# Patient Record
Sex: FEMALE | Race: WHITE | Employment: OTHER | URBAN - METROPOLITAN AREA
[De-identification: names, ages, dates, MRNs, and addresses within clinical notes are randomized per-mention and may not be internally consistent; named-entity substitution may affect disease eponyms.]

---

## 2018-02-13 ENCOUNTER — HOSPITAL ENCOUNTER (OUTPATIENT)
Age: 58
Setting detail: OBSERVATION
Discharge: PSYCHIATRIC OR SUBSTANCE USE FACILITY | End: 2018-02-16
Attending: EMERGENCY MEDICINE | Admitting: INTERNAL MEDICINE
Payer: MEDICARE

## 2018-02-13 ENCOUNTER — APPOINTMENT (OUTPATIENT)
Dept: GENERAL RADIOLOGY | Age: 58
End: 2018-02-13
Attending: INTERNAL MEDICINE
Payer: MEDICARE

## 2018-02-13 ENCOUNTER — APPOINTMENT (OUTPATIENT)
Dept: CT IMAGING | Age: 58
End: 2018-02-13
Attending: EMERGENCY MEDICINE
Payer: MEDICARE

## 2018-02-13 DIAGNOSIS — R41.82 ALTERED MENTAL STATUS, UNSPECIFIED ALTERED MENTAL STATUS TYPE: ICD-10-CM

## 2018-02-13 DIAGNOSIS — E87.6 HYPOKALEMIA: Primary | ICD-10-CM

## 2018-02-13 PROBLEM — E83.39 HYPOPHOSPHATEMIA: Status: ACTIVE | Noted: 2018-02-13
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
## 2018-02-13 PROBLEM — F10.10 ALCOHOL ABUSE: Status: ACTIVE | Noted: 2018-02-13

## 2018-02-13 PROBLEM — F32.A DEPRESSION: Status: ACTIVE | Noted: 2018-02-13

## 2018-02-13 PROBLEM — G93.41 ACUTE METABOLIC ENCEPHALOPATHY: Status: ACTIVE | Noted: 2018-02-13

## 2018-02-13 PROBLEM — F01.50 VASCULAR DEMENTIA (HCC): Status: ACTIVE | Noted: 2018-02-13

## 2018-02-13 PROBLEM — N17.9 AKI (ACUTE KIDNEY INJURY) (HCC): Status: ACTIVE | Noted: 2018-02-13

## 2018-02-13 PROBLEM — E83.42 HYPOMAGNESEMIA: Status: ACTIVE | Noted: 2018-02-13

## 2018-02-13 LAB
ALBUMIN SERPL-MCNC: 2.7 G/DL (ref 3.5–5)
ALBUMIN/GLOB SERPL: 0.8 {RATIO} (ref 1.2–3.5)
ALP SERPL-CCNC: 207 U/L (ref 50–136)
ALT SERPL-CCNC: 36 U/L (ref 12–65)
AMMONIA PLAS-SCNC: <10 UMOL/L (ref 11–32)
AMPHET UR QL SCN: NEGATIVE
ANION GAP SERPL CALC-SCNC: 9 MMOL/L (ref 7–16)
APPEARANCE UR: ABNORMAL
AST SERPL-CCNC: 34 U/L (ref 15–37)
BACTERIA URNS QL MICRO: ABNORMAL /HPF
BARBITURATES UR QL SCN: NEGATIVE
BASOPHILS # BLD: 0.1 K/UL (ref 0–0.2)
BASOPHILS NFR BLD: 1 % (ref 0–2)
BENZODIAZ UR QL: NEGATIVE
BILIRUB SERPL-MCNC: 1.5 MG/DL (ref 0.2–1.1)
BILIRUB UR QL: NEGATIVE
BUN SERPL-MCNC: 11 MG/DL (ref 6–23)
CALCIUM SERPL-MCNC: 9.2 MG/DL (ref 8.3–10.4)
CANNABINOIDS UR QL SCN: NEGATIVE
CASTS URNS QL MICRO: ABNORMAL /LPF
CHLORIDE SERPL-SCNC: 99 MMOL/L (ref 98–107)
CO2 SERPL-SCNC: 33 MMOL/L (ref 21–32)
COCAINE UR QL SCN: NEGATIVE
COLOR UR: YELLOW
CREAT SERPL-MCNC: 1.94 MG/DL (ref 0.6–1)
DIFFERENTIAL METHOD BLD: ABNORMAL
EOSINOPHIL # BLD: 0 K/UL (ref 0–0.8)
EOSINOPHIL NFR BLD: 1 % (ref 0.5–7.8)
EPI CELLS #/AREA URNS HPF: ABNORMAL /HPF
ERYTHROCYTE [DISTWIDTH] IN BLOOD BY AUTOMATED COUNT: 15.6 % (ref 11.9–14.6)
GLOBULIN SER CALC-MCNC: 3.4 G/DL (ref 2.3–3.5)
GLUCOSE SERPL-MCNC: 85 MG/DL (ref 65–100)
GLUCOSE UR STRIP.AUTO-MCNC: NEGATIVE MG/DL
HCT VFR BLD AUTO: 39.3 % (ref 35.8–46.3)
HGB BLD-MCNC: 12.9 G/DL (ref 11.7–15.4)
HGB UR QL STRIP: NEGATIVE
IMM GRANULOCYTES # BLD: 0 K/UL (ref 0–0.5)
IMM GRANULOCYTES NFR BLD AUTO: 0 % (ref 0–5)
KETONES UR QL STRIP.AUTO: NEGATIVE MG/DL
LACTATE BLD-SCNC: 1.4 MMOL/L (ref 0.5–1.9)
LEUKOCYTE ESTERASE UR QL STRIP.AUTO: NEGATIVE
LYMPHOCYTES # BLD: 1.6 K/UL (ref 0.5–4.6)
LYMPHOCYTES NFR BLD: 20 % (ref 13–44)
MAGNESIUM SERPL-MCNC: 1.4 MG/DL (ref 1.8–2.4)
MCH RBC QN AUTO: 29.8 PG (ref 26.1–32.9)
MCHC RBC AUTO-ENTMCNC: 32.8 G/DL (ref 31.4–35)
MCV RBC AUTO: 90.8 FL (ref 79.6–97.8)
METHADONE UR QL: NEGATIVE
MONOCYTES # BLD: 1.1 K/UL (ref 0.1–1.3)
MONOCYTES NFR BLD: 13 % (ref 4–12)
NEUTS SEG # BLD: 5.2 K/UL (ref 1.7–8.2)
NEUTS SEG NFR BLD: 65 % (ref 43–78)
NITRITE UR QL STRIP.AUTO: NEGATIVE
OPIATES UR QL: NEGATIVE
PCP UR QL: NEGATIVE
PH UR STRIP: 7 [PH] (ref 5–9)
PHOSPHATE SERPL-MCNC: 2 MG/DL (ref 2.5–4.5)
PLATELET # BLD AUTO: 163 K/UL (ref 150–450)
PMV BLD AUTO: 9.9 FL (ref 10.8–14.1)
POTASSIUM SERPL-SCNC: 2.5 MMOL/L (ref 3.5–5.1)
PROT SERPL-MCNC: 6.1 G/DL (ref 6.3–8.2)
PROT UR STRIP-MCNC: 100 MG/DL
RBC # BLD AUTO: 4.33 M/UL (ref 4.05–5.25)
RBC #/AREA URNS HPF: ABNORMAL /HPF
SODIUM SERPL-SCNC: 141 MMOL/L (ref 136–145)
SP GR UR REFRACTOMETRY: 1 (ref 1–1.02)
UROBILINOGEN UR QL STRIP.AUTO: 0.2 EU/DL (ref 0.2–1)
WBC # BLD AUTO: 7.9 K/UL (ref 4.3–11.1)
WBC URNS QL MICRO: ABNORMAL /HPF

## 2018-02-13 PROCEDURE — 81003 URINALYSIS AUTO W/O SCOPE: CPT | Performed by: INTERNAL MEDICINE

## 2018-02-13 PROCEDURE — 85025 COMPLETE CBC W/AUTO DIFF WBC: CPT | Performed by: EMERGENCY MEDICINE

## 2018-02-13 PROCEDURE — 74011250637 HC RX REV CODE- 250/637: Performed by: EMERGENCY MEDICINE

## 2018-02-13 PROCEDURE — 65390000012 HC CONDITION CODE 44 OBSERVATION

## 2018-02-13 PROCEDURE — 84100 ASSAY OF PHOSPHORUS: CPT | Performed by: INTERNAL MEDICINE

## 2018-02-13 PROCEDURE — 99218 HC RM OBSERVATION: CPT

## 2018-02-13 PROCEDURE — 99285 EMERGENCY DEPT VISIT HI MDM: CPT | Performed by: EMERGENCY MEDICINE

## 2018-02-13 PROCEDURE — 96372 THER/PROPH/DIAG INJ SC/IM: CPT

## 2018-02-13 PROCEDURE — 83735 ASSAY OF MAGNESIUM: CPT | Performed by: INTERNAL MEDICINE

## 2018-02-13 PROCEDURE — 74011250637 HC RX REV CODE- 250/637: Performed by: INTERNAL MEDICINE

## 2018-02-13 PROCEDURE — 81003 URINALYSIS AUTO W/O SCOPE: CPT | Performed by: EMERGENCY MEDICINE

## 2018-02-13 PROCEDURE — 65270000029 HC RM PRIVATE

## 2018-02-13 PROCEDURE — 71045 X-RAY EXAM CHEST 1 VIEW: CPT

## 2018-02-13 PROCEDURE — 83605 ASSAY OF LACTIC ACID: CPT

## 2018-02-13 PROCEDURE — 82140 ASSAY OF AMMONIA: CPT | Performed by: EMERGENCY MEDICINE

## 2018-02-13 PROCEDURE — 74011250636 HC RX REV CODE- 250/636: Performed by: INTERNAL MEDICINE

## 2018-02-13 PROCEDURE — 70450 CT HEAD/BRAIN W/O DYE: CPT

## 2018-02-13 PROCEDURE — 80053 COMPREHEN METABOLIC PANEL: CPT | Performed by: EMERGENCY MEDICINE

## 2018-02-13 PROCEDURE — 80307 DRUG TEST PRSMV CHEM ANLYZR: CPT | Performed by: EMERGENCY MEDICINE

## 2018-02-13 RX ORDER — ATORVASTATIN CALCIUM 40 MG/1
40 TABLET, FILM COATED ORAL DAILY
Status: DISCONTINUED | OUTPATIENT
Start: 2018-02-14 | End: 2018-02-17 | Stop reason: HOSPADM

## 2018-02-13 RX ORDER — SODIUM CHLORIDE 9 MG/ML
75 INJECTION, SOLUTION INTRAVENOUS CONTINUOUS
Status: DISCONTINUED | OUTPATIENT
Start: 2018-02-13 | End: 2018-02-14

## 2018-02-13 RX ORDER — MAGNESIUM SULFATE HEPTAHYDRATE 40 MG/ML
2 INJECTION, SOLUTION INTRAVENOUS ONCE
Status: DISCONTINUED | OUTPATIENT
Start: 2018-02-13 | End: 2018-02-13

## 2018-02-13 RX ORDER — FOLIC ACID 1 MG/1
1 TABLET ORAL DAILY
Status: DISCONTINUED | OUTPATIENT
Start: 2018-02-14 | End: 2018-02-17 | Stop reason: HOSPADM

## 2018-02-13 RX ORDER — AMLODIPINE BESYLATE 5 MG/1
5 TABLET ORAL DAILY
Status: DISCONTINUED | OUTPATIENT
Start: 2018-02-14 | End: 2018-02-17 | Stop reason: HOSPADM

## 2018-02-13 RX ORDER — ONDANSETRON 8 MG/1
8 TABLET, ORALLY DISINTEGRATING ORAL
Status: COMPLETED | OUTPATIENT
Start: 2018-02-13 | End: 2018-02-13

## 2018-02-13 RX ORDER — LORAZEPAM 2 MG/ML
2 INJECTION INTRAMUSCULAR
Status: DISCONTINUED | OUTPATIENT
Start: 2018-02-13 | End: 2018-02-13

## 2018-02-13 RX ORDER — SODIUM,POTASSIUM PHOSPHATES 280-250MG
1 POWDER IN PACKET (EA) ORAL 4 TIMES DAILY
Status: DISCONTINUED | OUTPATIENT
Start: 2018-02-13 | End: 2018-02-17 | Stop reason: HOSPADM

## 2018-02-13 RX ORDER — LORAZEPAM 2 MG/ML
2 INJECTION INTRAMUSCULAR
Status: DISCONTINUED | OUTPATIENT
Start: 2018-02-13 | End: 2018-02-17 | Stop reason: HOSPADM

## 2018-02-13 RX ORDER — CLOPIDOGREL BISULFATE 75 MG/1
75 TABLET ORAL DAILY
Status: DISCONTINUED | OUTPATIENT
Start: 2018-02-14 | End: 2018-02-14

## 2018-02-13 RX ORDER — LANOLIN ALCOHOL/MO/W.PET/CERES
800 CREAM (GRAM) TOPICAL ONCE
Status: COMPLETED | OUTPATIENT
Start: 2018-02-13 | End: 2018-02-13

## 2018-02-13 RX ORDER — ACETAMINOPHEN 325 MG/1
650 TABLET ORAL
Status: DISCONTINUED | OUTPATIENT
Start: 2018-02-13 | End: 2018-02-17 | Stop reason: HOSPADM

## 2018-02-13 RX ORDER — POTASSIUM CHLORIDE 20 MEQ/1
40 TABLET, EXTENDED RELEASE ORAL
Status: COMPLETED | OUTPATIENT
Start: 2018-02-13 | End: 2018-02-13

## 2018-02-13 RX ORDER — HEPARIN SODIUM 5000 [USP'U]/ML
5000 INJECTION, SOLUTION INTRAVENOUS; SUBCUTANEOUS EVERY 8 HOURS
Status: DISCONTINUED | OUTPATIENT
Start: 2018-02-13 | End: 2018-02-17 | Stop reason: HOSPADM

## 2018-02-13 RX ORDER — POTASSIUM CHLORIDE 20 MEQ/1
20 TABLET, EXTENDED RELEASE ORAL
Status: COMPLETED | OUTPATIENT
Start: 2018-02-13 | End: 2018-02-13

## 2018-02-13 RX ORDER — PANTOPRAZOLE SODIUM 40 MG/1
40 TABLET, DELAYED RELEASE ORAL
Status: DISCONTINUED | OUTPATIENT
Start: 2018-02-14 | End: 2018-02-17 | Stop reason: HOSPADM

## 2018-02-13 RX ORDER — LANOLIN ALCOHOL/MO/W.PET/CERES
400 CREAM (GRAM) TOPICAL 2 TIMES DAILY
Status: DISCONTINUED | OUTPATIENT
Start: 2018-02-14 | End: 2018-02-17 | Stop reason: HOSPADM

## 2018-02-13 RX ORDER — LANOLIN ALCOHOL/MO/W.PET/CERES
100 CREAM (GRAM) TOPICAL DAILY
Status: DISCONTINUED | OUTPATIENT
Start: 2018-02-14 | End: 2018-02-17 | Stop reason: HOSPADM

## 2018-02-13 RX ORDER — POTASSIUM CHLORIDE 14.9 MG/ML
20 INJECTION INTRAVENOUS ONCE
Status: DISCONTINUED | OUTPATIENT
Start: 2018-02-13 | End: 2018-02-13

## 2018-02-13 RX ADMIN — POTASSIUM CHLORIDE 40 MEQ: 20 TABLET, EXTENDED RELEASE ORAL at 14:24

## 2018-02-13 RX ADMIN — POTASSIUM CHLORIDE 20 MEQ: 20 TABLET, EXTENDED RELEASE ORAL at 19:13

## 2018-02-13 RX ADMIN — LORAZEPAM 2 MG: 2 INJECTION INTRAMUSCULAR; INTRAVENOUS at 22:58

## 2018-02-13 RX ADMIN — POTASSIUM & SODIUM PHOSPHATES POWDER PACK 280-160-250 MG 1 PACKET: 280-160-250 PACK at 22:58

## 2018-02-13 RX ADMIN — Medication 800 MG: at 19:14

## 2018-02-13 RX ADMIN — HEPARIN SODIUM 5000 UNITS: 5000 INJECTION, SOLUTION INTRAVENOUS; SUBCUTANEOUS at 19:14

## 2018-02-13 RX ADMIN — ONDANSETRON 8 MG: 8 TABLET, ORALLY DISINTEGRATING ORAL at 19:14

## 2018-02-13 NOTE — ED PROVIDER NOTES
HPI:  62 F, brought in by family member with concern that she has not eaten for over 3 weeks.  stated she was diagnosed with a stroke in February 2017 went to rehabilitation for 1 month in May 2017 is discharged home. Since then she has not been eating and drinking. Has been refusing to take care of herself. Stated she is able to get up and go urinate. + drinking fluids. No fever. Patient denies any headache, neck stiffness, abdominal pain nausea vomiting or diarrhea. No rash. Stated she does not have an appetite. ROS  Constitutional: No fever, no chills  Skin: no rash  Eye: No vision changes  ENMT: No sore throat, no congestion  Respiratory: No shortness of breath, no cough  Cardiovascular: No chest pain  Gastrointestinal: No vomiting, no nausea, no diarrhea, no abdominal pain  : No dysuria  MSK: No back pain, no muscle pain, no joint pain  Neuro: No headache, no change in mental status, no numbness, no tingling, no weakness    All other review of systems positive per history of present illness and the above otherwise negative or noncontributory.     Visit Vitals    BP (!) 154/92    Pulse (!) 16    Temp 99.1 °F (37.3 °C)    Resp 16    Ht 5' 6\" (1.676 m)    Wt 86.2 kg (190 lb)    SpO2 98%    BMI 30.67 kg/m2     Past Medical History:   Diagnosis Date    Aneurysm of common carotid artery ([facility] [address] 2004    left side s/p coil     CKD (chronic kidney disease) 9/18/2014    Dementia     FSGS (focal segmental glomerulosclerosis)     in remission at present    Gout     Hypertension     managed with medication     Osteoarthritis 9/18/2014    Stroke ([facility] [address] 2004, 2013    slight weakness on right side, slight effect to speech     Past Surgical History:   Procedure Laterality Date    HX ANKLE FRACTURE TX Left 2013    has hardware in   [address] [facility]  2004    left carotid     HX ORTHOPAEDIC Right 10/2014    hip replacement    HX REFRACTIVE SURGERY      bilateral - Lasik Prior to Admission Medications   Prescriptions Last Dose Informant Patient Reported? Taking? Cetirizine (ZYRTEC) 10 mg cap   Yes No   Sig: Take 10 mg by mouth daily. POTASSIUM GLUCONATE PO   Yes No   Sig: Take 1 capsule by mouth as needed. Takes one capsule as needed   aspirin (ASPIRIN) 325 mg tablet   Yes No   Sig: Take 325 mg by mouth daily. furosemide (LASIX) 20 mg tablet   No No   Sig: Take two or three tablets every day   ibuprofen (MOTRIN IB) 200 mg tablet   Yes No   Sig: Take 200 mg by mouth daily. multivitamin (ONE A DAY) tablet   Yes No   Sig: Take 1 tablet by mouth daily. olmesartan (BENICAR) 40 mg tablet   Yes No   Sig: Take 20 mg by mouth daily. oxyCODONE IR (ROXICODONE) 5 mg immediate release tablet   No No   Sig: Take 1-2 tablets by mouth every four (4) hours as needed. pravastatin (PRAVACHOL) 20 mg tablet   No No   Sig: Take 1 tablet by mouth nightly. promethazine (PHENERGAN) 25 mg tablet   No No   Sig: Take 1 tablet by mouth every six (6) hours as needed. traZODone (DESYREL) 100 mg tablet   No No   Sig: Take 1 Tab by mouth nightly. valerian root 500 mg cap   Yes No   Sig: Take 500 mg by mouth daily. vitamin E (AQUA GEMS) 400 unit capsule   Yes No   Sig: Take 400 Units by mouth as needed. ziprasidone hcl (GEODON) 60 mg capsule   Yes No   Sig: Take 60 mg by mouth daily.       Facility-Administered Medications: None         Adult Exam   General: alert, no acute distress  Head: normocephalic, atraumatic  ENT: moist mucous membranes  Neck: supple, non-tender; full range of motion  Cardiovascular: regular rate and rhythm, normal peripheral perfusion, no edema  Respiratory: lungs are clear to auscultation; normal respirations; no wheezing, rales or rhonchi  Gastrointestinal: soft, non-tender; no rebound or guarding, no peritoneal signs, no distension  Back: non-tender, full range of motion  Musculoskeletal: normal range of motion, normal strength, no gross deformities  Neurological: alert and oriented x 4, no gross focal deficits; normal speech  Psychiatric: cooperative; appropriate mood and affect    MDM:neurologically intact nontoxic well-appearing. There is no complaint at this time. I did affect that she does not have an appetite. She denies any difficulty swallowing. Denies any abdominal pain nausea vomiting when eating. Just stated she doesn't feel like eating. We'll obtain lab work, CT scan of the head for assessment. Recent Results (from the past 12 hour(s))   CBC WITH AUTOMATED DIFF    Collection Time: 02/13/18  1:01 PM   Result Value Ref Range    WBC 7.9 4.3 - 11.1 K/uL    RBC 4.33 4.05 - 5.25 M/uL    HGB 12.9 11.7 - 15.4 g/dL    HCT 39.3 35.8 - 46.3 %    MCV 90.8 79.6 - 97.8 FL    MCH 29.8 26.1 - 32.9 PG    MCHC 32.8 31.4 - 35.0 g/dL    RDW 15.6 (H) 11.9 - 14.6 %    PLATELET 797 872 - 507 K/uL    MPV 9.9 (L) 10.8 - 14.1 FL    DF AUTOMATED      NEUTROPHILS 65 43 - 78 %    LYMPHOCYTES 20 13 - 44 %    MONOCYTES 13 (H) 4.0 - 12.0 %    EOSINOPHILS 1 0.5 - 7.8 %    BASOPHILS 1 0.0 - 2.0 %    IMMATURE GRANULOCYTES 0 0.0 - 5.0 %    ABS. NEUTROPHILS 5.2 1.7 - 8.2 K/UL    ABS. LYMPHOCYTES 1.6 0.5 - 4.6 K/UL    ABS. MONOCYTES 1.1 0.1 - 1.3 K/UL    ABS. EOSINOPHILS 0.0 0.0 - 0.8 K/UL    ABS. BASOPHILS 0.1 0.0 - 0.2 K/UL    ABS. IMM. GRANS. 0.0 0.0 - 0.5 K/UL   METABOLIC PANEL, COMPREHENSIVE    Collection Time: 02/13/18  1:01 PM   Result Value Ref Range    Sodium 141 136 - 145 mmol/L    Potassium 2.5 (LL) 3.5 - 5.1 mmol/L    Chloride 99 98 - 107 mmol/L    CO2 33 (H) 21 - 32 mmol/L    Anion gap 9 7 - 16 mmol/L    Glucose 85 65 - 100 mg/dL    BUN 11 6 - 23 MG/DL    Creatinine 1.94 (H) 0.6 - 1.0 MG/DL    GFR est AA 34 (L) >60 ml/min/1.73m2    GFR est non-AA 28 (L) >60 ml/min/1.73m2    Calcium 9.2 8.3 - 10.4 MG/DL    Bilirubin, total 1.5 (H) 0.2 - 1.1 MG/DL    ALT (SGPT) 36 12 - 65 U/L    AST (SGOT) 34 15 - 37 U/L    Alk.  phosphatase 207 (H) 50 - 136 U/L Protein, total 6.1 (L) 6.3 - 8.2 g/dL    Albumin 2.7 (L) 3.5 - 5.0 g/dL    Globulin 3.4 2.3 - 3.5 g/dL    A-G Ratio 0.8 (L) 1.2 - 3.5     AMMONIA    Collection Time: 02/13/18  1:01 PM   Result Value Ref Range    Ammonia <10 (L) 11 - 32 UMOL/L   MAGNESIUM    Collection Time: 02/13/18  1:01 PM   Result Value Ref Range    Magnesium 1.4 (LL) 1.8 - 2.4 mg/dL   PHOSPHORUS    Collection Time: 02/13/18  1:01 PM   Result Value Ref Range    Phosphorus 2.0 (L) 2.5 - 4.5 MG/DL   POC LACTIC ACID    Collection Time: 02/13/18  1:13 PM   Result Value Ref Range    Lactic Acid (POC) 1.4 0.5 - 1.9 mmol/L   DRUG SCREEN, URINE    Collection Time: 02/13/18  3:16 PM   Result Value Ref Range    PCP(PHENCYCLIDINE) NEGATIVE       BENZODIAZEPINES NEGATIVE       COCAINE NEGATIVE       AMPHETAMINES NEGATIVE       METHADONE NEGATIVE       THC (TH-CANNABINOL) NEGATIVE       OPIATES NEGATIVE       BARBITURATES NEGATIVE      URINALYSIS W/ RFLX MICROSCOPIC    Collection Time: 02/13/18  3:16 PM   Result Value Ref Range    Color YELLOW      Appearance CLOUDY      Specific gravity 1.003 1.001 - 1.023      pH (UA) 7.0 5.0 - 9.0      Protein 100 (A) NEG mg/dL    Glucose NEGATIVE  NEG mg/dL    Ketone NEGATIVE  NEG mg/dL    Bilirubin NEGATIVE  NEG      Blood NEGATIVE  NEG      Urobilinogen 0.2 0.2 - 1.0 EU/dL    Nitrites NEGATIVE  NEG      Leukocyte Esterase NEGATIVE  NEG      WBC 0-3 0 /hpf    RBC 0-3 0 /hpf    Epithelial cells 3-5 0 /hpf    Bacteria TRACE 0 /hpf    Casts 0-3 0 /lpf       Xr Chest Sngl V    Result Date: 2/13/2018  Portable chest x-ray INDICATION: Altered mental status COMPARISON: 09/18/2014 FINDINGS: Lungs are clear and normally expanded. Normal cardiomediastinal silhouette. No pleural effusion or pneumothorax. Surrounding bones are intact. IMPRESSION: No acute abnormality. Ct Head Wo Cont    Result Date: 2/13/2018  CT BRAIN WITHOUT CONTRAST 2/13/2018 HISTORY:  History of stroke.  No other progressive confusion and deteriorating mental status COMPARISON:  CT brain 10/2/2014 TECHNIQUE:  Axial scans without contrast.  Radiation dose reduction techniques were used for this study: All CT scans performed at this facility use one or all of the following: Automated exposure control, adjustment of the mA and/or kVp according to patient's size, iterative reconstruction. FINDINGS:  Stable, chronic left posterior frontal and adjacent anterior temporal lobe infarct. Diffuse advanced cortical volume loss again noted. Small chronic left posterior watershed infarct. Embolization coil within the left parasellar ICA. Exam negative for acute hemorrhage, mass, or mass effect. No definite CT evidence of acute major vascular territory infarct. IMPRESSION:  No acute CT findings the brain with stable appearing chronic changes as above. CT neg. CXR neg  Hypokalemia. She has AMS per family. This may have psychiatric component but I feel patient need to have further evaluation including possible MRI head for AMS. Spoke with hospitalist. Patient will be admitted for further evaluation   Dragon voice recognition software was used to create this note. Although the note has been reviewed and corrected where necessary, additional errors may have been overlooked and remain in the text.

## 2018-02-13 NOTE — ED TRIAGE NOTES
Patient with  stating that for past 2 months patient has refused to take her medications and decreased appetite. Patient has been refusing personal care stating has only taken one bath since discharge from [facility] in May 2017. Patient denies any complaints. Patient history of CVA and has been having increased confusion over past month. Patient only answers question in relation to family member at this time. Patient with bowel movement on herself yesterday.

## 2018-02-13 NOTE — H&P
History and Physical    Patient: Christophe Pressley MRN: [id number]  SSN: [id number]    YOB: 1960  Age: 62 y.o. Sex: female      Subjective:   Cc: \" I have decreased appetite\"    Christophe Pressley is a 62 y.o. female who has a PMH of vascular dementia, HTN, dyslipidemia, chronic alcohol intake, remote history of bipolar disorder in no treatment, CKD stage III, multiple mechanical falls who was brought in by her  since he noted she had been refusing to eat and drink water for around 3-4 weeks. He said that she drinks 1 pint of vodka daily for several years and her habit has produced falls with fractures, last 1 month ago creating a right humeral fracture. Her last drink was 2 days ago. Patient denies cough, fever, upper or lower gi bleeding episodes, abdominal pain, diarrhea. She has no suicidal nor homicidal ideations, but stated she does feel depressed sometimes. Her  stated since May/17, when she was released from rehab, he finds very hard to convince her to take a bath. Upon arrival to ER VS: /90  HR 80  T 97F  RR 16  O2: 96% room air. Labs: potassium of 2. 5, cr 1.9 ( baseline 1.5 ), brain ct was negative for acute findings. cxr unremarkable. She received KCL 40 po and 20 meq iv x 1. Hospitalist was contacted for admission due to hypokalemia and bryson. ROS: all pertinent findings described as above    PMH: as above  Social hx: as above.  Non smoker  Family hx: her father had cancer     Past Medical History:   Diagnosis Date    Aneurysm of common carotid artery ([facility] [address] 2004    left side s/p coil     CKD (chronic kidney disease) 9/18/2014    Dementia     FSGS (focal segmental glomerulosclerosis)     in remission at present    Gout     Hypertension     managed with medication     Osteoarthritis 9/18/2014    Stroke ([address] 2004, 2013    slight weakness on right side, slight effect to speech     Past Surgical History:   Procedure Laterality Date    HX ANKLE FRACTURE TX Left 2013    [facility]    [facility]     HX ORTHOPAEDIC Right 10/2014    hip replacement    HX REFRACTIVE SURGERY      bilateral - Lasik      Family History   Problem Relation Age of Onset    Cancer Father 80     unknown    Stroke Sister 48     Social History   Substance Use Topics    Smoking status: Former Smoker     Packs/day: 0.25     Quit date: 1/1/1979    Smokeless tobacco: Never Used    Alcohol use 1.5 oz/week     3 Shots of liquor per week      Prior to Admission medications    Medication Sig Start Date End Date Taking? Authorizing Provider   aspirin (ASPIRIN) 325 mg tablet Take 325 mg by mouth daily. Historical Provider   olmesartan (BENICAR) 40 mg tablet Take 20 mg by mouth daily. Anneliese Worrell MD   vitamin E (AQUA GEMS) 400 unit capsule Take 400 Units by mouth as needed. Historical Provider   POTASSIUM GLUCONATE PO Take 1 capsule by mouth as needed. Takes one capsule as needed    Historical Provider   ibuprofen (MOTRIN IB) 200 mg tablet Take 200 mg by mouth daily. Historical Provider   ziprasidone hcl (GEODON) 60 mg capsule Take 60 mg by mouth daily. Aretha Coughlin MD   valerian root 500 mg cap Take 500 mg by mouth daily. Historical Provider   Cetirizine (ZYRTEC) 10 mg cap Take 10 mg by mouth daily. Historical Provider   multivitamin (ONE A DAY) tablet Take 1 tablet by mouth daily. Historical Provider   pravastatin (PRAVACHOL) 20 mg tablet Take 1 tablet by mouth nightly. 10/22/14   Nory Harmon DO   promethazine (PHENERGAN) 25 mg tablet Take 1 tablet by mouth every six (6) hours as needed. 9/21/14   Aretha Coughlin MD   oxyCODONE IR (ROXICODONE) 5 mg immediate release tablet Take 1-2 tablets by mouth every four (4) hours as needed.  9/19/14   Aretha Coughlin MD   furosemide (LASIX) 20 mg tablet Take two or three tablets every day 6/30/14   Anneliese Worrell MD   traZODone (DESYREL) 100 mg tablet Take 1 Tab by mouth nightly. 10/31/13   Radha Stanley MD        Allergies   Allergen Reactions    Codeine Nausea and Vomiting    Lortab [Hydrocodone-Acetaminophen] Nausea and Vomiting       Review of Systems:  A comprehensive review of systems was negative except for that written in the History of Present Illness. Objective:     Vitals:    02/13/18 0842 02/13/18 0849 02/13/18 1109   BP: 136/90 (!) 154/92    Pulse: (!) 16     Resp: 16     Temp:   99.1 °F (37.3 °C)   SpO2: 98%     Weight: 86.2 kg (190 lb)     Height: 5' 6\" (1.676 m)          Physical Exam:  GENERAL: alert, cooperative, no distress, appears stated age  EYE: negative  LYMPHATIC: Cervical, supraclavicular, and axillary nodes normal.   THROAT & NECK: normal and no erythema or exudates noted. LUNG: clear to auscultation bilaterally  HEART: regular rate and rhythm, S1, S2 normal, no murmur, click, rub or gallop  ABDOMEN: soft, non-tender. Bowel sounds normal. No masses,  no organomegaly  EXTREMITIES:  extremities normal, atraumatic, no cyanosis or edema  SKIN: Normal.  NEUROLOGIC: negative, no tongue fasciculations, no tremors, alert, oriented in person and place, not in time   PSYCHIATRIC: flat affect     Assessment:     Hospital Problems  Date Reviewed: 7/29/2014          Codes Class Noted POA    Hypokalemia ICD-10-CM: E87.6  ICD-9-CM: 276.8  2/13/2018 Yes        OSMIN (acute [facility]) [facility] ICD-10-CM: N17.9  ICD-9-CM: 584.9  2/13/2018 Yes        Acute metabolic encephalopathy YFZ-87-GE: G93.41  ICD-9-CM: 348.31  2/13/2018 Yes        Alcohol abuse ICD-10-CM: F10.10  ICD-9-CM: 305.00  2/13/2018 Yes        Falls ICD-10-CM: P72. Jack Schwalbe  ICD-9-CM: E888.9  2/13/2018 Yes        Vascular dementia ICD-10-CM: F01.50  ICD-9-CM: 290.40  2/13/2018 Yes        Gait instability ICD-10-CM: R26.81  ICD-9-CM: 781.2  10/22/2014 Yes        Osteoarthritis ICD-10-CM: M19.90  ICD-9-CM: 715.90  9/18/2014 Yes        Essential hypertension ICD-10-CM: I10  ICD-9-CM: 401.9  9/11/2012 Yes              Plan: 1. Hypokalemia, possible due to chronic alcohol intake / poor dietary intake  2. OSMIN on ckd: possible due to dehydration  3. Acute metabolic encephalopathy, possible secondary to poor oral intake / history of depression/ vascular dementia / hypokalemia  4. Chronic alcohol intake: currently not on withdrawal  5. Multiple history of falls and fractures: recent 1 month ago with right humeral fx  6. Hypomagenesemia, hypophosphatemia   7. History of bipolar/ depression: currently not suicidal nor homicidal     Plan:    -Admit under inpatient  -Renal diet  -Continue IVF hydration  -Avoid nephrotoxic meds  -Stat iv thiamine/folate, then oral tablet tomorrow  -Mag 2 gr iv  -Neutra phos 1 tab q 6hrs   -Ativan 2mg ivp q 4hrs if agitation/withdrawal  -Resume lipitor, norvasc  -Hold plavix   -Psych eval tomorrow  -EKG, UA   -re-check chem, mag, phosp in am  -Check vit b 12 levels, tsh, rpr   -PT/OT for falls  -DVT ppx: heparin sq/ GCS    Full code    Estimated LOS > 2MN   Risk: high  Estimated DC planning: home PT/ STR  Goals of care discussed with patient and her       Signed By: Concha Martinez MD     February 13, 2018

## 2018-02-13 NOTE — PROGRESS NOTES
Visited with patient,  Marissa Robb at bedside.  states patient was in [facility] for rehab after inpatient visit for 2 months (till May of 2017). States she since 'won't do anything for me'. ... Levorn Stover 'won't get out'. ...'hasn't bathed since May'. States she uses a walker for ambulation but sometimes will just 'walk down the walls'. States he has tried to [employer] for her but they mostly eat out because she doesn't like his food. They also get Meals on Wheels. Patient tells  and I to stop talking because he's 'talking shit'. Addressed patient directly who admits that she does not leave the house because 'I just don't want to'. Also admits that she does not bathe 'because it hurts too much'. Patient then put her hands in the air and states 'go away, go away - stop asking me all these questions'. Patient appears agitated. Will need to address dispo later.

## 2018-02-14 LAB
ANION GAP SERPL CALC-SCNC: 12 MMOL/L (ref 7–16)
BUN SERPL-MCNC: 16 MG/DL (ref 6–23)
CALCIUM SERPL-MCNC: 8.4 MG/DL (ref 8.3–10.4)
CHLORIDE SERPL-SCNC: 102 MMOL/L (ref 98–107)
CO2 SERPL-SCNC: 25 MMOL/L (ref 21–32)
CREAT SERPL-MCNC: 2.36 MG/DL (ref 0.6–1)
GLUCOSE SERPL-MCNC: 110 MG/DL (ref 65–100)
MAGNESIUM SERPL-MCNC: 1.8 MG/DL (ref 1.8–2.4)
POTASSIUM SERPL-SCNC: 4.1 MMOL/L (ref 3.5–5.1)
SODIUM SERPL-SCNC: 139 MMOL/L (ref 136–145)

## 2018-02-14 PROCEDURE — 74011250637 HC RX REV CODE- 250/637: Performed by: INTERNAL MEDICINE

## 2018-02-14 PROCEDURE — 65390000012 HC CONDITION CODE 44 OBSERVATION

## 2018-02-14 PROCEDURE — G8980 MOBILITY D/C STATUS: HCPCS

## 2018-02-14 PROCEDURE — 97162 PT EVAL MOD COMPLEX 30 MIN: CPT

## 2018-02-14 PROCEDURE — 83735 ASSAY OF MAGNESIUM: CPT | Performed by: INTERNAL MEDICINE

## 2018-02-14 PROCEDURE — 80048 BASIC METABOLIC PNL TOTAL CA: CPT | Performed by: INTERNAL MEDICINE

## 2018-02-14 PROCEDURE — 90471 IMMUNIZATION ADMIN: CPT

## 2018-02-14 PROCEDURE — 96361 HYDRATE IV INFUSION ADD-ON: CPT

## 2018-02-14 PROCEDURE — G8978 MOBILITY CURRENT STATUS: HCPCS

## 2018-02-14 PROCEDURE — 90686 IIV4 VACC NO PRSV 0.5 ML IM: CPT | Performed by: INTERNAL MEDICINE

## 2018-02-14 PROCEDURE — 99218 HC RM OBSERVATION: CPT

## 2018-02-14 PROCEDURE — 96372 THER/PROPH/DIAG INJ SC/IM: CPT

## 2018-02-14 PROCEDURE — G8979 MOBILITY GOAL STATUS: HCPCS

## 2018-02-14 PROCEDURE — 36416 COLLJ CAPILLARY BLOOD SPEC: CPT | Performed by: INTERNAL MEDICINE

## 2018-02-14 PROCEDURE — 74011250636 HC RX REV CODE- 250/636: Performed by: INTERNAL MEDICINE

## 2018-02-14 RX ORDER — ONDANSETRON 2 MG/ML
4 INJECTION INTRAMUSCULAR; INTRAVENOUS
Status: DISCONTINUED | OUTPATIENT
Start: 2018-02-14 | End: 2018-02-17 | Stop reason: HOSPADM

## 2018-02-14 RX ORDER — MIRTAZAPINE 15 MG/1
7.5 TABLET, FILM COATED ORAL
Status: DISCONTINUED | OUTPATIENT
Start: 2018-02-14 | End: 2018-02-17 | Stop reason: HOSPADM

## 2018-02-14 RX ADMIN — ATORVASTATIN CALCIUM 40 MG: 40 TABLET, FILM COATED ORAL at 09:40

## 2018-02-14 RX ADMIN — AMLODIPINE BESYLATE 5 MG: 5 TABLET ORAL at 09:40

## 2018-02-14 RX ADMIN — INFLUENZA VIRUS VACCINE 0.5 ML: 15; 15; 15; 15 SUSPENSION INTRAMUSCULAR at 00:43

## 2018-02-14 RX ADMIN — POTASSIUM & SODIUM PHOSPHATES POWDER PACK 280-160-250 MG 1 PACKET: 280-160-250 PACK at 09:39

## 2018-02-14 RX ADMIN — Medication 1 AMPULE: at 03:43

## 2018-02-14 RX ADMIN — MIRTAZAPINE 7.5 MG: 15 TABLET, FILM COATED ORAL at 22:23

## 2018-02-14 RX ADMIN — Medication 1 AMPULE: at 21:09

## 2018-02-14 RX ADMIN — POTASSIUM & SODIUM PHOSPHATES POWDER PACK 280-160-250 MG 1 PACKET: 280-160-250 PACK at 22:22

## 2018-02-14 RX ADMIN — Medication 400 MG: at 18:18

## 2018-02-14 RX ADMIN — FOLIC ACID 1 MG: 1 TABLET ORAL at 09:39

## 2018-02-14 RX ADMIN — Medication 400 MG: at 09:39

## 2018-02-14 RX ADMIN — HEPARIN SODIUM 5000 UNITS: 5000 INJECTION, SOLUTION INTRAVENOUS; SUBCUTANEOUS at 13:32

## 2018-02-14 RX ADMIN — Medication 1 AMPULE: at 09:46

## 2018-02-14 RX ADMIN — Medication 100 MG: at 09:40

## 2018-02-14 RX ADMIN — LORAZEPAM 2 MG: 2 INJECTION INTRAMUSCULAR; INTRAVENOUS at 22:18

## 2018-02-14 RX ADMIN — PANTOPRAZOLE SODIUM 40 MG: 40 TABLET, DELAYED RELEASE ORAL at 09:40

## 2018-02-14 RX ADMIN — HEPARIN SODIUM 5000 UNITS: 5000 INJECTION, SOLUTION INTRAVENOUS; SUBCUTANEOUS at 03:43

## 2018-02-14 RX ADMIN — LORAZEPAM 2 MG: 2 INJECTION INTRAMUSCULAR; INTRAVENOUS at 13:32

## 2018-02-14 RX ADMIN — SODIUM CHLORIDE 75 ML/HR: 900 INJECTION, SOLUTION INTRAVENOUS at 13:32

## 2018-02-14 RX ADMIN — POTASSIUM & SODIUM PHOSPHATES POWDER PACK 280-160-250 MG 1 PACKET: 280-160-250 PACK at 13:00

## 2018-02-14 RX ADMIN — POTASSIUM & SODIUM PHOSPHATES POWDER PACK 280-160-250 MG 1 PACKET: 280-160-250 PACK at 18:18

## 2018-02-14 RX ADMIN — HEPARIN SODIUM 5000 UNITS: 5000 INJECTION, SOLUTION INTRAVENOUS; SUBCUTANEOUS at 18:18

## 2018-02-14 NOTE — PROGRESS NOTES
Problem: Nutrition Deficit  Goal: *Optimize nutritional status  Nutrition  Reason for assessment: Referral received from nursing admission Malnutrition Screening Tool for recently lost unknown amount of weight without trying and eating poorly due to decreased appetite. Assessment:   Diet order(s): Renal  Food/Nutrition Patient History:  Pt presented by  r/t patient refusing to eat or drink past 3-4 weeks; drinks 1 pint vodka daily past several years. Past medical history notable for vascular dementia, HTN, dyslipidemia, chronic alcohol intake, , stroke and multiple falls at home. Pt lives with spouse; states she is sedentary at home; has no appetite.  states he brings take out home most days from [facility] [employer] and [employer]; states his wife is a picky eater so he doesn't cook much at home. Pt eats a biscuit at breakfast and burger, Baxano filet during the days when she eats. Spouse states she has eaten more the past day than she had for the last 3 weeks. Pt unable to tell me if she lost weight recently or what is her usual body weight; doesn't know if her clothes are feeling loose on her. Pertinent Rx:  Folvite, Thiamine, Remeron   Anthropometrics:Height: 5' 6\" (167.6 cm),  Weight: 86.2 kg (190 lb), Weight Source: Estimated, Body mass index is 30.67 kg/(m^2). BMI class of Obesity Class 1.   *Please obtain an actual weight to assess reported weight loss  Macronutrient needs:  EER:  9100-7934 kcal /day (20-25 kcal/kg BW)  EPR:  35-47 grams protein/day (0.6-0.8- grams/kg IBW) GFR 23  Intake/Comparative Standards: No recorded intake; pt/spouse state patient has eaten well past 3 meals; ~95%. Pt potentially meets % estimated kcal needs and 100% estimated protein needs. Nutrition Diagnosis: No nutrition diagnosis at this time. Intervention:  Meals and snacks: Continue current diet.  Briefly reviewed dietary guidelines for a Renal diet; pt states she doesn't use salt at home; noted high fat, high sodium intake from take outs. Encouraged continued po intake past discharge. Ordered weight  Discharge nutrition plan: Too soon to determine.     Nidhi Hernandez, [address] [place] [address], [facility] [address], MPH  [phone]

## 2018-02-14 NOTE — PROGRESS NOTES
Admission assessment completed via doc flow sheet. Patient is alert and oriented x 2. Respirations even and unlabored on room air. Lung sounds CTA bilaterally. Heart sounds S1, S2 auscultated and regular. Abdomen soft and non tender. Bowel sounds active to all 4 quadrants. Skin is noted with bruising on the left armpit and left hip. Patient ambulates to bathroom as needed with assistance. Patient denies pain and other needs at this time. Bed is locked and in low position. Bed rails x 3. Patient is encouraged to call for assistance. Spouse at bedside. Call light within reach.

## 2018-02-14 NOTE — PROGRESS NOTES
Primary Nurse Chris Broderick and Reyes Pederson RN performed a dual skin assessment on this patient No impairment noted  Luis score is 20.

## 2018-02-14 NOTE — PROGRESS NOTES
Problem: Mobility Impaired (Adult and Pediatric)  Goal: *Acute Goals and Plan of Care (Insert Text)  1 WEEK GOALS :  (1.)Ms. Ed Flores will move from supine to sit and sit to supine  with STAND BY ASSIST within 7 treatment day(s). (2.)Ms. Ed Flores will transfer from bed to chair and chair to bed with CONTACT GUARD ASSIST using the least restrictive device within 7 treatment day(s). (3.)Ms. Ed Flores will ambulate with CONTACT GUARD ASSIST for  feet with the least restrictive device within 7 treatment day(s). 4) pt performing HEP 4 X a day, AROM UE & LE with written guidelines. 5) Pt out of bed or in chair / chair mode 6 hrs a day.    ________________________________________________________________________________________________    PHYSICAL THERAPY: Initial Assessment, Discharge, Treatment Day: Day of Assessment, AM 2/14/2018  INPATIENT: Hospital Day: 2  Payor: Griffith Councilman / Plan: [address] / Product Type: Managed Care Medicare /      NAME/AGE/GENDER: Adrienne Torres is a 62 y.o. female   PRIMARY DIAGNOSIS: Hypokalemia  OSMIN [facility]acute [facility]) ([facility]  Acute metabolic encephalopathy <principal problem not specified> <principal problem not specified>        ICD-10: Treatment Diagnosis:   · Generalized Muscle Weakness (M62.81)  · Other lack of cordination (R27.8)   Precaution/Allergies:  Codeine and Lortab [hydrocodone-acetaminophen]      ASSESSMENT:     Ms. Ed Flores presents as agitated & very debilitated along with not being very cooperative. Pt is a high fall risk & questionable potential due to poor motivation. PT will put pt on a trial basis for 1 week to see if further potential can be obtained. ADDENDUM : Dr Angela Greene cleared pt to use Right UE as tolerated with no restrictions @ 9:15 am 11/14/18. This section established at most recent assessment   PROBLEM LIST (Impairments causing functional limitations):  1. Decreased Strength  2.  Decreased ADL/Functional Activities  3. Decreased Transfer Abilities  4. Decreased Ambulation Ability/Technique  5. Decreased Balance  6. Decreased Activity Tolerance  7. Decreased Flexibility/Joint Mobility  8. Decreased [place] with Home Exercise Program   INTERVENTIONS PLANNED: (Benefits and precautions of physical therapy have been discussed with the patient.)  1. Balance Exercise  2. Bed Mobility  3. Family Education  4. Gait Training  5. Therapeutic Activites  6. Therapeutic Exercise/Strengthening  7. Transfer Training     TREATMENT PLAN: Frequency/Duration: daily for duration of hospital stay  Rehabilitation Potential For Stated Goals: Guarded     RECOMMENDED REHABILITATION/EQUIPMENT: (at time of discharge pending progress): Due to the probability of continued deficits (see above) this patient will likely need continued skilled physical therapy after discharge. Equipment:    to be determined              HISTORY:   History of Present Injury/Illness (Reason for Referral):  Anup Mejía is a 62 y.o. female who has a PMH of vascular dementia, HTN, dyslipidemia, chronic alcohol intake, remote history of bipolar disorder in no treatment, CKD stage III, multiple mechanical falls who was brought in by her  since he noted she had been refusing to eat and drink water for around 3-4 weeks. He said that she drinks 1 pint of vodka daily for several years and her habit has produced falls with fractures, last 1 month ago creating a right humeral fracture. Her last drink was 2 days ago. Patient denies cough, fever, upper or lower gi bleeding episodes, abdominal pain, diarrhea. She has no suicidal nor homicidal ideations, but stated she does feel depressed sometimes. Her  stated since May/17, when she was released from rehab, he finds very hard to convince her to take a bath. Upon arrival to ER VS: /90  HR 80  T 97F  RR 16  O2: 96% room air.   Labs: potassium of 2. 5, cr 1.9 ( baseline 1.5 ), brain ct was negative for acute findings. cxr unremarkable. She received KCL 40 po and 20 meq iv x 1. Past Medical History/Comorbidities:   Ms. Kyle Aquino  has a past medical history of Aneurysm of common carotid artery ([address] (2004); CKD (chronic kidney disease) (9/18/2014); Dementia; FSGS (focal segmental glomerulosclerosis); Gout; Hypertension; Osteoarthritis (9/18/2014); and Stroke [facility]) (2004, 2013). Ms. Kyle Aquino  has a past surgical history that includes hx intracranial aneurysm repair (2004); hx refractive surgery; hx orthopaedic (Right, 10/2014); and hx ankle fracture tx (Left, 2013). Social History/Living Environment:   Home Environment: Private residence  # Steps to Enter:  (pt not able to say)  One/Two Story Residence: One story  # of Interior Steps: 15  Living Alone: No  Support Systems: Spouse/Significant Other/Partner  Patient Expects to be Discharged to[de-identified] Private residence  Current DME Used/Available at Home:  (pt unable to say)  Prior Level of Function/Work/Activity:  Pt holding furniture in the home to ambulate but frequent falls reported  Personal Factors: Other factors that influence how disability is experienced by the patient:  Current & PMH   Number of Personal Factors/Comorbidities that affect the Plan of Care: 3+: HIGH COMPLEXITY   EXAMINATION:   Most Recent Physical Functioning:   Gross Assessment:  AROM: Generally decreased, functional (left UE & both LE's)  Strength: Generally decreased, functional (left UE & both LE's)  Coordination: Generally decreased, functional (left UE & both LE's)                    Balance:  Sitting: Intact; Without support  Standing: Impaired; With support (manual) Bed Mobility:  Supine to Sit: Minimum assistance  Sit to Supine: Contact guard assistance  Scooting: Contact guard assistance       Transfers:  Sit to Stand: Moderate assistance  Stand to Sit: Moderate assistance  Bed to Chair: Moderate assistance  Gait:     Speed/Radha: Shuffled; Slow  Step Length: Left shortened;Right shortened  Gait Abnormalities: Decreased step clearance;Shuffling gait;Trunk sway increased  Distance (ft): 15 Feet (ft) (x 2)  Assistive Device:  (none, HHA)  Ambulation - Level of Assistance: Moderate assistance   Functional Mobility:         Gait/Ambulation:  mod        Transfers:  mod        Bed Mobility:  min   Body Structures Involved:  1. Metabolic  2. Muscles Body Functions Affected:  1. Movement Related  2. Metobolic/Endocrine Activities and Participation Affected:  1. General Tasks and Demands  2. Mobility   Number of elements that affect the Plan of Care: 4+: HIGH COMPLEXITY   CLINICAL PRESENTATION:   Presentation: Evolving clinical presentation with changing clinical characteristics: MODERATE COMPLEXITY   CLINICAL DECISION MAKIN [facility] Mobility Inpatient Short Form  How much difficulty does the patient currently have. .. Unable A Lot A Little None   1. Turning over in bed (including adjusting bedclothes, sheets and blankets)? [] 1   [] 2   [x] 3   [] 4   2. Sitting down on and standing up from a chair with arms ( e.g., wheelchair, bedside commode, etc.)   [] 1   [x] 2   [] 3   [] 4   3. Moving from lying on back to sitting on the side of the bed? [] 1   [] 2   [x] 3   [] 4   How much help from another person does the patient currently need. .. Total A Lot A Little None   4. Moving to and from a bed to a chair (including a wheelchair)? [] 1   [x] 2   [] 3   [] 4   5. Need to walk in hospital room? [] 1   [x] 2   [] 3   [] 4   6. Climbing 3-5 steps with a railing? [x] 1   [] 2   [] 3   [] 4   © , Trustees of [address] [place] [id number], under license to [employer]. All rights reserved      Score:  Initial: 13 Most Recent: X (Date: -- )    Interpretation of Tool:  Represents activities that are increasingly more difficult (i.e. Bed mobility, Transfers, Gait). Score 24 23 22-20 19-15 14-10 9-7 6     Modifier CH CI CJ CK CL CM CN      ?  Mobility - Walking and Moving Around:     - CURRENT STATUS: CL - 60%-79% impaired, limited or restricted    - GOAL STATUS: CK - 40%-59% impaired, limited or restricted    - D/C STATUS:  CL - 60%-79% impaired, limited or restricted  Payor: [address] [place] [address] / Plan: [address] / Product Type: Managed Care Medicare /      Medical Necessity:     · Patient is expected to demonstrate progress in strength, range of motion, balance, coordination and functional technique to decrease assistance required with bed mobility, transfers & gait. Reason for Services/Other Comments:  · Patient continues to require skilled intervention due to pt not safe with functional mobility. Use of outcome tool(s) and clinical judgement create a POC that gives a: Questionable prediction of patient's progress: MODERATE COMPLEXITY            TREATMENT:   (In addition to Assessment/Re-Assessment sessions the following treatments were rendered)   Pre-treatment Symptoms/Complaints:  fatigue  Pain: Initial: visual scale  Pain Intensity 1: 0  Post Session:  0/10     Assessment/Reassessment only, no treatment provided today    Braces/Orthotics/Lines/Etc:   · IV  Treatment/Session Assessment:    · Response to Treatment:  tolerated poorly  · Interdisciplinary Collaboration:   o Registered Nurse  o Rehabilitation Attendant  · After treatment position/precautions:   o Supine in bed  o Bed alarm/tab alert on  o Bed/Chair-wheels locked  o Call light within reach  o RN notified   · Compliance with Program/Exercises: Will assess as treatment progresses. · Recommendations/Intent for next treatment session: \"Next visit will focus on reduction in assistance provided\".   Total Treatment Duration:  PT Patient Time In/Time Out  Time In: 0830  Time Out: [address] PT

## 2018-02-14 NOTE — PROGRESS NOTES
EFFIE Santana Care Management Signed  Progress Notes Date of Service: 02/15/18 1687         []Hide copied text  []Rolandover for attribution information  SW has spoken with spouse the past 2 days about psych placement. Spouse last updated this am.   Referrals were made to [facility] and [facility] on 2/14. [facility] denied because they state they are a crisis hospital and pt was not a harm to herself or others. [facility] had no beds and didn't feel pt was medically stable due to labs. Pt had updated lab work today which was [facility]. [facility] reviewed and may take but did want an EKG- MD will order. [facility] Viera will want pt on Involuntary Papers which will be completed when [facility] officially accepts her. SW spoke with Pallavi Wright several times and she is aware EKG ordered but results will not be ready until late and will be faxed in the AM.  Pallavi Wright [phone]  [facility]                      Per MD and  tele psych , they feel pt needs inpt treatment for depression. Pt is agreeable to go ,if not she would be made an involuntary commitment. SW also spoke with spouse who states he has been to [facility] before. Spouse originally was wanting a referral to [facility] for physical rehab ( where pt has been before). SW and MD explained that pt's depression needed to be addressed first since she is not eating ,bathing or ambulating much. Referral faxed to [facility] and they Mily Garcia report pt does not meet inpt criteria since she is not a threat to herself or others. Maeve Cummings states they cannot make pt's eat. Maeve Cummings states [facility] is a [facility] thererfore pt has to be a harm to herself or others , not just depression. BIJAN [place]ed [place] [id number]. Deshawn Dotson from [place] [address] called and states pt is not medially stable (\"horrible labs\") and they currently do not have an available bed.   BIJAN asked MD if pt was medically stable- she was rechecking labs but first pt refused, then  they were unable to get blood. MD reports she will try to draw blood herself if none has been able to be drawn. SW following for pt to be medically stable and will fax to additional facilities.     Lynette Motley

## 2018-02-14 NOTE — PROGRESS NOTES
Progress Note    Patient: Eran Dia MRN: [id number]  SSN: [id number]    YOB: 1960  Age: 62 y.o. Sex: female      Admit Date: 2/13/2018    LOS: 1 day     Subjective:   Patient examined at bedside. She had no complains. Psychiatry consult made, patient is a candidate for inpatient psych admission once medically stable.  was present in the room. Care manager involved. She has been eating. She was encouraged to allow blood work done. ROS: all pertinent findings described in my note. Objective:     Vitals:    02/13/18 1905 02/13/18 2058 02/13/18 2320 02/14/18 0340   BP: 148/71 (!) 143/95 133/80 116/78   Pulse: 89 92 81 (!) 106   Resp: 16 16 18 16   Temp:  98.3 °F (36.8 °C) 98.5 °F (36.9 °C) 98 °F (36.7 °C)   SpO2: 98% 100% 100% 96%   Weight:       Height:            Intake and Output:  Current Shift:    Last three shifts: 02/12 1901 - 02/14 0700  In: -   Out: 50 [Urine:50]    Physical Exam:   GENERAL: alert, cooperative, no distress, appears stated age  EYE: negative  LYMPHATIC: Cervical, supraclavicular, and axillary nodes normal.   THROAT & NECK: normal and no erythema or exudates noted. LUNG: clear to auscultation bilaterally  HEART: regular rate and rhythm, S1, S2 normal, no murmur, click, rub or gallop  ABDOMEN: soft, non-tender. Bowel sounds normal. No masses,  no organomegaly  EXTREMITIES:  extremities normal, atraumatic, no cyanosis or edema  SKIN: Normal.  NEUROLOGIC: negative, no signs of tremors   PSYCHIATRIC: flat affect     Lab/Data Review: All lab results for the last 24 hours reviewed.        Assessment:     Active Problems:    Essential hypertension (9/11/2012)      Osteoarthritis (9/18/2014)      Gait instability (10/22/2014)      Hypokalemia (2/13/2018)      OSMIN (acute kidney injury) ([facility] (2/13/2018)      Acute metabolic encephalopathy (0/51/4711)      Alcohol abuse (2/13/2018)      Falls (2/13/2018)      Vascular dementia (2/13/2018) Hypomagnesemia (2/13/2018)      Hypophosphatemia (2/13/2018)      Depression (2/13/2018)        Plan: 1. Hypokalemia, possible due to chronic alcohol intake / poor dietary intake  2. OSMIN on ckd: possible due to dehydration  3. Acute metabolic encephalopathy, possible secondary to poor oral intake / history of depression/ vascular dementia / hypokalemia: resolved   4. Chronic alcohol intake: currently not on withdrawal  5. Multiple history of falls and fractures: recent 1 month ago with right humeral fx  6. Hypomagenesemia, hypophosphatemia   7. History of bipolar/ depression: currently not suicidal nor homicidal        -Continue diet  -Stop IVF   -Avoid nephrotoxic meds  -PO thiamine/folate  -Neutra phos 1 tab q 6hrs   -Ativan 2mg ivp q 4hrs if agitation/withdrawal   lipitor, norvasc  -Holding plavix for now   -start mirtazapine 7.5 mg po pm  -re-check chem, mag, phosp today and replace prn   -PT/OT for falls  -DVT ppx: heparin sq/ GCS     Full code    Psych eval done today suggest inpatient psychiatry transfer once medically ready. *Patient status changed to observation. PMH, ROS, Social hx and family hx unchanged from HPI done on 2/13/18.      Signed By: Acosta Calderon MD     February 14, 2018

## 2018-02-14 NOTE — PROGRESS NOTES
Per MD and  tele psych , they feel pt needs inpt treatment for depression. Pt is agreeable to go ,if not she would be made an involuntary commitment. BIJAN also spoke with spouse who states he has been to [facility] before. Spouse originally was wanting a referral to [facility] for physical rehab ( where pt has been before). SW and MD explained that pt's depression needed to be addressed first since she is not eating ,bathing or ambulating much. Referral faxed to [facility] and they Alberteen Corporal) report pt does not meet inpt criteria since she is not a threat to herself or others. [facility] states they cannot make pt's eat. [facility] states [facility] is a [facility] thererfore pt has to be a harm to herself or others , not just depression. BIJAN faxed referral to [facility] [address] [facility]    Tabatha Doherty

## 2018-02-14 NOTE — PROGRESS NOTES
TRANSFER - IN REPORT:    Verbal report received from Michael Jalloh (name) on Karole Cure  being received from ED (unit) for routine progression of care      Report consisted of patients Situation, Background, Assessment and   Recommendations(SBAR). Information from the following report(s) SBAR, Kardex, ED Summary, Procedure Summary, Intake/Output, MAR, Accordion and Med Rec Status was reviewed with the receiving nurse. Opportunity for questions and clarification was provided. Assessment completed upon patients arrival to unit and care assumed.

## 2018-02-14 NOTE — PHYSICIAN ADVISORY
Letter of Determination:  Outpatient status receiving Observation Services    This patient was originally hospitalized as Inpatient Status on 2/13/2018 for acute hypokalemia. At this time this patient does not appear to meet the medical necessity requirements in CMS regulation Section 43 .3 to support an inpatient level of care. It is our recommendation that this patient's hospitalization status should be changed from INPATIENT to OUTPATIENT receiving OBSERVATION services via Condition Code 44.      This may change due to the medical condition of the patient and new clinical evidence as the patients care progreses. The final decision regarding the patient's hospitalization status depends on the attending physician's judgement.     Amber Bowles MD, QASIM,   Physician [place].

## 2018-02-14 NOTE — ED NOTES
TRANSFER - OUT REPORT:    Verbal report given to [address] [place] [address], RN(name) on Richmond Parada  being transferred to [facility](unit) for routine progression of care       Report consisted of patients Situation, Background, Assessment and   Recommendations(SBAR). Information from the following report(s) SBAR, ED Summary, Procedure Summary and MAR was reviewed with the receiving nurse. Lines:   Quad Lumen 09/18/14 Right Subclavian (Active)        Opportunity for questions and clarification was provided.       Patient transported with:   [employer]

## 2018-02-15 LAB
ANION GAP SERPL CALC-SCNC: 10 MMOL/L (ref 7–16)
BUN SERPL-MCNC: 15 MG/DL (ref 6–23)
CALCIUM SERPL-MCNC: 8.7 MG/DL (ref 8.3–10.4)
CHLORIDE SERPL-SCNC: 105 MMOL/L (ref 98–107)
CO2 SERPL-SCNC: 29 MMOL/L (ref 21–32)
CREAT SERPL-MCNC: 2.14 MG/DL (ref 0.6–1)
GLUCOSE SERPL-MCNC: 89 MG/DL (ref 65–100)
MAGNESIUM SERPL-MCNC: 1.9 MG/DL (ref 1.8–2.4)
POTASSIUM SERPL-SCNC: 3.6 MMOL/L (ref 3.5–5.1)
SODIUM SERPL-SCNC: 144 MMOL/L (ref 136–145)

## 2018-02-15 PROCEDURE — 96361 HYDRATE IV INFUSION ADD-ON: CPT

## 2018-02-15 PROCEDURE — 96372 THER/PROPH/DIAG INJ SC/IM: CPT

## 2018-02-15 PROCEDURE — 74011250637 HC RX REV CODE- 250/637: Performed by: INTERNAL MEDICINE

## 2018-02-15 PROCEDURE — 93005 ELECTROCARDIOGRAM TRACING: CPT | Performed by: INTERNAL MEDICINE

## 2018-02-15 PROCEDURE — 36415 COLL VENOUS BLD VENIPUNCTURE: CPT | Performed by: INTERNAL MEDICINE

## 2018-02-15 PROCEDURE — 99218 HC RM OBSERVATION: CPT

## 2018-02-15 PROCEDURE — 80048 BASIC METABOLIC PNL TOTAL CA: CPT | Performed by: INTERNAL MEDICINE

## 2018-02-15 PROCEDURE — 83735 ASSAY OF MAGNESIUM: CPT | Performed by: INTERNAL MEDICINE

## 2018-02-15 PROCEDURE — 74011250636 HC RX REV CODE- 250/636: Performed by: INTERNAL MEDICINE

## 2018-02-15 RX ORDER — SODIUM CHLORIDE 9 MG/ML
75 INJECTION, SOLUTION INTRAVENOUS CONTINUOUS
Status: DISCONTINUED | OUTPATIENT
Start: 2018-02-15 | End: 2018-02-17 | Stop reason: HOSPADM

## 2018-02-15 RX ADMIN — HEPARIN SODIUM 5000 UNITS: 5000 INJECTION, SOLUTION INTRAVENOUS; SUBCUTANEOUS at 17:36

## 2018-02-15 RX ADMIN — POTASSIUM & SODIUM PHOSPHATES POWDER PACK 280-160-250 MG 1 PACKET: 280-160-250 PACK at 22:48

## 2018-02-15 RX ADMIN — POTASSIUM & SODIUM PHOSPHATES POWDER PACK 280-160-250 MG 1 PACKET: 280-160-250 PACK at 08:59

## 2018-02-15 RX ADMIN — SODIUM CHLORIDE 75 ML/HR: 900 INJECTION, SOLUTION INTRAVENOUS at 08:59

## 2018-02-15 RX ADMIN — AMLODIPINE BESYLATE 5 MG: 5 TABLET ORAL at 08:58

## 2018-02-15 RX ADMIN — ACETAMINOPHEN 650 MG: 325 TABLET ORAL at 23:35

## 2018-02-15 RX ADMIN — Medication 400 MG: at 08:58

## 2018-02-15 RX ADMIN — FOLIC ACID 1 MG: 1 TABLET ORAL at 08:59

## 2018-02-15 RX ADMIN — Medication 1 AMPULE: at 20:31

## 2018-02-15 RX ADMIN — HEPARIN SODIUM 5000 UNITS: 5000 INJECTION, SOLUTION INTRAVENOUS; SUBCUTANEOUS at 03:25

## 2018-02-15 RX ADMIN — HEPARIN SODIUM 5000 UNITS: 5000 INJECTION, SOLUTION INTRAVENOUS; SUBCUTANEOUS at 10:41

## 2018-02-15 RX ADMIN — ATORVASTATIN CALCIUM 40 MG: 40 TABLET, FILM COATED ORAL at 08:58

## 2018-02-15 RX ADMIN — Medication 100 MG: at 08:58

## 2018-02-15 RX ADMIN — MIRTAZAPINE 7.5 MG: 15 TABLET, FILM COATED ORAL at 22:48

## 2018-02-15 RX ADMIN — Medication 1 AMPULE: at 08:58

## 2018-02-15 RX ADMIN — Medication 400 MG: at 17:36

## 2018-02-15 RX ADMIN — POTASSIUM & SODIUM PHOSPHATES POWDER PACK 280-160-250 MG 1 PACKET: 280-160-250 PACK at 17:35

## 2018-02-15 RX ADMIN — LORAZEPAM 2 MG: 2 INJECTION INTRAMUSCULAR; INTRAVENOUS at 20:22

## 2018-02-15 RX ADMIN — PANTOPRAZOLE SODIUM 40 MG: 40 TABLET, DELAYED RELEASE ORAL at 08:58

## 2018-02-15 NOTE — PROGRESS NOTES
Assessment done via doc flow sheet. Pt resting in bed, sleeping, aroused repeatedly with verbal and tactile stimuli, resp easy & regular, lungs CTA bilaterally, cooperative and follows command. Bed low & locked, side rails x3 up with call light within reach, instructed to call for assistance as needed.  at bedside.

## 2018-02-15 NOTE — PROGRESS NOTES
Progress Note    Patient: Joey Peters MRN: [id number]  SSN: [id number]    YOB: 1960  Age: 62 y.o. Sex: female      Admit Date: 2/13/2018    LOS: 1 day     Subjective:   Patient examined at bedside. She had no complains.  at bedside. She had no complains. Awaiting bed availability to transfer her to psych facility. Electrolytes replaced. ROS: all pertinent findings described in my note. Objective:     Vitals:    02/14/18 2249 02/15/18 0321 02/15/18 0717 02/15/18 1036   BP: 114/77 122/77 110/71 108/72   Pulse: 82 88 87 84   Resp: 16 16 16 18   Temp: 98.6 °F (37 °C) 98.7 °F (37.1 °C) 98.2 °F (36.8 °C) 98.6 °F (37 °C)   SpO2: 100% 97% 98% 98%   Weight:       Height:            Intake and Output:  Current Shift:    Last three shifts: 02/13 1901 - 02/15 0700  In: 391 [I.V.:391]  Out: 1300 [Urine:1300]    Physical Exam:   GENERAL: alert, cooperative, no distress, appears stated age  EYE: negative  LYMPHATIC: Cervical, supraclavicular, and axillary nodes normal.   THROAT & NECK: normal and no erythema or exudates noted. LUNG: clear to auscultation bilaterally  HEART: regular rate and rhythm, S1, S2 normal, no murmur, click, rub or gallop  ABDOMEN: soft, non-tender. Bowel sounds normal. No masses,  no organomegaly  EXTREMITIES:  extremities normal, atraumatic, no cyanosis or edema  SKIN: Normal.  NEUROLOGIC: negative, no signs of tremors   PSYCHIATRIC: flat affect     Lab/Data Review: All lab results for the last 24 hours reviewed.        Assessment:     Active Problems:    Essential hypertension (9/11/2012)      Osteoarthritis (9/18/2014)      Gait instability (10/22/2014)      Hypokalemia (2/13/2018)      OSMIN (acute kidney injury) ([facility] (2/13/2018)      Acute metabolic encephalopathy (9/08/8982)      Alcohol abuse (2/13/2018)      Falls (2/13/2018)      Vascular dementia (2/13/2018)      Hypomagnesemia (2/13/2018)      Hypophosphatemia (2/13/2018)      Depression (2/13/2018)        Plan: 1. Hypokalemia, possible due to chronic alcohol intake / poor dietary intake: resolved   2. OSMIN on ckd: possible due to dehydration: stable   3. Acute metabolic encephalopathy, possible secondary to poor oral intake / history of depression/ vascular dementia / hypokalemia: resolved   4. Chronic alcohol intake: not on withdrawal  5. Multiple history of falls and fractures: recent 1 month ago with right humeral fx  6. Hypomagenesemia, hypophosphatemia: resolved   7. History of bipolar/ depression: currently not suicidal nor homicidal      -Continue diet  -Start ivf at 75 ml/hr   -Avoid nephrotoxic meds  -PO thiamine/folate  -Neutra phos 1 tab q 6hrs   -Ativan 2mg ivp q 4hrs if agitation/withdrawal   lipitor, norvasc  -Holding plavix for now   -On mirtazapine 7.5 mg po pm  -PT/OT for falls  -DVT ppx: heparin sq/ GCS     Full code    Disposition: inpatient psychiatry transfer once bed availability- she was not accepted to [facility]     Signed By: Mahad Ovalles MD     February 15, 2018

## 2018-02-15 NOTE — PHYSICIAN ADVISORY
Letter of Determination:  Outpatient states receiving Observation Services    This case was reviewed, and I concur with Outpatient status receiving Observation services. This determination may change depending on further medical information, condition changes of the patient, or treatment requirements.       Thong Head MD, QASIM,   Physician [facility].

## 2018-02-15 NOTE — PROGRESS NOTES
Shift assessment completed via doc flow sheet. Patient is alert and oriented x 2. Respirations even and unlabored on room air. Lung sounds CTA bilaterally. Heart sounds S1, S2 auscultated and regular. Abdomen soft and non tender. Bowel sounds active to all 4 quadrants. IV flushed without difficulty. Patient ambulates to bathroom as needed with assistance. Patient denies pain and other needs at this time. Bed is locked and in low position. Bed rails x 2. Patient is encouraged to call for assistance. Spouse at bedside. Call light within reach.

## 2018-02-16 VITALS
OXYGEN SATURATION: 99 % | TEMPERATURE: 98.3 F | RESPIRATION RATE: 18 BRPM | DIASTOLIC BLOOD PRESSURE: 81 MMHG | WEIGHT: 190 LBS | SYSTOLIC BLOOD PRESSURE: 132 MMHG | BODY MASS INDEX: 30.53 KG/M2 | HEIGHT: 66 IN | HEART RATE: 91 BPM

## 2018-02-16 LAB
ANION GAP SERPL CALC-SCNC: 11 MMOL/L (ref 7–16)
ATRIAL RATE: 87 BPM
BUN SERPL-MCNC: 15 MG/DL (ref 6–23)
CALCIUM SERPL-MCNC: 8.1 MG/DL (ref 8.3–10.4)
CALCULATED P AXIS, ECG09: 34 DEGREES
CALCULATED R AXIS, ECG10: 54 DEGREES
CALCULATED T AXIS, ECG11: 25 DEGREES
CHLORIDE SERPL-SCNC: 106 MMOL/L (ref 98–107)
CO2 SERPL-SCNC: 26 MMOL/L (ref 21–32)
CREAT SERPL-MCNC: 1.94 MG/DL (ref 0.6–1)
DIAGNOSIS, 93000: NORMAL
GLUCOSE SERPL-MCNC: 97 MG/DL (ref 65–100)
MAGNESIUM SERPL-MCNC: 1.8 MG/DL (ref 1.8–2.4)
P-R INTERVAL, ECG05: 136 MS
POTASSIUM SERPL-SCNC: 3.4 MMOL/L (ref 3.5–5.1)
Q-T INTERVAL, ECG07: 380 MS
QRS DURATION, ECG06: 82 MS
QTC CALCULATION (BEZET), ECG08: 457 MS
SODIUM SERPL-SCNC: 143 MMOL/L (ref 136–145)
VENTRICULAR RATE, ECG03: 87 BPM

## 2018-02-16 PROCEDURE — 74011250637 HC RX REV CODE- 250/637: Performed by: INTERNAL MEDICINE

## 2018-02-16 PROCEDURE — 99218 HC RM OBSERVATION: CPT

## 2018-02-16 PROCEDURE — 96365 THER/PROPH/DIAG IV INF INIT: CPT

## 2018-02-16 PROCEDURE — 83735 ASSAY OF MAGNESIUM: CPT | Performed by: INTERNAL MEDICINE

## 2018-02-16 PROCEDURE — 96366 THER/PROPH/DIAG IV INF ADDON: CPT

## 2018-02-16 PROCEDURE — 74011250636 HC RX REV CODE- 250/636: Performed by: INTERNAL MEDICINE

## 2018-02-16 PROCEDURE — 96372 THER/PROPH/DIAG INJ SC/IM: CPT

## 2018-02-16 PROCEDURE — 80048 BASIC METABOLIC PNL TOTAL CA: CPT | Performed by: INTERNAL MEDICINE

## 2018-02-16 PROCEDURE — 36415 COLL VENOUS BLD VENIPUNCTURE: CPT | Performed by: INTERNAL MEDICINE

## 2018-02-16 PROCEDURE — 96361 HYDRATE IV INFUSION ADD-ON: CPT

## 2018-02-16 RX ORDER — ATORVASTATIN CALCIUM 40 MG/1
40 TABLET, FILM COATED ORAL DAILY
Qty: 30 TAB | Refills: 0 | Status: SHIPPED | OUTPATIENT
Start: 2018-02-17 | End: 2018-05-25

## 2018-02-16 RX ORDER — POTASSIUM CHLORIDE 14.9 MG/ML
20 INJECTION INTRAVENOUS ONCE
Status: COMPLETED | OUTPATIENT
Start: 2018-02-16 | End: 2018-02-16

## 2018-02-16 RX ORDER — MIRTAZAPINE 7.5 MG/1
7.5 TABLET, FILM COATED ORAL
Qty: 30 TAB | Refills: 0 | Status: SHIPPED | OUTPATIENT
Start: 2018-02-16 | End: 2018-03-03

## 2018-02-16 RX ORDER — ACETAMINOPHEN 325 MG/1
650 TABLET ORAL
Qty: 30 TAB | Refills: 0 | Status: SHIPPED | OUTPATIENT
Start: 2018-02-16

## 2018-02-16 RX ORDER — PANTOPRAZOLE SODIUM 40 MG/1
40 TABLET, DELAYED RELEASE ORAL
Qty: 30 TAB | Refills: 0 | Status: SHIPPED | OUTPATIENT
Start: 2018-02-17

## 2018-02-16 RX ORDER — FOLIC ACID 1 MG/1
1 TABLET ORAL DAILY
Qty: 30 TAB | Refills: 0 | Status: SHIPPED | OUTPATIENT
Start: 2018-02-17

## 2018-02-16 RX ORDER — AMLODIPINE BESYLATE 5 MG/1
5 TABLET ORAL DAILY
Qty: 30 TAB | Refills: 0 | Status: SHIPPED | OUTPATIENT
Start: 2018-02-17 | End: 2018-05-25

## 2018-02-16 RX ORDER — LANOLIN ALCOHOL/MO/W.PET/CERES
100 CREAM (GRAM) TOPICAL DAILY
Qty: 30 TAB | Refills: 0 | Status: SHIPPED | OUTPATIENT
Start: 2018-02-17

## 2018-02-16 RX ADMIN — POTASSIUM CHLORIDE 20 MEQ: 200 INJECTION, SOLUTION INTRAVENOUS at 10:28

## 2018-02-16 RX ADMIN — AMLODIPINE BESYLATE 5 MG: 5 TABLET ORAL at 09:23

## 2018-02-16 RX ADMIN — HEPARIN SODIUM 5000 UNITS: 5000 INJECTION, SOLUTION INTRAVENOUS; SUBCUTANEOUS at 02:44

## 2018-02-16 RX ADMIN — LORAZEPAM 2 MG: 2 INJECTION INTRAMUSCULAR; INTRAVENOUS at 15:38

## 2018-02-16 RX ADMIN — HEPARIN SODIUM 5000 UNITS: 5000 INJECTION, SOLUTION INTRAVENOUS; SUBCUTANEOUS at 10:28

## 2018-02-16 RX ADMIN — Medication 400 MG: at 09:22

## 2018-02-16 RX ADMIN — Medication 100 MG: at 09:22

## 2018-02-16 RX ADMIN — POTASSIUM & SODIUM PHOSPHATES POWDER PACK 280-160-250 MG 1 PACKET: 280-160-250 PACK at 13:54

## 2018-02-16 RX ADMIN — Medication 1 AMPULE: at 09:23

## 2018-02-16 RX ADMIN — ATORVASTATIN CALCIUM 40 MG: 40 TABLET, FILM COATED ORAL at 09:22

## 2018-02-16 RX ADMIN — SODIUM CHLORIDE 75 ML/HR: 900 INJECTION, SOLUTION INTRAVENOUS at 01:03

## 2018-02-16 RX ADMIN — HEPARIN SODIUM 5000 UNITS: 5000 INJECTION, SOLUTION INTRAVENOUS; SUBCUTANEOUS at 17:30

## 2018-02-16 RX ADMIN — PANTOPRAZOLE SODIUM 40 MG: 40 TABLET, DELAYED RELEASE ORAL at 09:23

## 2018-02-16 RX ADMIN — POTASSIUM & SODIUM PHOSPHATES POWDER PACK 280-160-250 MG 1 PACKET: 280-160-250 PACK at 09:23

## 2018-02-16 RX ADMIN — Medication 400 MG: at 17:31

## 2018-02-16 RX ADMIN — FOLIC ACID 1 MG: 1 TABLET ORAL at 09:22

## 2018-02-16 RX ADMIN — POTASSIUM & SODIUM PHOSPHATES POWDER PACK 280-160-250 MG 1 PACKET: 280-160-250 PACK at 17:31

## 2018-02-16 NOTE — PROGRESS NOTES
AM Assessment. Pt. A/O X4. Flat affect noted.  very demanding. Respirations even and unlabored. Lungs clear. S1 & S2 auscultated. Abd. Soft and non tender. Generalized weakness BLE noted. Pt. Sitting up eating breakfast. No complaints at this time. Call light within reach.  at bedside will monitor hourly.

## 2018-02-16 NOTE — PROGRESS NOTES
Called report to [address], MARTIN at [facility]. All questions answered. Call back number provided for any further questions.

## 2018-02-16 NOTE — PROGRESS NOTES
Physical Therapy assessment: checked on pt for PT, spouse at bedside. Pt is curled up with covers over her head. Attempted to gently wake pt and when I pulled covers back she pulled them back over her head. Tried again and explained we were there to help her keep her strength up, she again pulled her covers back up and did not acknowledge PT presence with any verbalizations. Will try and check back on later as schedule/time allows. Thanks.  Spencer Rendon, PT

## 2018-02-16 NOTE — PROGRESS NOTES
Shift assessment completed via doc flow sheet. Patient is alert and oriented x 2. Respirations even and unlabored on room air. Lung sounds CTA bilaterally. Heart sounds S1, S2 auscultated and regular. Abdomen soft and non tender. Bowel sounds active to all 4 quadrants. IV fluids infusing without difficulty. Patient ambulates to bathroom as needed with assistance. Patient reported chest pain to 3/10 at this time. Bed is locked and in low position. Bed rails x 2. Bed alarm on. Patient is encouraged to call for assistance. Spouse at bedside. Call light within reach.

## 2018-02-16 NOTE — DISCHARGE SUMMARY
Discharge Summary     Patient: Navarro Sher MRN: [id number]  SSN: [id number]    YOB: 1960  Age: 62 y.o. Sex: female       Admit Date: 2/13/2018    Discharge Date: 2/16/2018      Admission Diagnoses: Hypokalemia  OSMIN (acute kidney injury) ([facility]  Acute metabolic encephalopathy  Hypokalemia    Discharge Diagnoses:   Problem List as of 2/16/2018  Date Reviewed: 2/13/2018          Codes Class Noted - Resolved    Hypokalemia ICD-10-CM: E87.6  ICD-9-CM: 276.8  2/13/2018 - Present        OSMIN (acute kidney injury) ([facility] ICD-10-CM: N17.9  ICD-9-CM: 584.9  2/13/2018 - Present        Acute metabolic encephalopathy GVI-84-YO: G93.41  ICD-9-CM: 348.31  2/13/2018 - Present        Alcohol abuse ICD-10-CM: F10.10  ICD-9-CM: 305.00  2/13/2018 - Present        Falls ICD-10-CM: W19. Cele Heal  ICD-9-CM: E888.9  2/13/2018 - Present        Vascular dementia ICD-10-CM: F01.50  ICD-9-CM: 290.40  2/13/2018 - Present        Hypomagnesemia ICD-10-CM: E83.42  ICD-9-CM: 275.2  2/13/2018 - Present        Hypophosphatemia ICD-10-CM: E83.39  ICD-9-CM: 275.3  2/13/2018 - Present        Depression ICD-10-CM: F32.9  ICD-9-CM: 242  2/13/2018 - Present        Bipolar disorder ([facility] ICD-10-CM: F31.9  ICD-9-CM: 296.80  10/22/2014 - Present        Gait instability ICD-10-CM: R26.81  ICD-9-CM: 781.2  10/22/2014 - Present        S/P total hip arthroplasty ICD-10-CM: B77.591  ICD-9-CM: V43.64  9/19/2014 - Present        CKD (chronic kidney disease) ICD-10-CM: N18.9  ICD-9-CM: 585.9  9/18/2014 - Present        Osteoarthritis ICD-10-CM: M19.90  ICD-9-CM: 715.90  9/18/2014 - Present        Weakness of right leg ICD-10-CM: R29.898  ICD-9-CM: 729.89  12/2/2013 - Present        Essential hypertension ICD-10-CM: I10  ICD-9-CM: 401.9  9/11/2012 - Present        CVA (cerebral infarction) ICD-10-CM: I63.9  ICD-9-CM: 434.91  9/11/2012 - Present        FSGS (focal segmental glomerulosclerosis) ICD-10-CM: N05.1  ICD-9-CM: 582.1  9/11/2012 - Present Gout ICD-10-CM: M10.9  ICD-9-CM: 274.9  9/11/2012 - Present        RESOLVED: Hip fracture requiring operative repair [facility]) ICD-10-CM: S72.009A  ICD-9-CM: 820.8  9/19/2014 - 10/22/2014        RESOLVED: Femoral neck fracture ([address] ICD-10-CM: S72.009A  ICD-9-CM: 820.8  9/18/2014 - 10/22/2014        RESOLVED: HTN (hypertension), malignant ICD-10-CM: I10  ICD-9-CM: 401.0  9/18/2014 - 10/22/2014        RESOLVED: Edema of both legs ICD-10-CM: R60.0  ICD-9-CM: 782.3  9/11/2012 - 10/22/2014        RESOLVED: Anxiety state, unspecified ICD-10-CM: F41.1  ICD-9-CM: 300.00  9/11/2012 - 10/22/2014        RESOLVED: Allergic rhinitis, cause unspecified ICD-10-CM: J30.9  ICD-9-CM: 477.9  9/11/2012 - 10/22/2014        RESOLVED: Cerebral arteritis ICD-10-CM: I67.7  ICD-9-CM: 437.4  9/11/2012 - 10/22/2014               Discharge Condition: [facility] Course:   As note on HPI note: \" Oswaldo Garduno is a 62 y.o. female who has a PMH of vascular dementia, HTN, dyslipidemia, chronic alcohol intake, remote history of bipolar disorder in no treatment, CKD stage III, multiple mechanical falls who was brought in by her  since he noted she had been refusing to eat and drink water for around 3-4 weeks. He said that she drinks 1 pint of vodka daily for several years and her habit has produced falls with fractures, last 1 month ago creating a right humeral fracture. Her last drink was 2 days ago. Patient denies cough, fever, upper or lower gi bleeding episodes, abdominal pain, diarrhea. She has no suicidal nor homicidal ideations, but stated she does feel depressed sometimes. Her  stated since May/17, when she was released from rehab, he finds very hard to convince her to take a bath. Upon arrival to ER VS: /90  HR 80  T 97F  RR 16  O2: 96% room air. Labs: potassium of 2. 5, cr 1.9 ( baseline 1.5 ), brain ct was negative for acute findings. cxr unremarkable. She received KCL 40 po and 20 meq iv x 1\".     Hospital course: she was admitted with diagnosis of: Hypokalemia, possible due to chronic alcohol intake / poor dietary intake,  Chronic alcohol intake: currently not on withdrawal, Multiple history of falls and fractures: recent 1 month ago with right humeral fx, Hypomagenesemia, hypophosphatemia, History of bipolar/ depression: currently not suicidal nor homicidal.    Her clinical status improved, her electrolyte imbalances were corrected. She experienced no signs of alcohol withdrawal during her admission. EKG showed NSR. Patient had tele-psych evaluation, which recommended admission into a psych facility due to depression. She was started on mirtazapine. Patient has been eating. Spouse at bedside providing support. She is medically stable to be referred to psych unit. Physical Exam:  GENERAL: alert, cooperative, no distress, appears stated age  EYE: negative  LYMPHATIC: Cervical, supraclavicular, and axillary nodes normal.   THROAT & NECK: normal and no erythema or exudates noted. LUNG: clear to auscultation bilaterally  HEART: regular rate and rhythm, S1, S2 normal, no murmur, click, rub or gallop  ABDOMEN: soft, non-tender. Bowel sounds normal. No masses,  no organomegaly  EXTREMITIES:  extremities normal, atraumatic, no cyanosis or edema  SKIN: Normal.  NEUROLOGIC: negative, no tongue fasciculations, no tremors, alert, oriented in person and place, not in time   PSYCHIATRIC: flat affect     Consults: Psychiatry    Significant Diagnostic Studies: SEE DC SUMMARY NOTE     Disposition: [address]     Discharge Medications:   Current Discharge Medication List      START taking these medications    Details   acetaminophen (TYLENOL) 325 mg tablet Take 2 Tabs by mouth every six (6) hours as needed. Qty: 30 Tab, Refills: 0      amLODIPine (NORVASC) 5 mg tablet Take 1 Tab by mouth daily. Qty: 30 Tab, Refills: 0      atorvastatin (LIPITOR) 40 mg tablet Take 1 Tab by mouth daily.   Qty: 30 Tab, Refills: 0      folic acid (FOLVITE) 1 mg tablet Take 1 Tab by mouth daily. Qty: 30 Tab, Refills: 0      mirtazapine (REMERON) 7.5 mg tablet Take 1 Tab by mouth nightly. Qty: 30 Tab, Refills: 0      pantoprazole (PROTONIX) 40 mg tablet Take 1 Tab by mouth Daily (before breakfast). Qty: 30 Tab, Refills: 0      thiamine (B-1) 100 mg tablet Take 1 Tab by mouth daily. Qty: 30 Tab, Refills: 0         STOP taking these medications       aspirin (ASPIRIN) 325 mg tablet Comments:   Reason for Stopping:         olmesartan (BENICAR) 40 mg tablet Comments:   Reason for Stopping:         vitamin E (AQUA GEMS) 400 unit capsule Comments:   Reason for Stopping:         POTASSIUM GLUCONATE PO Comments:   Reason for Stopping:         ibuprofen (MOTRIN IB) 200 mg tablet Comments:   Reason for Stopping:         ziprasidone hcl (GEODON) 60 mg capsule Comments:   Reason for Stopping:         valerian root 500 mg cap Comments:   Reason for Stopping:         Cetirizine (ZYRTEC) 10 mg cap Comments:   Reason for Stopping:         multivitamin (ONE A DAY) tablet Comments:   Reason for Stopping:         pravastatin (PRAVACHOL) 20 mg tablet Comments:   Reason for Stopping:         promethazine (PHENERGAN) 25 mg tablet Comments:   Reason for Stopping:         oxyCODONE IR (ROXICODONE) 5 mg immediate release tablet Comments:   Reason for Stopping:         furosemide (LASIX) 20 mg tablet Comments:   Reason for Stopping:         traZODone (DESYREL) 100 mg tablet Comments:   Reason for Stopping:               Activity: Activity as tolerated  Diet: Cardiac Diet  Wound Care: None needed    No orders of the defined types were placed in this encounter.       Signed By: Eliza Gonzalez MD     February 16, 2018

## 2018-02-17 NOTE — PROGRESS NOTES
Shift assessment done. Pt alert, oriented to self. HR regular. RR even and unlabored. Abd soft tender. ASctive bowels sounds X4. No IV site. Bed LL. Bed alarm on, Family at bedside. All needs met at this time.

## 2018-02-25 ENCOUNTER — APPOINTMENT (OUTPATIENT)
Dept: ULTRASOUND IMAGING | Age: 58
DRG: 854 | End: 2018-02-25
Attending: EMERGENCY MEDICINE
Payer: MEDICARE

## 2018-02-25 ENCOUNTER — APPOINTMENT (OUTPATIENT)
Dept: GENERAL RADIOLOGY | Age: 58
DRG: 854 | End: 2018-02-25
Attending: EMERGENCY MEDICINE
Payer: MEDICARE

## 2018-02-25 ENCOUNTER — HOSPITAL ENCOUNTER (INPATIENT)
Age: 58
LOS: 6 days | Discharge: HOME HEALTH | DRG: 854 | End: 2018-03-03
Attending: EMERGENCY MEDICINE | Admitting: SURGERY
Payer: MEDICARE

## 2018-02-25 DIAGNOSIS — R11.2 NAUSEA AND VOMITING, INTRACTABILITY OF VOMITING NOT SPECIFIED, UNSPECIFIED VOMITING TYPE: ICD-10-CM

## 2018-02-25 DIAGNOSIS — M19.91 PRIMARY OSTEOARTHRITIS, UNSPECIFIED SITE: ICD-10-CM

## 2018-02-25 DIAGNOSIS — R10.13 ACUTE EPIGASTRIC PAIN: ICD-10-CM

## 2018-02-25 DIAGNOSIS — K81.0 ACUTE CHOLECYSTITIS: ICD-10-CM

## 2018-02-25 DIAGNOSIS — N17.9 ACUTE KIDNEY INJURY (HCC): ICD-10-CM

## 2018-02-25 DIAGNOSIS — K80.63 CALCULUS OF GALLBLADDER AND BILE DUCT WITH ACUTE CHOLECYSTITIS, WITH OBSTRUCTION: Primary | ICD-10-CM

## 2018-02-25 PROBLEM — K81.9 CHOLECYSTITIS: Status: ACTIVE | Noted: 2018-02-25

## 2018-02-25 PROBLEM — K80.20 CHOLELITHIASIS: Status: ACTIVE | Noted: 2018-02-25

## 2018-02-25 LAB
ALBUMIN SERPL-MCNC: 2.9 G/DL (ref 3.5–5)
ALBUMIN/GLOB SERPL: 0.7 {RATIO} (ref 1.2–3.5)
ALP SERPL-CCNC: 563 U/L (ref 50–136)
ALT SERPL-CCNC: 292 U/L (ref 12–65)
ANION GAP SERPL CALC-SCNC: 16 MMOL/L (ref 7–16)
APPEARANCE UR: CLEAR
AST SERPL-CCNC: 660 U/L (ref 15–37)
BACTERIA URNS QL MICRO: 0 /HPF
BASOPHILS # BLD: 0 K/UL (ref 0–0.2)
BASOPHILS NFR BLD: 0 % (ref 0–2)
BILIRUB SERPL-MCNC: 4.1 MG/DL (ref 0.2–1.1)
BILIRUB UR QL: ABNORMAL
BUN SERPL-MCNC: 26 MG/DL (ref 6–23)
CALCIUM SERPL-MCNC: 9.3 MG/DL (ref 8.3–10.4)
CHLORIDE SERPL-SCNC: 102 MMOL/L (ref 98–107)
CO2 SERPL-SCNC: 22 MMOL/L (ref 21–32)
COLOR UR: ABNORMAL
CREAT SERPL-MCNC: 2.49 MG/DL (ref 0.6–1)
DIFFERENTIAL METHOD BLD: ABNORMAL
EOSINOPHIL # BLD: 0 K/UL (ref 0–0.8)
EOSINOPHIL NFR BLD: 0 % (ref 0.5–7.8)
EPI CELLS #/AREA URNS HPF: ABNORMAL /HPF
ERYTHROCYTE [DISTWIDTH] IN BLOOD BY AUTOMATED COUNT: 15.2 % (ref 11.9–14.6)
FLUAV AG NPH QL IA: NEGATIVE
FLUBV AG NPH QL IA: NEGATIVE
GLOBULIN SER CALC-MCNC: 4.3 G/DL (ref 2.3–3.5)
GLUCOSE SERPL-MCNC: 229 MG/DL (ref 65–100)
GLUCOSE UR STRIP.AUTO-MCNC: 100 MG/DL
HCT VFR BLD AUTO: 39.7 % (ref 35.8–46.3)
HGB BLD-MCNC: 13.2 G/DL (ref 11.7–15.4)
HGB UR QL STRIP: ABNORMAL
IMM GRANULOCYTES # BLD: 0 K/UL (ref 0–0.5)
IMM GRANULOCYTES NFR BLD AUTO: 0 % (ref 0–5)
KETONES UR QL STRIP.AUTO: NEGATIVE MG/DL
LACTATE BLD-SCNC: 2.7 MMOL/L (ref 0.5–1.9)
LACTATE BLD-SCNC: 3.9 MMOL/L (ref 0.5–1.9)
LEUKOCYTE ESTERASE UR QL STRIP.AUTO: ABNORMAL
LIPASE SERPL-CCNC: 391 U/L (ref 73–393)
LYMPHOCYTES # BLD: 0.2 K/UL (ref 0.5–4.6)
LYMPHOCYTES NFR BLD: 5 % (ref 13–44)
MCH RBC QN AUTO: 30.9 PG (ref 26.1–32.9)
MCHC RBC AUTO-ENTMCNC: 33.2 G/DL (ref 31.4–35)
MCV RBC AUTO: 93 FL (ref 79.6–97.8)
MONOCYTES # BLD: 0 K/UL (ref 0.1–1.3)
MONOCYTES NFR BLD: 1 % (ref 4–12)
NEUTS SEG # BLD: 3.8 K/UL (ref 1.7–8.2)
NEUTS SEG NFR BLD: 94 % (ref 43–78)
NITRITE UR QL STRIP.AUTO: NEGATIVE
PH UR STRIP: 7.5 [PH] (ref 5–9)
PLATELET # BLD AUTO: 190 K/UL (ref 150–450)
PMV BLD AUTO: 10.8 FL (ref 10.8–14.1)
POTASSIUM SERPL-SCNC: 5 MMOL/L (ref 3.5–5.1)
PROCALCITONIN SERPL-MCNC: 4.6 NG/ML
PROT SERPL-MCNC: 7.2 G/DL (ref 6.3–8.2)
PROT UR STRIP-MCNC: ABNORMAL MG/DL
RBC # BLD AUTO: 4.27 M/UL (ref 4.05–5.25)
RBC #/AREA URNS HPF: ABNORMAL /HPF
SODIUM SERPL-SCNC: 140 MMOL/L (ref 136–145)
SP GR UR REFRACTOMETRY: 1.01 (ref 1–1.02)
UROBILINOGEN UR QL STRIP.AUTO: 1 EU/DL (ref 0.2–1)
WBC # BLD AUTO: 4 K/UL (ref 4.3–11.1)
WBC URNS QL MICRO: ABNORMAL /HPF

## 2018-02-25 PROCEDURE — 87186 SC STD MICRODIL/AGAR DIL: CPT | Performed by: EMERGENCY MEDICINE

## 2018-02-25 PROCEDURE — 96365 THER/PROPH/DIAG IV INF INIT: CPT | Performed by: EMERGENCY MEDICINE

## 2018-02-25 PROCEDURE — 87040 BLOOD CULTURE FOR BACTERIA: CPT | Performed by: EMERGENCY MEDICINE

## 2018-02-25 PROCEDURE — 76705 ECHO EXAM OF ABDOMEN: CPT

## 2018-02-25 PROCEDURE — 80053 COMPREHEN METABOLIC PANEL: CPT | Performed by: EMERGENCY MEDICINE

## 2018-02-25 PROCEDURE — 96361 HYDRATE IV INFUSION ADD-ON: CPT | Performed by: EMERGENCY MEDICINE

## 2018-02-25 PROCEDURE — 87205 SMEAR GRAM STAIN: CPT | Performed by: EMERGENCY MEDICINE

## 2018-02-25 PROCEDURE — 71045 X-RAY EXAM CHEST 1 VIEW: CPT

## 2018-02-25 PROCEDURE — 74011000258 HC RX REV CODE- 258: Performed by: EMERGENCY MEDICINE

## 2018-02-25 PROCEDURE — 83605 ASSAY OF LACTIC ACID: CPT

## 2018-02-25 PROCEDURE — 87077 CULTURE AEROBIC IDENTIFY: CPT | Performed by: EMERGENCY MEDICINE

## 2018-02-25 PROCEDURE — 87153 DNA/RNA SEQUENCING: CPT

## 2018-02-25 PROCEDURE — 87076 CULTURE ANAEROBE IDENT EACH: CPT

## 2018-02-25 PROCEDURE — 87804 INFLUENZA ASSAY W/OPTIC: CPT | Performed by: EMERGENCY MEDICINE

## 2018-02-25 PROCEDURE — 74011250637 HC RX REV CODE- 250/637: Performed by: EMERGENCY MEDICINE

## 2018-02-25 PROCEDURE — 74011250636 HC RX REV CODE- 250/636: Performed by: FAMILY MEDICINE

## 2018-02-25 PROCEDURE — 84145 PROCALCITONIN (PCT): CPT | Performed by: EMERGENCY MEDICINE

## 2018-02-25 PROCEDURE — 87086 URINE CULTURE/COLONY COUNT: CPT | Performed by: EMERGENCY MEDICINE

## 2018-02-25 PROCEDURE — 96375 TX/PRO/DX INJ NEW DRUG ADDON: CPT | Performed by: EMERGENCY MEDICINE

## 2018-02-25 PROCEDURE — 77030032490 HC SLV COMPR SCD KNE COVD -B

## 2018-02-25 PROCEDURE — 85025 COMPLETE CBC W/AUTO DIFF WBC: CPT | Performed by: EMERGENCY MEDICINE

## 2018-02-25 PROCEDURE — 99285 EMERGENCY DEPT VISIT HI MDM: CPT | Performed by: EMERGENCY MEDICINE

## 2018-02-25 PROCEDURE — 74011250636 HC RX REV CODE- 250/636: Performed by: EMERGENCY MEDICINE

## 2018-02-25 PROCEDURE — 74011000258 HC RX REV CODE- 258: Performed by: FAMILY MEDICINE

## 2018-02-25 PROCEDURE — 74011000250 HC RX REV CODE- 250: Performed by: EMERGENCY MEDICINE

## 2018-02-25 PROCEDURE — 81003 URINALYSIS AUTO W/O SCOPE: CPT | Performed by: EMERGENCY MEDICINE

## 2018-02-25 PROCEDURE — 83690 ASSAY OF LIPASE: CPT | Performed by: EMERGENCY MEDICINE

## 2018-02-25 PROCEDURE — 74011250637 HC RX REV CODE- 250/637: Performed by: FAMILY MEDICINE

## 2018-02-25 PROCEDURE — 65270000029 HC RM PRIVATE

## 2018-02-25 RX ORDER — MIRTAZAPINE 15 MG/1
30 TABLET, FILM COATED ORAL
Status: DISCONTINUED | OUTPATIENT
Start: 2018-02-25 | End: 2018-02-27 | Stop reason: HOSPADM

## 2018-02-25 RX ORDER — SODIUM CHLORIDE 0.9 % (FLUSH) 0.9 %
5-10 SYRINGE (ML) INJECTION AS NEEDED
Status: DISCONTINUED | OUTPATIENT
Start: 2018-02-25 | End: 2018-02-27 | Stop reason: HOSPADM

## 2018-02-25 RX ORDER — ONDANSETRON 4 MG/1
4 TABLET, ORALLY DISINTEGRATING ORAL
Status: COMPLETED | OUTPATIENT
Start: 2018-02-25 | End: 2018-02-25

## 2018-02-25 RX ORDER — ACETAMINOPHEN 325 MG/1
650 TABLET ORAL
Status: DISCONTINUED | OUTPATIENT
Start: 2018-02-25 | End: 2018-02-26

## 2018-02-25 RX ORDER — ESCITALOPRAM OXALATE 10 MG/1
10 TABLET ORAL DAILY
COMMUNITY

## 2018-02-25 RX ORDER — PANTOPRAZOLE SODIUM 40 MG/1
40 TABLET, DELAYED RELEASE ORAL
Status: DISCONTINUED | OUTPATIENT
Start: 2018-02-26 | End: 2018-02-27 | Stop reason: HOSPADM

## 2018-02-25 RX ORDER — SODIUM CHLORIDE, SODIUM LACTATE, POTASSIUM CHLORIDE, CALCIUM CHLORIDE 600; 310; 30; 20 MG/100ML; MG/100ML; MG/100ML; MG/100ML
500 INJECTION, SOLUTION INTRAVENOUS ONCE
Status: COMPLETED | OUTPATIENT
Start: 2018-02-25 | End: 2018-02-25

## 2018-02-25 RX ORDER — HYDROCODONE BITARTRATE AND ACETAMINOPHEN 7.5; 325 MG/1; MG/1
1 TABLET ORAL
Status: DISCONTINUED | OUTPATIENT
Start: 2018-02-25 | End: 2018-02-25

## 2018-02-25 RX ORDER — MORPHINE SULFATE 4 MG/ML
4 INJECTION, SOLUTION INTRAMUSCULAR; INTRAVENOUS
Status: DISCONTINUED | OUTPATIENT
Start: 2018-02-25 | End: 2018-02-27

## 2018-02-25 RX ORDER — SODIUM CHLORIDE 0.9 % (FLUSH) 0.9 %
5-10 SYRINGE (ML) INJECTION EVERY 8 HOURS
Status: DISCONTINUED | OUTPATIENT
Start: 2018-02-25 | End: 2018-02-27 | Stop reason: HOSPADM

## 2018-02-25 RX ORDER — LORAZEPAM 2 MG/ML
0.5 INJECTION INTRAMUSCULAR
Status: COMPLETED | OUTPATIENT
Start: 2018-02-25 | End: 2018-02-25

## 2018-02-25 RX ORDER — ONDANSETRON 2 MG/ML
4 INJECTION INTRAMUSCULAR; INTRAVENOUS
Status: COMPLETED | OUTPATIENT
Start: 2018-02-25 | End: 2018-02-25

## 2018-02-25 RX ORDER — SODIUM CHLORIDE, SODIUM LACTATE, POTASSIUM CHLORIDE, CALCIUM CHLORIDE 600; 310; 30; 20 MG/100ML; MG/100ML; MG/100ML; MG/100ML
1000 INJECTION, SOLUTION INTRAVENOUS ONCE
Status: COMPLETED | OUTPATIENT
Start: 2018-02-25 | End: 2018-02-25

## 2018-02-25 RX ORDER — ACETAMINOPHEN 650 MG/1
650 SUPPOSITORY RECTAL
Status: COMPLETED | OUTPATIENT
Start: 2018-02-25 | End: 2018-02-25

## 2018-02-25 RX ORDER — ESCITALOPRAM OXALATE 10 MG/1
10 TABLET ORAL DAILY
Status: DISCONTINUED | OUTPATIENT
Start: 2018-02-26 | End: 2018-02-27 | Stop reason: HOSPADM

## 2018-02-25 RX ORDER — SODIUM CHLORIDE, SODIUM LACTATE, POTASSIUM CHLORIDE, CALCIUM CHLORIDE 600; 310; 30; 20 MG/100ML; MG/100ML; MG/100ML; MG/100ML
125 INJECTION, SOLUTION INTRAVENOUS CONTINUOUS
Status: DISCONTINUED | OUTPATIENT
Start: 2018-02-25 | End: 2018-02-27 | Stop reason: HOSPADM

## 2018-02-25 RX ORDER — QUETIAPINE FUMARATE 50 MG/1
50 TABLET, FILM COATED ORAL
COMMUNITY

## 2018-02-25 RX ORDER — QUETIAPINE FUMARATE 25 MG/1
50 TABLET, FILM COATED ORAL
Status: DISCONTINUED | OUTPATIENT
Start: 2018-02-25 | End: 2018-02-27 | Stop reason: HOSPADM

## 2018-02-25 RX ORDER — ONDANSETRON 2 MG/ML
4 INJECTION INTRAMUSCULAR; INTRAVENOUS
Status: DISCONTINUED | OUTPATIENT
Start: 2018-02-25 | End: 2018-02-27 | Stop reason: HOSPADM

## 2018-02-25 RX ADMIN — PIPERACILLIN SODIUM,TAZOBACTAM SODIUM 3.38 G: 3; .375 INJECTION, POWDER, FOR SOLUTION INTRAVENOUS at 15:27

## 2018-02-25 RX ADMIN — ACETAMINOPHEN 650 MG: 650 SUPPOSITORY RECTAL at 06:07

## 2018-02-25 RX ADMIN — PIPERACILLIN SODIUM AND TAZOBACTAM SODIUM 4.5 G: 4; .5 INJECTION, POWDER, LYOPHILIZED, FOR SOLUTION INTRAVENOUS at 07:53

## 2018-02-25 RX ADMIN — SODIUM CHLORIDE 1000 ML: 900 INJECTION, SOLUTION INTRAVENOUS at 05:04

## 2018-02-25 RX ADMIN — Medication 5 ML: at 21:11

## 2018-02-25 RX ADMIN — PROMETHAZINE HYDROCHLORIDE 12.5 MG: 25 INJECTION INTRAMUSCULAR; INTRAVENOUS at 06:07

## 2018-02-25 RX ADMIN — MIRTAZAPINE 30 MG: 15 TABLET, FILM COATED ORAL at 21:10

## 2018-02-25 RX ADMIN — SODIUM CHLORIDE, SODIUM LACTATE, POTASSIUM CHLORIDE, AND CALCIUM CHLORIDE 125 ML/HR: 600; 310; 30; 20 INJECTION, SOLUTION INTRAVENOUS at 12:00

## 2018-02-25 RX ADMIN — Medication 10 ML: at 15:31

## 2018-02-25 RX ADMIN — QUETIAPINE FUMARATE 50 MG: 25 TABLET ORAL at 21:10

## 2018-02-25 RX ADMIN — SODIUM CHLORIDE, SODIUM LACTATE, POTASSIUM CHLORIDE, AND CALCIUM CHLORIDE 500 ML: 600; 310; 30; 20 INJECTION, SOLUTION INTRAVENOUS at 07:55

## 2018-02-25 RX ADMIN — LORAZEPAM 0.5 MG: 2 INJECTION INTRAMUSCULAR; INTRAVENOUS at 07:56

## 2018-02-25 RX ADMIN — SODIUM CHLORIDE, SODIUM LACTATE, POTASSIUM CHLORIDE, AND CALCIUM CHLORIDE 1000 ML: 600; 310; 30; 20 INJECTION, SOLUTION INTRAVENOUS at 06:18

## 2018-02-25 RX ADMIN — ONDANSETRON 4 MG: 2 INJECTION INTRAMUSCULAR; INTRAVENOUS at 05:04

## 2018-02-25 RX ADMIN — ONDANSETRON 4 MG: 4 TABLET, ORALLY DISINTEGRATING ORAL at 07:50

## 2018-02-25 RX ADMIN — PIPERACILLIN SODIUM,TAZOBACTAM SODIUM 3.38 G: 3; .375 INJECTION, POWDER, FOR SOLUTION INTRAVENOUS at 23:43

## 2018-02-25 NOTE — CONSULTS
[address]  [address], [facility] [place]        History and Physical/Surgical Consult   Angelique Lane date: 2018    MRN: [id number]     : 1960     Age: 62 y.o.          2018 11:42 AM    Subjective/HPI:   This patient is a 62 y.o. seen and evaluated at the request of Hospitalist for Cholelithiasis. This is a 70-year-old female with history of vascular dementia chronic renal disease and alcohol abuse recently admitted to the hospital on  for malnutrition and dehydration. She was discharged on . Her previous admit the patient admits to not eating or drinking while at home. She was discharged home after rehydration with no other issues. Today the patient presents to the emergency department complaining of abdominal pain described as diffuse in location. Her pain was rated 10 out of 10. The onset was after eating dinner last night. She states she has never had this abdominal pain before. Nothing seemed to make the pain better until she presented to the emergency department and pain medication was administered. She denies any associated fevers nausea or vomiting. She denies drinking any alcohol since she was discharged from the hospital 1 week ago. The patient does admit to drinking an excessive amount of alcohol. She states that she has been drinking 1 pint of vodka every day for the last 10 years. Prior to that she states that she drank 6-12 Hdez lights every day for approximately 1 year. She states that she did not began drinking alcohol until approximately 11 years ago and did not drink prior to that. Today she appears in no acute distress. She does not have any family support in the room at this time. She states that her  went home. Gallbladder ultrasound revealed gallstones with gallbladder wall thickening. She has no previous history of liver disease or cirrhosis.   Her liver enzymes on today's evaluation are elevated and her total bilirubin is 4. Limited abdominal ultrasound     INDICATION: Abdominal pain, fever, jaundice     FINDINGS: Absent sonographic Alberts's sign. Gallstones. Gallbladder wall  thickening measuring 9 mm. No pericholecystic fluid. The extrahepatic bile duct  is normal in caliber measuring 4 mm. Liver measures 17.6 cm. Heterogeneous liver  echogenicity most compatible with steatosis. The visualized portion of the  pancreas is normal. Right kidney measures 8.2 cm. It is echodense in relation to  the liver. This can be seen with chronic medical renal disease. The imaged  portion of the abdominal aorta is normal in caliber. The IVC is patent. Portal  vein is patent with normal flow direction.     IMPRESSION  IMPRESSION: Gallstones with gallbladder wall thickening. Absent sonographic  Alberts's sign. No biliary ductal dilatation. 2. Likely liver steatosis. 3. Echogenic right renal cortex. Review of Systems  A comprehensive review of systems was negative except for that written in the HPI.   Past Medical History:   Diagnosis Date    Aneurysm of common carotid artery ([address] 2004    left side s/p coil     CKD (chronic kidney disease) 9/18/2014    Dementia     FSGS (focal segmental glomerulosclerosis)     in remission at present    Gout     Hypertension     managed with medication     Osteoarthritis 9/18/2014    Stroke ([address] 2004, 2013    slight weakness on right side, slight effect to speech      Past Surgical History:   Procedure Laterality Date    HX ANKLE FRACTURE TX Left 2013    has hardware in   [place]  2004    left carotid     HX ORTHOPAEDIC Right 10/2014    hip replacement    HX REFRACTIVE SURGERY      bilateral - Lasik      Allergies   Allergen Reactions    Codeine Nausea and Vomiting    Lortab [Hydrocodone-Acetaminophen] Nausea and Vomiting      Social History   Substance Use Topics    Smoking status: Former Smoker Packs/day: 0.25     Quit date: 1/1/1979    Smokeless tobacco: Never Used    Alcohol use 1.5 oz/week     3 Shots of liquor per week      Social History     Social History Narrative     Family History   Problem Relation Age of Onset    Cancer Father 80     unknown    Stroke Sister 48      Prior to Admission Medications   Prescriptions Last Dose Informant Patient Reported? Taking?   acetaminophen (TYLENOL) 325 mg tablet   No No   Sig: Take 2 Tabs by mouth every six (6) hours as needed. amLODIPine (NORVASC) 5 mg tablet   No No   Sig: Take 1 Tab by mouth daily. atorvastatin (LIPITOR) 40 mg tablet   No No   Sig: Take 1 Tab by mouth daily. folic acid (FOLVITE) 1 mg tablet   No No   Sig: Take 1 Tab by mouth daily. mirtazapine (REMERON) 7.5 mg tablet   No No   Sig: Take 1 Tab by mouth nightly. pantoprazole (PROTONIX) 40 mg tablet   No No   Sig: Take 1 Tab by mouth Daily (before breakfast). thiamine (B-1) 100 mg tablet   No No   Sig: Take 1 Tab by mouth daily. Facility-Administered Medications: None     No current facility-administered medications for this encounter. Current Outpatient Prescriptions   Medication Sig    acetaminophen (TYLENOL) 325 mg tablet Take 2 Tabs by mouth every six (6) hours as needed.  amLODIPine (NORVASC) 5 mg tablet Take 1 Tab by mouth daily.  atorvastatin (LIPITOR) 40 mg tablet Take 1 Tab by mouth daily.  folic acid (FOLVITE) 1 mg tablet Take 1 Tab by mouth daily.  mirtazapine (REMERON) 7.5 mg tablet Take 1 Tab by mouth nightly.  pantoprazole (PROTONIX) 40 mg tablet Take 1 Tab by mouth Daily (before breakfast).  thiamine (B-1) 100 mg tablet Take 1 Tab by mouth daily.      Objective:     Vitals:    02/25/18 0616 02/25/18 0630 02/25/18 0909 02/25/18 0911   BP:  141/73  119/75   Pulse:       Resp:    18   Temp: (!) 101.2 °F (38.4 °C)   (!) 100.7 °F (38.2 °C)   SpO2: 94% 94% 93% 97%   Weight:       Height:               Physical Exam:   Gen- the patient is well developed and in no acute distress  HEENT- PERRL, EOMI, no scleral icterus       nose without alar flaring or epistaxis                  oral mus[place]a moist without cyanosis  Neck- no JVD or retractions  Lungs- resp even/unlab   Heart- RRR   Abd- Soft normal bowel sounds in all 4 quadrants. There is minimal guarding. There is no Alberts sign. Ext- warm without cyanosis. There is no lower leg edema. Skin- no jaundice or rashes  Neuro- alert and oriented x 3. No gross sensorimotor deficits are present. Data Review   Recent Labs      02/25/18   0517   WBC  4.0*   HGB  13.2   HCT  39.7   PLT  190     Recent Labs      02/25/18   0517   NA  140   K  5.0   CL  102   CO2  22   GLU  229*   BUN  26*   CREA  2.49*       Assessment:     Hospital Problems  Date Reviewed: 2/13/2018    None        Plan:   Patient has cholelithiasis without ductal dilatation. Liver enzymes and total bilirubin are elevated. Although this could be related to choledocholithiasis, the patient's alcohol abuse history has to be considered and that this could be early onset cirrhosis. I will order an INR and some serial liver enzymes and follow along with the patient to assess her risk for surgery. If her liver enzymes trend to normal and her INR is acceptable I will recommend a laparoscopic cholecystectomy prior to discharge. I will consult gastroenterology for possible ERCP if we think this could be due to choledocholithiasis.   --    [place]ley [place] Kristina, DO

## 2018-02-25 NOTE — IP AVS SNAPSHOT
Celeste Sanford 
 
 
 [address] [place] [address] 
[phone] Patient: Anastacio Carnes MRN: [id number] :1960 About your hospitalization You were admitted on:  2018 You last received care in the:  [facility]  [place] You were discharged on:  March 3, 2018 Why you were hospitalized Your primary diagnosis was:  Cholelithiasis Your diagnoses also included:  Cholecystitis, Essential Hypertension, Bipolar Disorder (Hcc), Rickey (Acute Kidney Injury) (Hcc), Depression, Cerebral Infarction (Hcc), Sepsis (Hcc), Bacteremia Due To Gram-Negative Bacteria Follow-up Information Follow up With Details Comments Contact Info Shabbir Paniagua [address] [facility] [address] 
[phone] Kerwin Acevedo DO Schedule an appointment as soon as possible for a visit in 2 weeks for follow up  [address] [facility] [address] [facility] [address] [phone] Rashaad Chinchilla DO Schedule an appointment as soon as possible for a visit in 1 week for follow up  [facility] [address] 
[phone] Chirag Meza MD Schedule an appointment as soon as possible for a visit in 3 weeks For follow up  [address] [facility] [address] [facility] [address] 
[phone] Shayan Albarado MD Schedule an appointment as soon as possible for a visit in 3 weeks For follow up  [address] [facility] [facility] [address] 
[phone] Discharge Orders None A check kristen indicates which time of day the medication should be taken. My Medications START taking these medications Instructions Each Dose to Equal  
 Morning Noon Evening Bedtime  
 ciprofloxacin HCl 500 mg tablet Commonly known as:  CIPRO Your last dose was: Your next dose is: Take 1 Tab by mouth two (2) times a day for 7 days. 500 mg  
    
   
   
   
  
 oxyCODONE-acetaminophen 5-325 mg per tablet Commonly known as:  PERCOCET Your last dose was: Your next dose is: Take 1 Tab by mouth every six (6) hours as needed. Max Daily Amount: 4 Tabs. 1 Tab Saccharomyces boulardii 250 mg capsule Commonly known as:  Jalen Pang Your last dose was: Your next dose is: Take 2 Caps by mouth two (2) times a day for 7 days. 500 mg CHANGE how you take these medications Instructions Each Dose to Equal  
 Morning Noon Evening Bedtime  
 mirtazapine 30 mg tablet Commonly known as:  Mónica Buzz What changed:   
- medication strength 
- how much to take Your last dose was: Your next dose is: Take 1 Tab by mouth nightly for 30 days. 30 mg CONTINUE taking these medications Instructions Each Dose to Equal  
 Morning Noon Evening Bedtime  
 acetaminophen 325 mg tablet Commonly known as:  TYLENOL Your last dose was: Your next dose is: Take 2 Tabs by mouth every six (6) hours as needed. 650 mg  
    
   
   
   
  
 amLODIPine 5 mg tablet Commonly known as:  Mahnaz Strange Your last dose was: Your next dose is: Take 1 Tab by mouth daily. 5 mg  
    
   
   
   
  
 atorvastatin 40 mg tablet Commonly known as:  LIPITOR Your last dose was: Your next dose is: Take 1 Tab by mouth daily. 40 mg  
    
   
   
   
  
 escitalopram oxalate 10 mg tablet Commonly known as:  Brandy Helms Your last dose was: Your next dose is: Take 10 mg by mouth daily. 10 mg  
    
   
   
   
  
 folic acid 1 mg tablet Commonly known as:  Cheyanne Your last dose was: Your next dose is: Take 1 Tab by mouth daily. 1 mg pantoprazole 40 mg tablet Commonly known as:  PROTONIX Your last dose was: Your next dose is: Take 1 Tab by mouth Daily (before breakfast). 40 mg  
    
   
   
   
  
 QUEtiapine 50 mg tablet Commonly known as:  SEROquel Your last dose was: Your next dose is: Take 50 mg by mouth nightly. 50 mg  
    
   
   
   
  
 thiamine 100 mg tablet Commonly known as:  B-1 Your last dose was: Your next dose is: Take 1 Tab by mouth daily. 100 mg Where to Get Your Medications Information on where to get these meds will be given to you by the nurse or doctor. ! Ask your nurse or doctor about these medications  
  ciprofloxacin HCl 500 mg tablet  
 mirtazapine 30 mg tablet  
 oxyCODONE-acetaminophen 5-325 mg per tablet Saccharomyces boulardii 250 mg capsule Discharge Instructions DISCHARGE SUMMARY from Nurse PATIENT INSTRUCTIONS: 
 
 
F-face looks uneven A-arms unable to move or move unevenly S-speech slurred or non-existent T-time-call 911 as soon as signs and symptoms begin-DO NOT go Back to bed or wait to see if you get better-TIME IS BRAIN. Warning Signs of HEART ATTACK Call 911 if you have these symptoms: 
? Chest discomfort. Most heart attacks involve discomfort in the center of the chest that lasts more than a few minutes, or that goes away and comes back. It can feel like uncomfortable pressure, squeezing, fullness, or pain. ? Discomfort in other areas of the upper body. Symptoms can include pain or discomfort in one or both arms, the back, neck, jaw, or stomach. ? Shortness of breath with or without chest discomfort. ? Other signs may include breaking out in a cold sweat, nausea, or lightheadedness. Don't wait more than five minutes to call [address]! Fast action can save your life. Calling 911 is almost always the fastest way to get lifesaving treatment. Emergency Medical Services staff can begin treatment when they arrive  up to an hour sooner than if someone gets to the hospital by car. The discharge information has been reviewed with the {PATIENT PARENT GUARDIAN:33901}. The {PATIENT PARENT GUARDIAN:41877} verbalized understanding. Discharge medications reviewed with the {Dishcarge meds reviewed CDDI:53533} and appropriate educational materials and side effects teaching were provided. __________________________________________________________________________________________________________________________________ Cholecystectomy: What to Expect at [facility] Your Recovery After your surgery, it is normal to feel weak and tired for several days after you return home. Your belly may be swollen. If you had laparoscopic surgery, you may also have pain in your shoulder for about 24 hours. You may have gas or need to burp a lot at first, and a few people get diarrhea. The diarrhea usually goes away in 2 to 4 weeks, but it may last longer. How quickly you recover depends on whether you had a laparoscopic or open surgery. · For a laparoscopic surgery, most people can go back to work or their normal routine in 1 to 2 weeks, but it may take longer, depending on the type of work you do. · For an open surgery, it will probably take 4 to 6 weeks before you get back to your normal routine. This care sheet gives you a general idea about how long it will take for you to recover. However, each person recovers at a different pace. Follow the steps below to get better as quickly as possible. How can you care for yourself at home? Activity ? · Rest when you feel tired. Getting enough sleep will help you recover. ? · Try to walk each day.  Start out by walking a little more than you did the day before. Gradually increase the amount you walk. Walking boosts blood flow and helps prevent pneumonia and constipation. ? · For about 2 to 4 weeks, avoid lifting anything that would make you strain. This may include a child, heavy grocery bags and milk containers, a heavy briefcase or backpack, cat litter or dog food bags, or a vacuum . ? · Avoid strenuous activities, such as biking, jogging, weightlifting, and aerobic exercise, until your doctor says it is okay. ? · You may shower 24 to 48 hours after surgery, if your doctor okays it. Pat the cut (incision) dry. Do not take a bath for the first 2 weeks, or until your doctor tells you it is okay. ? · You may drive when you are no longer taking pain medicine and can quickly move your foot from the gas pedal to the brake. You must also be able to sit comfortably for a long period of time, even if you do not plan to go far. You might get caught in traffic. ? · For a laparoscopic surgery, most people can go back to work or their normal routine in 1 to 2 weeks, but it may take longer. For an open surgery, it will probably take 4 to 6 weeks before you get back to your normal routine. ? · Your doctor will tell you when you can have sex again. ? Diet ? · Eat smaller meals more often instead of fewer larger meals. You can eat a normal diet, but avoid eating fatty foods for about 1 month. Fatty foods include hamburger, whole milk, cheese, and many snack foods. If your stomach is upset, try bland, low-fat foods like plain rice, broiled chicken, toast, and yogurt. ? · Drink plenty of fluids (unless your doctor tells you not to). ? · If you have diarrhea, try avoiding spicy foods, dairy products, fatty foods, and alcohol. You can also watch to see if specific foods cause it, and stop eating them. If the diarrhea continues for more than 2 weeks, talk to your doctor.   
? · You may notice that your bowel movements are not regular right after your surgery. This is common. Try to avoid constipation and straining with bowel movements. You may want to take a fiber supplement every day. If you have not had a bowel movement after a couple of days, ask your doctor about taking a mild laxative. Medicines ? · Your doctor will tell you if and when you can restart your medicines. He or she will also give you instructions about taking any new medicines. ? · If you take blood thinners, such as warfarin (Coumadin), clopidogrel (Plavix), or aspirin, be sure to talk to your doctor. He or she will tell you if and when to start taking those medicines again. Make sure that you understand exactly what your doctor wants you to do. ? · Take pain medicines exactly as directed. ¨ If the doctor gave you a prescription medicine for pain, take it as prescribed. ¨ If you are not taking a prescription pain medicine, take an over-the-counter medicine such as acetaminophen (Tylenol), ibuprofen (Advil, Motrin), or naproxen (Aleve). Read and follow all instructions on the label. ¨ Do not take two or more pain medicines at the same time unless the doctor told you to. Many pain medicines contain acetaminophen, which is Tylenol. Too much Tylenol can be harmful. ? · If you think your pain medicine is making you sick to your stomach: 
¨ Take your medicine after meals (unless your doctor tells you not to). ¨ Ask your doctor for a different pain medicine. ? · If your doctor prescribed antibiotics, take them as directed. Do not stop taking them just because you feel better. You need to take the full course of antibiotics. Incision care ? · If you have strips of tape on the incision, or cut, leave the tape on for a week or until it falls off.  
? · After 24 to 48 hours, wash the area daily with warm, soapy water, and pat it dry. ? · You may have staples to hold the cut together. Keep them dry until your doctor takes them out. This is usually in 7 to 10 days. ? · Keep the area clean and dry. You may cover it with a gauze bandage if it weeps or rubs against clothing. Change the bandage every day. ?Ice ? · To reduce swelling and pain, put ice or a cold pack on your belly for 10 to 20 minutes at a time. Do this every 1 to 2 hours. Put a thin cloth between the ice and your skin. Follow-up care is a key part of your treatment and safety. Be sure to make and go to all appointments, and call your doctor if you are having problems. It's also a good idea to know your test results and keep a list of the medicines you take. When should you call for help? Call 911 anytime you think you may need emergency care. For example, call if: 
? · You passed out (lost consciousness). ? · You are short of breath. Frutoso Golder ? Call your doctor now or seek immediate medical care if: 
? · You are sick to your stomach and cannot drink fluids. ? · You have pain that does not get better when you take your pain medicine. ? · You cannot pass stools or gas. ? · You have signs of infection, such as: 
¨ Increased pain, swelling, warmth, or redness. ¨ Red streaks leading from the incision. ¨ Pus draining from the incision. ¨ A fever. ? · Bright red blood has soaked through the bandage over your incision. ? · You have loose stitches, or your incision comes open. ? · You have signs of a blood clot in your leg (called a deep vein thrombosis), such as: 
¨ Pain in your calf, back of knee, thigh, or groin. ¨ Redness and swelling in your leg or groin. ? Watch closely for any changes in your health, and be sure to contact your doctor if you have any problems. Where can you learn more? Go to http://carrington-venancio.info/. Enter [phone] [id number] in the search box to learn more about \"Cholecystectomy: What to Expect at Home. \" Current as of: May 12, 2017 Content Version: 11.4 © 1037-1542 [employer]wise, [employer].  Care instructions adapted under license by Yolande Chilel (which disclaims liability or warranty for this information). If you have questions about a medical condition or this instruction, always ask your healthcare professional. [address] any warranty or liability for your use of this information. [employer] Announcement We are excited to announce that we are making your provider's discharge notes available to you in [employer]. You will see these notes when they are completed and signed by the physician that discharged you from your recent hospital stay. If you have any questions or concerns about any information you see in [employer], please call the Health Information Department where you were seen or reach out to your Primary Care Provider for more information about your plan of care. Introducing [facility]! Judith Roblero introduces [employer] patient portal. Now you can access parts of your medical record, email your doctor's office, and request medication refills online. 1. In your internet browser, go to [email or web address] 2. Click on the First Time User? Click Here link in the Sign In box. You will see the New Member Sign Up page. 3. Enter your [employer] Access Code exactly as it appears below. You will not need to use this code after youve completed the sign-up process. If you do not sign up before the expiration date, you must request a new code. · [employer] Access Code: [email or web address] Expires: 5/14/2018  3:49 PM 
 
4. Enter the last four digits of your Social Security Number (xxxx) and Date of Birth (mm/dd/yyyy) as indicated and click Submit. You will be taken to the next sign-up page. 5. Create a [employer] ID. This will be your [employer] login ID and cannot be changed, so think of one that is secure and easy to remember. 6. Create a [employer] password. You can change your password at any time. 7. Enter your Password Reset Question and Answer. This can be used at a later time if you forget your password. 8. Enter your e-mail address. You will receive e-mail notification when new information is available in [address] 9. Click Sign Up. You can now view and download portions of your medical record. 10. Click the Download Summary menu link to download a portable copy of your medical information. If you have questions, please visit the Frequently Asked Questions section of the [employer] website. Remember, [employer] is NOT to be used for urgent needs. For medical emergencies, dial 911. Now available from your iPhone and Android! Unresulted Labs-Please follow up with your PCP about these lab tests Order Current Status CULTURE, BLOOD Preliminary result CULTURE, BLOOD Preliminary result Providers Seen During Your Hospitalization Provider Specialty Primary office phone Anneliese Naidu MD Emergency Medicine [phone] Quentin Billy MD Emergency Medicine [phone] Ayad Springer MD Family Practice [phone] Your Primary Care Physician (PCP) Primary Care Physician Office Phone Office Fax Radha Falcon [phone] You are allergic to the following Allergen Reactions Codeine Nausea and Vomiting Lortab (Hydrocodone-Acetaminophen) Nausea and Vomiting Recent Documentation Height Weight Breastfeeding? BMI OB Status Smoking Status 1.676 m 81.6 kg No 29.05 kg/m2 Postmenopausal Former Smoker Emergency Contacts Name Discharge Info Relation Home Work Mobile Prashant Barrientos  Spouse [3] [phone] Patient Belongings The following personal items are in your possession at time of discharge: 
  Dental Appliances: None         Home Medications: Sent home   Jewelry: None  Clothing: At bedside    Other Valuables: None Please provide this summary of care documentation to your next provider. Signatures-by signing, you are acknowledging that this After Visit Summary has been reviewed with you and you have received a copy. Patient Signature:  ____________________________________________________________ Date:  ____________________________________________________________  
  
Viola Oka Provider Signature:  ____________________________________________________________ Date:  ____________________________________________________________

## 2018-02-25 NOTE — CONSULTS
[facility] Consult Note    Luis A Sanon,  1960       Primary GI Physician:     Referring Physician:  Dr Lluvia Rojas Date:  2018    Admit Date:  2018    Chief Complaint:  Elevated liver chemistry, cholelithiasis    Subjective:     History of Present Illness:  Patient is a 62 y.o. female seen in consultation at the request of Dr. Azalia Bosch for evaluation of elevated liver chemistry and cholelithiasis. She was admitted this morning after presenting to the ER after a fall which occurred this morning after a period of nausea, vomiting and abdominal pain which began late yesterday. Ultrasound was obtained and revealed gallstones with CBD 4mm, as well as a heterogenous liver consistent with steatosis. Labs were obtained with WBC 4.0, hgb 13.2, platelet 622. Liver panel revealed t bili 4.1, , , and alk phos 563. BUN was 26 with creatinine 2.49. INR is 1.0. She was seen by surgery with evaluation for liver disease recommended prior to proceeding with cholecystectomy. Patient was recently admitted  to  by the hospitalist service with dehydration and malnutrition. Daily intake of etoh was reported at that time in large amount, currently with 1 pint of vodka daily. During that admission, alk phos was mildly elevated at 203 with low albumin. She was admitted for psychiatric evaluation in [place] upon discharge with hx of bipolar disease noted. She is seen today with her  present and reports no etoh intake since that admission . She notes she was discharged from [facility] on Thursday and had steak that evening, which seemed to give her some gi discomfort. Yesterday, she had [place] for lunch and noted onset of epigastric pain about an hour later with some nausea and vomiting, and later some light-headness with a fall. She had one loose stool this morning but none further. She has had no bleeding and no melena.      On current admission, patient has been febrile with tmax 101.7, and some tachycardia noted. Limited abdominal ultrasound 2/25/18   INDICATION: Abdominal pain, fever, jaundice   FINDINGS: Absent sonographic Alberts's sign. Gallstones. Gallbladder wall  thickening measuring 9 mm. No pericholecystic fluid. The extrahepatic bile duct  is normal in caliber measuring 4 mm. Liver measures 17.6 cm. Heterogeneous liver  echogenicity most compatible with steatosis. The visualized portion of the  pancreas is normal. Right kidney measures 8.2 cm. It is echodense in relation to  the liver. This can be seen with chronic medical renal disease. The imaged  portion of the abdominal aorta is normal in caliber. The IVC is patent. Portal  vein is patent with normal flow direction.   IMPRESSION  IMPRESSION: Gallstones with gallbladder wall thickening. Absent sonographic  Alberts's sign. No biliary ductal dilatation. 2. Likely liver steatosis. 3. Echogenic right renal cortex. PMH:  Past Medical History:   Diagnosis Date    Aneurysm of common carotid artery ([address] 2004    left side s/p coil     CKD (chronic kidney disease) 9/18/2014    Dementia     FSGS (focal segmental glomerulosclerosis)     in remission at present    Gout     Hypertension     managed with medication     Osteoarthritis 9/18/2014    Stroke ([address] 2004, 2013    slight weakness on right side, slight effect to speech       PSH:  Past Surgical History:   Procedure Laterality Date    HX ANKLE FRACTURE TX Left 2013    has hardware in   [place]  2004    left carotid     HX ORTHOPAEDIC Right 10/2014    hip replacement    HX REFRACTIVE SURGERY      bilateral - Lasik       Allergies: Allergies   Allergen Reactions    Codeine Nausea and Vomiting    Lortab [Hydrocodone-Acetaminophen] Nausea and Vomiting       Home Medications:  Prior to Admission medications    Medication Sig Start Date End Date Taking?  Authorizing Provider   acetaminophen (TYLENOL) 325 mg tablet Take 2 Tabs by mouth every six (6) hours as needed. 2/16/18   Jsoesito Ruiz MD   amLODIPine (NORVASC) 5 mg tablet Take 1 Tab by mouth daily. 2/17/18   Josesito Ruiz MD   atorvastatin (LIPITOR) 40 mg tablet Take 1 Tab by mouth daily. 2/17/18   Josesito Ruiz MD   folic acid (FOLVITE) 1 mg tablet Take 1 Tab by mouth daily. 2/17/18   Josesito Ruiz MD   mirtazapine (REMERON) 7.5 mg tablet Take 1 Tab by mouth nightly. 2/16/18   Josesito Ruiz MD   pantoprazole (PROTONIX) 40 mg tablet Take 1 Tab by mouth Daily (before breakfast). 2/17/18   Josesito Ruiz MD   thiamine (B-1) 100 mg tablet Take 1 Tab by mouth daily. 2/17/18   Josesito Ruiz MD       Hospital Medications:  Current Facility-Administered Medications   Medication Dose Route Frequency    sodium chloride (NS) flush 5-10 mL  5-10 mL IntraVENous Q8H    sodium chloride (NS) flush 5-10 mL  5-10 mL IntraVENous PRN    acetaminophen (TYLENOL) tablet 650 mg  650 mg Oral Q4H PRN    morphine injection 4 mg  4 mg IntraVENous Q4H PRN    lactated Ringers infusion  125 mL/hr IntraVENous CONTINUOUS    ondansetron (ZOFRAN) injection 4 mg  4 mg IntraVENous Q4H PRN    [START ON 2/26/2018] pantoprazole (PROTONIX) tablet 40 mg  40 mg Oral ACB    piperacillin-tazobactam (ZOSYN) 3.375 g in 0.9% sodium chloride (MBP/ADV) 100 mL  3.375 g IntraVENous Q8H       Social History:  Social History   Substance Use Topics    Smoking status: Former Smoker     Packs/day: 0.25     Quit date: 1/1/1979    Smokeless tobacco: Never Used    Alcohol use 1.5 oz/week     3 Shots of liquor per week       Pt denies any history of drug use, blood transfusions, or tattoos. Family History:  Family History   Problem Relation Age of Onset    Cancer Father 80     unknown    Stroke Sister 48       Review of Systems:  A detailed 10 system ROS is obtained, with pertinent positives as listed above. All others are negative.     Diet:  npo    Objective:     Physical Exam:  Vitals:  Visit Vitals    /74    Pulse 97    Temp 98.4 °F (36.9 °C)    Resp 18    Ht 5' 6\" (1.676 m)    Wt 81.6 kg (180 lb)    SpO2 97%    BMI 29.05 kg/m2     Gen:  Pt is alert, cooperative, no acute distress  Skin:  Extremities and face reveal no rashes. Pale, no jaundice. HEENT: Sclerae anicteric. Extra-occular muscles are intact. No oral ulcers. No abnormal pigmentation of the lips. The neck is supple. Cardiovascular: Regular rate and rhythm. No murmurs, gallops, or rubs. Respiratory:  Comfortable breathing with no accessory muscle use. Clear breath sounds anteriorly with no wheezes, rales, or rhonchi. GI:  Abdomen nondistended, soft, with mild epigastric tenderness. Normal active bowel sounds. No enlargement of the liver or spleen. No masses palpable. Rectal:  Deferred  Musculoskeletal:  No pitting edema of the lower legs. Neurological:  Gross memory appears intact. Patient is alert and oriented. Psychiatric:  Mood appears appropriate with judgement intact. Lymphatic:  No cervical or supraclavicular adenopathy. Laboratory:    Recent Labs      02/25/18   0517   WBC  4.0*   HGB  13.2   HCT  39.7   PLT  190   MCV  93.0   NA  140   K  5.0   CL  102   CO2  22   BUN  26*   CREA  2.49*   CA  9.3   GLU  229*   AP  563*   SGOT  660*   ALT  292*   TBILI  4.1*   ALB  2.9*   TP  7.2   LPSE  391          Assessment:     Principal Problem:    Cholelithiasis (2/25/2018)    Active Problems:    Essential hypertension (9/11/2012)      Cerebral infarction ([address] (9/11/2012)      Bipolar disorder ([facility] (10/22/2014)      OSMIN (acute kidney injury) ([facility] (2/13/2018)      Depression (2/13/2018)      Cholecystitis (2/25/2018)        Plan:     61 yo female with hx of CVA, CKD, etoh abuse and bipolar disease is seen today for evaluation of elevated liver chemistry. She presented last evening with onset of n/v and epigastric pain which began following a meal at the [address]   She has noted in the last month that fattier and fried foods seem to cause gi discomfort. On arrival to the ER, she was noted to have elevations in liver chemistry, consistent with reported hx of heavy etoh intake (abstinent x 2 weeks). Ultarsound revealed thickened gallbladder wall with CBD 4mm; hepatic fatty infiltration was also noted. WBC ct is normal with normal platelet count and INR 1.0. Recent admission this month for malnutrition and electrolyte imbalance is reviewed, with discharge to psychiatric facility where patient was admitted for approximately a week. 1.  Elevated liver chemistry likely secondary acute etoh hepatitis given AST:ALT ratio; patient reports abstinence of etoh x 2 weeks but liver chemistry on recent admission was normal excepting mild elevation of alk phos and low albumin  2. Etiology of abdominal pain and n/v is more likely related to cholelithiasis   3. Will further evaluation biliary tract with MRCP  4. Further recommendations to be based on above. Patient is seen and examined in collaboration with Danii Galarza. Assessment and plan as per Dr. Martir Powell.   Olivia Rasmussen NP

## 2018-02-25 NOTE — ED PROVIDER NOTES
HPI Comments: 68-year-old female with history of CVA, hypertension, recent admission for alcohol use and delirium presents with fall about a non-hour episode of vomiting associated with epigastric pain. She's had no fever or diarrhea. Ate [place] food prior to the onset of this. hospitalized here about a week ago. Admitted to psychiatric Hospital after that. Patient is a 62 y.o. female presenting with abdominal pain. The history is provided by the patient. Abdominal Pain    This is a new problem. The current episode started 6 to 12 hours ago. The problem occurs constantly. The problem has not changed since onset. The pain is associated with vomiting. The pain is located in the epigastric region. The quality of the pain is dull and cramping. The pain is moderate. Associated symptoms include nausea and vomiting. Pertinent negatives include no fever, no diarrhea, no hematochezia, no melena, no constipation, no dysuria, no frequency, no chest pain and no back pain. Nothing worsens the pain. The pain is relieved by nothing. The patient's surgical history non-contributory.        Past Medical History:   Diagnosis Date    Aneurysm of common carotid artery ([address] 2004    left side s/p coil     CKD (chronic kidney disease) 9/18/2014    Dementia     FSGS (focal segmental glomerulosclerosis)     in remission at present    Gout     Hypertension     managed with medication     Osteoarthritis 9/18/2014    Stroke ([address] 2004, 2013    slight weakness on right side, slight effect to speech       Past Surgical History:   Procedure Laterality Date    HX ANKLE FRA[facility] [place] Left 2013    [facility]    [facility]     HX ORTHOPAEDIC Right 10/2014    hip replacement    HX REFRACTIVE SURGERY      bilateral - Lasik         Family History:   Problem Relation Age of Onset    Cancer Father 80     unknown    Stroke Sister 48       Social History     Social History    Marital status:      Spouse name: N/A    Number of children: N/A    Years of education: N/A     Occupational History    Not on file. Social History Main Topics    Smoking status: Former Smoker     Packs/day: 0.25     Quit date: 1/1/1979    Smokeless tobacco: Never Used    Alcohol use 1.5 oz/week     3 Shots of liquor per week    Drug use: No    Sexual activity: Yes     Partners: Male     Birth control/ protection: None     Other Topics Concern    Not on file     Social History Narrative         ALLERGIES: Codeine and Lortab [hydrocodone-acetaminophen]    Review of Systems   Constitutional: Negative for chills and fever. Cardiovascular: Negative for chest pain. Gastrointestinal: Positive for abdominal pain, nausea and vomiting. Negative for constipation, diarrhea, hematochezia and melena. Genitourinary: Negative for dysuria and frequency. Musculoskeletal: Negative for back pain and neck pain. Vitals:    02/25/18 0344   BP: (!) 157/92   Pulse: (!) 133   Resp: 18   Temp: 98.9 °F (37.2 °C)   SpO2: 98%   Weight: 81.6 kg (180 lb)   Height: 5' 6\" (1.676 m)            Physical Exam   Constitutional: She is oriented to person, place, and time. She appears well-developed and well-nourished. No distress. HENT:   Head: Normocephalic and atraumatic. Mouth/Throat: Oropharynx is clear and moist. No oropharyngeal exudate. Eyes: Conjunctivae and EOM are normal. Pupils are equal, round, and reactive to light. Neck: Normal range of motion. Neck supple. Cardiovascular: Normal rate, regular rhythm and intact distal pulses. No murmur heard. Pulmonary/Chest: Breath sounds normal. No respiratory distress. Abdominal: Soft. Bowel sounds are normal. She exhibits no mass. There is tenderness in the epigastric area. There is no rebound, no guarding, no CVA tenderness, no tenderness at McBurney's point and negative Alberts's sign. No hernia.    Actively vomiting   Neurological: She is alert and oriented to person, place, and time. Gait normal.   Nl speech   Skin: Skin is warm and dry. Psychiatric: She has a normal mood and affect. Her speech is normal.   Nursing note and vitals reviewed. MDM  Number of Diagnoses or Management Options  Acute cholecystitis:   Acute epigastric pain:   Acute kidney injury [facility]):   Nausea and vomiting, intractability of vomiting not specified, unspecified vomiting type:   Diagnosis management comments: Patient feels very warm, checked rectal temp. IV fluids and hydration. Check screening lab work. Possible imaging. 9:37 AM  Patient's ultrasound shows a thickened gallbladder wall but no active stones and nothing in the common bile duct. I discussed the case with on-call for general surgery and they said they would follow along with a delayed procedure is the Likely preferred course. I will discuss the case with the hospitalist for admission. I spoke with the hospitalist who kindly agreed to admit the patient.        Amount and/or Complexity of Data Reviewed  Clinical lab tests: ordered and reviewed  Independent visualization of images, tracings, or specimens: yes    Risk of Complications, Morbidity, and/or Mortality  Presenting problems: moderate  Diagnostic procedures: minimal  Management options: low    Patient Progress  Patient progress: stable        ED Course       Procedures      Results Include:    Recent Results (from the past 24 hour(s))   CBC WITH AUTOMATED DIFF    Collection Time: 02/25/18  5:17 AM   Result Value Ref Range    WBC 4.0 (L) 4.3 - 11.1 K/uL    RBC 4.27 4.05 - 5.25 M/uL    HGB 13.2 11.7 - 15.4 g/dL    HCT 39.7 35.8 - 46.3 %    MCV 93.0 79.6 - 97.8 FL    MCH 30.9 26.1 - 32.9 PG    MCHC 33.2 31.4 - 35.0 g/dL    RDW 15.2 (H) 11.9 - 14.6 %    PLATELET 399 047 - 574 K/uL    MPV 10.8 10.8 - 14.1 FL    DF AUTOMATED      NEUTROPHILS 94 (H) 43 - 78 %    LYMPHOCYTES 5 (L) 13 - 44 %    MONOCYTES 1 (L) 4.0 - 12.0 %    EOSINOPHILS 0 (L) 0.5 - 7.8 %    BASOPHILS 0 0.0 - 2.0 %    IMMATURE GRANULOCYTES 0 0.0 - 5.0 %    ABS. NEUTROPHILS 3.8 1.7 - 8.2 K/UL    ABS. LYMPHOCYTES 0.2 (L) 0.5 - 4.6 K/UL    ABS. MONOCYTES 0.0 (L) 0.1 - 1.3 K/UL    ABS. EOSINOPHILS 0.0 0.0 - 0.8 K/UL    ABS. BASOPHILS 0.0 0.0 - 0.2 K/UL    ABS. IMM. GRANS. 0.0 0.0 - 0.5 K/UL   METABOLIC PANEL, COMPREHENSIVE    Collection Time: 02/25/18  5:17 AM   Result Value Ref Range    Sodium 140 136 - 145 mmol/L    Potassium 5.0 3.5 - 5.1 mmol/L    Chloride 102 98 - 107 mmol/L    CO2 22 21 - 32 mmol/L    Anion gap 16 7 - 16 mmol/L    Glucose 229 (H) 65 - 100 mg/dL    BUN 26 (H) 6 - 23 MG/DL    Creatinine 2.49 (H) 0.6 - 1.0 MG/DL    GFR est AA 26 (L) >60 ml/min/1.73m2    GFR est non-AA 21 (L) >60 ml/min/1.73m2    Calcium 9.3 8.3 - 10.4 MG/DL    Bilirubin, total 4.1 (H) 0.2 - 1.1 MG/DL    ALT (SGPT) 292 (H) 12 - 65 U/L    AST (SGOT) 660 (H) 15 - 37 U/L    Alk.  phosphatase 563 (H) 50 - 136 U/L    Protein, total 7.2 6.3 - 8.2 g/dL    Albumin 2.9 (L) 3.5 - 5.0 g/dL    Globulin 4.3 (H) 2.3 - 3.5 g/dL    A-G Ratio 0.7 (L) 1.2 - 3.5     INFLUENZA A & B AG (RAPID TEST)    Collection Time: 02/25/18  5:17 AM   Result Value Ref Range    Influenza A Ag NEGATIVE  NEG      Influenza B Ag NEGATIVE  NEG     LIPASE    Collection Time: 02/25/18  5:17 AM   Result Value Ref Range    Lipase 391 73 - 393 U/L   URINALYSIS W/ RFLX MICROSCOPIC    Collection Time: 02/25/18  6:00 AM   Result Value Ref Range    Color ELISA      Appearance CLEAR      Specific gravity 1.015 1.001 - 1.023      pH (UA) 7.5 5.0 - 9.0      Protein GREATER THAN/EQUAL  (A) NEG mg/dL    Glucose 100 (A) NEG mg/dL    Ketone NEGATIVE  NEG mg/dL    Bilirubin MODERATE (A) NEG      Blood TRACE (A) NEG      Urobilinogen 1.0 0.2 - 1.0 EU/dL    Nitrites NEGATIVE  NEG      Leukocyte Esterase TRACE (A) NEG      WBC 5-10 0 /hpf    RBC 3-5 0 /hpf    Epithelial cells 3-5 0 /hpf    Bacteria 0 0 /hpf   POC LACTIC ACID    Collection Time: 02/25/18  6:49 AM   Result Value Ref Range Lactic Acid (POC) 3.9 (H) 0.5 - 1.9 mmol/L            7:17 AM   epigastrium. Patient febrile, elevated lactate. All we'll cover for sepsis at this point. Due to elevated bilirubin, will obtain ultrasound.

## 2018-02-25 NOTE — ROUTINE PROCESS
TRANSFER - OUT REPORT:    Verbal report given to Lawson Goldsmith on Alli Pelaez  being transferred to  for routine progression of care       Report consisted of patients Situation, Background, Assessment and   Recommendations(SBAR). Information from the following report(s) ED Summary was reviewed with the receiving nurse. Lines:   Peripheral IV 02/25/18 Right External jugular (Active)   Site Assessment Clean, dry, & intact 2/25/2018  6:34 AM   Phlebitis Assessment 0 2/25/2018  6:34 AM   Infiltration Assessment 0 2/25/2018  6:34 AM   Dressing Status Clean, dry, & intact 2/25/2018  6:34 AM   Dressing Type Transparent 2/25/2018  6:34 AM   Hub Color/Line Status Pink 2/25/2018  6:34 AM        Opportunity for questions and clarification was provided.       Patient transported with:

## 2018-02-25 NOTE — PROGRESS NOTES
Assessment complete via flow sheet. Pt A&Ox3. Pt is jaundiced. Respirations even and unlabored, CTA. S1 S2 auscultated. Pt on room air. Pt reports pain level of 0 out of 10. Bowel sounds active, abdomen soft. Pt has small amount of loose stool. Assisted pt up to [facility], pt fairly tolerated. Denies other needs. Bed in lowest position, side rails up 3, call bell in reach. Instructed to call for assistance. Pt verbalized understanding. Plan of care reviewed with patient. Bed alarm on and functioning.

## 2018-02-25 NOTE — PROGRESS NOTES
TRANSFER - IN REPORT:    Verbal report received from Anahi(name) on Ti Strickland  being received from ER(unit) for routine progression of care      Report consisted of patients Situation, Background, Assessment and   Recommendations(SBAR). Information from the following report(s) ED Summary and Recent Results was reviewed with the receiving nurse. Opportunity for questions and clarification was provided.

## 2018-02-25 NOTE — PROGRESS NOTES
Notified nurse to complete screening form and keep patient NPO after midnight for MRI abd to be done tomorrow

## 2018-02-25 NOTE — H&P
HOSPITALIST H&P/CONSULT  NAME:  Rigoberto Fay   Age:  62 y.o.  :   1960   MRN:   [id number]  PCP: Rita Chauhan DO  Consulting MD:  Treatment Team: Attending Provider: Tremayne Lockhart MD  HPI:   62 y. o. female who has a PMH of vascular dementia, HTN, dyslipidemia, chronic alcohol intake, remote history of bipolar disorder in no treatment, CKD stage III, multiple mechanical falls that presents from home with complaints of severe abdominal pain x 2 days. Pain is mid abdomen/epigastic and sharp in nature. She denies nausea, emesis or diarrhea. She was recently hospitalized earlier this month for alcohol intoxication and delirium. She denies any recent alcohol use. In the ER, she is tachycardic, febrile and normotensive. Labs are significant for: WBC 4, lactic acid of 3.9, creatinine of 2.4, bilirubin 4.1 and transaminitis. She had abdominal ultrasound that showed gallstones with gallbladder wall thickening but no biliary ductal dilatation. General surg consulted and already evaluated. GI to be consulted also. Needs MRCP prior to cholecystectomy    Complete ROS done and is as stated in HPI or otherwise negative  Past Medical History:   Diagnosis Date    Aneurysm of common carotid artery ([facility] [address]     left side s/p coil     CKD (chronic kidney disease) 2014    Dementia     FSGS (focal segmental glomerulosclerosis)     in remission at present    Gout     Hypertension     managed with medication     Osteoarthritis 2014    Stroke ([facility] [address] ,     slight weakness on right side, slight effect to speech      Past Surgical History:   Procedure Laterality Date    HX ANKLE FRACTURE TX Left 2013    has hardware in   [place]      left carotid     HX ORTHOPAEDIC Right 10/2014    hip replacement    HX REFRACTIVE SURGERY      bilateral - Lasik      Prior to Admission Medications   Prescriptions Last Dose Informant Patient Reported?  Taking?   acetaminophen (TYLENOL) 325 mg tablet   No No   Sig: Take 2 Tabs by mouth every six (6) hours as needed. amLODIPine (NORVASC) 5 mg tablet   No No   Sig: Take 1 Tab by mouth daily. atorvastatin (LIPITOR) 40 mg tablet   No No   Sig: Take 1 Tab by mouth daily. folic acid (FOLVITE) 1 mg tablet   No No   Sig: Take 1 Tab by mouth daily. mirtazapine (REMERON) 7.5 mg tablet   No No   Sig: Take 1 Tab by mouth nightly. pantoprazole (PROTONIX) 40 mg tablet   No No   Sig: Take 1 Tab by mouth Daily (before breakfast). thiamine (B-1) 100 mg tablet   No No   Sig: Take 1 Tab by mouth daily. Facility-Administered Medications: None     Allergies   Allergen Reactions    Codeine Nausea and Vomiting    Lortab [Hydrocodone-Acetaminophen] Nausea and Vomiting      Social History   Substance Use Topics    Smoking status: Former Smoker     Packs/day: 0.25     Quit date: 1979    Smokeless tobacco: Never Used    Alcohol use 1.5 oz/week     3 Shots of liquor per week      Family History   Problem Relation Age of Onset    Cancer Father 80     unknown    Stroke Sister 50      Objective:     Visit Vitals    /75    Pulse (!) 138    Temp (!) 100.7 °F (38.2 °C)    Resp 18    Ht 5' 6\" (1.676 m)    Wt 81.6 kg (180 lb)    SpO2 97%    BMI 29.05 kg/m2      Temp (24hrs), Av.3 °F (37.9 °C), Min:98.9 °F (37.2 °C), Max:101.2 °F (38.4 °C)    Oxygen Therapy  O2 Sat (%): 97 % (18 0911)  Pulse via Oximetry: 115 beats per minute (18 0911)  O2 Device: Room air (18 8896)     Physical Exam:  General:    Alert, cooperative, no distress, appears stated age. Lungs:   Clear to auscultation bilaterally. No Wheezing or Rhonchi. No rales. Heart:   Regular rate and rhythm,  no murmur, rub or gallop. Abdomen:   Soft, Not distended. Bowel sounds active. RUQ TTP  Extremities: No cyanosis. No edema. No clubbing  Skin:     Texture, turgor normal. No rashes or lesions.   Jaundiced  Neurologic: Alert and oriented x 3, no focal deficits   Data Review:   Recent Results (from the past 24 hour(s))   CBC WITH AUTOMATED DIFF    Collection Time: 02/25/18  5:17 AM   Result Value Ref Range    WBC 4.0 (L) 4.3 - 11.1 K/uL    RBC 4.27 4.05 - 5.25 M/uL    HGB 13.2 11.7 - 15.4 g/dL    HCT 39.7 35.8 - 46.3 %    MCV 93.0 79.6 - 97.8 FL    MCH 30.9 26.1 - 32.9 PG    MCHC 33.2 31.4 - 35.0 g/dL    RDW 15.2 (H) 11.9 - 14.6 %    PLATELET 958 800 - 811 K/uL    MPV 10.8 10.8 - 14.1 FL    DF AUTOMATED      NEUTROPHILS 94 (H) 43 - 78 %    LYMPHOCYTES 5 (L) 13 - 44 %    MONOCYTES 1 (L) 4.0 - 12.0 %    EOSINOPHILS 0 (L) 0.5 - 7.8 %    BASOPHILS 0 0.0 - 2.0 %    IMMATURE GRANULOCYTES 0 0.0 - 5.0 %    ABS. NEUTROPHILS 3.8 1.7 - 8.2 K/UL    ABS. LYMPHOCYTES 0.2 (L) 0.5 - 4.6 K/UL    ABS. MONOCYTES 0.0 (L) 0.1 - 1.3 K/UL    ABS. EOSINOPHILS 0.0 0.0 - 0.8 K/UL    ABS. BASOPHILS 0.0 0.0 - 0.2 K/UL    ABS. IMM. GRANS. 0.0 0.0 - 0.5 K/UL   METABOLIC PANEL, COMPREHENSIVE    Collection Time: 02/25/18  5:17 AM   Result Value Ref Range    Sodium 140 136 - 145 mmol/L    Potassium 5.0 3.5 - 5.1 mmol/L    Chloride 102 98 - 107 mmol/L    CO2 22 21 - 32 mmol/L    Anion gap 16 7 - 16 mmol/L    Glucose 229 (H) 65 - 100 mg/dL    BUN 26 (H) 6 - 23 MG/DL    Creatinine 2.49 (H) 0.6 - 1.0 MG/DL    GFR est AA 26 (L) >60 ml/min/1.73m2    GFR est non-AA 21 (L) >60 ml/min/1.73m2    Calcium 9.3 8.3 - 10.4 MG/DL    Bilirubin, total 4.1 (H) 0.2 - 1.1 MG/DL    ALT (SGPT) 292 (H) 12 - 65 U/L    AST (SGOT) 660 (H) 15 - 37 U/L    Alk.  phosphatase 563 (H) 50 - 136 U/L    Protein, total 7.2 6.3 - 8.2 g/dL    Albumin 2.9 (L) 3.5 - 5.0 g/dL    Globulin 4.3 (H) 2.3 - 3.5 g/dL    A-G Ratio 0.7 (L) 1.2 - 3.5     INFLUENZA A & B AG (RAPID TEST)    Collection Time: 02/25/18  5:17 AM   Result Value Ref Range    Influenza A Ag NEGATIVE  NEG      Influenza B Ag NEGATIVE  NEG     LIPASE    Collection Time: 02/25/18  5:17 AM   Result Value Ref Range    Lipase 391 73 - 393 U/L   PROCALCITONIN    Collection Time: 02/25/18  5:17 AM   Result Value Ref Range    Procalcitonin 4.6 ng/mL   URINALYSIS W/ RFLX MICROSCOPIC    Collection Time: 02/25/18  6:00 AM   Result Value Ref Range    Color ELISA      Appearance CLEAR      Specific gravity 1.015 1.001 - 1.023      pH (UA) 7.5 5.0 - 9.0      Protein GREATER THAN/EQUAL  (A) NEG mg/dL    Glucose 100 (A) NEG mg/dL    Ketone NEGATIVE  NEG mg/dL    Bilirubin MODERATE (A) NEG      Blood TRACE (A) NEG      Urobilinogen 1.0 0.2 - 1.0 EU/dL    Nitrites NEGATIVE  NEG      Leukocyte Esterase TRACE (A) NEG      WBC 5-10 0 /hpf    RBC 3-5 0 /hpf    Epithelial cells 3-5 0 /hpf    Bacteria 0 0 /hpf   POC LACTIC ACID    Collection Time: 02/25/18  6:49 AM   Result Value Ref Range    Lactic Acid (POC) 3.9 (H) 0.5 - 1.9 mmol/L   POC LACTIC ACID    Collection Time: 02/25/18  7:43 AM   Result Value Ref Range    Lactic Acid (POC) 2.7 (H) 0.5 - 1.9 mmol/L     Imaging /Procedures /Studies   IMPRESSION: Gallstones with gallbladder wall thickening. Absent sonographic  Alberts's sign. No biliary ductal dilatation. 2. Likely liver steatosis. 3. Echogenic right renal cortex    Assessment and Plan: Active Hospital Problems    Diagnosis Date Noted    Cholecystitis 02/25/2018    Cholelithiasis 02/25/2018    Essential hypertension 09/11/2012       PLAN  ·  Admit to med-surg unit  · S/p LR bolus in ER. Will continue maintenance LR and make NPO except meds/sips of clears  · General surg and GI consulted. Input is appreciated  · Meets sepsis criteria for cholecystitis.  Continue Zosyn  · Repeat Lactic acid is pending  · Liver profile, PT/INR in am  ·     DVT ppx: SCDs   Code Status: Full  Anticipated discharge: 3-4 Midnights    Signed By: Ian John MD     February 25, 2018

## 2018-02-26 ENCOUNTER — APPOINTMENT (OUTPATIENT)
Dept: MRI IMAGING | Age: 58
DRG: 854 | End: 2018-02-26
Attending: NURSE PRACTITIONER
Payer: MEDICARE

## 2018-02-26 LAB
BACTERIA SPEC CULT: NEGATIVE
BACTERIA SPEC CULT: NORMAL
SERVICE CMNT-IMP: NORMAL

## 2018-02-26 PROCEDURE — 74011000258 HC RX REV CODE- 258: Performed by: FAMILY MEDICINE

## 2018-02-26 PROCEDURE — 74011250636 HC RX REV CODE- 250/636: Performed by: FAMILY MEDICINE

## 2018-02-26 PROCEDURE — 97165 OT EVAL LOW COMPLEX 30 MIN: CPT

## 2018-02-26 PROCEDURE — 74181 MRI ABDOMEN W/O CONTRAST: CPT

## 2018-02-26 PROCEDURE — 87493 C DIFF AMPLIFIED PROBE: CPT | Performed by: FAMILY MEDICINE

## 2018-02-26 PROCEDURE — 97162 PT EVAL MOD COMPLEX 30 MIN: CPT

## 2018-02-26 PROCEDURE — 74011250637 HC RX REV CODE- 250/637: Performed by: FAMILY MEDICINE

## 2018-02-26 PROCEDURE — 65270000029 HC RM PRIVATE

## 2018-02-26 RX ADMIN — ESCITALOPRAM OXALATE 10 MG: 10 TABLET ORAL at 08:59

## 2018-02-26 RX ADMIN — Medication 5 ML: at 05:54

## 2018-02-26 RX ADMIN — SODIUM CHLORIDE, SODIUM LACTATE, POTASSIUM CHLORIDE, AND CALCIUM CHLORIDE 125 ML/HR: 600; 310; 30; 20 INJECTION, SOLUTION INTRAVENOUS at 13:41

## 2018-02-26 RX ADMIN — PIPERACILLIN SODIUM,TAZOBACTAM SODIUM 3.38 G: 3; .375 INJECTION, POWDER, FOR SOLUTION INTRAVENOUS at 07:37

## 2018-02-26 RX ADMIN — Medication 5 ML: at 13:43

## 2018-02-26 RX ADMIN — QUETIAPINE FUMARATE 50 MG: 25 TABLET ORAL at 21:23

## 2018-02-26 RX ADMIN — PANTOPRAZOLE SODIUM 40 MG: 40 TABLET, DELAYED RELEASE ORAL at 07:37

## 2018-02-26 RX ADMIN — PIPERACILLIN SODIUM,TAZOBACTAM SODIUM 3.38 G: 3; .375 INJECTION, POWDER, FOR SOLUTION INTRAVENOUS at 16:00

## 2018-02-26 RX ADMIN — Medication 5 ML: at 21:23

## 2018-02-26 RX ADMIN — MIRTAZAPINE 30 MG: 15 TABLET, FILM COATED ORAL at 21:23

## 2018-02-26 NOTE — PROGRESS NOTES
Spoke with Tadeo SALAZAR about order for PICC line. Patient has positive blood cultures and history of CKD. Would need nephrology approval for PICC or Midline placement. Tadeo SALAZAR informed me that the IV in the EJ is working, but lab is unable to draw labs. Informed her that we had outpatients scheduled downtown this morning and it would be after lunch before we could get there. Asked her to call ICU and house supervisor to see if they could get labs and potentially another IV access.

## 2018-02-26 NOTE — PROGRESS NOTES
Dr. Jt Moser made aware of Blood culture with gram negative rods. No new orders recieved as patient is already on Zosyn.

## 2018-02-26 NOTE — PROGRESS NOTES
Spiritual Care visit. Initial visit. Patient felt she was fine, didn't need anything from chaplains.  invited to call if needed.     Visit by Clyde Murray M.Ed., .B. ,Staff

## 2018-02-26 NOTE — CONSULTS
[facility]        Subjective: A on CKD (FSGS)   Renal consult dictated # [id number]    Review of Systems -   General ROS: negative for - fever, chills  Respiratory ROS: no SOB, cough, ROBERTS  Cardiovascular ROS: no CP, palpitations  Gastrointestinal ROS: no N&V, abdominal pain, diarrhea  Genito-Urinary ROS: no difficulty voiding, dysuria  Neurological ROS: no seizures, focal weekness        Objective:    Vitals:    02/26/18 0341 02/26/18 0737 02/26/18 1042 02/26/18 1448   BP: 97/64 97/68 101/68 102/71   Pulse: 97 95 93 86   Resp: 18 18 18 18   Temp: 98.7 °F (37.1 °C) 98.5 °F (36.9 °C) 98.6 °F (37 °C) 98.2 °F (36.8 °C)   SpO2: 97% 95% 93% 97%   Weight:       Height:           PE  Gen: in no acute distress  Oriented to person and place, not time  CV:reg rate  Chest:clear  Abd: soft  Ext/Access: no edema       . LAB  Recent Labs      02/25/18   0517   WBC  4.0*   HGB  13.2   HCT  39.7   PLT  190     Recent Labs      02/25/18   0517   NA  140   K  5.0   CL  102   CO2  22   GLU  229*   BUN  26*   CREA  2.49*   CA  9.3   ALB  2.9*   TBILI  4.1*   ALT  292*   SGOT  660*           Radiology    A/P:   Patient Active Problem List   Diagnosis Code    Essential hypertension I10    Cerebral infarction ([facility] I63.9    FSGS (focal segmental glomerulosclerosis) N05.1    Gout M10.9    Weakness of right leg R29.898    CKD (chronic kidney disease) N18.9    Osteoarthritis M19.90    S/P total hip arthroplasty Z96.649    Bipolar disorder ([facility] [address] [facility] F31.9    Gait instability R26.81    Hypokalemia E87.6    OSMIN (acute kidney injury) ([facility] [address] [facility] N17.9    Acute metabolic encephalopathy E74.20    Alcohol abuse F10.10    Falls W19. Silvana Hilt Vascular dementia F01.50    Hypomagnesemia E83.42    Hypophosphatemia E83.39    Depression F32.9    Cholecystitis K81.9    Cholelithiasis K80.20       The acute component is probably due to a pre- renal component.   She has hx of FSGS baseline creat on 1.4 ot 2. )  Will check labs in am. Rupesh Man MD

## 2018-02-26 NOTE — PROGRESS NOTES
Problem: Interdisciplinary Rounds  Goal: Interdisciplinary Rounds  Interdisciplinary team rounds were held 2/26/2018 with the following team members:Care Management, Nursing, Pharmacy and Physician and the patient and spouse. Plan of care discussed. See clinical pathway and/or care plan for interventions and desired outcomes.

## 2018-02-26 NOTE — PROGRESS NOTES
Problem: Falls - Risk of  Goal: *Absence of Falls  Document Damien Fall Risk and appropriate interventions in the flowsheet.    Outcome: Progressing Towards Goal  Fall Risk Interventions:  Mobility Interventions: Assess mobility with egress test, Bed/chair exit alarm, Communicate number of staff needed for ambulation/transfer, Mechanical lift, OT consult for ADLs, Patient to call before getting OOB, PT Consult for mobility concerns, PT Consult for assist device competence, Strengthening exercises (ROM-active/passive), Utilize walker, cane, or other assitive device, Utilize gait belt for transfers/ambulation    Mentation Interventions: Adequate sleep, hydration, pain control, Bed/chair exit alarm, Door open when patient unattended, Evaluate medications/consider consulting pharmacy, Familiar objects from home, Eyeglasses and hearing aids, Family/sitter at bedside, Gait belt with transfers/ambulation, HELP ([address]) if available, Reorient patient, Self-releasing belt, Toileting rounds, Update white board, Room close to nurse's station, More frequent rounding, Increase mobility    Medication Interventions: Assess postural VS orthostatic hypotension, Bed/chair exit alarm, Patient to call before getting OOB, Teach patient to arise slowly, Utilize gait belt for transfers/ambulation, Evaluate medications/consider consulting pharmacy         History of Falls Interventions: Bed/chair exit alarm, Consult care management for discharge planning, Door open when patient unattended, Evaluate medications/consider consulting pharmacy, Investigate reason for fall, Room close to nurse's station, Utilize gait belt for transfer/ambulation

## 2018-02-26 NOTE — PROGRESS NOTES
GI DAILY PROGRESS NOTE    Admit Date:  2/25/2018  Today's Date:  2/26/2018    CC:  Elevated LFTs    Subjective:     Patient feeling \"foggy. \" Abdominal pain resolved. \"Just gassy. \"  Reports minimal diarrhea - just 2 small loose BMs since admission. No rectal bleeding. Unable to draw blood this morning - awaiting PICC placement. MRCP pending - radiology checking if head coil is made of titanium. Medications:   Current Facility-Administered Medications   Medication Dose Route Frequency    sodium chloride (NS) flush 5-10 mL  5-10 mL IntraVENous Q8H    sodium chloride (NS) flush 5-10 mL  5-10 mL IntraVENous PRN    acetaminophen (TYLENOL) tablet 650 mg  650 mg Oral Q4H PRN    morphine injection 4 mg  4 mg IntraVENous Q4H PRN    lactated Ringers infusion  125 mL/hr IntraVENous CONTINUOUS    ondansetron (ZOFRAN) injection 4 mg  4 mg IntraVENous Q4H PRN    pantoprazole (PROTONIX) tablet 40 mg  40 mg Oral ACB    piperacillin-tazobactam (ZOSYN) 3.375 g in 0.9% sodium chloride (MBP/ADV) 100 mL  3.375 g IntraVENous Q8H    escitalopram oxalate (LEXAPRO) tablet 10 mg  10 mg Oral DAILY    mirtazapine (REMERON) tablet 30 mg  30 mg Oral QHS    QUEtiapine (SEROquel) tablet 50 mg  50 mg Oral QHS       Review of Systems:  ROS was obtained, with pertinent positives as listed above. No chest pain or SOB. Diet:  NPO    Objective:   Vitals:  Visit Vitals    /68 (BP 1 Location: Right arm, BP Patient Position: At rest;Head of bed elevated (Comment degrees))    Pulse 93    Temp 98.6 °F (37 °C)    Resp 18    Ht 5' 6\" (1.676 m)    Wt 81.6 kg (180 lb)    SpO2 93%    Breastfeeding No    BMI 29.05 kg/m2     Intake/Output:        Exam:  General appearance: alert, cooperative, no distress  Lungs: CTA ant  Heart: RRR  Abdomen: Soft, NONTENDER, NABS.    Extremities: extremities normal, atraumatic, no cyanosis or edema  Neuro:  alert and oriented    Data Review (Labs):    Recent Labs      02/25/18   0517   WBC  4.0* HGB  13.2   HCT  39.7   PLT  190   MCV  93.0   NA  140   K  5.0   CL  102   CO2  22   BUN  26*   CREA  2.49*   CA  9.3   GLU  229*   AP  563*   SGOT  660*   ALT  292*   TBILI  4.1*   ALB  2.9*   TP  7.2   LPSE  391       Assessment:     Principal Problem:  Cholelithiasis (2/25/2018)    Active Problems:  Essential hypertension (9/11/2012)  Cerebral infarction ([address] (9/11/2012)  Bipolar disorder ([address] (10/22/2014)  OSMIN (acute kidney injury) ([address] (2/13/2018)  Depression (2/13/2018)  Cholecystitis (2/25/2018)    Abdominal US 2/25  IMPRESSION: Gallstones with gallbladder wall thickening. Absent sonographic  Alberts's sign. No biliary ductal dilatation. 2. Likely liver steatosis. 3. Echogenic right renal cortex. 61 yo female with hx of CVA, CKD, EtOH abuse and bipolar disease is seen today for evaluation of elevated liver chemistry. She presented last evening with onset of n/v and epigastric pain which began following a meal at the [address] [employer]. She has noted in the last month that fattier and fried foods seem to cause gi discomfort. On arrival to the ER, she was noted to have elevations in liver chemistry, consistent with reported hx of heavy etoh intake (abstinent x 2 weeks). Ultarsound revealed thickened gallbladder wall with CBD 4mm; hepatic fatty infiltration was also noted. WBC ct is normal with normal platelet count and INR 1.0. Recent admission this month for malnutrition and electrolyte imbalance is reviewed, with discharge to psychiatric facility where patient was admitted for approximately a week. Elevated liver chemistry likely secondary acute etoh hepatitis given AST:ALT ratio; but also could represent early biliary obstruction. Abdominal pain resolved. Possibly passed a stone. Plan:     MRCP and labs pending. Further recs pending MRCP results and if radiology able to proceed (patient with head coil - ? Titanium).      Otis Swain PA-C  [facility]    I have seen and examined the patient, and I have directed and agreed to the plan as described. She has no abdominal pain at this point. Repeat blood testing is not available due to lack of access, and a PICC is planned. Her MRCP is complete but has not been read. I think I see a filling defect in the distal common bile duct consistent with a stone. I do suspect alcoholic hepatitis, but she may have biliary obstruction due to a CBD stone as well. Will allow a diet at her request but keep her NPO in the AM for possible ERCP tomorrow, pending official read on MRCP. Francy Jj MD    Addendum:  With her history of heavy alcohol use and elevated transaminases, Tylenol is contraindicated.

## 2018-02-26 NOTE — PROGRESS NOTES
Assessment complete via flow sheet. Pt A&Ox3. Pt sitting up in bed ordering breakfast.  Respirations even and unlabored, CTA. S1 S2 auscultated. Pt on room air. Pt reports pain level of 0 out of 10. Bowel sounds active, abdomen tender. Denies other needs. Bed in lowest position, side rails up 3, call bell in reach. Instructed to call for assistance. Pt verbalized understanding. Plan of care reviewed with patient. No

## 2018-02-26 NOTE — PROGRESS NOTES
[address]  [address] [facility], [address] [place] [address], [address]      PLAN:MRCP shows choledocholithiasis. GI following. She will likely need ERCP. Lap kerri on Wed      ASSESSMENT:  Admit Date: 2/25/2018   * No surgery found *  * No surgery found *    Principal Problem:    Cholelithiasis (2/25/2018)    Active Problems:    Essential hypertension (9/11/2012)      Cerebral infarction ([facility] [address] (9/11/2012)      Bipolar disorder ([facility] [address] (10/22/2014)      OSMIN (acute kidney injury) ([facility] [address] (2/13/2018)      Depression (2/13/2018)      Cholecystitis (2/25/2018)         SUBJECTIVE:Denies pain. Lab work pending due to difficult stick. MRCP shows choledocholithiasis. She denies pain, N/V/F.      OBJECTIVE:  Constitutional: Alert oriented cooperative patient in no acute distress; appears stated age   Visit Vitals    /71 (BP 1 Location: Right arm, BP Patient Position: At rest;Head of bed elevated (Comment degrees))    Pulse 86    Temp 98.2 °F (36.8 °C)    Resp 18    Ht 5' 6\" (1.676 m)    Wt 180 lb (81.6 kg)    SpO2 97%    Breastfeeding No    BMI 29.05 kg/m2     Eyes:Sclera are clear. ENMT: no external lesions gross hearing normal; no obvious neck masses, no ear or lip lesions  CV: RRR. Normal perfusion  Resp: No JVD. Breathing is  non-labored; no audible wheezing. GI: soft no TTP    Musculoskeletal: unremarkable with normal function. No embolic signs or cyanosis.    Neuro:  Oriented; moves all 4; no focal deficits  Psychiatric: normal affect and mood, no memory impairment      Patient Vitals for the past 24 hrs:   BP Temp Pulse Resp SpO2   02/26/18 1448 102/71 98.2 °F (36.8 °C) 86 18 97 %   02/26/18 1042 101/68 98.6 °F (37 °C) 93 18 93 %   02/26/18 0737 97/68 98.5 °F (36.9 °C) 95 18 95 %   02/26/18 0341 97/64 98.7 °F (37.1 °C) 97 18 97 %   02/25/18 2245 92/62 98.9 °F (37.2 °C) 97 16 95 %   02/25/18 1837 92/62 - - - -   02/25/18 1815 (!) 89/61 99.3 °F (37.4 °C) 94 16 96 % Labs:  Recent Labs      02/25/18   0517   WBC  4.0*   HGB  13.2   PLT  190   NA  140   K  5.0   CL  102   CO2  22   BUN  26*   CREA  2.49*   GLU  229*   TBILI  4.1*   SGOT  660*   ALT  292*   AP  563*   LPSE  391         Shikha Bettencourt, DO

## 2018-02-26 NOTE — PROGRESS NOTES
Problem: Mobility Impaired (Adult and Pediatric)  Goal: *Acute Goals and Plan of Care (Insert Text)  STG:  (1.)Ms. Josesito Wilhelm will move from supine to sit and sit to supine  with INDEPENDENT within three treatment day(s). (2.)Ms. Josesito Wilhelm will transfer from bed to chair and chair to bed with INDEPENDENT using the least restrictive device within three treatment day(s). (3.)Ms. Josesito Wilhelm will ambulate with INDEPENDENT for 25 feet with the least restrictive device within three treatment day(s). LTG:  (1.)Ms. Josesito Wilhelm will ambulate with INDEPENDENT for  feet with the least restrictive device within 5-7 treatment day(s). ________________________________________________________________________________________________    Outcome: Progressing Towards Goal    PHYSICAL THERAPY: Initial Assessment 2/26/2018  INPATIENT: Hospital Day: 2  Payor: [address] [place] [address] / Plan: [address] / Product Type: [employer] Medicare /      NAME/AGE/GENDER: Ronnie Gan is a 62 y.o. female   PRIMARY DIAGNOSIS: Cholecystitis Cholelithiasis Cholelithiasis        ICD-10: Treatment Diagnosis:   · Difficulty in walking, Not elsewhere classified (R26.2)  · Other abnormalities of gait and mobility (R26.89)   Precaution/Allergies:  Codeine and Lortab [hydrocodone-acetaminophen]      ASSESSMENT:     Ms. Josesito Wilhelm presents with decreased independence with bed mobility, transfers, gait, and therapeutic exercise. Therapy will maximize independence with functional mobility. Pt progressing with ambulation. This section established at most recent assessment   PROBLEM LIST (Impairments causing functional limitations):  1. Decreased Strength  2. Decreased Transfer Abilities  3. Decreased Ambulation Ability/Technique  4. Decreased Balance  5. Increased Pain  6. Decreased Activity Tolerance  7.  Decreased Flexibility/Joint Mobility   INTERVENTIONS PLANNED: (Benefits and precautions of physical therapy have been discussed with the patient.)  1. Bed Mobility  2. Gait Training  3. Therapeutic Activites  4. Therapeutic Exercise/Strengthening  5. Transfer Training  6. Group Therapy     TREATMENT PLAN: Frequency/Duration: daily for duration of hospital stay  Rehabilitation Potential For Stated Goals: Good     RECOMMENDED REHABILITATION/EQUIPMENT: (at time of discharge pending progress): Due to the probability of continued deficits (see above) this patient will likely need continued skilled physical therapy after discharge. Equipment:    None at this time              HISTORY:   History of Present Injury/Illness (Reason for Referral):  62 y. o. female who has a PMH of vascular dementia, HTN, dyslipidemia, chronic alcohol intake, remote history of bipolar disorder in no treatment, CKD stage III, multiple mechanical falls that presents from home with complaints of severe abdominal pain x 2 days. Pain is mid abdomen/epigastic and sharp in nature. She denies nausea, emesis or diarrhea. She was recently hospitalized earlier this month for alcohol intoxication and delirium. She denies any recent alcohol use. In the ER, she is tachycardic, febrile and normotensive. Labs are significant for: WBC 4, lactic acid of 3.9, creatinine of 2.4, bilirubin 4.1 and transaminitis. She had abdominal ultrasound that showed gallstones with gallbladder wall thickening but no biliary ductal dilatation. General surg consulted and already evaluated. GI to be consulted also. Needs MRCP prior to cholecystectomy  Past Medical History/Comorbidities:   Ms. Amy Hair  has a past medical history of Aneurysm of common carotid artery ([facility] [address] (2004); CKD (chronic kidney disease) (9/18/2014); Dementia; FSGS (focal segmental glomerulosclerosis); Gout; Hypertension; Osteoarthritis (9/18/2014); and Stroke [facility]) (2004, 2013).   Ms. Amy Hair  has a past surgical history that includes hx intracranial aneurysm repair (2004); hx refractive surgery; hx orthopaedic (Right, 10/2014); and hx ankle fracture tx (Left, 2013). Social History/Living Environment:   Home Environment: Private residence  One/Two Story Residence: Two story, live on 1st floor  Living Alone: No  Support Systems: Family member(s)  Patient Expects to be Discharged to[de-identified] Private residence  Current DME Used/Available at Home: None  Prior Level of Function/Work/Activity:  Pt requires assistance from  for ambulation and ADLs. Number of Personal Factors/Comorbidities that affect the Plan of Care: 1-2: MODERATE COMPLEXITY   EXAMINATION:   Most Recent Physical Functioning:   Gross Assessment:  AROM: Generally decreased, functional  Strength: Generally decreased, functional  Coordination: Generally decreased, functional               Posture:  Posture (WDL): Within defined limits  Balance:  Sitting: Intact  Standing: Pull to stand; With support Bed Mobility:  Supine to Sit: Minimum assistance  Sit to Supine: Minimum assistance  Wheelchair Mobility:     Transfers:  Sit to Stand: Moderate assistance;Assist x2  Stand to Sit: Moderate assistance;Assist x2  Gait:     Gait Abnormalities: Other (unsteady gait)  Distance (ft): 18 Feet (ft) (to door and back to bed)  Assistive Device: Other (comment) (handheld assistance)  Ambulation - Level of Assistance: Moderate assistance;Assist x2      Body Structures Involved:  1. Bones  2. Joints  3. Muscles  4. Ligaments Body Functions Affected:  1. Neuromusculoskeletal  2. Movement Related Activities and Participation Affected:  1. Mobility   Number of elements that affect the Plan of Care: 4+: HIGH COMPLEXITY   CLINICAL PRESENTATION:   Presentation: Evolving clinical presentation with changing clinical characteristics: MODERATE COMPLEXITY   CLINICAL DECISION MAKIN [facility] Mobility Inpatient Short Form  How much difficulty does the patient currently have. .. Unable A Lot A Little None   1.   Turning over in bed (including adjusting bedclothes, sheets and blankets)? [] 1   [] 2   [x] 3   [] 4   2. Sitting down on and standing up from a chair with arms ( e.g., wheelchair, bedside commode, etc.)   [] 1   [] 2   [x] 3   [] 4   3. Moving from lying on back to sitting on the side of the bed? [] 1   [] 2   [x] 3   [] 4   How much help from another person does the patient currently need. .. Total A Lot A Little None   4. Moving to and from a bed to a chair (including a wheelchair)? [] 1   [] 2   [x] 3   [] 4   5. Need to walk in hospital room? [] 1   [] 2   [x] 3   [] 4   6. Climbing 3-5 steps with a railing? [] 1   [x] 2   [] 3   [] 4   © 2007, Trustees of [address] [place] [address], under license to [employer]. All rights reserved      Score:  Initial: 17 Most Recent: X (Date: -- )    Interpretation of Tool:  Represents activities that are increasingly more difficult (i.e. Bed mobility, Transfers, Gait). Score 24 23 22-20 19-15 14-10 9-7 6     Modifier CH CI CJ CK CL CM CN      ? Mobility - Walking and Moving Around:     - CURRENT STATUS: CK - 40%-59% impaired, limited or restricted    - GOAL STATUS: CK - 40%-59% impaired, limited or restricted    - D/C STATUS:  CK - 40%-59% impaired, limited or restricted  Payor: [address] [place] [address] / Plan: [employer] / Product Type: Managed Care Medicare /      Medical Necessity:     · Skilled intervention continues to be required due to decreased mobility ability. Reason for Services/Other Comments:  · Patient continues to require skilled intervention due to medical complications and patient unable to attend/participate in therapy as expected.    Use of outcome tool(s) and clinical judgement create a POC that gives a: Questionable prediction of patient's progress: MODERATE COMPLEXITY            TREATMENT:   (In addition to Assessment/Re-Assessment sessions the following treatments were rendered)   Pre-treatment Symptoms/Complaints:  No complaints  Pain: Initial:   Pain Intensity 1: 0  Post Session:  No compliants     Assessment/Reassessment only, no treatment provided today    Braces/Orthotics/Lines/Etc:   · O2 Device: Room air  Treatment/Session Assessment:    · Response to Treatment:  Pt agreeable to therapy. · Interdisciplinary Collaboration:   o Physical Therapist  o Occupational Therapist  o Registered Nurse  · After treatment position/precautions:   o Bed/Chair-wheels locked  o Call light within reach  o RN notified   · Compliance with Program/Exercises: compliant most of the time. · Recommendations/Intent for next treatment session: \"Next visit will focus on advancements to more challenging activities and reduction in assistance provided\".   Total Treatment Duration:  PT Patient Time In/Time Out  Time In: 1030  Time Out: [address], PT

## 2018-02-26 NOTE — PROGRESS NOTES
Message left at [facility], [phone]. Dr. Ramos Like, according to , placed brain coils.

## 2018-02-26 NOTE — PROGRESS NOTES
Shift assessment complete. HR regular. RR even and unlabored. Able to make needs know. Abd soft active bowel sounds. All needs met at this time. Will monitor with hourly rounds.

## 2018-02-26 NOTE — PROGRESS NOTES
Patient has coil in brain, have informed RN that we will need implant information - Please call MRI once we get op notes for coil  Thanks

## 2018-02-26 NOTE — PROGRESS NOTES
Problem: Self Care Deficits Care Plan (Adult)  Goal: *Acute Goals and Plan of Care (Insert Text)  1. Patient will perform grooming with supervision. 2. Patient will perform upper body dressing with supervision. 3. Patient will perform lower body dressing with minimal assist.  4. Patient will perform bathing with minimal assist.  5. Patient will perform toileting and toilet transfer with supervision. 6. Patient will perform ADL functional mobility and tranfers in room with supervision. 7. Patient/family to demonstrate knowledge of home safety and DME recommendations. Goals to be achieved in 7 days. OCCUPATIONAL THERAPY: Initial Assessment and AM 2/26/2018  INPATIENT: Hospital Day: 2  Payor: [address] [place] [address] / Plan: [address] / Product Type: [employer] Medicare /      NAME/AGE/GENDER: Hawa Wright is a 62 y.o. female   PRIMARY DIAGNOSIS:  Cholecystitis Cholelithiasis Cholelithiasis        ICD-10: Treatment Diagnosis:    · Generalized Muscle Weakness (M62.81)  · Other lack of cordination (R27.8)   Precautions/Allergies:     Codeine and Lortab [hydrocodone-acetaminophen]      ASSESSMENT:     Ms. Neema Woodard presents with above diagnosis. Although pt receives assist for her  for self care, she appears to be somewhat weaker than baseline. Pt will benefit from skilled OT services to address self care and functional mobility deficits. This section established at most recent assessment   PROBLEM LIST (Impairments causing functional limitations):  1. Decreased Strength  2. Decreased ADL/Functional Activities  3. Decreased Transfer Abilities  4. Decreased Ambulation Ability/Technique  5. Decreased Balance   INTERVENTIONS PLANNED: (Benefits and precautions of occupational therapy have been discussed with the patient.)  1. Activities of daily living training  2. Adaptive equipment training  3. Balance training  4. Clothing management  5. Cognitive training  6.  Hygiene training  7. Therapeutic activity  8. Therapeutic exercise       TREATMENT PLAN: Frequency/Duration: Follow patient 4x/wk to address above goals. Rehabilitation Potential For Stated Goals: Excellent     RECOMMENDED REHABILITATION/EQUIPMENT: (at time of discharge pending progress): Due to the probability of continued deficits (see above) this patient will not likely need continued skilled occupational therapy after discharge. Equipment:    TBD              OCCUPATIONAL PROFILE AND HISTORY:   History of Present Injury/Illness (Reason for Referral):  Pt admitted with epidastric pain. Past Medical History/Comorbidities:   Ms. Suzie Orta  has a past medical history of Aneurysm of common carotid artery ([facility] [address] (2004); CKD (chronic kidney disease) (9/18/2014); Dementia; FSGS (focal segmental glomerulosclerosis); Gout; Hypertension; Osteoarthritis (9/18/2014); and Stroke [facility]) (2004, 2013). Ms. Suzie Orta  has a past surgical history that includes hx intracranial aneurysm repair (2004); hx refractive surgery; hx orthopaedic (Right, 10/2014); and hx ankle fracture tx (Left, 2013). Social History/Living Environment:   Home Environment: Private residence  One/Two Story Residence: Two story, live on 1st floor  Living Alone: No  Support Systems: Family member(s)  Patient Expects to be Discharged to[de-identified] Private residence  Current DME Used/Available at Home: None  Tub or Shower Type: Shower  Prior Level of Function/Work/Activity:  Supervision to minimal assist for self care and funtional mobility     Number of Personal Factors/Comorbidities that affect the Plan of Care: Brief history (0):  LOW COMPLEXITY   ASSESSMENT OF OCCUPATIONAL PERFORMANCE[de-identified]   Activities of Daily Living:         Currently requiring moderate assist  Basic ADLs (From Assessment) Complex ADLs (From Assessment)   Basic ADL  Feeding: Supervision  Oral Facial Hygiene/Grooming: Supervision  Bathing:  Moderate assistance  Upper Body Dressing: Minimum assistance  Lower Body Dressing: Moderate assistance  Toileting: Moderate assistance     Grooming/Bathing/Dressing Activities of Daily Living     Cognitive Retraining  Safety/Judgement: Awareness of environment; Fall prevention                 Functional Transfers  Toilet Transfer : Moderate assistance;Assist x2  Tub Transfer: Moderate assistance;Assist x2     Bed/Mat Mobility  Supine to Sit: Minimum assistance  Sit to Supine: Minimum assistance  Sit to Stand: Moderate assistance;Assist x2       Most Recent Physical Functioning:   Gross Assessment:                  Posture:  Posture (WDL): Within defined limits  Balance:  Sitting: Intact  Standing: Pull to stand; With support Bed Mobility:  Supine to Sit: Minimum assistance  Sit to Supine: Minimum assistance  Wheelchair Mobility:     Transfers:  Sit to Stand: Moderate assistance;Assist x2  Stand to Sit: Moderate assistance;Assist x2     ROM:          BUE ROM WFL           Patient Vitals for the past 6 hrs:   BP BP Patient Position SpO2 Pulse   18 0737 97/68 At rest;Head of bed elevated (Comment degrees) 95 % 95   18 1042 101/68 At rest;Head of bed elevated (Comment degrees) 93 % 93       Mental Status  Neurologic State: Alert  Orientation Level: Oriented X4  Cognition: Appropriate decision making, Appropriate for age attention/concentration, Appropriate safety awareness  Perception: Appears intact  Perseveration: No perseveration noted  Safety/Judgement: Awareness of environment, Fall prevention                          Physical Skills Involved:  1. Range of Motion  2. Balance  3. Strength  4. Activity Tolerance Cognitive Skills Affected (resulting in the inability to perform in a timely and safe manner):  1. None Psychosocial Skills Affected:  1.  None   Number of elements that affect the Plan of Care: 1-3:  LOW COMPLEXITY   CLINICAL DECISION MAKIN [facility] [address] [id number] AM-PAC 6 Clicks   Daily Activity Inpatient Short Form  How much help from another person does the patient currently need. .. Total A Lot A Little None   1. Putting on and taking off regular lower body clothing? [] 1   [x] 2   [] 3   [] 4   2. Bathing (including washing, rinsing, drying)? [] 1   [x] 2   [] 3   [] 4   3. Toileting, which includes using toilet, bedpan or urinal?   [] 1   [x] 2   [] 3   [] 4   4. Putting on and taking off regular upper body clothing? [] 1   [] 2   [x] 3   [] 4   5. Taking care of personal grooming such as brushing teeth? [] 1   [] 2   [x] 3   [] 4   6. Eating meals? [] 1   [] 2   [x] 3   [] 4   © 2007, Trustees of [address] [place], [place] [id number], under license to [employer]. All rights reserved      Score:  Initial: 15 Most Recent: X (Date: -- )    Interpretation of Tool:  Represents activities that are increasingly more difficult (i.e. Bed mobility, Transfers, Gait). Score 24 23 22-20 19-15 14-10 9-7 6     Modifier CH CI CJ CK CL CM CN      ? Self Care:     - CURRENT STATUS: CK - 40%-59% impaired, limited or restricted    - GOAL STATUS: CJ - 20%-39% impaired, limited or restricted    - D/C STATUS:  ---------------To be determined---------------  Payor: Pita Emery / Plan: [employer] MEDICARE COMPLETE / Product Type: Managed Care Medicare /      Medical Necessity:     · Patient is expected to demonstrate progress in balance, coordination and functional technique to decrease assistance required with self care and functional mobility. Reason for Services/Other Comments:  · Patient continues to require skilled intervention due to decreased self care and functional mobility.    Use of outcome tool(s) and clinical judgement create a POC that gives a: LOW COMPLEXITY         TREATMENT:   (In addition to Assessment/Re-Assessment sessions the following treatments were rendered)     Pre-treatment Symptoms/Complaints:  none  Pain: Initial:   Pain Intensity 1: 0  Post Session:  0     Assessment/Reassessment only, no treatment provided today    Braces/Orthotics/Lines/Etc:   · IV  · O2 Device: Room air  Treatment/Session Assessment:    · Response to Treatment:  Tolerated well  · Interdisciplinary Collaboration:   o Physical Therapist  o Occupational Therapist  o Registered Nurse  · After treatment position/precautions:   o Supine in bed  o Bed alarm/tab alert on  o Bed/Chair-wheels locked  o Bed in low position  o Caregiver at bedside  o Call light within reach  o RN notified  o Side rails x 3   · Compliance with Program/Exercises: compliant most of the time. · Recommendations/Intent for next treatment session: \"Next visit will focus on advancements to more challenging activities\".   Total Treatment Duration:  OT Patient Time In/Time Out  Time In: 0950  Time Out: [place], OT

## 2018-02-27 ENCOUNTER — ANESTHESIA EVENT (OUTPATIENT)
Dept: ENDOSCOPY | Age: 58
DRG: 854 | End: 2018-02-27
Payer: MEDICARE

## 2018-02-27 ENCOUNTER — HOSPITAL ENCOUNTER (OUTPATIENT)
Age: 58
Setting detail: OUTPATIENT SURGERY
Discharge: STILL ADMITTED OR ADMINISTRATIVE | DRG: 854 | End: 2018-02-27
Attending: INTERNAL MEDICINE | Admitting: INTERNAL MEDICINE
Payer: MEDICARE

## 2018-02-27 ENCOUNTER — ANESTHESIA (OUTPATIENT)
Dept: ENDOSCOPY | Age: 58
DRG: 854 | End: 2018-02-27
Payer: MEDICARE

## 2018-02-27 ENCOUNTER — APPOINTMENT (OUTPATIENT)
Dept: GENERAL RADIOLOGY | Age: 58
DRG: 854 | End: 2018-02-27
Attending: INTERNAL MEDICINE
Payer: MEDICARE

## 2018-02-27 VITALS
OXYGEN SATURATION: 100 % | TEMPERATURE: 98.1 F | RESPIRATION RATE: 18 BRPM | DIASTOLIC BLOOD PRESSURE: 86 MMHG | SYSTOLIC BLOOD PRESSURE: 135 MMHG | HEART RATE: 65 BPM

## 2018-02-27 PROBLEM — R78.81 BACTEREMIA DUE TO GRAM-NEGATIVE BACTERIA: Status: ACTIVE | Noted: 2018-02-27

## 2018-02-27 PROBLEM — A41.9 SEPSIS (HCC): Status: ACTIVE | Noted: 2018-02-27

## 2018-02-27 LAB
ALBUMIN SERPL-MCNC: 1.8 G/DL (ref 3.5–5)
ALBUMIN/GLOB SERPL: 0.5 {RATIO} (ref 1.2–3.5)
ALP SERPL-CCNC: 190 U/L (ref 50–136)
ALT SERPL-CCNC: 105 U/L (ref 12–65)
ANION GAP SERPL CALC-SCNC: 11 MMOL/L (ref 7–16)
AST SERPL-CCNC: 64 U/L (ref 15–37)
BACTERIA SPEC CULT: ABNORMAL
BACTERIA SPEC CULT: ABNORMAL
BACTERIA SPEC CULT: NORMAL
BASOPHILS # BLD: 0 K/UL (ref 0–0.2)
BASOPHILS NFR BLD: 0 % (ref 0–2)
BILIRUB SERPL-MCNC: 1.1 MG/DL (ref 0.2–1.1)
BUN SERPL-MCNC: 33 MG/DL (ref 6–23)
CALCIUM SERPL-MCNC: 8.5 MG/DL (ref 8.3–10.4)
CHLORIDE SERPL-SCNC: 109 MMOL/L (ref 98–107)
CO2 SERPL-SCNC: 27 MMOL/L (ref 21–32)
CREAT SERPL-MCNC: 3.13 MG/DL (ref 0.6–1)
DIFFERENTIAL METHOD BLD: ABNORMAL
EOSINOPHIL # BLD: 0 K/UL (ref 0–0.8)
EOSINOPHIL NFR BLD: 0 % (ref 0.5–7.8)
ERYTHROCYTE [DISTWIDTH] IN BLOOD BY AUTOMATED COUNT: 16 % (ref 11.9–14.6)
GLOBULIN SER CALC-MCNC: 3.6 G/DL (ref 2.3–3.5)
GLUCOSE SERPL-MCNC: 83 MG/DL (ref 65–100)
GRAM STN SPEC: ABNORMAL
HCT VFR BLD AUTO: 31.1 % (ref 35.8–46.3)
HGB BLD-MCNC: 9.7 G/DL (ref 11.7–15.4)
IMM GRANULOCYTES # BLD: 0.1 K/UL (ref 0–0.5)
IMM GRANULOCYTES NFR BLD AUTO: 0 % (ref 0–5)
INR PPP: 0.9
LACTATE SERPL-SCNC: 0.8 MMOL/L (ref 0.4–2)
LYMPHOCYTES # BLD: 1.2 K/UL (ref 0.5–4.6)
LYMPHOCYTES NFR BLD: 7 % (ref 13–44)
MCH RBC QN AUTO: 29.2 PG (ref 26.1–32.9)
MCHC RBC AUTO-ENTMCNC: 31.2 G/DL (ref 31.4–35)
MCV RBC AUTO: 93.7 FL (ref 79.6–97.8)
MONOCYTES # BLD: 0.5 K/UL (ref 0.1–1.3)
MONOCYTES NFR BLD: 3 % (ref 4–12)
NEUTS SEG # BLD: 14.9 K/UL (ref 1.7–8.2)
NEUTS SEG NFR BLD: 90 % (ref 43–78)
PLATELET # BLD AUTO: 151 K/UL (ref 150–450)
PMV BLD AUTO: 12.3 FL (ref 10.8–14.1)
POTASSIUM SERPL-SCNC: 4.1 MMOL/L (ref 3.5–5.1)
PROT SERPL-MCNC: 5.4 G/DL (ref 6.3–8.2)
PROTHROMBIN TIME: 12.8 SEC (ref 11.5–14.5)
RBC # BLD AUTO: 3.32 M/UL (ref 4.05–5.25)
SERVICE CMNT-IMP: ABNORMAL
SERVICE CMNT-IMP: NORMAL
SODIUM SERPL-SCNC: 147 MMOL/L (ref 136–145)
WBC # BLD AUTO: 16.7 K/UL (ref 4.3–11.1)

## 2018-02-27 PROCEDURE — 74011636320 HC RX REV CODE- 636/320: Performed by: INTERNAL MEDICINE

## 2018-02-27 PROCEDURE — 77030011640 HC PAD GRND REM COVD -A: Performed by: INTERNAL MEDICINE

## 2018-02-27 PROCEDURE — 80053 COMPREHEN METABOLIC PANEL: CPT | Performed by: INTERNAL MEDICINE

## 2018-02-27 PROCEDURE — 74011250636 HC RX REV CODE- 250/636: Performed by: INTERNAL MEDICINE

## 2018-02-27 PROCEDURE — 74011250637 HC RX REV CODE- 250/637: Performed by: FAMILY MEDICINE

## 2018-02-27 PROCEDURE — 74011250636 HC RX REV CODE- 250/636: Performed by: FAMILY MEDICINE

## 2018-02-27 PROCEDURE — 74330 X-RAY BILE/PANC ENDOSCOPY: CPT

## 2018-02-27 PROCEDURE — 85025 COMPLETE CBC W/AUTO DIFF WBC: CPT | Performed by: INTERNAL MEDICINE

## 2018-02-27 PROCEDURE — 76060000032 HC ANESTHESIA 0.5 TO 1 HR: Performed by: INTERNAL MEDICINE

## 2018-02-27 PROCEDURE — 74011000250 HC RX REV CODE- 250

## 2018-02-27 PROCEDURE — 77030032490 HC SLV COMPR SCD KNE COVD -B: Performed by: INTERNAL MEDICINE

## 2018-02-27 PROCEDURE — 77030009038 HC CATH BILI STN RTVR BSC -C: Performed by: INTERNAL MEDICINE

## 2018-02-27 PROCEDURE — 74011000258 HC RX REV CODE- 258: Performed by: FAMILY MEDICINE

## 2018-02-27 PROCEDURE — 85610 PROTHROMBIN TIME: CPT | Performed by: INTERNAL MEDICINE

## 2018-02-27 PROCEDURE — 77030007288 HC DEV LOK BILI BSC -A: Performed by: INTERNAL MEDICINE

## 2018-02-27 PROCEDURE — 83605 ASSAY OF LACTIC ACID: CPT | Performed by: FAMILY MEDICINE

## 2018-02-27 PROCEDURE — 77030012595 HC SPHNTOM BILI BSC -D: Performed by: INTERNAL MEDICINE

## 2018-02-27 PROCEDURE — 74011250636 HC RX REV CODE- 250/636

## 2018-02-27 PROCEDURE — 0FC98ZZ EXTIRPATION OF MATTER FROM COMMON BILE DUCT, VIA NATURAL OR ARTIFICIAL OPENING ENDOSCOPIC: ICD-10-PCS | Performed by: INTERNAL MEDICINE

## 2018-02-27 PROCEDURE — 76040000026: Performed by: INTERNAL MEDICINE

## 2018-02-27 PROCEDURE — 65270000029 HC RM PRIVATE

## 2018-02-27 RX ORDER — OXYCODONE AND ACETAMINOPHEN 5; 325 MG/1; MG/1
1 TABLET ORAL
Status: DISCONTINUED | OUTPATIENT
Start: 2018-02-27 | End: 2018-02-27 | Stop reason: HOSPADM

## 2018-02-27 RX ORDER — MORPHINE SULFATE 4 MG/ML
2 INJECTION, SOLUTION INTRAMUSCULAR; INTRAVENOUS
Status: DISCONTINUED | OUTPATIENT
Start: 2018-02-27 | End: 2018-02-27 | Stop reason: HOSPADM

## 2018-02-27 RX ORDER — PANTOPRAZOLE SODIUM 40 MG/1
40 TABLET, DELAYED RELEASE ORAL
Status: DISCONTINUED | OUTPATIENT
Start: 2018-02-28 | End: 2018-02-27 | Stop reason: HOSPADM

## 2018-02-27 RX ORDER — LORAZEPAM 1 MG/1
1 TABLET ORAL
Status: DISCONTINUED | OUTPATIENT
Start: 2018-02-27 | End: 2018-02-27 | Stop reason: HOSPADM

## 2018-02-27 RX ORDER — ROCURONIUM BROMIDE 10 MG/ML
INJECTION, SOLUTION INTRAVENOUS AS NEEDED
Status: DISCONTINUED | OUTPATIENT
Start: 2018-02-27 | End: 2018-02-27 | Stop reason: HOSPADM

## 2018-02-27 RX ORDER — GLYCOPYRROLATE 0.2 MG/ML
INJECTION INTRAMUSCULAR; INTRAVENOUS AS NEEDED
Status: DISCONTINUED | OUTPATIENT
Start: 2018-02-27 | End: 2018-02-27 | Stop reason: HOSPADM

## 2018-02-27 RX ORDER — SODIUM CHLORIDE, SODIUM LACTATE, POTASSIUM CHLORIDE, CALCIUM CHLORIDE 600; 310; 30; 20 MG/100ML; MG/100ML; MG/100ML; MG/100ML
1000 INJECTION, SOLUTION INTRAVENOUS CONTINUOUS
Status: DISCONTINUED | OUTPATIENT
Start: 2018-02-27 | End: 2018-02-27 | Stop reason: HOSPADM

## 2018-02-27 RX ORDER — QUETIAPINE FUMARATE 25 MG/1
50 TABLET, FILM COATED ORAL
Status: CANCELLED | OUTPATIENT
Start: 2018-02-27

## 2018-02-27 RX ORDER — NALOXONE HYDROCHLORIDE 0.4 MG/ML
0.4 INJECTION, SOLUTION INTRAMUSCULAR; INTRAVENOUS; SUBCUTANEOUS AS NEEDED
Status: DISCONTINUED | OUTPATIENT
Start: 2018-02-27 | End: 2018-02-27 | Stop reason: HOSPADM

## 2018-02-27 RX ORDER — PROPOFOL 10 MG/ML
INJECTION, EMULSION INTRAVENOUS AS NEEDED
Status: DISCONTINUED | OUTPATIENT
Start: 2018-02-27 | End: 2018-02-27 | Stop reason: HOSPADM

## 2018-02-27 RX ORDER — NALOXONE HYDROCHLORIDE 0.4 MG/ML
0.4 INJECTION, SOLUTION INTRAMUSCULAR; INTRAVENOUS; SUBCUTANEOUS AS NEEDED
Status: CANCELLED | OUTPATIENT
Start: 2018-02-27

## 2018-02-27 RX ORDER — QUETIAPINE FUMARATE 25 MG/1
50 TABLET, FILM COATED ORAL
Status: DISCONTINUED | OUTPATIENT
Start: 2018-02-27 | End: 2018-02-27 | Stop reason: HOSPADM

## 2018-02-27 RX ORDER — ONDANSETRON 2 MG/ML
4 INJECTION INTRAMUSCULAR; INTRAVENOUS
Status: CANCELLED | OUTPATIENT
Start: 2018-02-27

## 2018-02-27 RX ORDER — LIDOCAINE HYDROCHLORIDE 20 MG/ML
INJECTION, SOLUTION EPIDURAL; INFILTRATION; INTRACAUDAL; PERINEURAL AS NEEDED
Status: DISCONTINUED | OUTPATIENT
Start: 2018-02-27 | End: 2018-02-27 | Stop reason: HOSPADM

## 2018-02-27 RX ORDER — LORAZEPAM 1 MG/1
1 TABLET ORAL
Status: CANCELLED | OUTPATIENT
Start: 2018-02-27

## 2018-02-27 RX ORDER — SODIUM CHLORIDE, SODIUM LACTATE, POTASSIUM CHLORIDE, CALCIUM CHLORIDE 600; 310; 30; 20 MG/100ML; MG/100ML; MG/100ML; MG/100ML
125 INJECTION, SOLUTION INTRAVENOUS CONTINUOUS
Status: DISCONTINUED | OUTPATIENT
Start: 2018-02-27 | End: 2018-02-27 | Stop reason: HOSPADM

## 2018-02-27 RX ORDER — OXYCODONE AND ACETAMINOPHEN 5; 325 MG/1; MG/1
1 TABLET ORAL
Status: CANCELLED | OUTPATIENT
Start: 2018-02-27

## 2018-02-27 RX ORDER — NEOSTIGMINE METHYLSULFATE 1 MG/ML
INJECTION INTRAVENOUS AS NEEDED
Status: DISCONTINUED | OUTPATIENT
Start: 2018-02-27 | End: 2018-02-27 | Stop reason: HOSPADM

## 2018-02-27 RX ORDER — PANTOPRAZOLE SODIUM 40 MG/1
40 TABLET, DELAYED RELEASE ORAL
Status: CANCELLED | OUTPATIENT
Start: 2018-02-28

## 2018-02-27 RX ORDER — ESCITALOPRAM OXALATE 10 MG/1
10 TABLET ORAL DAILY
Status: CANCELLED | OUTPATIENT
Start: 2018-02-28

## 2018-02-27 RX ORDER — MIRTAZAPINE 30 MG/1
30 TABLET, FILM COATED ORAL
Status: DISCONTINUED | OUTPATIENT
Start: 2018-02-27 | End: 2018-02-27 | Stop reason: HOSPADM

## 2018-02-27 RX ORDER — MIRTAZAPINE 15 MG/1
30 TABLET, FILM COATED ORAL
Status: CANCELLED | OUTPATIENT
Start: 2018-02-27

## 2018-02-27 RX ORDER — SODIUM CHLORIDE, SODIUM LACTATE, POTASSIUM CHLORIDE, CALCIUM CHLORIDE 600; 310; 30; 20 MG/100ML; MG/100ML; MG/100ML; MG/100ML
125 INJECTION, SOLUTION INTRAVENOUS CONTINUOUS
Status: CANCELLED | OUTPATIENT
Start: 2018-02-27

## 2018-02-27 RX ORDER — ONDANSETRON 2 MG/ML
4 INJECTION INTRAMUSCULAR; INTRAVENOUS
Status: DISCONTINUED | OUTPATIENT
Start: 2018-02-27 | End: 2018-02-27 | Stop reason: HOSPADM

## 2018-02-27 RX ORDER — ESCITALOPRAM OXALATE 10 MG/1
10 TABLET ORAL DAILY
Status: DISCONTINUED | OUTPATIENT
Start: 2018-02-28 | End: 2018-02-27 | Stop reason: HOSPADM

## 2018-02-27 RX ADMIN — Medication 5 ML: at 05:14

## 2018-02-27 RX ADMIN — IOPAMIDOL 125 ML: 755 INJECTION, SOLUTION INTRAVENOUS at 14:02

## 2018-02-27 RX ADMIN — ROCURONIUM BROMIDE 25 MG: 10 INJECTION, SOLUTION INTRAVENOUS at 13:34

## 2018-02-27 RX ADMIN — NEOSTIGMINE METHYLSULFATE 3 MG: 1 INJECTION INTRAVENOUS at 14:03

## 2018-02-27 RX ADMIN — GLYCOPYRROLATE 0.4 MG: 0.2 INJECTION INTRAMUSCULAR; INTRAVENOUS at 14:03

## 2018-02-27 RX ADMIN — PROPOFOL 110 MG: 10 INJECTION, EMULSION INTRAVENOUS at 13:33

## 2018-02-27 RX ADMIN — PANTOPRAZOLE SODIUM 40 MG: 40 TABLET, DELAYED RELEASE ORAL at 08:30

## 2018-02-27 RX ADMIN — SODIUM CHLORIDE, SODIUM LACTATE, POTASSIUM CHLORIDE, AND CALCIUM CHLORIDE 1000 ML: 600; 310; 30; 20 INJECTION, SOLUTION INTRAVENOUS at 11:44

## 2018-02-27 RX ADMIN — ESCITALOPRAM OXALATE 10 MG: 10 TABLET ORAL at 08:30

## 2018-02-27 RX ADMIN — LIDOCAINE HYDROCHLORIDE 100 MG: 20 INJECTION, SOLUTION EPIDURAL; INFILTRATION; INTRACAUDAL; PERINEURAL at 13:33

## 2018-02-27 RX ADMIN — PIPERACILLIN SODIUM,TAZOBACTAM SODIUM 3.38 G: 3; .375 INJECTION, POWDER, FOR SOLUTION INTRAVENOUS at 00:40

## 2018-02-27 RX ADMIN — PIPERACILLIN SODIUM,TAZOBACTAM SODIUM 3.38 G: 3; .375 INJECTION, POWDER, FOR SOLUTION INTRAVENOUS at 08:30

## 2018-02-27 NOTE — PROGRESS NOTES
Hospitalist Progress Note      Admit Date: 2018  3:49 AM   NAME: Fifi Gonzales   :  1960   MRN:  [id number]   Attending: Zahira Macario MD  PCP:  Madan Escobedo DO    SUBJECTIVE:   62 y. o. female who has a PMH of vascular dementia, HTN, dyslipidemia, chronic alcohol intake, remote history of bipolar disorder in no treatment, CKD stage III, multiple mechanical falls admitted on  for acute cholecystitis with associated transminitis. Abdominal USG showed gallstones with gallbladder wall thickening and liver steatosis. GI and surgery consulted. MRCP showed persistent cholelithiasis with trace pericholecystic free fluid and obstructing choledocholithiasis. Plan is for ERCP and elective cholecystectomy. PICC line placement declined by PICC team due to presence of CKD. Nephrology consulted for eval.      : patient resting well. She denies any significant pain. Review of Systems negative with exception of pertinent positives noted above  PHYSICAL EXAM     Visit Vitals    /81 (BP 1 Location: Right arm, BP Patient Position: At rest;Head of bed elevated (Comment degrees))    Pulse 84    Temp 99.4 °F (37.4 °C)    Resp 16    Ht 5' 6\" (1.676 m)    Wt 81.6 kg (180 lb)    SpO2 97%    Breastfeeding No    BMI 29.05 kg/m2      Temp (24hrs), Av.8 °F (37.1 °C), Min:98.2 °F (36.8 °C), Max:99.4 °F (37.4 °C)    Oxygen Therapy  O2 Sat (%): 97 % (18 0349)  Pulse via Oximetry: 97 beats per minute (18 1414)  O2 Device: Room air (18 0719)  No intake or output data in the 24 hours ending 18 0726     General: No acute distress    Lungs:  CTA Bilaterally.    Heart:  Regular rate and rhythm,  No murmur, rub, or gallop  Abdomen: Soft, Non distended, Non tender, Positive bowel sounds  Extremities: No cyanosis, clubbing or edema  Neurologic:  No focal deficits      / MRCP  1.  Persistent cholelithiasis with trace pericholecystic free fluid, and  findings consistent with obstructing choledocholithiasis. Elective GI  consultation is recommended. ASSESSMENT      Active Hospital Problems    Diagnosis Date Noted    Cholecystitis 02/25/2018    Cholelithiasis 02/25/2018    OSMIN (acute kidney injury) ([facility] 02/13/2018    Depression 02/13/2018    Bipolar disorder ([facility] 10/22/2014    Essential hypertension 09/11/2012    Cerebral infarction ([facility] 09/11/2012     Plan:  · Continue maintenance LR and make NPO except meds/sips of clears  · General surg and GI on board, will f/u with recommendations. · MRCP confirms obstructive choledocholithiasis. Scheduled for ERCP today, lap kerri tentatively scheduled for 2/28 as per gen surgery. · Patient has poor vasculature and nursing unable to obtain blood work. She has IJ for medication. PICC line requested. Due to h/o CKD; PICC refused to place PICC unless Nephrology consents (in case of future HD needs). Nephrology consulted  · Met sepsis criteria due to cholecystitis. Continue Zosyn. Blood cultures positive for GNR. · Liver profile, PT/INR daily  ·  Continue home meds.  PO Ativan prn anxiety     DVT ppx: SCDs   Code Status: Full    High risk with opioids on discharge    Signed By: Chris Fuller MD     February 26, 2018

## 2018-02-27 NOTE — CONSULTS
[facility]    Braulio Menon  MR#: [id number]  : 1960  ACCOUNT #: [de-identified]   DATE OF SERVICE: 2018    NEPHROLOGY CONSULTATION     We are seeing the patient at the request of Dr. Maxine Hartmann with regards to chronic kidney disease. HISTORY OF PRESENT ILLNESS:  The patient is a 54-year-old  female who was admitted to [address] [place] [address] in 2018 with a 2-day history of severe abdominal pain. The pain was midepigastric and sharp in nature. Workup shows abnormal liver function tests with a total bilirubin of 4.1, ALT of 660 and AST of 563. She also has been noted to have worsening renal function with a sodium of 140, potassium 5.0, chloride 102, CO2 is 22, blood sugar 229, BUN is 26, creatinine 2.49. The patient has a history of stage III chronic kidney disease with biopsy-proven focal segmental glomerulosclerosis. She has been followed by Dr. Michelle Moore in our office and has been very intermittent and irregular in her office visits, often going several years between visits. She has been followed in our office since at least , if no longer. Her creatinine has been hovering anywhere from 1.1 to 2.20 over the past 13 or 14 years. She was recently hospitalized here with dehydration, and about a week or so ago her BUN was 16, creatinine was 2.36. By the time she was discharged on the , her BUN was 15, creatinine was back down to 1.94 before popping back up to 2.49 yesterday. Other medical problems include vascular dementia secondary to multiple strokes, chronic hypertension, bipolar disorder, cholecystitis, osteoarthritis, and alcohol abuse. There are also some records that indicate that she may have had diabetes in the past.      ALLERGIES:  SHE HAS HISTORY OF DRUG INTOLERANCE TO ACE INHIBITORS, WHICH CAUSED A COUGH.     CURRENT MEDICATIONS:  Include Lexapro 10 mg p.o. daily, Remeron 30 mg p.o. at bedtime, Protonix 40 mg p.o. q.a.m., Zosyn 3.375 grams IV q.8h, Seroquel 50 mg p.o. at bedtime, and lactated Ringer's at 125 mL per hour. SOCIAL HISTORY:  She is . She does not smoke. She states that she stopped drinking alcohol 3 or 4 weeks ago. FAMILY HISTORY:  Noncontributory. REVIEW OF SYSTEMS:  She denies any fevers, chills. No headaches, dizzy spells, fainting spells. No shortness of breath, cough, wheezing or chest pain. She does have the abdominal pain as stated above. No dysuria or polyuria. No arthralgias or myalgias. PHYSICAL EXAMINATION  GENERAL:  Reveals a pleasant but mildly confused white female in no acute distress. She is oriented to person and place, but not to time. VITAL SIGNS:  Her temperature is 98.2, blood pressure is 102/71, pulse is 86, respirations are 18 and not labored. HEAD:  Normocephalic. EYES:  Pupils equal, react to light and accommodation. Extraocular muscles are intact. Fundi were not visualized. LUNGS:  Clear. No rales or wheezes heard. Breath sounds equal bilaterally. HEART:  Regular rate and rhythm. ABDOMEN:  Soft. Bowel sounds are present. GENITORECTAL:  Exam is deferred. EXTREMITIES:  She has no peripheral edema. IMPRESSION   1. Acute on chronic kidney disease. Her CKD is secondary to FSGS. Her acute component is probably a prerenal component due to poor oral intake with the abdominal pain as well as possibly effects of her liver disease. 2.  Acute alcoholic hepatitis. 3.  Cholecystitis. 4.  Questionable diabetes. 5.  History of multiple cerebral infarcts and multi-infarct dementia. 6.  History of medical noncompliance. 7.  Remote history of alcohol abuse. PLANS AND RECOMMENDATIONS:  For now, hopefully her renal function is returning to baseline with careful hydration. Will check labs in the morning and will follow with you. Thank you very much for allowing us to see her and helping in her care.       NEDRA ROLLINS Jaja Goff / GRACIE  D: 02/26/2018 16:17     T: 02/26/2018 17:25  JOB #: [id number]  CC: Alec Alaniz, [address] [place] [address]

## 2018-02-27 NOTE — PROGRESS NOTES
Problem: Falls - Risk of  Goal: *Absence of Falls  Document Damien Fall Risk and appropriate interventions in the flowsheet.    Outcome: Progressing Towards Goal  Fall Risk Interventions:  Mobility Interventions: Assess mobility with egress test    Mentation Interventions: Adequate sleep, hydration, pain control    Medication Interventions: Assess postural VS orthostatic hypotension         History of Falls Interventions: Bed/chair exit alarm

## 2018-02-27 NOTE — PROGRESS NOTES
TRANSFER - OUT REPORT:    Verbal report given to Helena(name) on Shalom Portillo  being transferred to [facility](unit) for routine progression of care       Report consisted of patients Situation, Background, Assessment and   Recommendations(SBAR). Information from the following report(s) SBAR, Kardex, [facility] and Recent Results was reviewed with the receiving nurse. Lines:   Peripheral IV 02/25/18 Right External jugular (Active)   Site Assessment Clean, dry, & intact 2/27/2018  7:19 AM   Phlebitis Assessment 0 2/27/2018  7:19 AM   Infiltration Assessment 0 2/27/2018  7:19 AM   Dressing Status Clean, dry, & intact 2/27/2018  7:19 AM   Dressing Type Tape;Transparent 2/27/2018  7:19 AM   Hub Color/Line Status Infusing 2/27/2018  7:19 AM        Opportunity for questions and clarification was provided.

## 2018-02-27 NOTE — PROGRESS NOTES
Assessment complete via flow sheet. Pt A&Ox3, pt has flat affect. Respirations even and unlabored. S1 S2 auscultated. Pt on room air. Pt reports pain level of 0 out of 10. Bowel sounds active, abdomen soft. Denies other needs. Bed in lowest position, side rails up 3, call bell in reach. Instructed to call for assistance. Pt verbalized understanding. Plan of care reviewed with patient.

## 2018-02-27 NOTE — PROCEDURES
ENDOSCOPIC  RETROGRADE CHOLANGIOPANCREATOGRAPHY    DATE of PROCEDURE: 2/27/2018    PT NAME: Jennifer Cote     [id number]    MEDICATION: general;     INSTRUMENT:  [id number] VF    SPECIAL PROCEDURE:papillotomy w/ balloon sweep  BLOOD LOSS- 0 to min. SPEC- no  IMPLANT- none    PROCEDURE: standard procedure w/o complications    ASSESSMENT:  1. 15 mm CBD stone extracted- able to pass 18 mm balloon- intrahepatics wnl  2.  Pancreas not evaluated    3. GB not seen    PLAN:  1. inpt f/u    Nohemy Davidson MD

## 2018-02-27 NOTE — PROGRESS NOTES
PT Note: S: Pt states she is not well--had a terrible night.  present, states pt is worried about \"surgery. \"                 O: Pt supine. A: Pt appears comfortable with no needs identified. P: Continue efforts as pt in better spirits.

## 2018-02-27 NOTE — PROGRESS NOTES
TRANSFER - IN REPORT:    Verbal report received from Paz(name) on Leron Merlin  being received from PACU(unit) for routine progression of care      Report consisted of patients Situation, Background, Assessment and   Recommendations(SBAR). Information from the following report(s) SBAR, Procedure Summary, Intake/Output and Recent Results was reviewed with the receiving nurse. Opportunity for questions and clarification was provided. Assessment completed upon patients arrival to unit and care assumed.

## 2018-02-27 NOTE — PERIOP NOTES
TRANSFER - OUT REPORT:    Verbal report given to MARTIN SANCHEZ on Fifi Gonzales  being transferred to 18  for routine post - op       Report consisted of patients Situation, Background, Assessment and   Recommendations(SBAR). Information from the following report(s) SBAR and OR Summary was reviewed with the receiving nurse. Lines:   Peripheral IV 02/25/18 Right External jugular (Active)   Site Assessment Clean, dry, & intact 2/27/2018 11:35 AM   Phlebitis Assessment 0 2/27/2018 11:35 AM   Infiltration Assessment 0 2/27/2018 11:35 AM   Dressing Status Clean, dry, & intact 2/27/2018 11:35 AM   Dressing Type Transparent;Tape 2/27/2018 11:35 AM   Hub Color/Line Status Pink; Infusing 2/27/2018 11:35 AM        Opportunity for questions and clarification was provided.       Patient transported with:   O2 @ 3 liters

## 2018-02-27 NOTE — PROGRESS NOTES
TRANSFER - IN REPORT:    Verbal report received from [address] [facility] [address] [facility] [address], RN (name) on Philis Route  being received from [place]- room 344 (unit) for ordered procedure. Report consisted of patients Situation, Background, Assessment and   Recommendations(SBAR). Information from the following report(s) SBAR was reviewed with the receiving nurse. Opportunity for questions and clarification was provided. [facility] [place] at this time.

## 2018-02-27 NOTE — PROGRESS NOTES
Hospitalist Progress Note      Admit Date: 2018  3:49 AM   NAME: Kirk Garcia   :  1960   MRN:  [id number]   Attending: Claudell Polo, MD  PCP:  Benita Koch DO    SUBJECTIVE:   62 y. o. female who has a PMH of vascular dementia, HTN, dyslipidemia, chronic alcohol intake, remote history of bipolar disorder in no treatment, CKD stage III, multiple mechanical falls that presents from home with complaints of severe abdominal pain x 2 days. Pain is mid abdomen/epigastic and sharp in nature. She denies nausea, emesis or diarrhea. She was recently hospitalized earlier this month for alcohol intoxication and delirium. She denies any recent alcohol use. In the ER, she is tachycardic, febrile and normotensive. Labs are significant for: WBC 4, lactic acid of 3.9, creatinine of 2.4, bilirubin 4.1 and transaminitis. She had abdominal ultrasound that showed gallstones with gallbladder wall thickening but no biliary ductal dilatation. General surg consulted and already evaluated. GI consulted for MRCP prior to cholecystectomy      Today: patient resting well. She denies any significant pain. Unable to obtain labs due to poor vasculature. PICC pending    Review of Systems negative with exception of pertinent positives noted above  PHYSICAL EXAM     Visit Vitals    /81 (BP 1 Location: Right arm, BP Patient Position: At rest;Head of bed elevated (Comment degrees))    Pulse 84    Temp 99.4 °F (37.4 °C)    Resp 16    Ht 5' 6\" (1.676 m)    Wt 81.6 kg (180 lb)    SpO2 97%    Breastfeeding No    BMI 29.05 kg/m2      Temp (24hrs), Av.8 °F (37.1 °C), Min:98.2 °F (36.8 °C), Max:99.4 °F (37.4 °C)    Oxygen Therapy  O2 Sat (%): 97 % (18 3029)  Pulse via Oximetry: 97 beats per minute (18 1414)  O2 Device: Room air (18 9234)  No intake or output data in the 24 hours ending 18 0563     General: No acute distress    Lungs:  CTA Bilaterally.    Heart:  Regular rate and rhythm,  No murmur, rub, or gallop  Abdomen: Soft, Non distended, Non tender, Positive bowel sounds  Extremities: No cyanosis, clubbing or edema  Neurologic:  No focal deficits      2/26/ MRCP  1.  Persistent cholelithiasis with trace pericholecystic free fluid, and  findings consistent with obstructing choledocholithiasis. Elective GI  consultation is recommended. ASSESSMENT      Active Hospital Problems    Diagnosis Date Noted    Cholecystitis 02/25/2018    Cholelithiasis 02/25/2018    OSMIN (acute kidney injury) ([facility] 02/13/2018    Depression 02/13/2018    Bipolar disorder ([facility] 10/22/2014    Essential hypertension 09/11/2012    Cerebral infarction ([facility] 09/11/2012     Plan:  · Continue maintenance LR and make NPO except meds/sips of clears  · General surg and GI consulted. Input is appreciated  · MRCP confirms choledocholithiasis. Will need ERCP  · Patient has poor vasculature and nursing unable to obtain blood work. She has IJ for medication. PICC line requested. Due to h/o CKD; PICC refused to place PICC unless Nephrology consents (in case of future HD needs). Nephrology consulted  · Met sepsis criteria for cholecystitis. Continue Zosyn  · Liver profile, PT/INR daily  ·  Continue home meds.  PO Ativan prn anxiety     DVT ppx: SCDs   Code Status: Full  Anticipated discharge: 3-4 Midnights    Signed By: Aydin Calloway MD     February 26, 2018

## 2018-02-27 NOTE — ANESTHESIA POSTPROCEDURE EVALUATION
Post-Anesthesia Evaluation and Assessment    Patient: Richmond Parada MRN: [id number]  SSN: [id number]    YOB: 1960  Age: 62 y.o. Sex: female       Cardiovascular Function/Vital Signs  Visit Vitals    /74    Pulse 95    Temp 36.7 °C (98.1 °F)    Resp 16    SpO2 99%       Patient is status post general anesthesia for Procedure(s):  ENDOSCOPIC RETROGRADE CHOLANGIOPANCREATOGRAPHY (ERCP)/ [facility]- ROOM 344  ENDOSCOPIC SPHINCTEROTOMY  ENDOSCOPIC STONE EXTRACTION/BALLOON SWEEP. Nausea/Vomiting: None    Postoperative hydration reviewed and adequate. Pain:  Pain Scale 1: Numeric (0 - 10) (02/27/18 1418)  Pain Intensity 1: 0 (02/27/18 1418)   Managed    Neurological Status:   Neuro (WDL): Within Defined Limits (02/27/18 1418)   At baseline    Mental Status and Level of Consciousness: Arousable    Pulmonary Status:   O2 Device: Nasal cannula (02/27/18 1418)   Adequate oxygenation and airway patent    Complications related to anesthesia: None    Post-anesthesia assessment completed.  No concerns    Signed By: Milton Tan MD     February 27, 2018

## 2018-02-27 NOTE — INTERVAL H&P NOTE
H&P Update:  German Dumont was seen and examined. History and physical has been reviewed. The patient has been examined.  There have been no significant clinical changes since the completion of the originally dated History and Physical.    Signed By: Edgar Israel MD     February 27, 2018 12:56 PM

## 2018-02-27 NOTE — PROGRESS NOTES
MRCP shows probable small stone in distal CBD. She is afebrile with stable vital signs and significant improvement in liver tests, but she has significant leukocytosis with worsening of renal function. IV access is currently limited to an external jugular IV with a PICC planned (?). I am concerned for possible cholangitis, although the improvement in liver tests implies that she no longer has high grade biliary obstruction. In my opinion, urgent ERCP is indicated. I discussed this possibility with the patient yesterday. This is being tentatively scheduled for late this afternoon. Ideally, this patient needs central venous access of some type ASAP due to need for IV antibiotics and potential for decompensation.

## 2018-02-27 NOTE — PROGRESS NOTES
Problem: Falls - Risk of  Goal: *Absence of Falls  Document Damien Fall Risk and appropriate interventions in the flowsheet.    Fall Risk Interventions:  Mobility Interventions: Assess mobility with egress test, Bed/chair exit alarm, Communicate number of staff needed for ambulation/transfer, Mechanical lift, OT consult for ADLs, PT Consult for mobility concerns, PT Consult for assist device competence, Utilize walker, cane, or other assitive device, Utilize gait belt for transfers/ambulation, Strengthening exercises (ROM-active/passive), Patient to call before getting OOB    Mentation Interventions: Adequate sleep, hydration, pain control, Bed/chair exit alarm, Door open when patient unattended, Eyeglasses and hearing aids, Familiar objects from home, Gait belt with transfers/ambulation, More frequent rounding, Reorient patient, Room close to nurse's station, Self-releasing belt, Toileting rounds, Update white board, Increase mobility, HELP ([address]) if available, Family/sitter at bedside, Evaluate medications/consider consulting pharmacy    Medication Interventions: Assess postural VS orthostatic hypotension, Bed/chair exit alarm, Evaluate medications/consider consulting pharmacy, Patient to call before getting OOB, Utilize gait belt for transfers/ambulation, Teach patient to arise slowly         History of Falls Interventions: Bed/chair exit alarm, Door open when patient unattended, Consult care management for discharge planning, Evaluate medications/consider consulting pharmacy, Investigate reason for fall, Utilize gait belt for transfer/ambulation, Room close to nurse's station

## 2018-02-27 NOTE — ANESTHESIA PREPROCEDURE EVALUATION
Anesthetic History   No history of anesthetic complications            Review of Systems / Medical History  Patient summary reviewed and pertinent labs reviewed    Pulmonary  Within defined limits                 Neuro/Psych       CVA (weakness in R side.)      Comments: Vascular dementia Cardiovascular    Hypertension          PAD    Exercise tolerance: >4 METS     GI/Hepatic/Renal         Renal disease (FSGS (remission)): CRI and ARF       Endo/Other        Arthritis and anemia     Other Findings              Physical Exam    Airway  Mallampati: II  TM Distance: 4 - 6 cm  Neck ROM: normal range of motion   Mouth opening: Normal     Cardiovascular    Rhythm: regular  Rate: normal         Dental  No notable dental hx       Pulmonary  Breath sounds clear to auscultation               Abdominal         Other Findings            Anesthetic Plan    ASA: 3  Anesthesia type: spinal          Induction: Intravenous  Anesthetic plan and risks discussed with: Patient

## 2018-02-27 NOTE — PROGRESS NOTES
[facility] progress note    Follow-Up on: 2/27/2018     [place] and [place] planned ERCP today. She denies any nausea or abdominal pain at present. ROS:  Gen - no fever, no chills, appetite npo  CV - no chest pain, no orthopnea  Lung - no shortness of breath, no cough  Abd - no tenderness, no nausea, no vomiting  Ext - noted edema    Exam:  Vitals:    02/27/18 1135   BP: 133/78   Pulse: 81   Resp: 16   SpO2: 93%       No intake or output data in the 24 hours ending 02/27/18 1200    GEN - in no distress  CV - S1, S2, no rub  Lung - clear bilaterally  Abd - soft, nontender  Ext - trace edema    Recent Labs      02/27/18   0708  02/25/18   0517   WBC  16.7*  4.0*   HGB  9.7*  13.2   HCT  31.1*  39.7   PLT  151  190        Recent Labs      02/27/18   0708  02/25/18   0517   NA  147*  140   K  4.1  5.0   CL  109*  102   CO2  27  22   BUN  33*  26*   CREA  3.13*  2.49*   CA  8.5  9.3   GLU  83  229*       Assessment / Plan:    1. OSMIN - suspect pre-renal.  Continue IVF. She has underlying Stage IV CKD and noted small right kidney on ultrasound consistent with advanced CKD. 2.  HTN - stable    3. Cholangitis     4. Bx-proven FSGS    Continue IVF. Hopefully, she can get through this with her limited renal reserve.

## 2018-02-27 NOTE — PROGRESS NOTES
Informed by  unable to get AM labs due to pt being a hard stick. Lab draw attempted by 3 lab techs, unable to find vein.

## 2018-02-27 NOTE — PROGRESS NOTES
Shift assessment complete. HR regular. RR even and unlabored. Abd soft active bowel sounds. Incontinent briefs worn. Thick cream applied to buttocks as preventative. IVF without complications. All needs met at this time. Will monitor with hourly rounds.

## 2018-02-28 ENCOUNTER — APPOINTMENT (OUTPATIENT)
Dept: GENERAL RADIOLOGY | Age: 58
DRG: 854 | End: 2018-02-28
Attending: SURGERY
Payer: MEDICARE

## 2018-02-28 ENCOUNTER — ANESTHESIA (OUTPATIENT)
Dept: SURGERY | Age: 58
DRG: 854 | End: 2018-02-28
Payer: MEDICARE

## 2018-02-28 ENCOUNTER — ANESTHESIA EVENT (OUTPATIENT)
Dept: SURGERY | Age: 58
DRG: 854 | End: 2018-02-28
Payer: MEDICARE

## 2018-02-28 LAB
ALBUMIN SERPL-MCNC: 1.8 G/DL (ref 3.5–5)
ALBUMIN/GLOB SERPL: 0.5 {RATIO} (ref 1.2–3.5)
ALP SERPL-CCNC: 289 U/L (ref 50–136)
ALT SERPL-CCNC: 102 U/L (ref 12–65)
ANION GAP SERPL CALC-SCNC: 11 MMOL/L (ref 7–16)
AST SERPL-CCNC: 109 U/L (ref 15–37)
BILIRUB SERPL-MCNC: 1.2 MG/DL (ref 0.2–1.1)
BUN SERPL-MCNC: 24 MG/DL (ref 6–23)
CALCIUM SERPL-MCNC: 8.6 MG/DL (ref 8.3–10.4)
CHLORIDE SERPL-SCNC: 111 MMOL/L (ref 98–107)
CO2 SERPL-SCNC: 25 MMOL/L (ref 21–32)
CREAT SERPL-MCNC: 2.74 MG/DL (ref 0.6–1)
ERYTHROCYTE [DISTWIDTH] IN BLOOD BY AUTOMATED COUNT: 15.6 % (ref 11.9–14.6)
GLOBULIN SER CALC-MCNC: 3.6 G/DL (ref 2.3–3.5)
GLUCOSE SERPL-MCNC: 88 MG/DL (ref 65–100)
HCT VFR BLD AUTO: 31.2 % (ref 35.8–46.3)
HGB BLD-MCNC: 9.9 G/DL (ref 11.7–15.4)
LACTATE SERPL-SCNC: 0.9 MMOL/L (ref 0.4–2)
MCH RBC QN AUTO: 29.6 PG (ref 26.1–32.9)
MCHC RBC AUTO-ENTMCNC: 31.7 G/DL (ref 31.4–35)
MCV RBC AUTO: 93.4 FL (ref 79.6–97.8)
PLATELET # BLD AUTO: 136 K/UL (ref 150–450)
PMV BLD AUTO: 11.6 FL (ref 10.8–14.1)
POTASSIUM SERPL-SCNC: 3.6 MMOL/L (ref 3.5–5.1)
PROT SERPL-MCNC: 5.4 G/DL (ref 6.3–8.2)
RBC # BLD AUTO: 3.34 M/UL (ref 4.05–5.25)
SODIUM SERPL-SCNC: 147 MMOL/L (ref 136–145)
WBC # BLD AUTO: 9.5 K/UL (ref 4.3–11.1)

## 2018-02-28 PROCEDURE — 74011250636 HC RX REV CODE- 250/636

## 2018-02-28 PROCEDURE — 77030008522 HC TBNG INSUF LAPRO STRY -B: Performed by: SURGERY

## 2018-02-28 PROCEDURE — 77030031139 HC SUT VCRL2 J&J -A: Performed by: SURGERY

## 2018-02-28 PROCEDURE — 94760 N-INVAS EAR/PLS OXIMETRY 1: CPT

## 2018-02-28 PROCEDURE — 97116 GAIT TRAINING THERAPY: CPT

## 2018-02-28 PROCEDURE — 77030035051: Performed by: SURGERY

## 2018-02-28 PROCEDURE — 77030011640 HC PAD GRND REM COVD -A: Performed by: SURGERY

## 2018-02-28 PROCEDURE — 88304 TISSUE EXAM BY PATHOLOGIST: CPT | Performed by: SURGERY

## 2018-02-28 PROCEDURE — 77030032490 HC SLV COMPR SCD KNE COVD -B: Performed by: SURGERY

## 2018-02-28 PROCEDURE — C1894 INTRO/SHEATH, NON-LASER: HCPCS | Performed by: SURGERY

## 2018-02-28 PROCEDURE — 74011000250 HC RX REV CODE- 250

## 2018-02-28 PROCEDURE — 74011250636 HC RX REV CODE- 250/636: Performed by: INTERNAL MEDICINE

## 2018-02-28 PROCEDURE — 76210000006 HC OR PH I REC 0.5 TO 1 HR: Performed by: SURGERY

## 2018-02-28 PROCEDURE — 74011636320 HC RX REV CODE- 636/320: Performed by: SURGERY

## 2018-02-28 PROCEDURE — 74011000258 HC RX REV CODE- 258: Performed by: HOSPITALIST

## 2018-02-28 PROCEDURE — 74011250636 HC RX REV CODE- 250/636: Performed by: HOSPITALIST

## 2018-02-28 PROCEDURE — 87040 BLOOD CULTURE FOR BACTERIA: CPT | Performed by: HOSPITALIST

## 2018-02-28 PROCEDURE — 77030018836 HC SOL IRR NACL ICUM -A: Performed by: SURGERY

## 2018-02-28 PROCEDURE — 77030004818 HC CATH CHOLGM TELE -B: Performed by: SURGERY

## 2018-02-28 PROCEDURE — 76060000034 HC ANESTHESIA 1.5 TO 2 HR: Performed by: SURGERY

## 2018-02-28 PROCEDURE — 77030035048 HC TRCR ENDOSC OPTCL COVD -B: Performed by: SURGERY

## 2018-02-28 PROCEDURE — 74300 X-RAY BILE DUCTS/PANCREAS: CPT

## 2018-02-28 PROCEDURE — 77030008477 HC STYL SATN SLP COVD -A: Performed by: NURSE ANESTHETIST, CERTIFIED REGISTERED

## 2018-02-28 PROCEDURE — 77030008756 HC TU IRR SUC STRY -B: Performed by: SURGERY

## 2018-02-28 PROCEDURE — 80053 COMPREHEN METABOLIC PANEL: CPT | Performed by: SURGERY

## 2018-02-28 PROCEDURE — 77030000038 HC TIP SCIS LAPSCP SURI -B: Performed by: SURGERY

## 2018-02-28 PROCEDURE — 74011000258 HC RX REV CODE- 258: Performed by: INTERNAL MEDICINE

## 2018-02-28 PROCEDURE — 97535 SELF CARE MNGMENT TRAINING: CPT

## 2018-02-28 PROCEDURE — 85027 COMPLETE CBC AUTOMATED: CPT | Performed by: SURGERY

## 2018-02-28 PROCEDURE — 65270000029 HC RM PRIVATE

## 2018-02-28 PROCEDURE — 77030002967 HC SUT PDS J&J -B: Performed by: SURGERY

## 2018-02-28 PROCEDURE — 74011000250 HC RX REV CODE- 250: Performed by: SURGERY

## 2018-02-28 PROCEDURE — 0FT44ZZ RESECTION OF GALLBLADDER, PERCUTANEOUS ENDOSCOPIC APPROACH: ICD-10-PCS | Performed by: SURGERY

## 2018-02-28 PROCEDURE — 77030012022 HC APPL CLP ENDOSC COVD -C: Performed by: SURGERY

## 2018-02-28 PROCEDURE — BF100ZZ FLUOROSCOPY OF BILE DUCTS USING HIGH OSMOLAR CONTRAST: ICD-10-PCS | Performed by: SURGERY

## 2018-02-28 PROCEDURE — 74011250637 HC RX REV CODE- 250/637: Performed by: HOSPITALIST

## 2018-02-28 PROCEDURE — 77030008703 HC TU ET UNCUF COVD -A: Performed by: NURSE ANESTHETIST, CERTIFIED REGISTERED

## 2018-02-28 PROCEDURE — 83605 ASSAY OF LACTIC ACID: CPT | Performed by: HOSPITALIST

## 2018-02-28 PROCEDURE — 74011250636 HC RX REV CODE- 250/636: Performed by: SURGERY

## 2018-02-28 PROCEDURE — 77010033678 HC OXYGEN DAILY

## 2018-02-28 PROCEDURE — 77030016151 HC PROTCTR LNS DFOG COVD -B: Performed by: SURGERY

## 2018-02-28 PROCEDURE — 36415 COLL VENOUS BLD VENIPUNCTURE: CPT | Performed by: SURGERY

## 2018-02-28 PROCEDURE — 76010000161 HC OR TIME 1 TO 1.5 HR INTENSV-TIER 1: Performed by: SURGERY

## 2018-02-28 PROCEDURE — 77030035220 HC TRCR ENDOSC BLNTPRT ANCHR COVD -B: Performed by: SURGERY

## 2018-02-28 RX ORDER — SODIUM CHLORIDE, SODIUM LACTATE, POTASSIUM CHLORIDE, CALCIUM CHLORIDE 600; 310; 30; 20 MG/100ML; MG/100ML; MG/100ML; MG/100ML
100 INJECTION, SOLUTION INTRAVENOUS CONTINUOUS
Status: DISCONTINUED | OUTPATIENT
Start: 2018-02-28 | End: 2018-03-01

## 2018-02-28 RX ORDER — NALOXONE HYDROCHLORIDE 0.4 MG/ML
0.2 INJECTION, SOLUTION INTRAMUSCULAR; INTRAVENOUS; SUBCUTANEOUS AS NEEDED
Status: DISCONTINUED | OUTPATIENT
Start: 2018-02-28 | End: 2018-02-28 | Stop reason: HOSPADM

## 2018-02-28 RX ORDER — ROCURONIUM BROMIDE 10 MG/ML
INJECTION, SOLUTION INTRAVENOUS AS NEEDED
Status: DISCONTINUED | OUTPATIENT
Start: 2018-02-28 | End: 2018-02-28 | Stop reason: HOSPADM

## 2018-02-28 RX ORDER — LORAZEPAM 1 MG/1
1 TABLET ORAL
Status: DISCONTINUED | OUTPATIENT
Start: 2018-02-28 | End: 2018-03-03 | Stop reason: HOSPADM

## 2018-02-28 RX ORDER — FENTANYL CITRATE 50 UG/ML
INJECTION, SOLUTION INTRAMUSCULAR; INTRAVENOUS AS NEEDED
Status: DISCONTINUED | OUTPATIENT
Start: 2018-02-28 | End: 2018-02-28 | Stop reason: HOSPADM

## 2018-02-28 RX ORDER — MIRTAZAPINE 15 MG/1
30 TABLET, FILM COATED ORAL
Status: DISCONTINUED | OUTPATIENT
Start: 2018-02-28 | End: 2018-03-03 | Stop reason: HOSPADM

## 2018-02-28 RX ORDER — SAME BUTANEDISULFONATE/BETAINE 400-600 MG
500 POWDER IN PACKET (EA) ORAL 2 TIMES DAILY
Status: DISCONTINUED | OUTPATIENT
Start: 2018-02-28 | End: 2018-03-03 | Stop reason: HOSPADM

## 2018-02-28 RX ORDER — GLYCOPYRROLATE 0.2 MG/ML
INJECTION INTRAMUSCULAR; INTRAVENOUS AS NEEDED
Status: DISCONTINUED | OUTPATIENT
Start: 2018-02-28 | End: 2018-02-28 | Stop reason: HOSPADM

## 2018-02-28 RX ORDER — HYDROMORPHONE HYDROCHLORIDE 2 MG/ML
0.5 INJECTION, SOLUTION INTRAMUSCULAR; INTRAVENOUS; SUBCUTANEOUS
Status: DISCONTINUED | OUTPATIENT
Start: 2018-02-28 | End: 2018-02-28 | Stop reason: HOSPADM

## 2018-02-28 RX ORDER — SODIUM CHLORIDE 0.9 % (FLUSH) 0.9 %
5-10 SYRINGE (ML) INJECTION AS NEEDED
Status: DISCONTINUED | OUTPATIENT
Start: 2018-02-28 | End: 2018-03-03 | Stop reason: HOSPADM

## 2018-02-28 RX ORDER — SODIUM CHLORIDE, SODIUM LACTATE, POTASSIUM CHLORIDE, CALCIUM CHLORIDE 600; 310; 30; 20 MG/100ML; MG/100ML; MG/100ML; MG/100ML
75 INJECTION, SOLUTION INTRAVENOUS CONTINUOUS
Status: DISCONTINUED | OUTPATIENT
Start: 2018-02-28 | End: 2018-02-28 | Stop reason: HOSPADM

## 2018-02-28 RX ORDER — BUPIVACAINE HYDROCHLORIDE 2.5 MG/ML
INJECTION, SOLUTION EPIDURAL; INFILTRATION; INTRACAUDAL AS NEEDED
Status: DISCONTINUED | OUTPATIENT
Start: 2018-02-28 | End: 2018-02-28 | Stop reason: HOSPADM

## 2018-02-28 RX ORDER — ONDANSETRON 2 MG/ML
4 INJECTION INTRAMUSCULAR; INTRAVENOUS
Status: DISCONTINUED | OUTPATIENT
Start: 2018-02-28 | End: 2018-03-03 | Stop reason: HOSPADM

## 2018-02-28 RX ORDER — QUETIAPINE FUMARATE 25 MG/1
50 TABLET, FILM COATED ORAL
Status: DISCONTINUED | OUTPATIENT
Start: 2018-02-28 | End: 2018-03-03 | Stop reason: HOSPADM

## 2018-02-28 RX ORDER — OXYCODONE AND ACETAMINOPHEN 5; 325 MG/1; MG/1
1 TABLET ORAL
Status: DISCONTINUED | OUTPATIENT
Start: 2018-02-28 | End: 2018-03-03 | Stop reason: HOSPADM

## 2018-02-28 RX ORDER — LIDOCAINE HYDROCHLORIDE 20 MG/ML
INJECTION, SOLUTION EPIDURAL; INFILTRATION; INTRACAUDAL; PERINEURAL AS NEEDED
Status: DISCONTINUED | OUTPATIENT
Start: 2018-02-28 | End: 2018-02-28 | Stop reason: HOSPADM

## 2018-02-28 RX ORDER — ESCITALOPRAM OXALATE 10 MG/1
10 TABLET ORAL DAILY
Status: DISCONTINUED | OUTPATIENT
Start: 2018-02-28 | End: 2018-03-03 | Stop reason: HOSPADM

## 2018-02-28 RX ORDER — MORPHINE SULFATE 2 MG/ML
2 INJECTION, SOLUTION INTRAMUSCULAR; INTRAVENOUS
Status: DISCONTINUED | OUTPATIENT
Start: 2018-02-28 | End: 2018-03-03 | Stop reason: HOSPADM

## 2018-02-28 RX ORDER — DEXAMETHASONE SODIUM PHOSPHATE 4 MG/ML
INJECTION, SOLUTION INTRA-ARTICULAR; INTRALESIONAL; INTRAMUSCULAR; INTRAVENOUS; SOFT TISSUE AS NEEDED
Status: DISCONTINUED | OUTPATIENT
Start: 2018-02-28 | End: 2018-02-28 | Stop reason: HOSPADM

## 2018-02-28 RX ORDER — SODIUM CHLORIDE 0.9 % (FLUSH) 0.9 %
5-10 SYRINGE (ML) INJECTION EVERY 8 HOURS
Status: DISCONTINUED | OUTPATIENT
Start: 2018-02-28 | End: 2018-03-03 | Stop reason: HOSPADM

## 2018-02-28 RX ORDER — NEOSTIGMINE METHYLSULFATE 1 MG/ML
INJECTION INTRAVENOUS AS NEEDED
Status: DISCONTINUED | OUTPATIENT
Start: 2018-02-28 | End: 2018-02-28 | Stop reason: HOSPADM

## 2018-02-28 RX ORDER — NALOXONE HYDROCHLORIDE 0.4 MG/ML
0.4 INJECTION, SOLUTION INTRAMUSCULAR; INTRAVENOUS; SUBCUTANEOUS AS NEEDED
Status: DISCONTINUED | OUTPATIENT
Start: 2018-02-28 | End: 2018-03-03 | Stop reason: HOSPADM

## 2018-02-28 RX ORDER — ONDANSETRON 2 MG/ML
INJECTION INTRAMUSCULAR; INTRAVENOUS AS NEEDED
Status: DISCONTINUED | OUTPATIENT
Start: 2018-02-28 | End: 2018-02-28 | Stop reason: HOSPADM

## 2018-02-28 RX ORDER — PROPOFOL 10 MG/ML
INJECTION, EMULSION INTRAVENOUS AS NEEDED
Status: DISCONTINUED | OUTPATIENT
Start: 2018-02-28 | End: 2018-02-28 | Stop reason: HOSPADM

## 2018-02-28 RX ORDER — EPINEPHRINE 1 MG/ML
INJECTION, SOLUTION, CONCENTRATE INTRAVENOUS AS NEEDED
Status: DISCONTINUED | OUTPATIENT
Start: 2018-02-28 | End: 2018-02-28 | Stop reason: HOSPADM

## 2018-02-28 RX ORDER — PANTOPRAZOLE SODIUM 40 MG/1
40 TABLET, DELAYED RELEASE ORAL
Status: DISCONTINUED | OUTPATIENT
Start: 2018-02-28 | End: 2018-03-03 | Stop reason: HOSPADM

## 2018-02-28 RX ORDER — OXYCODONE HYDROCHLORIDE 5 MG/1
5 TABLET ORAL
Status: DISCONTINUED | OUTPATIENT
Start: 2018-02-28 | End: 2018-02-28 | Stop reason: HOSPADM

## 2018-02-28 RX ADMIN — LORAZEPAM 1 MG: 1 TABLET ORAL at 19:32

## 2018-02-28 RX ADMIN — SODIUM CHLORIDE, SODIUM LACTATE, POTASSIUM CHLORIDE, AND CALCIUM CHLORIDE: 600; 310; 30; 20 INJECTION, SOLUTION INTRAVENOUS at 15:59

## 2018-02-28 RX ADMIN — FENTANYL CITRATE 100 MCG: 50 INJECTION, SOLUTION INTRAMUSCULAR; INTRAVENOUS at 16:09

## 2018-02-28 RX ADMIN — LIDOCAINE HYDROCHLORIDE 60 MG: 20 INJECTION, SOLUTION EPIDURAL; INFILTRATION; INTRACAUDAL; PERINEURAL at 16:09

## 2018-02-28 RX ADMIN — DEXAMETHASONE SODIUM PHOSPHATE 4 MG: 4 INJECTION, SOLUTION INTRA-ARTICULAR; INTRALESIONAL; INTRAMUSCULAR; INTRAVENOUS; SOFT TISSUE at 16:41

## 2018-02-28 RX ADMIN — ONDANSETRON 4 MG: 2 INJECTION INTRAMUSCULAR; INTRAVENOUS at 16:41

## 2018-02-28 RX ADMIN — LORAZEPAM 1 MG: 1 TABLET ORAL at 10:25

## 2018-02-28 RX ADMIN — GLYCOPYRROLATE 0.8 MG: 0.2 INJECTION INTRAMUSCULAR; INTRAVENOUS at 17:13

## 2018-02-28 RX ADMIN — ESCITALOPRAM OXALATE 10 MG: 10 TABLET ORAL at 09:01

## 2018-02-28 RX ADMIN — PANTOPRAZOLE SODIUM 40 MG: 40 TABLET, DELAYED RELEASE ORAL at 09:01

## 2018-02-28 RX ADMIN — PIPERACILLIN SODIUM,TAZOBACTAM SODIUM 3.38 G: 3; .375 INJECTION, POWDER, FOR SOLUTION INTRAVENOUS at 09:00

## 2018-02-28 RX ADMIN — SODIUM CHLORIDE, SODIUM LACTATE, POTASSIUM CHLORIDE, AND CALCIUM CHLORIDE: 600; 310; 30; 20 INJECTION, SOLUTION INTRAVENOUS at 16:58

## 2018-02-28 RX ADMIN — PIPERACILLIN SODIUM,TAZOBACTAM SODIUM 3.38 G: 3; .375 INJECTION, POWDER, FOR SOLUTION INTRAVENOUS at 17:13

## 2018-02-28 RX ADMIN — ROCURONIUM BROMIDE 10 MG: 10 INJECTION, SOLUTION INTRAVENOUS at 16:42

## 2018-02-28 RX ADMIN — NEOSTIGMINE METHYLSULFATE 5 MG: 1 INJECTION INTRAVENOUS at 17:13

## 2018-02-28 RX ADMIN — ROCURONIUM BROMIDE 40 MG: 10 INJECTION, SOLUTION INTRAVENOUS at 16:09

## 2018-02-28 RX ADMIN — PROPOFOL 130 MG: 10 INJECTION, EMULSION INTRAVENOUS at 16:09

## 2018-02-28 RX ADMIN — QUETIAPINE FUMARATE 50 MG: 25 TABLET ORAL at 22:49

## 2018-02-28 RX ADMIN — MIRTAZAPINE 30 MG: 15 TABLET, FILM COATED ORAL at 22:49

## 2018-02-28 NOTE — ANESTHESIA PREPROCEDURE EVALUATION
Anesthetic History   No history of anesthetic complications            Review of Systems / Medical History  Patient summary reviewed and pertinent labs reviewed    Pulmonary          Smoker (Quit 1979)         Neuro/Psych       CVA (weakness in R side.)  Dementia    Comments: Vascular dementia Cardiovascular    Hypertension: well controlled          PAD    Exercise tolerance: >4 METS  Comments: ECHO 2017: 55%  Per  has no CP     GI/Hepatic/Renal         Renal disease (FSGS (remission)): CRI and ARF       Endo/Other        Arthritis and anemia     Other Findings              Physical Exam    Airway  Mallampati: II  TM Distance: 4 - 6 cm  Neck ROM: normal range of motion   Mouth opening: Normal     Cardiovascular    Rhythm: regular  Rate: normal         Dental    Dentition: Caps/crowns     Pulmonary  Breath sounds clear to auscultation               Abdominal  GI exam deferred       Other Findings            Anesthetic Plan    ASA: 3  Anesthesia type: spinal and general          Induction: Intravenous  Anesthetic plan and risks discussed with: Patient and Spouse      I spoke with the patient's  on the phone and explained risks.

## 2018-02-28 NOTE — PROGRESS NOTES
Shift assessment completed. Pt is alert and oriented x 3, lungs diminished but clear. Verbalizes needs well. Skin warm and dry. Up to bedside commode with assist. Possibility patient may go to surgery today. Continue to monitor.

## 2018-02-28 NOTE — PROGRESS NOTES
GI DAILY PROGRESS NOTE    Admit Date:  2/25/2018  Today's Date:  2/28/2018    CC:  Elevated LFTs    Subjective:     S/p ERCP yesterday 2/27 - see below  Patient feels well. Denies any abdominal pain or N/V  Reports loose stools this morning. No bleeding. Medications:   Current Facility-Administered Medications   Medication Dose Route Frequency    sodium chloride (NS) flush 5-10 mL  5-10 mL IntraVENous Q8H    morphine injection 2 mg  2 mg IntraVENous Q4H PRN    sodium chloride (NS) flush 5-10 mL  5-10 mL IntraVENous PRN    escitalopram oxalate (LEXAPRO) tablet 10 mg  10 mg Oral DAILY    mirtazapine (REMERON) tablet 30 mg  30 mg Oral QHS    pantoprazole (PROTONIX) tablet 40 mg  40 mg Oral ACB    piperacillin-tazobactam (ZOSYN) 3.375 g in 0.9% sodium chloride (MBP/ADV) 100 mL  3.375 g IntraVENous Q8H    QUEtiapine (SEROquel) tablet 50 mg  50 mg Oral QHS    lactated Ringers infusion  100 mL/hr IntraVENous CONTINUOUS    LORazepam (ATIVAN) tablet 1 mg  1 mg Oral TID PRN    naloxone (NARCAN) injection 0.4 mg  0.4 mg IntraVENous PRN    ondansetron (ZOFRAN) injection 4 mg  4 mg IntraVENous Q4H PRN    oxyCODONE-acetaminophen (PERCOCET) 5-325 mg per tablet 1 Tab  1 Tab Oral Q6H PRN    Saccharomyces boulardii (FLORASTOR) capsule 500 mg  500 mg Oral BID       Review of Systems:  ROS was obtained, with pertinent positives as listed above. No chest pain or SOB. Diet:  NPO    Objective:   Vitals:  Visit Vitals    /84 (BP 1 Location: Right arm, BP Patient Position: At rest)    Pulse 76    Temp 99 °F (37.2 °C)    Resp 16    Ht 5' 6\" (1.676 m)    Wt 81.6 kg (180 lb)    SpO2 96%    Breastfeeding No    BMI 29.05 kg/m2     Intake/Output:  02/28 0701 - 02/28 1900  In: -   Out: 300 [Urine:300]  02/26 1901 - 02/28 0700  In: 150 [I.V.:150]  Out: 250 [Urine:250]  Exam:  General appearance: alert, cooperative, no distress  Lungs: CTA ant  Heart: RRR  Abdomen: Soft, NONTENDER, NABS.    Extremities: extremities normal, atraumatic, no cyanosis or edema  Neuro:  alert and oriented    Data Review (Labs):    Recent Labs      02/28/18   0817  02/27/18   0708   WBC  9.5  16.7*   HGB  9.9*  9.7*   HCT  31.2*  31.1*   PLT  136*  151   MCV  93.4  93.7   NA  147*  147*   K  3.6  4.1   CL  111*  109*   CO2  25  27   BUN  24*  33*   CREA  2.74*  3.13*   CA  8.6  8.5   GLU  88  83   AP  289*  190*   SGOT  109*  64*   ALT  102*  105*   TBILI  1.2*  1.1   ALB  1.8*  1.8*   TP  5.4*  5.4*   PTP   --   12.8   INR   --   0.9       Assessment:     Principal Problem:  Cholelithiasis (2/25/2018)    Active Problems:  Essential hypertension (9/11/2012)  Cerebral infarction ([facility] (9/11/2012)  Bipolar disorder ([facility] (10/22/2014)  OSMIN (acute kidney injury) ([facility] (2/13/2018)  Depression (2/13/2018)  Cholecystitis (2/25/2018)    Abdominal US 2/25  IMPRESSION: Gallstones with gallbladder wall thickening. Absent sonographic  Alberts's sign. No biliary ductal dilatation. 2. Likely liver steatosis. 3. Echogenic right renal cortex. MRCP 2/26  1. Persistent cholelithiasis with trace pericholecystic free fluid, and  findings consistent with obstructing choledocholithiasis. Elective GI  consultation is recommended. 2.  Other chronic findings as above. ERCP 2/27  SPECIAL PROCEDURE:papillotomy w/ balloon sweep  BLOOD LOSS- 0 to min. SPEC- no  IMPLANT- none     PROCEDURE: standard procedure w/o complications     ASSESSMENT:  1. 15 mm CBD stone extracted- able to pass 18 mm balloon- intrahepatics wnl  2. Pancreas not evaluated    3. GB not seen      63 yo female with hx of CVA, CKD, EtOH abuse and bipolar disease is seen today for evaluation of elevated liver chemistry. She presented 2/25 with onset of n/v and epigastric pain which began following a meal at the [address] She has noted in the last month that fattier and fried foods seem to cause GI discomfort.   On arrival to the ER, she was noted to have elevations in liver chemistry, consistent with reported hx of heavy etoh intake (abstinent x 2 weeks). Ultarsound revealed thickened gallbladder wall with CBD 4mm; hepatic fatty infiltration was also noted. MRCP with CBD stone and ERCP with stone extraction. LFTs have improved and abdominal pain resolved. Blood culture positive for Klebsiella Pneumoniae, pansensitive, patient on Zosyn. Plan:      Abstinence of EtOH advised. Patient for lap kerri today. Will sign-off. Pls call if we can be of any further assistance. FU with GI as needed. Ira Palacios PA-C  [facility]    Discussed with AMINAH and agree with above. Doing well post ERCP. No further GI intervention necessary. No need for outpatient f/u    D.  Opal Lou MD

## 2018-02-28 NOTE — PROGRESS NOTES
Hospitalist Progress Note      Admit Date: 2018  3:49 AM   NAME: Claire Aguero   :  1960   MRN:  [id number]   Attending: Ian John MD  PCP:  Concepción Das, DO    SUBJECTIVE:   62 y. o. female who has a PMH of vascular dementia, HTN, dyslipidemia, chronic alcohol intake, remote history of bipolar disorder in no treatment, CKD stage III, multiple mechanical falls admitted on  for acute cholecystitis with associated transminitis. Abdominal USG showed gallstones with gallbladder wall thickening and liver steatosis. GI and surgery consulted. MRCP showed persistent cholelithiasis with trace pericholecystic free fluid and obstructing choledocholithiasis. Plan is for ERCP and elective cholecystectomy. PICC line placement declined by PICC team due to presence of CKD. Nephrology consulted for eval.      :   Pt is resting comfortably in bed. She denies abdominal pain, nausea or vomiting. She reports having loose stools, multiple BM's since yesterday. Discussed results of ERCP    Review of Systems negative with exception of pertinent positives noted above  PHYSICAL EXAM     Visit Vitals    BP (!) 135/92 (BP 1 Location: Right arm, BP Patient Position: At rest;Head of bed elevated (Comment degrees))  Comment: rn notified    Pulse 81    Temp 99.4 °F (37.4 °C)    Resp 16    Ht 5' 6\" (1.676 m)    Wt 81.6 kg (180 lb)    SpO2 97%    Breastfeeding No    BMI 29.05 kg/m2      Temp (24hrs), Av.9 °F (37.2 °C), Min:98.1 °F (36.7 °C), Max:100.1 °F (37.8 °C)    Oxygen Therapy  O2 Sat (%): 97 % (18 0724)  Pulse via Oximetry: 97 beats per minute (18 1414)  O2 Device: Room air (18)    Intake/Output Summary (Last 24 hours) at 18 0819  Last data filed at 18 1400   Gross per 24 hour   Intake              150 ml   Output              250 ml   Net             -100 ml        General: No acute distress    Lungs:  CTA Bilaterally.    Heart:  Regular rate and rhythm,  No murmur, rub, or gallop  Abdomen: Soft, Non distended, Non tender, Positive bowel sounds  Extremities: No cyanosis, clubbing or edema  Neurologic:  No focal deficits      2/26/ MRCP  1.  Persistent cholelithiasis with trace pericholecystic free fluid, and  findings consistent with obstructing choledocholithiasis. Elective GI  consultation is recommended. ASSESSMENT      Active Hospital Problems    Diagnosis Date Noted    Sepsis ([facility] 02/27/2018     Priority: 1 - One    Bacteremia due to Gram-negative bacteria 02/27/2018     Priority: 2 - Two    Cholecystitis 02/25/2018     Priority: 3 - Three    Cholelithiasis 02/25/2018     Priority: 4 - Four    OSMIN (acute kidney injury) ([facility] 02/13/2018     Priority: 5 - Five    Depression 02/13/2018    Bipolar disorder ([facility] 10/22/2014    Essential hypertension 09/11/2012    Cerebral infarction ([facility] 09/11/2012     Plan:  · Plan for laparoscopic cholecystectomy today. Pt is NPO. · MRCP confirms obstructive choledocholithiasis. S/P ERCP on 2/27, removal of 15 mm stone from GBD. · Blood culture positive for Klebsiella Pneumoniae, pansensitive, will continue zosyn for now. D3  · Repeat blood cultures drawn this AM  · Prn narcotics for pain control  · Liver profile, PT/INR daily  ·  Continue home meds.  PO Ativan prn anxiety     DVT ppx: SCDs   Code Status: Full    High risk with opioids on board    Signed By: Graciela Choudhary MD     February 26, 2018

## 2018-02-28 NOTE — ANESTHESIA POSTPROCEDURE EVALUATION
Post-Anesthesia Evaluation and Assessment    Patient: Sri Slade MRN: [id number]  SSN: [id number]    YOB: 1960  Age: 62 y.o. Sex: female       Cardiovascular Function/Vital Signs  Visit Vitals    /89 (BP 1 Location: Left arm, BP Patient Position: At rest)    Pulse 86    Temp 36.8 °C (98.2 °F)    Resp 16    Ht 5' 6\" (1.676 m)    Wt 81.6 kg (180 lb)    SpO2 97%    Breastfeeding No    BMI 29.05 kg/m2       Patient is status post spinal, general anesthesia for Procedure(s):  CHOLECYSTECTOMY LAPAROSCOPIC WITH INTRAOP CHOLANGIOGRAM.    Nausea/Vomiting: None    Postoperative hydration reviewed and adequate. Pain:  Pain Scale 1: Visual (02/28/18 1731)  Pain Intensity 1: 0 (02/28/18 1731)   Managed    Neurological Status:   Neuro (WDL): Exceptions to WDL (02/28/18 1731)  Neuro  Neurologic State: Drowsy; Eyes open to voice (02/28/18 1731)  Orientation Level: Oriented to situation;Oriented to person (02/28/18 1046)  Cognition: Decreased attention/concentration (02/28/18 1731)   At baseline    Mental Status and Level of Consciousness: Arousable    Pulmonary Status:   O2 Device: Nasal cannula (02/28/18 1801)   Adequate oxygenation and airway patent    Complications related to anesthesia: None    Post-anesthesia assessment completed.  No concerns    Signed By: Onur Almanzar MD     February 28, 2018

## 2018-02-28 NOTE — PERIOP NOTES
TRANSFER - IN REPORT:    Verbal report received from Everton Gonzales RN on Jennifer Cote  being received from  for routine progression of care      Report consisted of patients Situation, Background, Assessment and   Recommendations(SBAR). Information from the following report(s) SBAR, Kardex, ED Summary, OR Summary, Procedure Summary, Intake/Output, MAR, Accordion, Recent Results and Med Rec Status was reviewed with the receiving nurse. Opportunity for questions and clarification was provided. Assessment completed upon patients arrival to unit and care assumed.

## 2018-02-28 NOTE — PROGRESS NOTES
Shift assessment complete. Pt resting in bed quietly. Respirations even and unlabored. No s/sx of distress noted. No needs voiced at this time. All safety measures in place.

## 2018-02-28 NOTE — PROGRESS NOTES
Problem: Interdisciplinary Rounds  Goal: Interdisciplinary Rounds  Interdisciplinary team rounds were held 2/28/2018 with the following team members:Care Management, Nursing, Patient Relations, Pharmacy, Physical Therapy and Physician and the patient and spouse. Plan of care discussed. See clinical pathway and/or care plan for interventions and desired outcomes.

## 2018-02-28 NOTE — PROGRESS NOTES
Spoke with horace Treviño. Patient has CKD and need to be cleared by Nephrology before picc placement.

## 2018-02-28 NOTE — PERIOP NOTES
TRANSFER - OUT REPORT:    Verbal report given to Tigist Fontenot RN (name) on Ra Knott  being transferred to room 344 (unit) for routine progression of care       Report consisted of patients Situation, Background, Assessment and   Recommendations(SBAR). Information from the following report(s) SBAR, Kardex, Procedure Summary and Intake/Output was reviewed with the receiving nurse. Lines:   Peripheral IV 02/25/18 Right External jugular (Active)   Site Assessment Clean, dry, & intact 2/28/2018  5:31 PM   Phlebitis Assessment 0 2/28/2018  5:31 PM   Infiltration Assessment 0 2/28/2018  5:31 PM   Dressing Status Clean, dry, & intact 2/28/2018  5:31 PM   Dressing Type Tape;Transparent 2/28/2018  5:31 PM   Hub Color/Line Status Infusing;Patent 2/28/2018  5:31 PM        Opportunity for questions and clarification was provided.       Patient transported with:   O2 @ 2 liters  Registered Nurse

## 2018-02-28 NOTE — BRIEF OP NOTE
BRIEF OPERATIVE NOTE    Date of Procedure: 2/28/2018   Preoperative Diagnosis: chloecystitis with cholelithiasis  Postoperative Diagnosis: chloecystitis with cholelithiasis    Procedure(s):  CHOLECYSTECTOMY LAPAROSCOPIC WITH INTRAOP CHOLANGIOGRAM  Surgeon(s) and Role:     * Karely Knott DO - Primary         Assistant Staff: None      Surgical Staff:  Circ-1: Michael Zendejas RN  Scrub Tech-1: Andres Valdez  Scrub Tech-2: Valerie Lopez  Event Time In   Incision Start 1627   Incision Close 1721     Anesthesia: General   Estimated Blood Loss: Minimal  Specimens:   ID Type Source Tests Collected by Time Destination   1 : Gallbladder Preservative Gallbladder  Karely Knott DO 2/28/2018 1656 Pathology      Findings: Micronodular liver consistent with alcoholic liver disease, GB with thickened wall, 4mm cystic duct, normal IOC, PDS endoloop closure of cystic duct.    Complications: none  Implants: * No implants in log [address] [facility] [address]

## 2018-02-28 NOTE — PROGRESS NOTES
Problem: Mobility Impaired (Adult and Pediatric)  Goal: *Acute Goals and Plan of Care (Insert Text)  Goals revised 2/28  STG:  (1.)Ms. Debra Mckay will move from supine to sit and sit to supine  with INDEPENDENT within three treatment day(s). (2.)Ms. Debra Mckay will transfer from bed to chair and chair to bed with SUPERVISION using the least restrictive device within three treatment day(s). (3.)Ms. Debra Mckay will ambulate with CGA for 25 feet with the least restrictive device within three treatment day(s). LTG:  (1.)Ms. Debra Mckay will ambulate with SUPERVISION for 100 feet with the least restrictive device within 5-7 treatment day(s). 2. Pt. Will increase B LE strength 1/2 grade within 7days  3. Pt. Will perform sit to stand and SPT independently within 7 days  4. Pt. Will climb up/down 4 steps with rails and CGA within 7 days  ________________________________________________________________________________________________    Outcome: Progressing Towards Goal    PHYSICAL THERAPY: Re-evaluation, AM 2/28/2018  INPATIENT: Hospital Day: 4  Payor: [employer] MEDICARE COMPLETE / Plan: [address] / Product Type: Managed Care Medicare /      NAME/AGE/GENDER: Nestor Moore is a 62 y.o. female   PRIMARY DIAGNOSIS: Cholecystitis  chloecystitis with cholelithiasis Cholelithiasis Cholelithiasis  Procedure(s) (LRB):  CHOLECYSTECTOMY LAPAROSCOPIC WITH INTRAOP CHOLANGIOGRAM/ patient in room 344 (N/A)     ICD-10: Treatment Diagnosis:   · Generalized Muscle Weakness (M62.81)  · Other abnormalities of gait and mobility (R26.89)   Precaution/Allergies:  Codeine and Lortab [hydrocodone-acetaminophen]      ASSESSMENT:     Ms. Debra Mckay presents with decreased independence with bed mobility, transfers, gait, and decreased standing balance B LE strength. Sh requires encouragement to participate. She states that she uses a RW at home.  Nursing thinks she has some dementia, but the cognitive issues may be from the prior aneurysm/CVA. Her spouse is present in the room. She would benefit from further PT while here to address her deficits. As we work with her, we will determine if she needs HH PT at OR. This section established at most recent assessment   PROBLEM LIST (Impairments causing functional limitations):  1. Decreased Strength  2. Decreased Transfer Abilities  3. Decreased Ambulation Ability/Technique  4. Decreased Balance  5. Decreased Activity Tolerance  6. Decreased Flexibility/Joint Mobility  7. decreased endurance   INTERVENTIONS PLANNED: (Benefits and precautions of physical therapy have been discussed with the patient.)  1. Balance Exercise  2. Bed Mobility  3. Family Education  4. Gait Training  5. Home Exercise Program (HEP)  6. Therapeutic Activites  7. Therapeutic Exercise/Strengthening  8. Transfer Training  9. endurance activities     TREATMENT PLAN: Frequency/Duration: daily for duration of hospital stay  Rehabilitation Potential For Stated Goals: Good     RECOMMENDED REHABILITATION/EQUIPMENT: (at time of discharge pending progress): Due to the probability of continued deficits (see above) this patient will likely need continued skilled physical therapy after discharge. Equipment:    None at this time              HISTORY:   History of Present Injury/Illness (Reason for Referral):  62 y. o. female who has a PMH of vascular dementia, HTN, dyslipidemia, chronic alcohol intake, remote history of bipolar disorder in no treatment, CKD stage III, multiple mechanical falls that presents from home with complaints of severe abdominal pain x 2 days. Pain is mid abdomen/epigastic and sharp in nature. She denies nausea, emesis or diarrhea. She was recently hospitalized earlier this month for alcohol intoxication and delirium. She denies any recent alcohol use. In the ER, she is tachycardic, febrile and normotensive.  Labs are significant for: WBC 4, lactic acid of 3.9, creatinine of 2.4, bilirubin 4.1 and transaminitis. She had abdominal ultrasound that showed gallstones with gallbladder wall thickening but no biliary ductal dilatation. General surg consulted and already evaluated. GI to be consulted also. Needs MRCP prior to cholecystectomy  Past Medical History/Comorbidities:   Ms. Darlene Osorio  has a past medical history of Aneurysm of common carotid artery ([facility] (2004); CKD (chronic kidney disease) (9/18/2014); Dementia; FSGS (focal segmental glomerulosclerosis); Gout; Hypertension; Osteoarthritis (9/18/2014); and Stroke [facility]) (2004, 2013). Ms. Darlene Osorio  has a past surgical history that includes hx intracranial aneurysm repair (2004); hx refractive surgery; hx orthopaedic (Right, 10/2014); hx ankle fracture tx (Left, 2013); and hx ercp (02/27/2018). Social History/Living Environment:   Home Environment: Private residence  One/Two Story Residence: Two story, live on 1st floor  # of Interior Steps: 15  Interior Rails: Left  Living Alone: No  Support Systems: Spouse/Significant Other/Partner  Patient Expects to be Discharged to[de-identified] Private residence  Current DME Used/Available at Home: Walker, rolling  Tub or Shower Type: Shower  Prior Level of Function/Work/Activity:  Pt requires assistance from  for  ADLs and uses a RW for amb. Number of Personal Factors/Comorbidities that affect the Plan of Care: 1-2: MODERATE COMPLEXITY   EXAMINATION:   Most Recent Physical Functioning:   Gross Assessment:  AROM: Within functional limits (B LEs)  Strength: Generally decreased, functional (B LES 3+/5)  Sensation: Intact (B LEs)               Posture:  Posture Assessment:  Forward head, Rounded shoulders  Balance:  Sitting: Intact  Standing: With support;Pull to stand Bed Mobility:  Supine to Sit: Contact guard assistance  Sit to Supine: Contact guard assistance  Scooting: Contact guard assistance  Wheelchair Mobility:     Transfers:  Sit to Stand: Contact guard assistance  Stand to Sit: Contact guard assistance  Gait: Speed/Radha: Pace decreased (<100 feet/min)  Gait Abnormalities: Decreased step clearance; Path deviations  Distance (ft): 5 Feet (ft) (x2)  Assistive Device: Walker, rolling  Ambulation - Level of Assistance: Minimal assistance  Interventions: Safety awareness training;Verbal cues; Visual/Demos; Tactile cues  Duration: 10 Minutes      Body Structures Involved:  1. Joints  2. Muscles Body Functions Affected:  1. Neuromusculoskeletal  2. Movement Related Activities and Participation Affected:  1. Mobility  2. Self Care   Number of elements that affect the Plan of Care: 4+: HIGH COMPLEXITY   CLINICAL PRESENTATION:   Presentation: Evolving clinical presentation with changing clinical characteristics: MODERATE COMPLEXITY   CLINICAL DECISION MAKIN [facility] Inpatient Short Form  How much difficulty does the patient currently have. .. Unable A Lot A Little None   1. Turning over in bed (including adjusting bedclothes, sheets and blankets)? [] 1   [] 2   [] 3   [x] 4   2. Sitting down on and standing up from a chair with arms ( e.g., wheelchair, bedside commode, etc.)   [] 1   [] 2   [x] 3   [] 4   3. Moving from lying on back to sitting on the side of the bed? [] 1   [] 2   [x] 3   [] 4   How much help from another person does the patient currently need. .. Total A Lot A Little None   4. Moving to and from a bed to a chair (including a wheelchair)? [] 1   [] 2   [x] 3   [] 4   5. Need to walk in hospital room? [] 1   [] 2   [x] 3   [] 4   6. Climbing 3-5 steps with a railing? [] 1   [x] 2   [] 3   [] 4   © 2007, Trustees of [address] [place] [address] [address] [id number], under license to [employer]. All rights reserved      Score:  Initial: 17 Most Recent: 18 (Date: 18 )    Interpretation of Tool:  Represents activities that are increasingly more difficult (i.e. Bed mobility, Transfers, Gait). Score 24 23 22-20 19-15 14-10 9-7 6     Modifier CH CI CJ CK CL CM CN      ?  Mobility - Walking and Moving Around:     - CURRENT STATUS: CK - 40%-59% impaired, limited or restricted    - GOAL STATUS: CJ - 20%-39% impaired, limited or restricted    - D/C STATUS:  ---------------To be determined---------------  Payor: [employer] MEDICARE COMPLETE / Plan: [address] / Product Type: Managed Care Medicare /      Medical Necessity:     · Skilled intervention continues to be required due to decreased mobility ability. Reason for Services/Other Comments:  · Patient continues to require skilled intervention due to medical complications and patient unable to attend/participate in therapy as expected. Use of outcome tool(s) and clinical judgement create a POC that gives a: Questionable prediction of patient's progress: MODERATE COMPLEXITY            TREATMENT:   (In addition to Assessment/Re-Assessment sessions the following treatments were rendered)   Pre-treatment Symptoms/Complaints:  Needs encouragement to get up. Needs to have a BM  Pain: Initial:   Pain Intensity 1: 0  Post Session:  No compliants     Gait Training (10 Minutes):  Gait training to improve and/or restore physical functioning as related to mobility, strength, balance and endurance. Ambulated 5 Feet (ft) (x2) with Minimal assistance using a Walker, rolling and moderate Safety awareness training;Verbal cues; Visual/Demos; Tactile cues related to their posture and safe use of RW to promote proper body posture and safety. Braces/Orthotics/Lines/Etc:   · IV  · O2 Device: Room air  Treatment/Session Assessment:    · Response to Treatment: Needs encouragement to get out bed.  Anxious  · Interdisciplinary Collaboration:   o Physical Therapist  o Occupational Therapist  o Registered Nurse  · After treatment position/precautions:   o Supine in bed  o Bed/Chair-wheels locked  o Bed in low position  o Call light within reach  o Family at bedside  o Nurse at bedside  o Side rails x 3   · Compliance with Program/Exercises: compliant most of the time. · Recommendations/Intent for next treatment session: \"Next visit will focus on advancements to more challenging activities and reduction in assistance provided\" Will focus on bed mobility, transfers, gait, strengthening and endurance activities.   Total Treatment Duration:  PT Patient Time In/Time Out  Time In: 1015  Time Out: [address], [place]

## 2018-02-28 NOTE — PROGRESS NOTES
Problem: Self Care Deficits Care Plan (Adult)  Goal: *Acute Goals and Plan of Care (Insert Text)  1. Patient will perform grooming with supervision. 2. Patient will perform upper body dressing with supervision. 3. Patient will perform lower body dressing with minimal assist.  4. Patient will perform bathing with minimal assist.  5. Patient will perform toileting and toilet transfer with supervision. 6. Patient will perform ADL functional mobility and tranfers in room with supervision. 7. Patient/family to demonstrate knowledge of home safety and DME recommendations. Goals to be achieved in 7 days. OCCUPATIONAL THERAPY: Daily Note, Treatment Day: 1st and AM 2/28/2018  INPATIENT: Hospital Day: 4  Payor: Veena Quezada / Plan: [address] / Product Type: [employer] Care Medicare /      NAME/AGE/GENDER: Karen Munoz is a 62 y.o. female   PRIMARY DIAGNOSIS:  Cholecystitis  chloecystitis with cholelithiasis Cholelithiasis Cholelithiasis  Procedure(s) (LRB):  CHOLECYSTECTOMY LAPAROSCOPIC WITH INTRAOP CHOLANGIOGRAM/ patient in room 344 (N/A)     ICD-10: Treatment Diagnosis:    · Generalized Muscle Weakness (M62.81)  · Other lack of cordination (R27.8)   Precautions/Allergies:     Codeine and Lortab [hydrocodone-acetaminophen]      ASSESSMENT:     Ms. Manolo Acevedo presents with above diagnosis. Pt completed toileting and functional transfers today. See chart below for assist. Encouraged pt to complete perineal hygiene herself. Initially pt refused then assisted. Pt declined getting in chair or ambulating in hallways. Continue OT. This section established at most recent assessment   PROBLEM LIST (Impairments causing functional limitations):  1. Decreased Strength  2. Decreased ADL/Functional Activities  3. Decreased Transfer Abilities  4. Decreased Ambulation Ability/Technique  5.  Decreased Balance   INTERVENTIONS PLANNED: (Benefits and precautions of occupational therapy have been discussed with the patient.)  1. Activities of daily living training  2. Adaptive equipment training  3. Balance training  4. Clothing management  5. Cognitive training  6. Hygiene training  7. Therapeutic activity  8. Therapeutic exercise       TREATMENT PLAN: Frequency/Duration: Follow patient 4x/wk to address above goals. Rehabilitation Potential For Stated Goals: Excellent     RECOMMENDED REHABILITATION/EQUIPMENT: (at time of discharge pending progress): Due to the probability of continued deficits (see above) this patient will not likely need continued skilled occupational therapy after discharge. Equipment:    TBD              OCCUPATIONAL PROFILE AND HISTORY:   History of Present Injury/Illness (Reason for Referral):  Pt admitted with epidastric pain. Past Medical History/Comorbidities:   Ms. Suzie Orta  has a past medical history of Aneurysm of common carotid artery ([facility] [address] (2004); CKD (chronic kidney disease) (9/18/2014); Dementia; FSGS (focal segmental glomerulosclerosis); Gout; Hypertension; Osteoarthritis (9/18/2014); and Stroke [facility]) (2004, 2013). Ms. Szuie Orta  has a past surgical history that includes hx intracranial aneurysm repair (2004); hx refractive surgery; hx orthopaedic (Right, 10/2014); hx ankle fracture tx (Left, 2013); and hx ercp (02/27/2018).   Social History/Living Environment:   Home Environment: Private residence  One/Two Story Residence: Two story, live on 1st floor  # of Interior Steps: 15  Interior Rails: Left  Living Alone: No  Support Systems: Spouse/Significant Other/Partner  Patient Expects to be Discharged to[de-identified] Private residence  Current DME Used/Available at Home: Walker, rolling  Tub or Shower Type: Shower  Prior Level of Function/Work/Activity:  Supervision to minimal assist for self care and funtional mobility     Number of Personal Factors/Comorbidities that affect the Plan of Care: Brief history (0):  LOW COMPLEXITY   ASSESSMENT OF OCCUPATIONAL PERFORMANCE[de-identified]   Activities of Daily Living:         Currently requiring moderate assist  Basic ADLs (From Assessment) Complex ADLs (From Assessment)   Basic ADL  Feeding: Supervision  Oral Facial Hygiene/Grooming: Supervision  Bathing: Moderate assistance  Upper Body Dressing: Minimum assistance  Lower Body Dressing: Moderate assistance  Toileting: Moderate assistance     Grooming/Bathing/Dressing Activities of Daily Living     Cognitive Retraining  Safety/Judgement: Fall prevention           Toileting  Toileting Assistance: Supervision/set up  Bowel Hygiene: Maximum assistance; Total assistance (dependent)     Functional Transfers  Toilet Transfer : Contact guard assistance (BSC)     Bed/Mat Mobility  Supine to Sit: Contact guard assistance  Sit to Supine: Contact guard assistance  Sit to Stand: Contact guard assistance  Scooting: Contact guard assistance       Most Recent Physical Functioning:   Gross Assessment:                  Posture:  Posture (WDL): Within defined limits  Posture Assessment: Forward head, Rounded shoulders  Balance:  Sitting: Intact  Standing: Pull to stand; With support Bed Mobility:  Supine to Sit: Contact guard assistance  Sit to Supine: Contact guard assistance  Scooting: Contact guard assistance  Wheelchair Mobility:     Transfers:  Sit to Stand: Contact guard assistance  Stand to Sit: Contact guard assistance     ROM:          BUE ROM WFL           Patient Vitals for the past 6 hrs:   BP BP Patient Position SpO2 Pulse   02/28/18 0724 (!) 135/92 At rest;Head of bed elevated (Comment degrees) 97 % 81       Mental Status  Neurologic State: Alert  Orientation Level: Oriented to situation, Oriented to person  Cognition: Decreased attention/concentration, Impulsive  Perception: Appears intact  Perseveration: No perseveration noted  Safety/Judgement: Fall prevention                          Physical Skills Involved:  1. Range of Motion  2. Balance  3. Strength  4.  Activity Tolerance Cognitive Skills Affected (resulting in the inability to perform in a timely and safe manner):  1. None Psychosocial Skills Affected:  1. None   Number of elements that affect the Plan of Care: 1-3:  LOW COMPLEXITY   CLINICAL DECISION MAKIN[address] [place] [address] AM-PAC 6 Clicks   Daily Activity Inpatient Short Form  How much help from another person does the patient currently need. .. Total A Lot A Little None   1. Putting on and taking off regular lower body clothing? [] 1   [x] 2   [] 3   [] 4   2. Bathing (including washing, rinsing, drying)? [] 1   [x] 2   [] 3   [] 4   3. Toileting, which includes using toilet, bedpan or urinal?   [] 1   [x] 2   [] 3   [] 4   4. Putting on and taking off regular upper body clothing? [] 1   [] 2   [x] 3   [] 4   5. Taking care of personal grooming such as brushing teeth? [] 1   [] 2   [x] 3   [] 4   6. Eating meals? [] 1   [] 2   [x] 3   [] 4   © , Trustees of [address] [place] [id number], under license to [employer]. All rights reserved      Score:  Initial: 15 Most Recent: X (Date: -- )    Interpretation of Tool:  Represents activities that are increasingly more difficult (i.e. Bed mobility, Transfers, Gait). Score 24 23 22-20 19-15 14-10 9-7 6     Modifier CH CI CJ CK CL CM CN      ? Self Care:     - CURRENT STATUS: CK - 40%-59% impaired, limited or restricted    - GOAL STATUS: CJ - 20%-39% impaired, limited or restricted    - D/C STATUS:  ---------------To be determined---------------  Payor: [address] [place] [address] / Plan: [facility] MEDICARE COMPLETE / Product Type: Managed Care Medicare /      Medical Necessity:     · Patient is expected to demonstrate progress in balance, coordination and functional technique to decrease assistance required with self care and functional mobility. Reason for Services/Other Comments:  · Patient continues to require skilled intervention due to decreased self care and functional mobility.    Use of outcome tool(s) and clinical judgement create a POC that gives a: LOW COMPLEXITY         TREATMENT:   (In addition to Assessment/Re-Assessment sessions the following treatments were rendered)     Pre-treatment Symptoms/Complaints:  none  Pain: Initial:   Pain Intensity 1: 0  Post Session:  0     Self Care: (10): Procedure(s) (per grid) utilized to improve and/or restore self-care/home management as related to toileting and grooming. Required minimal verbal and   cueing to facilitate activities of daily living skills and compensatory activities. Braces/Orthotics/Lines/Etc:   · IV  · O2 Device: Room air  Treatment/Session Assessment:    · Response to Treatment:  Tolerated fairly  · Interdisciplinary Collaboration:   o Physical Therapist  o Occupational Therapist  o Registered Nurse  · After treatment position/precautions:   o Supine in bed  o Bed/Chair-wheels locked  o Bed in low position  o Caregiver at bedside  o Call light within reach  o RN notified  o Side rails x 3   · Compliance with Program/Exercises: compliant most of the time. · Recommendations/Intent for next treatment session: \"Next visit will focus on advancements to more challenging activities\".   Total Treatment Duration:  OT Patient Time In/Time Out  Time In: 1030  Time Out: 999 [facility]

## 2018-02-28 NOTE — PROGRESS NOTES
Pt in bed resting, no noted distress. Assessment complete. Pt denies pain. IV infusing without difficulties. SCDs on. RR even, unlabored, no noted distress, lungs clear. Bed alarm turned on for safety. Bed L/L, call bell is within reach and side rails are up x 2.

## 2018-02-28 NOTE — PROGRESS NOTES
TRANSFER - IN REPORT:    Verbal report received from [facility], Rn, (name) on Shalom Portillo  being received from PACU (unit) for routine progression of care      Report consisted of patients Situation, Background, Assessment and   Recommendations(SBAR). Information from the following report(s) SBAR and Kardex was reviewed with the receiving nurse. Opportunity for questions and clarification was provided. Assessment completed upon patients arrival to unit and care assumed.

## 2018-02-28 NOTE — H&P (VIEW-ONLY)
[address]  [address], [address] [place], [place] [address]n [facility]       History and Physical/Surgical Consult   Manolo Buchanan date: 2018    MRN: [id number]     : 1960     Age: 62 y.o.          2018 11:42 AM    Subjective/HPI:   This patient is a 62 y.o. seen and evaluated at the request of Hospitalist for Cholelithiasis. This is a 78-year-old female with history of vascular dementia chronic renal disease and alcohol abuse recently admitted to the hospital on  for malnutrition and dehydration. She was discharged on . Her previous admit the patient admits to not eating or drinking while at home. She was discharged home after rehydration with no other issues. Today the patient presents to the emergency department complaining of abdominal pain described as diffuse in location. Her pain was rated 10 out of 10. The onset was after eating dinner last night. She states she has never had this abdominal pain before. Nothing seemed to make the pain better until she presented to the emergency department and pain medication was administered. She denies any associated fevers nausea or vomiting. She denies drinking any alcohol since she was discharged from the hospital 1 week ago. The patient does admit to drinking an excessive amount of alcohol. She states that she has been drinking 1 pint of vodka every day for the last 10 years. Prior to that she states that she drank 6-12 Hdez lights every day for approximately 1 year. She states that she did not began drinking alcohol until approximately 11 years ago and did not drink prior to that. Today she appears in no acute distress. She does not have any family support in the room at this time. She states that her  went home. Gallbladder ultrasound revealed gallstones with gallbladder wall thickening. She has no previous history of liver disease or cirrhosis.   Her liver enzymes on today's evaluation are elevated and her total bilirubin is 4. Limited abdominal ultrasound     INDICATION: Abdominal pain, fever, jaundice     FINDINGS: Absent sonographic Alberts's sign. Gallstones. Gallbladder wall  thickening measuring 9 mm. No pericholecystic fluid. The extrahepatic bile duct  is normal in caliber measuring 4 mm. Liver measures 17.6 cm. Heterogeneous liver  echogenicity most compatible with steatosis. The visualized portion of the  pancreas is normal. Right kidney measures 8.2 cm. It is echodense in relation to  the liver. This can be seen with chronic medical renal disease. The imaged  portion of the abdominal aorta is normal in caliber. The IVC is patent. Portal  vein is patent with normal flow direction.     IMPRESSION  IMPRESSION: Gallstones with gallbladder wall thickening. Absent sonographic  Alberts's sign. No biliary ductal dilatation. 2. Likely liver steatosis. 3. Echogenic right renal cortex. Review of Systems  A comprehensive review of systems was negative except for that written in the HPI.   Past Medical History:   Diagnosis Date    Aneurysm of common carotid artery ([address] 2004    left side s/p coil     CKD (chronic kidney disease) 9/18/2014    Dementia     FSGS (focal segmental glomerulosclerosis)     in remission at present    Gout     Hypertension     managed with medication     Osteoarthritis 9/18/2014    Stroke ([address] 2004, 2013    slight weakness on right side, slight effect to speech      Past Surgical History:   Procedure Laterality Date    HX ANKLE FRACTURE [place] Left 2013    has hardware in   [place]  2004    left carotid     HX ORTHOPAEDIC Right 10/2014    hip replacement    HX REFRACTIVE SURGERY      bilateral - Lasik      Allergies   Allergen Reactions    Codeine Nausea and Vomiting    Lortab [Hydrocodone-Acetaminophen] Nausea and Vomiting      Social History   Substance Use Topics    Smoking status: Former Smoker Packs/day: 0.25     Quit date: 1/1/1979    Smokeless tobacco: Never Used    Alcohol use 1.5 oz/week     3 Shots of liquor per week      Social History     Social History Narrative     Family History   Problem Relation Age of Onset    Cancer Father 80     unknown    Stroke Sister 48      Prior to Admission Medications   Prescriptions Last Dose Informant Patient Reported? Taking?   acetaminophen (TYLENOL) 325 mg tablet   No No   Sig: Take 2 Tabs by mouth every six (6) hours as needed. amLODIPine (NORVASC) 5 mg tablet   No No   Sig: Take 1 Tab by mouth daily. atorvastatin (LIPITOR) 40 mg tablet   No No   Sig: Take 1 Tab by mouth daily. folic acid (FOLVITE) 1 mg tablet   No No   Sig: Take 1 Tab by mouth daily. mirtazapine (REMERON) 7.5 mg tablet   No No   Sig: Take 1 Tab by mouth nightly. pantoprazole (PROTONIX) 40 mg tablet   No No   Sig: Take 1 Tab by mouth Daily (before breakfast). thiamine (B-1) 100 mg tablet   No No   Sig: Take 1 Tab by mouth daily. Facility-Administered Medications: None     No current facility-administered medications for this encounter. Current Outpatient Prescriptions   Medication Sig    acetaminophen (TYLENOL) 325 mg tablet Take 2 Tabs by mouth every six (6) hours as needed.  amLODIPine (NORVASC) 5 mg tablet Take 1 Tab by mouth daily.  atorvastatin (LIPITOR) 40 mg tablet Take 1 Tab by mouth daily.  folic acid (FOLVITE) 1 mg tablet Take 1 Tab by mouth daily.  mirtazapine (REMERON) 7.5 mg tablet Take 1 Tab by mouth nightly.  pantoprazole (PROTONIX) 40 mg tablet Take 1 Tab by mouth Daily (before breakfast).  thiamine (B-1) 100 mg tablet Take 1 Tab by mouth daily.      Objective:     Vitals:    02/25/18 0616 02/25/18 0630 02/25/18 0909 02/25/18 0911   BP:  141/73  119/75   Pulse:       Resp:    18   Temp: (!) 101.2 °F (38.4 °C)   (!) 100.7 °F (38.2 °C)   SpO2: 94% 94% 93% 97%   Weight:       Height:               Physical Exam:   Gen- the patient is well developed and in no acute distress  HEENT- PERRL, EOMI, no scleral icterus       nose without alar flaring or epistaxis                  oral muscosa moist without cyanosis  Neck- no JVD or retractions  Lungs- resp even/unlab   Heart- RRR   Abd- Soft normal bowel sounds in all 4 quadrants. There is minimal guarding. There is no Alberts sign. Ext- warm without cyanosis. There is no lower leg edema. Skin- no jaundice or rashes  Neuro- alert and oriented x 3. No gross sensorimotor deficits are present. Data Review   Recent Labs      02/25/18   0517   WBC  4.0*   HGB  13.2   HCT  39.7   PLT  190     Recent Labs      02/25/18   0517   NA  140   K  5.0   CL  102   CO2  22   GLU  229*   BUN  26*   CREA  2.49*       Assessment:     Hospital Problems  Date Reviewed: 2/13/2018    None        Plan:   Patient has cholelithiasis without ductal dilatation. Liver enzymes and total bilirubin are elevated. Although this could be related to choledocholithiasis, the patient's alcohol abuse history has to be considered and that this could be early onset cirrhosis. I will order an INR and some serial liver enzymes and follow along with the patient to assess her risk for surgery. If her liver enzymes trend to normal and her INR is acceptable I will recommend a laparoscopic cholecystectomy prior to discharge. I will consult gastroenterology for possible ERCP if we think this could be due to choledocholithiasis.   --    Pj Acevedo, DO

## 2018-02-28 NOTE — PROGRESS NOTES
TRANSFER - OUT REPORT:    Verbal report given to [facility], Rn, (name) on Josselyn Homes  being transferred to Preop(unit) for ordered procedure       Report consisted of patients Situation, Background, Assessment and   Recommendations(SBAR). Information from the following report(s) SBAR and Kardex was reviewed with the receiving nurse. Lines:   Peripheral IV 02/25/18 Right External jugular (Active)   Site Assessment Clean, dry, & intact 2/28/2018  7:36 AM   Phlebitis Assessment 0 2/28/2018  7:36 AM   Infiltration Assessment 0 2/28/2018  7:36 AM   Dressing Status Clean, dry, & intact 2/28/2018  7:36 AM   Dressing Type Transparent 2/28/2018  7:36 AM   Hub Color/Line Status Infusing 2/28/2018  7:36 AM        Opportunity for questions and clarification was provided.       Patient transported with:

## 2018-02-28 NOTE — INTERVAL H&P NOTE
H&P Update:  Esteban Banerjee was seen and examined. History and physical has been reviewed. Significant clinical changes have occurred as noted:  S/p ERCP. Lap kerri planned.     Signed By: Sabrina Acevedo DO     February 28, 2018 3:21 PM

## 2018-03-01 LAB
ALBUMIN SERPL-MCNC: 1.8 G/DL (ref 3.5–5)
ANION GAP SERPL CALC-SCNC: 9 MMOL/L (ref 7–16)
BASOPHILS # BLD: 0 K/UL (ref 0–0.2)
BASOPHILS NFR BLD: 0 % (ref 0–2)
BUN SERPL-MCNC: 22 MG/DL (ref 6–23)
CALCIUM SERPL-MCNC: 8.5 MG/DL (ref 8.3–10.4)
CHLORIDE SERPL-SCNC: 109 MMOL/L (ref 98–107)
CO2 SERPL-SCNC: 25 MMOL/L (ref 21–32)
CREAT SERPL-MCNC: 2.33 MG/DL (ref 0.6–1)
DIFFERENTIAL METHOD BLD: ABNORMAL
EOSINOPHIL # BLD: 0 K/UL (ref 0–0.8)
EOSINOPHIL NFR BLD: 0 % (ref 0.5–7.8)
ERYTHROCYTE [DISTWIDTH] IN BLOOD BY AUTOMATED COUNT: 15.3 % (ref 11.9–14.6)
GLUCOSE SERPL-MCNC: 131 MG/DL (ref 65–100)
HCT VFR BLD AUTO: 32.1 % (ref 35.8–46.3)
HGB BLD-MCNC: 10.2 G/DL (ref 11.7–15.4)
IMM GRANULOCYTES # BLD: 0.1 K/UL (ref 0–0.5)
IMM GRANULOCYTES NFR BLD AUTO: 1 % (ref 0–5)
LYMPHOCYTES # BLD: 1.1 K/UL (ref 0.5–4.6)
LYMPHOCYTES NFR BLD: 13 % (ref 13–44)
MCH RBC QN AUTO: 29.7 PG (ref 26.1–32.9)
MCHC RBC AUTO-ENTMCNC: 31.8 G/DL (ref 31.4–35)
MCV RBC AUTO: 93.6 FL (ref 79.6–97.8)
MONOCYTES # BLD: 0.6 K/UL (ref 0.1–1.3)
MONOCYTES NFR BLD: 7 % (ref 4–12)
NEUTS SEG # BLD: 6.9 K/UL (ref 1.7–8.2)
NEUTS SEG NFR BLD: 79 % (ref 43–78)
PHOSPHATE SERPL-MCNC: 4.1 MG/DL (ref 2.5–4.5)
PLATELET # BLD AUTO: 129 K/UL (ref 150–450)
PMV BLD AUTO: 12 FL (ref 10.8–14.1)
POTASSIUM SERPL-SCNC: 4.4 MMOL/L (ref 3.5–5.1)
RBC # BLD AUTO: 3.43 M/UL (ref 4.05–5.25)
SODIUM SERPL-SCNC: 143 MMOL/L (ref 136–145)
WBC # BLD AUTO: 8.7 K/UL (ref 4.3–11.1)

## 2018-03-01 PROCEDURE — 85025 COMPLETE CBC W/AUTO DIFF WBC: CPT | Performed by: INTERNAL MEDICINE

## 2018-03-01 PROCEDURE — 74011250636 HC RX REV CODE- 250/636: Performed by: INTERNAL MEDICINE

## 2018-03-01 PROCEDURE — 94760 N-INVAS EAR/PLS OXIMETRY 1: CPT

## 2018-03-01 PROCEDURE — 74011000258 HC RX REV CODE- 258: Performed by: HOSPITALIST

## 2018-03-01 PROCEDURE — 97530 THERAPEUTIC ACTIVITIES: CPT

## 2018-03-01 PROCEDURE — 74011250637 HC RX REV CODE- 250/637: Performed by: HOSPITALIST

## 2018-03-01 PROCEDURE — 74011000258 HC RX REV CODE- 258: Performed by: INTERNAL MEDICINE

## 2018-03-01 PROCEDURE — 65270000029 HC RM PRIVATE

## 2018-03-01 PROCEDURE — 97116 GAIT TRAINING THERAPY: CPT

## 2018-03-01 PROCEDURE — 74011250636 HC RX REV CODE- 250/636: Performed by: HOSPITALIST

## 2018-03-01 PROCEDURE — 80069 RENAL FUNCTION PANEL: CPT | Performed by: INTERNAL MEDICINE

## 2018-03-01 RX ORDER — SODIUM CHLORIDE 9 MG/ML
75 INJECTION, SOLUTION INTRAVENOUS CONTINUOUS
Status: DISCONTINUED | OUTPATIENT
Start: 2018-03-01 | End: 2018-03-03 | Stop reason: HOSPADM

## 2018-03-01 RX ADMIN — PIPERACILLIN SODIUM,TAZOBACTAM SODIUM 3.38 G: 3; .375 INJECTION, POWDER, FOR SOLUTION INTRAVENOUS at 00:23

## 2018-03-01 RX ADMIN — SODIUM CHLORIDE, SODIUM LACTATE, POTASSIUM CHLORIDE, AND CALCIUM CHLORIDE 100 ML/HR: 600; 310; 30; 20 INJECTION, SOLUTION INTRAVENOUS at 00:33

## 2018-03-01 RX ADMIN — CEFTRIAXONE SODIUM 2 G: 2 INJECTION, POWDER, FOR SOLUTION INTRAMUSCULAR; INTRAVENOUS at 12:55

## 2018-03-01 RX ADMIN — OXYCODONE HYDROCHLORIDE AND ACETAMINOPHEN 1 TABLET: 5; 325 TABLET ORAL at 17:59

## 2018-03-01 RX ADMIN — PANTOPRAZOLE SODIUM 40 MG: 40 TABLET, DELAYED RELEASE ORAL at 09:14

## 2018-03-01 RX ADMIN — QUETIAPINE FUMARATE 50 MG: 25 TABLET ORAL at 22:54

## 2018-03-01 RX ADMIN — SODIUM CHLORIDE 75 ML/HR: 900 INJECTION, SOLUTION INTRAVENOUS at 12:54

## 2018-03-01 RX ADMIN — LORAZEPAM 1 MG: 1 TABLET ORAL at 12:57

## 2018-03-01 RX ADMIN — LORAZEPAM 1 MG: 1 TABLET ORAL at 20:09

## 2018-03-01 RX ADMIN — MIRTAZAPINE 30 MG: 15 TABLET, FILM COATED ORAL at 22:54

## 2018-03-01 RX ADMIN — RDII 250 MG CAPSULE 500 MG: at 17:59

## 2018-03-01 RX ADMIN — PIPERACILLIN SODIUM,TAZOBACTAM SODIUM 3.38 G: 3; .375 INJECTION, POWDER, FOR SOLUTION INTRAVENOUS at 09:14

## 2018-03-01 RX ADMIN — ESCITALOPRAM OXALATE 10 MG: 10 TABLET ORAL at 09:14

## 2018-03-01 RX ADMIN — RDII 250 MG CAPSULE 500 MG: at 09:14

## 2018-03-01 RX ADMIN — OXYCODONE HYDROCHLORIDE AND ACETAMINOPHEN 1 TABLET: 5; 325 TABLET ORAL at 09:14

## 2018-03-01 NOTE — PROGRESS NOTES
02/28/18 2000   Oxygen Therapy   O2 Sat (%) 99 %   Pulse via Oximetry 88 beats per minute   O2 Device Nasal cannula   O2 Flow Rate (L/min) 2 l/min  (decreased to 1. ap added.  no sob noted)

## 2018-03-01 NOTE — PROGRESS NOTES
Problem: Self Care Deficits Care Plan (Adult)  Goal: *Acute Goals and Plan of Care (Insert Text)  1. Patient will perform grooming with supervision. 2. Patient will perform upper body dressing with supervision. 3. Patient will perform lower body dressing with minimal assist.  4. Patient will perform bathing with minimal assist.  5. Patient will perform toileting and toilet transfer with supervision. 6. Patient will perform ADL functional mobility and tranfers in room with supervision. 7. Patient/family to demonstrate knowledge of home safety and DME recommendations. Goals to be achieved in 7 days. OCCUPATIONAL THERAPY: Daily Note, Treatment Day: 2nd and AM 3/1/2018  INPATIENT: Hospital Day: 5  Payor: [employer] / Plan: [employer]ICH. [address] / Product Type: [employer] Medicare /      NAME/AGE/GENDER: Malinda Patino is a 62 y.o. female   PRIMARY DIAGNOSIS:  Cholecystitis  chloecystitis with cholelithiasis Cholelithiasis Cholelithiasis  Procedure(s) (LRB):  CHOLECYSTECTOMY LAPAROSCOPIC WITH INTRAOP CHOLANGIOGRAM (N/A)  1 Day Post-Op  ICD-10: Treatment Diagnosis:    · Generalized Muscle Weakness (M62.81)  · Other lack of cordination (R27.8)   Precautions/Allergies:     Codeine and Lortab [hydrocodone-acetaminophen]      ASSESSMENT:     Ms. Angelique Wong presents with above diagnosis. Pt completed bed mobility this am. See chart below for assist.   Pt needs encouragement to participate. Continue OT. This section established at most recent assessment   PROBLEM LIST (Impairments causing functional limitations):  1. Decreased Strength  2. Decreased ADL/Functional Activities  3. Decreased Transfer Abilities  4. Decreased Ambulation Ability/Technique  5. Decreased Balance   INTERVENTIONS PLANNED: (Benefits and precautions of occupational therapy have been discussed with the patient.)  1. Activities of daily living training  2. Adaptive equipment training  3. Balance training  4.  Clothing management  5. Cognitive training  6. Hygiene training  7. Therapeutic activity  8. Therapeutic exercise       TREATMENT PLAN: Frequency/Duration: Follow patient 4x/wk to address above goals. Rehabilitation Potential For Stated Goals: Excellent     RECOMMENDED REHABILITATION/EQUIPMENT: (at time of discharge pending progress): Due to the probability of continued deficits (see above) this patient will not likely need continued skilled occupational therapy after discharge. Equipment:    TBD              OCCUPATIONAL PROFILE AND HISTORY:   History of Present Injury/Illness (Reason for Referral):  Pt admitted with epidastric pain. Past Medical History/Comorbidities:   Ms. Walt Brannon  has a past medical history of Aneurysm of common carotid artery ([facility] [address] (2004); CKD (chronic kidney disease) (9/18/2014); Dementia; FSGS (focal segmental glomerulosclerosis); Gout; Hypertension; Osteoarthritis (9/18/2014); and Stroke [facility]) (2004, 2013). Ms. Walt Brannon  has a past surgical history that includes hx intracranial aneurysm repair (2004); hx refractive surgery; hx orthopaedic (Right, 10/2014); hx ankle fracture tx (Left, 2013); and hx ercp (02/27/2018).   Social History/Living Environment:   Home Environment: Private residence  One/Two Story Residence: Two story, live on 1st floor  # of Interior Steps: 15  Interior Rails: Left  Living Alone: No  Support Systems: Spouse/Significant Other/Partner  Patient Expects to be Discharged to[de-identified] Private residence  Current DME Used/Available at Home: Walker, rolling  Tub or Shower Type: Shower  Prior Level of Function/Work/Activity:  Supervision to minimal assist for self care and funtional mobility     Number of Personal Factors/Comorbidities that affect the Plan of Care: Brief history (0):  LOW COMPLEXITY   ASSESSMENT OF OCCUPATIONAL PERFORMANCE[de-identified]   Activities of Daily Living:         Currently requiring moderate assist  Basic ADLs (From Assessment) Complex ADLs (From Assessment)   Basic ADL  Feeding: Supervision  Oral Facial Hygiene/Grooming: Supervision  Bathing: Moderate assistance  Upper Body Dressing: Minimum assistance  Lower Body Dressing: Moderate assistance  Toileting: Moderate assistance     Grooming/Bathing/Dressing Activities of Daily Living     Cognitive Retraining  Safety/Judgement: Fall prevention                       Bed/Mat Mobility  Supine to Sit: Contact guard assistance; Additional time  Sit to Supine: Contact guard assistance; Additional time       Most Recent Physical Functioning:   Gross Assessment:                  Posture:  Posture (WDL): Within defined limits  Posture Assessment: Forward head, Rounded shoulders  Balance:  Sitting: Intact Bed Mobility:  Supine to Sit: Contact guard assistance; Additional time  Sit to Supine: Contact guard assistance; Additional time  Wheelchair Mobility:     Transfers:        ROM:          BUE ROM WFL           Patient Vitals for the past 6 hrs:   BP BP Patient Position SpO2 O2 Flow Rate (L/min) Pulse   18 0714 142/87 At rest 98 % - 68   18 0916 - - 98 % 0 l/min -       Mental Status  Neurologic State: Alert  Orientation Level: Oriented to person  Cognition: Follows commands  Perception: Appears intact  Perseveration: No perseveration noted  Safety/Judgement: Fall prevention                          Physical Skills Involved:  1. Range of Motion  2. Balance  3. Strength  4. Activity Tolerance Cognitive Skills Affected (resulting in the inability to perform in a timely and safe manner):  1. None Psychosocial Skills Affected:  1. None   Number of elements that affect the Plan of Care: 1-3:  LOW COMPLEXITY   CLINICAL DECISION MAKIN [facility] [address] [address] AM-PAC 6 Clicks   Daily Activity Inpatient Short Form  How much help from another person does the patient currently need. .. Total A Lot A Little None   1. Putting on and taking off regular lower body clothing? [] 1   [x] 2   [] 3   [] 4   2.   Bathing (including washing, rinsing, drying)? [] 1   [x] 2   [] 3   [] 4   3. Toileting, which includes using toilet, bedpan or urinal?   [] 1   [x] 2   [] 3   [] 4   4. Putting on and taking off regular upper body clothing? [] 1   [] 2   [x] 3   [] 4   5. Taking care of personal grooming such as brushing teeth? [] 1   [] 2   [x] 3   [] 4   6. Eating meals? [] 1   [] 2   [x] 3   [] 4   © 2007, Trustees of [address] [place] [address] [address] [address], under license to [employer]. All rights reserved      Score:  Initial: 15 Most Recent: X (Date: -- )    Interpretation of Tool:  Represents activities that are increasingly more difficult (i.e. Bed mobility, Transfers, Gait). Score 24 23 22-20 19-15 14-10 9-7 6     Modifier CH CI CJ CK CL CM CN      ? Self Care:     - CURRENT STATUS: CK - 40%-59% impaired, limited or restricted    - GOAL STATUS: CJ - 20%-39% impaired, limited or restricted    - D/C STATUS:  ---------------To be determined---------------  Payor: Sonia Turciso / Plan: [facility] MEDICARE COMPLETE / Product Type: Managed Care Medicare /      Medical Necessity:     · Patient is expected to demonstrate progress in balance, coordination and functional technique to decrease assistance required with self care and functional mobility. Reason for Services/Other Comments:  · Patient continues to require skilled intervention due to decreased self care and functional mobility. Use of outcome tool(s) and clinical judgement create a POC that gives a: LOW COMPLEXITY         TREATMENT:   (In addition to Assessment/Re-Assessment sessions the following treatments were rendered)     Pre-treatment Symptoms/Complaints:  none  Pain: Initial:   Pain Intensity 1: 0  Post Session:  0     Therapeutic Activity: (    10): Therapeutic activities including bed mobility to improve mobility and strength. Required minimal   to strength and positioning.      Braces/Orthotics/Lines/Etc:   · IV  · O2 Device: Room air  Treatment/Session Assessment: · Response to Treatment:  Tolerated fairly  · Interdisciplinary Collaboration:   o Physical Therapist  o Occupational Therapist  o Registered Nurse  · After treatment position/precautions:   o Supine in bed  o Bed/Chair-wheels locked  o Bed in low position  o Caregiver at bedside  o Call light within reach  o RN notified  o Side rails x 3   · Compliance with Program/Exercises: compliant most of the time. · Recommendations/Intent for next treatment session: \"Next visit will focus on advancements to more challenging activities\".   Total Treatment Duration:  OT Patient Time In/Time Out  Time In: 1050  Time Out: 1236 [facility]

## 2018-03-01 NOTE — OP NOTES
[facility] REPORT    Braulio Menon  MR#: [id number]  : 1960  ACCOUNT #: [de-identified]   DATE OF SERVICE: 2018    PREOPERATIVE DIAGNOSIS:  Acute cholecystitis with choledocholithiasis. POSTOPERATIVE DIAGNOSIS:  Acute cholecystitis with choledocholithiasis. PROCEDURE:  Laparoscopic cholecystectomy with intraoperative cholangiogram.    ANESTHESIA:  General endotracheal.    SURGEON:  Berlin Morse DO.    ASSISTANT:  None. COMPLICATIONS:  None. DISPOSITION:  Stable. COUNTS:  Sponge count, needle count, and instrument count all correct x3. SPECIMEN:  Gallbladder. IMPLANT:  None. ESTIMATED BLOOD LOSS:  Minimal.    DESCRIPTION OF PROCEDURE:  This is a 40-year-old female with liver disease, alcoholic liver disease, with acute cholecystitis, choledocholithiasis, status post ERCP with a sphincterotomy. She was prepared for laparoscopic cholecystectomy. Consent was obtained by describing the procedure to the patient including potential complications to include infection, bleeding, cholangiogram, possible bile leak, bile duct injury, and possible cholangiogram.  Consent was obtained and placed on the chart. She was administered Ancef 2 grams IV preoperatively, taken to the operating suite, placed in the supine position. General anesthesia was initiated without complication. She was then prepped and draped in sterile fashion. Timeout was taken to confirm the patient and the proper procedure. Following this, an infraumbilical incision was planned, 0.25% Marcaine with epinephrine was used to anesthetize the skin and subcutaneous tissue. A #11 scalpel blade was used to make a skin incision. Bovie cauterization was used to dissect down to the rectus fascia. The fascia was then incised and 0 Vicryl sutures were placed as anchor stitches. The peritoneum was elevated between tonsil Schnidts, entered with the Metzenbaum scissors. Colleen trocar was placed in the peritoneum and secured in place. CO2 gas was used to create a pneumoperitoneum of 15  mmHg. The laparoscope was passed into the abdomen and brief survey revealed a micronodular liver consistent with alcoholic liver disease and edematous thickened gallbladder with pericholecystic fluid. The remaining 5 mm trocars were placed in the usual fashion. No evidence of bleeding. The gallbladder was grasped, elevated over the dome of the liver exposing the triangle of Calot. The King pouch was able to be identified and grasped, elevated anteriorly and laterally exposing the triangle. The anterior peritoneum was taken down medially and laterally exposing a 5 mm cystic duct. The duct was skeletonized and one clip was placed adjacent to the gallbladder and then a choledochotomy was performed with the EndoShears. A cholangiocatheter was then inserted into the cystic duct and clipped into place and then using C-arm fluoroscopy, Conray dye was injected into the cystic duct under C-arm fluoroscopy. The cystic duct was readily visualized and the common bile duct, hepatic duct, right and left hepatic radicles were visualized to be dilated with free spillage into the duodenum. There was no evidence of stone or stricture. At this point, we concluded the cholangiogram.  The cholangiocatheter was removed and then 2 clips were placed proximally on the cystic duct, one distally and the duct was divided. The cystic duct was then reinforced using a PDS Endoloop. The cystic artery was then identified within the triangle. Two clips were placed proximally, one distally and the artery divided. The gallbladder was dissected off the liver bed using spot cauterization, was retrieved, and sent to pathology for analysis. We readmitted the scope, copiously irrigated with saline until clear, ensured hemostasis using spot cauterization.   Once hemostasis was confirmed, we removed all trocars in the usual fashion. No evidence of bleeding. The periumbilical port site was closed with 0 Vicryl in a figure-of-eight fashion, irrigated with saline until clear. We approximated the skin edges with 3-0 Vicryl in simple running fashion. Mastisol and Steri-Strips were placed on the incision. Sterile dressing applied. The patient will be extubated and transferred to recovery, stable. FINDINGS:  A 59-year-old female with acute cholecystitis and choledocholithiasis. Intraoperative findings revealed a micronodular liver consistent with alcoholic liver disease. A gallbladder with a thickened wall and pericholecystic fluid. A 4 mm cystic duct, cholangiogram was normal.  She tolerated the procedure well. DO DARSHANA VILLALOBOS / MARK  D: 02/28/2018 17:24     T: 02/28/2018 22:55  JOB #: [id number]

## 2018-03-01 NOTE — PROGRESS NOTES
Problem: Interdisciplinary Rounds  Goal: Interdisciplinary Rounds  Interdisciplinary team rounds were held 3/1/2018 with the following team members:Care Management, Nursing, Pharmacy, Physical Therapy and Physician and the patient. Plan of care discussed. See clinical pathway and/or care plan for interventions and desired outcomes.

## 2018-03-01 NOTE — PROGRESS NOTES
Hospitalist Progress Note      Admit Date: 2018  3:49 AM   NAME: Hawa Wright   :  1960   MRN:  [id number]   Attending: Eric Adams MD  PCP:  Lilian Ochoa DO    SUBJECTIVE:   62 y. o. female who has a PMH of vascular dementia, HTN, dyslipidemia, chronic alcohol intake, remote history of bipolar disorder in no treatment, CKD stage III, multiple mechanical falls admitted on  for acute cholecystitis with associated transminitis. Abdominal USG showed gallstones with gallbladder wall thickening and liver steatosis. GI and surgery consulted. MRCP showed persistent cholelithiasis with trace pericholecystic free fluid and obstructing choledocholithiasis. Plan is for ERCP and elective cholecystectomy. PICC line placement declined by PICC team due to presence of CKD. Nephrology consulted for eval.      3/1:   Pt seen and examined  \" I am doing okay. \"  Pt reports loose stools, multiple episodes. No abdominal pain, nausea or vomiting. Review of Systems negative with exception of pertinent positives noted above  PHYSICAL EXAM     Visit Vitals    /87 (BP 1 Location: Left arm, BP Patient Position: At rest)    Pulse 68    Temp 98.6 °F (37 °C)    Resp 14    Ht 5' 6\" (1.676 m)    Wt 81.6 kg (180 lb)    SpO2 98%    Breastfeeding No    BMI 29.05 kg/m2      Temp (24hrs), Av.5 °F (36.9 °C), Min:97.6 °F (36.4 °C), Max:99 °F (37.2 °C)    Oxygen Therapy  O2 Sat (%): 98 % (18 0714)  Pulse via Oximetry: 90 beats per minute (18)  O2 Device: Nasal cannula (18)  O2 Flow Rate (L/min): 1 l/min (18)    Intake/Output Summary (Last 24 hours) at 18 0818  Last data filed at 18 0151   Gross per 24 hour   Intake             1100 ml   Output              950 ml   Net              150 ml        General: No acute distress    Lungs:  CTA Bilaterally.    Heart:  Regular rate and rhythm,  No murmur, rub, or gallop  Abdomen: Soft, Non distended, Non tender, Positive bowel sounds  Extremities: No cyanosis, clubbing or edema  Neurologic:  No focal deficits      2/26/ MRCP  1.  Persistent cholelithiasis with trace pericholecystic free fluid, and  findings consistent with obstructing choledocholithiasis. Elective GI  consultation is recommended. ASSESSMENT      Active Hospital Problems    Diagnosis Date Noted    Sepsis ([address] 02/27/2018     Priority: 1 - One    Bacteremia due to Gram-negative bacteria 02/27/2018     Priority: 2 - Two    Cholecystitis 02/25/2018     Priority: 3 - Three    Cholelithiasis 02/25/2018     Priority: 4 - Four    OSMIN (acute kidney injury) ([facility] [address] 02/13/2018     Priority: 5 - Five    Depression 02/13/2018    Bipolar disorder ([address] 10/22/2014    Essential hypertension 09/11/2012    Cerebral infarction ([facility] 09/11/2012     Plan:  · S/p laparoscopic cholecystectomy, POD #1, still NPO. Will wait for surgical team evaluation  · MRCP confirms obstructive choledocholithiasis. S/P ERCP on 2/27, removal of 15 mm stone from GBD. · Blood culture positive for Klebsiella Pneumoniae, pansensitive, switched zosyn to rocephin on 3/1. · Repeat blood cultures drawn this AM  · Prn narcotics for pain control  · Liver profile, PT/INR daily  ·  Continue home meds.  PO Ativan prn anxiety     DVT ppx: SCDs   Code Status: Full    High risk with opioids on board    Signed By: Joseline Nunez MD     February 26, 2018

## 2018-03-01 NOTE — PROGRESS NOTES
Shift assessment complete. Pt alert and oriented.  at bedside. Respirations even and unlabored. Pt denies pain. Abdomen soft, tender with hypoactive bowel sounds. 4ps to abdomen c/d/i. Pt requested ativan for anxiety, see MAR for administration. No other needs voiced at this time. All safety measures in place.

## 2018-03-01 NOTE — PROGRESS NOTES
Problem: Mobility Impaired (Adult and Pediatric)  Goal: *Acute Goals and Plan of Care (Insert Text)  Goals revised 2/28  STG:  (1.)Ms. Jay Aldana will move from supine to sit and sit to supine  with INDEPENDENT within three treatment day(s). (2.)Ms. Jay Aldana will transfer from bed to chair and chair to bed with SUPERVISION using the least restrictive device within three treatment day(s). (3.)Ms. Jay Aldana will ambulate with CGA for 25 feet with the least restrictive device within three treatment day(s). LTG:  (1.)Ms. Jay Aldana will ambulate with SUPERVISION for 100 feet with the least restrictive device within 5-7 treatment day(s). 2. Pt. Will increase B LE strength 1/2 grade within 7days  3. Pt. Will perform sit to stand and SPT independently within 7 days  4. Pt. Will climb up/down 4 steps with rails and CGA within 7 days  ________________________________________________________________________________________________    Outcome: Progressing Towards Goal    PHYSICAL THERAPY: Daily Note, Treatment Day: 1st, AM 3/1/2018  INPATIENT: Hospital Day: 5  Payor: [employer] MEDICARE COMPLETE / Plan: [address] / Product Type: Managed Care Medicare /      NAME/AGE/GENDER: Claire Aguero is a 62 y.o. female   PRIMARY DIAGNOSIS: Cholecystitis  chloecystitis with cholelithiasis Cholelithiasis Cholelithiasis  Procedure(s) (LRB):  CHOLECYSTECTOMY LAPAROSCOPIC WITH INTRAOP CHOLANGIOGRAM (N/A)  1 Day Post-Op  ICD-10: Treatment Diagnosis:   · Generalized Muscle Weakness (M62.81)  · Other abnormalities of gait and mobility (R26.89)   Precaution/Allergies:  Codeine and Lortab [hydrocodone-acetaminophen]      ASSESSMENT:     Ms. Jay Aldana was supine on contact with  at bedside. She required a lot of encouragement to participate with therapy but was able to ambulate to and from the door with CGA and a rolling walker. She requires CGA x 1 for bed mobility.     This section established at most recent assessment PROBLEM LIST (Impairments causing functional limitations):  1. Decreased Strength  2. Decreased Transfer Abilities  3. Decreased Ambulation Ability/Technique  4. Decreased Balance  5. Decreased Activity Tolerance  6. Decreased Flexibility/Joint Mobility  7. decreased endurance   INTERVENTIONS PLANNED: (Benefits and precautions of physical therapy have been discussed with the patient.)  1. Balance Exercise  2. Bed Mobility  3. Family Education  4. Gait Training  5. Home Exercise Program (HEP)  6. Therapeutic Activites  7. Therapeutic Exercise/Strengthening  8. Transfer Training  9. endurance activities     TREATMENT PLAN: Frequency/Duration: daily for duration of hospital stay  Rehabilitation Potential For Stated Goals: Good     RECOMMENDED REHABILITATION/EQUIPMENT: (at time of discharge pending progress): Due to the probability of continued deficits (see above) this patient will likely need continued skilled physical therapy after discharge. Equipment:    None at this time              HISTORY:   History of Present Injury/Illness (Reason for Referral):  62 y. o. female who has a PMH of vascular dementia, HTN, dyslipidemia, chronic alcohol intake, remote history of bipolar disorder in no treatment, CKD stage III, multiple mechanical falls that presents from home with complaints of severe abdominal pain x 2 days. Pain is mid abdomen/epigastic and sharp in nature. She denies nausea, emesis or diarrhea. She was recently hospitalized earlier this month for alcohol intoxication and delirium. She denies any recent alcohol use. In the ER, she is tachycardic, febrile and normotensive. Labs are significant for: WBC 4, lactic acid of 3.9, creatinine of 2.4, bilirubin 4.1 and transaminitis. She had abdominal ultrasound that showed gallstones with gallbladder wall thickening but no biliary ductal dilatation. General surg consulted and already evaluated. GI to be consulted also.  Needs MRCP prior to cholecystectomy  Past Medical History/Comorbidities:   Ms. Neema Woodard  has a past medical history of Aneurysm of common carotid artery ([facility] [address] (2004); CKD (chronic kidney disease) (9/18/2014); Dementia; FSGS (focal segmental glomerulosclerosis); Gout; Hypertension; Osteoarthritis (9/18/2014); and Stroke [facility]) (2004, 2013). Ms. Neema Woodard  has a past surgical history that includes hx intracranial aneurysm repair (2004); hx refractive surgery; hx orthopaedic (Right, 10/2014); hx ankle fracture tx (Left, 2013); and hx ercp (02/27/2018). Social History/Living Environment:   Home Environment: Private residence  One/Two Story Residence: Two story, live on 1st floor  # of Interior Steps: 15  Interior Rails: Left  Living Alone: No  Support Systems: Spouse/Significant Other/Partner  Patient Expects to be Discharged to[de-identified] Private residence  Current DME Used/Available at Home: Walker, rolling  Tub or Shower Type: Shower  Prior Level of Function/Work/Activity:  Pt requires assistance from  for  ADLs and uses a RW for amb. Number of Personal Factors/Comorbidities that affect the Plan of Care: 1-2: MODERATE COMPLEXITY   EXAMINATION:   Most Recent Physical Functioning:   Gross Assessment:                  Posture:     Balance:  Sitting: Intact  Standing: Pull to stand; With support Bed Mobility:  Supine to Sit: Contact guard assistance  Sit to Supine: Contact guard assistance  Scooting: Contact guard assistance  Wheelchair Mobility:     Transfers:  Sit to Stand: Contact guard assistance  Stand to Sit: Contact guard assistance  Gait:     Speed/Radha: Slow  Step Length: Right shortened;Left shortened  Gait Abnormalities: Decreased step clearance;Shuffling gait  Distance (ft): 10 Feet (ft)  Assistive Device: Walker, rolling  Ambulation - Level of Assistance: Contact guard assistance  Interventions: Safety awareness training;Verbal cues  Duration: 10 Minutes      Body Structures Involved:  1. Joints  2.  Muscles Body Functions Affected:  1. Neuromusculoskeletal  2. Movement Related Activities and Participation Affected:  1. Mobility  2. Self Care   Number of elements that affect the Plan of Care: 4+: HIGH COMPLEXITY   CLINICAL PRESENTATION:   Presentation: Evolving clinical presentation with changing clinical characteristics: MODERATE COMPLEXITY   CLINICAL DECISION MAKIN [facility] Mobility Inpatient Short Form  How much difficulty does the patient currently have. .. Unable A Lot A Little None   1. Turning over in bed (including adjusting bedclothes, sheets and blankets)? [] 1   [] 2   [] 3   [x] 4   2. Sitting down on and standing up from a chair with arms ( e.g., wheelchair, bedside commode, etc.)   [] 1   [] 2   [x] 3   [] 4   3. Moving from lying on back to sitting on the side of the bed? [] 1   [] 2   [x] 3   [] 4   How much help from another person does the patient currently need. .. Total A Lot A Little None   4. Moving to and from a bed to a chair (including a wheelchair)? [] 1   [] 2   [x] 3   [] 4   5. Need to walk in hospital room? [] 1   [] 2   [x] 3   [] 4   6. Climbing 3-5 steps with a railing? [] 1   [x] 2   [] 3   [] 4   © , Trustees of [address] [place] [address], under license to [employer]. All rights reserved      Score:  Initial: 17 Most Recent: 18 (Date: 18 )    Interpretation of Tool:  Represents activities that are increasingly more difficult (i.e. Bed mobility, Transfers, Gait). Score 24 23 22-20 19-15 14-10 9-7 6     Modifier CH CI CJ CK CL CM CN      ?  Mobility - Walking and Moving Around:     - CURRENT STATUS: CK - 40%-59% impaired, limited or restricted    - GOAL STATUS: CJ - 20%-39% impaired, limited or restricted    - D/C STATUS:  ---------------To be determined---------------  Payor: [facility] MEDICARE COMPLETE / Plan: [address] / Product Type: Managed Care Medicare /      Medical Necessity:     · Skilled intervention continues to be required due to decreased mobility ability. Reason for Services/Other Comments:  · Patient continues to require skilled intervention due to medical complications and patient unable to attend/participate in therapy as expected. Use of outcome tool(s) and clinical judgement create a POC that gives a: Questionable prediction of patient's progress: MODERATE COMPLEXITY            TREATMENT:   (In addition to Assessment/Re-Assessment sessions the following treatments were rendered)   Pre-treatment Symptoms/Complaints:  Needs encouragement to get up. Needs to have a BM  Pain: Initial:      Post Session:  No compliants     Gait Training (10 Minutes):  Gait training to improve and/or restore physical functioning as related to mobility, strength, balance and endurance. Ambulated 10 Feet (ft) with Contact guard assistance using a Walker, rolling and moderate Safety awareness training;Verbal cues related to their posture and safe use of RW to promote proper body posture and safety. Braces/Orthotics/Lines/Etc:   · IV  · O2 Device: Room air  Treatment/Session Assessment:    · Response to Treatment: Needs encouragement to get out bed. Anxious  · Interdisciplinary Collaboration:   o Physical Therapist  o Occupational Therapist  o Registered Nurse  · After treatment position/precautions:   o Supine in bed  o Bed/Chair-wheels locked  o Bed in low position  o Call light within reach  o Family at bedside  o Nurse at bedside  o Side rails x 3   · Compliance with Program/Exercises: compliant most of the time. · Recommendations/Intent for next treatment session: \"Next visit will focus on advancements to more challenging activities and reduction in assistance provided\" Will focus on bed mobility, transfers, gait, strengthening and endurance activities.   Total Treatment Duration:  PT Patient Time In/Time Out  Time In: 1100  Time Out: K[address] 30, PT

## 2018-03-01 NOTE — PROGRESS NOTES
Am assessment completed. Pt is alert and oriented x 3, lungs diminished. Verbalizes needs well. Takes meds whole with thin liquids. Up with assist to bsc. Aixa Flaming blood for labs and sent to lab. Puncture sites c/d/i. Continue to monitor.

## 2018-03-01 NOTE — PROGRESS NOTES
[address]  [address] [facility], [address] [place] [address], [address] [facility] [address]      PLAN:Regular diet. Ambulate TID  Will sign off. Patient will follow up in 2 weeks in my office. ASSESSMENT:  Admit Date: 2/25/2018   1 Day Post-Op  Procedure(s):  CHOLECYSTECTOMY LAPAROSCOPIC WITH INTRAOP CHOLANGIOGRAM    Principal Problem:    Cholelithiasis (2/25/2018)    Active Problems:    Essential hypertension (9/11/2012)      Cerebral infarction ([address] (9/11/2012)      Bipolar disorder ([address] (10/22/2014)      OSMIN (acute kidney injury) ([address] (2/13/2018)      Depression (2/13/2018)      Cholecystitis (2/25/2018)      Sepsis ([address] (2/27/2018)      Bacteremia due to Gram-negative bacteria (2/27/2018)         SUBJECTIVE:Denies pain. No fever, nausea or vomiting. Incision clean dry and intact. OBJECTIVE:  Constitutional: Alert oriented cooperative patient in no acute distress; appears stated age   Visit Vitals    /87 (BP 1 Location: Left arm, BP Patient Position: At rest)    Pulse 68    Temp 98.6 °F (37 °C)    Resp 14    Ht 5' 6\" (1.676 m)    Wt 180 lb (81.6 kg)    SpO2 98%    Breastfeeding No    BMI 29.05 kg/m2     Eyes:Sclera are clear. ENMT: no external lesions gross hearing normal; no obvious neck masses, no ear or lip lesions  CV: RRR. Normal perfusion  Resp: No JVD. Breathing is  non-labored; no audible wheezing. GI: soft C/D/I. Musculoskeletal: unremarkable with normal function. No embolic signs or cyanosis.    Neuro:  Oriented; moves all 4; no focal deficits  Psychiatric: normal affect and mood, no memory impairment      Patient Vitals for the past 24 hrs:   BP Temp Pulse Resp SpO2   03/01/18 0916 - - - - 98 %   03/01/18 0714 142/87 98.6 °F (37 °C) 68 14 98 %   03/01/18 0307 131/82 99 °F (37.2 °C) 62 18 97 %   02/28/18 2218 136/86 98.8 °F (37.1 °C) 72 18 98 %   02/28/18 2015 - - - - 97 %   02/28/18 2000 - - - - 99 %   02/28/18 1829 (!) 165/97 97.6 °F (36.4 °C) 75 16 99 % 02/28/18 1816 163/87 - 83 14 98 %   02/28/18 1801 163/89 - 86 16 97 %   02/28/18 1746 161/83 - 92 16 97 %   02/28/18 1741 162/84 - 95 14 98 %   02/28/18 1736 162/86 - 100 16 98 %   02/28/18 1731 (!) 164/102 98.2 °F (36.8 °C) (!) 105 16 95 %     Labs:  Recent Labs      03/01/18   1005  02/28/18   0817  02/27/18   0708   WBC  8.7  9.5  16.7*   HGB  10.2*  9.9*  9.7*   PLT  129*  136*  151   NA  143  147*  147*   K  4.4  3.6  4.1   CL  109*  111*  109*   CO2  25  25  27   BUN  22  24*  33*   CREA  2.33*  2.74*  3.13*   GLU  131*  88  83   PTP   --    --   12.8   INR   --    --   0.9   TBILI   --   1.2*  1.1   SGOT   --   109*  64*   ALT   --   102*  105*   AP   --   289*  190*         Kerwin Snow Kristina, DO

## 2018-03-01 NOTE — PROGRESS NOTES
Called and spoke with RN about picc line order that is waiting for renal approval.  Informed her that as soon as renal gives the ok we will come place line.  Pt currently has cvc in neck

## 2018-03-02 PROCEDURE — 97530 THERAPEUTIC ACTIVITIES: CPT

## 2018-03-02 PROCEDURE — 74011000258 HC RX REV CODE- 258: Performed by: HOSPITALIST

## 2018-03-02 PROCEDURE — 74011250637 HC RX REV CODE- 250/637: Performed by: HOSPITALIST

## 2018-03-02 PROCEDURE — 74011250636 HC RX REV CODE- 250/636: Performed by: HOSPITALIST

## 2018-03-02 PROCEDURE — 65270000029 HC RM PRIVATE

## 2018-03-02 RX ADMIN — LORAZEPAM 1 MG: 1 TABLET ORAL at 09:28

## 2018-03-02 RX ADMIN — Medication 5 ML: at 05:22

## 2018-03-02 RX ADMIN — SODIUM CHLORIDE 75 ML/HR: 900 INJECTION, SOLUTION INTRAVENOUS at 13:05

## 2018-03-02 RX ADMIN — PANTOPRAZOLE SODIUM 40 MG: 40 TABLET, DELAYED RELEASE ORAL at 09:28

## 2018-03-02 RX ADMIN — CEFTRIAXONE SODIUM 2 G: 2 INJECTION, POWDER, FOR SOLUTION INTRAMUSCULAR; INTRAVENOUS at 13:04

## 2018-03-02 RX ADMIN — QUETIAPINE FUMARATE 50 MG: 25 TABLET ORAL at 20:35

## 2018-03-02 RX ADMIN — LORAZEPAM 1 MG: 1 TABLET ORAL at 20:35

## 2018-03-02 RX ADMIN — RDII 250 MG CAPSULE 500 MG: at 17:27

## 2018-03-02 RX ADMIN — ESCITALOPRAM OXALATE 10 MG: 10 TABLET ORAL at 09:29

## 2018-03-02 RX ADMIN — MIRTAZAPINE 30 MG: 15 TABLET, FILM COATED ORAL at 20:35

## 2018-03-02 RX ADMIN — RDII 250 MG CAPSULE 500 MG: at 09:00

## 2018-03-02 NOTE — PROGRESS NOTES
[facility] progress note    Follow-Up on: 3/2/2018     Patient seen and examined on floor.  at bedside. Making plenty of urine and denies complaints. ROS:  Gen - no fever, no chills, appetite npo  CV - no chest pain, no orthopnea  Lung - no shortness of breath, no cough  Abd - no tenderness, no nausea, no vomiting  Ext - noted edema    Exam:  Vitals:    03/01/18 2254 03/02/18 0240 03/02/18 0745 03/02/18 1144   BP: 112/75 113/76 142/86 129/78   Pulse: 78 75 89 89   Resp: 16 16 16 16   Temp: 98.3 °F (36.8 °C) 98.1 °F (36.7 °C) 97.9 °F (36.6 °C) 98.3 °F (36.8 °C)   SpO2: 97% 98% 97% 97%   Weight:       Height:             Intake/Output Summary (Last 24 hours) at 03/02/18 1525  Last data filed at 03/02/18 1420   Gross per 24 hour   Intake             1421 ml   Output              400 ml   Net             1021 ml       GEN - in no distress  CV - S1, S2, no rub  Lung - clear bilaterally  Abd - soft, nontender  Ext - trace edema    Recent Labs      03/01/18   1005  02/28/18   0817   WBC  8.7  9.5   HGB  10.2*  9.9*   HCT  32.1*  31.2*   PLT  129*  136*        Recent Labs      03/01/18   1005  02/28/18   0817   NA  143  147*   K  4.4  3.6   CL  109*  111*   CO2  25  25   BUN  22  24*   CREA  2.33*  2.74*   CA  8.5  8.6   GLU  131*  88   PHOS  4.1   --        Assessment / Plan:    1. OSMIN on CKD4: Cr improving and likely close to baseline    2. HTN - stable    3. Cholangitis s/p kerri and ERCP with stone extraction    4.   Bx-proven FSGS

## 2018-03-02 NOTE — PROGRESS NOTES
Shift assessment complete. Pt alert and oriented. Respirations even and unlabored. Lung sounds clear. Abdomen soft with active bowel sounds. 4ps c/d/i. Pt requested ativan see MAR for administration. No other needs voiced at this time. All safety measures in place. Pt instructed to call for any assistance.

## 2018-03-02 NOTE — PROGRESS NOTES
Problem: Mobility Impaired (Adult and Pediatric)  Goal: *Acute Goals and Plan of Care (Insert Text)  Goals revised 2/28  STG:  (1.)Ms. Pernell Wright will move from supine to sit and sit to supine  with INDEPENDENT within three treatment day(s). (2.)Ms. Pernell Wright will transfer from bed to chair and chair to bed with SUPERVISION using the least restrictive device within three treatment day(s). (3.)Ms. Pernell Wright will ambulate with CGA for 25 feet with the least restrictive device within three treatment day(s). LTG:  (1.)Ms. Pernell Wright will ambulate with SUPERVISION for 100 feet with the least restrictive device within 5-7 treatment day(s). 2. Pt. Will increase B LE strength 1/2 grade within 7days  3. Pt. Will perform sit to stand and SPT independently within 7 days  4. Pt. Will climb up/down 4 steps with rails and CGA within 7 days  ________________________________________________________________________________________________    Outcome: Progressing Towards Goal    PHYSICAL THERAPY: Daily Note, Treatment Day: 2nd, PM 3/2/2018  INPATIENT: Hospital Day: 6  Payor: [employer] MEDICARE COMPLETE / Plan: [address] / Product Type: Managed Care Medicare /      NAME/AGE/GENDER: German Dumont is a 62 y.o. female   PRIMARY DIAGNOSIS: Cholecystitis  chloecystitis with cholelithiasis Cholelithiasis Cholelithiasis  Procedure(s) (LRB):  CHOLECYSTECTOMY LAPAROSCOPIC WITH INTRAOP CHOLANGIOGRAM (N/A)  2 Days Post-Op  ICD-10: Treatment Diagnosis:   · Generalized Muscle Weakness (M62.81)  · Other abnormalities of gait and mobility (R26.89)   Precaution/Allergies:  Codeine and Lortab [hydrocodone-acetaminophen]      ASSESSMENT:     Ms. Pernell Wright was asleep on contact with  asleep on cot. She required encouragement to participate with therapy. Ambulated to [facility] with HHA  X 2; declined use of walker. Sat up on [facility] and performed LE exercises. CNA present for hygiene and bathing. Slow progress. Needs encouragement. This section established at most recent assessment   PROBLEM LIST (Impairments causing functional limitations):  1. Decreased Strength  2. Decreased Transfer Abilities  3. Decreased Ambulation Ability/Technique  4. Decreased Balance  5. Decreased Activity Tolerance  6. Decreased Flexibility/Joint Mobility  7. decreased endurance   INTERVENTIONS PLANNED: (Benefits and precautions of physical therapy have been discussed with the patient.)  1. Balance Exercise  2. Bed Mobility  3. Family Education  4. Gait Training  5. Home Exercise Program (HEP)  6. Therapeutic Activites  7. Therapeutic Exercise/Strengthening  8. Transfer Training  9. endurance activities     TREATMENT PLAN: Frequency/Duration: daily for duration of hospital stay  Rehabilitation Potential For Stated Goals: Good     RECOMMENDED REHABILITATION/EQUIPMENT: (at time of discharge pending progress): Due to the probability of continued deficits (see above) this patient will likely need continued skilled physical therapy after discharge. Equipment:    None at this time              HISTORY:   History of Present Injury/Illness (Reason for Referral):  62 y. o. female who has a PMH of vascular dementia, HTN, dyslipidemia, chronic alcohol intake, remote history of bipolar disorder in no treatment, CKD stage III, multiple mechanical falls that presents from home with complaints of severe abdominal pain x 2 days. Pain is mid abdomen/epigastic and sharp in nature. She denies nausea, emesis or diarrhea. She was recently hospitalized earlier this month for alcohol intoxication and delirium. She denies any recent alcohol use. In the ER, she is tachycardic, febrile and normotensive. Labs are significant for: WBC 4, lactic acid of 3.9, creatinine of 2.4, bilirubin 4.1 and transaminitis. She had abdominal ultrasound that showed gallstones with gallbladder wall thickening but no biliary ductal dilatation. General surg consulted and already evaluated.  GI to be consulted also. Needs MRCP prior to cholecystectomy  Past Medical History/Comorbidities:   Ms. Wagner Rob  has a past medical history of Aneurysm of common carotid artery ([facility] [address] (2004); CKD (chronic kidney disease) (9/18/2014); Dementia; FSGS (focal segmental glomerulosclerosis); Gout; Hypertension; Osteoarthritis (9/18/2014); and Stroke [facility]) (2004, 2013). Ms. Wagner Rob  has a past surgical history that includes hx intracranial aneurysm repair (2004); hx refractive surgery; hx orthopaedic (Right, 10/2014); hx ankle fracture tx (Left, 2013); and hx ercp (02/27/2018). Social History/Living Environment:   Home Environment: Private residence  One/Two Story Residence: Two story, live on 1st floor  # of Interior Steps: 15  Interior Rails: Left  Living Alone: No  Support Systems: Spouse/Significant Other/Partner  Patient Expects to be Discharged to[de-identified] Private residence  Current DME Used/Available at Home: Walker, rolling  Tub or Shower Type: Shower  Prior Level of Function/Work/Activity:  Pt requires assistance from  for  ADLs and uses a RW for amb. Number of Personal Factors/Comorbidities that affect the Plan of Care: 1-2: MODERATE COMPLEXITY   EXAMINATION:   Most Recent Physical Functioning:   Gross Assessment:                  Posture:     Balance:  Sitting: Intact  Standing: Pull to stand; With support Bed Mobility:  Supine to Sit: Contact guard assistance  Sit to Supine: Contact guard assistance  Wheelchair Mobility:     Transfers:  Sit to Stand: Minimum assistance; Additional time;Assist x1;Assist x2  Stand to Sit: Minimum assistance; Additional time;Assist x1;Assist x2  Bed to Chair: Minimum assistance; Additional time;Assist x1;Assist x2  Duration: 20 Minutes  Gait:     Speed/Radha: Slow  Step Length: Left shortened;Right shortened  Gait Abnormalities: Decreased step clearance;Shuffling gait  Distance (ft): 5 Feet (ft)  Assistive Device: Other (comment) (refused to use walker; requested HHA)  Ambulation - Level of Assistance: Minimal assistance;Assist x1;Assist x2  Interventions: Manual cues; Safety awareness training;Verbal cues      Body Structures Involved:  1. Joints  2. Muscles Body Functions Affected:  1. Neuromusculoskeletal  2. Movement Related Activities and Participation Affected:  1. Mobility  2. Self Care   Number of elements that affect the Plan of Care: 4+: HIGH COMPLEXITY   CLINICAL PRESENTATION:   Presentation: Evolving clinical presentation with changing clinical characteristics: MODERATE COMPLEXITY   CLINICAL DECISION MAKIN [facility] Mobility Inpatient Short Form  How much difficulty does the patient currently have. .. Unable A Lot A Little None   1. Turning over in bed (including adjusting bedclothes, sheets and blankets)? [] 1   [] 2   [] 3   [x] 4   2. Sitting down on and standing up from a chair with arms ( e.g., wheelchair, bedside commode, etc.)   [] 1   [] 2   [x] 3   [] 4   3. Moving from lying on back to sitting on the side of the bed? [] 1   [] 2   [x] 3   [] 4   How much help from another person does the patient currently need. .. Total A Lot A Little None   4. Moving to and from a bed to a chair (including a wheelchair)? [] 1   [] 2   [x] 3   [] 4   5. Need to walk in hospital room? [] 1   [] 2   [x] 3   [] 4   6. Climbing 3-5 steps with a railing? [] 1   [x] 2   [] 3   [] 4   © , Trustees of [address] [place] [address], under license to [employer]. All rights reserved      Score:  Initial: 17 Most Recent: 18 (Date: 18 )    Interpretation of Tool:  Represents activities that are increasingly more difficult (i.e. Bed mobility, Transfers, Gait). Score 24 23 22-20 19-15 14-10 9-7 6     Modifier CH CI CJ CK CL CM CN      ?  Mobility - Walking and Moving Around:     - CURRENT STATUS: CK - 40%-59% impaired, limited or restricted    - GOAL STATUS: CJ - 20%-39% impaired, limited or restricted    - D/C STATUS:  ---------------To be determined---------------  Payor: [address] [place] [address] / Plan: [facility] MEDICARE COMPLETE / Product Type: Managed Care Medicare /      Medical Necessity:     · Skilled intervention continues to be required due to decreased mobility ability. Reason for Services/Other Comments:  · Patient continues to require skilled intervention due to medical complications and patient unable to attend/participate in therapy as expected. Use of outcome tool(s) and clinical judgement create a POC that gives a: Questionable prediction of patient's progress: MODERATE COMPLEXITY            TREATMENT:   (In addition to Assessment/Re-Assessment sessions the following treatments were rendered)   Pre-treatment Symptoms/Complaints:  \" I can't get up. You have to help me. \"  Pain: Initial:   Pain Intensity 1: 0  Post Session:  No compliants       Therapeutic Activity: (  20 Minutes ):  Therapeutic activities including Bed transfers, Toilet transfers, Ambulation on level ground and  B LE exercises as below to improve mobility, strength, balance and coordination. Required minimal Manual cues; Safety awareness training;Verbal cues     Date:  3/2/18 Date:   Date:     Activity/Exercise Parameters Parameters Parameters   Seated marching 10     Ankle pumps 10     Long arc quads 10     Hip abduction/adduction 10                                 Braces/Orthotics/Lines/Etc:   · IV  · O2 Device: Room air  Treatment/Session Assessment:    · Response to Treatment: Needs encouragement to get out bed. Anxious  · Interdisciplinary Collaboration:   o Physical Therapist  o Registered Nurse  o Rehabilitation Attendant  o Certified Nursing Assistant/Patient Care Technician  · After treatment position/precautions:   o sitting on bedside commode; CNA present   · Compliance with Program/Exercises: needs encouragement. · Recommendations/Intent for next treatment session:   \"Next visit will focus on advancements to more challenging activities and reduction in assistance provided\" Will focus on bed mobility, transfers, gait, strengthening and endurance activities.   Total Treatment Duration:  PT Patient Time In/Time Out  Time In: 1400  Time Out: [address], PT

## 2018-03-02 NOTE — PROGRESS NOTES
Patient eating breakfast.  Refused PT at this time. \" I'm not doing it now. I'm eating. \" Will try back later if time allows.

## 2018-03-02 NOTE — PROGRESS NOTES
Problem: Interdisciplinary Rounds  Goal: Interdisciplinary Rounds  Interdisciplinary team rounds were held 3/2/2018 with the following team members:Care Management, Nursing, Patient Relations, Pharmacy, Physical Therapy and Physician and the patient. Plan of care discussed. See clinical pathway and/or care plan for interventions and desired outcomes.

## 2018-03-02 NOTE — PROGRESS NOTES
Am assessment completed. Pt is alert and oriented x 3, lungs clear, breathing non-labored. Bowel sounds + q 4. Denies constipation at this time. Pt wears brief refuses to take shower or go to bathroom but will use the bsc. Has not had stool since yesterday. C-diff cancelled due to prior one being negative. Multiple puncture sites c/d/i. Safety measures in place. Continue to monitor.

## 2018-03-02 NOTE — PROGRESS NOTES
Hospitalist Progress Note      Admit Date: 2018  3:49 AM   NAME: Kirk Garcia   :  1960   MRN:  [id number]   Attending: Claudell Polo, MD  PCP:  Benita Koch DO    SUBJECTIVE:   62 y. o. female who has a PMH of vascular dementia, HTN, dyslipidemia, chronic alcohol intake, remote history of bipolar disorder in no treatment, CKD stage III, multiple mechanical falls admitted on  for acute cholecystitis with associated transminitis. Abdominal USG showed gallstones with gallbladder wall thickening and liver steatosis. GI and surgery consulted. MRCP showed persistent cholelithiasis with trace pericholecystic free fluid and obstructing choledocholithiasis. Plan is for ERCP and elective cholecystectomy. PICC line placement declined by PICC team due to presence of CKD. Nephrology consulted for eval.      3/2:   Pt seen and examined  \" I am doing well. \"  Denies abdominal pain, nausea, vomiting, fever, chills. Review of Systems negative with exception of pertinent positives noted above  PHYSICAL EXAM     Visit Vitals    /76 (BP 1 Location: Right arm, BP Patient Position: At rest)    Pulse 75    Temp 98.1 °F (36.7 °C)    Resp 16    Ht 5' 6\" (1.676 m)    Wt 81.6 kg (180 lb)    SpO2 98%    Breastfeeding No    BMI 29.05 kg/m2      Temp (24hrs), Av.8 °F (36.6 °C), Min:96.7 °F (35.9 °C), Max:98.3 °F (36.8 °C)    Oxygen Therapy  O2 Sat (%): 98 % (18 0240)  Pulse via Oximetry: 81 beats per minute (18 0916)  O2 Device: Room air (18 1603)  O2 Flow Rate (L/min): 0 l/min (18 0916)    Intake/Output Summary (Last 24 hours) at 18 0754  Last data filed at 18 1802   Gross per 24 hour   Intake             1421 ml   Output                0 ml   Net             1421 ml        General: No acute distress    Lungs:  CTA Bilaterally.    Heart:  Regular rate and rhythm,  No murmur, rub, or gallop  Abdomen: Soft, Non distended, Non tender, Positive bowel sounds  Extremities: No cyanosis, clubbing or edema  Neurologic:  No focal deficits      2/26/ MRCP  1.  Persistent cholelithiasis with trace pericholecystic free fluid, and  findings consistent with obstructing choledocholithiasis. Elective GI  consultation is recommended. ASSESSMENT      Active Hospital Problems    Diagnosis Date Noted    Sepsis [facility] 02/27/2018     Priority: 1 - One    Bacteremia due to Gram-negative bacteria 02/27/2018     Priority: 2 - Two    Cholecystitis 02/25/2018     Priority: 3 - Three    Cholelithiasis 02/25/2018     Priority: 4 - Four    OSMIN (acute kidney injury) ([facility] [address] 02/13/2018     Priority: 5 - Five    Depression 02/13/2018    Bipolar disorder ([facility] 10/22/2014    Essential hypertension 09/11/2012    Cerebral infarction ([facility] 09/11/2012     Plan:  · S/p laparoscopic cholecystectomy, POD #2, tolerating diet well. · MRCP confirms obstructive choledocholithiasis. S/P ERCP on 2/27, removal of 15 mm stone from GBD. · Blood culture positive for Klebsiella Pneumoniae, pansensitive, switched zosyn to rocephin on 3/1 (D2)  · Repeat blood cultures drawn this AM  · Prn narcotics for pain control  · Liver profile, PT/INR daily  ·  Continue home meds. PO Ativan prn anxiety     DVT ppx: SCDs   Code Status: Full    High risk with opioids on board  Disposition: will be discharged to home tomorrow on oral antibiotics.     Signed By: Melinda Gonzalez MD     February 26, 2018

## 2018-03-03 VITALS
RESPIRATION RATE: 18 BRPM | OXYGEN SATURATION: 95 % | HEART RATE: 91 BPM | WEIGHT: 180 LBS | TEMPERATURE: 98.3 F | HEIGHT: 66 IN | SYSTOLIC BLOOD PRESSURE: 161 MMHG | BODY MASS INDEX: 28.93 KG/M2 | DIASTOLIC BLOOD PRESSURE: 90 MMHG

## 2018-03-03 PROCEDURE — 74011250637 HC RX REV CODE- 250/637: Performed by: HOSPITALIST

## 2018-03-03 RX ORDER — CIPROFLOXACIN 500 MG/1
500 TABLET ORAL 2 TIMES DAILY
Qty: 14 TAB | Refills: 0 | Status: SHIPPED | OUTPATIENT
Start: 2018-03-03 | End: 2018-03-10

## 2018-03-03 RX ORDER — SAME BUTANEDISULFONATE/BETAINE 400-600 MG
500 POWDER IN PACKET (EA) ORAL 2 TIMES DAILY
Qty: 28 CAP | Refills: 0 | Status: SHIPPED | OUTPATIENT
Start: 2018-03-03 | End: 2018-03-10

## 2018-03-03 RX ORDER — OXYCODONE AND ACETAMINOPHEN 5; 325 MG/1; MG/1
1 TABLET ORAL
Qty: 20 TAB | Refills: 0 | Status: ON HOLD | OUTPATIENT
Start: 2018-03-03 | End: 2018-05-25

## 2018-03-03 RX ORDER — MIRTAZAPINE 30 MG/1
30 TABLET, FILM COATED ORAL
Qty: 30 TAB | Refills: 2 | Status: SHIPPED | OUTPATIENT
Start: 2018-03-03 | End: 2018-04-02

## 2018-03-03 RX ADMIN — ESCITALOPRAM OXALATE 10 MG: 10 TABLET ORAL at 08:37

## 2018-03-03 RX ADMIN — PANTOPRAZOLE SODIUM 40 MG: 40 TABLET, DELAYED RELEASE ORAL at 06:08

## 2018-03-03 RX ADMIN — RDII 250 MG CAPSULE 500 MG: at 08:37

## 2018-03-03 NOTE — PROGRESS NOTES
Nutrition: Pt screened for LOS Day 5. No needs identified. Assessment notes to follow.     Norma Downing MS, [address], [address], LD  W: [phone]  C: [phone]

## 2018-03-03 NOTE — PROGRESS NOTES
Discharge instructions given to pt. And spouse. Verbalized understanding. All questions answered. Will call when ready to leave.

## 2018-03-03 NOTE — DISCHARGE SUMMARY
Hospitalist Discharge Summary     Patient ID:  Leron Merlin  [id number]  62 y.o.  1960  Admit date: 2/25/2018  3:49 AM  Discharge date and time: 3/3/2018  Attending: Navarro Gomez MD  PCP:  Reshma Reddy DO  Treatment Team: Attending Provider: Navarro Gomez MD; Utilization Review: Maribel Jain RN; Consulting Provider: Gregg Baum MD    Principal Diagnosis:    Sepsis due to klebsiella bacteremia secondary to Acute cholecystitis. Acute cholecystitis  Cholelithiasis with choledocholithiasis  OSMIN on CKD-3  HTN      Principal Problem:    Cholelithiasis (2/25/2018)    Active Problems:    Sepsis ([address] (2/27/2018)      Bacteremia due to Gram-negative bacteria (2/27/2018)      Cholecystitis (2/25/2018)      OSMIN (acute kidney injury) ([address] (2/13/2018)      Essential hypertension (9/11/2012)      Cerebral infarction ([address] (9/11/2012)      Bipolar disorder ([address] (10/22/2014)      Depression (2/13/2018)             Hospital Course:  Please refer to the admission H&P for details of presentation. In summary, the patient is 62years old female with pmhx of HTN, CKD-3, bipolar d/o, depression  Admitted on 2/26 with nausea, vomiting, abdominal discomfort which was secondary to acute cholecystitis due to cholelithiasis and choledocholithiasis. Pt had MRCP as recommended by GI, which confirmed obstructive choledocholithiasis. ERCP was done the next day, which removed 15 mm stone with balloon approach. Surgery was consulted, recommended for laparoscopic cholecystectomy. Pt was empirically started on Zosyn in view of possible sepsis, which was confirmed with positive blood culture with Klebsiella pneumoniae. Repeat blood culture on 2/28 was negative. Pt was transitioned from zosyn to rocephin for three days and discharged on oral cipro for 7 more days. Pt was able to tolerate diet without difficulty. She will be discharged on 3/3/18 with home health aide and PT/OT.     Significant Diagnostic Studies: All imaging personally reviewed by me. Labs: Results:       Chemistry Recent Labs      03/01/18   1005  02/28/18   0817   GLU  131*  88   NA  143  147*   K  4.4  3.6   CL  109*  111*   CO2  25  25   BUN  22  24*   CREA  2.33*  2.74*   CA  8.5  8.6   AGAP  9  11   AP   --   289*   TP   --   5.4*   ALB  1.8*  1.8*   GLOB   --   3.6*   AGRAT   --   0.5*      CBC w/Diff Recent Labs      03/01/18   1005  02/28/18   0817   WBC  8.7  9.5   RBC  3.43*  3.34*   HGB  10.2*  9.9*   HCT  32.1*  31.2*   PLT  129*  136*   GRANS  79*   --    LYMPH  13   --    EOS  0*   --       Cardiac Enzymes No results for input(s): CPK, CKND1, RADHA in the last 72 hours. No lab exists for component: CKRMB, TROIP   Coagulation No results for input(s): PTP, INR, APTT in the last 72 hours. No lab exists for component: INREXT    Lipid Panel Lab Results   Component Value Date/Time    Cholesterol, total 245 (H) 08/28/2014 08:40 AM    HDL Cholesterol 84 (H) 08/28/2014 08:40 AM    LDL, calculated 144.2 (H) 08/28/2014 08:40 AM    VLDL, calculated 16.8 08/28/2014 08:40 AM    Triglyceride 84 08/28/2014 08:40 AM    CHOL/HDL Ratio 2.9 08/28/2014 08:40 AM      BNP No results for input(s): BNPP in the last 72 hours. Liver Enzymes Recent Labs      03/01/18   1005  02/28/18   0817   TP   --   5.4*   ALB  1.8*  1.8*   AP   --   289*   SGOT   --   109*      Thyroid Studies Lab Results   Component Value Date/Time    TSH 0.904 12/02/2013 11:16 AM            Discharge Exam:  Visit Vitals    /90 (BP 1 Location: Left arm, BP Patient Position: At rest)    Pulse 91    Temp 98.3 °F (36.8 °C)    Resp 18    Ht 5' 6\" (1.676 m)    Wt 81.6 kg (180 lb)    SpO2 95%    Breastfeeding No    BMI 29.05 kg/m2     General appearance: alert, cooperative, no distress, appears stated age  Lungs: clear to auscultation bilaterally  Heart: regular rate and rhythm, S1, S2 normal, no murmur, click, rub or gallop  Abdomen: soft, non-tender.  Bowel sounds normal. No masses,  no organomegaly  Extremities: no cyanosis or edema  Neurologic: Grossly normal    Disposition: home  Discharge Condition: stable  Patient Instructions:   Current Discharge Medication List      START taking these medications    Details   Saccharomyces boulardii (FLORASTOR) 250 mg capsule Take 2 Caps by mouth two (2) times a day for 7 days. Qty: 28 Cap, Refills: 0      oxyCODONE-acetaminophen (PERCOCET) 5-325 mg per tablet Take 1 Tab by mouth every six (6) hours as needed. Max Daily Amount: 4 Tabs. Qty: 20 Tab, Refills: 0    Associated Diagnoses: Primary osteoarthritis, unspecified site      ciprofloxacin HCl (CIPRO) 500 mg tablet Take 1 Tab by mouth two (2) times a day for 7 days. Qty: 14 Tab, Refills: 0         CONTINUE these medications which have CHANGED    Details   mirtazapine (REMERON) 30 mg tablet Take 1 Tab by mouth nightly for 30 days. Qty: 30 Tab, Refills: 2         CONTINUE these medications which have NOT CHANGED    Details   QUEtiapine (SEROQUEL) 50 mg tablet Take 50 mg by mouth nightly. escitalopram oxalate (LEXAPRO) 10 mg tablet Take 10 mg by mouth daily. acetaminophen (TYLENOL) 325 mg tablet Take 2 Tabs by mouth every six (6) hours as needed. Qty: 30 Tab, Refills: 0      amLODIPine (NORVASC) 5 mg tablet Take 1 Tab by mouth daily. Qty: 30 Tab, Refills: 0      atorvastatin (LIPITOR) 40 mg tablet Take 1 Tab by mouth daily. Qty: 30 Tab, Refills: 0      folic acid (FOLVITE) 1 mg tablet Take 1 Tab by mouth daily. Qty: 30 Tab, Refills: 0      pantoprazole (PROTONIX) 40 mg tablet Take 1 Tab by mouth Daily (before breakfast). Qty: 30 Tab, Refills: 0      thiamine (B-1) 100 mg tablet Take 1 Tab by mouth daily. Qty: 30 Tab, Refills: 0             Activity: PT/OT per Home Health  Diet: GI soft diet, cardia  Wound Care: As directed    Follow-up  ·   Follow up with Surgery in 2 weeks as scheduled. · Follow up with PCP in 1 week. · Follow up with GI and Nephrology in 2-3 weeks.   Time spent to discharge patient 35 minutes  Signed:  Syeda Busch MD  3/3/2018  7:46 AM

## 2018-03-03 NOTE — PROGRESS NOTES
AM Assessment. Pt. A/O X4. Respirations even and unlabored. Lungs clear. Abd. soft and non tender. 4 PS with steristrips, c/d/i. Generalized weakness noted. Pt. Voiding in brief at night and asking to go to the [facility] during the day. No complaints at this time.  at bedside. Call light within reach. Will monitor hourly.

## 2018-03-03 NOTE — PROGRESS NOTES
Resting comfortably,multiple abdominal dsngs D/I. NV status WDL. Denies needs at present. Incontinent and wears briefs. Call light within reach.

## 2018-03-03 NOTE — PROGRESS NOTES
Problem: Nutrition Deficit  Goal: *Optimize nutritional status  Nutrition Assessment for: LOS Day 5 assessment    Assessment:   Pt with a HTN of CKD, gout, and HTN (Per H&P); \"Pt is a 62 y.o. seen and evaluated at the request of Hospitalist for Cholelithiasis. This is a 77-year-old female with history of vascular dementia chronic renal disease and alcohol abuse recently admitted to the hospital on February 14 for malnutrition and dehydration. She was discharged on February 16. Her previous admit the patient admits to not eating or drinking while at home. She was discharged home after rehydration with no other issues. Today the patient presents to the emergency department complaining of abdominal pain. \" Noted also that pt has a 10 year history of drinking 1 pint of alcohol/day along with some beer intake. Anthropometrics:  Height: 5' 6\" (1676 m), Weight: 81.6 kg (180 lb), source not stated, BMI: 29.05 kg/(m^2). (Overweight BMI Class)    Macronutrient needs:  EER: 1632- kcal/day (20-25 kcal/kg current BW)  EPR: 35-47 gm/day (0.6-0.8gm/kg IBW) GFR 23 improving; CKD    Intake/Comparative Standards: Patient consuming adequate nutrition (per RN assessment; Luis nutrition score of 3). Did not get to speak with pt. Pt being discharged. Nutrition Diagnosis:  No nutrition diagnosis at this time. Nutrition Intervention:  Meals and Snacks: Continue with current diet. Discharge Planning: Pt is process of being discharged.     Ney Ramirez [address], [address] [place] [address], [address], [address] Dates

## 2018-03-05 ENCOUNTER — PATIENT OUTREACH (OUTPATIENT)
Dept: CASE MANAGEMENT | Age: 58
End: 2018-03-05

## 2018-03-05 LAB
BACTERIA SPEC CULT: NORMAL
BACTERIA SPEC CULT: NORMAL
SERVICE CMNT-IMP: NORMAL
SERVICE CMNT-IMP: NORMAL

## 2018-03-05 NOTE — PROGRESS NOTES
Care Coordinator left voicemail message on home/mobile # [phone] , requesting a return call from patient. Care Coordinator will make several more attempts to reach patient for Transitions of Care Outreach. This note will not be viewable in [address]

## 2018-03-06 ENCOUNTER — PATIENT OUTREACH (OUTPATIENT)
Dept: CASE MANAGEMENT | Age: 58
End: 2018-03-06

## 2018-03-06 NOTE — PROGRESS NOTES
Care Coordinator left voicemail message on home # [phone], requesting a return call from patient. Care Coordinator will make final attempt to reach patient for Transitions of Care Outreach in 5 business days. This note will not be viewable in [address]

## 2018-03-13 ENCOUNTER — PATIENT OUTREACH (OUTPATIENT)
Dept: CASE MANAGEMENT | Age: 58
End: 2018-03-13

## 2018-03-13 NOTE — PROGRESS NOTES
Care Coordinator left voicemail message on mobile # [phone], requesting a return call from patient. Care Coordinator will close encounter due to Unable to Reach patient for Transitions of Care Outreach. Care Coordinator will reopen encounter if or when patient returns call. This note will not be viewable in [address]

## 2018-03-20 LAB
BACTERIA SPEC CULT: ABNORMAL
GRAM STN SPEC: ABNORMAL
SERVICE CMNT-IMP: ABNORMAL

## 2018-03-21 LAB
Lab: NORMAL
REFERENCE LAB,REFLB: NORMAL
TEST DESCRIPTION:,ATST: NORMAL

## 2018-05-18 ENCOUNTER — HOSPITAL ENCOUNTER (INPATIENT)
Age: 58
LOS: 4 days | Discharge: NURSING, REHAB OR LONG-TERM CARE | DRG: 896 | End: 2018-05-25
Attending: EMERGENCY MEDICINE | Admitting: INTERNAL MEDICINE
Payer: MEDICARE

## 2018-05-18 DIAGNOSIS — E87.29 ALCOHOLIC KETOACIDOSIS: Primary | ICD-10-CM

## 2018-05-18 DIAGNOSIS — F10.930 ALCOHOL WITHDRAWAL SYNDROME WITHOUT COMPLICATION (HCC): ICD-10-CM

## 2018-05-18 DIAGNOSIS — E86.0 DEHYDRATION: ICD-10-CM

## 2018-05-18 DIAGNOSIS — M19.91 PRIMARY OSTEOARTHRITIS, UNSPECIFIED SITE: ICD-10-CM

## 2018-05-18 PROBLEM — F10.20 ALCOHOLISM (HCC): Status: ACTIVE | Noted: 2018-05-18

## 2018-05-18 PROBLEM — I10 HYPERTENSION: Status: ACTIVE | Noted: 2018-05-18

## 2018-05-18 PROBLEM — F10.939 ALCOHOL WITHDRAWAL (HCC): Status: ACTIVE | Noted: 2018-05-18

## 2018-05-18 LAB
ALBUMIN SERPL-MCNC: 3 G/DL (ref 3.5–5)
ALBUMIN/GLOB SERPL: 1.1 {RATIO} (ref 1.2–3.5)
ALP SERPL-CCNC: 128 U/L (ref 50–136)
ALT SERPL-CCNC: 10 U/L (ref 12–65)
AMPHET UR QL SCN: NEGATIVE
ANION GAP SERPL CALC-SCNC: 12 MMOL/L (ref 7–16)
ANION GAP SERPL CALC-SCNC: 16 MMOL/L (ref 7–16)
APPEARANCE UR: ABNORMAL
AST SERPL-CCNC: 13 U/L (ref 15–37)
BACTERIA URNS QL MICRO: 0 /HPF
BARBITURATES UR QL SCN: NEGATIVE
BASOPHILS # BLD: 0 K/UL (ref 0–0.2)
BASOPHILS NFR BLD: 0 % (ref 0–2)
BENZODIAZ UR QL: NEGATIVE
BILIRUB SERPL-MCNC: 1.4 MG/DL (ref 0.2–1.1)
BILIRUB UR QL: NEGATIVE
BUN SERPL-MCNC: 28 MG/DL (ref 6–23)
BUN SERPL-MCNC: 32 MG/DL (ref 6–23)
CALCIUM SERPL-MCNC: 8.2 MG/DL (ref 8.3–10.4)
CALCIUM SERPL-MCNC: 9.6 MG/DL (ref 8.3–10.4)
CANNABINOIDS UR QL SCN: POSITIVE
CASTS URNS QL MICRO: ABNORMAL /LPF
CHLORIDE SERPL-SCNC: 100 MMOL/L (ref 98–107)
CHLORIDE SERPL-SCNC: 106 MMOL/L (ref 98–107)
CO2 SERPL-SCNC: 19 MMOL/L (ref 21–32)
CO2 SERPL-SCNC: 19 MMOL/L (ref 21–32)
COCAINE UR QL SCN: NEGATIVE
COLOR UR: YELLOW
CREAT SERPL-MCNC: 2.66 MG/DL (ref 0.6–1)
CREAT SERPL-MCNC: 3 MG/DL (ref 0.6–1)
DIFFERENTIAL METHOD BLD: ABNORMAL
EOSINOPHIL # BLD: 0.2 K/UL (ref 0–0.8)
EOSINOPHIL NFR BLD: 2 % (ref 0.5–7.8)
EPI CELLS #/AREA URNS HPF: ABNORMAL /HPF
ERYTHROCYTE [DISTWIDTH] IN BLOOD BY AUTOMATED COUNT: 15.9 % (ref 11.9–14.6)
ETHANOL SERPL-MCNC: <3 MG/DL
GLOBULIN SER CALC-MCNC: 2.8 G/DL (ref 2.3–3.5)
GLUCOSE BLD STRIP.AUTO-MCNC: 108 MG/DL (ref 65–100)
GLUCOSE SERPL-MCNC: 120 MG/DL (ref 65–100)
GLUCOSE SERPL-MCNC: 128 MG/DL (ref 65–100)
GLUCOSE UR STRIP.AUTO-MCNC: NEGATIVE MG/DL
HCT VFR BLD AUTO: 34.2 % (ref 35.8–46.3)
HGB BLD-MCNC: 11.3 G/DL (ref 11.7–15.4)
HGB UR QL STRIP: NEGATIVE
IMM GRANULOCYTES # BLD: 0 K/UL (ref 0–0.5)
IMM GRANULOCYTES NFR BLD AUTO: 0 % (ref 0–5)
INR PPP: 1
KETONES UR QL STRIP.AUTO: NEGATIVE MG/DL
LEUKOCYTE ESTERASE UR QL STRIP.AUTO: ABNORMAL
LYMPHOCYTES # BLD: 1.6 K/UL (ref 0.5–4.6)
LYMPHOCYTES NFR BLD: 22 % (ref 13–44)
MCH RBC QN AUTO: 28.5 PG (ref 26.1–32.9)
MCHC RBC AUTO-ENTMCNC: 33 G/DL (ref 31.4–35)
MCV RBC AUTO: 86.1 FL (ref 79.6–97.8)
METHADONE UR QL: NEGATIVE
MONOCYTES # BLD: 0.5 K/UL (ref 0.1–1.3)
MONOCYTES NFR BLD: 7 % (ref 4–12)
NEUTS SEG # BLD: 5 K/UL (ref 1.7–8.2)
NEUTS SEG NFR BLD: 69 % (ref 43–78)
NITRITE UR QL STRIP.AUTO: NEGATIVE
OPIATES UR QL: NEGATIVE
PCP UR QL: NEGATIVE
PH UR STRIP: 5.5 [PH] (ref 5–9)
PLATELET # BLD AUTO: 204 K/UL (ref 150–450)
PMV BLD AUTO: 9.6 FL (ref 10.8–14.1)
POTASSIUM SERPL-SCNC: 3.1 MMOL/L (ref 3.5–5.1)
POTASSIUM SERPL-SCNC: 4.8 MMOL/L (ref 3.5–5.1)
PROT SERPL-MCNC: 5.8 G/DL (ref 6.3–8.2)
PROT UR STRIP-MCNC: ABNORMAL MG/DL
PROTHROMBIN TIME: 13.7 SEC (ref 11.5–14.5)
RBC # BLD AUTO: 3.97 M/UL (ref 4.05–5.25)
RBC #/AREA URNS HPF: ABNORMAL /HPF
RPR SER QL: NONREACTIVE
SODIUM SERPL-SCNC: 135 MMOL/L (ref 136–145)
SODIUM SERPL-SCNC: 137 MMOL/L (ref 136–145)
SP GR UR REFRACTOMETRY: 1 (ref 1–1.02)
TSH SERPL DL<=0.005 MIU/L-ACNC: 1.95 UIU/ML (ref 0.36–3.74)
UROBILINOGEN UR QL STRIP.AUTO: 0.2 EU/DL (ref 0.2–1)
VIT B12 SERPL-MCNC: 269 PG/ML (ref 193–986)
WBC # BLD AUTO: 7.3 K/UL (ref 4.3–11.1)
WBC URNS QL MICRO: ABNORMAL /HPF

## 2018-05-18 PROCEDURE — 74011250636 HC RX REV CODE- 250/636: Performed by: EMERGENCY MEDICINE

## 2018-05-18 PROCEDURE — 74011250636 HC RX REV CODE- 250/636: Performed by: INTERNAL MEDICINE

## 2018-05-18 PROCEDURE — 74011000250 HC RX REV CODE- 250: Performed by: FAMILY MEDICINE

## 2018-05-18 PROCEDURE — 80048 BASIC METABOLIC PNL TOTAL CA: CPT | Performed by: EMERGENCY MEDICINE

## 2018-05-18 PROCEDURE — 82962 GLUCOSE BLOOD TEST: CPT

## 2018-05-18 PROCEDURE — 85025 COMPLETE CBC W/AUTO DIFF WBC: CPT | Performed by: FAMILY MEDICINE

## 2018-05-18 PROCEDURE — 86592 SYPHILIS TEST NON-TREP QUAL: CPT | Performed by: INTERNAL MEDICINE

## 2018-05-18 PROCEDURE — 80307 DRUG TEST PRSMV CHEM ANLYZR: CPT | Performed by: EMERGENCY MEDICINE

## 2018-05-18 PROCEDURE — 74011250636 HC RX REV CODE- 250/636: Performed by: FAMILY MEDICINE

## 2018-05-18 PROCEDURE — 96374 THER/PROPH/DIAG INJ IV PUSH: CPT | Performed by: EMERGENCY MEDICINE

## 2018-05-18 PROCEDURE — 77030032490 HC SLV COMPR SCD KNE COVD -B

## 2018-05-18 PROCEDURE — 74011000258 HC RX REV CODE- 258: Performed by: INTERNAL MEDICINE

## 2018-05-18 PROCEDURE — 80307 DRUG TEST PRSMV CHEM ANLYZR: CPT | Performed by: FAMILY MEDICINE

## 2018-05-18 PROCEDURE — 99218 HC RM OBSERVATION: CPT

## 2018-05-18 PROCEDURE — 81001 URINALYSIS AUTO W/SCOPE: CPT | Performed by: FAMILY MEDICINE

## 2018-05-18 PROCEDURE — 82607 VITAMIN B-12: CPT | Performed by: INTERNAL MEDICINE

## 2018-05-18 PROCEDURE — 84443 ASSAY THYROID STIM HORMONE: CPT | Performed by: INTERNAL MEDICINE

## 2018-05-18 PROCEDURE — 80053 COMPREHEN METABOLIC PANEL: CPT | Performed by: FAMILY MEDICINE

## 2018-05-18 PROCEDURE — 85610 PROTHROMBIN TIME: CPT | Performed by: FAMILY MEDICINE

## 2018-05-18 PROCEDURE — 74011250637 HC RX REV CODE- 250/637: Performed by: EMERGENCY MEDICINE

## 2018-05-18 PROCEDURE — 84425 ASSAY OF VITAMIN B-1: CPT | Performed by: INTERNAL MEDICINE

## 2018-05-18 PROCEDURE — 36415 COLL VENOUS BLD VENIPUNCTURE: CPT | Performed by: FAMILY MEDICINE

## 2018-05-18 PROCEDURE — 99285 EMERGENCY DEPT VISIT HI MDM: CPT | Performed by: EMERGENCY MEDICINE

## 2018-05-18 PROCEDURE — 74011250637 HC RX REV CODE- 250/637: Performed by: FAMILY MEDICINE

## 2018-05-18 RX ORDER — AMLODIPINE BESYLATE 5 MG/1
5 TABLET ORAL DAILY
Status: DISCONTINUED | OUTPATIENT
Start: 2018-05-19 | End: 2018-05-19

## 2018-05-18 RX ORDER — CHLORDIAZEPOXIDE HYDROCHLORIDE 25 MG/1
25 CAPSULE, GELATIN COATED ORAL EVERY 8 HOURS
Status: DISCONTINUED | OUTPATIENT
Start: 2018-05-18 | End: 2018-05-20

## 2018-05-18 RX ORDER — LORAZEPAM 2 MG/ML
1 INJECTION INTRAMUSCULAR
Status: COMPLETED | OUTPATIENT
Start: 2018-05-18 | End: 2018-05-18

## 2018-05-18 RX ORDER — ESCITALOPRAM OXALATE 10 MG/1
10 TABLET ORAL DAILY
Status: DISCONTINUED | OUTPATIENT
Start: 2018-05-19 | End: 2018-05-25 | Stop reason: HOSPADM

## 2018-05-18 RX ORDER — OXYCODONE AND ACETAMINOPHEN 5; 325 MG/1; MG/1
1 TABLET ORAL
Status: DISCONTINUED | OUTPATIENT
Start: 2018-05-18 | End: 2018-05-25 | Stop reason: HOSPADM

## 2018-05-18 RX ORDER — DEXTROSE MONOHYDRATE AND SODIUM CHLORIDE 5; .9 G/100ML; G/100ML
75 INJECTION, SOLUTION INTRAVENOUS CONTINUOUS
Status: DISCONTINUED | OUTPATIENT
Start: 2018-05-18 | End: 2018-05-19

## 2018-05-18 RX ORDER — QUETIAPINE FUMARATE 25 MG/1
50 TABLET, FILM COATED ORAL
Status: DISCONTINUED | OUTPATIENT
Start: 2018-05-18 | End: 2018-05-23

## 2018-05-18 RX ORDER — AMOXICILLIN 250 MG
1 CAPSULE ORAL DAILY
Status: DISCONTINUED | OUTPATIENT
Start: 2018-05-19 | End: 2018-05-25 | Stop reason: HOSPADM

## 2018-05-18 RX ORDER — PANTOPRAZOLE SODIUM 40 MG/1
40 TABLET, DELAYED RELEASE ORAL
Status: DISCONTINUED | OUTPATIENT
Start: 2018-05-19 | End: 2018-05-25 | Stop reason: HOSPADM

## 2018-05-18 RX ORDER — SODIUM CHLORIDE 0.9 % (FLUSH) 0.9 %
5-10 SYRINGE (ML) INJECTION EVERY 8 HOURS
Status: DISCONTINUED | OUTPATIENT
Start: 2018-05-18 | End: 2018-05-25 | Stop reason: HOSPADM

## 2018-05-18 RX ORDER — LORAZEPAM 2 MG/ML
1 INJECTION INTRAMUSCULAR
Status: DISCONTINUED | OUTPATIENT
Start: 2018-05-18 | End: 2018-05-19

## 2018-05-18 RX ORDER — LANOLIN ALCOHOL/MO/W.PET/CERES
100 CREAM (GRAM) TOPICAL DAILY
Status: DISCONTINUED | OUTPATIENT
Start: 2018-05-19 | End: 2018-05-25 | Stop reason: HOSPADM

## 2018-05-18 RX ORDER — ACETAMINOPHEN 325 MG/1
650 TABLET ORAL
Status: DISCONTINUED | OUTPATIENT
Start: 2018-05-18 | End: 2018-05-25 | Stop reason: HOSPADM

## 2018-05-18 RX ORDER — ONDANSETRON 2 MG/ML
4 INJECTION INTRAMUSCULAR; INTRAVENOUS
Status: DISCONTINUED | OUTPATIENT
Start: 2018-05-18 | End: 2018-05-19

## 2018-05-18 RX ORDER — CHLORDIAZEPOXIDE HYDROCHLORIDE 25 MG/1
25 CAPSULE, GELATIN COATED ORAL
Status: COMPLETED | OUTPATIENT
Start: 2018-05-18 | End: 2018-05-18

## 2018-05-18 RX ORDER — SODIUM CHLORIDE 0.9 % (FLUSH) 0.9 %
5-10 SYRINGE (ML) INJECTION AS NEEDED
Status: DISCONTINUED | OUTPATIENT
Start: 2018-05-18 | End: 2018-05-25 | Stop reason: HOSPADM

## 2018-05-18 RX ORDER — SODIUM CHLORIDE 9 MG/ML
1000 INJECTION, SOLUTION INTRAVENOUS ONCE
Status: COMPLETED | OUTPATIENT
Start: 2018-05-18 | End: 2018-05-18

## 2018-05-18 RX ORDER — FOLIC ACID 1 MG/1
1 TABLET ORAL DAILY
Status: DISCONTINUED | OUTPATIENT
Start: 2018-05-19 | End: 2018-05-25 | Stop reason: HOSPADM

## 2018-05-18 RX ADMIN — LORAZEPAM 1 MG: 2 INJECTION INTRAMUSCULAR; INTRAVENOUS at 21:43

## 2018-05-18 RX ADMIN — DEXTROSE MONOHYDRATE AND SODIUM CHLORIDE 75 ML/HR: 5; .9 INJECTION, SOLUTION INTRAVENOUS at 19:24

## 2018-05-18 RX ADMIN — CHLORDIAZEPOXIDE HYDROCHLORIDE 25 MG: 25 CAPSULE ORAL at 14:49

## 2018-05-18 RX ADMIN — SODIUM CHLORIDE 1000 ML: 900 INJECTION, SOLUTION INTRAVENOUS at 14:49

## 2018-05-18 RX ADMIN — CHLORDIAZEPOXIDE HYDROCHLORIDE 25 MG: 25 CAPSULE ORAL at 21:43

## 2018-05-18 RX ADMIN — Medication 10 ML: at 21:53

## 2018-05-18 RX ADMIN — LORAZEPAM 1 MG: 2 INJECTION INTRAMUSCULAR; INTRAVENOUS at 14:49

## 2018-05-18 RX ADMIN — QUETIAPINE FUMARATE 50 MG: 25 TABLET ORAL at 21:43

## 2018-05-18 RX ADMIN — FOLIC ACID: 5 INJECTION, SOLUTION INTRAMUSCULAR; INTRAVENOUS; SUBCUTANEOUS at 20:21

## 2018-05-18 NOTE — PROGRESS NOTES
Right leg with two areas concaved scar, right foot second toes eschar or dry blood, discoloration right chin.

## 2018-05-18 NOTE — PROGRESS NOTES
Admitted to the unit, no acute distress, alert, oriented X person, confused, assisted to bathroom, loose stools, gait unsteady, returned to bed, extremities aligned, HOB elevated.

## 2018-05-18 NOTE — ED PROVIDER NOTES
[address] Emergency Department  Seen  @ [facility] EMERGENCY DEPT in room FT11/11    Chief Complaint   Patient presents with    Alcohol intoxication       HPI:   Jessica Galarza is a 62 y.o. female who complaints of withdrawal. Patient drinks a pint of fireball vodka daily. Has not had a drink since last night. Patient's  is here as well. He is also an alcoholic. He's been admitted for possible Warneke's    She has a history of strokes. Per report she is dependent on him    No fever. No chills. No vomiting    She is tremulous. Tachycardic.     Historian: patient    Review of Systems:unable to be obtained secondary to mental status changes    Past Medical History:  Primary Care Doctor: Tayo Boland DO    Past Medical History:   Diagnosis Date    Aneurysm of common carotid artery ([address] 2004    left side s/p coil     CKD (chronic kidney disease) 9/18/2014    Dementia     FSGS (focal segmental glomerulosclerosis)     in remission at present    Gout     Hypertension     managed with medication     Osteoarthritis 9/18/2014    Stroke ([facility] [address] 2004, 2013    slight weakness on right side, slight effect to speech       Past Surgical History:   Procedure Laterality Date    HX ANKLE FRACTURE TX Left 2013    has hardware in    HX CHOLECYSTECTOMY  02/28/2018    HX ERCP  02/27/2018    cbd stone    HX INTRACRANIAL ANEURYSM REPAIR  2004    left carotid     HX ORTHOPAEDIC Right 10/2014    hip replacement    HX REFRACTIVE SURGERY      bilateral - Lasik        Social History     Social History    Marital status:      Spouse name: N/A    Number of children: N/A    Years of education: N/A     Social History Main Topics    Smoking status: Former Smoker     Packs/day: 0.25     Quit date: 1/1/1979    Smokeless tobacco: Never Used    Alcohol use 1.5 oz/week     3 Shots of liquor per week      Comment: pint of fireball    Drug use: No    Sexual activity: Yes     Partners: Male     Birth control/ protection: None     Other Topics Concern    Not on file     Social History Narrative       Previous Medications    ACETAMINOPHEN (TYLENOL) 325 MG TABLET    Take 2 Tabs by mouth every six (6) hours as needed. AMLODIPINE (NORVASC) 5 MG TABLET    Take 1 Tab by mouth daily. ATORVASTATIN (LIPITOR) 40 MG TABLET    Take 1 Tab by mouth daily. ESCITALOPRAM OXALATE (LEXAPRO) 10 MG TABLET    Take 10 mg by mouth daily. FOLIC ACID (FOLVITE) 1 MG TABLET    Take 1 Tab by mouth daily. OXYCODONE-ACETAMINOPHEN (PERCOCET) 5-325 MG PER TABLET    Take 1 Tab by mouth every six (6) hours as needed. Max Daily Amount: 4 Tabs. PANTOPRAZOLE (PROTONIX) 40 MG TABLET    Take 1 Tab by mouth Daily (before breakfast). QUETIAPINE (SEROQUEL) 50 MG TABLET    Take 50 mg by mouth nightly. THIAMINE (B-1) 100 MG TABLET    Take 1 Tab by mouth daily. Allergies   Allergen Reactions    Codeine Nausea and Vomiting    Lortab [Hydrocodone-Acetaminophen] Nausea and Vomiting       Physical Exam:    Vitals:    05/18/18 1338   BP: 129/68   Pulse: (!) 106   Resp: 16   Temp: 97.7 °F (36.5 °C)   SpO2: 99%     Vital signs were reviewed. Pulse oximetry interpretation: normal    Constitutional: Disheveled, appears older than stated age  Head: Atraumatic, normo-cephalic,   Ears/Nose/Throat: throat clear, mucous membranes moist,   Eyes: PERRL, EOMI, anicteric,   Neck: supple, FROM, no lymphadenopathy, no meningismus,   Cardiovascular: regular rate and rhythm, no murmur, 2+ radial pulses,    Respiratory: clear to auscultation with no wheezes, rales, ronchi,   Abdomen: soft, non-tender, no guarding/rebound, no percussion tenderness,    Musculoskeletal: no deformities, edema,   Skin: dry, no rashes,   Neurologic: alert, oriented, answers questions follows commands, noted to be markedly tremulous  Psychiatric: Speech pattern and content normal, no confusion.   No confabulation. _____________________________________________________________________  Medical Decision Making: This is a new problem that Floyd Stonewall need additional workup  Labs/Radiographs/ECG were ordered: yes    The patient's problem is: acute  The Diagnostic Options are: minimal risk  The Management Options are: moderate risk  ______________________________________________________________________  ED Evaluation    Labs: No results found for this or any previous visit (from the past 8 hour(s)). Labs were reviewed and interpreted by me. Radiology studies performed:   No orders to display     Radiographs were visualized by me      No current facility-administered medications for this encounter. Current Outpatient Prescriptions   Medication Sig    oxyCODONE-acetaminophen (PERCOCET) 5-325 mg per tablet Take 1 Tab by mouth every six (6) hours as needed. Max Daily Amount: 4 Tabs.  QUEtiapine (SEROQUEL) 50 mg tablet Take 50 mg by mouth nightly.  escitalopram oxalate (LEXAPRO) 10 mg tablet Take 10 mg by mouth daily.  acetaminophen (TYLENOL) 325 mg tablet Take 2 Tabs by mouth every six (6) hours as needed.  amLODIPine (NORVASC) 5 mg tablet Take 1 Tab by mouth daily.  atorvastatin (LIPITOR) 40 mg tablet Take 1 Tab by mouth daily.  folic acid (FOLVITE) 1 mg tablet Take 1 Tab by mouth daily.  pantoprazole (PROTONIX) 40 mg tablet Take 1 Tab by mouth Daily (before breakfast).  thiamine (B-1) 100 mg tablet Take 1 Tab by mouth daily.       ==================================================================  ASSESSMENT: 51-year-old female with long history of alcohol abuse presents to the emergency department with 12-24 hours abstinence. Patient feels tremulous. She has a heart rate of about . Not hypertensive. Not vomiting. PLAN: monitor for changes. Hydrate. Check labs.   _____________________________________________________________________    Condition: guarded  Disposition:  pending  Diagnosis:  Alcohol withdrawal    Scotty Brown MD; 5/18/2018 @2:02 PM===========================================    ED Course

## 2018-05-18 NOTE — PROGRESS NOTES
Visited with patient while at bedside of her  in ER 8. Demographics on face sheet verified.  states he and his wife have been drinking daily the entire time they have been together (25 yrs).  admits to drinking a pint a day of 'whatever' - mainly tequila. Patient admits to drinking a pint of fireball a day.  states it is just the two of them at home and that patient is completely dependent on him for her ADLs as she has had 2 strokes. States at baseline he is completely independent in his ADLs and still drives. States he uses a cane 'occasionally'. States he is a 'drunk' and wants to quit. States he and patient are planning on quitting together but they are refusing all offers of resources stating that they want to do it on their own.  states he has all the resources he needs at home because he was in [facility] approximately 4 yrs ago to detox from Xanax (was taking 2mg 4x/day) and oxycodone) and also did ADSAP at [facility] for a DWI. States he has not taken any Xanax or oxycodone since he was discharged from [facility]. States he did purchase a Klonopin from 'the street' 2 days ago and took it but other than than has not done any drugs. States there are AA meeting 'right down the road' and he and patient plan on attending.

## 2018-05-18 NOTE — H&P
History and Physical    Patient: Esteban Banerjee MRN: [id number]  SSN: [id number]    YOB: 1960  Age: 62 y.o. Sex: female      Subjective:      Esteban Banerjee is a 62 y.o. female who is an alcoholic. She drinks a pint of liquor each day for the past 30 years. She decided to stop drinking 2 days ago and now felt \"not good\". She could not think clearly. She is slow to find answers. She denied localized weakness more than her baseline. She has previous stroke with remnant right leg weakness. She has hypertension, CKD, dementia, FSGS from history. She denies smoking. Past Medical History:   Diagnosis Date    Aneurysm of common carotid artery ([address] 2004    left side s/p coil     CKD (chronic kidney disease) 9/18/2014    Dementia     FSGS (focal segmental glomerulosclerosis)     in remission at present    Gout     Hypertension     managed with medication     Osteoarthritis 9/18/2014    Stroke ([facility] [address] 2004, 2013    slight weakness on right side, slight effect to speech     Past Surgical History:   Procedure Laterality Date    HX ANKLE FRACTURE TX Left 2013    has hardware in    HX CHOLECYSTECTOMY  02/28/2018    HX ERCP  02/27/2018    cbd stone    HX INTRACRANIAL ANEURYSM REPAIR  2004    left carotid     HX ORTHOPAEDIC Right 10/2014    hip replacement    HX REFRACTIVE SURGERY      bilateral - Lasik      Family History   Problem Relation Age of Onset    Cancer Father 80     unknown    Stroke Sister 48     Social History   Substance Use Topics    Smoking status: Former Smoker     Packs/day: 0.25     Quit date: 1/1/1979    Smokeless tobacco: Never Used    Alcohol use 1.5 oz/week     3 Shots of liquor per week      Comment: pint of fireball      Prior to Admission medications    Medication Sig Start Date End Date Taking? Authorizing Provider   oxyCODONE-acetaminophen (PERCOCET) 5-325 mg per tablet Take 1 Tab by mouth every six (6) hours as needed.  Max Daily Amount: 4 Tabs. 3/3/18   Paula Rojas MD   QUEtiapine (SEROQUEL) 50 mg tablet Take 50 mg by mouth nightly. Historical Provider   escitalopram oxalate (LEXAPRO) 10 mg tablet Take 10 mg by mouth daily. Historical Provider   acetaminophen (TYLENOL) 325 mg tablet Take 2 Tabs by mouth every six (6) hours as needed. 2/16/18   Tess London MD   amLODIPine (NORVASC) 5 mg tablet Take 1 Tab by mouth daily. 2/17/18   Tess London MD   atorvastatin (LIPITOR) 40 mg tablet Take 1 Tab by mouth daily. 2/17/18   Tess London MD   folic acid (FOLVITE) 1 mg tablet Take 1 Tab by mouth daily. 2/17/18   Tess London MD   pantoprazole (PROTONIX) 40 mg tablet Take 1 Tab by mouth Daily (before breakfast). 2/17/18   Tess London MD   thiamine (B-1) 100 mg tablet Take 1 Tab by mouth daily. 2/17/18   Tess London MD        Allergies   Allergen Reactions    Codeine Nausea and Vomiting    Lortab [Hydrocodone-Acetaminophen] Nausea and Vomiting       Review of Systems:    Constitutional: Negative for chills and fever. HENT: Negative for rhinorrhea and sore throat. Eyes: Negative for pain, redness and visual disturbance. Respiratory: Negative for chest tightness, shortness of breath and wheezing. Cardiovascular: Negative for chest pain and leg swelling. Gastrointestinal: Negative for abdominal pain, bowel incontinence, diarrhea, nausea and vomiting. Genitourinary: Negative for bladder incontinence, dysuria and hematuria. Musculoskeletal: Negative for back pain, gait problem, neck pain and neck stiffness. Skin: Negative for color change and rash. Neurological: negative for speech difficulty. Right leg focal weakness from previous stroke, no headaches and loss of balance.    Psychiatric/Behavioral: Negative for agitation        Objective:     Vitals:    05/18/18 1540 05/18/18 1556 05/18/18 1600 05/18/18 1620   BP: 112/60 112/60 125/76    Pulse: 87   91   Resp:       Temp:       SpO2: 100%  (!) 83% 100%   Weight: Height:            Physical Exam:    General:                    The patient is a female in no acute respiratory distress. Head:                                   Normocephalic/atraumatic. Redness of face. dilation of superficial veins. Enlargement of parotid glands. Eyes:                                   No palpebral pallor or scleral icterus. ENT:                                    External auricular and nasal exam within normal limits. Mucous membranes are moist.  Neck:                                   Supple, non-tender, no JVD. Lungs:                       Clear to auscultation bilaterally without wheezes or crackles. No respiratory distress or accessory muscle use. Heart:                                  Regular rate and rhythm, without murmurs, rubs, or gallops. Abdomen:                  Soft, non-tender, non-distended with normoactive bowel sounds. Genitourinary:           No tenderness over the bladder or bilateral CVAs. Extremities:               Without clubbing, cyanosis, or edema. Skin:                                    Normal color, texture, and turgor. No rashes, lesions, or jaundice. Pulses:                      Radial and dorsalis pedis pulses present 2+ bilaterally. Capillary refill <2s. Neurologic:                CN II-XII grossly intact and symmetrical.                                               Moving all four extremities, right leg power 3+/5, other extremities 4-/5 power. Psychiatric:                Pleasant demeanor, appropriate affect.  Alert and oriented x 3    Investigations:    Recent Results (from the past 24 hour(s))   METABOLIC PANEL, BASIC    Collection Time: 05/18/18  1:50 PM   Result Value Ref Range    Sodium 135 (L) 136 - 145 mmol/L    Potassium 4.8 3.5 - 5.1 mmol/L    Chloride 100 98 - 107 mmol/L    CO2 19 (L) 21 - 32 mmol/L    Anion gap 16 7 - 16 mmol/L    Glucose 120 (H) 65 - 100 mg/dL    BUN 32 (H) 6 - 23 MG/DL    Creatinine 3.00 (H) 0.6 - 1.0 MG/DL    GFR est AA 21 (L) >60 ml/min/1.73m2    GFR est non-AA 17 (L) >60 ml/min/1.73m2    Calcium 9.6 8.3 - 10.4 MG/DL   ETHYL ALCOHOL    Collection Time: 05/18/18  1:50 PM   Result Value Ref Range    ALCOHOL(ETHYL),SERUM <3 MG/DL           Assessment:     Hospital Problems  Date Reviewed: 2/28/2018          Codes Class Noted POA    * (Principal)Alcohol withdrawal ([place] [address] ICD-10-CM: Q48.894  ICD-9-CM: 291.81  5/18/2018 Unknown        Alcoholism ([place] [address] ICD-10-CM: F10.20  ICD-9-CM: 303.90  5/18/2018 Unknown        Hypertension ICD-10-CM: I10  ICD-9-CM: 401.9  5/18/2018 Unknown        OSMIN (acute kidney injury) ([facility] ICD-10-CM: N17.9  ICD-9-CM: 584.9  2/13/2018 Yes        Acute metabolic encephalopathy BQQ-69-DS: G93.41  ICD-9-CM: 348.31  2/13/2018 Yes        Alcohol abuse ICD-10-CM: F10.10  ICD-9-CM: 305.00  2/13/2018 Yes        CKD (chronic kidney disease) ICD-10-CM: N18.9  ICD-9-CM: 585.9  9/18/2014 Yes        Weakness of right leg ICD-10-CM: R29.898  ICD-9-CM: 729.89  12/2/2013 Yes        Essential hypertension ICD-10-CM: I10  ICD-9-CM: 401.9  9/11/2012 Yes        FSGS (focal segmental glomerulosclerosis) ICD-10-CM: N05.1  ICD-9-CM: 582.1  9/11/2012 Yes              Plan:     Admit to medical floor for monitoring. Watch for delirium tremens. Lorazepam PRN. IV fluid. Monitor blood sugar. Check thiamine, B12, TSH, lipid panel, urine drug screen. GI prophylaxis. Monitor BMP, CBC    Monitor renal function and intake and output. Avoid nephrotoxic agents. I have discussed the plan of care with patient. I have discussed with patient regarding advance directive. Patient would like to have a full-code status.      DVT prophylaxis : SCD        Signed By: Arielle Mayes MD     May 18, 2018

## 2018-05-18 NOTE — ED TRIAGE NOTES
Patient was with her  today for ETOH withdrawal. Patient now feels she is having withdrawal symptoms. Patient is unsteady and has diarrhea. Patient states she drinks a pint of fireball per day.

## 2018-05-19 LAB
ALBUMIN SERPL-MCNC: 2.9 G/DL (ref 3.5–5)
ALBUMIN/GLOB SERPL: 1 {RATIO} (ref 1.2–3.5)
ALP SERPL-CCNC: 125 U/L (ref 50–136)
ALT SERPL-CCNC: 10 U/L (ref 12–65)
ANION GAP SERPL CALC-SCNC: 11 MMOL/L (ref 7–16)
AST SERPL-CCNC: 14 U/L (ref 15–37)
BASOPHILS # BLD: 0.1 K/UL (ref 0–0.2)
BASOPHILS NFR BLD: 1 % (ref 0–2)
BILIRUB SERPL-MCNC: 1.4 MG/DL (ref 0.2–1.1)
BUN SERPL-MCNC: 26 MG/DL (ref 6–23)
CALCIUM SERPL-MCNC: 8.2 MG/DL (ref 8.3–10.4)
CHLORIDE SERPL-SCNC: 108 MMOL/L (ref 98–107)
CHOLEST SERPL-MCNC: 145 MG/DL
CO2 SERPL-SCNC: 20 MMOL/L (ref 21–32)
CREAT SERPL-MCNC: 2.42 MG/DL (ref 0.6–1)
DIFFERENTIAL METHOD BLD: ABNORMAL
EOSINOPHIL # BLD: 0.2 K/UL (ref 0–0.8)
EOSINOPHIL NFR BLD: 4 % (ref 0.5–7.8)
ERYTHROCYTE [DISTWIDTH] IN BLOOD BY AUTOMATED COUNT: 15.8 % (ref 11.9–14.6)
GLOBULIN SER CALC-MCNC: 2.9 G/DL (ref 2.3–3.5)
GLUCOSE BLD STRIP.AUTO-MCNC: 106 MG/DL (ref 65–100)
GLUCOSE BLD STRIP.AUTO-MCNC: 80 MG/DL (ref 65–100)
GLUCOSE BLD STRIP.AUTO-MCNC: 85 MG/DL (ref 65–100)
GLUCOSE BLD STRIP.AUTO-MCNC: 93 MG/DL (ref 65–100)
GLUCOSE BLD STRIP.AUTO-MCNC: 97 MG/DL (ref 65–100)
GLUCOSE SERPL-MCNC: 89 MG/DL (ref 65–100)
HCT VFR BLD AUTO: 34.5 % (ref 35.8–46.3)
HDLC SERPL-MCNC: 71 MG/DL (ref 40–60)
HDLC SERPL: 2 {RATIO}
HGB BLD-MCNC: 11.2 G/DL (ref 11.7–15.4)
IMM GRANULOCYTES # BLD: 0 K/UL (ref 0–0.5)
IMM GRANULOCYTES NFR BLD AUTO: 1 % (ref 0–5)
LDLC SERPL CALC-MCNC: 55 MG/DL
LIPID PROFILE,FLP: ABNORMAL
LYMPHOCYTES # BLD: 1.8 K/UL (ref 0.5–4.6)
LYMPHOCYTES NFR BLD: 30 % (ref 13–44)
MCH RBC QN AUTO: 28.4 PG (ref 26.1–32.9)
MCHC RBC AUTO-ENTMCNC: 32.5 G/DL (ref 31.4–35)
MCV RBC AUTO: 87.3 FL (ref 79.6–97.8)
MONOCYTES # BLD: 0.4 K/UL (ref 0.1–1.3)
MONOCYTES NFR BLD: 6 % (ref 4–12)
NEUTS SEG # BLD: 3.5 K/UL (ref 1.7–8.2)
NEUTS SEG NFR BLD: 58 % (ref 43–78)
PLATELET # BLD AUTO: 216 K/UL (ref 150–450)
PMV BLD AUTO: 9.8 FL (ref 10.8–14.1)
POTASSIUM SERPL-SCNC: 3 MMOL/L (ref 3.5–5.1)
PROT SERPL-MCNC: 5.8 G/DL (ref 6.3–8.2)
RBC # BLD AUTO: 3.95 M/UL (ref 4.05–5.25)
SODIUM SERPL-SCNC: 139 MMOL/L (ref 136–145)
TRIGL SERPL-MCNC: 95 MG/DL (ref 35–150)
VLDLC SERPL CALC-MCNC: 19 MG/DL (ref 6–23)
WBC # BLD AUTO: 6 K/UL (ref 4.3–11.1)

## 2018-05-19 PROCEDURE — 74011000258 HC RX REV CODE- 258: Performed by: INTERNAL MEDICINE

## 2018-05-19 PROCEDURE — 74011250636 HC RX REV CODE- 250/636: Performed by: FAMILY MEDICINE

## 2018-05-19 PROCEDURE — 80053 COMPREHEN METABOLIC PANEL: CPT | Performed by: INTERNAL MEDICINE

## 2018-05-19 PROCEDURE — 85025 COMPLETE CBC W/AUTO DIFF WBC: CPT | Performed by: INTERNAL MEDICINE

## 2018-05-19 PROCEDURE — 36415 COLL VENOUS BLD VENIPUNCTURE: CPT | Performed by: INTERNAL MEDICINE

## 2018-05-19 PROCEDURE — 76937 US GUIDE VASCULAR ACCESS: CPT

## 2018-05-19 PROCEDURE — 80061 LIPID PANEL: CPT | Performed by: INTERNAL MEDICINE

## 2018-05-19 PROCEDURE — 87493 C DIFF AMPLIFIED PROBE: CPT | Performed by: INTERNAL MEDICINE

## 2018-05-19 PROCEDURE — 74011250637 HC RX REV CODE- 250/637: Performed by: INTERNAL MEDICINE

## 2018-05-19 PROCEDURE — 74011250637 HC RX REV CODE- 250/637: Performed by: FAMILY MEDICINE

## 2018-05-19 PROCEDURE — 82962 GLUCOSE BLOOD TEST: CPT

## 2018-05-19 PROCEDURE — 99218 HC RM OBSERVATION: CPT

## 2018-05-19 RX ORDER — ONDANSETRON 8 MG/1
8 TABLET, ORALLY DISINTEGRATING ORAL
Status: DISCONTINUED | OUTPATIENT
Start: 2018-05-19 | End: 2018-05-25 | Stop reason: HOSPADM

## 2018-05-19 RX ORDER — LORAZEPAM 1 MG/1
2 TABLET ORAL
Status: DISCONTINUED | OUTPATIENT
Start: 2018-05-19 | End: 2018-05-23

## 2018-05-19 RX ORDER — LANOLIN ALCOHOL/MO/W.PET/CERES
400 CREAM (GRAM) TOPICAL 2 TIMES DAILY
Status: DISCONTINUED | OUTPATIENT
Start: 2018-05-19 | End: 2018-05-25 | Stop reason: HOSPADM

## 2018-05-19 RX ORDER — LORAZEPAM 1 MG/1
1 TABLET ORAL
Status: DISCONTINUED | OUTPATIENT
Start: 2018-05-19 | End: 2018-05-23

## 2018-05-19 RX ORDER — POTASSIUM CHLORIDE 20 MEQ/1
20 TABLET, EXTENDED RELEASE ORAL 2 TIMES DAILY
Status: DISCONTINUED | OUTPATIENT
Start: 2018-05-19 | End: 2018-05-19

## 2018-05-19 RX ORDER — POTASSIUM CHLORIDE 20 MEQ/1
40 TABLET, EXTENDED RELEASE ORAL 2 TIMES DAILY
Status: COMPLETED | OUTPATIENT
Start: 2018-05-19 | End: 2018-05-21

## 2018-05-19 RX ADMIN — CHLORDIAZEPOXIDE HYDROCHLORIDE 25 MG: 25 CAPSULE ORAL at 22:50

## 2018-05-19 RX ADMIN — PANTOPRAZOLE SODIUM 40 MG: 40 TABLET, DELAYED RELEASE ORAL at 08:42

## 2018-05-19 RX ADMIN — POTASSIUM CHLORIDE 40 MEQ: 20 TABLET, EXTENDED RELEASE ORAL at 21:18

## 2018-05-19 RX ADMIN — Medication 100 MG: at 08:42

## 2018-05-19 RX ADMIN — SODIUM CHLORIDE 1000 ML: 900 INJECTION, SOLUTION INTRAVENOUS at 02:07

## 2018-05-19 RX ADMIN — CHLORDIAZEPOXIDE HYDROCHLORIDE 25 MG: 25 CAPSULE ORAL at 07:00

## 2018-05-19 RX ADMIN — Medication 400 MG: at 21:18

## 2018-05-19 RX ADMIN — POTASSIUM CHLORIDE 20 MEQ: 20 TABLET, EXTENDED RELEASE ORAL at 17:22

## 2018-05-19 RX ADMIN — ESCITALOPRAM OXALATE 10 MG: 10 TABLET ORAL at 08:42

## 2018-05-19 RX ADMIN — SENNOSIDES AND DOCUSATE SODIUM 1 TABLET: 8.6; 5 TABLET ORAL at 08:42

## 2018-05-19 RX ADMIN — FOLIC ACID 1 MG: 1 TABLET ORAL at 08:42

## 2018-05-19 RX ADMIN — QUETIAPINE FUMARATE 50 MG: 25 TABLET ORAL at 21:18

## 2018-05-19 RX ADMIN — POTASSIUM CHLORIDE 20 MEQ: 20 TABLET, EXTENDED RELEASE ORAL at 10:25

## 2018-05-19 RX ADMIN — CHLORDIAZEPOXIDE HYDROCHLORIDE 25 MG: 25 CAPSULE ORAL at 14:55

## 2018-05-19 NOTE — PROGRESS NOTES
Resting quietly, awake, resp even, unlab. Skin warm, dry. Oriented to bed controls, nurse call light. Oriented to person, place, situation, forgetful with time. Assessment noted. Instructed to call for asst to be out of bed with rationale given; agreed. Bed alarm set, door open. No c/o. No distress.

## 2018-05-19 NOTE — PROGRESS NOTES
Reported to Dr. Bj Mccall, pt's BP 86/62, pulse 90, drowsy, arousable, meds received with no distress noted. No new orders, with BP to be reassessed. Pt aware not to be out of bed without asst, bed alarm set.

## 2018-05-19 NOTE — PROGRESS NOTES
BP 76/54, reported to Dr. Ella Corona; orders received. Pt awoke easily during reassessment, resp even, unlab, skin warm, dry, with no distress noted.

## 2018-05-19 NOTE — PROGRESS NOTES
Pt resting in bed. Falls alarm on. Denies pain.   Actually appeared to be sleeping when this RN entered room and is calm so will hold Ativan po for now

## 2018-05-19 NOTE — PROGRESS NOTES
Original IV site Left arm no longer swollen, patent, flushing well with blood return. IVFs resumed, site to be reassessed.

## 2018-05-19 NOTE — PROGRESS NOTES
Up to bathroom with asst by DAVE Story and back to bed. Urine specimen obtained and to lab, per orders.

## 2018-05-19 NOTE — PROGRESS NOTES
Reported to Dr. Elie Zheng, pt with no IV access, after recent line, placed by PICC team, infiltrated, reported earlier this evening to Md. Plan is to continue to encourage PO fluid intake, IVFs dc'd, will address PO supplements.

## 2018-05-19 NOTE — PROGRESS NOTES
Progress Note    Patient: Nestor Moore MRN: [id number]  SSN: [id number]    YOB: 1960  Age: 62 y.o. Sex: female      Admit Date: 5/18/2018    LOS: 0 days     Subjective:     Nestor Moore is a 62 y.o. female who is an alcoholic. She drinks a pint of liquor each day for the past 30 years. She decided to stop drinking 2 days prior to admission and felt \"not good\". She could not think clearly. She was slow to find answers. She denied localized weakness more than her baseline. She has previous stroke with remnant right leg weakness.      She has hypertension, CKD, dementia, FSGS from history. She denies smoking. 5/19/2018  Feeling better. More aware and could think more clearly. Feeling nervous, however. Eating OK. Objective:     Vitals:    05/19/18 0347 05/19/18 0649 05/19/18 0729 05/19/18 1000   BP: 96/54 108/68 99/67 123/75   Pulse: 84 76 79 98   Resp: 20  18    Temp: 97.5 °F (36.4 °C)  97.5 °F (36.4 °C)    SpO2: 100%  100%    Weight:       Height:            Intake and Output:  Current Shift:    Last three shifts: 05/17 1901 - 05/19 0700  In: -   Out: 450 [Urine:450]    Physical Exam:     General:                    The patient is a female in no acute respiratory distress.  no tremors. DTAK:                                   [id number]. Less redness of face. dilation of superficial veins. Enlargement of parotid glands. Eyes:                                   No palpebral pallor or scleral icterus. ENT:                                    External auricular and nasal exam within normal limits.                                             GIUGWR membranes are moist.  Neck:                                   Supple, non-tender, no JVD. Lungs:                       Clear to auscultation bilaterally without wheezes or crackles.                                             No respiratory distress or accessory muscle use.   Heart:                                  Regular rate and rhythm, without murmurs, rubs, or gallops. Abdomen:                  Soft, non-tender, non-distended with normoactive bowel sounds. Genitourinary:           No tenderness over the bladder or bilateral CVAs. Extremities:               Without clubbing, cyanosis, or edema. Skin:                                    Normal color, texture, and turgor. No rashes, lesions, or jaundice. Pulses:                      Radial and dorsalis pedis pulses present 2+ bilaterally.                                               Capillary refill <2s. Neurologic:                CN II-XII grossly intact and symmetrical.                                               Moving all four extremities, right leg power 3+/5, other extremities 4-/5 power. Psychiatric:                Pleasant demeanor, appropriate affect.  Alert and oriented x 3       Lab/Data Review:    Recent Results (from the past 24 hour(s))   METABOLIC PANEL, BASIC    Collection Time: 05/18/18  1:50 PM   Result Value Ref Range    Sodium 135 (L) 136 - 145 mmol/L    Potassium 4.8 3.5 - 5.1 mmol/L    Chloride 100 98 - 107 mmol/L    CO2 19 (L) 21 - 32 mmol/L    Anion gap 16 7 - 16 mmol/L    Glucose 120 (H) 65 - 100 mg/dL    BUN 32 (H) 6 - 23 MG/DL    Creatinine 3.00 (H) 0.6 - 1.0 MG/DL    GFR est AA 21 (L) >60 ml/min/1.73m2    GFR est non-AA 17 (L) >60 ml/min/1.73m2    Calcium 9.6 8.3 - 10.4 MG/DL   ETHYL ALCOHOL    Collection Time: 05/18/18  1:50 PM   Result Value Ref Range    ALCOHOL(ETHYL),SERUM <3 MG/DL   TSH 3RD GENERATION    Collection Time: 05/18/18  1:50 PM   Result Value Ref Range    TSH 1.948 0.358 - 3.740 uIU/mL   VITAMIN B12    Collection Time: 05/18/18  1:50 PM   Result Value Ref Range    Vitamin B12 269 193 - 986 pg/mL   RPR    Collection Time: 05/18/18  1:50 PM   Result Value Ref Range    RPR NONREACTIVE NR     GLUCOSE, POC    Collection Time: 05/18/18  9:36 PM   Result Value Ref Range    Glucose (POC) 108 (H) 65 - 191 mg/dL   METABOLIC PANEL, COMPREHENSIVE    Collection Time: 05/18/18 10:04 PM   Result Value Ref Range    Sodium 137 136 - 145 mmol/L    Potassium 3.1 (L) 3.5 - 5.1 mmol/L    Chloride 106 98 - 107 mmol/L    CO2 19 (L) 21 - 32 mmol/L    Anion gap 12 7 - 16 mmol/L    Glucose 128 (H) 65 - 100 mg/dL    BUN 28 (H) 6 - 23 MG/DL    Creatinine 2.66 (H) 0.6 - 1.0 MG/DL    GFR est AA 24 (L) >60 ml/min/1.73m2    GFR est non-AA 20 (L) >60 ml/min/1.73m2    Calcium 8.2 (L) 8.3 - 10.4 MG/DL    Bilirubin, total 1.4 (H) 0.2 - 1.1 MG/DL    ALT (SGPT) 10 (L) 12 - 65 U/L    AST (SGOT) 13 (L) 15 - 37 U/L    Alk. phosphatase 128 50 - 136 U/L    Protein, total 5.8 (L) 6.3 - 8.2 g/dL    Albumin 3.0 (L) 3.5 - 5.0 g/dL    Globulin 2.8 2.3 - 3.5 g/dL    A-G Ratio 1.1 (L) 1.2 - 3.5     CBC WITH AUTOMATED DIFF    Collection Time: 05/18/18 10:04 PM   Result Value Ref Range    WBC 7.3 4.3 - 11.1 K/uL    RBC 3.97 (L) 4.05 - 5.25 M/uL    HGB 11.3 (L) 11.7 - 15.4 g/dL    HCT 34.2 (L) 35.8 - 46.3 %    MCV 86.1 79.6 - 97.8 FL    MCH 28.5 26.1 - 32.9 PG    MCHC 33.0 31.4 - 35.0 g/dL    RDW 15.9 (H) 11.9 - 14.6 %    PLATELET 651 813 - 100 K/uL    MPV 9.6 (L) 10.8 - 14.1 FL    DF AUTOMATED      NEUTROPHILS 69 43 - 78 %    LYMPHOCYTES 22 13 - 44 %    MONOCYTES 7 4.0 - 12.0 %    EOSINOPHILS 2 0.5 - 7.8 %    BASOPHILS 0 0.0 - 2.0 %    IMMATURE GRANULOCYTES 0 0.0 - 5.0 %    ABS. NEUTROPHILS 5.0 1.7 - 8.2 K/UL    ABS. LYMPHOCYTES 1.6 0.5 - 4.6 K/UL    ABS. MONOCYTES 0.5 0.1 - 1.3 K/UL    ABS. EOSINOPHILS 0.2 0.0 - 0.8 K/UL    ABS. BASOPHILS 0.0 0.0 - 0.2 K/UL    ABS. IMM.  GRANS. 0.0 0.0 - 0.5 K/UL   PROTHROMBIN TIME + INR    Collection Time: 05/18/18 10:04 PM   Result Value Ref Range    Prothrombin time 13.7 11.5 - 14.5 sec    INR 1.0     URINALYSIS W/ RFLX MICROSCOPIC    Collection Time: 05/18/18 10:16 PM   Result Value Ref Range    Color YELLOW      Appearance CLOUDY      Specific gravity 1.002 1.001 - 1.023      pH (UA) 5.5 5.0 - 9.0      Protein TRACE (A) NEG mg/dL    Glucose NEGATIVE  mg/dL    Ketone NEGATIVE  NEG mg/dL    Bilirubin NEGATIVE  NEG      Blood NEGATIVE  NEG      Urobilinogen 0.2 0.2 - 1.0 EU/dL    Nitrites NEGATIVE  NEG      Leukocyte Esterase TRACE (A) NEG      WBC 0-3 0 /hpf    RBC 0-3 0 /hpf    Epithelial cells 0-3 0 /hpf    Bacteria 0 0 /hpf    Casts 0-3 0 /lpf   DRUG SCREEN, URINE    Collection Time: 05/18/18 10:16 PM   Result Value Ref Range    PCP(PHENCYCLIDINE) NEGATIVE       BENZODIAZEPINES NEGATIVE       COCAINE NEGATIVE       AMPHETAMINES NEGATIVE       METHADONE NEGATIVE       THC (TH-CANNABINOL) POSITIVE      OPIATES NEGATIVE       BARBITURATES NEGATIVE      GLUCOSE, POC    Collection Time: 05/19/18  5:44 AM   Result Value Ref Range    Glucose (POC) 85 65 - 038 mg/dL   METABOLIC PANEL, COMPREHENSIVE    Collection Time: 05/19/18  6:45 AM   Result Value Ref Range    Sodium 139 136 - 145 mmol/L    Potassium 3.0 (L) 3.5 - 5.1 mmol/L    Chloride 108 (H) 98 - 107 mmol/L    CO2 20 (L) 21 - 32 mmol/L    Anion gap 11 7 - 16 mmol/L    Glucose 89 65 - 100 mg/dL    BUN 26 (H) 6 - 23 MG/DL    Creatinine 2.42 (H) 0.6 - 1.0 MG/DL    GFR est AA 27 (L) >60 ml/min/1.73m2    GFR est non-AA 22 (L) >60 ml/min/1.73m2    Calcium 8.2 (L) 8.3 - 10.4 MG/DL    Bilirubin, total 1.4 (H) 0.2 - 1.1 MG/DL    ALT (SGPT) 10 (L) 12 - 65 U/L    AST (SGOT) 14 (L) 15 - 37 U/L    Alk.  phosphatase 125 50 - 136 U/L    Protein, total 5.8 (L) 6.3 - 8.2 g/dL    Albumin 2.9 (L) 3.5 - 5.0 g/dL    Globulin 2.9 2.3 - 3.5 g/dL    A-G Ratio 1.0 (L) 1.2 - 3.5     CBC WITH AUTOMATED DIFF    Collection Time: 05/19/18  6:45 AM   Result Value Ref Range    WBC 6.0 4.3 - 11.1 K/uL    RBC 3.95 (L) 4.05 - 5.25 M/uL    HGB 11.2 (L) 11.7 - 15.4 g/dL    HCT 34.5 (L) 35.8 - 46.3 %    MCV 87.3 79.6 - 97.8 FL    MCH 28.4 26.1 - 32.9 PG    MCHC 32.5 31.4 - 35.0 g/dL    RDW 15.8 (H) 11.9 - 14.6 %    PLATELET 121 441 - 900 K/uL    MPV 9.8 (L) 10.8 - 14.1 FL    DF AUTOMATED      NEUTROPHILS 58 43 - 78 %    LYMPHOCYTES 30 13 - 44 %    MONOCYTES 6 4.0 - 12.0 %    EOSINOPHILS 4 0.5 - 7.8 %    BASOPHILS 1 0.0 - 2.0 %    IMMATURE GRANULOCYTES 1 0.0 - 5.0 %    ABS. NEUTROPHILS 3.5 1.7 - 8.2 K/UL    ABS. LYMPHOCYTES 1.8 0.5 - 4.6 K/UL    ABS. MONOCYTES 0.4 0.1 - 1.3 K/UL    ABS. EOSINOPHILS 0.2 0.0 - 0.8 K/UL    ABS. BASOPHILS 0.1 0.0 - 0.2 K/UL    ABS. IMM. GRANS. 0.0 0.0 - 0.5 K/UL   LIPID PANEL    Collection Time: 05/19/18  6:45 AM   Result Value Ref Range    LIPID PROFILE          Cholesterol, total 145 <200 MG/DL    Triglyceride 95 35 - 150 MG/DL    HDL Cholesterol 71 (H) 40 - 60 MG/DL    LDL, calculated 55 <100 MG/DL    VLDL, calculated 19 6.0 - 23.0 MG/DL    CHOL/HDL Ratio 2.0     GLUCOSE, POC    Collection Time: 05/19/18  7:23 AM   Result Value Ref Range    Glucose (POC) 80 65 - 100 mg/dL           Assessment:     Principal Problem:    Alcohol withdrawal ([facility] [address] (5/18/2018)    Active Problems:    Essential hypertension (9/11/2012)      FSGS (focal segmental glomerulosclerosis) (9/11/2012)      Weakness of right leg (12/2/2013)      CKD (chronic kidney disease) (9/18/2014)      OSMIN (acute kidney injury) ([facility] [address] (2/13/2018)      Acute metabolic encephalopathy (8/91/4257)      Alcohol abuse (2/13/2018)      Alcoholism ([facility] [address] (5/18/2018)      Hypertension (5/18/2018)    hypokalemia      Plan:     Continue IV fluid with nutritional supplements. Replete potassium. Continue Librium and Ativan PRN. Patient has no IV access. Will ask PICC team to put mid line on her. OSMIN on CKD, improving. Monitor renal function and intake and output. Avoid nephrotoxic agents. Advised her to stop using marijuana. Continue to monitor. I have discussed the plan of care with patient. DVT prophylaxis : SCD    Disposition : likely home tomorrow.          Signed By: Izabella Black MD     May 19, 2018

## 2018-05-19 NOTE — PROGRESS NOTES
Call placed to Dr. Naye Boone, discussed pt's dx, hx, PTA meds, current meds and clarified SQBS assessments; to address.

## 2018-05-19 NOTE — PROGRESS NOTES
Ativan 1 mg given IVP slowly, per pt request. Reminded to call for asst to be out of bed, agreed. Bed alarm set.

## 2018-05-19 NOTE — PROGRESS NOTES
After numerous unsuccessful attempts to start a new IV site by two nurses and nursing supervisor, Dr. Azalia Jimenez notified. No orders received with plan for oncoming nursing staff to start new IV.

## 2018-05-19 NOTE — PROGRESS NOTES
CM met with patient due to ETOH addiction listed in her chart. CM provided the patient with an application and brochure for [employer] (a [facility] addictions program for women), a list of all of the [place] [address] meetings in [facility], a [employer], and a handout on "[employer] and how to begin services with that agency. Materials left with the patient. No further needs identified at this time.

## 2018-05-19 NOTE — PROGRESS NOTES
Awake with no c/o during bedside report given to Maritza Guillory RN, including need for new IV site, pt having poor venous access, IVFs need to be resumed after new IV placed.

## 2018-05-19 NOTE — PROGRESS NOTES
IV site puffy with no pain. IVF bolus completed. Pt informed that a new IV site will need to be placed; voiced understanding.

## 2018-05-20 PROBLEM — A04.72 C. DIFFICILE COLITIS: Status: ACTIVE | Noted: 2018-05-20

## 2018-05-20 LAB
BACTERIA SPEC CULT: ABNORMAL
BACTERIA SPEC CULT: ABNORMAL
BACTERIA SPEC CULT: POSITIVE
GLUCOSE BLD STRIP.AUTO-MCNC: 112 MG/DL (ref 65–100)
GLUCOSE BLD STRIP.AUTO-MCNC: 114 MG/DL (ref 65–100)
GLUCOSE BLD STRIP.AUTO-MCNC: 95 MG/DL (ref 65–100)
GLUCOSE BLD STRIP.AUTO-MCNC: 96 MG/DL (ref 65–100)
SERVICE CMNT-IMP: ABNORMAL

## 2018-05-20 PROCEDURE — 74011250637 HC RX REV CODE- 250/637: Performed by: FAMILY MEDICINE

## 2018-05-20 PROCEDURE — 99218 HC RM OBSERVATION: CPT

## 2018-05-20 PROCEDURE — 74011250637 HC RX REV CODE- 250/637: Performed by: INTERNAL MEDICINE

## 2018-05-20 PROCEDURE — 82962 GLUCOSE BLOOD TEST: CPT

## 2018-05-20 RX ORDER — CHLORDIAZEPOXIDE HYDROCHLORIDE 25 MG/1
25 CAPSULE, GELATIN COATED ORAL EVERY 8 HOURS
Status: DISCONTINUED | OUTPATIENT
Start: 2018-05-20 | End: 2018-05-23

## 2018-05-20 RX ORDER — METRONIDAZOLE 500 MG/1
500 TABLET ORAL EVERY 8 HOURS
Status: DISCONTINUED | OUTPATIENT
Start: 2018-05-20 | End: 2018-05-25 | Stop reason: HOSPADM

## 2018-05-20 RX ADMIN — FOLIC ACID 1 MG: 1 TABLET ORAL at 09:48

## 2018-05-20 RX ADMIN — METRONIDAZOLE 500 MG: 500 TABLET ORAL at 17:18

## 2018-05-20 RX ADMIN — METRONIDAZOLE 500 MG: 500 TABLET ORAL at 09:47

## 2018-05-20 RX ADMIN — PANTOPRAZOLE SODIUM 40 MG: 40 TABLET, DELAYED RELEASE ORAL at 09:48

## 2018-05-20 RX ADMIN — LORAZEPAM 1 MG: 1 TABLET ORAL at 20:41

## 2018-05-20 RX ADMIN — CHLORDIAZEPOXIDE HYDROCHLORIDE 25 MG: 25 CAPSULE ORAL at 09:48

## 2018-05-20 RX ADMIN — Medication 400 MG: at 09:48

## 2018-05-20 RX ADMIN — Medication 400 MG: at 17:18

## 2018-05-20 RX ADMIN — QUETIAPINE FUMARATE 50 MG: 25 TABLET ORAL at 20:41

## 2018-05-20 RX ADMIN — SENNOSIDES AND DOCUSATE SODIUM 1 TABLET: 8.6; 5 TABLET ORAL at 09:48

## 2018-05-20 RX ADMIN — ESCITALOPRAM OXALATE 10 MG: 10 TABLET ORAL at 09:47

## 2018-05-20 RX ADMIN — Medication 100 MG: at 09:48

## 2018-05-20 RX ADMIN — METRONIDAZOLE 500 MG: 500 TABLET ORAL at 01:05

## 2018-05-20 RX ADMIN — CHLORDIAZEPOXIDE HYDROCHLORIDE 25 MG: 25 CAPSULE ORAL at 17:18

## 2018-05-20 RX ADMIN — ACETAMINOPHEN 650 MG: 325 TABLET ORAL at 20:41

## 2018-05-20 RX ADMIN — POTASSIUM CHLORIDE 40 MEQ: 20 TABLET, EXTENDED RELEASE ORAL at 09:48

## 2018-05-20 RX ADMIN — POTASSIUM CHLORIDE 40 MEQ: 20 TABLET, EXTENDED RELEASE ORAL at 17:18

## 2018-05-20 NOTE — PROGRESS NOTES
Progress Note    Patient: Fabiola Guzman MRN: [id number]  SSN: [id number]    YOB: 1960  Age: 62 y.o. Sex: female      Admit Date: 5/18/2018    LOS: 0 days     Subjective:     Fabiola Guzman is a 62 y.o. female who is an alcoholic. She drinks a pint of liquor each day for the past 30 years. She decided to stop drinking 2 days prior to admission and felt \"not good\". She could not think clearly. She was slow to find answers. She denied localized weakness more than her baseline. She has previous stroke with remnant right leg weakness.      She has hypertension, CKD, dementia, FSGS from history. She denies smoking. 5/19/2018  Feeling better. More aware and could think more clearly. Feeling nervous, however. Eating OK.     5/20/2018  Feeling less jittery. No jerking. No headache. No vision changes. Objective:     Vitals:    05/19/18 2055 05/19/18 2347 05/20/18 0515 05/20/18 0757   BP: 108/63 103/61 102/54 102/68   Pulse: 87 (!) 111 97 90   Resp: 18 16 17 16   Temp: 97.3 °F (36.3 °C) 97.9 °F (36.6 °C) 97.6 °F (36.4 °C) 97.7 °F (36.5 °C)   SpO2: 100% 98% 96% 97%   Weight:       Height:            Intake and Output:  Current Shift:    Last three shifts: 05/18 1901 - 05/20 0700  In: -   Out: [place] [Urine:1325]    Physical Exam:     General:                    The patient is a female in no acute respiratory distress.  no tremors. Resting and lying flat in bed. BHUR:                                   [id number]. Less redness of face. dilation of superficial veins. Enlargement of parotid glands. Eyes:                                   No palpebral pallor or scleral icterus. ENT:                                    External auricular and nasal exam within normal limits.                                             MRNTVE membranes are moist.  Neck:                                   Supple, non-tender, no JVD.   Lungs:                       Clear to auscultation bilaterally without wheezes or crackles.                                             No respiratory distress or accessory muscle use. Heart:                                  Regular rate and rhythm, without murmurs, rubs, or gallops. Abdomen:                  Soft, non-tender, non-distended with normoactive bowel sounds. Genitourinary:           No tenderness over the bladder or bilateral CVAs. Extremities:               Without clubbing, cyanosis, or edema. Skin:                                    Normal color, texture, and turgor. No rashes, lesions, or jaundice. Pulses:                      Radial and dorsalis pedis pulses present 2+ bilaterally.                                               Capillary refill <2s. Neurologic:                CN II-XII grossly intact and symmetrical.                                               Moving all four extremities, right leg power 3+/5, other extremities 4-/5 power. Psychiatric:                Pleasant demeanor, appropriate affect.  Alert and oriented x 3       Lab/Data Review:    Recent Results (from the past 24 hour(s))   GLUCOSE, POC    Collection Time: 05/19/18 12:20 PM   Result Value Ref Range    Glucose (POC) 93 65 - 100 mg/dL   C. DIFFICILE/EPI PCR    Collection Time: 05/19/18  2:55 PM   Result Value Ref Range    Special Requests: NO SPECIAL REQUESTS      Culture result: POSITIVE      Culture result: Toxigenic C Diff POS/[id number] PRESUMPTIVE NEG (A)      Culture result:        RESULTS VERIFIED, PHONED TO AND READ BACK BY  561 Maury NAIDU RN @5755 5/120/18     GLUCOSE, POC    Collection Time: 05/19/18  3:36 PM   Result Value Ref Range    Glucose (POC) 97 65 - 100 mg/dL   GLUCOSE, POC    Collection Time: 05/19/18  9:42 PM   Result Value Ref Range    Glucose (POC) 106 (H) 65 - 100 mg/dL   GLUCOSE, POC    Collection Time: 05/20/18  6:06 AM   Result Value Ref Range    Glucose (POC) 95 65 - 100 mg/dL   GLUCOSE, POC    Collection Time: 05/20/18  7:55 AM   Result Value Ref Range    Glucose (POC) 96 65 - 100 mg/dL           Assessment:     Principal Problem:    Alcohol withdrawal ([facility] [address] (5/18/2018)    Active Problems:    Essential hypertension (9/11/2012)      FSGS (focal segmental glomerulosclerosis) (9/11/2012)      Weakness of right leg (12/2/2013)      CKD (chronic kidney disease) (9/18/2014)      OSMIN (acute kidney injury) ([facility] [address] (2/13/2018)      Acute metabolic encephalopathy (4/85/5743)      Alcohol abuse (2/13/2018)      Alcoholism ([facility] [address] (5/18/2018)      Hypertension (5/18/2018)    hypokalemia  C. Difficile colitis    Plan:     Patient has no IV access, but is able to eat and drink adequately. Encourage oral fluid intake at least 2-3 liters per day. Continue to replete potassium. Continue Librium and Ativan PRN. OSMIN on CKD, improving. Monitor renal function and intake and output. Avoid nephrotoxic agents. Advised her to stop using marijuana. Continue to monitor. Check CBC, BMP, Mg, Phosphorus in AM.     Stool is positive for C. Difficile. On Metronidazole now. Monitor. Enteric precaution. I have discussed the plan of care with patient. DVT prophylaxis : SCD    Disposition : likely home tomorrow.          Signed By: Simin Bautista MD     May 20, 2018

## 2018-05-20 NOTE — PROGRESS NOTES
Up to bedside commode with asst, stance weak, unsteady. Voided without difficulty, alex care given, pt unable. Back to bed with no c/o. No distress noted Call light within reach with reminder to call for asst to be out of bed; agreed. Bed alarm set, door open.

## 2018-05-20 NOTE — PROGRESS NOTES
Shift assessment complete. Patient alert and oriented person, place, and time albeit slow to respond. Respirations present, even and unlabored and HOB elevated. Patient denies any SOB at this time. S1 & S2 auscultated, heart with regular rate and rhythm. Abdomen soft and non tender, bowel sounds active in all 4 quadrants. No pressure ulcers noted, but she has 2+ edema to the right upper arm. Patient's skin assessment shows ecchymosis to bilateral upper arms, but is otherwise intact. Patient is up with assistance to the bedside toilet. She is reported to be very unsteady. Her  is weak bilaterally but stronger on the left than the right. Patient has no IV access. Patient denies any pain at this time and instructed to call for assistance for any needs. Patient verbalizes understanding. The bed is low and locked in position, side rails x3, call light within reach. Will continue to follow patient's progression through hourly rounding. All patient's needs will be met as requested and as appropriate.

## 2018-05-20 NOTE — PROGRESS NOTES
Resting quietly, awake with no c/o. Skin warm, dry. RUE with 2+ edema, elevated on pillow. Head of bed elevated. Oriented to person, place, situation, forgetful with time. Reminded to call for asst to be out of bed, agreed. Bed alarm set, call light within reach. No needs, no distress noted.

## 2018-05-20 NOTE — PROGRESS NOTES
Received bedside shift report from off going nurse Kalee Genao RN which included SBAR, MAR, and Plan of Care. Patient is resting quietly with eyes closed and respirations present. Will continue to follow patient's progression through hourly rounding. Will meet all requests as needed and appropriate.

## 2018-05-20 NOTE — PROGRESS NOTES
Continues to rest quietly, resp even, unlab, skin warm, dry. Eyes closed with relaxed facial expression during bedside report given to Anita Damon RN, including adjusted time for Librium to 0730, due to pt's drowsiness when med due. Pt drowsy, arouseable, then returning to sleep. No distress noted.

## 2018-05-20 NOTE — PROGRESS NOTES
Problem: Nutrition Deficit  Goal: *Optimize nutritional status  Nutrition Assessment for: [facility] nutrition referral received with identified risk factor of \"unsure\" amount of wt loss. Assessment:   Current Diet: Regular (since 5/19), CLD on 5/18  Pt admitted with alcohol withdrawal after she stopped drinking around 5/16/18. Pt also found with C.diff. She has been drinking about 1 pint liquor/day for 30 years. PMH of HTN, CKD, and dementia. Pt reports her appetite is \"okay\" and she has been able to eat most of meals provided since diet advanced. Per MD note ghanshyam, pt is able to \"eat and drink adequately\". Pt declines recent wt loss. Per chart review, pt lost ~10# in February, but wt has remained stable since previous admission if wt data is accurate. Anthropometrics:  Height: 5' 6\" (167.6 cm), Weight: 81.6 kg (180 lb), wt loss not specified, Body mass index is 29.05 kg/(m^2). BMI class of overweight. Macronutrient needs:  EER: 1688-6441 kcal/day 20-25 kcal/kg CBW (Current body weight)  EPR: 47 g/day (0.8 g/kg IBW (Ideal body weight), upper end of CKD protein recommended range, GFR 22 improving     Intake/Comparative Standards: No recorded intake. Patient reports she's consuming 75% of regular diet. This potentially meets 92% of kcal needs and 100% of protein needs. Nutrition Diagnosis:  No diagnosis at this time. Nutrition Intervention:  Meals and snacks: Continue current diet. Pt refused any nutritional supplements. Discharge Planning: Too soon to determine.     Leny Vanegas, [address] [place] [address],   [phone]

## 2018-05-21 LAB
ANION GAP SERPL CALC-SCNC: 11 MMOL/L (ref 7–16)
BASOPHILS # BLD: 0.1 K/UL (ref 0–0.2)
BASOPHILS NFR BLD: 1 % (ref 0–2)
BUN SERPL-MCNC: 25 MG/DL (ref 6–23)
CALCIUM SERPL-MCNC: 8.6 MG/DL (ref 8.3–10.4)
CHLORIDE SERPL-SCNC: 111 MMOL/L (ref 98–107)
CO2 SERPL-SCNC: 20 MMOL/L (ref 21–32)
CREAT SERPL-MCNC: 2.52 MG/DL (ref 0.6–1)
DIFFERENTIAL METHOD BLD: ABNORMAL
EOSINOPHIL # BLD: 0.3 K/UL (ref 0–0.8)
EOSINOPHIL NFR BLD: 5 % (ref 0.5–7.8)
ERYTHROCYTE [DISTWIDTH] IN BLOOD BY AUTOMATED COUNT: 16.4 % (ref 11.9–14.6)
GLUCOSE BLD STRIP.AUTO-MCNC: 107 MG/DL (ref 65–100)
GLUCOSE BLD STRIP.AUTO-MCNC: 83 MG/DL (ref 65–100)
GLUCOSE BLD STRIP.AUTO-MCNC: 88 MG/DL (ref 65–100)
GLUCOSE SERPL-MCNC: 103 MG/DL (ref 65–100)
HCT VFR BLD AUTO: 36.5 % (ref 35.8–46.3)
HGB BLD-MCNC: 11.2 G/DL (ref 11.7–15.4)
IMM GRANULOCYTES # BLD: 0 K/UL (ref 0–0.5)
IMM GRANULOCYTES NFR BLD AUTO: 0 % (ref 0–5)
LYMPHOCYTES # BLD: 1.8 K/UL (ref 0.5–4.6)
LYMPHOCYTES NFR BLD: 30 % (ref 13–44)
MAGNESIUM SERPL-MCNC: 1.8 MG/DL (ref 1.8–2.4)
MCH RBC QN AUTO: 28.3 PG (ref 26.1–32.9)
MCHC RBC AUTO-ENTMCNC: 30.7 G/DL (ref 31.4–35)
MCV RBC AUTO: 92.2 FL (ref 79.6–97.8)
MONOCYTES # BLD: 0.4 K/UL (ref 0.1–1.3)
MONOCYTES NFR BLD: 7 % (ref 4–12)
NEUTS SEG # BLD: 3.5 K/UL (ref 1.7–8.2)
NEUTS SEG NFR BLD: 57 % (ref 43–78)
PHOSPHATE SERPL-MCNC: 3.4 MG/DL (ref 2.5–4.5)
PLATELET # BLD AUTO: 184 K/UL (ref 150–450)
PMV BLD AUTO: 10 FL (ref 10.8–14.1)
POTASSIUM SERPL-SCNC: 5.3 MMOL/L (ref 3.5–5.1)
RBC # BLD AUTO: 3.96 M/UL (ref 4.05–5.25)
SODIUM SERPL-SCNC: 142 MMOL/L (ref 136–145)
WBC # BLD AUTO: 6.1 K/UL (ref 4.3–11.1)

## 2018-05-21 PROCEDURE — 65270000029 HC RM PRIVATE

## 2018-05-21 PROCEDURE — 85025 COMPLETE CBC W/AUTO DIFF WBC: CPT | Performed by: INTERNAL MEDICINE

## 2018-05-21 PROCEDURE — 83735 ASSAY OF MAGNESIUM: CPT | Performed by: INTERNAL MEDICINE

## 2018-05-21 PROCEDURE — 80048 BASIC METABOLIC PNL TOTAL CA: CPT | Performed by: INTERNAL MEDICINE

## 2018-05-21 PROCEDURE — 82962 GLUCOSE BLOOD TEST: CPT

## 2018-05-21 PROCEDURE — 99218 HC RM OBSERVATION: CPT

## 2018-05-21 PROCEDURE — 36415 COLL VENOUS BLD VENIPUNCTURE: CPT | Performed by: INTERNAL MEDICINE

## 2018-05-21 PROCEDURE — 74011250637 HC RX REV CODE- 250/637: Performed by: INTERNAL MEDICINE

## 2018-05-21 PROCEDURE — 74011250637 HC RX REV CODE- 250/637: Performed by: FAMILY MEDICINE

## 2018-05-21 PROCEDURE — 84100 ASSAY OF PHOSPHORUS: CPT | Performed by: INTERNAL MEDICINE

## 2018-05-21 RX ADMIN — CHLORDIAZEPOXIDE HYDROCHLORIDE 25 MG: 25 CAPSULE ORAL at 17:26

## 2018-05-21 RX ADMIN — QUETIAPINE FUMARATE 50 MG: 25 TABLET ORAL at 21:37

## 2018-05-21 RX ADMIN — FOLIC ACID 1 MG: 1 TABLET ORAL at 09:05

## 2018-05-21 RX ADMIN — ESCITALOPRAM OXALATE 10 MG: 10 TABLET ORAL at 09:04

## 2018-05-21 RX ADMIN — METRONIDAZOLE 500 MG: 500 TABLET ORAL at 17:26

## 2018-05-21 RX ADMIN — SENNOSIDES AND DOCUSATE SODIUM 1 TABLET: 8.6; 5 TABLET ORAL at 09:04

## 2018-05-21 RX ADMIN — METRONIDAZOLE 500 MG: 500 TABLET ORAL at 09:05

## 2018-05-21 RX ADMIN — Medication 400 MG: at 17:26

## 2018-05-21 RX ADMIN — CHLORDIAZEPOXIDE HYDROCHLORIDE 25 MG: 25 CAPSULE ORAL at 01:01

## 2018-05-21 RX ADMIN — Medication 5 ML: at 21:38

## 2018-05-21 RX ADMIN — CHLORDIAZEPOXIDE HYDROCHLORIDE 25 MG: 25 CAPSULE ORAL at 09:04

## 2018-05-21 RX ADMIN — Medication 100 MG: at 09:04

## 2018-05-21 RX ADMIN — PANTOPRAZOLE SODIUM 40 MG: 40 TABLET, DELAYED RELEASE ORAL at 09:04

## 2018-05-21 RX ADMIN — Medication 400 MG: at 09:05

## 2018-05-21 RX ADMIN — METRONIDAZOLE 500 MG: 500 TABLET ORAL at 01:01

## 2018-05-21 RX ADMIN — POTASSIUM CHLORIDE 40 MEQ: 20 TABLET, EXTENDED RELEASE ORAL at 09:04

## 2018-05-21 NOTE — PROGRESS NOTES
Progress Note    Patient: Fabiola Guzman MRN: [id number]  SSN: [id number]    YOB: 1960  Age: 62 y.o. Sex: female      Admit Date: 5/18/2018    LOS: 0 days     Subjective:     Fabiola Guzman is a 62 y.o. female who is an alcoholic.      She has hypertension, CKD, dementia, FSGS from history. She denies smoking. Today she feels better. More energy. Still with lose stool. Objective:     Vitals:    05/20/18 2007 05/20/18 2320 05/21/18 0414 05/21/18 0849   BP: 121/70 133/78 109/75 119/85   Pulse: 93 92 78 80   Resp: 16 20 16 16   Temp: 97.9 °F (36.6 °C) 98.6 °F (37 °C) 97.6 °F (36.4 °C) 96.9 °F (36.1 °C)   SpO2: 99% 96% 98% 99%   Weight:       Height:            Intake and Output:  Current Shift:    Last three shifts: 05/19 1901 - 05/21 0700  In: 360 [P.O.:360]  Out: 1275 [Urine:1275]    Physical Exam:     General:                    The patient is a female in no acute respiratory distress.  no tremors. Resting and lying flat in bed. LNMO:                                   [id number]/LKUQKIFIWJ. dilation of superficial veins. Enlargement of parotid glands. Eyes:                                   No palpebral pallor or scleral icterus. ENT:                                    External auricular and nasal exam within normal limits.                                             IIWNJD membranes are moist.  Neck:                                   Supple, non-tender, no JVD. Lungs:                       Clear to auscultation bilaterally without wheezes or crackles.                                             No respiratory distress or accessory muscle use. Heart:                                  Regular rate and rhythm, without murmurs, rubs, or gallops. Abdomen:                  Soft, non-tender, non-distended with normoactive bowel sounds. Genitourinary:           No tenderness over the bladder or bilateral CVAs.   Extremities:               Without clubbing, cyanosis, or edema.  Skin:                                    no lesions. No cellulitis. Pulses:                      Radial and dorsalis pedis pulses present 2+ bilaterally.                                               Capillary refill <2s. Neurologic:                CN II-XII grossly intact and symmetrical.                                               Moving all four extremities, right leg power 3+/5, other extremities 4-/5 power. Psychiatric:                Pleasant demeanor, appropriate affect. Alert and oriented x 3       Lab/Data Review:    Recent Results (from the past 24 hour(s))   GLUCOSE, POC    Collection Time: 05/20/18  1:41 PM   Result Value Ref Range    Glucose (POC) 114 (H) 65 - 100 mg/dL   GLUCOSE, POC    Collection Time: 05/20/18  9:14 PM   Result Value Ref Range    Glucose (POC) 112 (H) 65 - 100 mg/dL   CBC WITH AUTOMATED DIFF    Collection Time: 05/21/18  6:15 AM   Result Value Ref Range    WBC 6.1 4.3 - 11.1 K/uL    RBC 3.96 (L) 4.05 - 5.25 M/uL    HGB 11.2 (L) 11.7 - 15.4 g/dL    HCT 36.5 35.8 - 46.3 %    MCV 92.2 79.6 - 97.8 FL    MCH 28.3 26.1 - 32.9 PG    MCHC 30.7 (L) 31.4 - 35.0 g/dL    RDW 16.4 (H) 11.9 - 14.6 %    PLATELET 255 740 - 017 K/uL    MPV 10.0 (L) 10.8 - 14.1 FL    DF AUTOMATED      NEUTROPHILS 57 43 - 78 %    LYMPHOCYTES 30 13 - 44 %    MONOCYTES 7 4.0 - 12.0 %    EOSINOPHILS 5 0.5 - 7.8 %    BASOPHILS 1 0.0 - 2.0 %    IMMATURE GRANULOCYTES 0 0.0 - 5.0 %    ABS. NEUTROPHILS 3.5 1.7 - 8.2 K/UL    ABS. LYMPHOCYTES 1.8 0.5 - 4.6 K/UL    ABS. MONOCYTES 0.4 0.1 - 1.3 K/UL    ABS. EOSINOPHILS 0.3 0.0 - 0.8 K/UL    ABS. BASOPHILS 0.1 0.0 - 0.2 K/UL    ABS. IMM.  GRANS. 0.0 0.0 - 0.5 K/UL   METABOLIC PANEL, BASIC    Collection Time: 05/21/18  6:15 AM   Result Value Ref Range    Sodium 142 136 - 145 mmol/L    Potassium 5.3 (H) 3.5 - 5.1 mmol/L    Chloride 111 (H) 98 - 107 mmol/L    CO2 20 (L) 21 - 32 mmol/L    Anion gap 11 7 - 16 mmol/L    Glucose 103 (H) 65 - 100 mg/dL    BUN 25 (H) 6 - 23 MG/DL    Creatinine 2.52 (H) 0.6 - 1.0 MG/DL    GFR est AA 25 (L) >60 ml/min/1.73m2    GFR est non-AA 21 (L) >60 ml/min/1.73m2    Calcium 8.6 8.3 - 10.4 MG/DL   MAGNESIUM    Collection Time: 05/21/18  6:15 AM   Result Value Ref Range    Magnesium 1.8 1.8 - 2.4 mg/dL   PHOSPHORUS    Collection Time: 05/21/18  6:15 AM   Result Value Ref Range    Phosphorus 3.4 2.5 - 4.5 MG/DL   GLUCOSE, POC    Collection Time: 05/21/18  6:15 AM   Result Value Ref Range    Glucose (POC) 88 65 - 100 mg/dL           Assessment:     Principal Problem:    Alcohol withdrawal ([address] (5/18/2018)    Active Problems:    Essential hypertension (9/11/2012)      FSGS (focal segmental glomerulosclerosis) (9/11/2012)      Weakness of right leg (12/2/2013)      CKD (chronic kidney disease) (9/18/2014)      OSMIN (acute kidney injury) ([address] (2/13/2018)      Acute metabolic encephalopathy (1/55/9107)      Alcohol abuse (2/13/2018)      Alcoholism ([address] (5/18/2018)      Hypertension (5/18/2018)      C. difficile colitis (5/20/2018)    hypokalemia  C. Difficile colitis    Plan:     Continue to encourage oral fluid intake at least 2-3 liters per day. Continue Librium and Ativan PRN. Cut back on frequency. OSMIN on CKD, improving. Monitor renal function and intake and output. Avoid nephrotoxic agents. I have advised her to stop using marijuana. Continue to monitor. Stool is positive for C. Difficile. On Metronidazole now. Monitor. Enteric precaution. Still with lose stool, but frequency is less. I have discussed the plan of care with patient. DVT prophylaxis : SCD    Disposition plan : 1-2 days.          Signed By: Ramakrishna Reno MD     May 21, 2018

## 2018-05-21 NOTE — PHYSICIAN ADVISORY
Letter of Determination: Upgrade from Observation to Inpatient Status    This patient was originally hospitalized as Outpatient Status with Observation Services on 5/18/2018 for alcohol withdrawal.  This patient now meets for Inpatient Admission based on medical necessity. The patient's stay was medically necessary based on history of alcohol dependence, vital signs significant for oxygen saturation of 88% on room air, pulse to 111 beats per minute, and blood pressure to 76/54 mmHg. It is our recommendation that this patient's hospitalization status should be upgraded from OBSERVATION to INPATIENT status.      The final decision regarding the patient's hospitalization status depends on the attending physician's judgement.     Romi Ashby MD, QASIM,   Physician [place].

## 2018-05-21 NOTE — PROGRESS NOTES
Assessment completed and documented. Pt resting in bed eyes closed, alert to name, oriented x4, complaint of general body ache and some nervousness, PRN tylenol and ativan given (See EMAR). Encouraging fluid intake, pt voids with assistance. VSS, lungs clear diminished on room air, no acute distress observed. Call bell in reach, bed locked and low, will continue to monitor hourly and as needed,bed exit alarm enabled.

## 2018-05-21 NOTE — PROGRESS NOTES
Problem: Interdisciplinary Rounds  Goal: Interdisciplinary Rounds  Interdisciplinary team rounds were held 5/21/2018 with the following team members:Care Management, Nursing, Nutrition, Pharmacy, Physical Therapy and Physician and the patient. Plan of care discussed. See clinical pathway and/or care plan for interventions and desired outcomes.

## 2018-05-21 NOTE — PROGRESS NOTES
Problem: Falls - Risk of  Goal: *Absence of Falls  Document Damien Fall Risk and appropriate interventions in the flowsheet.    Outcome: Progressing Towards Goal  Fall Risk Interventions:  Mobility Interventions: PT Consult for mobility concerns, Bed/chair exit alarm    Mentation Interventions: Bed/chair exit alarm, Door open when patient unattended, Adequate sleep, hydration, pain control    Medication Interventions: Bed/chair exit alarm, Patient to call before getting OOB    Elimination Interventions: Bed/chair exit alarm    History of Falls Interventions: Bed/chair exit alarm, Door open when patient unattended

## 2018-05-21 NOTE — PROGRESS NOTES
sw visited with patient as no PCP listed in chart. Patient states no PCP and no insurance. Pt was asleep; sw will return during ID rounds. Pt states she have the same insurance as her spouse; which is also a pt within the hospital. sw will confirm with CM if this is true and if I need to update her face sheet. DCR.

## 2018-05-21 NOTE — PROGRESS NOTES
Shift Assessment  - Patient is alert and oriented. No complaint of pain or discomfort at this time. Lungs are clear. Respirations are even and unlabored. Currently resting in a low, locked bed with call light within reach. , also a patient, is at bedside.

## 2018-05-22 LAB
GLUCOSE BLD STRIP.AUTO-MCNC: 112 MG/DL (ref 65–100)
GLUCOSE BLD STRIP.AUTO-MCNC: 93 MG/DL (ref 65–100)
GLUCOSE BLD STRIP.AUTO-MCNC: 94 MG/DL (ref 65–100)

## 2018-05-22 PROCEDURE — 74011250637 HC RX REV CODE- 250/637: Performed by: INTERNAL MEDICINE

## 2018-05-22 PROCEDURE — 74011000302 HC RX REV CODE- 302: Performed by: INTERNAL MEDICINE

## 2018-05-22 PROCEDURE — 82962 GLUCOSE BLOOD TEST: CPT

## 2018-05-22 PROCEDURE — 86580 TB INTRADERMAL TEST: CPT | Performed by: INTERNAL MEDICINE

## 2018-05-22 PROCEDURE — 65270000029 HC RM PRIVATE

## 2018-05-22 PROCEDURE — 97161 PT EVAL LOW COMPLEX 20 MIN: CPT

## 2018-05-22 PROCEDURE — 74011250637 HC RX REV CODE- 250/637: Performed by: FAMILY MEDICINE

## 2018-05-22 RX ADMIN — METRONIDAZOLE 500 MG: 500 TABLET ORAL at 18:32

## 2018-05-22 RX ADMIN — ESCITALOPRAM OXALATE 10 MG: 10 TABLET ORAL at 09:38

## 2018-05-22 RX ADMIN — Medication 100 MG: at 09:38

## 2018-05-22 RX ADMIN — METRONIDAZOLE 500 MG: 500 TABLET ORAL at 09:37

## 2018-05-22 RX ADMIN — CHLORDIAZEPOXIDE HYDROCHLORIDE 25 MG: 25 CAPSULE ORAL at 09:37

## 2018-05-22 RX ADMIN — TUBERCULIN PURIFIED PROTEIN DERIVATIVE 5 UNITS: 5 INJECTION, SOLUTION INTRADERMAL at 14:42

## 2018-05-22 RX ADMIN — CHLORDIAZEPOXIDE HYDROCHLORIDE 25 MG: 25 CAPSULE ORAL at 18:32

## 2018-05-22 RX ADMIN — Medication 10 ML: at 06:00

## 2018-05-22 RX ADMIN — METRONIDAZOLE 500 MG: 500 TABLET ORAL at 00:31

## 2018-05-22 RX ADMIN — CHLORDIAZEPOXIDE HYDROCHLORIDE 25 MG: 25 CAPSULE ORAL at 00:31

## 2018-05-22 RX ADMIN — Medication 10 ML: at 22:00

## 2018-05-22 RX ADMIN — QUETIAPINE FUMARATE 50 MG: 25 TABLET ORAL at 21:48

## 2018-05-22 RX ADMIN — PANTOPRAZOLE SODIUM 40 MG: 40 TABLET, DELAYED RELEASE ORAL at 09:38

## 2018-05-22 RX ADMIN — SENNOSIDES AND DOCUSATE SODIUM 1 TABLET: 8.6; 5 TABLET ORAL at 09:38

## 2018-05-22 RX ADMIN — Medication 400 MG: at 09:37

## 2018-05-22 RX ADMIN — Medication 400 MG: at 18:32

## 2018-05-22 RX ADMIN — FOLIC ACID 1 MG: 1 TABLET ORAL at 09:37

## 2018-05-22 NOTE — PROGRESS NOTES
Problem: Mobility Impaired (Adult and Pediatric)  Goal: *Acute Goals and Plan of Care (Insert Text)  STG:  (1.)Ms. Danie Park will move from supine to sit and sit to supine  with MIN A within 1-2 treatment day(s). (2.)Ms. Danie Park will transfer from bed to chair and chair to bed with MIN A using the least restrictive device within 1-2 treatment day(s). (3.)Ms. Danie Park will ambulate with MINIMAL ASSIST for 10-25 feet with the least restrictive device within 1-2 treatment day(s). LTG:  (1.)Ms. Danie Park will move from supine to sit and sit to supine  in bed with CGA-INDEPENDENT within 3-7 treatment day(s). (2.)Ms. Danie Park will transfer from bed to chair and chair to bed with CGA-INDEPENDENT using the least restrictive device within 3-7 treatment day(s). (3.)Ms. Danie Park will ambulate with CONTACT GUARD ASSIST-INDEPENDENT for  feet with the least restrictive device within 3-7 treatment day(s). ________________________________________________________________________________________________    Outcome: Progressing Towards Goal    PHYSICAL THERAPY: Initial Assessment 5/22/2018  INPATIENT: Hospital Day: 5  Payor: SELF PAY / Plan: [facility] SELF PAY / Product Type: Self Pay /      NAME/AGE/GENDER: Stephie South is a 62 y.o. female   PRIMARY DIAGNOSIS: Alcohol withdrawal ([address]  Alcoholism ([address]  Hypertension  Alcoholism ([facility] [address] Alcohol withdrawal ([facility] [address] Alcohol withdrawal ([facility] [address]        ICD-10: Treatment Diagnosis:    · Difficulty in walking, Not elsewhere classified (R26.2)  · Other abnormalities of gait and mobility (R26.89)   Precaution/Allergies:  Codeine and Lortab [hydrocodone-acetaminophen]      ASSESSMENT:     Ms. Danie Park presents with decreased independence with functional mobility. Therapy will maximize independence with functional mobility. Hope to progress and encourage pt to participate with activity.     This section established at most recent assessment   PROBLEM LIST (Impairments causing functional limitations):  1. Decreased Strength  2. Decreased Transfer Abilities  3. Decreased Ambulation Ability/Technique  4. Decreased Balance  5. Increased Pain  6. Decreased Activity Tolerance  7. Decreased Flexibility/Joint Mobility   INTERVENTIONS PLANNED: (Benefits and precautions of physical therapy have been discussed with the patient.)  1. Bed Mobility  2. Gait Training  3. Therapeutic Activites  4. Therapeutic Exercise/Strengthening  5. Transfer Training  6. Group Therapy     TREATMENT PLAN: Frequency/Duration: daily for duration of hospital stay  Rehabilitation Potential For Stated Goals: Good     RECOMMENDED REHABILITATION/EQUIPMENT: (at time of discharge pending progress): Due to the probability of continued deficits (see above) this patient will likely need continued skilled physical therapy after discharge. Equipment:    None at this time              HISTORY:   History of Present Injury/Illness (Reason for Referral):  Pt with decreased independence with functional mobility. Past Medical History/Comorbidities:   Ms. Mili Thorpe  has a past medical history of Aneurysm of common carotid artery ([facility] [facility] (2004); CKD (chronic kidney disease) (9/18/2014); Dementia; FSGS (focal segmental glomerulosclerosis); Gout; Hypertension; Osteoarthritis (9/18/2014); and Stroke [facility]) (2004, 2013). Ms. Mili Thorpe  has a past surgical history that includes hx intracranial aneurysm repair (2004); hx refractive surgery; hx orthopaedic (Right, 10/2014); hx ankle fracture tx (Left, 2013); hx ercp (02/27/2018); and hx cholecystectomy (02/28/2018).   Social History/Living Environment:   Home Environment: Private residence  # Steps to Enter:  (none)  One/Two Story Residence: Two story  # of Interior Steps: 17  Height of Each Step (in):  (unsure)  Interior Rails: None  Lift Chair Available: No  Living Alone: No  Patient Expects to be Discharged to[de-identified] Private residence  Current DME Used/Available at Home: None  Prior Level of Function/Work/Activity:  Pt ambulates with walker or holding on to her . Number of Personal Factors/Comorbidities that affect the Plan of Care: 0: LOW COMPLEXITY   EXAMINATION:   Most Recent Physical Functioning:   Gross Assessment:  AROM: Generally decreased, functional  Strength: Generally decreased, functional  Coordination: Generally decreased, functional               Posture:  Posture (WDL): Within defined limits  Balance:  Sitting: Intact  Standing: Intact Bed Mobility:  Supine to Sit: Contact guard assistance  Sit to Supine: Assist x2; Moderate assistance  Wheelchair Mobility:     Transfers:  Sit to Stand: Minimum assistance  Stand to Sit: Minimum assistance  Bed to Chair: Moderate assistance;Assist x2  Gait:      Amb 8' with min A (handheld assistance)      Body Structures Involved:  1. Bones  2. Joints  3. Muscles  4. Ligaments Body Functions Affected:  1. Neuromusculoskeletal  2. Movement Related Activities and Participation Affected:  1. Mobility   Number of elements that affect the Plan of Care: 4+: HIGH COMPLEXITY   CLINICAL PRESENTATION:   Presentation: Stable and uncomplicated: LOW COMPLEXITY   CLINICAL DECISION MAKIN [facility] [address] [id number] AM-PAC 6 Clicks   Basic Mobility Inpatient Short Form  How much difficulty does the patient currently have. .. Unable A Lot A Little None   1. Turning over in bed (including adjusting bedclothes, sheets and blankets)? [] 1   [] 2   [x] 3   [] 4   2. Sitting down on and standing up from a chair with arms ( e.g., wheelchair, bedside commode, etc.)   [] 1   [] 2   [x] 3   [] 4   3. Moving from lying on back to sitting on the side of the bed? [] 1   [] 2   [x] 3   [] 4   How much help from another person does the patient currently need. .. Total A Lot A Little None   4. Moving to and from a bed to a chair (including a wheelchair)? [] 1   [] 2   [x] 3   [] 4   5. Need to walk in hospital room? [] 1   [] 2   [x] 3   [] 4   6.   Climbing 3-5 steps with a railing? [] 1   [] 2   [x] 3   [] 4   © 2007, Trustees of [address] [place] [address] [address] [address], under license to [employer]. All rights reserved      Score:  Initial: 18 Most Recent: X (Date: -- )    Interpretation of Tool:  Represents activities that are increasingly more difficult (i.e. Bed mobility, Transfers, Gait). Score 24 23 22-20 19-15 14-10 9-7 6     Modifier CH CI CJ CK CL CM CN      ? Mobility - Walking and Moving Around:     - CURRENT STATUS: CK - 40%-59% impaired, limited or restricted    - GOAL STATUS: CK - 40%-59% impaired, limited or restricted    - D/C STATUS:  CK - 40%-59% impaired, limited or restricted  Payor: SELF PAY / Plan: [facility] SELF PAY / Product Type: Self Pay /      Medical Necessity:     · Skilled intervention continues to be required due to decreased mobility ability. Reason for Services/Other Comments:  · Patient continues to require skilled intervention due to decraesed mobility ability. Use of outcome tool(s) and clinical judgement create a POC that gives a: Clear prediction of patient's progress: LOW COMPLEXITY            TREATMENT:   (In addition to Assessment/Re-Assessment sessions the following treatments were rendered)   Pre-treatment Symptoms/Complaints:  none  Pain: Initial:   Pain Intensity 1: 0  Post Session:  none     Assessment/Reassessment only, no treatment provided today    Braces/Orthotics/Lines/Etc:   · O2 Device: Room air  Treatment/Session Assessment:    · Response to Treatment:  Pt agreeable to take steps to bed. Pt declined walking around bed. · Interdisciplinary Collaboration:   o Physical Therapist  o Registered Nurse  o Certified Nursing Assistant/Patient Care Technician  · After treatment position/precautions:   o Supine in bed  o Bed/Chair-wheels locked  o Bed in low position  o Caregiver at bedside  o Call light within reach  o RN notified   · Compliance with Program/Exercises: compliant most of the time.   · Recommendations/Intent for next treatment session: \"Next visit will focus on advancements to more challenging activities and reduction in assistance provided\".   Total Treatment Duration:  PT Patient Time In/Time Out  Time In: 6451  Time Out: 1614 Diana Spence, PT

## 2018-05-22 NOTE — PROGRESS NOTES
Progress Note    Patient: Wali Lee MRN: [id number]  SSN: [id number]    YOB: 1960  Age: 62 y.o. Sex: female      Admit Date: 5/18/2018    LOS: 1 day     Subjective:     Seen at bedside. Stable. Diarrhea has improved. NOT having withdrawals. Objective:     Vitals:    05/21/18 2332 05/22/18 0328 05/22/18 0822 05/22/18 1246   BP: 129/79 96/70 123/80 131/87   Pulse: 81 74 70 85   Resp: 16 16 16 16   Temp: 98 °F (36.7 °C) 96.2 °F (35.7 °C) 97.5 °F (36.4 °C) 98.4 °F (36.9 °C)   SpO2: 96% 100% 100% 100%   Weight:       Height:            Intake and Output:  Current Shift:    Last three shifts: 05/20 1901 - 05/22 0700  In: 360 [P.O.:360]  Out: 600 [Urine:600]    Physical Exam:     General:                    Seen at bedside. Stable. PNWL:                                   [id number]/PBPQMEWHOR  Eyes:                                   No palpebral pallor or scleral icterus. ENT:                                    External auricular and nasal exam within normal limits.                                             UMVUCT membranes are moist.  Neck:                                   Supple, non-tender, no JVD. Lungs:                       Clear to auscultation bilaterally without wheezes or crackles.                                             No respiratory distress or accessory muscle use. Heart:                                  Regular rate and rhythm, without murmurs, rubs, or gallops. Abdomen:                  Soft, non-tender, non-distended with normoactive bowel sounds. Genitourinary:           No tenderness over the bladder or bilateral CVAs. Extremities:               Without clubbing, cyanosis, or edema. Skin:                                    no lesions. No cellulitis. Pulses:                      Radial and dorsalis pedis pulses present 2+ bilaterally.                                               Capillary refill <2s.    Neurologic:                No focal deficits Psychiatric:                Pleasant demeanor, appropriate affect. Alert and oriented x 3       Lab/Data Review:    Recent Results (from the past 24 hour(s))   GLUCOSE, POC    Collection Time: 05/21/18  9:47 PM   Result Value Ref Range    Glucose (POC) 107 (H) 65 - 100 mg/dL   GLUCOSE, POC    Collection Time: 05/22/18  5:41 AM   Result Value Ref Range    Glucose (POC) 93 65 - 100 mg/dL   GLUCOSE, POC    Collection Time: 05/22/18  4:19 PM   Result Value Ref Range    Glucose (POC) 94 65 - 100 mg/dL           Assessment:     Principal Problem:    Alcohol withdrawal ([facility] [address] (5/18/2018)    Active Problems:    Essential hypertension (9/11/2012)      FSGS (focal segmental glomerulosclerosis) (9/11/2012)      Weakness of right leg (12/2/2013)      CKD (chronic kidney disease) (9/18/2014)      OSMIN (acute kidney injury) ([facility] [facility] (2/13/2018)      Acute metabolic encephalopathy (3/25/2944)      Alcohol abuse (2/13/2018)      Alcoholism ([facility] [facility] (5/18/2018)      Hypertension (5/18/2018)      C. difficile colitis (5/20/2018)    hypokalemia  C. Difficile colitis    Plan:     Continue to encourage oral fluid intake at least 2-3 liters per day. Continue Librium and Ativan PRN. OSMIN resolved   Monitor renal function and intake and output. Avoid nephrotoxic agents. advised  to stop using marijuana. Continue to monitor. Stool is positive for C. Difficile. On Metronidazole tid   Monitor. Enteric precaution. I have discussed the plan of care with patient.      DVT prophylaxis : SCD            Signed By: MD Derek     May 22, 2018

## 2018-05-22 NOTE — PROGRESS NOTES
Saw pt in interdisciplinarily rounds, plan of care and discharge date/ location discussed. Per the hospitalitis plan to monitor pt and consult for PT placed to evaluate pt's mobility and strength.

## 2018-05-22 NOTE — PROGRESS NOTES
Shift Assessment  - Patient is alert and oriented. No complaint of pain or discomfort at this time. Lungs are clear. Respirations are even and unlabored. Current mood is stable. Currently resting in a low, locked bed with call light within reach.

## 2018-05-22 NOTE — PROGRESS NOTES
Received bedside shift report from off going nurse Reena Quezada RN which included SBAR, MAR, and Plan of Care. Patient is resting quietly with eyes closed and respirations present. Will continue to follow patient's progression through hourly rounding. Will meet all requests as needed and appropriate.

## 2018-05-23 LAB
ANION GAP SERPL CALC-SCNC: 9 MMOL/L (ref 7–16)
BUN SERPL-MCNC: 36 MG/DL (ref 6–23)
CALCIUM SERPL-MCNC: 9.2 MG/DL (ref 8.3–10.4)
CHLORIDE SERPL-SCNC: 105 MMOL/L (ref 98–107)
CO2 SERPL-SCNC: 24 MMOL/L (ref 21–32)
CREAT SERPL-MCNC: 2.5 MG/DL (ref 0.6–1)
ERYTHROCYTE [DISTWIDTH] IN BLOOD BY AUTOMATED COUNT: 16.3 % (ref 11.9–14.6)
GLUCOSE BLD STRIP.AUTO-MCNC: 112 MG/DL (ref 65–100)
GLUCOSE BLD STRIP.AUTO-MCNC: 113 MG/DL (ref 65–100)
GLUCOSE BLD STRIP.AUTO-MCNC: 74 MG/DL (ref 65–100)
GLUCOSE BLD STRIP.AUTO-MCNC: 79 MG/DL (ref 65–100)
GLUCOSE BLD STRIP.AUTO-MCNC: 95 MG/DL (ref 65–100)
GLUCOSE BLD STRIP.AUTO-MCNC: 98 MG/DL (ref 65–100)
GLUCOSE SERPL-MCNC: 103 MG/DL (ref 65–100)
HCT VFR BLD AUTO: 41.5 % (ref 35.8–46.3)
HGB BLD-MCNC: 13.1 G/DL (ref 11.7–15.4)
MCH RBC QN AUTO: 28.1 PG (ref 26.1–32.9)
MCHC RBC AUTO-ENTMCNC: 31.6 G/DL (ref 31.4–35)
MCV RBC AUTO: 89.1 FL (ref 79.6–97.8)
MM INDURATION POC: 0 MM (ref 0–5)
PLATELET # BLD AUTO: 208 K/UL (ref 150–450)
PMV BLD AUTO: 9.9 FL (ref 10.8–14.1)
POTASSIUM SERPL-SCNC: 5.3 MMOL/L (ref 3.5–5.1)
PPD POC: NORMAL NEGATIVE
RBC # BLD AUTO: 4.66 M/UL (ref 4.05–5.25)
SODIUM SERPL-SCNC: 138 MMOL/L (ref 136–145)
WBC # BLD AUTO: 5.9 K/UL (ref 4.3–11.1)

## 2018-05-23 PROCEDURE — 74011250637 HC RX REV CODE- 250/637: Performed by: INTERNAL MEDICINE

## 2018-05-23 PROCEDURE — 80048 BASIC METABOLIC PNL TOTAL CA: CPT | Performed by: INTERNAL MEDICINE

## 2018-05-23 PROCEDURE — 36415 COLL VENOUS BLD VENIPUNCTURE: CPT | Performed by: INTERNAL MEDICINE

## 2018-05-23 PROCEDURE — 82962 GLUCOSE BLOOD TEST: CPT

## 2018-05-23 PROCEDURE — 74011250637 HC RX REV CODE- 250/637: Performed by: FAMILY MEDICINE

## 2018-05-23 PROCEDURE — 97530 THERAPEUTIC ACTIVITIES: CPT

## 2018-05-23 PROCEDURE — 85027 COMPLETE CBC AUTOMATED: CPT | Performed by: INTERNAL MEDICINE

## 2018-05-23 PROCEDURE — 65270000029 HC RM PRIVATE

## 2018-05-23 RX ORDER — CHLORDIAZEPOXIDE HYDROCHLORIDE 25 MG/1
25 CAPSULE, GELATIN COATED ORAL 2 TIMES DAILY
Status: DISCONTINUED | OUTPATIENT
Start: 2018-05-23 | End: 2018-05-25

## 2018-05-23 RX ORDER — LORAZEPAM 0.5 MG/1
0.5 TABLET ORAL
Status: DISCONTINUED | OUTPATIENT
Start: 2018-05-23 | End: 2018-05-25 | Stop reason: HOSPADM

## 2018-05-23 RX ORDER — QUETIAPINE FUMARATE 25 MG/1
25 TABLET, FILM COATED ORAL
Status: DISCONTINUED | OUTPATIENT
Start: 2018-05-23 | End: 2018-05-25 | Stop reason: HOSPADM

## 2018-05-23 RX ADMIN — CHLORDIAZEPOXIDE HYDROCHLORIDE 25 MG: 25 CAPSULE ORAL at 08:47

## 2018-05-23 RX ADMIN — ESCITALOPRAM OXALATE 10 MG: 10 TABLET ORAL at 08:47

## 2018-05-23 RX ADMIN — CHLORDIAZEPOXIDE HYDROCHLORIDE 25 MG: 25 CAPSULE ORAL at 02:00

## 2018-05-23 RX ADMIN — METRONIDAZOLE 500 MG: 500 TABLET ORAL at 02:00

## 2018-05-23 RX ADMIN — Medication 100 MG: at 08:47

## 2018-05-23 RX ADMIN — PANTOPRAZOLE SODIUM 40 MG: 40 TABLET, DELAYED RELEASE ORAL at 08:47

## 2018-05-23 RX ADMIN — METRONIDAZOLE 500 MG: 500 TABLET ORAL at 17:06

## 2018-05-23 RX ADMIN — Medication 400 MG: at 08:47

## 2018-05-23 RX ADMIN — CHLORDIAZEPOXIDE HYDROCHLORIDE 25 MG: 25 CAPSULE ORAL at 17:06

## 2018-05-23 RX ADMIN — FOLIC ACID 1 MG: 1 TABLET ORAL at 08:47

## 2018-05-23 RX ADMIN — METRONIDAZOLE 500 MG: 500 TABLET ORAL at 08:47

## 2018-05-23 RX ADMIN — SENNOSIDES AND DOCUSATE SODIUM 1 TABLET: 8.6; 5 TABLET ORAL at 08:47

## 2018-05-23 RX ADMIN — Medication 10 ML: at 06:00

## 2018-05-23 RX ADMIN — Medication 400 MG: at 17:06

## 2018-05-23 NOTE — PROGRESS NOTES
Problem: Mobility Impaired (Adult and Pediatric)  Goal: *Acute Goals and Plan of Care (Insert Text)  STG:  (1.)Ms. uNris Pfeiffer will move from supine to sit and sit to supine  with MIN A within 1-2 treatment day(s). (2.)Ms. Nuris Pfeiffer will transfer from bed to chair and chair to bed with MIN A using the least restrictive device within 1-2 treatment day(s). (3.)Ms. Nuris Pfeiffer will ambulate with MINIMAL ASSIST for 10-25 feet with the least restrictive device within 1-2 treatment day(s). LTG:  (1.)Ms. Nuris Pfeiffer will move from supine to sit and sit to supine  in bed with CGA-INDEPENDENT within 3-7 treatment day(s). (2.)Ms. Nuris Pfeiffer will transfer from bed to chair and chair to bed with CGA-INDEPENDENT using the least restrictive device within 3-7 treatment day(s). (3.)Ms. Nuris Pfeiffer will ambulate with CONTACT GUARD ASSIST-INDEPENDENT for  feet with the least restrictive device within 3-7 treatment day(s). ________________________________________________________________________________________________    Outcome: Progressing Towards Goal    PHYSICAL THERAPY: Daily Note, Treatment Day: 1st, PM 5/23/2018  INPATIENT: Hospital Day: 6  Payor: SELF PAY / Plan: [facility] SELF PAY / Product Type: Self Pay /      NAME/AGE/GENDER: Jennifer Cote is a 62 y.o. female   PRIMARY DIAGNOSIS: Alcohol withdrawal ([address]  Alcoholism ([address]  Hypertension  Alcoholism ([facility] Alcohol withdrawal ([address] Alcohol withdrawal ([facility]        ICD-10: Treatment Diagnosis:    · Difficulty in walking, Not elsewhere classified (R26.2)  · Other abnormalities of gait and mobility (R26.89)   Precaution/Allergies:  Codeine and Lortab [hydrocodone-acetaminophen]      ASSESSMENT:     Ms. Nuris Pfeiffer is supine upon arrival.  She practice bed mobility with CGA. She takes steps to [facility] with verbal cues to move her R LE, but the she says to therapist I forgot which is my Right.   Therapist feels pt is not safe to go home, due to constant cues for transfer and walking to HOB.  Pt left in supine with call light near and bed alarm on. This section established at most recent assessment   PROBLEM LIST (Impairments causing functional limitations):  1. Decreased Strength  2. Decreased Transfer Abilities  3. Decreased Ambulation Ability/Technique  4. Decreased Balance  5. Increased Pain  6. Decreased Activity Tolerance  7. Decreased Flexibility/Joint Mobility INTERVENTIONS PLANNED: (Benefits and precautions of physical therapy have been discussed with the patient.)  1. Bed Mobility  2. Gait Training  3. Therapeutic Activites  4. Therapeutic Exercise/Strengthening  5. Transfer Training  6. Group Therapy     TREATMENT PLAN: Frequency/Duration: daily for duration of hospital stay  Rehabilitation Potential For Stated Goals: Good     RECOMMENDED REHABILITATION/EQUIPMENT: (at time of discharge pending progress): Due to the probability of continued deficits (see above) this patient will likely need continued skilled physical therapy after discharge. Equipment:    None at this time              HISTORY:   History of Present Injury/Illness (Reason for Referral):  Pt with decreased independence with functional mobility. Past Medical History/Comorbidities:   Ms. Suzie Orta  has a past medical history of Aneurysm of common carotid artery ([facility] (2004); CKD (chronic kidney disease) (9/18/2014); Dementia; FSGS (focal segmental glomerulosclerosis); Gout; Hypertension; Osteoarthritis (9/18/2014); and Stroke [facility]) (2004, 2013). Ms. Suzie Orta  has a past surgical history that includes hx intracranial aneurysm repair (2004); hx refractive surgery; hx orthopaedic (Right, 10/2014); hx ankle fracture tx (Left, 2013); hx ercp (02/27/2018); and hx cholecystectomy (02/28/2018).   Social History/Living Environment:   Home Environment: Private residence  # Steps to Enter:  (none)  One/Two Story Residence: Two story  # of Interior Steps: 17  Height of Each Step (in):  (unsure)  Interior Rails: None  Lift Chair Available: No  Living Alone: No  Patient Expects to be Discharged to[de-identified] Private residence  Current DME Used/Available at Home: None  Prior Level of Function/Work/Activity:  Pt ambulates with walker or holding on to her . Number of Personal Factors/Comorbidities that affect the Plan of Care: 0: LOW COMPLEXITY   EXAMINATION:   Most Recent Physical Functioning:   Gross Assessment:                  Posture:     Balance:    Bed Mobility:  Supine to Sit: Contact guard assistance  Sit to Supine: Moderate assistance;Assist x2  Wheelchair Mobility:     Transfers:  Sit to Stand: Minimum assistance  Stand to Sit: Minimum assistance  Duration: 30 Minutes  Gait:     Gait Abnormalities: Antalgic  Distance (ft): 8 Feet (ft) (to [facility] )  Assistive Device: Walker, rolling  Ambulation - Level of Assistance: Minimal assistance      Body Structures Involved:  1. Bones  2. Joints  3. Muscles  4. Ligaments Body Functions Affected:  1. Neuromusculoskeletal  2. Movement Related Activities and Participation Affected:  1. Mobility   Number of elements that affect the Plan of Care: 4+: HIGH COMPLEXITY   CLINICAL PRESENTATION:   Presentation: Stable and uncomplicated: LOW COMPLEXITY   CLINICAL DECISION MAKIN[address] [place], [place] [id number] AM-PAC 6 Clicks   Basic Mobility Inpatient Short Form  How much difficulty does the patient currently have. .. Unable A Lot A Little None   1. Turning over in bed (including adjusting bedclothes, sheets and blankets)? [] 1   [] 2   [x] 3   [] 4   2. Sitting down on and standing up from a chair with arms ( e.g., wheelchair, bedside commode, etc.)   [] 1   [] 2   [x] 3   [] 4   3. Moving from lying on back to sitting on the side of the bed? [] 1   [] 2   [x] 3   [] 4   How much help from another person does the patient currently need. .. Total A Lot A Little None   4. Moving to and from a bed to a chair (including a wheelchair)? [] 1   [] 2   [x] 3   [] 4   5. Need to walk in hospital room?    [] 1 [] 2   [x] 3   [] 4   6. Climbing 3-5 steps with a railing? [] 1   [] 2   [x] 3   [] 4   © 2007, Trustees of [address] [place] [address] [address] [id number], under license to [employer]. All rights reserved      Score:  Initial: 18 Most Recent: X (Date: -- )    Interpretation of Tool:  Represents activities that are increasingly more difficult (i.e. Bed mobility, Transfers, Gait). Score 24 23 22-20 19-15 14-10 9-7 6     Modifier CH CI CJ CK CL CM CN      ? Mobility - Walking and Moving Around:     - CURRENT STATUS: CK - 40%-59% impaired, limited or restricted    - GOAL STATUS: CK - 40%-59% impaired, limited or restricted    - D/C STATUS:  CK - 40%-59% impaired, limited or restricted  Payor: SELF PAY / Plan: [facility] SELF PAY / Product Type: Self Pay /      Medical Necessity:     · Skilled intervention continues to be required due to decreased mobility ability. Reason for Services/Other Comments:  · Patient continues to require skilled intervention due to decraesed mobility ability. Use of outcome tool(s) and clinical judgement create a POC that gives a: Clear prediction of patient's progress: LOW COMPLEXITY            TREATMENT:   (In addition to Assessment/Re-Assessment sessions the following treatments were rendered)   Pre-treatment Symptoms/Complaints:  none  Pain: Initial:   Pain Intensity 1: 0 (0/10 after therapy)  Post Session:  none     Therapeutic Activity: (  30 Minutes ):  Therapeutic activities including Bed transfers working on exercises and balance.    Date:  5/23 Date:   Date:     Activity/Exercise Parameters Parameters Parameters   Ankle pumps 7 B aa     Marching in place 7 B aa     Hip abd/add 7  B aa     LAQ 7 B aa                         Braces/Orthotics/Lines/Etc:   · O2 Device: Room air  Treatment/Session Assessment:    · Response to Treatment:  Pt agreeable to work with therapist.  · Interdisciplinary Collaboration:   o Registered Nurse  · After treatment position/precautions:   o Supine in bed  o Bed/Chair-wheels locked  o Bed in low position  o Caregiver at bedside  o Call light within reach  o RN notified   · Compliance with Program/Exercises: compliant most of the time. · Recommendations/Intent for next treatment session: \"Next visit will focus on advancements to more challenging activities and reduction in assistance provided\".   Total Treatment Duration:  PT Patient Time In/Time Out  Time In: 1315  Time Out: 28 [facility], SVETLANA

## 2018-05-23 NOTE — PROGRESS NOTES
Therapist entered pt room and she was a sleep. Therapist woke pt up and ask if she would like to go see her , she no I would rather sleep. Therapist ask could we do some exercises together she said no I want to sleep. Therapist explain how  important it was to get up and walk around the room, she said no I just want to sleep. Therapist told pt she would be back after lunch. She said ok, but I will still be sleeping.

## 2018-05-23 NOTE — PROGRESS NOTES
Progress Note    Patient: Josselyn Alfredo MRN: [id number]  SSN: [id number]    YOB: 1960  Age: 62 y.o. Sex: female      Admit Date: 5/18/2018    LOS: 2 days     Subjective:     Seen at bedside. . Diarrhea has improved. NOT having withdrawals. Decreased dose of benzos today. Very debilitated. PT has recommended inpatient rehab. Objective:     Vitals:    05/23/18 0402 05/23/18 0717 05/23/18 1300 05/23/18 1600   BP: 104/65 101/67 126/83 150/79   Pulse: 78 74 78 77   Resp: 16 24 22 20   Temp: 97.3 °F (36.3 °C) 97.5 °F (36.4 °C) 98 °F (36.7 °C) 96.5 °F (35.8 °C)   SpO2: 97% 96% 93% 99%   Weight:       Height:            Intake and Output:  Current Shift: 05/23 0701 - 05/23 1900  In: -   Out: 400 [Urine:400]  Last three shifts: 05/21 1901 - 05/23 0700  In: -   Out: 400 [Urine:400]    Physical Exam:     General:                    Seen at bedside. GXGA:                                   [id number]  Eyes:                                   No palpebral pallor or scleral icterus. ENT:                                    External auricular and nasal exam within normal limits.                                             VBGFTQ membranes are moist.  Neck:                                   Supple, non-tender, no JVD. Lungs:                       Clear to auscultation bilaterally without wheezes or crackles.                                             No respiratory distress or accessory muscle use. Heart:                                  Regular rate and rhythm, without murmurs, rubs, or gallops. Abdomen:                  Soft, non-tender, non-distended with normoactive bowel sounds. Genitourinary:           No tenderness over the bladder or bilateral CVAs. Extremities:               Without clubbing, cyanosis, or edema. Skin:                                    no lesions. No cellulitis. Pulses:                      Radial and dorsalis pedis pulses present 2+ bilaterally.                                             Capillary refill <2s.    Neurologic:                No focal deficits   Psychiatric:                confused        Lab/Data Review:    Recent Results (from the past 24 hour(s))   GLUCOSE, POC    Collection Time: 05/22/18  9:54 PM   Result Value Ref Range    Glucose (POC) 112 (H) 65 - 100 mg/dL   GLUCOSE, POC    Collection Time: 05/23/18  5:35 AM   Result Value Ref Range    Glucose (POC) 98 65 - 100 mg/dL   GLUCOSE, POC    Collection Time: 05/23/18  7:21 AM   Result Value Ref Range    Glucose (POC) 95 65 - 100 mg/dL   CBC W/O DIFF    Collection Time: 05/23/18  7:58 AM   Result Value Ref Range    WBC 5.9 4.3 - 11.1 K/uL    RBC 4.66 4.05 - 5.25 M/uL    HGB 13.1 11.7 - 15.4 g/dL    HCT 41.5 35.8 - 46.3 %    MCV 89.1 79.6 - 97.8 FL    MCH 28.1 26.1 - 32.9 PG    MCHC 31.6 31.4 - 35.0 g/dL    RDW 16.3 (H) 11.9 - 14.6 %    PLATELET 394 245 - 623 K/uL    MPV 9.9 (L) 10.8 - 53.8 FL   METABOLIC PANEL, BASIC    Collection Time: 05/23/18  7:58 AM   Result Value Ref Range    Sodium 138 136 - 145 mmol/L    Potassium 5.3 (H) 3.5 - 5.1 mmol/L    Chloride 105 98 - 107 mmol/L    CO2 24 21 - 32 mmol/L    Anion gap 9 7 - 16 mmol/L    Glucose 103 (H) 65 - 100 mg/dL    BUN 36 (H) 6 - 23 MG/DL    Creatinine 2.50 (H) 0.6 - 1.0 MG/DL    GFR est AA 26 (L) >60 ml/min/1.73m2    GFR est non-AA 21 (L) >60 ml/min/1.73m2    Calcium 9.2 8.3 - 10.4 MG/DL   GLUCOSE, POC    Collection Time: 05/23/18 11:15 AM   Result Value Ref Range    Glucose (POC) 79 65 - 100 mg/dL   PLEASE READ & DOCUMENT PPD TEST IN 24 HRS    Collection Time: 05/23/18  1:58 PM   Result Value Ref Range    PPD neg Negative    mm Induration 0.0 mm   GLUCOSE, POC    Collection Time: 05/23/18  2:50 PM   Result Value Ref Range    Glucose (POC) 74 65 - 100 mg/dL   GLUCOSE, POC    Collection Time: 05/23/18  4:45 PM   Result Value Ref Range    Glucose (POC) 113 (H) 65 - 100 mg/dL           Assessment:     Principal Problem:    Alcohol withdrawal ([facility] (5/18/2018)    Active Problems:    Essential hypertension (9/11/2012)      FSGS (focal segmental glomerulosclerosis) (9/11/2012)      Weakness of right leg (12/2/2013)      CKD (chronic kidney disease) (9/18/2014)      OSMIN (acute kidney injury) ([facility] (2/13/2018)      Acute metabolic encephalopathy (8/60/6389)      Alcohol abuse (2/13/2018)      Alcoholism ([facility] (5/18/2018)      Hypertension (5/18/2018)      C. difficile colitis (5/20/2018)    hypokalemia  C. Difficile colitis    Plan:       Continue Librium and Ativan PRN. Dose reduced today   OSMIN resolved, back to her baseline. Already has CKD  Monitor renal function and intake and output. Avoid nephrotoxic agents. advised  to stop using marijuana. Continue to monitor. Stool was positive for C. Difficile. On Metronidazole tid   Monitor. Enteric precaution. Very debilitated.  PT has recommended inpatient rehab          DVT prophylaxis : SCD            Signed By: Natalie Saldana MD     May 23, 2018

## 2018-05-23 NOTE — PROGRESS NOTES
PT states that pt is very unsteady and is not safe to go home. Pt wants to talk with  before deciding if she should go to a SNF. I spoke with , he wants what is best for her, states she has fallen a few times at home and had to go to [employer]. Her ins is notification only.

## 2018-05-23 NOTE — PROGRESS NOTES
Problem: Interdisciplinary Rounds  Goal: Interdisciplinary Rounds  Interdisciplinary team rounds were held 5/23/2018 with the following team members:Care Management, Nursing, Nutrition, Pharmacy, Physical Therapy and Physician and the patient. Plan of care discussed. See clinical pathway and/or care plan for interventions and desired outcomes.

## 2018-05-23 NOTE — PROGRESS NOTES
Assessment done via doc flow sheet. Pt resting quietly in bed, alert & oriented x3, resp easy & regular, lungs CTA bilaterally. Denies pain, no tremors/agitation noted. Pt refused to eat breakfast.  Bed low & locked, side rails x3 up with call light within reach, pt instructed to call for assistance as needed.

## 2018-05-24 ENCOUNTER — HOME HEALTH ADMISSION (OUTPATIENT)
Dept: HOME HEALTH SERVICES | Facility: HOME HEALTH | Age: 58
End: 2018-05-24

## 2018-05-24 LAB
ANION GAP SERPL CALC-SCNC: 10 MMOL/L (ref 7–16)
BUN SERPL-MCNC: 36 MG/DL (ref 6–23)
CALCIUM SERPL-MCNC: 8.9 MG/DL (ref 8.3–10.4)
CHLORIDE SERPL-SCNC: 105 MMOL/L (ref 98–107)
CO2 SERPL-SCNC: 23 MMOL/L (ref 21–32)
CREAT SERPL-MCNC: 2.48 MG/DL (ref 0.6–1)
GLUCOSE BLD STRIP.AUTO-MCNC: 107 MG/DL (ref 65–100)
GLUCOSE BLD STRIP.AUTO-MCNC: 89 MG/DL (ref 65–100)
GLUCOSE BLD STRIP.AUTO-MCNC: 90 MG/DL (ref 65–100)
GLUCOSE SERPL-MCNC: 111 MG/DL (ref 65–100)
MM INDURATION POC: 0 MM (ref 0–5)
POTASSIUM SERPL-SCNC: 5.2 MMOL/L (ref 3.5–5.1)
PPD POC: NORMAL NEGATIVE
SODIUM SERPL-SCNC: 138 MMOL/L (ref 136–145)

## 2018-05-24 PROCEDURE — 82962 GLUCOSE BLOOD TEST: CPT

## 2018-05-24 PROCEDURE — 36415 COLL VENOUS BLD VENIPUNCTURE: CPT | Performed by: INTERNAL MEDICINE

## 2018-05-24 PROCEDURE — 97530 THERAPEUTIC ACTIVITIES: CPT

## 2018-05-24 PROCEDURE — 80048 BASIC METABOLIC PNL TOTAL CA: CPT | Performed by: INTERNAL MEDICINE

## 2018-05-24 PROCEDURE — 74011250637 HC RX REV CODE- 250/637: Performed by: INTERNAL MEDICINE

## 2018-05-24 PROCEDURE — 65270000029 HC RM PRIVATE

## 2018-05-24 PROCEDURE — 74011250637 HC RX REV CODE- 250/637: Performed by: FAMILY MEDICINE

## 2018-05-24 RX ADMIN — METRONIDAZOLE 500 MG: 500 TABLET ORAL at 17:04

## 2018-05-24 RX ADMIN — FOLIC ACID 1 MG: 1 TABLET ORAL at 08:44

## 2018-05-24 RX ADMIN — PANTOPRAZOLE SODIUM 40 MG: 40 TABLET, DELAYED RELEASE ORAL at 08:44

## 2018-05-24 RX ADMIN — CHLORDIAZEPOXIDE HYDROCHLORIDE 25 MG: 25 CAPSULE ORAL at 17:04

## 2018-05-24 RX ADMIN — Medication 100 MG: at 08:44

## 2018-05-24 RX ADMIN — METRONIDAZOLE 500 MG: 500 TABLET ORAL at 00:40

## 2018-05-24 RX ADMIN — Medication 400 MG: at 08:44

## 2018-05-24 RX ADMIN — METRONIDAZOLE 500 MG: 500 TABLET ORAL at 08:44

## 2018-05-24 RX ADMIN — Medication 400 MG: at 17:04

## 2018-05-24 RX ADMIN — ESCITALOPRAM OXALATE 10 MG: 10 TABLET ORAL at 08:44

## 2018-05-24 RX ADMIN — QUETIAPINE FUMARATE 25 MG: 25 TABLET ORAL at 21:13

## 2018-05-24 RX ADMIN — CHLORDIAZEPOXIDE HYDROCHLORIDE 25 MG: 25 CAPSULE ORAL at 08:43

## 2018-05-24 NOTE — PROGRESS NOTES
Pt A&O. Resting quietly. Contact enteric precautions in place for C. Diff. Bed low and locked, call light within reach. No IV site per orders. No complaints/concerns at this time. Will continue to monitor.

## 2018-05-24 NOTE — PROGRESS NOTES
Problem: Mobility Impaired (Adult and Pediatric)  Goal: *Acute Goals and Plan of Care (Insert Text)  STG:  (1.)Ms. Chandler Mayes will move from supine to sit and sit to supine  with MIN A within 1-2 treatment day(s). (2.)Ms. Chandler Mayes will transfer from bed to chair and chair to bed with MIN A using the least restrictive device within 1-2 treatment day(s). (3.)Ms. Chandler Mayes will ambulate with MINIMAL ASSIST for 10-25 feet with the least restrictive device within 1-2 treatment day(s). LTG:  (1.)Ms. Chandler Mayes will move from supine to sit and sit to supine  in bed with CGA-INDEPENDENT within 3-7 treatment day(s). (2.)Ms. Chandler Mayes will transfer from bed to chair and chair to bed with CGA-INDEPENDENT using the least restrictive device within 3-7 treatment day(s). (3.)Ms. Chandler Mayes will ambulate with CONTACT GUARD ASSIST-INDEPENDENT for  feet with the least restrictive device within 3-7 treatment day(s). ________________________________________________________________________________________________    Outcome: Progressing Towards Goal    PHYSICAL THERAPY: Daily Note, Treatment Day: 2nd, AM 5/24/2018  INPATIENT: Hospital Day: 7  Payor: SELF PAY / Plan: [facility] SELF PAY / Product Type: Self Pay /      NAME/AGE/GENDER: Rojelio Jackson is a 62 y.o. female   PRIMARY DIAGNOSIS: Alcohol withdrawal ([address]  Alcoholism ([address]  Hypertension  Alcoholism ([address] Alcohol withdrawal ([address] Alcohol withdrawal ([facility]        ICD-10: Treatment Diagnosis:    · Difficulty in walking, Not elsewhere classified (R26.2)  · Other abnormalities of gait and mobility (R26.89)   Precaution/Allergies:  Codeine and Lortab [hydrocodone-acetaminophen]      ASSESSMENT:     Ms. Chandler Mayes is supine upon arrival.  She practice bed mobility with Min A. She takes side steps, forward/backward with Min A and verbal cues. She transfer to [facility] with Min A using RW and verbal cues. Pt stated she can not clean herself, so therapist did.   She sit on EOB and works on balance and exercises. She return to supine with Min A.  came over and told pt she was going to rehab, because it would be hard for him. Pt left in supine with call light near. This section established at most recent assessment   PROBLEM LIST (Impairments causing functional limitations):  1. Decreased Strength  2. Decreased Transfer Abilities  3. Decreased Ambulation Ability/Technique  4. Decreased Balance  5. Increased Pain  6. Decreased Activity Tolerance  7. Decreased Flexibility/Joint Mobility INTERVENTIONS PLANNED: (Benefits and precautions of physical therapy have been discussed with the patient.)  1. Bed Mobility  2. Gait Training  3. Therapeutic Activites  4. Therapeutic Exercise/Strengthening  5. Transfer Training  6. Group Therapy     TREATMENT PLAN: Frequency/Duration: daily for duration of hospital stay  Rehabilitation Potential For Stated Goals: Good     RECOMMENDED REHABILITATION/EQUIPMENT: (at time of discharge pending progress): Due to the probability of continued deficits (see above) this patient will likely need continued skilled physical therapy after discharge. Equipment:    None at this time              HISTORY:   History of Present Injury/Illness (Reason for Referral):  Pt with decreased independence with functional mobility. Past Medical History/Comorbidities:   Ms. Darlene Osorio  has a past medical history of Aneurysm of common carotid artery ([facility] (2004); CKD (chronic kidney disease) (9/18/2014); Dementia; FSGS (focal segmental glomerulosclerosis); Gout; Hypertension; Osteoarthritis (9/18/2014); and Stroke [facility]) (2004, 2013). Ms. Darlene Osorio  has a past surgical history that includes hx intracranial aneurysm repair (2004); hx refractive surgery; hx orthopaedic (Right, 10/2014); hx ankle fracture tx (Left, 2013); hx ercp (02/27/2018); and hx cholecystectomy (02/28/2018).   Social History/Living Environment:   Home Environment: Private residence  # Steps to Enter:  (none)  One/Two Story Residence: Two story  # of Interior Steps: 17  Height of Each Step (in):  (unsure)  Interior Rails: None  Lift Chair Available: No  Living Alone: No  Patient Expects to be Discharged to[de-identified] Private residence  Current DME Used/Available at Home: None  Prior Level of Function/Work/Activity:  Pt ambulates with walker or holding on to her . Number of Personal Factors/Comorbidities that affect the Plan of Care: 0: LOW COMPLEXITY   EXAMINATION:   Most Recent Physical Functioning:   Gross Assessment:                  Posture:     Balance:    Bed Mobility:  Supine to Sit: Minimum assistance  Sit to Supine: Minimum assistance  Wheelchair Mobility:     Transfers:  Sit to Stand: Minimum assistance  Stand to Sit: Minimum assistance  Duration: 40 Minutes  Gait:     Gait Abnormalities: Antalgic  Distance (ft): 10 Feet (ft) (walking forward/backward, side steps)  Assistive Device: Walker, rolling  Ambulation - Level of Assistance: Minimal assistance      Body Structures Involved:  1. Bones  2. Joints  3. Muscles  4. Ligaments Body Functions Affected:  1. Neuromusculoskeletal  2. Movement Related Activities and Participation Affected:  1. Mobility   Number of elements that affect the Plan of Care: 4+: HIGH COMPLEXITY   CLINICAL PRESENTATION:   Presentation: Stable and uncomplicated: LOW COMPLEXITY   CLINICAL DECISION MAKIN [place] [address] AM-PAC 6 Clicks   Basic Mobility Inpatient Short Form  How much difficulty does the patient currently have. .. Unable A Lot A Little None   1. Turning over in bed (including adjusting bedclothes, sheets and blankets)? [] 1   [] 2   [x] 3   [] 4   2. Sitting down on and standing up from a chair with arms ( e.g., wheelchair, bedside commode, etc.)   [] 1   [] 2   [x] 3   [] 4   3. Moving from lying on back to sitting on the side of the bed? [] 1   [] 2   [x] 3   [] 4   How much help from another person does the patient currently need. .. Total A Lot A Little None   4.   Moving to and from a bed to a chair (including a wheelchair)? [] 1   [] 2   [x] 3   [] 4   5. Need to walk in hospital room? [] 1   [] 2   [x] 3   [] 4   6. Climbing 3-5 steps with a railing? [] 1   [] 2   [x] 3   [] 4   © 2007, Trustees of [address] [place] [address] [address] [address], under license to [employer]. All rights reserved      Score:  Initial: 18 Most Recent: X (Date: -- )    Interpretation of Tool:  Represents activities that are increasingly more difficult (i.e. Bed mobility, Transfers, Gait). Score 24 23 22-20 19-15 14-10 9-7 6     Modifier CH CI CJ CK CL CM CN      ? Mobility - Walking and Moving Around:     - CURRENT STATUS: CK - 40%-59% impaired, limited or restricted    - GOAL STATUS: CK - 40%-59% impaired, limited or restricted    - D/C STATUS:  CK - 40%-59% impaired, limited or restricted  Payor: SELF PAY / Plan: [facility] SELF PAY / Product Type: Self Pay /      Medical Necessity:     · Skilled intervention continues to be required due to decreased mobility ability. Reason for Services/Other Comments:  · Patient continues to require skilled intervention due to decraesed mobility ability. Use of outcome tool(s) and clinical judgement create a POC that gives a: Clear prediction of patient's progress: LOW COMPLEXITY            TREATMENT:   (In addition to Assessment/Re-Assessment sessions the following treatments were rendered)   Pre-treatment Symptoms/Complaints:  none  Pain: Initial:   Pain Intensity 1: 0  Post Session:      Therapeutic Activity: (  40 Minutes ):  Therapeutic activities including Bed transfers working on exercises and balance.    Date:  5/23 Date:  5/24   Date:     Activity/Exercise Parameters Parameters Parameters   Ankle pumps 7 B aa 10 b aa    Marching in place 7 B aa 10 b aa    Hip abd/add 7  B aa 10 b aa    LAQ 7 B aa 10 b aa                        Braces/Orthotics/Lines/Etc:   · O2 Device: Room air  Treatment/Session Assessment:    · Response to Treatment:  Pt making slow progress. · Interdisciplinary Collaboration:   o Registered Nurse  · After treatment position/precautions:   o Supine in bed  o Bed/Chair-wheels locked  o Bed in low position  o Caregiver at bedside  o Call light within reach  o RN notified   · Compliance with Program/Exercises: compliant most of the time. · Recommendations/Intent for next treatment session: \"Next visit will focus on advancements to more challenging activities and reduction in assistance provided\".   Total Treatment Duration:  PT Patient Time In/Time Out  Time In: 1100  Time Out: [address] Ranjana Guzman, PTA

## 2018-05-24 NOTE — PROGRESS NOTES
[address]  Face to Face Encounter    Patients Name: Shalom Portillo    YOB: 1960    Ordering Physician: Dr. Tai Ruffin      Primary Diagnosis: Alcohol withdrawal ([facility]  Alcoholism ([facility]  Hypertension  Alcoholism [facility])    Date of Face to Face:   5/24/2018                                  Face to Face Encounter findings are related to primary reason for home care:   yes. 1. I certify that the patient needs intermittent care as follows: physical therapy: strengthening and gait/stair training    2. I certify that this patient is homebound, that is: 1) patient requires the use of a walker device, special transportation, or assistance of another to leave the home; or 2) patient's condition makes leaving the home medically contraindicated; and 3) patient has a normal inability to leave the home and leaving the home requires considerable and taxing effort. Patient may leave the home for infrequent and short duration for medical reasons, and occasional absences for non-medical reasons. Homebound status is due to the following functional limitations: Patient's ambulation limited secondary to severe pain and requires the use of an assistive device and the assistance of a caregiver for safe completion. Patient with strength and ROM deficits limiting ambulation endurance requiring the use of an assistive device and the assistance of a caregiver. Patient deemed temporarily homebound secondary to increased risk for infection when leaving home and going out into the community. 3. I certify that this patient is under my care and that I, or a nurse practitioner or  [id number], or clinical nurse specialist, or certified nurse midwife, working with me, had a Face-to-Face Encounter that meets the physician Face-to-Face Encounter requirements.   The following are the clinical findings from the [address] [place] [address] encounter that support the need for skilled services and is a summary of the encounter: See hospital chart          Kelsea Gerber, RN  5/24/2018      THE FOLLOWING TO BE COMPLETED BY THE COMMUNITY PHYSICIAN:    I concur with the findings described above from the F2F encounter that this patient is homebound and in need of a skilled service.     Certifying Physician: _____________________________________      Printed Certifying Physician Name: _____________________________________    Date: _________________

## 2018-05-24 NOTE — PROGRESS NOTES
Problem: Interdisciplinary Rounds  Goal: Interdisciplinary Rounds  Interdisciplinary team rounds were held 5/24/2018 with the following team members:Care Management, Nursing, Nutrition, Pharmacy, Physical Therapy and Physician and the patient. Plan of care discussed. See clinical pathway and/or care plan for interventions and desired outcomes.

## 2018-05-24 NOTE — PROGRESS NOTES
Problem: Falls - Risk of  Goal: *Absence of Falls  Document Damien Fall Risk and appropriate interventions in the flowsheet.    Outcome: Progressing Towards Goal  Fall Risk Interventions:  Mobility Interventions: Bed/chair exit alarm    Mentation Interventions: Bed/chair exit alarm, Door open when patient unattended    Medication Interventions: Bed/chair exit alarm, Teach patient to arise slowly, Patient to call before getting OOB    Elimination Interventions: Bed/chair exit alarm, Call light in reach    History of Falls Interventions: Bed/chair exit alarm

## 2018-05-24 NOTE — PROGRESS NOTES
PT agreed to SNF after speaking with  and hospitalist's. Request for [facility] sent in cclink, this facility is near pt's home.

## 2018-05-24 NOTE — PROGRESS NOTES
Visited with pt regarding her decision about a SNF, pt is refusing. I informed pt that she was very weak/unsteady with PT yesterday and there recommendations are for SNF. I asked pt if she would be ok if I ordered [place] and the pt aggred with that. Ordered placed for HHPT/Aide.

## 2018-05-24 NOTE — PROGRESS NOTES
Pt PCP in system is not correct, I have made a new pt appt with Dr. Shoaib Yee on 5/29 @ 11:15 @ [facility], pt used to see a another physician back in 2014. Pt aware and appt will be on the d/c papers.  Also sent a email to Bernie Johnson/DEBBIE b/c pt has been followed by Aruna Darnell LPN in the resent past.

## 2018-05-24 NOTE — PROGRESS NOTES
Progress Note    Patient: Natasha Coello MRN: [id number]  SSN: [id number]    YOB: 1960  Age: 62 y.o. Sex: female      Admit Date: 5/18/2018    LOS: 3 days     Subjective:     Seen at bedside. . Diarrhea has improved. NOT having withdrawals. Decreased dose of benzos AGAIN  today. Very debilitated. PT has recommended inpatient rehab. Patient agreed to go to rehab. Objective:     Vitals:    05/24/18 0400 05/24/18 0833 05/24/18 1232 05/24/18 1624   BP: 104/71 119/87 126/85 119/71   Pulse: 76 76 70 77   Resp: 18 18 19 20   Temp: 96.5 °F (35.8 °C) 96.1 °F (35.6 °C) 97.2 °F (36.2 °C) 98.2 °F (36.8 °C)   SpO2: 98% 94% 93% 94%   Weight:       Height:            Intake and Output:  Current Shift:    Last three shifts: 05/22 1901 - 05/24 0700  In: -   Out: 1700 [Urine:1700]    Physical Exam:     General:                    Seen at bedside. ITOP:                                   LYOBZRNIKTOMG/BRPHWNRIFJ  Eyes:                                   No palpebral pallor or scleral icterus. ENT:                                    External auricular and nasal exam within normal limits.                                             PSPIRC membranes are moist.  Neck:                                   Supple, non-tender, no JVD. Lungs:                       Clear to auscultation bilaterally without wheezes or crackles.                                             No respiratory distress or accessory muscle use. Heart:                                  Regular rate and rhythm, without murmurs, rubs, or gallops. Abdomen:                  Soft, non-tender, non-distended with normoactive bowel sounds. Genitourinary:           No tenderness over the bladder or bilateral CVAs. Extremities:               Without clubbing, cyanosis, or edema. Skin:                                    no lesions. No cellulitis. Pulses:                      Radial and dorsalis pedis pulses present 2+ bilaterally.                                             Capillary refill <2s.    Neurologic:                No focal deficits   Psychiatric:                confused        Lab/Data Review:    Recent Results (from the past 24 hour(s))   GLUCOSE, POC    Collection Time: 05/23/18  4:45 PM   Result Value Ref Range    Glucose (POC) 113 (H) 65 - 100 mg/dL   GLUCOSE, POC    Collection Time: 05/23/18  9:33 PM   Result Value Ref Range    Glucose (POC) 112 (H) 65 - 100 mg/dL   GLUCOSE, POC    Collection Time: 05/24/18  5:27 AM   Result Value Ref Range    Glucose (POC) 107 (H) 65 - 835 mg/dL   METABOLIC PANEL, BASIC    Collection Time: 05/24/18  6:57 AM   Result Value Ref Range    Sodium 138 136 - 145 mmol/L    Potassium 5.2 (H) 3.5 - 5.1 mmol/L    Chloride 105 98 - 107 mmol/L    CO2 23 21 - 32 mmol/L    Anion gap 10 7 - 16 mmol/L    Glucose 111 (H) 65 - 100 mg/dL    BUN 36 (H) 6 - 23 MG/DL    Creatinine 2.48 (H) 0.6 - 1.0 MG/DL    GFR est AA 26 (L) >60 ml/min/1.73m2    GFR est non-AA 21 (L) >60 ml/min/1.73m2    Calcium 8.9 8.3 - 10.4 MG/DL   GLUCOSE, POC    Collection Time: 05/24/18  7:39 AM   Result Value Ref Range    Glucose (POC) 89 65 - 100 mg/dL   GLUCOSE, POC    Collection Time: 05/24/18 11:55 AM   Result Value Ref Range    Glucose (POC) 90 65 - 100 mg/dL   PLEASE READ & DOCUMENT PPD TEST IN 48 HRS    Collection Time: 05/24/18  3:03 PM   Result Value Ref Range    PPD neg Negative    mm Induration 0.0 mm           Assessment:     Principal Problem:    Alcohol withdrawal (HCC) (5/18/2018)    Active Problems:    Essential hypertension (9/11/2012)      FSGS (focal segmental glomerulosclerosis) (9/11/2012)      Weakness of right leg (12/2/2013)      CKD (chronic kidney disease) (9/18/2014)      OSMNI (acute kidney injury) ([facility] (2/13/2018)      Acute metabolic encephalopathy (4/55/7368)      Alcohol abuse (2/13/2018)      Alcoholism ([facility] (5/18/2018)      Hypertension (5/18/2018)      C. difficile colitis (5/20/2018)    hypokalemia  C. Difficile colitis    Plan:       Continue Librium and Ativan PRN. Dose reduced again  today   OSMIN resolved, back to her baseline. Already has CKD  Avoid nephrotoxic agents. Continue to monitor. Stool was positive for C. Difficile. On Metronidazole tid   Monitor. Enteric precaution. Very debilitated.  PT has recommended inpatient rehab          DVT prophylaxis : SCD            Signed By: Vee Crocker MD     May 24, 2018

## 2018-05-25 VITALS
WEIGHT: 180 LBS | HEART RATE: 71 BPM | RESPIRATION RATE: 20 BRPM | OXYGEN SATURATION: 99 % | HEIGHT: 66 IN | DIASTOLIC BLOOD PRESSURE: 79 MMHG | TEMPERATURE: 96 F | BODY MASS INDEX: 28.93 KG/M2 | SYSTOLIC BLOOD PRESSURE: 116 MMHG

## 2018-05-25 LAB
ANION GAP SERPL CALC-SCNC: 9 MMOL/L (ref 7–16)
BUN SERPL-MCNC: 33 MG/DL (ref 6–23)
CALCIUM SERPL-MCNC: 9 MG/DL (ref 8.3–10.4)
CHLORIDE SERPL-SCNC: 104 MMOL/L (ref 98–107)
CO2 SERPL-SCNC: 27 MMOL/L (ref 21–32)
CREAT SERPL-MCNC: 2.45 MG/DL (ref 0.6–1)
GLUCOSE BLD STRIP.AUTO-MCNC: 105 MG/DL (ref 65–100)
GLUCOSE BLD STRIP.AUTO-MCNC: 121 MG/DL (ref 65–100)
GLUCOSE SERPL-MCNC: 107 MG/DL (ref 65–100)
POTASSIUM SERPL-SCNC: 4.9 MMOL/L (ref 3.5–5.1)
SODIUM SERPL-SCNC: 140 MMOL/L (ref 136–145)
VIT B1 BLD-SCNC: 148.3 NMOL/L (ref 66.5–200)

## 2018-05-25 PROCEDURE — 80048 BASIC METABOLIC PNL TOTAL CA: CPT | Performed by: INTERNAL MEDICINE

## 2018-05-25 PROCEDURE — 36415 COLL VENOUS BLD VENIPUNCTURE: CPT | Performed by: INTERNAL MEDICINE

## 2018-05-25 PROCEDURE — 82962 GLUCOSE BLOOD TEST: CPT

## 2018-05-25 PROCEDURE — 97530 THERAPEUTIC ACTIVITIES: CPT

## 2018-05-25 PROCEDURE — 74011250637 HC RX REV CODE- 250/637: Performed by: INTERNAL MEDICINE

## 2018-05-25 PROCEDURE — 74011250637 HC RX REV CODE- 250/637: Performed by: FAMILY MEDICINE

## 2018-05-25 RX ORDER — ONDANSETRON 8 MG/1
8 TABLET, ORALLY DISINTEGRATING ORAL
Qty: 30 TAB | Refills: 0 | Status: SHIPPED | OUTPATIENT
Start: 2018-05-25

## 2018-05-25 RX ORDER — OXYCODONE AND ACETAMINOPHEN 5; 325 MG/1; MG/1
1 TABLET ORAL
Qty: 20 TAB | Refills: 0 | Status: SHIPPED | OUTPATIENT
Start: 2018-05-25 | End: 2020-09-29

## 2018-05-25 RX ORDER — LANOLIN ALCOHOL/MO/W.PET/CERES
400 CREAM (GRAM) TOPICAL DAILY
Qty: 30 TAB | Refills: 0 | Status: SHIPPED | OUTPATIENT
Start: 2018-05-25

## 2018-05-25 RX ORDER — CHLORDIAZEPOXIDE HYDROCHLORIDE 25 MG/1
25 CAPSULE, GELATIN COATED ORAL DAILY
Qty: 3 CAP | Refills: 0 | Status: SHIPPED | OUTPATIENT
Start: 2018-05-26 | End: 2018-05-29

## 2018-05-25 RX ORDER — ALPRAZOLAM 0.5 MG/1
0.25 TABLET ORAL
Qty: 20 TAB | Refills: 0 | Status: SHIPPED | OUTPATIENT
Start: 2018-05-25 | End: 2020-09-29

## 2018-05-25 RX ORDER — METRONIDAZOLE 500 MG/1
500 TABLET ORAL EVERY 8 HOURS
Qty: 30 TAB | Refills: 0 | Status: SHIPPED | OUTPATIENT
Start: 2018-05-25 | End: 2018-06-04

## 2018-05-25 RX ORDER — CHLORDIAZEPOXIDE HYDROCHLORIDE 25 MG/1
25 CAPSULE, GELATIN COATED ORAL DAILY
Status: DISCONTINUED | OUTPATIENT
Start: 2018-05-26 | End: 2018-05-25 | Stop reason: HOSPADM

## 2018-05-25 RX ADMIN — METRONIDAZOLE 500 MG: 500 TABLET ORAL at 00:18

## 2018-05-25 RX ADMIN — Medication 5 ML: at 06:00

## 2018-05-25 RX ADMIN — PANTOPRAZOLE SODIUM 40 MG: 40 TABLET, DELAYED RELEASE ORAL at 08:39

## 2018-05-25 RX ADMIN — METRONIDAZOLE 500 MG: 500 TABLET ORAL at 08:40

## 2018-05-25 RX ADMIN — FOLIC ACID 1 MG: 1 TABLET ORAL at 08:40

## 2018-05-25 RX ADMIN — ESCITALOPRAM OXALATE 10 MG: 10 TABLET ORAL at 08:39

## 2018-05-25 RX ADMIN — Medication 100 MG: at 08:39

## 2018-05-25 RX ADMIN — SENNOSIDES AND DOCUSATE SODIUM 1 TABLET: 8.6; 5 TABLET ORAL at 08:40

## 2018-05-25 RX ADMIN — Medication 400 MG: at 08:39

## 2018-05-25 RX ADMIN — CHLORDIAZEPOXIDE HYDROCHLORIDE 25 MG: 25 CAPSULE ORAL at 08:39

## 2018-05-25 NOTE — PROGRESS NOTES
Problem: Mobility Impaired (Adult and Pediatric)  Goal: *Acute Goals and Plan of Care (Insert Text)  STG:  (1.)Ms. Angelique Wong will move from supine to sit and sit to supine  with MIN A within 1-2 treatment day(s). MET 5/25/18  (2.)Ms. Angelique Wong will transfer from bed to chair and chair to bed with MIN A using the least restrictive device within 1-2 treatment day(s). MET 5/25/18  (3.)Ms. Angelique Wong will ambulate with MINIMAL ASSIST for 10-25 feet with the least restrictive device within 1-2 treatment day(s). LTG:  (1.)Ms. Angelique Wong will move from supine to sit and sit to supine  in bed with CGA-INDEPENDENT within 3-7 treatment day(s). (2.)Ms. Angelique Wong will transfer from bed to chair and chair to bed with CGA-INDEPENDENT using the least restrictive device within 3-7 treatment day(s). (3.)Ms. Angelique Wong will ambulate with CONTACT GUARD ASSIST-INDEPENDENT for  feet with the least restrictive device within 3-7 treatment day(s). ________________________________________________________________________________________________    Outcome: Progressing Towards Goal    PHYSICAL THERAPY: Daily Note, Treatment Day: 3rd, AM 5/25/2018  INPATIENT: Hospital Day: 8  Payor: SELF PAY / Plan: [facility] SELF PAY / Product Type: Self Pay /      NAME/AGE/GENDER: Malinda Patino is a 62 y.o. female   PRIMARY DIAGNOSIS: Alcohol withdrawal ([address]  Alcoholism ([address]  Hypertension  Alcoholism ([address] Alcohol withdrawal ([address] Alcohol withdrawal ([address]        ICD-10: Treatment Diagnosis:    · Difficulty in walking, Not elsewhere classified (R26.2)  · Other abnormalities of gait and mobility (R26.89)   Precaution/Allergies:  Codeine and Lortab [hydrocodone-acetaminophen]      ASSESSMENT:     Ms. Angelique Wong is supine in bed on arrival.  He  has just walked into the room discussing pt going to rehab. Discussion with pt about importance of getting stronger before going home. She sat EOB with SBA but required min assist for scooting forward.   Pt stood with CGA. She ambulated 20 feet with min assist of 2 and constant verbal and tactile cues for walker safety. Pt is doing better and seems motivated today to participate with therapy. She was left sitting up in chair with needs in reach. Transport will be here around 10:30 to take pt to rehab. This section established at most recent assessment   PROBLEM LIST (Impairments causing functional limitations):  1. Decreased Strength  2. Decreased Transfer Abilities  3. Decreased Ambulation Ability/Technique  4. Decreased Balance  5. Increased Pain  6. Decreased Activity Tolerance  7. Decreased Flexibility/Joint Mobility INTERVENTIONS PLANNED: (Benefits and precautions of physical therapy have been discussed with the patient.)  1. Bed Mobility  2. Gait Training  3. Therapeutic Activites  4. Therapeutic Exercise/Strengthening  5. Transfer Training  6. Group Therapy     TREATMENT PLAN: Frequency/Duration: daily for duration of hospital stay  Rehabilitation Potential For Stated Goals: Good     RECOMMENDED REHABILITATION/EQUIPMENT: (at time of discharge pending progress): Due to the probability of continued deficits (see above) this patient will likely need continued skilled physical therapy after discharge. Equipment:    None at this time              HISTORY:   History of Present Injury/Illness (Reason for Referral):  Pt with decreased independence with functional mobility. Past Medical History/Comorbidities:   Ms. Hema Sandoval  has a past medical history of Aneurysm of common carotid artery ([facility] (2004); CKD (chronic kidney disease) (9/18/2014); Dementia; FSGS (focal segmental glomerulosclerosis); Gout; Hypertension; Osteoarthritis (9/18/2014); and Stroke [facility]) (2004, 2013).   Ms. Hema Sandoval  has a past surgical history that includes hx intracranial aneurysm repair (2004); hx refractive surgery; hx orthopaedic (Right, 10/2014); hx ankle fracture tx (Left, 2013); hx ercp (02/27/2018); and hx cholecystectomy (2018). Social History/Living Environment:   Home Environment: Private residence  # Steps to Enter:  (none)  One/Two Story Residence: Two story  # of Interior Steps: 17  Height of Each Step (in):  (unsure)  Interior Rails: None  Lift Chair Available: No  Living Alone: No  Patient Expects to be Discharged to[de-identified] Private residence  Current DME Used/Available at Home: None  Prior Level of Function/Work/Activity:  Pt ambulates with walker or holding on to her . Number of Personal Factors/Comorbidities that affect the Plan of Care: 0: LOW COMPLEXITY   EXAMINATION:   Most Recent Physical Functioning:   Gross Assessment:                  Posture:     Balance:    Bed Mobility:  Supine to Sit: Stand-by assistance  Sit to Supine:  (pt left up in chair)  Wheelchair Mobility:     Transfers:  Sit to Stand: Contact guard assistance  Stand to Sit: Contact guard assistance  Gait:     Speed/Radha: Shuffled; Slow  Step Length: Left shortened;Right shortened  Gait Abnormalities: Antalgic  Distance (ft): 20 Feet (ft)  Assistive Device: Walker, rolling  Ambulation - Level of Assistance: Minimal assistance;Assist x2  Duration: 15 Minutes      Body Structures Involved:  1. Bones  2. Joints  3. Muscles  4. Ligaments Body Functions Affected:  1. Neuromusculoskeletal  2. Movement Related Activities and Participation Affected:  1. Mobility   Number of elements that affect the Plan of Care: 4+: HIGH COMPLEXITY   CLINICAL PRESENTATION:   Presentation: Stable and uncomplicated: LOW COMPLEXITY   CLINICAL DECISION MAKIN [facility] [id number] AM-PAC 6 Clicks   Basic Mobility Inpatient Short Form  How much difficulty does the patient currently have. .. Unable A Lot A Little None   1. Turning over in bed (including adjusting bedclothes, sheets and blankets)? [] 1   [] 2   [x] 3   [] 4   2. Sitting down on and standing up from a chair with arms ( e.g., wheelchair, bedside commode, etc.)   [] 1   [] 2   [x] 3   [] 4   3.   Moving from lying on back to sitting on the side of the bed? [] 1   [] 2   [x] 3   [] 4   How much help from another person does the patient currently need. .. Total A Lot A Little None   4. Moving to and from a bed to a chair (including a wheelchair)? [] 1   [] 2   [x] 3   [] 4   5. Need to walk in hospital room? [] 1   [] 2   [x] 3   [] 4   6. Climbing 3-5 steps with a railing? [] 1   [] 2   [x] 3   [] 4   © 2007, Trustees of [address] [place] [address], under license to [employer]. All rights reserved      Score:  Initial: 18 Most Recent: X (Date: -- )    Interpretation of Tool:  Represents activities that are increasingly more difficult (i.e. Bed mobility, Transfers, Gait). Score 24 23 22-20 19-15 14-10 9-7 6     Modifier CH CI CJ CK CL CM CN      ? Mobility - Walking and Moving Around:     - CURRENT STATUS: CK - 40%-59% impaired, limited or restricted    - GOAL STATUS: CK - 40%-59% impaired, limited or restricted    - D/C STATUS:  CK - 40%-59% impaired, limited or restricted  Payor: SELF PAY / Plan: [facility] SELF PAY / Product Type: Self Pay /      Medical Necessity:     · Skilled intervention continues to be required due to decreased mobility ability. Reason for Services/Other Comments:  · Patient continues to require skilled intervention due to decraesed mobility ability. Use of outcome tool(s) and clinical judgement create a POC that gives a: Clear prediction of patient's progress: LOW COMPLEXITY            TREATMENT:   (In addition to Assessment/Re-Assessment sessions the following treatments were rendered)   Pre-treatment Symptoms/Complaints:  none  Pain: Initial:      Post Session:      Therapeutic Activity: (    ):  Therapeutic activities including Bed transfers working on exercises and balance.    Date:  5/23 Date:  5/24   Date:     Activity/Exercise Parameters Parameters Parameters   Ankle pumps 7 B aa 10 b aa    Marching in place 7 B aa 10 b aa    Hip abd/add 7  B aa 10 b aa    LAQ 7 B aa 10 b aa                        Braces/Orthotics/Lines/Etc:   · O2 Device: Room air  Treatment/Session Assessment:    · Response to Treatment:  Pt making slow progress. · Interdisciplinary Collaboration:   o Registered Nurse  · After treatment position/precautions:   o Up in chair  o Bed/Chair-wheels locked  o Bed in low position  o Call light within reach  o RN notified   · Compliance with Program/Exercises: compliant most of the time. · Recommendations/Intent for next treatment session: \"Next visit will focus on advancements to more challenging activities and reduction in assistance provided\".   Total Treatment Duration:  PT Patient Time In/Time Out  Time In: 0935  Time Out: [facility]

## 2018-05-25 NOTE — PROGRESS NOTES
Shift assessment complete. Pt is alert x 2. Verbalizes needs well. Up to bsc with 1p assist. Appetite fair. Denies pain. Continue to monitor.

## 2018-05-25 NOTE — PROGRESS NOTES
Spoke with pt about going to [facility], she seems to be in \"good spirits\" states that she spoke with her  again and he really wants to her to go. Pt rm is #[id number], transportation is set up for 10:30 via [employer], report # given to [employer].

## 2018-05-25 NOTE — DISCHARGE SUMMARY
Discharge Summary     Patient: Katja Oconnor MRN: [id number]  SSN: [id number]    YOB: 1960  Age: 62 y.o. Sex: female       Admit Date: 5/18/2018    Discharge Date: 5/25/2018      Admission Diagnoses: Alcohol withdrawal ([place] [address].)  Alcoholism ([place] [address].)  Hypertension  Alcoholism ([place] [address].)    Discharge Diagnoses:   Problem List as of 5/25/2018  Date Reviewed: 2/28/2018          Codes Class Noted - Resolved    C. difficile colitis ICD-10-CM: A04.72  ICD-9-CM: 008.45  5/20/2018 - Present        * (Principal)Alcohol withdrawal ([place] [address].) ICD-10-CM: N04.878  ICD-9-CM: 291.81  5/18/2018 - Present        Alcoholism ([place] [address].) ICD-10-CM: F10.20  ICD-9-CM: 303.90  5/18/2018 - Present        Hypertension ICD-10-CM: I10  ICD-9-CM: 401.9  5/18/2018 - Present        Sepsis ([place] [address].) ICD-10-CM: A41.9  ICD-9-CM: 038.9, 995.91  2/27/2018 - Present        Bacteremia due to Gram-negative bacteria ICD-10-CM: R78.81  ICD-9-CM: 790.7, 041.85  2/27/2018 - Present        Cholecystitis ICD-10-CM: K81.9  ICD-9-CM: 575.10  2/25/2018 - Present        Cholelithiasis ICD-10-CM: K80.20  ICD-9-CM: 574.20  2/25/2018 - Present        Hypokalemia ICD-10-CM: E87.6  ICD-9-CM: 276.8  2/13/2018 - Present        OSMIN (acute kidney injury) ([place] [address] ICD-10-CM: N17.9  ICD-9-CM: 584.9  2/13/2018 - Present        Acute metabolic encephalopathy AEU-23-RQ: G93.41  ICD-9-CM: 348.31  2/13/2018 - Present        Alcohol abuse ICD-10-CM: F10.10  ICD-9-CM: 305.00  2/13/2018 - Present        Falls ICD-10-CM: W19. Monserrat Esparza  ICD-9-CM: E888.9  2/13/2018 - Present        Vascular dementia ICD-10-CM: F01.50  ICD-9-CM: 290.40  2/13/2018 - Present        Hypomagnesemia ICD-10-CM: E83.42  ICD-9-CM: 275.2  2/13/2018 - Present        Hypophosphatemia ICD-10-CM: E83.39  ICD-9-CM: 275.3  2/13/2018 - Present        Depression ICD-10-CM: F32.9  ICD-9-CM: 741  2/13/2018 - Present        Bipolar disorder ([facility] ICD-10-CM: F31.9  ICD-9-CM: 296.80  10/22/2014 - Present        Gait instability ICD-10-CM: R26.81  ICD-9-CM: 781.2  10/22/2014 - Present        S/P total hip arthroplasty ICD-10-CM: Z96.649  ICD-9-CM: V43.64  9/19/2014 - Present        CKD (chronic kidney disease) ICD-10-CM: N18.9  ICD-9-CM: 585.9  9/18/2014 - Present        Osteoarthritis ICD-10-CM: M19.90  ICD-9-CM: 715.90  9/18/2014 - Present        Weakness of right leg ICD-10-CM: R29.898  ICD-9-CM: 729.89  12/2/2013 - Present        Essential hypertension ICD-10-CM: I10  ICD-9-CM: 401.9  9/11/2012 - Present        Cerebral infarction ([facility] ICD-10-CM: I63.9  ICD-9-CM: 434.91  9/11/2012 - Present        FSGS (focal segmental glomerulosclerosis) ICD-10-CM: N05.1  ICD-9-CM: 582.1  9/11/2012 - Present        Gout ICD-10-CM: M10.9  ICD-9-CM: 274.9  9/11/2012 - Present        RESOLVED: Hip fracture requiring operative repair ([facility] ICD-10-CM: S72.009A  ICD-9-CM: 820.8  9/19/2014 - 10/22/2014        RESOLVED: Femoral neck fracture ([facility] ICD-10-CM: S72.009A  ICD-9-CM: 820.8  9/18/2014 - 10/22/2014        RESOLVED: HTN (hypertension), malignant ICD-10-CM: I10  ICD-9-CM: 401.0  9/18/2014 - 10/22/2014        RESOLVED: Edema of both legs ICD-10-CM: R60.0  ICD-9-CM: 782.3  9/11/2012 - 10/22/2014        RESOLVED: Anxiety state, unspecified ICD-10-CM: F41.1  ICD-9-CM: 300.00  9/11/2012 - 10/22/2014        RESOLVED: Allergic rhinitis, cause unspecified ICD-10-CM: J30.9  ICD-9-CM: 477.9  9/11/2012 - 10/22/2014        RESOLVED: Cerebral arteritis ICD-10-CM: I67.7  ICD-9-CM: 437.4  9/11/2012 - 10/22/2014               Discharge Condition: [place] Course:     Enzo Botello is a 62 y.o. 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 She decided to stop drinking 2 days before admission because she was not feeling good. She also had diarrhea and became very weak. She has a previous stroke with remnant R leg weakness. She was seen in the ER and was hypotensive, had OSMIN on top of CKD and was confused. She was admitted to the hospital and was started on IV fluids. Her BP meds were held and was started on an alcohol withdrawal prophylaxis protocol. Her stool tested positive for Cdiff and she was started on Flagyl PO TID on 5/20/18. She should complete 14 days of treatment. Her diarrhea improved as did her mental status and kidney function but she remained below her regular level of functional capacity and because of that PT recommended inpatient rehab. At baseline she is very slow to answer questions and usually has a flat affect. She is being discharged to day with plan to complete 10 more days of flagyl. Her Blood Pressure has remained low and her BP meds have NOT been resumed. Her Vital Signs should be monitored and whenever her BP starts to go up her medications can be titrated. She should receive 3 more days of librium to complete her DT prophylaxis. General:                    Seen at bedside. DIWC:                                   [id number]  Eyes:                                   No palpebral pallor or scleral icterus. ENT:                                    External auricular and nasal exam within normal limits.                                             DJLHUX membranes are moist.  Neck:                                   Supple, non-tender, no JVD. Lungs:                       Clear to auscultation bilaterally without wheezes or crackles.                                             No respiratory distress or accessory muscle use. Heart:                                  Regular rate and rhythm, without murmurs, rubs, or gallops. Abdomen:                  Soft, non-tender, non-distended with normoactive bowel sounds. Genitourinary:           No tenderness over the bladder   Extremities:               Without clubbing, cyanosis, or edema. Skin:                                    no lesions. No cellulitis. Pulses:                      Radial and dorsalis pedis pulses present 2+ bilaterally.                                               Capillary refill <2s. Neurologic:                R leg weakness. Oriented. Psychiatric:                flat affect     Consults: None    Significant Diagnostic Studies: see hospital course     Disposition: rehab     Discharge Medications:   Current Discharge Medication List      START taking these medications    Details   chlordiazePOXIDE (LIBRIUM) 25 mg capsule Take 1 Cap by mouth daily for 3 days. Max Daily Amount: 25 mg.  Qty: 3 Cap, Refills: 0. Do not use it together with xanax or seroquel    Associated Diagnoses: Alcohol withdrawal syndrome without complication ([facility])      magnesium oxide (MAG-OX) 400 mg tablet Take 1 Tab by mouth daily. Qty: 30 Tab, Refills: 0      metroNIDAZOLE (FLAGYL) 500 mg tablet Take 1 Tab by mouth every eight (8) hours for 10 days. Qty: 30 Tab, Refills: 0      ondansetron (ZOFRAN ODT) 8 mg disintegrating tablet Take 1 Tab by mouth every six (6) hours as needed. PRN for nausea  Qty: 30 Tab, Refills: 0      ALPRAZolam (XANAX) 0.5 mg tablet Take 0.5 Tabs by mouth two (2) times daily as needed for Anxiety or Sleep. Max Daily Amount: 0.5 mg. DO not use it together with librium or seroquel  Qty: 20 Tab, Refills: 0    Associated Diagnoses: Alcohol withdrawal syndrome without complication ([facility]         CONTINUE these medications which have CHANGED    Details   oxyCODONE-acetaminophen (PERCOCET) 5-325 mg per tablet Take 1 Tab by mouth every eight (8) hours as needed. Max Daily Amount: 3 Tabs. Qty: 20 Tab, Refills: 0    Associated Diagnoses: Primary osteoarthritis, unspecified site         CONTINUE these medications which have NOT CHANGED    Details   QUEtiapine (SEROQUEL) 50 mg tablet Take 50 mg by mouth nightly. . Do not use it together with librium or xanax      escitalopram oxalate (LEXAPRO) 10 mg tablet Take 10 mg by mouth daily. acetaminophen (TYLENOL) 325 mg tablet Take 2 Tabs by mouth every six (6) hours as needed. Qty: 30 Tab, Refills: 0      folic acid (FOLVITE) 1 mg tablet Take 1 Tab by mouth daily. Qty: 30 Tab, Refills: 0      pantoprazole (PROTONIX) 40 mg tablet Take 1 Tab by mouth Daily (before breakfast). Qty: 30 Tab, Refills: 0      thiamine (B-1) 100 mg tablet Take 1 Tab by mouth daily.   Qty: 30 Tab, Refills: 0         STOP taking these medications       amLODIPine (NORVASC) 5 mg tablet Comments:   Reason for Stopping:         atorvastatin (LIPITOR) 40 mg tablet Comments:   Reason for Stopping:               Activity: Activity as tolerated  Diet: Cardiac Diet  Wound Care: None needed    Follow-up Appointments   Procedures    FOLLOW UP VISIT Appointment in: Ten Days PCP     PCP     Standing Status:   Standing     Number of Occurrences:   1     Order Specific Question:   Appointment in     Answer:   Ten Days    FOLLOW UP VISIT Appointment in: One Month ckd     ckd     Standing Status:   Standing     Number of Occurrences:   1     Standing Expiration Date:   5/26/2018     Order Specific Question:   Appointment in     Answer:   One Month       Signed By: Sudha Martinez MD     May 25, 2018

## 2018-05-25 NOTE — PROGRESS NOTES
Shift assessment completed via doc flow sheet. Patient is alert and oriented x 4. Respirations even and unlabored on room air. Lung sounds CTA bilaterally. Heart sounds S1, S2 auscultated and regular. Abdomen soft and non tender. Bowel sounds active to all 4 quadrants. IV flushed without difficulty. Patient denies pain and other needs at this time. Bed is locked and in low position. Bed rails x 3. Patient is encouraged to call for assistance. Call light within reach.

## 2018-05-30 ENCOUNTER — PATIENT OUTREACH (OUTPATIENT)
Dept: CASE MANAGEMENT | Age: 58
End: 2018-05-30

## 2018-05-30 NOTE — PROGRESS NOTES
· Patient admitted to [facility] through the ER on 5/18/18 for alcohol withdrawal, ETOH, and HTN  · Patient discharged on 5/25/18 to [facility] for STR   · RRAT score of 20   · Patient will be followed on weekly SNF preferred network provider call  This note will not be viewable in [address]

## 2018-06-12 ENCOUNTER — HOSPITAL ENCOUNTER (OUTPATIENT)
Dept: LAB | Age: 58
Discharge: HOME | End: 2018-06-12

## 2018-06-12 LAB
BASOPHILS # BLD: 0.1 K/UL (ref 0–0.2)
BASOPHILS NFR BLD: 1 % (ref 0–2)
DIFFERENTIAL METHOD BLD: ABNORMAL
EOSINOPHIL # BLD: 0.3 K/UL (ref 0–0.8)
EOSINOPHIL NFR BLD: 4 % (ref 0.5–7.8)
ERYTHROCYTE [DISTWIDTH] IN BLOOD BY AUTOMATED COUNT: 15.6 % (ref 11.9–14.6)
HCT VFR BLD AUTO: 39.7 % (ref 35.8–46.3)
HGB BLD-MCNC: 12.5 G/DL (ref 11.7–15.4)
IMM GRANULOCYTES # BLD: 0 K/UL (ref 0–0.5)
IMM GRANULOCYTES NFR BLD AUTO: 1 % (ref 0–5)
LYMPHOCYTES # BLD: 1.5 K/UL (ref 0.5–4.6)
LYMPHOCYTES NFR BLD: 24 % (ref 13–44)
MCH RBC QN AUTO: 28.9 PG (ref 26.1–32.9)
MCHC RBC AUTO-ENTMCNC: 31.5 G/DL (ref 31.4–35)
MCV RBC AUTO: 91.9 FL (ref 79.6–97.8)
MONOCYTES # BLD: 0.7 K/UL (ref 0.1–1.3)
MONOCYTES NFR BLD: 12 % (ref 4–12)
NEUTS SEG # BLD: 3.5 K/UL (ref 1.7–8.2)
NEUTS SEG NFR BLD: 58 % (ref 43–78)
PLATELET # BLD AUTO: 173 K/UL (ref 150–450)
PMV BLD AUTO: 9.6 FL (ref 10.8–14.1)
RBC # BLD AUTO: 4.32 M/UL (ref 4.05–5.25)
WBC # BLD AUTO: 6.2 K/UL (ref 4.3–11.1)

## 2018-06-12 PROCEDURE — 85025 COMPLETE CBC W/AUTO DIFF WBC: CPT | Performed by: INTERNAL MEDICINE

## 2018-06-13 ENCOUNTER — HOSPITAL ENCOUNTER (OUTPATIENT)
Dept: LAB | Age: 58
Discharge: HOME | End: 2018-06-13

## 2018-06-13 LAB
ALBUMIN SERPL-MCNC: 3 G/DL (ref 3.5–5)
ALBUMIN/GLOB SERPL: 0.9 {RATIO} (ref 1.2–3.5)
ALP SERPL-CCNC: 115 U/L (ref 50–136)
ALT SERPL-CCNC: 26 U/L (ref 12–65)
ANION GAP SERPL CALC-SCNC: 14 MMOL/L (ref 7–16)
AST SERPL-CCNC: 29 U/L (ref 15–37)
BILIRUB SERPL-MCNC: 0.4 MG/DL (ref 0.2–1.1)
BUN SERPL-MCNC: 43 MG/DL (ref 6–23)
CALCIUM SERPL-MCNC: 8.7 MG/DL (ref 8.3–10.4)
CHLORIDE SERPL-SCNC: 109 MMOL/L (ref 98–107)
CO2 SERPL-SCNC: 21 MMOL/L (ref 21–32)
CREAT SERPL-MCNC: 2.2 MG/DL (ref 0.6–1)
GLOBULIN SER CALC-MCNC: 3.2 G/DL (ref 2.3–3.5)
GLUCOSE SERPL-MCNC: 114 MG/DL (ref 65–100)
POTASSIUM SERPL-SCNC: 4.7 MMOL/L (ref 3.5–5.1)
PROT SERPL-MCNC: 6.2 G/DL (ref 6.3–8.2)
SODIUM SERPL-SCNC: 144 MMOL/L (ref 136–145)

## 2018-06-13 PROCEDURE — 80053 COMPREHEN METABOLIC PANEL: CPT | Performed by: INTERNAL MEDICINE

## 2018-06-14 ENCOUNTER — HOME HEALTH ADMISSION (OUTPATIENT)
Dept: HOME HEALTH SERVICES | Facility: HOME HEALTH | Age: 58
End: 2018-06-14

## 2018-06-22 ENCOUNTER — PATIENT OUTREACH (OUTPATIENT)
Dept: CASE MANAGEMENT | Age: 58
End: 2018-06-22

## 2018-06-22 NOTE — PROGRESS NOTES
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 She decided to stop drinking 2 days before admission because she was not feeling good. She also had diarrhea and became very weak. She has a previous stroke with remnant R leg weakness. · Patient needs some assist with ADL's - family and spouse assist as needed -has been using RW for ambulation  · Denies any issues with med, transport, or psychosocial issues at this time   · PLAN:  Will follow up with patient and home health in 2 weeks to further assess CCM needs  This note will not be viewable in [address]

## 2018-07-10 ENCOUNTER — PATIENT OUTREACH (OUTPATIENT)
Dept: CASE MANAGEMENT | Age: 58
End: 2018-07-10

## 2018-07-10 NOTE — PROGRESS NOTES
· Outreached to patient to f/u since discharge to home  · Patient discharged from [facility] to SNF on 5/25/18 to [facility] for STR   · RRAT score of 20   · Patient discharged from SNF to home on 6/14/18  · Interim HH ordered at this time - due to [facility] was unable to pick patient up at that time  · Home health is following patient closely    · Patient was admitted to [facility] through the ER on 5/18/18 for alcohol withdrawal, ETOH, and HTN PMH of chronic alcohol abuse ( 1 pint of liquor per day), history of hypertension, CKD due to  Focal segmental glomerulosclerosis  with baseline Cr level of 2.5 mg/dL. She decided to stop drinking 2 days before admission because she was not feeling good. She also had diarrhea and became very weak. She has a previous stroke with remnant R leg weakness. · Patient needs some assist with ADL's - family and spouse assist as needed -has been using RW for ambulation  · Denies any issues with med, transport, or psychosocial issues at this time   · PLAN:  Will follow up with patient and home health in 1 week to further assess CCM needs  This note will not be viewable in [address]

## 2018-07-17 ENCOUNTER — PATIENT OUTREACH (OUTPATIENT)
Dept: CASE MANAGEMENT | Age: 58
End: 2018-07-17

## 2018-07-17 NOTE — PROGRESS NOTES
· Outreached to patient to f/u since discharge to home  · Patient discharged from [facility] to SNF on 5/25/18 to [facility] for STR   · RRAT score of 20   · Patient discharged from SNF to home on 6/14/18  · Interim HH ordered at this time - due to [facility] was unable to pick patient up at that time  · Home health is following patient closely    · Patient was admitted to [facility] through the ER on 5/18/18 for alcohol withdrawal, ETOH, and HTN PMH of chronic alcohol abuse ( 1 pint of liquor per day), history of hypertension, CKD due to  Focal segmental glomerulosclerosis  with baseline Cr level of 2.5 mg/dL. She decided to stop drinking 2 days before admission because she was not feeling good. She also had diarrhea and became very weak. She has a previous stroke with remnant R leg weakness. · Patient needs some assist with ADL's - family and spouse assist as needed -has been using RW for ambulation   Fall Prevention teaching included:   Wearing non slip soles. Remove  throw rugs. Clear pathways. Keeping bed linen off floor prior to standing. Increase lighting in rooms/ use of night lights. Advise patient to keep  assistive device, BSC, personal items or other articles within easy reach  especially at night. Encourage patient to use ambulation devices as  needed. Keeping bed linen and lap blankets off  floor prior to standing. · Denies any issues with med, transport, or psychosocial issues at this time   · PLAN:  Will follow up with patient and home health in 1 week to further assess CCM needs  This note will not be viewable in [address]

## 2018-07-24 ENCOUNTER — PATIENT OUTREACH (OUTPATIENT)
Dept: CASE MANAGEMENT | Age: 58
End: 2018-07-24

## 2018-07-24 NOTE — PROGRESS NOTES
· Outreached to patient to f/u since discharge to home  · Patient discharged from [facility] to SNF on 5/25/18 to [facility] for STR   · RRAT score of 20   · Patient discharged from SNF to home on 6/14/18  · [facility] ordered at this time - due to [facility] was unable to pick patient up at that time  · Home health is following patient closely    · Patient was admitted to [facility] through the ER on 5/18/18 for alcohol withdrawal, ETOH, and HTN PMH of chronic alcohol abuse ( 1 pint of liquor per day), history of hypertension, CKD due to  Focal segmental glomerulosclerosis  with baseline Cr level of 2.5 mg/dL. She decided to stop drinking 2 days before admission because she was not feeling good. She also had diarrhea and became very weak. She has a previous stroke with remnant R leg weakness. · Patient needs some assist with ADL's - family and spouse assist as needed -has been using RW for ambulation  · Denies any issues with med, transport, or psychosocial issues at this time   Fall Prevention teaching included:   Wearing non slip soles. Remove throw rugs. Clear pathways. Keeping bed linen off floor prior to standing. Increase lighting in rooms/ use of night lights. Advise patient to keep assistive device, BSC, personal items or other articles within easy reach especially at night. Encourage patient to use ambulation devices as needed. Keeping bed linen and lap blankets off  floor prior to standing. · PLAN:  Will follow up with patient and home health in 1 week to further assess CCM needs  This note will not be viewable in [address]

## 2018-07-31 ENCOUNTER — PATIENT OUTREACH (OUTPATIENT)
Dept: CASE MANAGEMENT | Age: 58
End: 2018-07-31

## 2018-07-31 NOTE — PROGRESS NOTES
Community Care Management  Follow up Outreach Note Outreach type: Phone call:  7/31/18 Home visit:  
 
Reason for follow-up: 
 Patient discharged from SNF to home on 6/14/18 Disease specific complaints/issues:  
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 She decided to stop drinking 2 days before admission because she was not feeling good. She also had diarrhea and became very weak. She has a previous stroke with remnant R leg weakness. Patient progress towards goals set from last contact: 
 Continuing to work with [facility] Fall Prevention teaching included:   Wearing non slip soles. Remove throw rugs. Clear pathways. Keeping bed linen off floor prior to standing. Increase lighting in rooms/ use of night lights. Advise patient to keep assistive device, BSC, personal items or other articles within easy reach especially at night. Encourage patient to use ambulation devices as needed. Keeping bed linen and lap blankets off  floor prior to standing. CM Assessed Risk for Readmission:  
 
 
 
 
 
 
 
Patient stated Risk for Readmission: 
 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 She decided to stop drinking 2 days before admission because she was not feeling good. She also had diarrhea and became very weak. She has a previous stroke with remnant R leg weakness. Not sure- states that she doesnt want to go back to hospital   
  
Has patient attended any PCP or specialist follow-up appointments since last contact? What was outcome of appointment?  
 
When is next follow-up scheduled? Yes and transportation has been arranged Hospital f/u with PCP was 5/29/18 Review medications. Any medication changes since last outreach? Does patient have any questions or issues related to their medications? Verbalizes understanding of medication review Home health active? If yes  any issue? Progress? Yes, [facility] because [facility] was unavailable Referrals needed? 
(SW, Diabetes education, HH, etc. ) Not at this time Other issues/Miscellaneous? (Transportation, access to meals, ability to perform ADLs, adequate caregiver support, etc.) Patient needs some assist with ADL's - family and spouse assist as needed -has been using RW for ambulation Denies any issues with med, transport, or psychosocial issues at this time Next Outreach Scheduled: In one week Next Steps/Goals: 
 Continue to work with [facility] Fall Prevention teaching included:   Wearing non slip soles. Remove throw rugs. Clear pathways. Keeping bed linen off floor prior to standing. Increase lighting in rooms/ use of night lights. Advise patient to keep assistive device, BSC, personal items or other articles within easy reach especially at night. Encourage patient to use ambulation devices as needed. Keeping bed linen and lap blankets off  floor prior to standing. Community Care Manager:  
Chandan SALEH, RN, CCM /  
c: [phone] / [email or web address] This note will not be viewable in [address]

## 2018-08-07 ENCOUNTER — PATIENT OUTREACH (OUTPATIENT)
Dept: CASE MANAGEMENT | Age: 58
End: 2018-08-07

## 2018-08-07 NOTE — PROGRESS NOTES
Community Care Management  Follow up Outreach Note   Outreach type: Phone call:  8/7/18         Home visit:      Reason for follow-up: Patient discharged from SNF to home on 6/14/18   Disease specific complaints/issues:    SF through the ER on 5/18/18 for alcohol withdrawal, ETOH, and HTN PMH of chronic alcohol abuse ( 1 pint of liquor per day), history of hypertension, CKD due to  Focal segmental glomerulosclerosis  with baseline Cr level of 2.5 mg/dL. She decided to stop drinking 2 days before admission because she was not feeling good. She also had diarrhea and became very weak. She has a previous stroke with remnant R leg weakness.      Patient progress towards goals set from last contact: Continuing to work with [facility]   Fall Prevention teaching included:   Wearing non slip soles.  Remove throw rugs.  Clear pathways.  Keeping bed linen off floor prior to standing.  Increase lighting in rooms/ use of night lights. Advise patient to keep assistive device, BSC, personal items or other articles within easy reach especially at night.  Encourage patient to use ambulation devices as needed.  Keeping bed linen and lap blankets off  floor prior to standing. CM Assessed Risk for Readmission:                        Patient stated Risk for Readmission: HRRP: RRAT score of 20   Patient was admitted to [facility] through the ER on 5/18/18 for alcohol withdrawal, ETOH, and HTN PMH of chronic alcohol abuse ( 1 pint of liquor per day), history of hypertension, CKD due to  Focal segmental glomerulosclerosis  with baseline Cr level of 2.5 mg/dL. She decided to stop drinking 2 days before admission because she was not feeling good. She also had diarrhea and became very weak.  She has a previous stroke with remnant R leg weakness.              Not sure- states that she doesnt want to go back to hospital     Has patient attended any PCP or specialist follow-up appointments since last contact?     What was outcome of appointment?     When is next follow-up scheduled? Yes and transportation has been arranged   Hospital f/u with PCP was 5/29/18   Review medications.     Any medication changes since last outreach?     Does patient have any questions or issues related to their medications? Verbalizes understanding of medication review    Home health active? If yes  any issue? Progress? Yes, [facility] because SF was unavailable     Referrals needed?  (SW, Diabetes education, HH, etc. ) Not at this time    Other issues/Miscellaneous? (Transportation, access to meals, ability to perform ADLs, adequate caregiver support, etc.) Patient needs some assist with ADL's - family and spouse assist as needed -has been using RW for ambulation  Denies any issues with med, transport, or psychosocial issues at this time          Next Outreach Scheduled: In one week       Next Steps/Goals: Continue to work with [facility]   Fall Prevention teaching included:   Wearing non slip soles.  Remove throw rugs.  Clear pathways.  Keeping bed linen off floor prior to standing.  Increase lighting in rooms/ use of night lights. Advise patient to keep assistive device, BSC, personal items or other articles within easy reach especially at night.  Encourage patient to use ambulation devices as needed.  Keeping bed linen and lap blankets off  floor prior to standing. Community Care Manager:   Darron SALEH RN, [facility] /   c: [phone] / [email or web address]     This note will not be viewable in [address]

## 2018-08-14 ENCOUNTER — PATIENT OUTREACH (OUTPATIENT)
Dept: CASE MANAGEMENT | Age: 58
End: 2018-08-14

## 2018-08-14 NOTE — PROGRESS NOTES
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 She decided to stop drinking 2 days before admission because she was not feeling good. She also had diarrhea and became very weak. She has a previous stroke with remnant R leg weakness.       Patient progress towards goals set from last contact: Continuing to work with [facility]   Fall Prevention teaching included:   Wearing non slip soles.  Remove throw rugs.  Clear pathways.  Keeping bed linen off floor prior to standing.  Increase lighting in rooms/ use of night lights. Advise patient to keep assistive device, BSC, personal items or other articles within easy reach especially at night.  Encourage patient to use ambulation devices as needed.  Keeping bed linen and lap blankets off  floor prior to standing. CM Assessed Risk for Readmission:                               Patient stated Risk for Readmission: HRRP: RRAT score of 20   Patient was admitted to [facility] through the ER on 5/18/18 for alcohol withdrawal, ETOH, and HTN PMH of chronic alcohol abuse ( 1 pint of liquor per day), history of hypertension, CKD due to  Focal segmental glomerulosclerosis  with baseline Cr level of 2.5 mg/dL. She decided to stop drinking 2 days before admission because she was not feeling good. She also had diarrhea and became very weak.  She has a previous stroke with remnant R leg weakness.                  Not sure- states that she doesnt want to go back to hospital     Has patient attended any PCP or specialist follow-up appointments since last contact?      What was outcome of appointment?      When is next follow-up scheduled? Yes and transportation has been arranged   Hospital f/u with PCP was 5/29/18   Review medications.      Any medication changes since last outreach?      Does patient have any questions or issues related to their medications? Verbalizes understanding of medication review    Home health active? If yes Inna Rivas issue? Progress? Yes, [facility] because SF was unavailable     Referrals needed?  (SW, Diabetes education, HH, etc. ) Not at this time    Other issues/Miscellaneous? (Transportation, access to meals, ability to perform ADLs, adequate caregiver support, etc.) Patient needs some assist with ADL's - family and spouse assist as needed -has been using RW for ambulation  Denies any issues with med, transport, or psychosocial issues at this time            Next Outreach Scheduled:  In one week - will possibly close case and graduate patient if patient remains stable this week          Next Steps/Goals: Continue to work with [facility]   Fall Prevention teaching included:   Wearing non slip soles.  Remove throw rugs.  Clear pathways.  Keeping bed linen off floor prior to standing.  Increase lighting in rooms/ use of night lights. Advise patient to keep assistive device, BSC, personal items or other articles within easy reach especially at night.  Encourage patient to use ambulation devices as needed.  Keeping bed linen and lap blankets off  floor prior to standing. Community Care Manager:   Elif SALEH, RN, [facility] /   c: [phone] / [email or web address]      This note will not be viewable in [employer]

## 2018-08-21 ENCOUNTER — PATIENT OUTREACH (OUTPATIENT)
Dept: CASE MANAGEMENT | Age: 58
End: 2018-08-21

## 2018-08-21 NOTE — PROGRESS NOTES
Community Care Management  Follow up Outreach Note   Outreach type: Phone call:  8/21/18   Benjamín Allan  Home visit:       Reason for follow-up: Patient discharged from SNF to home on 6/14/18   Disease specific complaints/issues:     SF through the ER on 5/18/18 for alcohol withdrawal, ETOH, and HTN PMH of chronic alcohol abuse ( 1 pint of liquor per day), history of hypertension, CKD due to  Focal segmental glomerulosclerosis  with baseline Cr level of 2.5 mg/dL. She decided to stop drinking 2 days before admission because she was not feeling good. She also had diarrhea and became very weak. She has a previous stroke with remnant R leg weakness.       Patient progress towards goals set from last contact: Continuing to work with [facility]   Fall Prevention teaching included:   Wearing non slip soles.  Remove throw rugs.  Clear pathways.  Keeping bed linen off floor prior to standing.  Increase lighting in rooms/ use of night lights. Advise patient to keep assistive device, BSC, personal items or other articles within easy reach especially at night.  Encourage patient to use ambulation devices as needed.  Keeping bed linen and lap blankets off  floor prior to standing. CM Assessed Risk for Readmission:                               Patient stated Risk for Readmission: HRRP: RRAT score of 20   Patient was admitted to [facility] through the ER on 5/18/18 for alcohol withdrawal, ETOH, and HTN PMH of chronic alcohol abuse ( 1 pint of liquor per day), history of hypertension, CKD due to  Focal segmental glomerulosclerosis  with baseline Cr level of 2.5 mg/dL. She decided to stop drinking 2 days before admission because she was not feeling good. She also had diarrhea and became very weak.  She has a previous stroke with remnant R leg weakness.                  Not sure- states that she doesnt want to go back to hospital     Has patient attended any PCP or specialist follow-up appointments since last contact?      What was outcome of appointment?      When is next follow-up scheduled? Yes and transportation has been arranged   Hospital f/u with PCP was 5/29/18   Review medications.      Any medication changes since last outreach?      Does patient have any questions or issues related to their medications? Verbalizes understanding of medication review    Home health active? If yes Marissa Bahena issue? Progress? Yes, [facility] because SF was unavailable     Referrals needed?  (SW, Diabetes education, HH, etc. ) Not at this time    Other issues/Miscellaneous? (Transportation, access to meals, ability to perform ADLs, adequate caregiver support, etc.) Patient needs some assist with ADL's - family and spouse assist as needed -has been using RW for ambulation  Denies any issues with med, transport, or psychosocial issues at this time            Next Outreach Scheduled: case closed and patient graduated at this time - stable at this time          Next Steps/Goals: Continue to work on   [employer] included:   Wearing non slip soles.  Remove throw rugs.  Clear pathways.  Keeping bed linen off floor prior to standing.  Increase lighting in rooms/ use of night lights. Advise patient to keep assistive device, BSC, personal items or other articles within easy reach especially at night.  Encourage patient to use ambulation devices as needed.  Keeping bed linen and lap blankets off  floor prior to standing. Community Care Manager:   Abbey SALEH, RN, [facility] /   c: [phone] / [email or web address]       This note will not be viewable in [employer]

## 2019-06-06 ENCOUNTER — HOSPITAL ENCOUNTER (EMERGENCY)
Age: 59
Discharge: HOME | End: 2019-06-06
Attending: EMERGENCY MEDICINE
Payer: MEDICARE

## 2019-06-06 ENCOUNTER — APPOINTMENT (OUTPATIENT)
Dept: GENERAL RADIOLOGY | Age: 59
End: 2019-06-06
Attending: EMERGENCY MEDICINE
Payer: MEDICARE

## 2019-06-06 VITALS
HEIGHT: 66 IN | WEIGHT: 209 LBS | HEART RATE: 77 BPM | BODY MASS INDEX: 33.59 KG/M2 | SYSTOLIC BLOOD PRESSURE: 209 MMHG | RESPIRATION RATE: 18 BRPM | OXYGEN SATURATION: 100 % | TEMPERATURE: 97.9 F | DIASTOLIC BLOOD PRESSURE: 98 MMHG

## 2019-06-06 DIAGNOSIS — I10 ESSENTIAL HYPERTENSION: Primary | ICD-10-CM

## 2019-06-06 LAB
ALBUMIN SERPL-MCNC: 3.5 G/DL (ref 3.5–5)
ALBUMIN/GLOB SERPL: 1 {RATIO} (ref 1.2–3.5)
ALP SERPL-CCNC: 169 U/L (ref 50–136)
ALT SERPL-CCNC: 35 U/L (ref 12–65)
ANION GAP SERPL CALC-SCNC: 12 MMOL/L (ref 7–16)
AST SERPL-CCNC: 33 U/L (ref 15–37)
BASOPHILS # BLD: 0.1 K/UL (ref 0–0.2)
BASOPHILS NFR BLD: 1 % (ref 0–2)
BILIRUB SERPL-MCNC: 1 MG/DL (ref 0.2–1.1)
BUN SERPL-MCNC: 23 MG/DL (ref 6–23)
CALCIUM SERPL-MCNC: 9.9 MG/DL (ref 8.3–10.4)
CHLORIDE SERPL-SCNC: 109 MMOL/L (ref 98–107)
CO2 SERPL-SCNC: 21 MMOL/L (ref 21–32)
CREAT SERPL-MCNC: 2.26 MG/DL (ref 0.6–1)
DIFFERENTIAL METHOD BLD: ABNORMAL
EOSINOPHIL # BLD: 0.1 K/UL (ref 0–0.8)
EOSINOPHIL NFR BLD: 1 % (ref 0.5–7.8)
ERYTHROCYTE [DISTWIDTH] IN BLOOD BY AUTOMATED COUNT: 14.9 % (ref 11.9–14.6)
GLOBULIN SER CALC-MCNC: 3.5 G/DL (ref 2.3–3.5)
GLUCOSE SERPL-MCNC: 92 MG/DL (ref 65–100)
HCT VFR BLD AUTO: 42.1 % (ref 35.8–46.3)
HGB BLD-MCNC: 14.1 G/DL (ref 11.7–15.4)
IMM GRANULOCYTES # BLD AUTO: 0 K/UL (ref 0–0.5)
IMM GRANULOCYTES NFR BLD AUTO: 0 % (ref 0–5)
LYMPHOCYTES # BLD: 2.3 K/UL (ref 0.5–4.6)
LYMPHOCYTES NFR BLD: 22 % (ref 13–44)
MCH RBC QN AUTO: 29.3 PG (ref 26.1–32.9)
MCHC RBC AUTO-ENTMCNC: 33.5 G/DL (ref 31.4–35)
MCV RBC AUTO: 87.3 FL (ref 79.6–97.8)
MONOCYTES # BLD: 0.8 K/UL (ref 0.1–1.3)
MONOCYTES NFR BLD: 8 % (ref 4–12)
NEUTS SEG # BLD: 7.1 K/UL (ref 1.7–8.2)
NEUTS SEG NFR BLD: 68 % (ref 43–78)
NRBC # BLD: 0 K/UL (ref 0–0.2)
PLATELET # BLD AUTO: 107 K/UL (ref 150–450)
PMV BLD AUTO: 10.8 FL (ref 9.4–12.3)
POTASSIUM SERPL-SCNC: 4.3 MMOL/L (ref 3.5–5.1)
PROT SERPL-MCNC: 7 G/DL (ref 6.3–8.2)
RBC # BLD AUTO: 4.82 M/UL (ref 4.05–5.2)
SODIUM SERPL-SCNC: 142 MMOL/L (ref 136–145)
WBC # BLD AUTO: 10.4 K/UL (ref 4.3–11.1)

## 2019-06-06 PROCEDURE — 85025 COMPLETE CBC W/AUTO DIFF WBC: CPT

## 2019-06-06 PROCEDURE — 80053 COMPREHEN METABOLIC PANEL: CPT

## 2019-06-06 PROCEDURE — 71046 X-RAY EXAM CHEST 2 VIEWS: CPT

## 2019-06-06 PROCEDURE — 99284 EMERGENCY DEPT VISIT MOD MDM: CPT | Performed by: EMERGENCY MEDICINE

## 2019-06-06 PROCEDURE — 74011250637 HC RX REV CODE- 250/637: Performed by: EMERGENCY MEDICINE

## 2019-06-06 PROCEDURE — 36415 COLL VENOUS BLD VENIPUNCTURE: CPT

## 2019-06-06 PROCEDURE — 93005 ELECTROCARDIOGRAM TRACING: CPT | Performed by: EMERGENCY MEDICINE

## 2019-06-06 RX ORDER — AMLODIPINE BESYLATE 10 MG/1
5 TABLET ORAL
Status: COMPLETED | OUTPATIENT
Start: 2019-06-06 | End: 2019-06-06

## 2019-06-06 RX ORDER — AMLODIPINE BESYLATE 5 MG/1
5 TABLET ORAL DAILY
Qty: 30 TAB | Refills: 2 | Status: SHIPPED | OUTPATIENT
Start: 2019-06-06 | End: 2019-07-06

## 2019-06-06 RX ADMIN — AMLODIPINE BESYLATE 5 MG: 10 TABLET ORAL at 18:12

## 2019-06-06 NOTE — ED PROVIDER NOTES
66-year-old lady presents with concerns from her primary care doctor about having an elevated blood pressure. Patient says that she went to see the doctor because she had some diarrhea on Tuesday. She took a dose of Imodium last night and another dose this morning and her diarrhea is gone. She said that she has no other symptoms and has no chest pain, difficulty breathing, or headache. She said since her blood pressure was high at her doctor's office she was told to go to the ER. Elements of this note were created using speech recognition software. As such, errors of speech recognition may be present.            Past Medical History:   Diagnosis Date    Aneurysm of common carotid artery ([facility] [address] 2004    left side s/p coil     CKD (chronic kidney disease) 9/18/2014    Dementia     FSGS (focal segmental glomerulosclerosis)     in remission at present    Gout     Hypertension     managed with medication     Osteoarthritis 9/18/2014    Stroke ([facility] [address] 2004, 2013    slight weakness on right side, slight effect to speech       Past Surgical History:   Procedure Laterality Date    HX ANKLE FRACTURE TX Left 2013    has hardware in    HX CHOLECYSTECTOMY  02/28/2018    HX ERCP  02/27/2018    cbd stone    HX INTRACRANIAL ANEURYSM REPAIR  2004    left carotid     HX ORTHOPAEDIC Right 10/2014    hip replacement    HX REFRACTIVE SURGERY      bilateral - Lasik         Family History:   Problem Relation Age of Onset    Cancer Father 80        unknown    Stroke Sister 48       Social History     Socioeconomic History    Marital status:      Spouse name: Not on file    Number of children: Not on file    Years of education: Not on file    Highest education level: Not on file   Occupational History    Not on file   Social Needs    Financial resource strain: Not on file    Food insecurity:     Worry: Not on file     Inability: Not on file    Transportation needs:     Medical: Not on file     Non-medical: Not on file   Tobacco Use    Smoking status: Former Smoker     Packs/day: 0.25     Last attempt to quit: 1979     Years since quittin.4    Smokeless tobacco: Never Used   Substance and Sexual Activity    Alcohol use: Yes     Alcohol/week: 1.5 oz     Types: 3 Shots of liquor per week     Comment: pint of fireball    Drug use: No    Sexual activity: Yes     Partners: Male     Birth control/protection: None   Lifestyle    Physical activity:     Days per week: Not on file     Minutes per session: Not on file    Stress: Not on file   Relationships    Social connections:     Talks on phone: Not on file     Gets together: Not on file     Attends Islam service: Not on file     Active member of club or organization: Not on file     Attends meetings of clubs or organizations: Not on file     Relationship status: Not on file    Intimate partner violence:     Fear of current or ex partner: Not on file     Emotionally abused: Not on file     Physically abused: Not on file     Forced sexual activity: Not on file   Other Topics Concern    Not on file   Social History Narrative    Not on file         ALLERGIES: Codeine and Lortab [hydrocodone-acetaminophen]    Review of Systems   Constitutional: Negative for chills, diaphoresis and fever. HENT: Negative for congestion, rhinorrhea and sore throat. Eyes: Negative for redness and visual disturbance. Respiratory: Negative for cough, chest tightness, shortness of breath and wheezing. Cardiovascular: Negative for chest pain and palpitations. Gastrointestinal: Positive for diarrhea. Negative for abdominal pain, blood in stool, nausea and vomiting. Endocrine: Negative for polydipsia and polyuria. Genitourinary: Negative for dysuria and hematuria. Musculoskeletal: Negative for arthralgias, myalgias and neck stiffness. Skin: Negative for rash. Allergic/Immunologic: Negative for environmental allergies and food allergies.    Neurological: Negative for dizziness, weakness and headaches. Hematological: Negative for adenopathy. Does not bruise/bleed easily. Psychiatric/Behavioral: Negative for confusion and sleep disturbance. The patient is not nervous/anxious. Vitals:    06/06/19 1454   BP: (!) 186/103   Pulse: 77   Resp: 18   Temp: 97.9 °F (36.6 °C)   SpO2: 100%   Weight: 94.8 kg (209 lb)   Height: 5' 6\" (1.676 m)            Physical Exam   Constitutional: She is oriented to person, place, and time. She appears well-developed and well-nourished. HENT:   Head: Normocephalic and atraumatic. Mouth/Throat: Oropharynx is clear and moist.   Eyes: Pupils are equal, round, and reactive to light. Conjunctivae are normal. Right eye exhibits no discharge. Left eye exhibits no discharge. Neck: No thyromegaly present. Cardiovascular: Normal rate, regular rhythm and normal heart sounds. No murmur heard. Pulmonary/Chest: Effort normal and breath sounds normal.   Abdominal: Soft. Bowel sounds are normal. There is no tenderness. There is no rebound and no guarding. Musculoskeletal: Normal range of motion. She exhibits no edema. Neurological: She is alert and oriented to person, place, and time. She exhibits normal muscle tone. Coordination normal.   Skin: Skin is warm and dry. Psychiatric: She has a normal mood and affect. Her behavior is normal.        MDM  Number of Diagnoses or Management Options  Diagnosis management comments: Patient's exam is unremarkable her pressure is elevated and I think she probably needs to be restarted on some antihypertensives. She does have some chronic kidney disease.          Procedures

## 2019-06-06 NOTE — ED NOTES
I have reviewed discharge instructions with the patient. The patient verbalized understanding. Patient left ED via Discharge Method: ambulatory to Home with spouse. Opportunity for questions and clarification provided. Patient given 1 scripts. To continue your aftercare when you leave the hospital, you may receive an automated call from our care team to check in on how you are doing. This is a free service and part of our promise to provide the best care and service to meet your aftercare needs.  If you have questions, or wish to unsubscribe from this service please call [phone]. Thank you for Choosing our [facility] Emergency Department.

## 2019-06-06 NOTE — DISCHARGE INSTRUCTIONS
Return with any fevers, vomiting, difficulty breathing, worsening symptoms, or additional concerns. Follow-up with your primary care doctor next week for further evaluation.

## 2019-06-06 NOTE — ED TRIAGE NOTES
Pt states she has had diarrhea for the last 3 weeks. Hx of c diff but denies being on abx recently. States she went to PCP due to diarrhea and that they sent her here because of htn. Pt is not sure how high her bp was at pcp office. No problems eating/drinking. No problems urinating. Denies headache. Dx with htn in 2004 but was taken off of bp meds 1 year ago when she was hospitalized for cholecystecomy.

## 2019-06-07 LAB
ATRIAL RATE: 77 BPM
CALCULATED P AXIS, ECG09: 32 DEGREES
CALCULATED R AXIS, ECG10: 36 DEGREES
CALCULATED T AXIS, ECG11: 13 DEGREES
DIAGNOSIS, 93000: NORMAL
P-R INTERVAL, ECG05: 126 MS
Q-T INTERVAL, ECG07: 398 MS
QRS DURATION, ECG06: 76 MS
QTC CALCULATION (BEZET), ECG08: 450 MS
VENTRICULAR RATE, ECG03: 77 BPM

## 2019-12-31 ENCOUNTER — HOSPITAL ENCOUNTER (OUTPATIENT)
Dept: LAB | Age: 59
Discharge: HOME | End: 2019-12-31

## 2019-12-31 LAB
APPEARANCE UR: ABNORMAL
BACTERIA URNS QL MICRO: ABNORMAL /HPF
BILIRUB UR QL: NEGATIVE
CASTS URNS QL MICRO: ABNORMAL /LPF
COLOR UR: YELLOW
EPI CELLS #/AREA URNS HPF: ABNORMAL /HPF
GLUCOSE UR STRIP.AUTO-MCNC: NEGATIVE MG/DL
HGB UR QL STRIP: ABNORMAL
KETONES UR QL STRIP.AUTO: NEGATIVE MG/DL
LEUKOCYTE ESTERASE UR QL STRIP.AUTO: ABNORMAL
NITRITE UR QL STRIP.AUTO: NEGATIVE
OTHER OBSERVATIONS,UCOM: ABNORMAL
PH UR STRIP: 5.5 [PH] (ref 5–9)
PROT UR STRIP-MCNC: 100 MG/DL
RBC #/AREA URNS HPF: ABNORMAL /HPF
SP GR UR REFRACTOMETRY: 1.01 (ref 1–1.02)
UROBILINOGEN UR QL STRIP.AUTO: 0.2 EU/DL (ref 0.2–1)
WBC URNS QL MICRO: >100 /HPF

## 2019-12-31 PROCEDURE — 81001 URINALYSIS AUTO W/SCOPE: CPT

## 2020-01-01 ENCOUNTER — HOSPITAL ENCOUNTER (OUTPATIENT)
Dept: LAB | Age: 60
Discharge: HOME | End: 2020-01-01

## 2020-01-01 LAB
ANION GAP SERPL CALC-SCNC: 13 MMOL/L (ref 7–16)
BUN SERPL-MCNC: 31 MG/DL (ref 6–23)
CALCIUM SERPL-MCNC: 9.3 MG/DL (ref 8.3–10.4)
CHLORIDE SERPL-SCNC: 109 MMOL/L (ref 98–107)
CO2 SERPL-SCNC: 15 MMOL/L (ref 21–32)
CREAT SERPL-MCNC: 2.68 MG/DL (ref 0.6–1)
GLUCOSE SERPL-MCNC: 103 MG/DL (ref 65–100)
POTASSIUM SERPL-SCNC: 5 MMOL/L (ref 3.5–5.1)
SODIUM SERPL-SCNC: 137 MMOL/L (ref 136–145)

## 2020-01-01 PROCEDURE — 80048 BASIC METABOLIC PNL TOTAL CA: CPT

## 2020-01-02 ENCOUNTER — HOSPITAL ENCOUNTER (OUTPATIENT)
Dept: LAB | Age: 60
Discharge: HOME | End: 2020-01-02

## 2020-01-02 LAB
ANION GAP SERPL CALC-SCNC: 6 MMOL/L (ref 7–16)
BASOPHILS # BLD: 0.2 K/UL (ref 0–0.2)
BASOPHILS NFR BLD: 2 % (ref 0–2)
BUN SERPL-MCNC: 33 MG/DL (ref 6–23)
CALCIUM SERPL-MCNC: 9 MG/DL (ref 8.3–10.4)
CHLORIDE SERPL-SCNC: 108 MMOL/L (ref 98–107)
CO2 SERPL-SCNC: 26 MMOL/L (ref 21–32)
CREAT SERPL-MCNC: 2.61 MG/DL (ref 0.6–1)
DIFFERENTIAL METHOD BLD: ABNORMAL
EOSINOPHIL # BLD: 0.2 K/UL (ref 0–0.8)
EOSINOPHIL NFR BLD: 3 % (ref 0.5–7.8)
ERYTHROCYTE [DISTWIDTH] IN BLOOD BY AUTOMATED COUNT: 12 % (ref 11.9–14.6)
GLUCOSE SERPL-MCNC: 96 MG/DL (ref 65–100)
HCT VFR BLD AUTO: 37.5 % (ref 35.8–46.3)
HGB BLD-MCNC: 11.4 G/DL (ref 11.7–15.4)
IMM GRANULOCYTES # BLD AUTO: 0 K/UL (ref 0–0.5)
IMM GRANULOCYTES NFR BLD AUTO: 0 % (ref 0–5)
LYMPHOCYTES # BLD: 1.7 K/UL (ref 0.5–4.6)
LYMPHOCYTES NFR BLD: 22 % (ref 13–44)
MCH RBC QN AUTO: 30.7 PG (ref 26.1–32.9)
MCHC RBC AUTO-ENTMCNC: 30.4 G/DL (ref 31.4–35)
MCV RBC AUTO: 101.1 FL (ref 79.6–97.8)
MONOCYTES # BLD: 0.6 K/UL (ref 0.1–1.3)
MONOCYTES NFR BLD: 8 % (ref 4–12)
NEUTS SEG # BLD: 4.8 K/UL (ref 1.7–8.2)
NEUTS SEG NFR BLD: 65 % (ref 43–78)
NRBC # BLD: 0 K/UL (ref 0–0.2)
PLATELET # BLD AUTO: 97 K/UL (ref 150–450)
PMV BLD AUTO: 12.2 FL (ref 9.4–12.3)
POTASSIUM SERPL-SCNC: 4.4 MMOL/L (ref 3.5–5.1)
RBC # BLD AUTO: 3.71 M/UL (ref 4.05–5.2)
SODIUM SERPL-SCNC: 140 MMOL/L (ref 136–145)
WBC # BLD AUTO: 7.5 K/UL (ref 4.3–11.1)

## 2020-01-02 PROCEDURE — 80048 BASIC METABOLIC PNL TOTAL CA: CPT

## 2020-01-02 PROCEDURE — 85025 COMPLETE CBC W/AUTO DIFF WBC: CPT

## 2020-01-05 ENCOUNTER — HOSPITAL ENCOUNTER (OUTPATIENT)
Dept: LAB | Age: 60
Discharge: HOME | End: 2020-01-05

## 2020-01-05 LAB
ANION GAP SERPL CALC-SCNC: 13 MMOL/L (ref 7–16)
BUN SERPL-MCNC: 26 MG/DL (ref 6–23)
CALCIUM SERPL-MCNC: 9 MG/DL (ref 8.3–10.4)
CHLORIDE SERPL-SCNC: 110 MMOL/L (ref 98–107)
CO2 SERPL-SCNC: 20 MMOL/L (ref 21–32)
CREAT SERPL-MCNC: 2.86 MG/DL (ref 0.6–1)
ERYTHROCYTE [DISTWIDTH] IN BLOOD BY AUTOMATED COUNT: 11.9 % (ref 11.9–14.6)
GLUCOSE SERPL-MCNC: 101 MG/DL (ref 65–100)
HCT VFR BLD AUTO: 37.6 % (ref 35.8–46.3)
HGB BLD-MCNC: 11.9 G/DL (ref 11.7–15.4)
MCH RBC QN AUTO: 30.4 PG (ref 26.1–32.9)
MCHC RBC AUTO-ENTMCNC: 31.6 G/DL (ref 31.4–35)
MCV RBC AUTO: 95.9 FL (ref 79.6–97.8)
NRBC # BLD: 0 K/UL (ref 0–0.2)
PLATELET # BLD AUTO: 266 K/UL (ref 150–450)
PMV BLD AUTO: 11 FL (ref 9.4–12.3)
POTASSIUM SERPL-SCNC: 4 MMOL/L (ref 3.5–5.1)
RBC # BLD AUTO: 3.92 M/UL (ref 4.05–5.2)
SODIUM SERPL-SCNC: 143 MMOL/L (ref 136–145)
WBC # BLD AUTO: 9.8 K/UL (ref 4.3–11.1)

## 2020-01-05 PROCEDURE — 80048 BASIC METABOLIC PNL TOTAL CA: CPT

## 2020-01-05 PROCEDURE — 85027 COMPLETE CBC AUTOMATED: CPT

## 2020-09-23 ENCOUNTER — APPOINTMENT (OUTPATIENT)
Dept: GENERAL RADIOLOGY | Age: 60
DRG: 481 | End: 2020-09-23
Attending: EMERGENCY MEDICINE
Payer: MEDICARE

## 2020-09-23 ENCOUNTER — HOSPITAL ENCOUNTER (INPATIENT)
Age: 60
LOS: 6 days | Discharge: NURSING, REHAB OR LONG-TERM CARE | DRG: 481 | End: 2020-09-29
Attending: FAMILY MEDICINE | Admitting: HOSPITALIST
Payer: MEDICARE

## 2020-09-23 ENCOUNTER — HOSPITAL ENCOUNTER (OUTPATIENT)
Age: 60
Setting detail: OBSERVATION
Discharge: ANOTHER HOSPITAL | DRG: 481 | End: 2020-09-23
Attending: EMERGENCY MEDICINE | Admitting: HOSPITALIST
Payer: MEDICARE

## 2020-09-23 ENCOUNTER — APPOINTMENT (OUTPATIENT)
Dept: CT IMAGING | Age: 60
DRG: 481 | End: 2020-09-23
Attending: EMERGENCY MEDICINE
Payer: MEDICARE

## 2020-09-23 VITALS
HEART RATE: 112 BPM | SYSTOLIC BLOOD PRESSURE: 174 MMHG | DIASTOLIC BLOOD PRESSURE: 91 MMHG | RESPIRATION RATE: 19 BRPM | TEMPERATURE: 98.6 F | OXYGEN SATURATION: 92 %
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
## 2020-09-23 DIAGNOSIS — S72.409A CLOSED FRACTURE OF DISTAL END OF FEMUR, UNSPECIFIED FRACTURE MORPHOLOGY, INITIAL ENCOUNTER (HCC): ICD-10-CM

## 2020-09-23 DIAGNOSIS — F10.930 ALCOHOL WITHDRAWAL SYNDROME WITHOUT COMPLICATION (HCC): ICD-10-CM

## 2020-09-23 DIAGNOSIS — S72.8X1A OTHER FRACTURE OF RIGHT FEMUR, INITIAL ENCOUNTER FOR CLOSED FRACTURE (HCC): Primary | ICD-10-CM

## 2020-09-23 DIAGNOSIS — F05 DELIRIUM DUE TO GENERAL MEDICAL CONDITION: ICD-10-CM

## 2020-09-23 DIAGNOSIS — N17.9 AKI (ACUTE KIDNEY INJURY) (HCC): ICD-10-CM

## 2020-09-23 PROBLEM — N18.9 ACUTE KIDNEY INJURY SUPERIMPOSED ON CHRONIC KIDNEY DISEASE (HCC): Status: ACTIVE | Noted: 2020-09-23

## 2020-09-23 PROBLEM — R65.10 SIRS (SYSTEMIC INFLAMMATORY RESPONSE SYNDROME) (HCC): Status: ACTIVE | Noted: 2020-09-23

## 2020-09-23 PROBLEM — S72.91XA FEMUR FRACTURE, RIGHT (HCC): Status: ACTIVE | Noted: 2020-09-23

## 2020-09-23 LAB
ABO + RH BLD: NORMAL
ANION GAP SERPL CALC-SCNC: 12 MMOL/L (ref 7–16)
APPEARANCE UR: CLEAR
ATRIAL RATE: 118 BPM
BACTERIA URNS QL MICRO: 0 /HPF
BILIRUB UR QL: NEGATIVE
BLOOD GROUP ANTIBODIES SERPL: NORMAL
BUN SERPL-MCNC: 33 MG/DL (ref 6–23)
CALCIUM SERPL-MCNC: 8.3 MG/DL (ref 8.3–10.4)
CALCULATED P AXIS, ECG09: 47 DEGREES
CALCULATED R AXIS, ECG10: 37 DEGREES
CALCULATED T AXIS, ECG11: 56 DEGREES
CASTS URNS QL MICRO: ABNORMAL /LPF
CHLORIDE SERPL-SCNC: 112 MMOL/L (ref 98–107)
CO2 SERPL-SCNC: 20 MMOL/L (ref 21–32)
COLOR UR: YELLOW
CREAT SERPL-MCNC: 3.57 MG/DL (ref 0.6–1)
DIAGNOSIS, 93000: NORMAL
EPI CELLS #/AREA URNS HPF: ABNORMAL /HPF
ERYTHROCYTE [DISTWIDTH] IN BLOOD BY AUTOMATED COUNT: 12.2 % (ref 11.9–14.6)
ETHANOL SERPL-MCNC: <3 MG/DL
GLUCOSE SERPL-MCNC: 191 MG/DL (ref 65–100)
GLUCOSE UR STRIP.AUTO-MCNC: NEGATIVE MG/DL
HCT VFR BLD AUTO: 36.3 % (ref 35.8–46.3)
HGB BLD-MCNC: 11.8 G/DL (ref 11.7–15.4)
HGB UR QL STRIP: NEGATIVE
KETONES UR QL STRIP.AUTO: NEGATIVE MG/DL
LACTATE SERPL-SCNC: 0.7 MMOL/L (ref 0.4–2)
LACTATE SERPL-SCNC: 2.3 MMOL/L (ref 0.4–2)
LEUKOCYTE ESTERASE UR QL STRIP.AUTO: NEGATIVE
MCH RBC QN AUTO: 28.8 PG (ref 26.1–32.9)
MCHC RBC AUTO-ENTMCNC: 32.5 G/DL (ref 31.4–35)
MCV RBC AUTO: 88.5 FL (ref 79.6–97.8)
NITRITE UR QL STRIP.AUTO: NEGATIVE
NRBC # BLD: 0 K/UL (ref 0–0.2)
P-R INTERVAL, ECG05: 128 MS
PH UR STRIP: 6 [PH] (ref 5–9)
PLATELET # BLD AUTO: 193 K/UL (ref 150–450)
PMV BLD AUTO: 10.7 FL (ref 9.4–12.3)
POTASSIUM SERPL-SCNC: 4.6 MMOL/L (ref 3.5–5.1)
PROT UR STRIP-MCNC: 100 MG/DL
Q-T INTERVAL, ECG07: 290 MS
QRS DURATION, ECG06: 74 MS
QTC CALCULATION (BEZET), ECG08: 406 MS
RBC # BLD AUTO: 4.1 M/UL (ref 4.05–5.2)
RBC #/AREA URNS HPF: ABNORMAL /HPF
SODIUM SERPL-SCNC: 144 MMOL/L (ref 136–145)
SP GR UR REFRACTOMETRY: 1.01 (ref 1–1.02)
SPECIMEN EXP DATE BLD: NORMAL
UROBILINOGEN UR QL STRIP.AUTO: 0.2 EU/DL (ref 0.2–1)
VENTRICULAR RATE, ECG03: 118 BPM
WBC # BLD AUTO: 21.6 K/UL (ref 4.3–11.1)
WBC URNS QL MICRO: ABNORMAL /HPF

## 2020-09-23 PROCEDURE — 87205 SMEAR GRAM STAIN: CPT

## 2020-09-23 PROCEDURE — 74011000302 HC RX REV CODE- 302: Performed by: HOSPITALIST

## 2020-09-23 PROCEDURE — 85027 COMPLETE CBC AUTOMATED: CPT

## 2020-09-23 PROCEDURE — 73502 X-RAY EXAM HIP UNI 2-3 VIEWS: CPT

## 2020-09-23 PROCEDURE — 81001 URINALYSIS AUTO W/SCOPE: CPT

## 2020-09-23 PROCEDURE — 74011250636 HC RX REV CODE- 250/636: Performed by: EMERGENCY MEDICINE

## 2020-09-23 PROCEDURE — 74011250637 HC RX REV CODE- 250/637: Performed by: HOSPITALIST

## 2020-09-23 PROCEDURE — 74011250636 HC RX REV CODE- 250/636: Performed by: INTERNAL MEDICINE

## 2020-09-23 PROCEDURE — 83605 ASSAY OF LACTIC ACID: CPT

## 2020-09-23 PROCEDURE — 87086 URINE CULTURE/COLONY COUNT: CPT

## 2020-09-23 PROCEDURE — 86900 BLOOD TYPING SEROLOGIC ABO: CPT

## 2020-09-23 PROCEDURE — 74011000258 HC RX REV CODE- 258: Performed by: EMERGENCY MEDICINE

## 2020-09-23 PROCEDURE — 87077 CULTURE AEROBIC IDENTIFY: CPT

## 2020-09-23 PROCEDURE — 77030040830 HC CATH URETH FOL MDII -A

## 2020-09-23 PROCEDURE — 2709999900 HC NON-CHARGEABLE SUPPLY

## 2020-09-23 PROCEDURE — 87040 BLOOD CULTURE FOR BACTERIA: CPT

## 2020-09-23 PROCEDURE — 70450 CT HEAD/BRAIN W/O DYE: CPT

## 2020-09-23 PROCEDURE — 96375 TX/PRO/DX INJ NEW DRUG ADDON: CPT

## 2020-09-23 PROCEDURE — 74011000258 HC RX REV CODE- 258: Performed by: HOSPITALIST

## 2020-09-23 PROCEDURE — 65270000029 HC RM PRIVATE

## 2020-09-23 PROCEDURE — 74011250636 HC RX REV CODE- 250/636: Performed by: HOSPITALIST

## 2020-09-23 PROCEDURE — 73562 X-RAY EXAM OF KNEE 3: CPT

## 2020-09-23 PROCEDURE — 86580 TB INTRADERMAL TEST: CPT | Performed by: HOSPITALIST

## 2020-09-23 PROCEDURE — 80048 BASIC METABOLIC PNL TOTAL CA: CPT

## 2020-09-23 PROCEDURE — 71045 X-RAY EXAM CHEST 1 VIEW: CPT

## 2020-09-23 PROCEDURE — 87186 SC STD MICRODIL/AGAR DIL: CPT

## 2020-09-23 PROCEDURE — 73700 CT LOWER EXTREMITY W/O DYE: CPT

## 2020-09-23 PROCEDURE — 72192 CT PELVIS W/O DYE: CPT

## 2020-09-23 PROCEDURE — 80307 DRUG TEST PRSMV CHEM ANLYZR: CPT

## 2020-09-23 PROCEDURE — 96365 THER/PROPH/DIAG IV INF INIT: CPT

## 2020-09-23 PROCEDURE — 74011250636 HC RX REV CODE- 250/636: Performed by: FAMILY MEDICINE

## 2020-09-23 PROCEDURE — 74011000258 HC RX REV CODE- 258: Performed by: INTERNAL MEDICINE

## 2020-09-23 PROCEDURE — 74011250637 HC RX REV CODE- 250/637: Performed by: INTERNAL MEDICINE

## 2020-09-23 PROCEDURE — 93005 ELECTROCARDIOGRAM TRACING: CPT | Performed by: EMERGENCY MEDICINE

## 2020-09-23 PROCEDURE — 87150 DNA/RNA AMPLIFIED PROBE: CPT

## 2020-09-23 PROCEDURE — 36415 COLL VENOUS BLD VENIPUNCTURE: CPT

## 2020-09-23 PROCEDURE — 99285 EMERGENCY DEPT VISIT HI MDM: CPT

## 2020-09-23 RX ORDER — ACETAMINOPHEN 325 MG/1
650 TABLET ORAL
Status: DISCONTINUED | OUTPATIENT
Start: 2020-09-23 | End: 2020-09-23 | Stop reason: HOSPADM

## 2020-09-23 RX ORDER — ESCITALOPRAM OXALATE 10 MG/1
10 TABLET ORAL DAILY
Status: CANCELLED | OUTPATIENT
Start: 2020-09-24

## 2020-09-23 RX ORDER — ACETAMINOPHEN 325 MG/1
650 TABLET ORAL
Status: CANCELLED | OUTPATIENT
Start: 2020-09-23

## 2020-09-23 RX ORDER — SODIUM CHLORIDE 9 MG/ML
125 INJECTION, SOLUTION INTRAVENOUS CONTINUOUS
Status: DISCONTINUED | OUTPATIENT
Start: 2020-09-23 | End: 2020-09-23

## 2020-09-23 RX ORDER — OXYCODONE HYDROCHLORIDE 5 MG/1
5 TABLET ORAL
Status: DISCONTINUED | OUTPATIENT
Start: 2020-09-23 | End: 2020-09-23 | Stop reason: HOSPADM

## 2020-09-23 RX ORDER — SODIUM CHLORIDE, SODIUM LACTATE, POTASSIUM CHLORIDE, CALCIUM CHLORIDE 600; 310; 30; 20 MG/100ML; MG/100ML; MG/100ML; MG/100ML
125 INJECTION, SOLUTION INTRAVENOUS CONTINUOUS
Status: CANCELLED | OUTPATIENT
Start: 2020-09-23

## 2020-09-23 RX ORDER — ONDANSETRON 2 MG/ML
4 INJECTION INTRAMUSCULAR; INTRAVENOUS
Status: DISCONTINUED | OUTPATIENT
Start: 2020-09-23 | End: 2020-09-23 | Stop reason: HOSPADM

## 2020-09-23 RX ORDER — OXYCODONE HYDROCHLORIDE 5 MG/1
5 TABLET ORAL
Status: CANCELLED | OUTPATIENT
Start: 2020-09-23

## 2020-09-23 RX ORDER — VANCOMYCIN 2 GRAM/500 ML IN 0.9 % SODIUM CHLORIDE INTRAVENOUS
2000 ONCE
Status: COMPLETED | OUTPATIENT
Start: 2020-09-23 | End: 2020-09-24

## 2020-09-23 RX ORDER — ACETAMINOPHEN 325 MG/1
650 TABLET ORAL EVERY 8 HOURS
Status: CANCELLED | OUTPATIENT
Start: 2020-09-24

## 2020-09-23 RX ORDER — OXYCODONE HYDROCHLORIDE 5 MG/1
5 TABLET ORAL
Status: DISCONTINUED | OUTPATIENT
Start: 2020-09-23 | End: 2020-09-25 | Stop reason: HOSPADM

## 2020-09-23 RX ORDER — SODIUM CHLORIDE 0.9 % (FLUSH) 0.9 %
5-40 SYRINGE (ML) INJECTION EVERY 8 HOURS
Status: DISCONTINUED | OUTPATIENT
Start: 2020-09-23 | End: 2020-09-23 | Stop reason: HOSPADM

## 2020-09-23 RX ORDER — ONDANSETRON 2 MG/ML
4 INJECTION INTRAMUSCULAR; INTRAVENOUS
Status: CANCELLED | OUTPATIENT
Start: 2020-09-23

## 2020-09-23 RX ORDER — SODIUM CHLORIDE 0.9 % (FLUSH) 0.9 %
5-40 SYRINGE (ML) INJECTION EVERY 8 HOURS
Status: DISCONTINUED | OUTPATIENT
Start: 2020-09-23 | End: 2020-09-29 | Stop reason: HOSPADM

## 2020-09-23 RX ORDER — SODIUM CHLORIDE 9 MG/ML
500 INJECTION, SOLUTION INTRAVENOUS ONCE
Status: COMPLETED | OUTPATIENT
Start: 2020-09-23 | End: 2020-09-23

## 2020-09-23 RX ORDER — HYDROMORPHONE HYDROCHLORIDE 1 MG/ML
0.5 INJECTION, SOLUTION INTRAMUSCULAR; INTRAVENOUS; SUBCUTANEOUS
Status: DISCONTINUED | OUTPATIENT
Start: 2020-09-23 | End: 2020-09-25 | Stop reason: HOSPADM

## 2020-09-23 RX ORDER — SODIUM CHLORIDE, SODIUM LACTATE, POTASSIUM CHLORIDE, CALCIUM CHLORIDE 600; 310; 30; 20 MG/100ML; MG/100ML; MG/100ML; MG/100ML
125 INJECTION, SOLUTION INTRAVENOUS CONTINUOUS
Status: DISCONTINUED | OUTPATIENT
Start: 2020-09-23 | End: 2020-09-26

## 2020-09-23 RX ORDER — HYDROMORPHONE HYDROCHLORIDE 1 MG/ML
0.5 INJECTION, SOLUTION INTRAMUSCULAR; INTRAVENOUS; SUBCUTANEOUS
Status: CANCELLED | OUTPATIENT
Start: 2020-09-23

## 2020-09-23 RX ORDER — SODIUM CHLORIDE 0.9 % (FLUSH) 0.9 %
5-40 SYRINGE (ML) INJECTION AS NEEDED
Status: CANCELLED | OUTPATIENT
Start: 2020-09-23

## 2020-09-23 RX ORDER — PANTOPRAZOLE SODIUM 40 MG/1
40 TABLET, DELAYED RELEASE ORAL
Status: DISCONTINUED | OUTPATIENT
Start: 2020-09-23 | End: 2020-09-23 | Stop reason: HOSPADM

## 2020-09-23 RX ORDER — SODIUM CHLORIDE, SODIUM LACTATE, POTASSIUM CHLORIDE, CALCIUM CHLORIDE 600; 310; 30; 20 MG/100ML; MG/100ML; MG/100ML; MG/100ML
125 INJECTION, SOLUTION INTRAVENOUS CONTINUOUS
Status: DISCONTINUED | OUTPATIENT
Start: 2020-09-23 | End: 2020-09-23 | Stop reason: HOSPADM

## 2020-09-23 RX ORDER — SODIUM CHLORIDE 0.9 % (FLUSH) 0.9 %
5-40 SYRINGE (ML) INJECTION EVERY 8 HOURS
Status: CANCELLED | OUTPATIENT
Start: 2020-09-23

## 2020-09-23 RX ORDER — PANTOPRAZOLE SODIUM 40 MG/1
40 TABLET, DELAYED RELEASE ORAL
Status: DISCONTINUED | OUTPATIENT
Start: 2020-09-24 | End: 2020-09-29 | Stop reason: HOSPADM

## 2020-09-23 RX ORDER — ESCITALOPRAM OXALATE 10 MG/1
10 TABLET ORAL DAILY
Status: DISCONTINUED | OUTPATIENT
Start: 2020-09-24 | End: 2020-09-29 | Stop reason: HOSPADM

## 2020-09-23 RX ORDER — PANTOPRAZOLE SODIUM 40 MG/1
40 TABLET, DELAYED RELEASE ORAL
Status: CANCELLED | OUTPATIENT
Start: 2020-09-24

## 2020-09-23 RX ORDER — ESCITALOPRAM OXALATE 10 MG/1
10 TABLET ORAL DAILY
Status: DISCONTINUED | OUTPATIENT
Start: 2020-09-23 | End: 2020-09-23 | Stop reason: HOSPADM

## 2020-09-23 RX ORDER — ACETAMINOPHEN 325 MG/1
650 TABLET ORAL EVERY 8 HOURS
Status: DISCONTINUED | OUTPATIENT
Start: 2020-09-24 | End: 2020-09-29 | Stop reason: HOSPADM

## 2020-09-23 RX ORDER — ACETAMINOPHEN 325 MG/1
650 TABLET ORAL EVERY 8 HOURS
Status: DISCONTINUED | OUTPATIENT
Start: 2020-09-23 | End: 2020-09-23 | Stop reason: HOSPADM

## 2020-09-23 RX ORDER — ACETAMINOPHEN 325 MG/1
650 TABLET ORAL
Status: DISCONTINUED | OUTPATIENT
Start: 2020-09-23 | End: 2020-09-29 | Stop reason: HOSPADM

## 2020-09-23 RX ORDER — HYDROMORPHONE HYDROCHLORIDE 1 MG/ML
0.5 INJECTION, SOLUTION INTRAMUSCULAR; INTRAVENOUS; SUBCUTANEOUS
Status: DISCONTINUED | OUTPATIENT
Start: 2020-09-23 | End: 2020-09-23 | Stop reason: HOSPADM

## 2020-09-23 RX ORDER — SODIUM CHLORIDE 0.9 % (FLUSH) 0.9 %
5-40 SYRINGE (ML) INJECTION AS NEEDED
Status: DISCONTINUED | OUTPATIENT
Start: 2020-09-23 | End: 2020-09-29 | Stop reason: HOSPADM

## 2020-09-23 RX ORDER — SODIUM CHLORIDE 0.9 % (FLUSH) 0.9 %
5-40 SYRINGE (ML) INJECTION AS NEEDED
Status: DISCONTINUED | OUTPATIENT
Start: 2020-09-23 | End: 2020-09-23 | Stop reason: HOSPADM

## 2020-09-23 RX ORDER — ONDANSETRON 2 MG/ML
4 INJECTION INTRAMUSCULAR; INTRAVENOUS
Status: DISCONTINUED | OUTPATIENT
Start: 2020-09-23 | End: 2020-09-25 | Stop reason: SDUPTHER

## 2020-09-23 RX ADMIN — ACETAMINOPHEN 650 MG: 325 TABLET, FILM COATED ORAL at 21:21

## 2020-09-23 RX ADMIN — THIAMINE HYDROCHLORIDE 500 MG: 100 INJECTION, SOLUTION INTRAMUSCULAR; INTRAVENOUS at 13:16

## 2020-09-23 RX ADMIN — TUBERCULIN PURIFIED PROTEIN DERIVATIVE 5 UNITS: 5 INJECTION, SOLUTION INTRADERMAL at 08:13

## 2020-09-23 RX ADMIN — SODIUM CHLORIDE 500 ML: 900 INJECTION, SOLUTION INTRAVENOUS at 03:47

## 2020-09-23 RX ADMIN — THIAMINE HYDROCHLORIDE 500 MG: 100 INJECTION, SOLUTION INTRAMUSCULAR; INTRAVENOUS at 04:55

## 2020-09-23 RX ADMIN — PANTOPRAZOLE SODIUM 40 MG: 40 TABLET, DELAYED RELEASE ORAL at 08:27

## 2020-09-23 RX ADMIN — ACETAMINOPHEN 650 MG: 325 TABLET, FILM COATED ORAL at 16:29

## 2020-09-23 RX ADMIN — ACETAMINOPHEN 650 MG: 325 TABLET, FILM COATED ORAL at 08:27

## 2020-09-23 RX ADMIN — CEFTRIAXONE 1 G: 1 INJECTION, POWDER, FOR SOLUTION INTRAMUSCULAR; INTRAVENOUS at 04:11

## 2020-09-23 RX ADMIN — SODIUM CHLORIDE, SODIUM LACTATE, POTASSIUM CHLORIDE, AND CALCIUM CHLORIDE 125 ML/HR: 600; 310; 30; 20 INJECTION, SOLUTION INTRAVENOUS at 21:21

## 2020-09-23 RX ADMIN — HYDROMORPHONE HYDROCHLORIDE 0.5 MG: 1 INJECTION, SOLUTION INTRAMUSCULAR; INTRAVENOUS; SUBCUTANEOUS at 08:14

## 2020-09-23 RX ADMIN — SODIUM CHLORIDE, SODIUM LACTATE, POTASSIUM CHLORIDE, AND CALCIUM CHLORIDE 125 ML/HR: 600; 310; 30; 20 INJECTION, SOLUTION INTRAVENOUS at 04:54

## 2020-09-23 RX ADMIN — SODIUM CHLORIDE 125 ML/HR: 900 INJECTION, SOLUTION INTRAVENOUS at 08:13

## 2020-09-23 RX ADMIN — ESCITALOPRAM OXALATE 10 MG: 10 TABLET ORAL at 10:05

## 2020-09-23 RX ADMIN — VANCOMYCIN HYDROCHLORIDE 2000 MG: 10 INJECTION, POWDER, LYOPHILIZED, FOR SOLUTION INTRAVENOUS at 23:28

## 2020-09-23 RX ADMIN — Medication 5 ML: at 16:29

## 2020-09-23 RX ADMIN — HYDROMORPHONE HYDROCHLORIDE 0.5 MG: 1 INJECTION, SOLUTION INTRAMUSCULAR; INTRAVENOUS; SUBCUTANEOUS at 16:28

## 2020-09-23 RX ADMIN — ONDANSETRON 4 MG: 2 INJECTION INTRAMUSCULAR; INTRAVENOUS at 08:15

## 2020-09-23 RX ADMIN — OXYCODONE 5 MG: 5 TABLET ORAL at 11:59

## 2020-09-23 RX ADMIN — Medication 5 ML: at 21:21

## 2020-09-23 RX ADMIN — THIAMINE HYDROCHLORIDE 500 MG: 100 INJECTION, SOLUTION INTRAMUSCULAR; INTRAVENOUS at 21:21
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
## 2020-09-23 NOTE — DISCHARGE SUMMARY
Hospitalist Discharge Summary     Admit Date:  2020  1:15 AM   DC note date: 2020  Name:  Nori Magana   Age:  61 y.o.  :  1960   MRN:  [id number]   PCP:  Mona Lake MD  Treatment Team: Attending Provider: Jacklyn Boast, MD; Primary Nurse: Cecile Conner RN; Surgeon: Pauly Ugalde MD; : Yaneli Way    Problem List for this Hospitalization:  Hospital Problems as of 2020 Date Reviewed: 2018          Codes Class Noted - Resolved POA    * (Principal) Femur fracture, right ([facility] ICD-10-CM: S72.91XA  ICD-9-CM: 821.00  2020 - Present Yes        Dementia ([place] [address].) ICD-10-CM: F03.90  ICD-9-CM: 294.20  Unknown - Present Yes        Leukocytosis ICD-10-CM: D72.829  ICD-9-CM: 288.60  Unknown - Present Yes        Other fracture of right femur, initial encounter for closed fracture ([place] [address].) ICD-10-CM: S16.1F4I  ICD-9-CM: 821.00  2020 - Present Unknown        Alcoholism ([place] [address].) ICD-10-CM: F10.20  ICD-9-CM: 303.90  2018 - Present Yes        Hypertension ICD-10-CM: I10  ICD-9-CM: 401.9  2018 - Present Yes        OSMIN (acute kidney injury) ([place] [address].) ICD-10-CM: N17.9  ICD-9-CM: 584.9  2018 - Present Yes        Acute metabolic encephalopathy LOX-27-SAAVEDRA: G93.41  ICD-9-CM: 348.31  2018 - Present Yes        Alcohol abuse ICD-10-CM: F10.10  ICD-9-CM: 305.00  2018 - Present Yes        Vascular dementia ([facility] ICD-10-CM: F01.50  ICD-9-CM: 290.40  2018 - Present Yes        Depression ICD-10-CM: F32.9  ICD-9-CM: 459  2018 - Present Yes        Bipolar disorder ([facility] ICD-10-CM: F31.9  ICD-9-CM: 296.80  10/22/2014 - Present Yes        CKD (chronic kidney disease) ICD-10-CM: N18.9  ICD-9-CM: 585.9  2014 - Present Yes        Essential hypertension ICD-10-CM: I10  ICD-9-CM: 401.9  2012 - Present Yes        FSGS (focal segmental glomerulosclerosis) ICD-10-CM: N05.1  ICD-9-CM: 582.1  2012 - Present Yes        Gout ICD-10-CM: M10.9  ICD-9-CM: 274.9  9/11/2012 - Present Yes            Hospital Course:  Mrs. 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 Her  attempted to help her while she was in the bathroom and he fell onto her knocking her over. On presentation she was tachycardic and mildly confused. She had RLE/knee pain. Plain films showed a fracture of the distal femur and possible fracture of proximal tibia. CT RLE confirmed distal femur fracture and a depressed fracture of the lateral tibial plateau. CT pelvis showed an intact right total hip arthroplasty w/o evidence of fracture in either hip. Head CT negative. Labs notable for leukocytosis (w/o evidence of acute infection) and OSMIN. Baseline Cr mid 2s, 3.57 on admission. She is on IVFs. She was accepted in transfer by night shift. Ortho is aware. She will be transferred to [facility]. Will make NPO midnight. Ortho will see tomorrow. Follow up AM labs. She is medically stable for discharge. Disposition:   Activity: Activity as tolerated  Diet: DIET NPO Except Meds, With Ice Chips, With Sips of Clear Fluids  DIET CARDIAC Regular  Code Status: Full Code    Follow Up Orders:  No orders of the defined types were placed in this encounter. Follow-up Information    None         Discharge meds at bottom of this note. Plan was discussed with patient, nursing. All questions answered. Patient was stable at time of discharge. Given instructions to call a physician or return if any concerns. Discharge summary and encounter summary was sent to PCP electronically via \"Comm Mgt\" link in [facility], if possible. Diagnostic Imaging/Tests:   Xr Hip Rt W Or Wo Pelv 2-3 Vws    Result Date: 9/23/2020  Right Hip INDICATION: Layman Onur, Pain COMPARISON: October 2014 TECHNIQUE: Three views of the right hip were obtained FINDINGS: Bones are osteopenic, diminishing sensitivity.  There is right total hip arthroplasty. Hardware elements appear intact. No evidence of dislocation. No evidence of periprosthetic fracture. The SI joints and pubic symphysis are intact. IMPRESSION: 1. Right total hip arthroplasty. No dislocation or evidence of periprosthetic fracture. Osteopenia limits sensitivity. Ct Head Wo Cont    Result Date: 9/23/2020  Noncontrast CT of the brain. COMPARISON: February 2018 INDICATION: Recurrent falls, previous ICH per  TECHNIQUE: Contiguous axial images were obtained from the skull base through the vertex without IV contrast. Radiation dose reduction techniques were used for this study:  Our CT scanners use one or all of the following: Automated exposure control, adjustment of the mA and/or kVp according to patient's size, iterative reconstruction. FINDINGS: Metallic structure in the left parasellar region, in keeping with prior aneurysm repair. There is no acute intracranial hemorrhage or CT evidence of acute territorial infarction. There is no mass effect, midline shift or hydrocephalus. No extra-axial fluid collection. Encephalomalacia in the left frontal lobe, left occipital lobe and the right cerebellum, consistent with old infarct. Periventricular hypodensities, nonspecific and likely due to chronic small vessel changes. Included globes appear intact. There is mild mucosal thickening of the left sphenoid sinus. Rest of the paranasal sinuses and the mastoid air cells are aerated. There is no skull fracture. IMPRESSION: 1. No CT evidence of acute intracranial abnormality. Ct Pelv Wo Cont    Result Date: 9/23/2020  Clinical history: Knee fracture. Fall. TECHNIQUE: Axial, coronal and sagittal CT of the pelvis without IV contrast. CT dose reduction was achieved through use of a standardized protocol tailored for this examination and automatic exposure control for dose modulation. Adria Dominguez FINDINGS: There are findings of right total hip arthroplasty.  Hardware elements appear intact. No evidence of periprosthetic fracture. There is no acute left hip fracture. There is left hip joint space narrowing. No hip dislocation. There is mild cortical irregularity of the right inferior pubic ramus, questionable for nondisplaced fracture. The SI joints and pubic symphysis are intact. There is sigmoid diverticulosis without diverticulitis. There is no free air or free fluid in the pelvis. IMPRESSION: 1. Right total hip arthroplasty. No evidence of periprosthetic fracture or hip dislocation. 2. No acute left hip fracture. If there is suspicion for occult left hip fracture, MRI is a more sensitive study. Ct Knee Rt Wo Cont    Result Date: 9/23/2020  Clinical history: Fall, knee pain. Fracture noted on prior x-ray, follow-up. TECHNIQUE: Axial, coronal and sagittal CT of the right knee without IV contrast. CT dose reduction was achieved through use of a standardized protocol tailored for this examination and automatic exposure control for dose modulation. Comparison: X-ray earlier today. FINDINGS: Bones are osteopenic, diminishing sensitivity. There is distal femur medial condyle fracture, extending into the intercondylar notch. There is involvement of the patellofemoral articular surface and no definite involvement of the femorotibial articular surface. There is depressed fracture of the lateral tibial plateau. Proximal fibular is unremarkable. There is hemarthrosis. There is moderate to advanced knee joint osteoarthritis. Anterior cruciate ligament appears chronically torn. Patellar and quadriceps tendons appear intact. Soft tissue evaluation is suboptimal by CT imaging. IMPRESSION: 1. Distal femur medial condyle fracture, extending into the intercondylar notch. 2. Depressed fracture of the lateral tibial plateau. 3. Osteopenia, diminishing overall sensitivity. Xr Chest Port    Result Date: 9/23/2020  Portable chest xray  COMPARISON: June 6, 2019 CLINICAL HISTORY: Dizziness. Tachycardia. FINDINGS: Heart appears mildly enlarged. Mediastinal contour is within normal limits. Lungs are underinflated. There is no focal consolidation, pneumothorax or pulmonary edema. No large pleural effusion. Surrounding bones are unremarkable. IMPRESSION: 1. No evidence of acute pulmonary disease. Xr Knee Rt 3 V    Result Date: 9/23/2020  Right Knee INDICATION: Fall. Knee pain. TECHNIQUE: AP, lateral, oblique views of the right knee were obtained FINDINGS:  Sensitivity is limited secondary to osteopenia and suboptimal positioning. Patient was unable to straighten the knee during x-ray study. There is supracondylar fracture of the femur. Proximal tibia and proximal fibula are not well evaluated. There is an apparent lucent line involving the medial aspect of the proximal tibia, questionable for fracture. IMPRESSION: Limited sensitivity due to suboptimal positioning and osteopenia. Patient was unable to straighten the knee for the x-ray study. There is fracture of the distal femur and suggestion of fracture of the proximal tibia. CT is recommended to better evaluate. DC4        Echocardiogram results:  No results found for this visit on 09/23/20. Procedures done this admission:  * No surgery found *    All Micro Results     Procedure Component Value Units Date/Time    CULTURE, URINE [[id number]] Collected:  09/23/20 0249    Order Status:  Completed Specimen:  Cath Urine Updated:  09/23/20 0843     Special Requests: NO SPECIAL REQUESTS        Culture result:       NO GROWTH AFTER SHORT PERIOD OF INCUBATION. FURTHER RESULTS TO FOLLOW AFTER OVERNIGHT INCUBATION.           MSSA/MRSA SC BY PCR, NASAL SWAB [[id number]]     Order Status:  Sent Specimen:  Nasal swab     CULTURE, BLOOD [[id number]] Collected:  09/23/20 0505    Order Status:  Completed Specimen:  Blood Updated:  09/23/20 0514    CULTURE, BLOOD [[id number]] Collected:  09/23/20 0505    Order Status:  Completed Specimen:  Blood Updated: 09/23/20 0514          SARS-CoV-2 Lab Results  \"Novel Coronavirus\" Test: No results found for: COV2NT   \"Emergent Disease\" Test: No results found for: EDPR  \"SARS-COV-2\" Test: No results found for: XGCOVT  \"Precision Labs\" Test:   Lab Results   Component Value Date/Time    RSLT SUSCEPTIBILITY 02/25/2018 08:37 AM     Rapid Test: No results found for: COVR         Labs: Results:       BMP, Mg, Phos Recent Labs     09/23/20  0214      K 4.6   *   CO2 20*   AGAP 12   BUN 33*   CREA 3.57*   CA 8.3   *      CBC Recent Labs     09/23/20  0339   WBC 21.6*   RBC 4.10   HGB 11.8   HCT 36.3         LFT No results for input(s): ALT, TBIL, AP, TP, ALB, GLOB, AGRAT in the last 72 hours.     No lab exists for component: SGOT, GPT   Cardiac Testing Lab Results   Component Value Date/Time    CK 24 12/02/2013 11:16 AM    CK - MB <0.5 (L) 12/02/2013 11:16 AM    CK-MB Index CANNOT BE CALCULATED 12/02/2013 11:16 AM    Troponin-I, Qt. <0.04 12/02/2013 11:16 AM      Coagulation Tests Lab Results   Component Value Date/Time    Prothrombin time 13.7 05/18/2018 10:04 PM    Prothrombin time 12.8 02/27/2018 07:08 AM    Prothrombin time 10.7 09/18/2014 01:05 PM    INR 1.0 05/18/2018 10:04 PM    INR 0.9 02/27/2018 07:08 AM    INR 1.0 09/18/2014 01:05 PM    aPTT 20.5 (L) 09/18/2014 01:05 PM    aPTT 21.4 (L) 09/18/2014 02:24 AM    aPTT 25.0 (L) 12/02/2013 11:16 AM      A1c No results found for: HBA1C, HGBE8, SBR1JTRF   Lipid Panel Lab Results   Component Value Date/Time    Cholesterol, total 145 05/19/2018 06:45 AM    HDL Cholesterol 71 (H) 05/19/2018 06:45 AM    LDL, calculated 55 05/19/2018 06:45 AM    VLDL, calculated 19 05/19/2018 06:45 AM    Triglyceride 95 05/19/2018 06:45 AM    CHOL/HDL Ratio 2.0 05/19/2018 06:45 AM      Thyroid Panel Lab Results   Component Value Date/Time    TSH 1.948 05/18/2018 01:50 PM    TSH 0.904 12/02/2013 11:16 AM        Most Recent UA Lab Results   Component Value Date/Time    Color YELLOW 09/23/2020 02:49 AM    Appearance CLEAR 09/23/2020 02:49 AM    Specific gravity 1.014 09/23/2020 02:49 AM    pH (UA) 6.0 09/23/2020 02:49 AM    Protein 100 (A) 09/23/2020 02:49 AM    Glucose Negative 09/23/2020 02:49 AM    Ketone Negative 09/23/2020 02:49 AM    Bilirubin Negative 09/23/2020 02:49 AM    Blood Negative 09/23/2020 02:49 AM    Urobilinogen 0.2 09/23/2020 02:49 AM    Nitrites Negative 09/23/2020 02:49 AM    Leukocyte Esterase Negative 09/23/2020 02:49 AM    WBC 0-3 09/23/2020 02:49 AM    RBC 0-3 09/23/2020 02:49 AM    Epithelial cells 0-3 09/23/2020 02:49 AM    Bacteria 0 09/23/2020 02:49 AM    Casts 0-3 09/23/2020 02:49 AM    Other observations RESULTS VERIFIED MANUALLY 12/31/2019 02:00 PM        Allergies   Allergen Reactions    Codeine Nausea and Vomiting    Lortab [Hydrocodone-Acetaminophen] Nausea and Vomiting     Immunization History   Administered Date(s) Administered    Influenza Vaccine 10/22/2014    Influenza Vaccine (Quad) PF 02/14/2018    Pneumococcal Polysaccharide (PPSV-23) 09/21/2014    TB Skin Test (PPD) Intradermal 09/18/2014, 05/22/2018, 09/23/2020       All Labs from Last 24 Hrs:  Recent Results (from the past 24 hour(s))   EKG, 12 LEAD, INITIAL    Collection Time: 09/23/20  1:52 AM   Result Value Ref Range    Ventricular Rate 118 BPM    Atrial Rate 118 BPM    P-R Interval 128 ms    QRS Duration 74 ms    Q-T Interval 290 ms    QTC Calculation (Bezet) 406 ms    Calculated P Axis 47 degrees    Calculated R Axis 37 degrees    Calculated T Axis 56 degrees    Diagnosis       !! AGE AND GENDER SPECIFIC ECG ANALYSIS !!   Sinus tachycardia  Possible Left atrial enlargement  RSR' or QR pattern in V1 suggests right ventricular conduction delay  Nonspecific T wave abnormality  Abnormal ECG  No previous ECGs available  Confirmed by JASON ALVAREZ (), Dennis Thorpe ([id number]) on 9/23/2020 51:61:90 AM     METABOLIC PANEL, BASIC    Collection Time: 09/23/20  2:14 AM   Result Value Ref Range    Sodium 144 136 - 145 mmol/L    Potassium 4.6 3.5 - 5.1 mmol/L    Chloride 112 (H) 98 - 107 mmol/L    CO2 20 (L) 21 - 32 mmol/L    Anion gap 12 7 - 16 mmol/L    Glucose 191 (H) 65 - 100 mg/dL    BUN 33 (H) 6 - 23 MG/DL    Creatinine 3.57 (H) 0.6 - 1.0 MG/DL    GFR est AA 17 (L) >60 ml/min/1.73m2    GFR est non-AA 14 (L) >60 ml/min/1.73m2    Calcium 8.3 8.3 - 10.4 MG/DL   ETHYL ALCOHOL    Collection Time: 09/23/20  2:14 AM   Result Value Ref Range    ALCOHOL(ETHYL),SERUM <3 MG/DL   CULTURE, URINE    Collection Time: 09/23/20  2:49 AM    Specimen: Cath Urine   Result Value Ref Range    Special Requests: NO SPECIAL REQUESTS      Culture result:        NO GROWTH AFTER SHORT PERIOD OF INCUBATION. FURTHER RESULTS TO FOLLOW AFTER OVERNIGHT INCUBATION.    URINALYSIS W/ RFLX MICROSCOPIC    Collection Time: 09/23/20  2:49 AM   Result Value Ref Range    Color YELLOW      Appearance CLEAR      Specific gravity 1.014 1.001 - 1.023      pH (UA) 6.0 5.0 - 9.0      Protein 100 (A) NEG mg/dL    Glucose Negative mg/dL    Ketone Negative NEG mg/dL    Bilirubin Negative NEG      Blood Negative NEG      Urobilinogen 0.2 0.2 - 1.0 EU/dL    Nitrites Negative NEG      Leukocyte Esterase Negative NEG      WBC 0-3 0 /hpf    RBC 0-3 0 /hpf    Epithelial cells 0-3 0 /hpf    Bacteria 0 0 /hpf    Casts 0-3 0 /lpf   LACTIC ACID    Collection Time: 09/23/20  3:39 AM   Result Value Ref Range    Lactic acid 2.3 (HH) 0.4 - 2.0 MMOL/L   CBC W/O DIFF    Collection Time: 09/23/20  3:39 AM   Result Value Ref Range    WBC 21.6 (H) 4.3 - 11.1 K/uL    RBC 4.10 4.05 - 5.2 M/uL    HGB 11.8 11.7 - 15.4 g/dL    HCT 36.3 35.8 - 46.3 %    MCV 88.5 79.6 - 97.8 FL    MCH 28.8 26.1 - 32.9 PG    MCHC 32.5 31.4 - 35.0 g/dL    RDW 12.2 11.9 - 14.6 %    PLATELET 196 443 - 096 K/uL    MPV 10.7 9.4 - 12.3 FL    ABSOLUTE NRBC 0.00 0.0 - 0.2 K/uL   LACTIC ACID    Collection Time: 09/23/20 11:13 AM   Result Value Ref Range    Lactic acid 0.7 0.4 - 2.0 MMOL/L   TYPE & SCREEN    Collection Time: 09/23/20 11:51 AM   Result Value Ref Range    Crossmatch Expiration 09/26/2020     ABO/Rh(D) A NEGATIVE     Antibody screen NEG        Discharge Exam:  Patient Vitals for the past 24 hrs:   Temp Pulse Resp BP SpO2   09/23/20 1746  (!) 112   92 %   09/23/20 1700  (!) 109  (!) 174/91 91 %   09/23/20 1600  (!) 114  (!) 189/93 95 %   09/23/20 1500  (!) 105  (!) 184/98 95 %   09/23/20 1400  (!) 111  (!) 164/114 93 %   09/23/20 1300  (!) 112  (!) 180/88 93 %   09/23/20 1202  (!) 122  (!) 174/96 98 %   09/23/20 1100  (!) 116  (!) 180/98 95 %   09/23/20 1000  (!) 114  (!) 169/101 92 %   09/23/20 0601  (!) 119   99 %   09/23/20 0600  (!) 121  (!) 185/94 98 %   09/23/20 0533  (!) 119   99 %   09/23/20 0530  (!) 125  (!) 173/93 98 %   09/23/20 0510  (!) 120  (!) 160/99 97 %   09/23/20 0504  (!) 115   98 %   09/23/20 0456  (!) 120  (!) 161/99 99 %   09/23/20 0346  (!) 115  (!) 159/89 100 %   09/23/20 0255  (!) 120 19     09/23/20 0130  (!) 143   98 %   09/23/20 0127    108/67    09/23/20 0112 98.6 °F (37 °C)  16       Oxygen Therapy  O2 Sat (%): 92 % (09/23/20 1746)  Pulse via Oximetry: 112 beats per minute (09/23/20 1746)    Estimated body mass index is 33.73 kg/m² as calculated from the following:    Height as of 6/6/19: 5' 6\" (1.676 m). Weight as of 6/6/19: 94.8 kg (209 lb). Intake/Output Summary (Last 24 hours) at 9/23/2020 5857  Last data filed at 9/23/2020 0447  Gross per 24 hour   Intake 550 ml   Output    Net 550 ml       *Note that automatically entered I/Os may not be accurate; dependent on patient compliance with collection and accurate  by assistants. General:    Well nourished. Alert. Eyes:   Normal sclerae. Extraocular movements intact. ENT:  Normocephalic, atraumatic. Moist mucous membranes  CV:   Regular rate and rhythm. No murmur, rub, or gallop. Lungs:  Clear to auscultation bilaterally.   No wheezing, rhonchi, or rales.  Abdomen: Soft, nontender, nondistended. Extremities: Warm and dry. No cyanosis or edema. RLE and knee tenderness to palpation. Limb is wrapped and not fully examined but noticeable swelling through dressing. Pulses and sensation intact. Neurologic: CN II-XII grossly intact. No gross focal deficits   Skin:     No rashes or jaundice. Psych:  Normal mood and affect. Current Med List in Hospital:   Current Facility-Administered Medications   Medication Dose Route Frequency    lactated Ringers infusion  125 mL/hr IntraVENous CONTINUOUS    thiamine (B-1) 500 mg in 0.9% sodium chloride 50 mL IVPB  500 mg IntraVENous TID    sodium chloride (NS) flush 5-40 mL  5-40 mL IntraVENous Q8H    sodium chloride (NS) flush 5-40 mL  5-40 mL IntraVENous PRN    acetaminophen (TYLENOL) tablet 650 mg  650 mg Oral Q8H    tuberculin injection 5 Units  5 Units IntraDERMal ONCE    ceFAZolin (ANCEF) 2 g in sterile water (preservative free) 20 mL IV syringe  2 g IntraVENous ONCE    ondansetron (ZOFRAN) injection 4 mg  4 mg IntraVENous Q6H PRN    oxyCODONE IR (ROXICODONE) tablet 5 mg  5 mg Oral Q6H PRN    HYDROmorphone (PF) (DILAUDID) injection 0.5 mg  0.5 mg IntraVENous Q6H PRN    escitalopram oxalate (LEXAPRO) tablet 10 mg  10 mg Oral DAILY    pantoprazole (PROTONIX) tablet 40 mg  40 mg Oral ACB    acetaminophen (TYLENOL) tablet 650 mg  650 mg Oral Q4H PRN     Current Outpatient Medications   Medication Sig    oxyCODONE-acetaminophen (PERCOCET) 5-325 mg per tablet Take 1 Tab by mouth every eight (8) hours as needed. Max Daily Amount: 3 Tabs.  magnesium oxide (MAG-OX) 400 mg tablet Take 1 Tab by mouth daily.  ondansetron (ZOFRAN ODT) 8 mg disintegrating tablet Take 1 Tab by mouth every six (6) hours as needed. PRN for nausea    ALPRAZolam (XANAX) 0.5 mg tablet Take 0.5 Tabs by mouth two (2) times daily as needed for Anxiety or Sleep.  Max Daily Amount: 0.5 mg.    QUEtiapine (SEROQUEL) 50 mg tablet Take 50 mg by mouth nightly.  escitalopram oxalate (LEXAPRO) 10 mg tablet Take 10 mg by mouth daily.  acetaminophen (TYLENOL) 325 mg tablet Take 2 Tabs by mouth every six (6) hours as needed.  folic acid (FOLVITE) 1 mg tablet Take 1 Tab by mouth daily.  pantoprazole (PROTONIX) 40 mg tablet Take 1 Tab by mouth Daily (before breakfast).  thiamine (B-1) 100 mg tablet Take 1 Tab by mouth daily. Discharge Info:   Current Discharge Medication List            Time spent in patient discharge planning and coordination 35 minutes.     Signed:  Luis Miguel Mnotgomery MD

## 2020-09-23 NOTE — ED NOTES
TRANSFER - OUT REPORT:    Verbal report given to ortho nurse on Jeff Trinidad  being transferred to [address] [place] [address] for routine progression of care       Report consisted of patients Situation, Background, Assessment and   Recommendations(SBAR). Information from the following report(s) SBAR was reviewed with the receiving nurse. Lines:   Peripheral IV 09/23/20 Left External jugular (Active)        Opportunity for questions and clarification was provided.       Patient transported with:  [facility], EMS

## 2020-09-23 NOTE — H&P
Hospitalist H&P/Consult Note     Admit Date:  2020  1:15 AM   Name:  Checo Dodson   Age:  61 y.o.  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 Patient states that her  fell on top of her and she has right knee pain. He denied he fell on her but rather that she had fallen on her own. She had some tremors and was tachycardic. Alcohol level negative. Patient had urinated on herself but she stated she had spilled water on herself. She seems confused. Head CT is negative for acute process. CT right hip with total hip arthroplasty. No periprosthetic fracture or dislocation. CT right lower extremity:  1. Distal femur medial condyle fracture, extending into the intercondylar notch.     2. Depressed fracture of the lateral tibial plateau.     3. Osteopenia, diminishing overall sensitivity    Patient will be transferred to [facility] orthopedic floor for definitive care. 10 systems reviewed and negative except as noted in HPI.   Past Medical History:   Diagnosis Date    Aneurysm of common carotid artery ([address]     left side s/p coil     CKD (chronic kidney disease) 2014    Dementia ([address]     FSGS (focal segmental glomerulosclerosis)     in remission at present    Gout     Hypertension     managed with medication     Osteoarthritis 2014    Stroke ([address] ,     slight weakness on right side, slight effect to speech      Past Surgical History:   Procedure Laterality Date    HX ANKLE FRACTURE TX Left 2013    has hardware in    HX CHOLECYSTECTOMY  2018    HX ERCP  2018    cbd stone    HX INTRACRANIAL ANEURYSM REPAIR  2004    left carotid     HX ORTHOPAEDIC Right 10/2014    hip replacement    HX REFRACTIVE SURGERY      bilateral - Lasik Prior to Admission Medications   Prescriptions Last Dose Informant Patient Reported? Taking? ALPRAZolam (XANAX) 0.5 mg tablet   No No   Sig: Take 0.5 Tabs by mouth two (2) times daily as needed for Anxiety or Sleep. Max Daily Amount: 0.5 mg.   QUEtiapine (SEROQUEL) 50 mg tablet   Yes No   Sig: Take 50 mg by mouth nightly. acetaminophen (TYLENOL) 325 mg tablet   No No   Sig: Take 2 Tabs by mouth every six (6) hours as needed. escitalopram oxalate (LEXAPRO) 10 mg tablet   Yes No   Sig: Take 10 mg by mouth daily. folic acid (FOLVITE) 1 mg tablet   No No   Sig: Take 1 Tab by mouth daily. magnesium oxide (MAG-OX) 400 mg tablet   No No   Sig: Take 1 Tab by mouth daily. ondansetron (ZOFRAN ODT) 8 mg disintegrating tablet   No No   Sig: Take 1 Tab by mouth every six (6) hours as needed. PRN for nausea   oxyCODONE-acetaminophen (PERCOCET) 5-325 mg per tablet   No No   Sig: Take 1 Tab by mouth every eight (8) hours as needed. Max Daily Amount: 3 Tabs. pantoprazole (PROTONIX) 40 mg tablet   No No   Sig: Take 1 Tab by mouth Daily (before breakfast). thiamine (B-1) 100 mg tablet   No No   Sig: Take 1 Tab by mouth daily. Facility-Administered Medications: None     Allergies   Allergen Reactions    Codeine Nausea and Vomiting    Lortab [Hydrocodone-Acetaminophen] Nausea and Vomiting      Social History     Tobacco Use    Smoking status: Former Smoker     Packs/day: 0.25     Last attempt to quit: 1979     Years since quittin.7    Smokeless tobacco: Never Used   Substance Use Topics    Alcohol use:  Yes     Alcohol/week: 2.5 standard drinks     Types: 3 Shots of liquor per week     Comment: pint of fireball      Family History   Problem Relation Age of Onset    Cancer Father 80        unknown    Stroke Sister 48      Immunization History   Administered Date(s) Administered    Influenza Vaccine 10/22/2014    Influenza Vaccine (Quad) PF 2018    Pneumococcal Polysaccharide (PPSV-23) 09/21/2014    TB Skin Test (PPD) Intradermal 09/18/2014, 05/22/2018       Objective:     Patient Vitals for the past 24 hrs:   Temp Pulse Resp BP SpO2   09/23/20 0504  (!) 115   98 %   09/23/20 0456  (!) 120  (!) 161/99 99 %   09/23/20 0255  (!) 120 19     09/23/20 0130  (!) 143   98 %   09/23/20 0127    108/67    09/23/20 0112 98.6 °F (37 °C)  16       Oxygen Therapy  O2 Sat (%): 98 % (09/23/20 0504)  Pulse via Oximetry: 115 beats per minute (09/23/20 0504)    Intake/Output Summary (Last 24 hours) at 9/23/2020 0509  Last data filed at 9/23/2020 0447  Gross per 24 hour   Intake 550 ml   Output    Net 550 ml       Physical Exam:  General:    Well nourished. Confused, disheveled  Looks older than stated age  Eyes:   Normal sclera. Extraocular movements intact. ENT:  Normocephalic, atraumatic. Moist mucous membranes  CV:   tachycardic. No murmur, rub, or gallop. Lungs:  Clear to auscultation bilaterally. No wheezing, rhonchi, or rales. Abdomen: Soft, nontender, nondistended. Bowel sounds normal.   Extremities: Warm and dry. No cyanosis or edema. Right leg rotated externally. Scars on leg. Tender right knee  Neurologic: CN II-XII grossly intact. Sensation intact. Skin:     No rashes or jaundice. No wounds. pale   Psych:  Normal mood and disoriented affect. I reviewed the labs, imaging, EKGs, telemetry, and other studies done this admission.   Data Review:   Recent Results (from the past 24 hour(s))   METABOLIC PANEL, BASIC    Collection Time: 09/23/20  2:14 AM   Result Value Ref Range    Sodium 144 136 - 145 mmol/L    Potassium 4.6 3.5 - 5.1 mmol/L    Chloride 112 (H) 98 - 107 mmol/L    CO2 20 (L) 21 - 32 mmol/L    Anion gap 12 7 - 16 mmol/L    Glucose 191 (H) 65 - 100 mg/dL    BUN 33 (H) 6 - 23 MG/DL    Creatinine 3.57 (H) 0.6 - 1.0 MG/DL    GFR est AA 17 (L) >60 ml/min/1.73m2    GFR est non-AA 14 (L) >60 ml/min/1.73m2    Calcium 8.3 8.3 - 10.4 MG/DL   ETHYL ALCOHOL    Collection Time: 09/23/20  2:14 AM   Result Value Ref Range    ALCOHOL(ETHYL),SERUM <3 MG/DL   URINALYSIS W/ RFLX MICROSCOPIC    Collection Time: 09/23/20  2:49 AM   Result Value Ref Range    Color YELLOW      Appearance CLEAR      Specific gravity 1.014 1.001 - 1.023      pH (UA) 6.0 5.0 - 9.0      Protein 100 (A) NEG mg/dL    Glucose Negative mg/dL    Ketone Negative NEG mg/dL    Bilirubin Negative NEG      Blood Negative NEG      Urobilinogen 0.2 0.2 - 1.0 EU/dL    Nitrites Negative NEG      Leukocyte Esterase Negative NEG      WBC 0-3 0 /hpf    RBC 0-3 0 /hpf    Epithelial cells 0-3 0 /hpf    Bacteria 0 0 /hpf    Casts 0-3 0 /lpf   LACTIC ACID    Collection Time: 09/23/20  3:39 AM   Result Value Ref Range    Lactic acid 2.3 (HH) 0.4 - 2.0 MMOL/L   CBC W/O DIFF    Collection Time: 09/23/20  3:39 AM   Result Value Ref Range    WBC 21.6 (H) 4.3 - 11.1 K/uL    RBC 4.10 4.05 - 5.2 M/uL    HGB 11.8 11.7 - 15.4 g/dL    HCT 36.3 35.8 - 46.3 %    MCV 88.5 79.6 - 97.8 FL    MCH 28.8 26.1 - 32.9 PG    MCHC 32.5 31.4 - 35.0 g/dL    RDW 12.2 11.9 - 14.6 %    PLATELET 037 637 - 754 K/uL    MPV 10.7 9.4 - 12.3 FL    ABSOLUTE NRBC 0.00 0.0 - 0.2 K/uL       Imaging Studies:  CXR Results  (Last 48 hours)               09/23/20 0200  XR CHEST PORT Final result    Impression:  IMPRESSION:       1. No evidence of acute pulmonary disease. Narrative:  Portable chest xray         COMPARISON: June 6, 2019       CLINICAL HISTORY: Dizziness. Tachycardia. FINDINGS:       Heart appears mildly enlarged. Mediastinal contour is within normal limits. Lungs are underinflated. There is no focal consolidation, pneumothorax or   pulmonary edema. No large pleural effusion. Surrounding bones are unremarkable. CT Results  (Last 48 hours)               09/23/20 0324  CT HEAD WO CONT Final result    Impression:  IMPRESSION:       1. No CT evidence of acute intracranial abnormality. Narrative:  Noncontrast CT of the brain. COMPARISON: February 2018       INDICATION: Recurrent falls, previous ICH per        TECHNIQUE: Contiguous axial images were obtained from the skull base through the   vertex without IV contrast. Radiation dose reduction techniques were used for   this study:  Our CT scanners use one or all of the following: Automated exposure   control, adjustment of the mA and/or kVp according to patient's size, iterative   reconstruction. FINDINGS:       Metallic structure in the left parasellar region, in keeping with prior aneurysm   repair. There is no acute intracranial hemorrhage or CT evidence of acute territorial   infarction. There is no mass effect, midline shift or hydrocephalus. No   extra-axial fluid collection. Encephalomalacia in the left frontal lobe, left occipital lobe and the right   cerebellum, consistent with old infarct. Periventricular hypodensities,   nonspecific and likely due to chronic small vessel changes. Included globes appear intact. There is mild mucosal thickening of the left   sphenoid sinus. Rest of the paranasal sinuses and the mastoid air cells are   aerated. There is no skull fracture. 09/23/20 0324  CT KNEE RT WO CONT Final result    Impression:  IMPRESSION:       1. Distal femur medial condyle fracture, extending into the intercondylar notch. 2. Depressed fracture of the lateral tibial plateau. 3. Osteopenia, diminishing overall sensitivity. Narrative:  Clinical history: Fall, knee pain. Fracture noted on prior x-ray, follow-up. TECHNIQUE: Axial, coronal and sagittal CT of the right knee without IV contrast.   CT dose reduction was achieved through use of a standardized protocol tailored   for this examination and automatic exposure control for dose modulation. Comparison: X-ray earlier today. FINDINGS:       Bones are osteopenic, diminishing sensitivity.  There is distal femur medial   condyle fracture, extending into the intercondylar notch. There is involvement   of the patellofemoral articular surface and no definite involvement of the   femorotibial articular surface. There is depressed fracture of the lateral tibial plateau. Proximal fibular is   unremarkable. There is hemarthrosis. There is moderate to advanced knee joint osteoarthritis. Anterior cruciate   ligament appears chronically torn. Patellar and quadriceps tendons appear   intact. Soft tissue evaluation is suboptimal by CT imaging. 09/23/20 0324  CT PELV WO CONT Final result    Impression:  IMPRESSION:       1. Right total hip arthroplasty. No evidence of periprosthetic fracture or hip   dislocation. 2. No acute left hip fracture. If there is suspicion for occult left hip   fracture, MRI is a more sensitive study. Narrative:  Clinical history: Knee fracture. Fall. TECHNIQUE: Axial, coronal and sagittal CT of the pelvis without IV contrast. CT   dose reduction was achieved through use of a standardized protocol tailored for   this examination and automatic exposure control for dose modulation. [place] FINDINGS:       There are findings of right total hip arthroplasty. Hardware elements appear   intact. No evidence of periprosthetic fracture. There is no acute left hip   fracture. There is left hip joint space narrowing. No hip dislocation. There is mild cortical irregularity of the right inferior pubic ramus,   questionable for nondisplaced fracture. The SI joints and pubic symphysis are   intact. There is sigmoid diverticulosis without diverticulitis. There is no free air or   free fluid in the pelvis.                      Assessment and Plan:     Hospital Problems as of 9/23/2020 Date Reviewed: 2/28/2018          Codes Class Noted - Resolved POA    * (Principal) Femur fracture, right ([facility] [address] ICD-10-CM: X48.33JO  ICD-9-CM: 821.00  9/23/2020 - Present Yes        Dementia ([facility] [address] ICD-10-CM: F03.90  ICD-9-CM: 294.20  Unknown - Present Yes        Leukocytosis ICD-10-CM: D72.829  ICD-9-CM: 288.60  Unknown - Present Yes        Alcoholism ([place] [address] ICD-10-CM: F10.20  ICD-9-CM: 303.90  5/18/2018 - Present Yes        Hypertension ICD-10-CM: I10  ICD-9-CM: 401.9  5/18/2018 - Present Yes        OSMIN (acute kidney injury) ([place] [address] ICD-10-CM: N17.9  ICD-9-CM: 584.9  2/13/2018 - Present Yes        Acute metabolic encephalopathy T-55-WS: G93.41  ICD-9-CM: 348.31  2/13/2018 - Present Yes        Alcohol abuse ICD-10-CM: F10.10  ICD-9-CM: 305.00  2/13/2018 - Present Yes        Vascular dementia ([place] [address] ICD-10-CM: F01.50  ICD-9-CM: 290.40  2/13/2018 - Present Yes        Depression ICD-10-CM: F32.9  ICD-9-CM: 555  2/13/2018 - Present Yes        Bipolar disorder ([place] [address] ICD-10-CM: F31.9  ICD-9-CM: 296.80  10/22/2014 - Present Yes        CKD (chronic kidney disease) ICD-10-CM: N18.9  ICD-9-CM: 585.9  9/18/2014 - Present Yes        Essential hypertension ICD-10-CM: I10  ICD-9-CM: 401.9  9/11/2012 - Present Yes        FSGS (focal segmental glomerulosclerosis) ICD-10-CM: N05.1  ICD-9-CM: 582.1  9/11/2012 - Present Yes        Gout ICD-10-CM: M10.9  ICD-9-CM: 274.9  9/11/2012 - Present Yes                PLAN:  · Admit to ortho unit  · Hip fracture order set utilized  · (patient has distal right femur fracture with depressed fracture of the lateral tibial plateau)  · Pain control  · Bedrest  · Hold antiplatelets/AC  · Ortho consulted  · Continue other home meds as ordered  · Place siegel catheter. Ins and outs  · She has hx alcohol abuse with confusional state  · Question if this is due to her vascular dementia or could she have element of Wernicke's-Korsakoffs  · Will start high-dose thiamine at 500 mg IV tid x 5 days, 250 mg daily then taper  · Need MVI. Optimize K and magnesium  · Her renal function has worsened. She follows with Nephrology  · Avoid all nephrotoxins.  No Nsaids    FEN:  NPO prior to surgery  DVT ppx:  SCDs until after surgery  Estimated LOS:  3 days  Anticipated DC needs:  Ppd ordered. CM consulted.   PT/OT evals after surgery  Risk:  high    Signed:  David Wilkins MD
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
## 2020-09-23 NOTE — H&P
Hospitalist Note     Admit Date:  No admission date for patient encounter. Name:  Marni Kayser   Age:  61 y.o.  :  1960   MRN:  [id number]   PCP:  Shirley Irvin MD  Treatment Team: Attending Provider: Domenica Hunter MD    HPI/Subjective:   Mrs. 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 Her  attempted to help her while she was in the bathroom and he fell onto her knocking her over. On presentation she was tachycardic and mildly confused. She had RLE/knee pain. Plain films showed a fracture of the distal femur and possible fracture of proximal tibia. CT RLE confirmed distal femur fracture and a depressed fracture of the lateral tibial plateau. CT pelvis showed an intact right total hip arthroplasty w/o evidence of fracture in either hip. Head CT negative. Labs notable for leukocytosis (w/o evidence of acute infection) and OSMIN. Baseline Cr mid 2s, 3.57 on admission. She is on IVFs. She was accepted in transfer by night shift. Ortho is aware. She will be transferred to [facility]. Will make NPO midnight. Ortho will see tomorrow. Follow up AM labs. She is medically stable for discharge. 10 systems reviewed and negative except as noted in HPI.   Past Medical History:   Diagnosis Date    Aneurysm of common carotid artery ([address]     left side s/p coil     CKD (chronic kidney disease) 2014    Dementia ([address]     FSGS (focal segmental glomerulosclerosis)     in remission at present    Gout     Hypertension     managed with medication     Osteoarthritis 2014    Stroke ([address] ,     slight weakness on right side, slight effect to speech      Past Surgical History:   Procedure Laterality Date    HX ANKLE FRACTURE TX Left 2013    has hardware in    HX CHOLECYSTECTOMY  2018    HX ERCP  2018    cbd stone    HX INTRACRANIAL ANEURYSM REPAIR  2004    left carotid     HX ORTHOPAEDIC Right 10/2014    hip replacement    HX REFRACTIVE SURGERY      bilateral - Lasik      Allergies   Allergen Reactions    Codeine Nausea and Vomiting    Lortab [Hydrocodone-Acetaminophen] Nausea and Vomiting      Social History     Tobacco Use    Smoking status: Former Smoker     Packs/day: 0.25     Last attempt to quit: 1979     Years since quittin.7    Smokeless tobacco: Never Used   Substance Use Topics    Alcohol use: Yes     Alcohol/week: 2.5 standard drinks     Types: 3 Shots of liquor per week     Comment: pint of fireball      Family History   Problem Relation Age of Onset    Cancer Father 80        unknown    Stroke Sister 48      Family history reviewed and noncontributory. Immunization History   Administered Date(s) Administered    Influenza Vaccine 10/22/2014    Influenza Vaccine (Quad) PF 2018    Pneumococcal Polysaccharide (PPSV-23) 2014    TB Skin Test (PPD) Intradermal 2014, 2018, 2020     PTA Medications:  Cannot display prior to admission medications because the patient has not been admitted in this contact. Objective:   No data found. Estimated body mass index is 33.73 kg/m² as calculated from the following:    Height as of 19: 5' 6\" (1.676 m). Weight as of 19: 94.8 kg (209 lb). Intake/Output Summary (Last 24 hours) at 2020 1807  Last data filed at 2020 0447  Gross per 24 hour   Intake 550 ml   Output    Net 550 ml       *Note that automatically entered I/Os may not be accurate; dependent on patient compliance with collection and accurate  by assistants. Physical Exam:  General:    Well nourished. Alert. Obese. Eyes:   Normal sclerae. Extraocular movements intact. HENT:  Normocephalic, atraumatic. Moist mucous membranes  CV:   RRR. No m/r/g. Lungs:  CTAB. No wheezing, rhonchi, or rales. Abdomen: Soft, nontender, nondistended. Extremities: Warm and dry.   No cyanosis or edema. RLE wrapped, swelling to right knee noted. Tenderness to palpation. Pulses intact. Neurologic: CN II-XII grossly intact. Sensation intact. Skin:     No rashes or jaundice. Normal coloration  Psych:  Normal mood and affect. I reviewed the labs, imaging, EKGs, telemetry, and other studies done this admission. Data Reviewed:   Recent Results (from the past 24 hour(s))   EKG, 12 LEAD, INITIAL    Collection Time: 09/23/20  1:52 AM   Result Value Ref Range    Ventricular Rate 118 BPM    Atrial Rate 118 BPM    P-R Interval 128 ms    QRS Duration 74 ms    Q-T Interval 290 ms    QTC Calculation (Bezet) 406 ms    Calculated P Axis 47 degrees    Calculated R Axis 37 degrees    Calculated T Axis 56 degrees    Diagnosis       !! AGE AND GENDER SPECIFIC ECG ANALYSIS !! Sinus tachycardia  Possible Left atrial enlargement  RSR' or QR pattern in V1 suggests right ventricular conduction delay  Nonspecific T wave abnormality  Abnormal ECG  No previous ECGs available  Confirmed by JASON ALVAREZ (), Souleymane Coombs ([id number]) on 9/23/2020 98:79:82 AM     METABOLIC PANEL, BASIC    Collection Time: 09/23/20  2:14 AM   Result Value Ref Range    Sodium 144 136 - 145 mmol/L    Potassium 4.6 3.5 - 5.1 mmol/L    Chloride 112 (H) 98 - 107 mmol/L    CO2 20 (L) 21 - 32 mmol/L    Anion gap 12 7 - 16 mmol/L    Glucose 191 (H) 65 - 100 mg/dL    BUN 33 (H) 6 - 23 MG/DL    Creatinine 3.57 (H) 0.6 - 1.0 MG/DL    GFR est AA 17 (L) >60 ml/min/1.73m2    GFR est non-AA 14 (L) >60 ml/min/1.73m2    Calcium 8.3 8.3 - 10.4 MG/DL   ETHYL ALCOHOL    Collection Time: 09/23/20  2:14 AM   Result Value Ref Range    ALCOHOL(ETHYL),SERUM <3 MG/DL   CULTURE, URINE    Collection Time: 09/23/20  2:49 AM    Specimen: Cath Urine   Result Value Ref Range    Special Requests: NO SPECIAL REQUESTS      Culture result:        NO GROWTH AFTER SHORT PERIOD OF INCUBATION. FURTHER RESULTS TO FOLLOW AFTER OVERNIGHT INCUBATION.    URINALYSIS W/ RFLX MICROSCOPIC Collection Time: 09/23/20  2:49 AM   Result Value Ref Range    Color YELLOW      Appearance CLEAR      Specific gravity 1.014 1.001 - 1.023      pH (UA) 6.0 5.0 - 9.0      Protein 100 (A) NEG mg/dL    Glucose Negative mg/dL    Ketone Negative NEG mg/dL    Bilirubin Negative NEG      Blood Negative NEG      Urobilinogen 0.2 0.2 - 1.0 EU/dL    Nitrites Negative NEG      Leukocyte Esterase Negative NEG      WBC 0-3 0 /hpf    RBC 0-3 0 /hpf    Epithelial cells 0-3 0 /hpf    Bacteria 0 0 /hpf    Casts 0-3 0 /lpf   LACTIC ACID    Collection Time: 09/23/20  3:39 AM   Result Value Ref Range    Lactic acid 2.3 (HH) 0.4 - 2.0 MMOL/L   CBC W/O DIFF    Collection Time: 09/23/20  3:39 AM   Result Value Ref Range    WBC 21.6 (H) 4.3 - 11.1 K/uL    RBC 4.10 4.05 - 5.2 M/uL    HGB 11.8 11.7 - 15.4 g/dL    HCT 36.3 35.8 - 46.3 %    MCV 88.5 79.6 - 97.8 FL    MCH 28.8 26.1 - 32.9 PG    MCHC 32.5 31.4 - 35.0 g/dL    RDW 12.2 11.9 - 14.6 %    PLATELET 637 588 - 446 K/uL    MPV 10.7 9.4 - 12.3 FL    ABSOLUTE NRBC 0.00 0.0 - 0.2 K/uL   LACTIC ACID    Collection Time: 09/23/20 11:13 AM   Result Value Ref Range    Lactic acid 0.7 0.4 - 2.0 MMOL/L   TYPE & SCREEN    Collection Time: 09/23/20 11:51 AM   Result Value Ref Range    Crossmatch Expiration 09/26/2020     ABO/Rh(D) A NEGATIVE     Antibody screen NEG        All Micro Results     None          No current facility-administered medications for this encounter. Current Outpatient Medications   Medication Sig    oxyCODONE-acetaminophen (PERCOCET) 5-325 mg per tablet Take 1 Tab by mouth every eight (8) hours as needed. Max Daily Amount: 3 Tabs.  magnesium oxide (MAG-OX) 400 mg tablet Take 1 Tab by mouth daily.  ondansetron (ZOFRAN ODT) 8 mg disintegrating tablet Take 1 Tab by mouth every six (6) hours as needed. PRN for nausea    ALPRAZolam (XANAX) 0.5 mg tablet Take 0.5 Tabs by mouth two (2) times daily as needed for Anxiety or Sleep.  Max Daily Amount: 0.5 mg.    QUEtiapine (SEROQUEL) 50 mg tablet Take 50 mg by mouth nightly.  escitalopram oxalate (LEXAPRO) 10 mg tablet Take 10 mg by mouth daily.  acetaminophen (TYLENOL) 325 mg tablet Take 2 Tabs by mouth every six (6) hours as needed.  folic acid (FOLVITE) 1 mg tablet Take 1 Tab by mouth daily.  pantoprazole (PROTONIX) 40 mg tablet Take 1 Tab by mouth Daily (before breakfast).  thiamine (B-1) 100 mg tablet Take 1 Tab by mouth daily. Facility-Administered Medications Ordered in Other Encounters   Medication Dose Route Frequency    lactated Ringers infusion  125 mL/hr IntraVENous CONTINUOUS    thiamine (B-1) 500 mg in 0.9% sodium chloride 50 mL IVPB  500 mg IntraVENous TID    sodium chloride (NS) flush 5-40 mL  5-40 mL IntraVENous Q8H    sodium chloride (NS) flush 5-40 mL  5-40 mL IntraVENous PRN    acetaminophen (TYLENOL) tablet 650 mg  650 mg Oral Q8H    tuberculin injection 5 Units  5 Units IntraDERMal ONCE    ceFAZolin (ANCEF) 2 g in sterile water (preservative free) 20 mL IV syringe  2 g IntraVENous ONCE    ondansetron (ZOFRAN) injection 4 mg  4 mg IntraVENous Q6H PRN    oxyCODONE IR (ROXICODONE) tablet 5 mg  5 mg Oral Q6H PRN    HYDROmorphone (PF) (DILAUDID) injection 0.5 mg  0.5 mg IntraVENous Q6H PRN    escitalopram oxalate (LEXAPRO) tablet 10 mg  10 mg Oral DAILY    pantoprazole (PROTONIX) tablet 40 mg  40 mg Oral ACB    acetaminophen (TYLENOL) tablet 650 mg  650 mg Oral Q4H PRN       Other Studies:  No results found for this visit on 09/23/20. Xr Hip Rt W Or Wo Pelv 2-3 Vws    Result Date: 9/23/2020  Right Hip INDICATION: Kyree Medin, Pain COMPARISON: October 2014 TECHNIQUE: Three views of the right hip were obtained FINDINGS: Bones are osteopenic, diminishing sensitivity. There is right total hip arthroplasty. Hardware elements appear intact. No evidence of dislocation. No evidence of periprosthetic fracture. The SI joints and pubic symphysis are intact. IMPRESSION: 1.  Right total hip arthroplasty. No dislocation or evidence of periprosthetic fracture. Osteopenia limits sensitivity. Ct Head Wo Cont    Result Date: 9/23/2020  Noncontrast CT of the brain. COMPARISON: February 2018 INDICATION: Recurrent falls, previous ICH per  TECHNIQUE: Contiguous axial images were obtained from the skull base through the vertex without IV contrast. Radiation dose reduction techniques were used for this study:  Our CT scanners use one or all of the following: Automated exposure control, adjustment of the mA and/or kVp according to patient's size, iterative reconstruction. FINDINGS: Metallic structure in the left parasellar region, in keeping with prior aneurysm repair. There is no acute intracranial hemorrhage or CT evidence of acute territorial infarction. There is no mass effect, midline shift or hydrocephalus. No extra-axial fluid collection. Encephalomalacia in the left frontal lobe, left occipital lobe and the right cerebellum, consistent with old infarct. Periventricular hypodensities, nonspecific and likely due to chronic small vessel changes. Included globes appear intact. There is mild mucosal thickening of the left sphenoid sinus. Rest of the paranasal sinuses and the mastoid air cells are aerated. There is no skull fracture. IMPRESSION: 1. No CT evidence of acute intracranial abnormality. Ct Pelv Wo Cont    Result Date: 9/23/2020  Clinical history: Knee fracture. Fall. TECHNIQUE: Axial, coronal and sagittal CT of the pelvis without IV contrast. CT dose reduction was achieved through use of a standardized protocol tailored for this examination and automatic exposure control for dose modulation. Javier Mejia FINDINGS: There are findings of right total hip arthroplasty. Hardware elements appear intact. No evidence of periprosthetic fracture. There is no acute left hip fracture. There is left hip joint space narrowing. No hip dislocation.  There is mild cortical irregularity of the right inferior pubic ramus, questionable for nondisplaced fracture. The SI joints and pubic symphysis are intact. There is sigmoid diverticulosis without diverticulitis. There is no free air or free fluid in the pelvis. IMPRESSION: 1. Right total hip arthroplasty. No evidence of periprosthetic fracture or hip dislocation. 2. No acute left hip fracture. If there is suspicion for occult left hip fracture, MRI is a more sensitive study. Ct Knee Rt Wo Cont    Result Date: 9/23/2020  Clinical history: Fall, knee pain. Fracture noted on prior x-ray, follow-up. TECHNIQUE: Axial, coronal and sagittal CT of the right knee without IV contrast. CT dose reduction was achieved through use of a standardized protocol tailored for this examination and automatic exposure control for dose modulation. Comparison: X-ray earlier today. FINDINGS: Bones are osteopenic, diminishing sensitivity. There is distal femur medial condyle fracture, extending into the intercondylar notch. There is involvement of the patellofemoral articular surface and no definite involvement of the femorotibial articular surface. There is depressed fracture of the lateral tibial plateau. Proximal fibular is unremarkable. There is hemarthrosis. There is moderate to advanced knee joint osteoarthritis. Anterior cruciate ligament appears chronically torn. Patellar and quadriceps tendons appear intact. Soft tissue evaluation is suboptimal by CT imaging. IMPRESSION: 1. Distal femur medial condyle fracture, extending into the intercondylar notch. 2. Depressed fracture of the lateral tibial plateau. 3. Osteopenia, diminishing overall sensitivity. Xr Chest Port    Result Date: 9/23/2020  Portable chest xray  COMPARISON: June 6, 2019 CLINICAL HISTORY: Dizziness. Tachycardia. FINDINGS: Heart appears mildly enlarged. Mediastinal contour is within normal limits. Lungs are underinflated. There is no focal consolidation, pneumothorax or pulmonary edema. No large pleural effusion. Surrounding bones are unremarkable. IMPRESSION: 1. No evidence of acute pulmonary disease. Xr Knee Rt 3 V    Result Date: 9/23/2020  Right Knee INDICATION: Fall. Knee pain. TECHNIQUE: AP, lateral, oblique views of the right knee were obtained FINDINGS:  Sensitivity is limited secondary to osteopenia and suboptimal positioning. Patient was unable to straighten the knee during x-ray study. There is supracondylar fracture of the femur. Proximal tibia and proximal fibula are not well evaluated. There is an apparent lucent line involving the medial aspect of the proximal tibia, questionable for fracture. IMPRESSION: Limited sensitivity due to suboptimal positioning and osteopenia. Patient was unable to straighten the knee for the x-ray study. There is fracture of the distal femur and suggestion of fracture of the proximal tibia. CT is recommended to better evaluate.  DC4        SARS-CoV-2 Lab Results  \"Novel Coronavirus\" Test: No results found for: COV2NT   \"Emergent Disease\" Test: No results found for: EDPR  \"SARS-COV-2\" Test: No results found for: XGCOVT  \"[facility]\" Test:   Lab Results   Component Value Date/Time    RSLT SUSCEPTIBILITY 02/25/2018 08:37 AM     Rapid Test: No results found for: COVR           Assessment and Plan:     Hospital Problems as of 9/23/2020 Date Reviewed: 2/28/2018          Codes Class Noted - Resolved POA    Femur fracture, right ([facility] ICD-10-CM: W08.34LS  ICD-9-CM: 821.00  9/23/2020 - Present Yes        Leukocytosis ICD-10-CM: D72.829  ICD-9-CM: 288.60  Unknown - Present Yes        Other fracture of right femur, initial encounter for closed fracture ([facility] ICD-10-CM: N56.5Q5I  ICD-9-CM: 821.00  9/23/2020 - Present Yes        SIRS (systemic inflammatory response syndrome) ([facility] ICD-10-CM: R65.10  ICD-9-CM: 995.90  9/23/2020 - Present Yes        Acute kidney injury superimposed on chronic kidney disease ([facility] ICD-10-CM: N17.9, N18.9  ICD-9-CM: 866.00, 585.9  9/23/2020 - Present Unknown        Hypertension ICD-10-CM: I10  ICD-9-CM: 401.9  5/18/2018 - Present Yes        Alcohol abuse ICD-10-CM: F10.10  ICD-9-CM: 305.00  2/13/2018 - Present Yes        Bipolar disorder ([address] ICD-10-CM: F31.9  ICD-9-CM: 296.80  10/22/2014 - Present Yes        CKD (chronic kidney disease) ICD-10-CM: N18.9  ICD-9-CM: 585.9  9/18/2014 - Present Yes        FSGS (focal segmental glomerulosclerosis) ICD-10-CM: N05.1  ICD-9-CM: 582.1  9/11/2012 - Present Yes              Plan:  # Distal right femur fracture/tibial plateau fracture   - Transfer to [facility] for Orthopedics evaluation. NPO midnight. Therapies when able. Pain control. 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 Baseline sCr mid 2s. 3.57 on admission. UA normal. IVFs, follow urine output. BMP tomorrow. # Leukocytosis   - CXR and UA normal. Afebrile. LA now normalized. Met SIRS criteria but no suspicion of acute infection. CBC tomorrow. Not on abx currently. # Bipolar   - Home meds    # EtOH abuse   - Alcohol level neg on admission. Discharge planning: Transfer to [facility] tonight. Dispo pending clinical course. Code status:  Full code.   Estimated LOS:  Greater than 2 midnights  Risk:  high    Signed:  Isabel Colorado MD

## 2020-09-23 NOTE — PROGRESS NOTES
09/23/20 1848   Dual Skin Pressure Injury Assessment   Dual Skin Pressure Injury Assessment WDL   Second Care Provider (Based on Facility Policy) MARTIN Berkowitz   Skin Integumentary   Skin Integumentary (WDL) X    Pressure  Injury Documentation No Pressure Injury Noted-Pressure Ulcer Prevention Initiated   Skin Color Appropriate for ethnicity   Skin Condition/Temp Warm;Dry;Fragile;Flaky   Skin Integrity Scars (comment)   Turgor Non-tenting   Hair Growth Present   Varicosities Absent   Wound Prevention and Protection Methods   Orientation of Wound Prevention Posterior   Location of Wound Prevention Sacrum/Coccyx   Dressing Present  No   Wound Offloading (Prevention Methods) Bed, pressure reduction mattress;Pillows;Repositioning;Turning

## 2020-09-23 NOTE — ED NOTES
Lab called for recollect for lactic acid. Lab asked to recollect d/t being difficult stick and IV being in EJ and not drawing blood.

## 2020-09-24 ENCOUNTER — ANESTHESIA EVENT (OUTPATIENT)
Dept: SURGERY | Age: 60
DRG: 481 | End: 2020-09-24
Payer: MEDICARE

## 2020-09-24 LAB
ACC. NO. FROM MICRO ORDER, ACCP: ABNORMAL
ANION GAP SERPL CALC-SCNC: 9 MMOL/L (ref 7–16)
BASOPHILS # BLD: 0.1 K/UL (ref 0–0.2)
BASOPHILS NFR BLD: 1 % (ref 0–2)
BUN SERPL-MCNC: 31 MG/DL (ref 6–23)
CALCIUM SERPL-MCNC: 8.7 MG/DL (ref 8.3–10.4)
CHLORIDE SERPL-SCNC: 115 MMOL/L (ref 98–107)
CO2 SERPL-SCNC: 20 MMOL/L (ref 21–32)
CREAT SERPL-MCNC: 2.89 MG/DL (ref 0.6–1)
DIFFERENTIAL METHOD BLD: ABNORMAL
EOSINOPHIL # BLD: 0 K/UL (ref 0–0.8)
EOSINOPHIL NFR BLD: 0 % (ref 0.5–7.8)
ERYTHROCYTE [DISTWIDTH] IN BLOOD BY AUTOMATED COUNT: 12.5 % (ref 11.9–14.6)
GLUCOSE SERPL-MCNC: 102 MG/DL (ref 65–100)
HCT VFR BLD AUTO: 36.6 % (ref 35.8–46.3)
HGB BLD-MCNC: 11.2 G/DL (ref 11.7–15.4)
IMM GRANULOCYTES # BLD AUTO: 0 K/UL (ref 0–0.5)
IMM GRANULOCYTES NFR BLD AUTO: 0 % (ref 0–5)
INTERPRETATION: ABNORMAL
LYMPHOCYTES # BLD: 1.9 K/UL (ref 0.5–4.6)
LYMPHOCYTES NFR BLD: 16 % (ref 13–44)
MCH RBC QN AUTO: 28.6 PG (ref 26.1–32.9)
MCHC RBC AUTO-ENTMCNC: 30.6 G/DL (ref 31.4–35)
MCV RBC AUTO: 93.6 FL (ref 79.6–97.8)
MECA (METHICILLIN-RESISTANCE GENES), MRGP: DETECTED
MM INDURATION POC: 0 MM (ref 0–5)
MONOCYTES # BLD: 1.1 K/UL (ref 0.1–1.3)
MONOCYTES NFR BLD: 10 % (ref 4–12)
NEUTS SEG # BLD: 8.6 K/UL (ref 1.7–8.2)
NEUTS SEG NFR BLD: 74 % (ref 43–78)
NRBC # BLD: 0 K/UL (ref 0–0.2)
PLATELET # BLD AUTO: 128 K/UL (ref 150–450)
PMV BLD AUTO: 10.4 FL (ref 9.4–12.3)
POTASSIUM SERPL-SCNC: 4.1 MMOL/L (ref 3.5–5.1)
PPD POC: NEGATIVE NEGATIVE
RBC # BLD AUTO: 3.91 M/UL (ref 4.05–5.2)
SODIUM SERPL-SCNC: 144 MMOL/L (ref 136–145)
STAPHYLOCOCCUS, STAPP: DETECTED
VANCOMYCIN SERPL-MCNC: 20.8 UG/ML
WBC # BLD AUTO: 11.8 K/UL (ref 4.3–11.1)

## 2020-09-24 PROCEDURE — 74011250637 HC RX REV CODE- 250/637: Performed by: INTERNAL MEDICINE

## 2020-09-24 PROCEDURE — 85025 COMPLETE CBC W/AUTO DIFF WBC: CPT

## 2020-09-24 PROCEDURE — 36415 COLL VENOUS BLD VENIPUNCTURE: CPT

## 2020-09-24 PROCEDURE — 80202 ASSAY OF VANCOMYCIN: CPT

## 2020-09-24 PROCEDURE — 74011000258 HC RX REV CODE- 258: Performed by: INTERNAL MEDICINE

## 2020-09-24 PROCEDURE — 80048 BASIC METABOLIC PNL TOTAL CA: CPT

## 2020-09-24 PROCEDURE — 65270000029 HC RM PRIVATE

## 2020-09-24 PROCEDURE — 74011250636 HC RX REV CODE- 250/636: Performed by: INTERNAL MEDICINE

## 2020-09-24 RX ORDER — CEFAZOLIN SODIUM/WATER 2 G/20 ML
2 SYRINGE (ML) INTRAVENOUS
Status: COMPLETED | OUTPATIENT
Start: 2020-09-25 | End: 2020-09-25

## 2020-09-24 RX ADMIN — HYDROMORPHONE HYDROCHLORIDE 0.5 MG: 1 INJECTION, SOLUTION INTRAMUSCULAR; INTRAVENOUS; SUBCUTANEOUS at 03:54

## 2020-09-24 RX ADMIN — Medication 10 ML: at 21:42

## 2020-09-24 RX ADMIN — HYDROMORPHONE HYDROCHLORIDE 0.5 MG: 1 INJECTION, SOLUTION INTRAMUSCULAR; INTRAVENOUS; SUBCUTANEOUS at 11:37

## 2020-09-24 RX ADMIN — SODIUM CHLORIDE, SODIUM LACTATE, POTASSIUM CHLORIDE, AND CALCIUM CHLORIDE 125 ML/HR: 600; 310; 30; 20 INJECTION, SOLUTION INTRAVENOUS at 11:38

## 2020-09-24 RX ADMIN — THIAMINE HYDROCHLORIDE 500 MG: 100 INJECTION, SOLUTION INTRAMUSCULAR; INTRAVENOUS at 21:42

## 2020-09-24 RX ADMIN — ACETAMINOPHEN 650 MG: 325 TABLET, FILM COATED ORAL at 17:36

## 2020-09-24 RX ADMIN — Medication 5 ML: at 05:54

## 2020-09-24 RX ADMIN — ESCITALOPRAM OXALATE 10 MG: 10 TABLET ORAL at 08:56

## 2020-09-24 RX ADMIN — THIAMINE HYDROCHLORIDE 500 MG: 100 INJECTION, SOLUTION INTRAMUSCULAR; INTRAVENOUS at 08:56

## 2020-09-24 RX ADMIN — THIAMINE HYDROCHLORIDE 500 MG: 100 INJECTION, SOLUTION INTRAMUSCULAR; INTRAVENOUS at 14:16

## 2020-09-24 RX ADMIN — ACETAMINOPHEN 650 MG: 325 TABLET, FILM COATED ORAL at 08:56

## 2020-09-24 RX ADMIN — HYDROMORPHONE HYDROCHLORIDE 0.5 MG: 1 INJECTION, SOLUTION INTRAMUSCULAR; INTRAVENOUS; SUBCUTANEOUS at 17:35

## 2020-09-24 RX ADMIN — Medication 5 ML: at 13:30

## 2020-09-24 RX ADMIN — OXYCODONE 5 MG: 5 TABLET ORAL at 14:06

## 2020-09-24 RX ADMIN — SODIUM CHLORIDE, SODIUM LACTATE, POTASSIUM CHLORIDE, AND CALCIUM CHLORIDE 125 ML/HR: 600; 310; 30; 20 INJECTION, SOLUTION INTRAVENOUS at 17:35

## 2020-09-24 NOTE — CONSULTS
Infectious Disease Consult    Today's Date: 9/24/2020   Admit Date: 9/23/2020    Impression:   · CoNS bacteremia: blood culture (9/23) with CoNS (ox-R)  · Right Distal femur and lateral tibia fracture: s/p fall  · Hx of R RAMONA  · Hx of ankle fracture with HW    Plan:   · Suspicion for contaminant of blood cultures (blood cultures are documented as being drawn at the same time). Will continue to treat with IV Vancomycin likely through next week then stop- following clinical progression. · Stop Ceftriaxone today. · Recommend routine operative antibiotic prophylaxis with Cefazolin. · Repeat Blood cultures on Saturday. · ID will continue to follow. Anti-infectives:   · Vanc (9/23-  · CTX (9/23-9/24)    Subjective:   Date of Consultation:  September 24, 2020  Referring Physician: Dr. Theodore Moore    Patient is a 61 y.o. female with a past medical history of HTN, CKD, dementia, CVA, and ETOH abuse who presented to [facility] ED on 9/22 after suffering from a fall at home. Per the patient her  fell on top of her and knocked her over. When she presented to ED she was found to have mild confusion and tachycardia. She complained of RLE and knee pain. CT RLE confirmed distal femur fx and depressed fx of lateral tibial plateau. CT pelvis showed an intact right total hip arthroplasty w/o evidence of fracture in either hip. Head CT negative. Labs notable for leukocytosis, elevated lactic and OSMIN. Baseline Cr mid 2s, 3.57 on admission. She was started on IVFs. Blood cx on 9/23 now with CoNS (ox-R). Ortho was consulted and plan to proceed to OR tomorrow. ID was consulted for bacteremia. Patient reports she has no symptoms of fever, chills, or sweats. She denies any diarrhea, nausea, vomiting. She reports no pain or urinary symptoms. She has not noticed any skin rash/lesions/wounds. She denies any IVDU, she does occasionally smoke marijuana, no tobacco use, and reports she used to drink alcohol, but not anymore.  She has a history of R hip replacement and L ankle fx with HW in both, per patient. Patient Active Problem List   Diagnosis Code    Essential hypertension I10    Cerebral infarction ([facility] I63.9    FSGS (focal segmental glomerulosclerosis) N05.1    Gout M10.9    Weakness of right leg R29.898    CKD (chronic kidney disease) N18.9    Osteoarthritis M19.90    S/P total hip arthroplasty Z96.649    Bipolar disorder ([facility] F31.9    Gait instability R26.81    Hypokalemia E87.6    OSMIN (acute kidney injury) ([facility] N17.9    Acute metabolic encephalopathy H62.07    Alcohol abuse F10.10    Falls W19. Vasiliy Kasal    Vascular dementia ([facility] F01.50    Hypomagnesemia E83.42    Hypophosphatemia E83.39    Depression F32.9    Cholecystitis K81.9    Cholelithiasis K80.20    Bacteremia due to Gram-negative bacteria R78.81    Alcohol withdrawal ([facility]) F10.239    Alcoholism ([facility]) F10.20    Hypertension I10    C. difficile colitis A04.72    Femur fracture, right ([facility]) S72.91XA    Dementia ([facility]) F03.90    Leukocytosis D72.829    Other fracture of right femur, initial encounter for closed fracture ([facility]) S72.8X1A    SIRS (systemic inflammatory response syndrome) ([facility]) R65.10    Acute kidney injury superimposed on chronic kidney disease ([facility]) N17.9, N18.9     Past Medical History:   Diagnosis Date    Aneurysm of common carotid artery ([facility]     left side s/p coil     CKD (chronic kidney disease) 2014    Dementia ([facility]     FSGS (focal segmental glomerulosclerosis)     in remission at present    Gout     Hypertension     managed with medication     Osteoarthritis 2014    Stroke ([facility] ,     slight weakness on right side, slight effect to speech      Family History   Problem Relation Age of Onset    Cancer Father 80        unknown    Stroke Sister 48      Social History     Tobacco Use    Smoking status: Former Smoker     Packs/day: 0.25     Last attempt to quit: 1979     Years since quittin.7    Smokeless tobacco: Never Used   Substance Use Topics    Alcohol use: Yes     Alcohol/week: 2.5 standard drinks     Types: 3 Shots of liquor per week     Comment: pint of fireball     Past Surgical History:   Procedure Laterality Date    HX ANKLE FRACTURE TX Left 2013    has hardware in   [address]  02/28/2018    HX ERCP  02/27/2018    cbd stone    HX INTRACRANIAL ANEURYSM REPAIR  2004    left carotid     HX ORTHOPAEDIC Right 10/2014    hip replacement    HX REFRACTIVE SURGERY      bilateral - Lasik      Prior to Admission medications    Medication Sig Start Date End Date Taking? Authorizing Provider   oxyCODONE-acetaminophen (PERCOCET) 5-325 mg per tablet Take 1 Tab by mouth every eight (8) hours as needed. Max Daily Amount: 3 Tabs. 5/25/18   Gretchen Farrell MD   magnesium oxide (MAG-OX) 400 mg tablet Take 1 Tab by mouth daily. 5/25/18   Gretchen Farrell MD   ondansetron (ZOFRAN ODT) 8 mg disintegrating tablet Take 1 Tab by mouth every six (6) hours as needed. PRN for nausea 5/25/18   Gretchen Farrell MD   ALPRAZolam [place]) 0.5 mg tablet Take 0.5 Tabs by mouth two (2) times daily as needed for Anxiety or Sleep. Max Daily Amount: 0.5 mg. 5/25/18   Gretchen Farrell MD   QUEtiapine (SEROQUEL) 50 mg tablet Take 50 mg by mouth nightly. Provider, Historical   escitalopram oxalate (LEXAPRO) 10 mg tablet Take 10 mg by mouth daily. Provider, Historical   acetaminophen (TYLENOL) 325 mg tablet Take 2 Tabs by mouth every six (6) hours as needed. 2/16/18   Kit Hsu MD   folic acid (FOLVITE) 1 mg tablet Take 1 Tab by mouth daily. 2/17/18   Kit Hsu MD   pantoprazole (PROTONIX) 40 mg tablet Take 1 Tab by mouth Daily (before breakfast). 2/17/18   Kit Hsu MD   thiamine (B-1) 100 mg tablet Take 1 Tab by mouth daily.  2/17/18   Kit Hsu MD       Allergies   Allergen Reactions    Codeine Nausea and Vomiting    Lortab [Hydrocodone-Acetaminophen] Nausea and Vomiting        Review of Systems:  A comprehensive review of systems was negative except for that written in the History of Present Illness. Objective:     Visit Vitals  BP (!) 169/86 (BP 1 Location: Right arm, BP Patient Position: At rest)   Pulse (!) 117   Temp 98.7 °F (37.1 °C)   Resp 18   Ht 5' 5\" (1.651 m)   Wt 88.5 kg (195 lb)   SpO2 96%   BMI 32.45 kg/m²     Temp (24hrs), Av.4 °F (36.9 °C), Min:97.8 °F (36.6 °C), Max:98.7 °F (37.1 °C)       Lines:  Peripheral IV:   L IJ     Physical Exam:    General:  Alert, cooperative, well noursished, well developed, appears stated age; obese   Eyes:  Sclera anicteric. Pupils equally round and reactive to light. Mouth/Throat: Mucous membranes normal, oral pharynx clear   Neck: Supple   Lungs:   Clear to auscultation bilaterally, good effort   CV:  Regular rate and rhythm,no murmur, click, rub or gallop   Abdomen:   Soft, non-tender. bowel sounds normal. non-distended   Extremities: RLE with ace bandage and immobilizer brace   Skin: Skin color, texture, turgor normal. no acute rash or lesions   Lymph nodes: Cervical and supraclavicular normal   Musculoskeletal: No swelling or deformity   Lines/Devices:  Intact, no erythema, drainage or tenderness   Psych: Alert and oriented, normal mood affect given the setting       Data Review:     CBC:  Recent Labs     20  0507 20  0339   WBC 11.8* 21.6*   GRANS 74  --    MONOS 10  --    EOS 0*  --    ANEU 8.6*  --    ABL 1.9  --    HGB 11.2* 11.8   HCT 36.6 36.3   * 193       BMP:  Recent Labs     20  0507 20  0214   CREA 2.89* 3.57*   BUN 31* 33*    144   K 4.1 4.6   * 112*   CO2 20* 20*   AGAP 9 12   * 191*       LFTS:  No results for input(s): TBILI, ALT, AP, TP, ALB in the last 72 hours.     No lab exists for component: SGOT    Microbiology:     All Micro Results     Procedure Component Value Units Date/Time    MSSA/MRSA SC BY PCR, NASAL SWAB [[id number]]     Order Status:  Sent Specimen:  Nasal swab           Imagin/23 CT R Knee  IMPRESSION:     1. Distal femur medial condyle fracture, extending into the intercondylar notch.     2. Depressed fracture of the lateral tibial plateau.     3. Osteopenia, diminishing overall sensitivity.  CT pelv  IMPRESSION  IMPRESSION:     1. Right total hip arthroplasty. No evidence of periprosthetic fracture or hip  dislocation.     2. No acute left hip fracture. If there is suspicion for occult left hip  fracture, MRI is a more sensitive study.     Signed By: GARRY Bhat     2020

## 2020-09-24 NOTE — PROGRESS NOTES
Contacted by [facility] MD about positive blood culture for gram positive cocci. Will start empiric coverage with IV vancomycin.

## 2020-09-24 NOTE — PROGRESS NOTES
Problem: Falls - Risk of  Goal: *Absence of Falls  Description: Document Ronald Limon Fall Risk and appropriate interventions in the flowsheet. Outcome: Progressing Towards Goal  Note: Fall Risk Interventions:            Medication Interventions: Bed/chair exit alarm, Patient to call before getting OOB, Teach patient to arise slowly    Elimination Interventions: Bed/chair exit alarm, Call light in reach, Patient to call for help with toileting needs, Stay With Me (per policy), Toilet paper/wipes in reach, Toileting schedule/hourly rounds    History of Falls Interventions: Bed/chair exit alarm, Consult care management for discharge planning, Door open when patient unattended, Investigate reason for fall         Problem: Patient Education: Go to Patient Education Activity  Goal: Patient/Family Education  Outcome: Progressing Towards Goal     Problem: Pressure Injury - Risk of  Goal: *Prevention of pressure injury  Description: Document Luis Scale and appropriate interventions in the flowsheet.   Outcome: Progressing Towards Goal  Note: Pressure Injury Interventions:  Sensory Interventions: Assess changes in LOC, Avoid rigorous massage over bony prominences    Moisture Interventions: Absorbent underpads, Check for incontinence Q2 hours and as needed    Activity Interventions: Increase time out of bed, Pressure redistribution bed/mattress(bed type)    Mobility Interventions: HOB 30 degrees or less, Pressure redistribution bed/mattress (bed type)    Nutrition Interventions: Document food/fluid/supplement intake, Offer support with meals,snacks and hydration    Friction and Shear Interventions: HOB 30 degrees or less                Problem: Patient Education: Go to Patient Education Activity  Goal: Patient/Family Education  Outcome: Progressing Towards Goal

## 2020-09-24 NOTE — PROGRESS NOTES
Pharmacokinetic Consult to Pharmacist    Darcy Lins is a 61 y.o. female being treated for bloodstream with vanc. Height: 5' 5\" (165.1 cm)  Weight: 88.5 kg (195 lb)  Lab Results   Component Value Date/Time    BUN 33 (H) 09/23/2020 02:14 AM    Creatinine 3.57 (H) 09/23/2020 02:14 AM    WBC 21.6 (H) 09/23/2020 03:39 AM    Procalcitonin 4.6 02/25/2018 05:17 AM    Lactic acid 0.7 09/23/2020 11:13 AM    Lactic Acid (POC) 2.7 (H) 02/25/2018 07:43 AM      Estimated Creatinine Clearance: 18.6 mL/min (A) (based on SCr of 3.57 mg/dL (H)). CULTURES:  BC- pos gm cocci  MSSA/MRSA PCR- pending    Day 1 of vancomycin. Goal trough is 15-20mcg/ml. Vancomycin dose initiated at 2gm x1. Pharmacy to dose per levels due to OSMIN. Will continue to follow patient and order levels when clinically indicated.     Thank you for this consult,  Rosa Naidu, PharmD

## 2020-09-24 NOTE — PROGRESS NOTES
Hospitalist Progress Note    2020  Admit Date: 2020  6:33 PM   NAME: Lisette Villegas   :  1960   MRN:  [id number]   Attending: Michael Gaming DO  PCP:  Lis Mc MD    SUBJECTIVE:   Patient is a 61year old CF with a PMHx of HTN, CKD s/s FSGS, dementia, CVA and EtOH presented to the ED after a fall when her  fell onto her. In ED, XR of R hip shows fracture of distal femur and possible fracture of proximal tibia. CT RLE confirmed distal femur fracture and a depressed fracture of the lateral tibial plateau. CT pelvis showed an intact right total hip arthroplasty w/o evidence of fracture in either hip. Head CT negative. Ortho service was consulted. Pt stated that she is doing fine this morning except pain on her R hip and leg. She is ready for her hip to be fixed. Otherwise, denies any pain anywhere else. Denies fever, chills, SOB, chest pain, abdominal pain, N/V, dysuria, hematuria. Review of Systems negative with exception of pertinent positives noted above  PHYSICAL EXAM     Visit Vitals  BP (!) 157/99 (BP 1 Location: Right arm, BP Patient Position: At rest)   Pulse (!) 110   Temp 98.6 °F (37 °C)   Resp 18   Ht 5' 5\" (1.651 m)   Wt 88.5 kg (195 lb)   SpO2 95%   BMI 32.45 kg/m²      Temp (24hrs), Av.3 °F (36.8 °C), Min:97.8 °F (36.6 °C), Max:98.6 °F (37 °C)    Oxygen Therapy  O2 Sat (%): 95 % (20 0347)    Intake/Output Summary (Last 24 hours) at 2020 0813  Last data filed at 2020 0625  Gross per 24 hour   Intake    Output 1050 ml   Net -1050 ml      General: No acute distress  Lungs:  CTA Bilaterally. Heart:  Regular rate and rhythm,  No murmur, rub, or gallop  Abdomen: Soft, Non distended, Non tender, Positive bowel sounds  Extremities: No cyanosis, clubbing or edema. RLE TTP. No peripheral edema. Pulses intact.  IV line in L neck  Neurologic:  No focal deficits    No orders to display         [address] Problems    Diagnosis Date Noted    SIRS [facility]systemic inflammatory response syndrome) ([facility] 09/23/2020    Acute kidney injury superimposed on chronic kidney disease ([facility] 09/23/2020    Other fracture of right femur, initial encounter for closed fracture ([facility] 09/23/2020    Femur fracture, right ([facility] 09/23/2020    Leukocytosis     Hypertension 05/18/2018    Alcohol abuse 02/13/2018    Bipolar disorder ([facility] 10/22/2014    CKD (chronic kidney disease) 09/18/2014    FSGS (focal segmental glomerulosclerosis) 09/11/2012     Plan:    Right femur fracture  XR of R hip shows fracture of distal femur and possible fracture of proximal tibia. CT RLE confirmed distal femur fracture and a depressed fracture of the lateral tibial plateau. Ortho consulted: OR tomorrow. NPO after midnight. Pain control: Dilaudid and Gris prn  -F/u ortho recs    AoCKD  -CKD 2/2 Focal segmental glomerulosclerosis (FSGS)  -Bl Cr ~2. 3.57 on admission  -mIVF w/ LR  -Cr improved this AM    Leukocytosis  -WBC 21.6 with LA 2.3 on admission. Meet SIRS criteria but no sepsis, negative qSOFA. No obvious signs of infection and pt well appearing.  - CXR and UA unremarkable. -Blood Cx Positive x2: MRSA  -Started on Vanc (9/24-. ..). Cont Ancef for surgery ppx. D/c ceftriaxone started in ED.  -WBC 11.8 this AM and LA normalized after IVF  -? Contaminant, however with pt undergoing orthopedic surgery with possible hardware placement, will consult ID for further guidance. Chronic medical problems:  Alcohol Use: EtOH negative on admission. Monitor for signs of withdrawals. Started on thiamine  Anxiety/depression: Cont home Lexapro.  Hold home Seroquel and Xanax  GERD: Cont home PPI    DVT Prophylaxis: SCD's  Dispo: most likely rehab    Signed By: Nancy Jenkins,      September 24, 2020

## 2020-09-24 NOTE — ANESTHESIA PREPROCEDURE EVALUATION
Anesthetic History   No history of anesthetic complications            Review of Systems / Medical History  Patient summary reviewed and pertinent labs reviewed    Pulmonary          Smoker (Quit 1979)         Neuro/Psych       CVA (weakness in R side.)  Dementia (reported in the chart, seems very mild)    Comments: Vascular dementia Cardiovascular    Hypertension: well controlled          PAD    Exercise tolerance: >4 METS  Comments: ECHO 2017: 55%       GI/Hepatic/Renal         Renal disease (FSGS (remission)): CRI and ARF       Endo/Other        Arthritis and anemia     Other Findings              Physical Exam    Airway  Mallampati: III  TM Distance: 4 - 6 cm  Neck ROM: normal range of motion, short neck   Mouth opening: Normal    Comments: Reported grade 2 view with DL for lap kerri 2018 Cardiovascular    Rhythm: regular  Rate: normal         Dental    Dentition: Caps/crowns     Pulmonary  Breath sounds clear to auscultation               Abdominal  GI exam deferred       Other Findings            Anesthetic Plan    ASA: 3  Anesthesia type: general      Post-op pain plan if not by surgeon: peripheral nerve block single    Induction: Intravenous  Anesthetic plan and risks discussed with: Patient and Spouse      Patient oriented to name, birthday, and situation, and seemed very mentally clear. Suspect it will be a long operation so I told her it would probably be a GA, but she also seemed amenable to a SAB. Cleared for blocks by Dr. Renetta Valencia. Patient unable/willing to move knee and thus discussed low risk (but increased risk) of unintended nerve injury with PNB under GA. Femoral block performed in pre-op.

## 2020-09-24 NOTE — PROGRESS NOTES
The plan will be to proceed with open reduction internal fixation of distal femur fracture and plateau fracture tomorrow

## 2020-09-24 NOTE — H&P (VIEW-ONLY)
Consult Patient: Checo Dodson MRN: [id number]  SSN: [id number] YOB: 1960  Age: 61 y.o. Sex: female Subjective:  
  
Checo Dodson is a 61 y.o. female who apparently fell there is some question on whether or not her  also maybe fell on her and injured her right knee. She has a history she says of having bad knees with arthritis. She does not know that she has had a previous fracture. Her pain x-rays and CT scan have revealed distal femur and proximal tibial plateau fractures of the right knee. She does have quite a bit of pain in her right knee. She denies other problems besides her right knee Past Medical History:  
Diagnosis Date  Aneurysm of common carotid artery ([facility] 2004  
 left side s/p coil  CKD (chronic kidney disease) 2014  Dementia ([facility]  FSGS (focal segmental glomerulosclerosis)   
 in remission at present  Gout  Hypertension   
 managed with medication  Osteoarthritis 2014  Stroke [facility]) ,   
 slight weakness on right side, slight effect to speech Past Surgical History:  
Procedure Laterality Date  HX ANKLE FRACTURE TX Left 2013  
 has hardware in  
[address]  2018  HX ERCP  2018  
 cbd stone  HX INTRACRANIAL ANEURYSM REPAIR  2004  
 left carotid  HX ORTHOPAEDIC Right 10/2014  
 hip replacement  HX REFRACTIVE SURGERY    
 bilateral - Lasik FAMHX -No history of inflammatory arthritis Social History Tobacco Use  Smoking status: Former Smoker Packs/day: 0.25 Last attempt to quit: 1979 Years since quittin.7  Smokeless tobacco: Never Used Substance Use Topics  Alcohol use: Yes Alcohol/week: 2.5 standard drinks Types: 3 Shots of liquor per week Comment: pint of fireball Current Facility-Administered Medications Medication Dose Route Frequency Provider Last Rate Last Dose  influenza vaccine 2020-21 (6 mos+)(PF) (FLUARIX/FLULAVAL/FLUZONE QUAD) injection 0.5 mL  0.5 mL IntraMUSCular PRIOR TO DISCHARGE Natalie Leung MD      
 acetaminophen (TYLENOL) tablet 650 mg  650 mg Oral Q4H PRN Patricia Brandt MD      
 escitalopram oxalate (LEXAPRO) tablet 10 mg  10 mg Oral DAILY Patricia Brandt MD      
 lactated Ringers infusion  125 mL/hr IntraVENous CONTINUOUS Patricia Brandt  mL/hr at 09/23/20 2121 125 mL/hr at 09/23/20 2121  
 pantoprazole (PROTONIX) tablet 40 mg  40 mg Oral ACB Patricia Brandt MD   Stopped at 09/24/20 0730  thiamine (B-1) 500 mg in 0.9% sodium chloride 50 mL IVPB  500 mg IntraVENous TID Patricia Brandt MD   500 mg at 09/23/20 2121  acetaminophen (TYLENOL) tablet 650 mg  650 mg Oral Q8H Patricia Brandt MD   650 mg at 09/23/20 2121  
 HYDROmorphone (PF) (DILAUDID) injection 0.5 mg  0.5 mg IntraVENous Q6H PRN Patricia Brandt MD   0.5 mg at 09/24/20 0354  ondansetron (ZOFRAN) injection 4 mg  4 mg IntraVENous Q6H PRN Patricia Brandt MD      
 oxyCODONE IR (ROXICODONE) tablet 5 mg  5 mg Oral Q6H PRN Patricia Brandt MD      
 sodium chloride (NS) flush 5-40 mL  5-40 mL IntraVENous Q8H Patricia Brandt MD   5 mL at 09/24/20 4896  sodium chloride (NS) flush 5-40 mL  5-40 mL IntraVENous PRN Patricia Brandt MD      
 Vancomycin Intermittent dosing- pharmacy to dose by levels   Other Rx Dosing/Monitoring Isaias Rabago MD      
  
 
Allergies Allergen Reactions  Codeine Nausea and Vomiting  Lortab [Hydrocodone-Acetaminophen] Nausea and Vomiting Review of Systems: A comprehensive review of systems was negative except for that written in the History of Present Illness. Objective:  
 
Vitals:  
 09/23/20 2246 09/24/20 0013 09/24/20 1239 09/24/20 0425 BP:  (!) 160/99 (!) 169/103 (!) 157/99 Pulse:  (!) 108 (!) 113 (!) 110 Resp:  18 18 Temp:  98.6 °F (37 °C) 98.6 °F (37 °C) SpO2:  97% 95% Weight: 88.5 kg (195 lb) Height: 5' 5\" (1.651 m) Physical Exam: 
Physical Exam: 
General:  Alert, cooperative, no distress, appears stated age. Orientation she is alert and oriented at least to person Eyes:  Conjunctivae/corneas clear. PERRL, EOMs intact. Fundi benign Ears:  Normal TMs and external ear canals both ears. Nose: Nares normal. Septum midline. Mucosa normal. No drainage or sinus tenderness. Mouth/Throat: Lips, mucosa, and tongue normal. Teeth and gums normal.  
Neck: Supple, symmetrical, trachea midline, no adenopathy, thyroid: no enlargment/tenderness/nodules, no carotid bruit and no JVD. Back:   Symmetric, no curvature. ROM normal. No CVA tenderness. Lungs:   Clear to auscultation bilaterally. Heart:  Regular rate and rhythm, S1, S2 normal, no murmur, click, rub or gallop. Abdomen:   Soft, non-tender. Bowel sounds normal. No masses,  No organomegaly. No lymphadenopathy in all 4 extremities Alignment she has near normal alignment of her right knee she holds it in a flexed position Range of motion pain with any range of motion right knee Vasculardistal pulses palpable in right lower extremity Sensory/motordeep tendon reflexes normal right lower extremity. Motor and sensory function intact. Stability I have not attempted to assess her stability because of her history of known fractures Tenderness to palpation throughout the right knee Skin no open wounds in her right lower extremity Gait-she cannot put any weight on her right lower extremity Assessment:  
 
Hospital Problems  Date Reviewed: 2/28/2018 Codes Class Noted POA Femur fracture, right ([facility] ICD-10-CM: E55.00KC ICD-9-CM: 821.00  9/23/2020 Yes Leukocytosis ICD-10-CM: U92.894 ICD-9-CM: 288.60  Unknown Yes Other fracture of right femur, initial encounter for closed fracture [facility]) ICD-10-CM: X56.0E5A 
ICD-9-CM: 821.00  9/23/2020 Yes SIRS (systemic inflammatory response syndrome) ([facility]) ICD-10-CM: R65.10 ICD-9-CM: 995.90  9/23/2020 Yes Acute kidney injury superimposed on chronic kidney disease ([facility] ICD-10-CM: N17.9, N18.9 ICD-9-CM: 866.00, 585.9  9/23/2020 Yes Hypertension ICD-10-CM: I10 
ICD-9-CM: 401.9  5/18/2018 Yes Alcohol abuse ICD-10-CM: F10.10 ICD-9-CM: 305.00  2/13/2018 Yes Bipolar disorder ([facility] ICD-10-CM: F31.9 ICD-9-CM: 296.80  10/22/2014 Yes  
   
 CKD (chronic kidney disease) ICD-10-CM: N18.9 ICD-9-CM: 585.9  9/18/2014 Yes FSGS (focal segmental glomerulosclerosis) ICD-10-CM: N05.1 ICD-9-CM: 582.1  9/11/2012 Yes Displaced right medial femoral condyle and right lateral tibial plateau fractures Xrays and or studies: 
 
I have reviewed plain films as well as her CT scans. She has what appears to be an acute medial femoral condyle fracture of her right knee. Is very difficult to tell on the CT scan at the lateral tibial plateau is something that is acute or chronic. She does have some significant degenerative changes she deftly has some depression of her lateral tibial plateau but it appears that it could easily be something that is chronic in nature. Unfortunate with her history of having the distal femur fracture is very difficult to have clinical correlation because she has pain in the knee from the medial condyle fracture but I think we have to assume this is likely acute Plan:  
 
Plan will be to proceed tomorrow to the operating room for open treatment of her right tibial plateau and right medial femoral condyle fracture Signed By: Mayi Lozano MD   
 September 24, 2020

## 2020-09-24 NOTE — CONSULTS
Consult    Patient: Urmila Campos MRN: [id number]  SSN: [id number]    YOB: 1960  Age: 61 y.o. Sex: female      Subjective:      Urmila Campos is a 61 y.o. female who apparently fell there is some question on whether or not her  also maybe fell on her and injured her right knee. She has a history she says of having bad knees with arthritis. She does not know that she has had a previous fracture. Her pain x-rays and CT scan have revealed distal femur and proximal tibial plateau fractures of the right knee. She does have quite a bit of pain in her right knee. She denies other problems besides her right knee    Past Medical History:   Diagnosis Date    Aneurysm of common carotid artery ([facility]     left side s/p coil     CKD (chronic kidney disease) 2014    Dementia ([facility]     FSGS (focal segmental glomerulosclerosis)     in remission at present    Gout     Hypertension     managed with medication     Osteoarthritis 2014    Stroke ([facility] ,     slight weakness on right side, slight effect to speech     Past Surgical History:   Procedure Laterality Date    HX ANKLE FRACTURE TX Left 2013    has hardware in    HX CHOLECYSTECTOMY  2018    HX ERCP  2018    cbd stone    HX INTRACRANIAL ANEURYSM REPAIR  2004    left carotid     HX ORTHOPAEDIC Right 10/2014    hip replacement    HX REFRACTIVE SURGERY      bilateral - Lasik      FAMHX -No history of inflammatory arthritis   Social History     Tobacco Use    Smoking status: Former Smoker     Packs/day: 0.25     Last attempt to quit: 1979     Years since quittin.7    Smokeless tobacco: Never Used   Substance Use Topics    Alcohol use:  Yes     Alcohol/week: 2.5 standard drinks     Types: 3 Shots of liquor per week     Comment: pint of fireball      Current Facility-Administered Medications   Medication Dose Route Frequency Provider Last Rate Last Dose    influenza vaccine  (6 mos+)(PF) (FLUARIX/FLULAVAL/FLUZONE QUAD) injection 0.5 mL  0.5 mL IntraMUSCular PRIOR TO DISCHARGE Erasmo Barrett MD        acetaminophen (TYLENOL) tablet 650 mg  650 mg Oral Q4H PRN Tyron Flores MD        escitalopram oxalate (LEXAPRO) tablet 10 mg  10 mg Oral DAILY Tyron Flores MD        lactated Ringers infusion  125 mL/hr IntraVENous CONTINUOUS Tyron Flores  mL/hr at 09/23/20 2121 125 mL/hr at 09/23/20 2121    pantoprazole (PROTONIX) tablet 40 mg  40 mg Oral ACB Tyron Flores MD   Stopped at 09/24/20 0730    thiamine (B-1) 500 mg in 0.9% sodium chloride 50 mL IVPB  500 mg IntraVENous TID Tyron Flores MD   500 mg at 09/23/20 2121    acetaminophen (TYLENOL) tablet 650 mg  650 mg Oral Q8H Tyron Flores MD   650 mg at 09/23/20 2121    HYDROmorphone (PF) (DILAUDID) injection 0.5 mg  0.5 mg IntraVENous Q6H PRN Tyron Flores MD   0.5 mg at 09/24/20 0354    ondansetron (ZOFRAN) injection 4 mg  4 mg IntraVENous Q6H PRN Tyron Flores MD        oxyCODONE IR (ROXICODONE) tablet 5 mg  5 mg Oral Q6H PRN yTron Flores MD        sodium chloride (NS) flush 5-40 mL  5-40 mL IntraVENous Q8H Tyron Flores MD   5 mL at 09/24/20 0554    sodium chloride (NS) flush 5-40 mL  5-40 mL IntraVENous PRN Tyron Florse MD        Vancomycin Intermittent dosing- pharmacy to dose by levels   Other Rx Dosing/Monitoring Jacklyn Boast, MD            Allergies   Allergen Reactions    Codeine Nausea and Vomiting    Lortab [Hydrocodone-Acetaminophen] Nausea and Vomiting       Review of Systems:  A comprehensive review of systems was negative except for that written in the History of Present Illness.     Objective:     Vitals:    09/23/20 2246 09/24/20 0013 09/24/20 0347 09/24/20 0425   BP:  (!) 160/99 (!) 169/103 (!) 157/99   Pulse:  (!) 108 (!) 113 (!) 110   Resp:  18 18    Temp:  98.6 °F (37 °C) 98.6 °F (37 °C)    SpO2:  97% 95%    Weight: 88.5 kg (195 lb)      Height: 5' 5\" (1.651 m)           Physical Exam:  Physical Exam:  General:  Alert, cooperative, no distress, appears stated age. Orientation she is alert and oriented at least to person   Eyes:  Conjunctivae/corneas clear. PERRL, EOMs intact. Fundi benign   Ears:  Normal TMs and external ear canals both ears. Nose: Nares normal. Septum midline. Mucosa normal. No drainage or sinus tenderness. Mouth/Throat: Lips, mucosa, and tongue normal. Teeth and gums normal.   Neck: Supple, symmetrical, trachea midline, no adenopathy, thyroid: no enlargment/tenderness/nodules, no carotid bruit and no JVD. Back:   Symmetric, no curvature. ROM normal. No CVA tenderness. Lungs:   Clear to auscultation bilaterally. Heart:  Regular rate and rhythm, S1, S2 normal, no murmur, click, rub or gallop. Abdomen:   Soft, non-tender. Bowel sounds normal. No masses,  No organomegaly. No lymphadenopathy in all 4 extremities  Alignment she has near normal alignment of her right knee she holds it in a flexed position  Range of motion pain with any range of motion right knee  Vasculardistal pulses palpable in right lower extremity  Sensory/motordeep tendon reflexes normal right lower extremity. Motor and sensory function intact.   Stability I have not attempted to assess her stability because of her history of known fractures  Tenderness to palpation throughout the right knee  Skin no open wounds in her right lower extremity  Gait-she cannot put any weight on her right lower extremity    Assessment:     Hospital Problems  Date Reviewed: 2/28/2018          Codes Class Noted POA    Femur fracture, right ([facility] ICD-10-CM: F49.56OV  ICD-9-CM: 821.00  9/23/2020 Yes        Leukocytosis ICD-10-CM: W58.756  ICD-9-CM: 288.60  Unknown Yes        Other fracture of right femur, initial encounter for closed fracture ([facility] ICD-10-CM: T20.3V4V  ICD-9-CM: 821.00  9/23/2020 Yes        SIRS (systemic inflammatory response syndrome) ([facility] ICD-10-CM: R65.10  ICD-9-CM: 995.90  9/23/2020 Yes        Acute kidney injury superimposed on chronic kidney disease ([facility] ICD-10-CM: N17.9, N18.9  ICD-9-CM: 866.00, 585.9  9/23/2020 Yes        Hypertension ICD-10-CM: I10  ICD-9-CM: 401.9  5/18/2018 Yes        Alcohol abuse ICD-10-CM: F10.10  ICD-9-CM: 305.00  2/13/2018 Yes        Bipolar disorder ([facility] ICD-10-CM: F31.9  ICD-9-CM: 296.80  10/22/2014 Yes        CKD (chronic kidney disease) ICD-10-CM: N18.9  ICD-9-CM: 585.9  9/18/2014 Yes        FSGS (focal segmental glomerulosclerosis) ICD-10-CM: N05.1  ICD-9-CM: 582.1  9/11/2012 Yes            Displaced right medial femoral condyle and right lateral tibial plateau fractures    Xrays and or studies:    I have reviewed plain films as well as her CT scans. She has what appears to be an acute medial femoral condyle fracture of her right knee. Is very difficult to tell on the CT scan at the lateral tibial plateau is something that is acute or chronic. She does have some significant degenerative changes she deftly has some depression of her lateral tibial plateau but it appears that it could easily be something that is chronic in nature.   Unfortunate with her history of having the distal femur fracture is very difficult to have clinical correlation because she has pain in the knee from the medial condyle fracture but I think we have to assume this is likely acute  Plan:     Plan will be to proceed tomorrow to the operating room for open treatment of her right tibial plateau and right medial femoral condyle fracture    Signed By: Onelia Rodgers MD     September 24, 2020

## 2020-09-24 NOTE — PROGRESS NOTES
Initial visit by  to convey care and concern and to explore spiritual needs. Offered spiritual interventions including affirmation of kasandra & emotions and prayer as requested. Chaplains remain available for follow-up care.      Miri Mai  Board Certified

## 2020-09-25 ENCOUNTER — APPOINTMENT (OUTPATIENT)
Dept: GENERAL RADIOLOGY | Age: 60
DRG: 481 | End: 2020-09-25
Attending: FAMILY MEDICINE
Payer: MEDICARE

## 2020-09-25 ENCOUNTER — APPOINTMENT (OUTPATIENT)
Dept: GENERAL RADIOLOGY | Age: 60
DRG: 481 | End: 2020-09-25
Attending: ORTHOPAEDIC SURGERY
Payer: MEDICARE

## 2020-09-25 ENCOUNTER — ANESTHESIA (OUTPATIENT)
Dept: SURGERY | Age: 60
DRG: 481 | End: 2020-09-25
Payer: MEDICARE

## 2020-09-25 LAB
ANION GAP SERPL CALC-SCNC: 10 MMOL/L (ref 7–16)
BACTERIA SPEC CULT: NORMAL
BASOPHILS # BLD: 0.1 K/UL (ref 0–0.2)
BASOPHILS NFR BLD: 1 % (ref 0–2)
BUN SERPL-MCNC: 26 MG/DL (ref 6–23)
CALCIUM SERPL-MCNC: 8.5 MG/DL (ref 8.3–10.4)
CHLORIDE SERPL-SCNC: 113 MMOL/L (ref 98–107)
CO2 SERPL-SCNC: 18 MMOL/L (ref 21–32)
CREAT SERPL-MCNC: 2.88 MG/DL (ref 0.6–1)
DIFFERENTIAL METHOD BLD: ABNORMAL
EOSINOPHIL # BLD: 0.2 K/UL (ref 0–0.8)
EOSINOPHIL NFR BLD: 2 % (ref 0.5–7.8)
ERYTHROCYTE [DISTWIDTH] IN BLOOD BY AUTOMATED COUNT: 12.5 % (ref 11.9–14.6)
GLUCOSE SERPL-MCNC: 90 MG/DL (ref 65–100)
HCT VFR BLD AUTO: 27.4 % (ref 35.8–46.3)
HGB BLD-MCNC: 8.8 G/DL (ref 11.7–15.4)
IMM GRANULOCYTES # BLD AUTO: 0.1 K/UL (ref 0–0.5)
IMM GRANULOCYTES NFR BLD AUTO: 1 % (ref 0–5)
LYMPHOCYTES # BLD: 1.1 K/UL (ref 0.5–4.6)
LYMPHOCYTES NFR BLD: 10 % (ref 13–44)
MCH RBC QN AUTO: 28.7 PG (ref 26.1–32.9)
MCHC RBC AUTO-ENTMCNC: 32.1 G/DL (ref 31.4–35)
MCV RBC AUTO: 89.3 FL (ref 79.6–97.8)
MONOCYTES # BLD: 0.5 K/UL (ref 0.1–1.3)
MONOCYTES NFR BLD: 5 % (ref 4–12)
NEUTS SEG # BLD: 8.4 K/UL (ref 1.7–8.2)
NEUTS SEG NFR BLD: 81 % (ref 43–78)
NRBC # BLD: 0 K/UL (ref 0–0.2)
PLATELET # BLD AUTO: 101 K/UL (ref 150–450)
PMV BLD AUTO: 11.3 FL (ref 9.4–12.3)
POTASSIUM SERPL-SCNC: 4.5 MMOL/L (ref 3.5–5.1)
RBC # BLD AUTO: 3.07 M/UL (ref 4.05–5.2)
SERVICE CMNT-IMP: NORMAL
SODIUM SERPL-SCNC: 141 MMOL/L (ref 136–145)
VANCOMYCIN SERPL-MCNC: 17.3 UG/ML
WBC # BLD AUTO: 10.3 K/UL (ref 4.3–11.1)

## 2020-09-25 PROCEDURE — 76010000162 HC OR TIME 1.5 TO 2 HR INTENSV-TIER 1: Performed by: ORTHOPAEDIC SURGERY

## 2020-09-25 PROCEDURE — 80202 ASSAY OF VANCOMYCIN: CPT

## 2020-09-25 PROCEDURE — 74011000250 HC RX REV CODE- 250: Performed by: ANESTHESIOLOGY

## 2020-09-25 PROCEDURE — 76210000063 HC OR PH I REC FIRST 0.5 HR: Performed by: ORTHOPAEDIC SURGERY

## 2020-09-25 PROCEDURE — 77030040922 HC BLNKT HYPOTHRM STRY -A: Performed by: ANESTHESIOLOGY

## 2020-09-25 PROCEDURE — C1713 ANCHOR/SCREW BN/BN,TIS/BN: HCPCS | Performed by: ORTHOPAEDIC SURGERY

## 2020-09-25 PROCEDURE — 74011250637 HC RX REV CODE- 250/637: Performed by: ANESTHESIOLOGY

## 2020-09-25 PROCEDURE — 73560 X-RAY EXAM OF KNEE 1 OR 2: CPT

## 2020-09-25 PROCEDURE — 77030000032 HC CUF TRNQT ZIMM -B: Performed by: ORTHOPAEDIC SURGERY

## 2020-09-25 PROCEDURE — 77030010509 HC AIRWY LMA MSK TELE -A: Performed by: ANESTHESIOLOGY

## 2020-09-25 PROCEDURE — 0QSG04Z REPOSITION RIGHT TIBIA WITH INTERNAL FIXATION DEVICE, OPEN APPROACH: ICD-10-PCS | Performed by: ORTHOPAEDIC SURGERY

## 2020-09-25 PROCEDURE — 80048 BASIC METABOLIC PNL TOTAL CA: CPT

## 2020-09-25 PROCEDURE — 76060000034 HC ANESTHESIA 1.5 TO 2 HR: Performed by: ORTHOPAEDIC SURGERY

## 2020-09-25 PROCEDURE — 77030033067 HC SUT PDO STRATFX SPIR J&J -B: Performed by: ORTHOPAEDIC SURGERY

## 2020-09-25 PROCEDURE — 2709999900 HC NON-CHARGEABLE SUPPLY: Performed by: ORTHOPAEDIC SURGERY

## 2020-09-25 PROCEDURE — 74011000258 HC RX REV CODE- 258: Performed by: ORTHOPAEDIC SURGERY

## 2020-09-25 PROCEDURE — 74011250636 HC RX REV CODE- 250/636: Performed by: ORTHOPAEDIC SURGERY

## 2020-09-25 PROCEDURE — 77030017016 HC DSG ANTIMIC BARR2 S&N -B: Performed by: ORTHOPAEDIC SURGERY

## 2020-09-25 PROCEDURE — 74011250637 HC RX REV CODE- 250/637: Performed by: ORTHOPAEDIC SURGERY

## 2020-09-25 PROCEDURE — 74011250636 HC RX REV CODE- 250/636: Performed by: ANESTHESIOLOGY

## 2020-09-25 PROCEDURE — 74011250636 HC RX REV CODE- 250/636: Performed by: INTERNAL MEDICINE

## 2020-09-25 PROCEDURE — 86580 TB INTRADERMAL TEST: CPT | Performed by: FAMILY MEDICINE

## 2020-09-25 PROCEDURE — 77030029637: Performed by: ORTHOPAEDIC SURGERY

## 2020-09-25 PROCEDURE — 73552 X-RAY EXAM OF FEMUR 2/>: CPT

## 2020-09-25 PROCEDURE — 74011000302 HC RX REV CODE- 302: Performed by: FAMILY MEDICINE

## 2020-09-25 PROCEDURE — 65270000029 HC RM PRIVATE

## 2020-09-25 PROCEDURE — 74011000250 HC RX REV CODE- 250: Performed by: NURSE ANESTHETIST, CERTIFIED REGISTERED

## 2020-09-25 PROCEDURE — 76010010054 HC POST OP PAIN BLOCK: Performed by: ORTHOPAEDIC SURGERY

## 2020-09-25 PROCEDURE — 77030003602 HC NDL NRV BLK BBMI -B: Performed by: ANESTHESIOLOGY

## 2020-09-25 PROCEDURE — 77030016474 HC BIT DRL QC3 SYNT -C: Performed by: ORTHOPAEDIC SURGERY

## 2020-09-25 PROCEDURE — 36415 COLL VENOUS BLD VENIPUNCTURE: CPT

## 2020-09-25 PROCEDURE — 76942 ECHO GUIDE FOR BIOPSY: CPT | Performed by: ORTHOPAEDIC SURGERY

## 2020-09-25 PROCEDURE — 77030038269 HC DRN EXT URIN PURWCK BARD -A

## 2020-09-25 PROCEDURE — 0QSB04Z REPOSITION RIGHT LOWER FEMUR WITH INTERNAL FIXATION DEVICE, OPEN APPROACH: ICD-10-PCS | Performed by: ORTHOPAEDIC SURGERY

## 2020-09-25 PROCEDURE — 85025 COMPLETE CBC W/AUTO DIFF WBC: CPT

## 2020-09-25 PROCEDURE — L1830 KO IMMOB CANVAS LONG PRE OTS: HCPCS | Performed by: ORTHOPAEDIC SURGERY

## 2020-09-25 PROCEDURE — 77030002966 HC SUT PDS J&J -A: Performed by: ORTHOPAEDIC SURGERY

## 2020-09-25 PROCEDURE — 77030013708 HC HNDPC SUC IRR PULS STRY –B: Performed by: ORTHOPAEDIC SURGERY

## 2020-09-25 PROCEDURE — 77030003862 HC BIT DRL SYNT -B: Performed by: ORTHOPAEDIC SURGERY

## 2020-09-25 PROCEDURE — 77030019908 HC STETH ESOPH SIMS -A: Performed by: ANESTHESIOLOGY

## 2020-09-25 PROCEDURE — 77030031139 HC SUT VCRL2 J&J -A: Performed by: ORTHOPAEDIC SURGERY

## 2020-09-25 PROCEDURE — 77030008467 HC STPLR SKN COVD -B: Performed by: ORTHOPAEDIC SURGERY

## 2020-09-25 PROCEDURE — 74011250636 HC RX REV CODE- 250/636: Performed by: NURSE ANESTHETIST, CERTIFIED REGISTERED

## 2020-09-25 PROCEDURE — 2709999900 HC NON-CHARGEABLE SUPPLY

## 2020-09-25 PROCEDURE — 73590 X-RAY EXAM OF LOWER LEG: CPT

## 2020-09-25 DEVICE — WASHER ORTH DIA13MM FOR CANN SCR: Type: IMPLANTABLE DEVICE | Site: KNEE | Status: FUNCTIONAL

## 2020-09-25 DEVICE — SCREW BNE L36MM DIA3.5MM CORT S STL ST LOK FULL THRD: Type: IMPLANTABLE DEVICE | Site: KNEE | Status: FUNCTIONAL

## 2020-09-25 DEVICE — 3.5MM LOCKING SCREW SLF-TPNG W/STARDRIVE(TM) RECESS 16MM: Type: IMPLANTABLE DEVICE | Site: KNEE | Status: FUNCTIONAL

## 2020-09-25 DEVICE — SCREW BNE L75MM DIA7.3MM THRD L32MM CANC S STL SELF DRL ST: Type: IMPLANTABLE DEVICE | Site: KNEE | Status: FUNCTIONAL

## 2020-09-25 DEVICE — SCREW BNE L34MM DIA3.5MM CORT S STL ST LOK FULL THRD: Type: IMPLANTABLE DEVICE | Site: KNEE | Status: FUNCTIONAL

## 2020-09-25 DEVICE — SCREW BNE L75MM DIA3.5MM PROX TIB S STL ST FULL THRD T15: Type: IMPLANTABLE DEVICE | Site: KNEE | Status: FUNCTIONAL

## 2020-09-25 DEVICE — 3.5MM LOCKING SCREW SLF-TPNG W/STARDRIVE(TM) RECESS 55MM: Type: IMPLANTABLE DEVICE | Site: KNEE | Status: FUNCTIONAL

## 2020-09-25 DEVICE — IMPLANTABLE DEVICE: Type: IMPLANTABLE DEVICE | Site: KNEE | Status: FUNCTIONAL

## 2020-09-25 DEVICE — SCREW BNE L40MM DIA3.5MM CORT S STL ST LOK FULL THRD: Type: IMPLANTABLE DEVICE | Site: KNEE | Status: FUNCTIONAL

## 2020-09-25 DEVICE — SCREW BNE L50MM DIA3.5MM CORT S STL ST LOK FULL THRD: Type: IMPLANTABLE DEVICE | Site: KNEE | Status: FUNCTIONAL

## 2020-09-25 DEVICE — SCREW BNE L54MM DIA3.5MM PROX TIB S STL ST FULL THRD T15: Type: IMPLANTABLE DEVICE | Site: KNEE | Status: FUNCTIONAL

## 2020-09-25 DEVICE — SCREW BNE L60MM DIA3.5MM PROX TIB S STL ST FULL THRD T15: Type: IMPLANTABLE DEVICE | Site: KNEE | Status: FUNCTIONAL

## 2020-09-25 RX ORDER — OXYCODONE HYDROCHLORIDE 5 MG/1
10 TABLET ORAL
Status: DISCONTINUED | OUTPATIENT
Start: 2020-09-25 | End: 2020-09-29 | Stop reason: HOSPADM

## 2020-09-25 RX ORDER — HYDROMORPHONE HYDROCHLORIDE 2 MG/ML
0.5 INJECTION, SOLUTION INTRAMUSCULAR; INTRAVENOUS; SUBCUTANEOUS
Status: DISCONTINUED | OUTPATIENT
Start: 2020-09-25 | End: 2020-09-25 | Stop reason: HOSPADM

## 2020-09-25 RX ORDER — VANCOMYCIN/0.9 % SOD CHLORIDE 750 MG/250
750 PLASTIC BAG, INJECTION (ML) INTRAVENOUS ONCE
Status: COMPLETED | OUTPATIENT
Start: 2020-09-25 | End: 2020-09-25

## 2020-09-25 RX ORDER — HYDROMORPHONE HYDROCHLORIDE 1 MG/ML
0.5 INJECTION, SOLUTION INTRAMUSCULAR; INTRAVENOUS; SUBCUTANEOUS
Status: DISCONTINUED | OUTPATIENT
Start: 2020-09-25 | End: 2020-09-29 | Stop reason: HOSPADM

## 2020-09-25 RX ORDER — BUPIVACAINE HYDROCHLORIDE 5 MG/ML
INJECTION, SOLUTION EPIDURAL; INTRACAUDAL
Status: DISCONTINUED | OUTPATIENT
Start: 2020-09-25 | End: 2020-09-25 | Stop reason: HOSPADM

## 2020-09-25 RX ORDER — ACETAMINOPHEN 500 MG
1000 TABLET ORAL ONCE
Status: COMPLETED | OUTPATIENT
Start: 2020-09-25 | End: 2020-09-25

## 2020-09-25 RX ORDER — MAG HYDROX/ALUMINUM HYD/SIMETH 200-200-20
30 SUSPENSION, ORAL (FINAL DOSE FORM) ORAL
Status: DISCONTINUED | OUTPATIENT
Start: 2020-09-25 | End: 2020-09-29 | Stop reason: HOSPADM

## 2020-09-25 RX ORDER — FERROUS SULFATE, DRIED 160(50) MG
1 TABLET, EXTENDED RELEASE ORAL
Status: DISCONTINUED | OUTPATIENT
Start: 2020-09-25 | End: 2020-09-29 | Stop reason: HOSPADM

## 2020-09-25 RX ORDER — SODIUM CHLORIDE 0.9 % (FLUSH) 0.9 %
5-40 SYRINGE (ML) INJECTION AS NEEDED
Status: DISCONTINUED | OUTPATIENT
Start: 2020-09-25 | End: 2020-09-29 | Stop reason: HOSPADM

## 2020-09-25 RX ORDER — ONDANSETRON 2 MG/ML
INJECTION INTRAMUSCULAR; INTRAVENOUS AS NEEDED
Status: DISCONTINUED | OUTPATIENT
Start: 2020-09-25 | End: 2020-09-25 | Stop reason: HOSPADM

## 2020-09-25 RX ORDER — ONDANSETRON 2 MG/ML
4 INJECTION INTRAMUSCULAR; INTRAVENOUS
Status: DISCONTINUED | OUTPATIENT
Start: 2020-09-25 | End: 2020-09-25 | Stop reason: HOSPADM

## 2020-09-25 RX ORDER — ALBUTEROL SULFATE 0.83 MG/ML
2.5 SOLUTION RESPIRATORY (INHALATION) AS NEEDED
Status: DISCONTINUED | OUTPATIENT
Start: 2020-09-25 | End: 2020-09-25 | Stop reason: HOSPADM

## 2020-09-25 RX ORDER — SODIUM CHLORIDE, SODIUM LACTATE, POTASSIUM CHLORIDE, CALCIUM CHLORIDE 600; 310; 30; 20 MG/100ML; MG/100ML; MG/100ML; MG/100ML
1000 INJECTION, SOLUTION INTRAVENOUS CONTINUOUS
Status: DISPENSED | OUTPATIENT
Start: 2020-09-25 | End: 2020-09-26

## 2020-09-25 RX ORDER — ONDANSETRON 2 MG/ML
4 INJECTION INTRAMUSCULAR; INTRAVENOUS
Status: DISCONTINUED | OUTPATIENT
Start: 2020-09-25 | End: 2020-09-29 | Stop reason: HOSPADM

## 2020-09-25 RX ORDER — MIDAZOLAM HYDROCHLORIDE 1 MG/ML
2 INJECTION, SOLUTION INTRAMUSCULAR; INTRAVENOUS
Status: COMPLETED | OUTPATIENT
Start: 2020-09-25 | End: 2020-09-25

## 2020-09-25 RX ORDER — LIDOCAINE HYDROCHLORIDE 10 MG/ML
0.1 INJECTION INFILTRATION; PERINEURAL AS NEEDED
Status: DISCONTINUED | OUTPATIENT
Start: 2020-09-25 | End: 2020-09-29 | Stop reason: HOSPADM

## 2020-09-25 RX ORDER — PROPOFOL 10 MG/ML
INJECTION, EMULSION INTRAVENOUS AS NEEDED
Status: DISCONTINUED | OUTPATIENT
Start: 2020-09-25 | End: 2020-09-25 | Stop reason: HOSPADM

## 2020-09-25 RX ORDER — SODIUM CHLORIDE 0.9 % (FLUSH) 0.9 %
5-40 SYRINGE (ML) INJECTION EVERY 8 HOURS
Status: DISCONTINUED | OUTPATIENT
Start: 2020-09-25 | End: 2020-09-29 | Stop reason: HOSPADM

## 2020-09-25 RX ORDER — LIDOCAINE HYDROCHLORIDE 20 MG/ML
INJECTION, SOLUTION EPIDURAL; INFILTRATION; INTRACAUDAL; PERINEURAL AS NEEDED
Status: DISCONTINUED | OUTPATIENT
Start: 2020-09-25 | End: 2020-09-25 | Stop reason: HOSPADM

## 2020-09-25 RX ORDER — OXYCODONE HYDROCHLORIDE 5 MG/1
5 TABLET ORAL
Status: DISCONTINUED | OUTPATIENT
Start: 2020-09-25 | End: 2020-09-25 | Stop reason: HOSPADM

## 2020-09-25 RX ORDER — DEXAMETHASONE SODIUM PHOSPHATE 4 MG/ML
INJECTION, SOLUTION INTRA-ARTICULAR; INTRALESIONAL; INTRAMUSCULAR; INTRAVENOUS; SOFT TISSUE AS NEEDED
Status: DISCONTINUED | OUTPATIENT
Start: 2020-09-25 | End: 2020-09-25 | Stop reason: HOSPADM

## 2020-09-25 RX ORDER — ASPIRIN 325 MG
325 TABLET ORAL DAILY
Status: DISCONTINUED | OUTPATIENT
Start: 2020-09-25 | End: 2020-09-29 | Stop reason: HOSPADM

## 2020-09-25 RX ORDER — FENTANYL CITRATE 50 UG/ML
INJECTION, SOLUTION INTRAMUSCULAR; INTRAVENOUS AS NEEDED
Status: DISCONTINUED | OUTPATIENT
Start: 2020-09-25 | End: 2020-09-25 | Stop reason: HOSPADM

## 2020-09-25 RX ADMIN — Medication 10 ML: at 05:11

## 2020-09-25 RX ADMIN — THIAMINE HYDROCHLORIDE 500 MG: 100 INJECTION, SOLUTION INTRAMUSCULAR; INTRAVENOUS at 12:18

## 2020-09-25 RX ADMIN — OXYCODONE 10 MG: 5 TABLET ORAL at 16:15

## 2020-09-25 RX ADMIN — THIAMINE HYDROCHLORIDE 500 MG: 100 INJECTION, SOLUTION INTRAMUSCULAR; INTRAVENOUS at 17:39

## 2020-09-25 RX ADMIN — ASPIRIN 325 MG ORAL TABLET 325 MG: 325 PILL ORAL at 12:17

## 2020-09-25 RX ADMIN — OXYCODONE 10 MG: 5 TABLET ORAL at 22:32

## 2020-09-25 RX ADMIN — ACETAMINOPHEN 650 MG: 325 TABLET, FILM COATED ORAL at 22:33

## 2020-09-25 RX ADMIN — Medication 10 ML: at 16:17

## 2020-09-25 RX ADMIN — FENTANYL CITRATE 25 MCG: 50 INJECTION INTRAMUSCULAR; INTRAVENOUS at 08:00

## 2020-09-25 RX ADMIN — PROPOFOL 150 MG: 10 INJECTION, EMULSION INTRAVENOUS at 07:38

## 2020-09-25 RX ADMIN — ACETAMINOPHEN 650 MG: 325 TABLET, FILM COATED ORAL at 12:18

## 2020-09-25 RX ADMIN — ONDANSETRON 4 MG: 2 INJECTION INTRAMUSCULAR; INTRAVENOUS at 08:02

## 2020-09-25 RX ADMIN — SODIUM CHLORIDE 1000 MG: 900 INJECTION, SOLUTION INTRAVENOUS at 16:17

## 2020-09-25 RX ADMIN — ACETAMINOPHEN 650 MG: 325 TABLET, FILM COATED ORAL at 16:15

## 2020-09-25 RX ADMIN — BUPIVACAINE HYDROCHLORIDE 15 ML: 5 INJECTION, SOLUTION EPIDURAL; INTRACAUDAL; PERINEURAL at 07:46

## 2020-09-25 RX ADMIN — FENTANYL CITRATE 25 MCG: 50 INJECTION INTRAMUSCULAR; INTRAVENOUS at 08:58

## 2020-09-25 RX ADMIN — DEXAMETHASONE SODIUM PHOSPHATE 4 MG: 4 INJECTION, SOLUTION INTRAMUSCULAR; INTRAVENOUS at 08:02

## 2020-09-25 RX ADMIN — ACETAMINOPHEN 1000 MG: 500 TABLET, FILM COATED ORAL at 06:09

## 2020-09-25 RX ADMIN — Medication 1 TABLET: at 16:15

## 2020-09-25 RX ADMIN — TUBERCULIN PURIFIED PROTEIN DERIVATIVE 5 UNITS: 5 INJECTION, SOLUTION INTRADERMAL at 17:38

## 2020-09-25 RX ADMIN — PHENYLEPHRINE HYDROCHLORIDE 120 MCG: 10 INJECTION INTRAVENOUS at 07:34

## 2020-09-25 RX ADMIN — Medication 1 TABLET: at 12:18

## 2020-09-25 RX ADMIN — BUPIVACAINE HYDROCHLORIDE 15 ML: 5 INJECTION, SOLUTION EPIDURAL; INTRACAUDAL; PERINEURAL at 07:12

## 2020-09-25 RX ADMIN — Medication 10 ML: at 21:16

## 2020-09-25 RX ADMIN — VANCOMYCIN HYDROCHLORIDE 750 MG: 10 INJECTION, POWDER, LYOPHILIZED, FOR SOLUTION INTRAVENOUS at 16:25

## 2020-09-25 RX ADMIN — MIDAZOLAM 2 MG: 1 INJECTION INTRAMUSCULAR; INTRAVENOUS at 07:06

## 2020-09-25 RX ADMIN — PHENYLEPHRINE HYDROCHLORIDE 120 MCG: 10 INJECTION INTRAVENOUS at 08:14

## 2020-09-25 RX ADMIN — SODIUM CHLORIDE, SODIUM LACTATE, POTASSIUM CHLORIDE, AND CALCIUM CHLORIDE: 600; 310; 30; 20 INJECTION, SOLUTION INTRAVENOUS at 08:35

## 2020-09-25 RX ADMIN — SODIUM CHLORIDE, SODIUM LACTATE, POTASSIUM CHLORIDE, AND CALCIUM CHLORIDE 1000 ML: 600; 310; 30; 20 INJECTION, SOLUTION INTRAVENOUS at 05:45

## 2020-09-25 RX ADMIN — Medication 2 G: at 07:45

## 2020-09-25 RX ADMIN — PHENYLEPHRINE HYDROCHLORIDE 120 MCG: 10 INJECTION INTRAVENOUS at 07:52

## 2020-09-25 RX ADMIN — FENTANYL CITRATE 25 MCG: 50 INJECTION INTRAMUSCULAR; INTRAVENOUS at 08:41

## 2020-09-25 RX ADMIN — SODIUM CHLORIDE, SODIUM LACTATE, POTASSIUM CHLORIDE, AND CALCIUM CHLORIDE 125 ML/HR: 600; 310; 30; 20 INJECTION, SOLUTION INTRAVENOUS at 17:00

## 2020-09-25 RX ADMIN — SODIUM CHLORIDE 1000 MG: 900 INJECTION, SOLUTION INTRAVENOUS at 23:48

## 2020-09-25 RX ADMIN — THIAMINE HYDROCHLORIDE 500 MG: 100 INJECTION, SOLUTION INTRAMUSCULAR; INTRAVENOUS at 21:16

## 2020-09-25 RX ADMIN — ESCITALOPRAM OXALATE 10 MG: 10 TABLET ORAL at 12:18

## 2020-09-25 RX ADMIN — PHENYLEPHRINE HYDROCHLORIDE 180 MCG: 10 INJECTION INTRAVENOUS at 08:23

## 2020-09-25 RX ADMIN — FENTANYL CITRATE 25 MCG: 50 INJECTION INTRAMUSCULAR; INTRAVENOUS at 08:46

## 2020-09-25 RX ADMIN — LIDOCAINE HYDROCHLORIDE 80 MG: 20 INJECTION, SOLUTION EPIDURAL; INFILTRATION; INTRACAUDAL; PERINEURAL at 07:38

## 2020-09-25 NOTE — PROGRESS NOTES
Per CM note from [facility], pt desires to go to rehab at either [facility]  or [address] [place] [address]. Referrals submitted but facilities will most likely require therapy evals before making a bed offer. Pt having surgery today. CM will forward evals to facilities when available. PPD placed 9/23/2020.    1330:  Pt has been accepted for admission to [facility] pending insurance approval.  Pt/spouse accepted bed offer and are in agreement with this [place] plan. Facility will initiate the insurance precert on Monday once therapy notes are available. COVID test to be ordered Monday. SW following to facilitate pt's transfer to rehab at [place]. Care Management Interventions  PCP Verified by CM: Yes  Last Visit to PCP: 08/21/20  Mode of Transport at Discharge: BLS  Transition of Care Consult (CM Consult): SNF  Partner SNF: Yes  Discharge Durable Medical Equipment: No  Physical Therapy Consult: Yes  Occupational Therapy Consult: Yes  Speech Therapy Consult: No  Current Support Network: Own Home, Lives with Spouse  Confirm Follow Up Transport: Family  The Plan for Transition of Care is Related to the Following Treatment Goals : Subacute rehab to improve pt's functional abilities for a safe transition to home.   The Patient and/or Patient Representative was Provided with a Choice of Provider and Agrees with the Discharge Plan?: Yes  Freedom of Choice List was Provided with Basic Dialogue that Supports the Patient's Individualized Plan of Care/Goals, Treatment Preferences and Shares the Quality Data Associated with the Providers?: No(pt requested referral to specific facilities she has been to before.)  Discharge Location  Discharge Placement: Rehab Unit [address] [place] [address]

## 2020-09-25 NOTE — PERIOP NOTES
TRANSFER - OUT REPORT:    Verbal report given to MARTIN CALLE on Ama Marinelli  being transferred to [address] [place] [address] for routine post - op       Report consisted of patients Situation, Background, Assessment and   Recommendations(SBAR). Information from the following report(s) SBAR was reviewed with the receiving nurse. Lines:   Peripheral IV 09/23/20 Left External jugular (Active)   Site Assessment Clean, dry, & intact 09/25/20 0913   Phlebitis Assessment 0 09/25/20 0913   Infiltration Assessment 0 09/25/20 0913   Dressing Status Clean, dry, & intact 09/25/20 0913   Dressing Type Transparent;Tape 09/25/20 0913   Hub Color/Line Status Patent 09/25/20 0913       Peripheral IV 09/25/20 Left Arm (Active)   Site Assessment Clean, dry, & intact 09/25/20 0913   Phlebitis Assessment 0 09/25/20 0913   Infiltration Assessment 0 09/25/20 0913   Dressing Status Clean, dry, & intact 09/25/20 0913   Dressing Type Transparent;Tape 09/25/20 0913   Hub Color/Line Status Patent 09/25/20 0913        Opportunity for questions and clarification was provided. Patient transported with:   Tech    VTE prophylaxis orders have not been written for Ama Marinelli. Patient and family given floor number and nurses name. Family updated re: pt status after security code verified.

## 2020-09-25 NOTE — PROGRESS NOTES
Hospitalist Progress Note    2020  Admit Date: 2020  6:33 PM   NAME: Taqueria Doe   :  1960   MRN:  [id number]   Attending: Ethan Montilla DO  PCP:  Eli Barker MD    SUBJECTIVE:   Patient is a 61year old CF with a PMHx of HTN, CKD s/s FSGS, dementia, CVA and EtOH presented to the ED after a fall when her  fell onto her. In ED, XR of R hip shows fracture of distal femur and possible fracture of proximal tibia. CT RLE confirmed distal femur fracture and a depressed fracture of the lateral tibial plateau. CT pelvis showed an intact right total hip arthroplasty w/o evidence of fracture in either hip. Head CT negative. Ortho service was consulted. Saw pt post-op. Stated that she feels fine now. Pain is controlled for the time being. No other complaints. Has been tolerating po well. Denies fever, chills, SOB, chest pain, abdominal pain, N/V, dysuria, hematuria. Review of Systems negative with exception of pertinent positives noted above  PHYSICAL EXAM     Visit Vitals  BP (!) 156/70 (BP 1 Location: Right arm, BP Patient Position: At rest)   Pulse (!) 104   Temp 98.6 °F (37 °C)   Resp 16   Ht 5' 5\" (1.651 m)   Wt 88.5 kg (195 lb)   SpO2 98%   BMI 32.45 kg/m²      Temp (24hrs), Av.2 °F (36.8 °C), Min:98 °F (36.7 °C), Max:98.6 °F (37 °C)    Oxygen Therapy  O2 Sat (%): 98 % (20 0712)  O2 Device: Nasal cannula (20 0706)  O2 Flow Rate (L/min): 3 l/min (20 0712)    Intake/Output Summary (Last 24 hours) at 2020 09  Last data filed at 2020 0856  Gross per 24 hour   Intake 1000 ml   Output 1695 ml   Net -695 ml      General: No acute distress  Lungs:  CTA Bilaterally. Heart:  Regular rate and rhythm,  No murmur, rub, or gallop  Abdomen: Soft, Non distended, Non tender, Positive bowel sounds  Extremities: No cyanosis, clubbing or edema. RLE TTP. No peripheral edema. Pulses intact. IV line in L neck. R LE wrapped, clean dry and intact.   Neurologic:  No focal deficits    XR FEMUR RT 2 VS    (Results Pending)   XR TIB/FIB RT    (Results Pending)   NC XR TECHNOLOGIST SERVICE    (Results Pending)         [address] Problems    Diagnosis Date Noted    SIRS (systemic inflammatory response syndrome) ([facility] 09/23/2020    Acute kidney [facility] superimposed on chronic kidney disease ([facility] 09/23/2020    Other fracture of right femur, initial encounter for closed fracture ([facility] 09/23/2020    Femur fracture, right ([facility] 09/23/2020    Leukocytosis     Hypertension 05/18/2018    Alcohol abuse 02/13/2018    Bipolar disorder ([facility] 10/22/2014    CKD (chronic kidney disease) 09/18/2014    FSGS (focal segmental glomerulosclerosis) 09/11/2012     Plan:    Right femur fracture  XR of R hip shows fracture of distal femur and possible fracture of proximal tibia. CT RLE confirmed distal femur fracture and a depressed fracture of the lateral tibial plateau. Ortho consulted: s/p ORIF of R hip and ORIF of R tibial on 9/25  Pain control: Dilaudid and Gris prn  -F/u ortho recs    Acute blood loss anemia post-op  -Hgb 11.2>8.8 this AM  -Continue to monitor. Transfuse if hgb <7 or symptomatic  -AM CBC    AoCKD, resolved  -CKD 2/2 Focal segmental glomerulosclerosis (FSGS)  -Bl Cr <3. Was 3.57 on admission  -mIVF w/ LR  -back to baseline    Leukocytosis, resolved  -WBC 21.6 with LA 2.3 on admission. Meet SIRS criteria but no sepsis, negative qSOFA. No obvious signs of infection and pt well appearing. -CXR and UA unremarkable. -Blood Cx Positive x2: MRSA  -Started on Vanc (9/24-. ..). Received Ancef for surgery ppx. D/c ceftriaxone started in ED.  -WBC 21.6>11.8>10.3. WBC and LA normalized after IVF  -? BCx Contaminant, however with pt undergoing orthopedic surgery with hardware placement, ID consulted for further guidance. OK to continue Vanc and Ancef alex-operatively. Repeat BCx on Sat and d/c abx if negative.       Chronic medical problems:  Alcohol Use: EtOH negative on admission. Monitor for signs of withdrawals. Started on thiamine  Anxiety/depression: Cont home Lexapro.  Hold home Seroquel and Xanax  GERD: Cont home PPI    DVT Prophylaxis: SCD's  Dispo: most likely rehab    Signed By: Jf Mathew DO     September 25, 2020

## 2020-09-25 NOTE — ANESTHESIA POSTPROCEDURE EVALUATION
Procedure(s):  Right FEMUR OPEN REDUCTION INTERNAL FIXATION  Right TIBIAL PLATEAU OPEN REDUCTION INTERNAL FIXATION. spinal, general    Anesthesia Post Evaluation      Multimodal analgesia: multimodal analgesia used between 6 hours prior to anesthesia start to PACU discharge  Patient location during evaluation: bedside  Patient participation: complete - patient participated  Level of consciousness: awake and responsive to light touch  Pain management: adequate  Airway patency: patent  Anesthetic complications: no  Cardiovascular status: acceptable, hemodynamically stable, blood pressure returned to baseline and stable  Respiratory status: acceptable, unassisted, spontaneous ventilation and nonlabored ventilation  Hydration status: acceptable        INITIAL Post-op Vital signs:   Vitals Value Taken Time   /74 9/25/2020  9:43 AM   Temp 36.2 °C (97.2 °F) 9/25/2020  9:17 AM   Pulse 86 9/25/2020  9:43 AM   Resp 15 9/25/2020  9:38 AM   SpO2 95 % 9/25/2020  9:43 AM   Vitals shown include unvalidated device data.

## 2020-09-25 NOTE — PROGRESS NOTES
Chart reviewed. Pt not seen today. Continue vancomycin for now. Blood cultures ordered for tomorrow.

## 2020-09-25 NOTE — ANESTHESIA PROCEDURE NOTES
Peripheral Block    Start time: 9/25/2020 7:43 AM  End time: 9/25/2020 7:46 AM  Performed by: Tsering Balbuena MD  Authorized by: Tsering Balbuena MD       Pre-procedure: Indications: at surgeon's request and post-op pain management    Preanesthetic Checklist: patient identified, risks and benefits discussed, site marked, timeout performed, anesthesia consent given and patient being monitored    Timeout Time: 07:43          Block Type:   Block Type:  Popliteal  Laterality:  Right  Monitoring:  Continuous pulse ox, frequent vital sign checks, heart rate, oxygen and responsive to questions  Injection Technique:  Single shot  Procedures: ultrasound guided    Prep: chlorhexidine    Location:  Lower thigh  Needle Type:  Stimuplex  Needle Gauge:  20 G  Needle Localization:  Ultrasound guidance  Medication Injected:  Bupivacaine (PF) (MARCAINE) 0.5% injection, 15 mL  Med Admin Time: 9/25/2020 7:46 AM    Assessment:  Number of attempts:  1  Injection Assessment:  Incremental injection every 5 mL, local visualized surrounding nerve on ultrasound, negative aspiration for blood, no intravascular symptoms, no paresthesia and ultrasound image on chart  Patient tolerance:  Patient tolerated the procedure well with no immediate complications  Block performed following induction due to patient inability to tolerate block position awake. Discussed potentially increased risk of nerve injury doing block with unconscious patient.

## 2020-09-25 NOTE — OP NOTES
Operative Report    Patient: Ama Marinelli MRN: [id number]  SSN: [id number]    YOB: 1960  Age: 61 y.o. Sex: female       Date of Surgery: 9/25/2020     History:  Ama Marinelli is a 61 y.o. female who fell and injured the right knee. She was seen in the emergency room and found to have fractures of her right medial femoral condyle as well as her right lateral tibial plateau. I did have a chance to talk to her regarding the need for operative fixation of these displaced intra-articular fracture she seemed understand and wished to proceed. I talked to the patient and/or their representative and explained the exact nature the procedure. I also went through a detailed list of the material risks associated with  the procedure which included risk of bleeding, infection, injury to nearby structures, worsening the situation, as well as the risks associate with anesthesia and finally death. Also talked with him regarding the benefits and alternatives to the procedure. Preoperative Diagnosis: Right Femur Fracture   Right Tibial Fracture     Postoperative Diagnosis: #1closed displaced right medial femoral condyle fracture, #2 closed displaced depression type lateral tibial plateau fracture     Surgeon(s) and Role:     * Zohreh Viveros MD - Primary    Anesthesia: General     Procedure: Procedure(s):  #1open reduction internal fixation of right medial femoral condyle fracture with screw fixation #2 open reduction internal fixation of unicondylar tibial plateau fracture    Procedure in Detail: After the successful induction of general anesthetic as well as a block for postoperative analgesia the right lower extremity was prepped and draped in usual sterile fashion. I then made a midline incision over the right knee which would be the same approach for a total knee arthroplasty.   I then proceeded with a formal medial parapatellar arthrotomy and this allow me access to the knee joint itself especially the medial femoral condyle fracture. The fracture was identified then reduced and held reduced with reduction forceps I then placed 2 guidewires for the [employer] 7.3 cannulated screws eventually the screws themselves from medial to lateral.  Once the screws were in place I then made a cursory attempt to place a small antiglide plate along the distal femur. However she was so grossly osteopenic that this really did not seem to capture in her distal femur multiple attempts to try with locking and cortical screws really were met with just no reasonable fixation so I quickly abandon this I then turned my attention to the lateral tibial plateau. He did appear to be essentially a depression type of the posterior lateral aspect of the tibial plateau. I made a small cortical window in the metaphyseal bone medially with a curette and then placed a curved bone tamp through this window and then with help of fluoroscopic guidance made several passes elevating the depression portion of the tibial plateau fracture. Once this was done I then placed my locking plate along the lateral aspect of the proximal tibia and placed a single locking screw in the shaft portion of the plate and then a single locking screw in the most proximal portion of the plate and once this was in appropriate position I then added additional locking screws proximally utilizing these 2 support the elevated lateral aspect of the tibial plateau then added some locking screws in the shaft portion of the plate as well.   Once I felt I had adequate fixation I  the final reduction as well as a place my hardware for both the distal femur as well as the proximal tibia was very pleased with this I irrigated with a copious amount of normal saline I then reapproximated some of the soft tissue over the plate on the proximal tibia using 0 Vicryl suture the knee capsule was closed using #2 strata fix suture the subcutaneous tissue was closed with two-point oh strata fix suture and the skin was closed with staples a smaller medial wound to place the cannulated screws was closed with staples only dressings were applied as well as a knee immobilizer. The patient was awakened and taken to cover him in stable condition      Estimated Blood Loss: 150 cc    Tourniquet Time: * Missing tourniquet times found for documented tourniquets in lo *      Implants: * No implants in log *            Specimens: * No specimens in log *        Drains: None                Complications: None    Counts: Sponge and needle counts were correct times two.     Signed By:  Melissa Dennis MD     2020

## 2020-09-25 NOTE — PROGRESS NOTES
Infectious Disease Progress Note    Today's Date: 9/25/2020   Admit Date: 9/23/2020    Impression:   · CoNS bacteremia: blood culture (9/23) with CoNS (ox-R)- likely contaminant  · Right Distal femur and lateral tibia fracture: s/p fall  · Hx of R RAMONA  · Hx of ankle fracture with HW    Plan:   · High suspicion for contamination of blood culture given lack of symptoms and presentation. Continue Vanc through tomorrow and repeat BCx- if negative likely stop antibiotics completely. No indication of infection seen in OR. · ID will continue to follow. Anti-infectives:   · Vanc (9/23-  · CTX (9/23-9/24)    Subjective: Interval History:  Patient doing well post operatively. No acute events overnight. Patient Active Problem List   Diagnosis Code    Essential hypertension I10    Cerebral infarction ([facility] I63.9    FSGS (focal segmental glomerulosclerosis) N05.1    Gout M10.9    Weakness of right leg R29.898    CKD (chronic kidney disease) N18.9    Osteoarthritis M19.90    S/P total hip arthroplasty Z96.649    Bipolar disorder ([facility] F31.9    Gait instability R26.81    Hypokalemia E87.6    OSMIN (acute kidney injury) ([facility] N17.9    Acute metabolic encephalopathy K55.90    Alcohol abuse F10.10    Falls W19. Bryan Needle    Vascular dementia ([facility] F01.50    Hypomagnesemia E83.42    Hypophosphatemia E83.39    Depression F32.9    Cholecystitis K81.9    Cholelithiasis K80.20    Bacteremia due to Gram-negative bacteria R78.81    Alcohol withdrawal ([facility]) F10.239    Alcoholism ([facility]) F10.20    Hypertension I10    C. difficile colitis A04.72    Femur fracture, right ([facility]) S72.91XA    Dementia ([facility]) F03.90    Leukocytosis D72.829    Other fracture of right femur, initial encounter for closed fracture ([facility]) S72.8X1A    SIRS (systemic inflammatory response syndrome) ([facility]) R65.10    Acute kidney injury superimposed on chronic kidney disease (HCC) N17.9, N18.9     Past Medical History:   Diagnosis Date    Aneurysm of common carotid artery ([facility] 2004    left side s/p coil     CKD (chronic kidney disease) 2014    Dementia ([facility]     FSGS (focal segmental glomerulosclerosis)     in remission at present    Gout     Hypertension     managed with medication     Osteoarthritis 2014    Stroke ([facility] ,     slight weakness on right side, slight effect to speech      Family History   Problem Relation Age of Onset    Cancer Father 80        unknown    Stroke Sister 48      Social History     Tobacco Use    Smoking status: Former Smoker     Packs/day: 0.25     Last attempt to quit: 1979     Years since quittin.7    Smokeless tobacco: Never Used   Substance Use Topics    Alcohol use: Yes     Alcohol/week: 2.5 standard drinks     Types: 3 Shots of liquor per week     Comment: pint of fireball     Past Surgical History:   Procedure Laterality Date    HX ANKLE FRACTURE TX Left 2013    has hardware in   [address]  2018    HX ERCP  2018    cbd stone    HX INTRACRANIAL ANEURYSM REPAIR  2004    left carotid     HX ORTHOPAEDIC Right 10/2014    hip replacement    HX REFRACTIVE SURGERY      bilateral - Lasik      Prior to Admission medications    Medication Sig Start Date End Date Taking? Authorizing Provider   escitalopram oxalate (LEXAPRO) 10 mg tablet Take 10 mg by mouth daily. Yes Provider, Historical   acetaminophen (TYLENOL) 325 mg tablet Take 2 Tabs by mouth every six (6) hours as needed. 18  Yes Nava Montanez MD   oxyCODONE-acetaminophen (PERCOCET) 5-325 mg per tablet Take 1 Tab by mouth every eight (8) hours as needed. Max Daily Amount: 3 Tabs. 18   Jefferson Fisher MD   magnesium oxide (MAG-OX) 400 mg tablet Take 1 Tab by mouth daily. 18   Jefferson Fisher MD   ondansetron (ZOFRAN ODT) 8 mg disintegrating tablet Take 1 Tab by mouth every six (6) hours as needed.  PRN for nausea 18   Liv Alexandra MD   ALPMARIEZolam Marisa Born) 0.5 mg tablet Take 0.5 Tabs by mouth two (2) times daily as needed for Anxiety or Sleep. Max Daily Amount: 0.5 mg. 18   Marina Hanson MD   QUEtiapine (SEROQUEL) 50 mg tablet Take 50 mg by mouth nightly. Provider, Historical   folic acid (FOLVITE) 1 mg tablet Take 1 Tab by mouth daily. 18   Albert Bolden MD   pantoprazole (PROTONIX) 40 mg tablet Take 1 Tab by mouth Daily (before breakfast). 18   Albert Bolden MD   thiamine (B-1) 100 mg tablet Take 1 Tab by mouth daily. 18   Albert Bolden MD       Allergies   Allergen Reactions    Codeine Nausea and Vomiting    Lortab [Hydrocodone-Acetaminophen] Nausea and Vomiting        Review of Systems:  A comprehensive review of systems was negative except for that written in the History of Present Illness. Objective:     Visit Vitals  /75 (BP 1 Location: Right arm, BP Patient Position: At rest)   Pulse 100   Temp 98.2 °F (36.8 °C)   Resp 16   Ht 5' 5\" (1.651 m)   Wt 88.5 kg (195 lb)   SpO2 100%   BMI 32.45 kg/m²     Temp (24hrs), Av.1 °F (36.7 °C), Min:97.2 °F (36.2 °C), Max:98.6 °F (37 °C)     Patient was seen and examined on 20 and physical exam remains unchanged since yesterday, unless noted below:      Lines:  Peripheral IV:   L IJ     Physical Exam:    General:  Alert, cooperative, well noursished, well developed, appears stated age; obese   Eyes:  Sclera anicteric. Pupils equally round and reactive to light. Mouth/Throat: Mucous membranes normal, oral pharynx clear   Neck: Supple   Lungs:   Clear to auscultation bilaterally, good effort   CV:  Regular rate and rhythm,no murmur, click, rub or gallop   Abdomen:   Soft, non-tender.  bowel sounds normal. non-distended   Extremities: RLE with post operative dressing   Skin: Skin color, texture, turgor normal. no acute rash or lesions   Lymph nodes: Cervical and supraclavicular normal   Musculoskeletal: No swelling or deformity   Lines/Devices:  Intact, no erythema, drainage or tenderness   Psych: Alert and oriented, normal mood affect given the setting       Data Review:     CBC:  Recent Labs     20  1004 20  0507 20  0339   WBC 10.3 11.8* 21.6*   GRANS 81* 74  --    MONOS 5 10  --    EOS 2 0*  --    ANEU 8.4* 8.6*  --    ABL 1.1 1.9  --    HGB 8.8* 11.2* 11.8   HCT 27.4* 36.6 36.3   * 128* 193       BMP:  Recent Labs     20  0451 20  0507 20  0214   CREA 2.88* 2.89* 3.57*   BUN 26* 31* 33*    144 144   K 4.5 4.1 4.6   * 115* 112*   CO2 18* 20* 20*   AGAP 10 9 12   GLU 90 102* 191*       LFTS:  No results for input(s): TBILI, ALT, AP, TP, ALB in the last 72 hours. No lab exists for component: SGOT    Microbiology:     All Micro Results     Procedure Component Value Units Date/Time    MSSA/MRSA SC BY PCR, NASAL SWAB [[id number]]     Order Status:  Sent Specimen:  Nasal swab           Imagin/23 CT R Knee  IMPRESSION:     1. Distal femur medial condyle fracture, extending into the intercondylar notch.     2. Depressed fracture of the lateral tibial plateau.     3. Osteopenia, diminishing overall sensitivity.  CT pelv  IMPRESSION  IMPRESSION:     1. Right total hip arthroplasty. No evidence of periprosthetic fracture or hip  dislocation.     2. No acute left hip fracture. If there is suspicion for occult left hip  fracture, MRI is a more sensitive study.     Signed By: GARRY Thakkar     2020

## 2020-09-25 NOTE — INTERVAL H&P NOTE
Update History & Physical 
 
The Patient's History and Physical of 24 2020 was reviewed with the patient and I examined the patient. There was no change. The surgical site was confirmed by the patient and me. Plan:  The risk, benefits, expected outcome, and alternative to the recommended procedure have been discussed with the patient. Patient understands and wants to proceed with treatment of right distal femur fracture and right tibial plateau fracture with internal fixation.  
 
Electronically signed by Christie Kate MD on 9/25/2020 at 7:01 AM

## 2020-09-25 NOTE — ROUTINE PROCESS
TRANSFER - IN REPORT: 
 
[facility]  being received from 7th floor for ordered procedure Report consisted of patients Situation, Background, Assessment and  
Recommendations(SBAR). Information from the following report(s) SBAR was reviewed with the receiving nurse. Opportunity for questions and clarification was provided. Assessment completed upon patients arrival to unit and care assumed.

## 2020-09-25 NOTE — PROGRESS NOTES
TRANSFER - IN REPORT:    Verbal report received from Faith Lesches RN(name) on Zofia Oneill  being received from PAR(unit) for routine post - op      Report consisted of patients Situation, Background, Assessment and   Recommendations(SBAR). Information from the following report(s) SBAR was reviewed with the receiving nurse. Opportunity for questions and clarification was provided. Assessment completed upon patients arrival to unit and care assumed.

## 2020-09-25 NOTE — ANESTHESIA PROCEDURE NOTES
Peripheral Block    Start time: 9/25/2020 7:06 AM  End time: 9/25/2020 7:12 AM  Performed by: Maria Isabel Pavon MD  Authorized by: Maria Isabel Pavon MD       Pre-procedure:    Indications: at surgeon's request and post-op pain management    Preanesthetic Checklist: patient identified, risks and benefits discussed, site marked, timeout performed, anesthesia consent given and patient being monitored    Timeout Time: 07:06          Block Type:   Block Type:  Femoral single shot  Laterality:  Right  Monitoring:  Continuous pulse ox, frequent vital sign checks, heart rate, oxygen and responsive to questions  Injection Technique:  Single shot  Procedures: ultrasound guided    Patient Position: supine  Prep: chlorhexidine    Location:  Mid thigh  Needle Gauge:  20 G  Needle Localization:  Ultrasound guidance  Medication Injected:  Bupivacaine (PF) (MARCAINE) 0.5% injection, 15 mL  Med Admin Time: 9/25/2020 7:12 AM    Assessment:  Number of attempts:  1  Injection Assessment:  Incremental injection every 5 mL, local visualized surrounding nerve on ultrasound, negative aspiration for blood, no intravascular symptoms, no paresthesia and ultrasound image on chart  Patient tolerance:  Patient tolerated the procedure well with no immediate complications

## 2020-09-25 NOTE — PROGRESS NOTES
Pharmacokinetic Consult to Pharmacist    [place] Hind is a 61 y.o. female being treated for bloodstream with vancomycin. Height: 5' 5\" (165.1 cm)  Weight: 88.5 kg (195 lb)  Lab Results   Component Value Date/Time    BUN 26 (H) 09/25/2020 04:51 AM    Creatinine 2.88 (H) 09/25/2020 04:51 AM    WBC 10.3 09/25/2020 10:04 AM    Procalcitonin 4.6 02/25/2018 05:17 AM    Lactic acid 0.7 09/23/2020 11:13 AM    Lactic Acid (POC) 2.7 (H) 02/25/2018 07:43 AM      Estimated Creatinine Clearance: 23.1 mL/min (A) (based on SCr of 2.88 mg/dL (H)). Lab Results   Component Value Date/Time    Vancomycin, random 17.3 09/25/2020 02:16 PM     CULTURES:  BC- pos gm cocci  MSSA/MRSA PCR- pending    Day 3 of vancomycin. Goal trough is 15-20mcg/ml. Patient had a vancomycin random concentration of 17.3 (~ 38 hours after 1 dose of vancomycin 2000 mg IV). T1/2 = 57 hrs and Ke is 0.0121. Will redose with vancomycin 750 mg IV x1. Consider obtaining a vancomycin level in ~36-48 hrs. Will continue to follow patient and order levels when clinically indicated.     Thank you for this consult,  Luz Elena Park, PharmD    [facility]  [email or web address]

## 2020-09-26 LAB
ANION GAP SERPL CALC-SCNC: 5 MMOL/L (ref 7–16)
BASOPHILS # BLD: 0.1 K/UL (ref 0–0.2)
BASOPHILS NFR BLD: 1 % (ref 0–2)
BUN SERPL-MCNC: 29 MG/DL (ref 6–23)
CALCIUM SERPL-MCNC: 8.2 MG/DL (ref 8.3–10.4)
CHLORIDE SERPL-SCNC: 114 MMOL/L (ref 98–107)
CO2 SERPL-SCNC: 24 MMOL/L (ref 21–32)
CREAT SERPL-MCNC: 3.16 MG/DL (ref 0.6–1)
DIFFERENTIAL METHOD BLD: ABNORMAL
EOSINOPHIL # BLD: 0 K/UL (ref 0–0.8)
EOSINOPHIL NFR BLD: 0 % (ref 0.5–7.8)
ERYTHROCYTE [DISTWIDTH] IN BLOOD BY AUTOMATED COUNT: 12.5 % (ref 11.9–14.6)
GLUCOSE SERPL-MCNC: 126 MG/DL (ref 65–100)
HCT VFR BLD AUTO: 23.9 % (ref 35.8–46.3)
HGB BLD-MCNC: 7.8 G/DL (ref 11.7–15.4)
IMM GRANULOCYTES # BLD AUTO: 0.1 K/UL (ref 0–0.5)
IMM GRANULOCYTES NFR BLD AUTO: 1 % (ref 0–5)
LYMPHOCYTES # BLD: 1 K/UL (ref 0.5–4.6)
LYMPHOCYTES NFR BLD: 9 % (ref 13–44)
MCH RBC QN AUTO: 29.2 PG (ref 26.1–32.9)
MCHC RBC AUTO-ENTMCNC: 32.6 G/DL (ref 31.4–35)
MCV RBC AUTO: 89.5 FL (ref 79.6–97.8)
MONOCYTES # BLD: 1.2 K/UL (ref 0.1–1.3)
MONOCYTES NFR BLD: 11 % (ref 4–12)
NEUTS SEG # BLD: 8.3 K/UL (ref 1.7–8.2)
NEUTS SEG NFR BLD: 78 % (ref 43–78)
NRBC # BLD: 0 K/UL (ref 0–0.2)
PLATELET # BLD AUTO: 151 K/UL (ref 150–450)
PMV BLD AUTO: 11.1 FL (ref 9.4–12.3)
POTASSIUM SERPL-SCNC: 4.2 MMOL/L (ref 3.5–5.1)
RBC # BLD AUTO: 2.67 M/UL (ref 4.05–5.2)
SODIUM SERPL-SCNC: 143 MMOL/L (ref 136–145)
WBC # BLD AUTO: 10.6 K/UL (ref 4.3–11.1)

## 2020-09-26 PROCEDURE — 65270000029 HC RM PRIVATE

## 2020-09-26 PROCEDURE — 74011250636 HC RX REV CODE- 250/636: Performed by: ORTHOPAEDIC SURGERY

## 2020-09-26 PROCEDURE — 74011000258 HC RX REV CODE- 258: Performed by: ORTHOPAEDIC SURGERY

## 2020-09-26 PROCEDURE — 97166 OT EVAL MOD COMPLEX 45 MIN: CPT

## 2020-09-26 PROCEDURE — 85025 COMPLETE CBC W/AUTO DIFF WBC: CPT

## 2020-09-26 PROCEDURE — 97110 THERAPEUTIC EXERCISES: CPT

## 2020-09-26 PROCEDURE — 80048 BASIC METABOLIC PNL TOTAL CA: CPT

## 2020-09-26 PROCEDURE — 97162 PT EVAL MOD COMPLEX 30 MIN: CPT

## 2020-09-26 PROCEDURE — 87040 BLOOD CULTURE FOR BACTERIA: CPT

## 2020-09-26 PROCEDURE — 97530 THERAPEUTIC ACTIVITIES: CPT

## 2020-09-26 PROCEDURE — 36415 COLL VENOUS BLD VENIPUNCTURE: CPT

## 2020-09-26 PROCEDURE — 74011250637 HC RX REV CODE- 250/637: Performed by: ORTHOPAEDIC SURGERY

## 2020-09-26 PROCEDURE — 97535 SELF CARE MNGMENT TRAINING: CPT

## 2020-09-26 RX ADMIN — OXYCODONE 10 MG: 5 TABLET ORAL at 12:26

## 2020-09-26 RX ADMIN — ACETAMINOPHEN 650 MG: 325 TABLET, FILM COATED ORAL at 08:10

## 2020-09-26 RX ADMIN — OXYCODONE 10 MG: 5 TABLET ORAL at 09:24

## 2020-09-26 RX ADMIN — Medication 10 ML: at 04:54

## 2020-09-26 RX ADMIN — Medication 10 ML: at 16:32

## 2020-09-26 RX ADMIN — Medication 10 ML: at 23:09

## 2020-09-26 RX ADMIN — OXYCODONE 10 MG: 5 TABLET ORAL at 23:08

## 2020-09-26 RX ADMIN — Medication 1 TABLET: at 08:11

## 2020-09-26 RX ADMIN — ACETAMINOPHEN 650 MG: 325 TABLET, FILM COATED ORAL at 16:33

## 2020-09-26 RX ADMIN — Medication 1 TABLET: at 12:26

## 2020-09-26 RX ADMIN — ACETAMINOPHEN 650 MG: 325 TABLET, FILM COATED ORAL at 23:09

## 2020-09-26 RX ADMIN — ASPIRIN 325 MG ORAL TABLET 325 MG: 325 PILL ORAL at 08:11

## 2020-09-26 RX ADMIN — THIAMINE HYDROCHLORIDE 500 MG: 100 INJECTION, SOLUTION INTRAMUSCULAR; INTRAVENOUS at 16:32

## 2020-09-26 RX ADMIN — Medication 1 TABLET: at 16:33

## 2020-09-26 RX ADMIN — PANTOPRAZOLE SODIUM 40 MG: 40 TABLET, DELAYED RELEASE ORAL at 04:54

## 2020-09-26 RX ADMIN — SODIUM CHLORIDE, SODIUM LACTATE, POTASSIUM CHLORIDE, AND CALCIUM CHLORIDE 125 ML/HR: 600; 310; 30; 20 INJECTION, SOLUTION INTRAVENOUS at 07:49

## 2020-09-26 RX ADMIN — THIAMINE HYDROCHLORIDE 500 MG: 100 INJECTION, SOLUTION INTRAMUSCULAR; INTRAVENOUS at 08:12

## 2020-09-26 RX ADMIN — ESCITALOPRAM OXALATE 10 MG: 10 TABLET ORAL at 08:11

## 2020-09-26 NOTE — PROGRESS NOTES
Hospitalist Progress Note    2020  Admit Date: 2020  6:33 PM   NAME: Marta Paez   :  1960   MRN:  [id number]   Attending: Arlyn Aiken DO  PCP:  Meghna Mcnair MD    SUBJECTIVE:   Patient is a 61year old CF with a PMHx of HTN, CKD s/s FSGS, dementia, CVA and EtOH presented to the ED after a fall when her  fell onto her. In ED, XR of R hip shows fracture of distal femur and possible fracture of proximal tibia. CT RLE confirmed distal femur fracture and a depressed fracture of the lateral tibial plateau. CT pelvis showed an intact right total hip arthroplasty w/o evidence of fracture in either hip. Head CT negative. Ortho service was consulted. Pt w/o any complaints this AM. Pain is controlled with pain med. No events overnight. Denies fever, chills, SOB, chest pain, abdominal pain, N/V, dysuria, hematuria. Review of Systems negative with exception of pertinent positives noted above  PHYSICAL EXAM     Visit Vitals  /71 (BP 1 Location: Right arm, BP Patient Position: At rest)   Pulse 90   Temp 98.8 °F (37.1 °C)   Resp 15   Ht 5' 5\" (1.651 m)   Wt 88.5 kg (195 lb)   SpO2 92%   BMI 32.45 kg/m²      Temp (24hrs), Av.5 °F (36.9 °C), Min:97.2 °F (36.2 °C), Max:99.4 °F (37.4 °C)    Oxygen Therapy  O2 Sat (%): 92 % (20)  Pulse via Oximetry: 90 beats per minute (20)  O2 Device: Nasal cannula (20)  O2 Flow Rate (L/min): 2 l/min (20)    Intake/Output Summary (Last 24 hours) at 2020 0717  Last data filed at 2020 0543  Gross per 24 hour   Intake 1360 ml   Output 1770 ml   Net -410 ml      General: No acute distress. Lungs:  CTA Bilaterally. Heart:  Regular rate and rhythm,  No murmur, rub, or gallop  Abdomen: Soft, Non distended, Non tender, Positive bowel sounds  Extremities: No cyanosis, clubbing or edema. RLE TTP. No peripheral edema. Pulses intact. IV line in L neck.  R LE wrapped with drain in place, clean dry and intact. Neurologic:  No focal deficits    XR KNEE RT MAX 2 VWS   Final Result   Impression:      Postoperative changes with extensive hardware placement. XR TIB/FIB RT   Final Result   Impression: Intraoperative spot films         NC XR TECHNOLOGIST SERVICE    (Results Pending)         [address] Problems    Diagnosis Date Noted    SIRS (systemic inflammatory response syndrome) ([facility] 09/23/2020    [facility] superimposed [facility] disease [facility] 09/23/2020    Other fracture of right femur, initial encounter for closed fracture [facility] 09/23/2020    Femur fracture, right ([facility] 09/23/2020    Leukocytosis     Hypertension 05/18/2018    Alcohol abuse 02/13/2018    Bipolar disorder ([facility] 10/22/2014    CKD (chronic kidney disease) 09/18/2014    FSGS (focal segmental glomerulosclerosis) 09/11/2012     Plan:    Right femur fracture  XR of R hip shows fracture of distal femur and possible fracture of proximal tibia. CT RLE confirmed distal femur fracture and a depressed fracture of the lateral tibial plateau. Ortho consulted: s/p ORIF of R hip and ORIF of R tibial on 9/25  Pain control: Dilaudid and Gris prn  -F/u ortho recs--Cont PT/OT and drain    Acute blood loss anemia post-op  -Hgb 11.2>8.8>7.8  -Continue to monitor. Transfuse if hgb <7 or symptomatic  -AM CBC    AoCKD, resolved  -CKD 2/2 Focal segmental glomerulosclerosis (FSGS)  -Bl Cr <3. Was 3.57 on admission. Improved on mIVF. -back to baseline. Pt tolerating po so will d/c fluid. Leukocytosis, resolved  -WBC 21.6 with LA 2.3 on admission. Meet SIRS criteria but no sepsis, negative qSOFA. No obvious signs of infection and pt well appearing. -CXR and UA unremarkable. -Blood Cx Positive x2: MRSA  -Started on Vanc (9/24-. ..). Received Ancef for surgery ppx. D/c ceftriaxone started in ED.  -WBC 21.6>11.8>10.3.  WBC and LA normalized after IVF  -? BCx Contaminant, however with pt undergoing orthopedic surgery with hardware placement, ID consulted for further guidance. OK to continue Vanc and Ancef alex-operatively. Repeat BCx on Sat and d/c abx if negative. Chronic medical problems:  Alcohol Use: EtOH negative on admission. Monitor for signs of withdrawals. Started on thiamine  Anxiety/depression: Cont home Lexapro.  Hold home Seroquel and Xanax  GERD: Cont home PPI    DVT Prophylaxis: SCD's  Dispo: most likely rehab    Signed By: Josh Dunbar,      September 26, 2020

## 2020-09-26 NOTE — PROGRESS NOTES
BETTY POST OP PROGRESS NOTE    2020  Admit Date: 2020  Admit Diagnosis: Other fracture of right femur, initial encounter for closed fracture ([facility] [S72.8X1A]  Procedure: Procedure(s):  Right FEMUR OPEN REDUCTION INTERNAL FIXATION  Right TIBIAL PLATEAU OPEN REDUCTION INTERNAL FIXATION  Post Op day: 1 Day Post-Op      Subjective:     Katie Burroughs is a patient who has no complaints. +flatus. No complaints of pain or nausea. Objective:     Vital Signs:    Blood pressure (!) 166/85, pulse (!) 108, temperature 98.5 °F (36.9 °C), resp. rate 16, height 5' 5\" (1.651 m), weight 88.5 kg (195 lb), SpO2 94 %. Temp (24hrs), Av.7 °F (37.1 °C), Min:98.2 °F (36.8 °C), Max:99.4 °F (37.4 °C)      No intake/output data recorded.  1901 -  0700  In: 1360 [P.O.:360; I.V.:1000]  Out: 2470 [Urine:2250; Drains:75]    LAB:    Recent Labs     20  0523   HGB 7.8*   WBC 10.6          Physical Exam    General:   Alert and oriented. No acute distress  Lungs:  Respirations unlabored. Extremities: No evidence of cyanosis. Calves soft, nontender. Moves both upper and lower extremities. Dressing:  clean, dry, and intact  Neuro:  no deficit      Assessment:      Patient Active Problem List   Diagnosis Code    Essential hypertension I10    Cerebral infarction ([facility] I63.9    FSGS (focal segmental glomerulosclerosis) N05.1    Gout M10.9    Weakness of right leg R29.898    CKD (chronic kidney disease) N18.9    Osteoarthritis M19.90    S/P total hip arthroplasty Z96.649    Bipolar disorder ([facility] F31.9    Gait instability R26.81    Hypokalemia E87.6    OSMIN (acute kidney injury) ([facility] N17.9    Acute metabolic encephalopathy Z80.31    Alcohol abuse F10.10    Falls W19. Veena Agent    Vascular dementia ([facility] F01.50    Hypomagnesemia E83.42    Hypophosphatemia E83.39    Depression F32.9    Cholecystitis K81.9    Cholelithiasis K80.20    Bacteremia due to Gram-negative bacteria R78.81  Alcohol withdrawal ([facility]) F10.239    Alcoholism ([facility] F10.20    Hypertension I10    C. difficile colitis A04.72    Femur fracture, right ([facility] S72.91XA    Dementia ([facility]) F03.90    Leukocytosis D72.829    Other fracture of right femur, initial encounter for closed fracture ([facility] S72.8X1A    SIRS (systemic inflammatory response syndrome) ([facility]) R65.10    Acute kidney injury superimposed on chronic kidney disease ([facility]) N17.9, N18.9       Plan:     Continue PT/OT  Continue drain today     Anticipate Discharge To: SNF next week       Signed By: Elvi Singh PA-C

## 2020-09-26 NOTE — PROGRESS NOTES
Problem: Self Care Deficits Care Plan (Adult)  Goal: *Acute Goals and Plan of Care (Insert Text)  Outcome: Progressing Towards Goal  Note: 1. Pt will toilet with mod A   2. Pt will complete functional transfers for ADLs with mod A  3. Pt will maintain standing balance for ADLs with min A  5. Pt will demonstrate independence with HEP to promote increased BUE strength and functional use for ADLs  6. Pt will tolerate 23 minutes functional activity with ,om or fewer rest breaks to promote increased endurance for ADLs  7. Pt will complete bed mobility with CGA in prep for ADLs    Timeframe: 7 days       OCCUPATIONAL THERAPY: Initial Assessment and Daily Note 9/26/2020  INPATIENT: OT Visit Days: 1  Payor: [address] [place] [address] / Plan: Ortiz Martinez MEDICARE COMPLETE / Product Type: [employer] Medicare /      NAME/AGE/GENDER: Jeff Trinidad is a 61 y.o. female   PRIMARY DIAGNOSIS:  Other fracture of right femur, initial encounter for closed fracture ([facility] [S72.8X1A] Other fracture of right femur, initial encounter for closed fracture ([address] Other fracture of right femur, initial encounter for closed fracture ([address]  Procedure(s) (LRB):  Right FEMUR OPEN REDUCTION INTERNAL FIXATION (Right)  Right TIBIAL PLATEAU OPEN REDUCTION INTERNAL FIXATION (Right)  1 Day Post-Op  ICD-10: Treatment Diagnosis:    History of falling (Z91.81)   Precautions/Allergies:    RLE NWB Codeine and Lortab [hydrocodone-acetaminophen]      ASSESSMENT:     Ms. Cassie Gonsalves presents with R distal femur and lateral tibia fractures d/t falling at home, s/p ORIF. RLTHUAN NWB with KI. Pt lives with her  and reports that she requires assistance for bathing, dressing, and tub transfers and is otherwise independent with mobility and with toileting. This session, pt presented with deficits in mobility, balance, strength, and endurance impacting ADLs.  Pt required mod A for bed mobility, max X2 to stand with max X1-2 to maintain standing balance while maintaining NWB status. Pt verbalized toileting need, however not willing to attempt transfer to [facility]. Pt anxious with mobility, fearful of falling and required extra time and encouragement for all. Total A to don socks, currently requires total A for toileting. Pt returned to bed with mod-max X2. Pt is below her functional baseline and would benefit from skilled OT services to address deficits. Recommend d/c to SNF. This section established at most recent assessment   PROBLEM LIST (Impairments causing functional limitations):  Decreased Strength  Decreased ADL/Functional Activities  Decreased Transfer Abilities  Decreased Balance  Increased Pain  Decreased Activity Tolerance  Decreased Cognition   INTERVENTIONS PLANNED: (Benefits and precautions of occupational therapy have been discussed with the patient.)  Activities of daily living training  Adaptive equipment training  Balance training  Therapeutic activity  Therapeutic exercise     TREATMENT PLAN: Frequency/Duration: Follow patient 6 times/ week to address above goals. Rehabilitation Potential For Stated Goals: [facility] (at time of discharge pending progress):    Placement: It is my opinion, based on this patient's performance to date, that Ms. Kj Azevedo may benefit from d/c to SNF. Equipment:   None at this time              OCCUPATIONAL PROFILE AND HISTORY:   History of Present Injury/Illness (Reason for Referral):  See H&P  Past Medical History/Comorbidities:   Ms. Kj Azevedo  has a past medical history of Aneurysm of common carotid artery ([facility] (2004), CKD (chronic kidney disease) (9/18/2014), Dementia ([address] FSGS (focal segmental glomerulosclerosis), Gout, Hypertension, Osteoarthritis (9/18/2014), and Stroke ([address] (2004, 2013).   Ms. Kj Azevedo  has a past surgical history that includes hx intracranial aneurysm repair (2004); hx refractive surgery; hx orthopaedic (Right, 10/2014); hx ankle fracture tx (Left, 2013); hx ercp (02/27/2018); and hx cholecystectomy (02/28/2018). Social History/Living Environment:   Home Environment: Private residence  One/Two Story Residence: One story  Living Alone: No  Support Systems: Spouse/Significant Other/Partner  Patient Expects to be Discharged to[de-identified] Rehabilitation facility  Current DME Used/Available at Home: Commode, bedside, Grab bars, Shower chair  Tub or Shower Type: Tub/Shower combination  Prior Level of Function/Work/Activity:  See assessment     Number of Personal Factors/Comorbidities that affect the Plan of Care: Expanded review of therapy/medical records (1-2):  MODERATE COMPLEXITY   ASSESSMENT OF OCCUPATIONAL PERFORMANCE[de-identified]   Activities of Daily Living:   Basic ADLs (From Assessment) Complex ADLs (From Assessment)   Feeding: Setup  Oral Facial Hygiene/Grooming: Contact guard assistance  Bathing: Maximum assistance  Upper Body Dressing: Moderate assistance  Lower Body Dressing: Total assistance  Toileting: Total assistance Instrumental ADL  Meal Preparation: Total assistance  Homemaking: Total assistance   Grooming/Bathing/Dressing Activities of Daily Living                           Lower Body Dressing Assistance  Socks: Total assistance (dependent) Bed/Mat Mobility  Supine to Sit: Moderate assistance  Sit to Supine: Moderate assistance;Maximum assistance;Assist x2  Sit to Stand: Maximum assistance;Assist x2  Stand to Sit: Maximum assistance;Assist x2  Scooting: Maximum assistance     Most Recent Physical Functioning:   Gross Assessment:  AROM: Generally decreased, functional  Strength: Generally decreased, functional  Coordination: Generally decreased, functional               Posture:     Balance:    Bed Mobility:  Supine to Sit: Moderate assistance  Sit to Supine:  Moderate assistance;Maximum assistance;Assist x2  Scooting: Maximum assistance  Wheelchair Mobility:     Transfers:  Sit to Stand: Maximum assistance;Assist x2  Stand to Sit: Maximum assistance;Assist x2            Patient Vitals for the past 6 hrs:   BP BP Patient Position SpO2 Pulse   20 0752 (!) 166/85 At rest 94 % (!) 108   20 1151 (!) 165/99 At rest 94 % (!) 119       Mental Status  Neurologic State: Alert  Orientation Level: Oriented X4  Cognition: Follows commands                          Physical Skills Involved:  Balance  Strength  Activity Tolerance  Pain (acute) Cognitive Skills Affected (resulting in the inability to perform in a timely and safe manner):  Executive Function Psychosocial Skills Affected:  Environmental Adaptation   Number of elements that affect the Plan of Care: 5+:  HIGH COMPLEXITY   CLINICAL DECISION MAKIN[address] [place] [address] AM-PAC 6 Clicks   Daily Activity Inpatient Short Form  How much help from another person does the patient currently need. .. Total A Lot A Little None   1. Putting on and taking off regular lower body clothing? [x] 1   [] 2   [] 3   [] 4   2. Bathing (including washing, rinsing, drying)? [] 1   [x] 2   [] 3   [] 4   3. Toileting, which includes using toilet, bedpan or urinal?   [x] 1   [] 2   [] 3   [] 4   4. Putting on and taking off regular upper body clothing? [] 1   [x] 2   [] 3   [] 4   5. Taking care of personal grooming such as brushing teeth? [] 1   [] 2   [x] 3   [] 4   6. Eating meals? [] 1   [] 2   [x] 3   [] 4   © , Trustees of [address] [place] [address], under license to [employer]. All rights reserved      Score:  Initial: 12 Most Recent: X (Date: -- )    Interpretation of Tool:  Represents activities that are increasingly more difficult (i.e. Bed mobility, Transfers, Gait). Medical Necessity:     Patient is expected to demonstrate progress in   strength, balance, and functional technique   to   decrease assistance required with ADLs  . Reason for Services/Other Comments:  Patient   continues to require present interventions due to patient's inability to complete ADLs  .    Use of outcome tool(s) and clinical judgement create a POC that gives a: MODERATE COMPLEXITY         TREATMENT:   (In addition to Assessment/Re-Assessment sessions the following treatments were rendered)     Pre-treatment Symptoms/Complaints:    Pain: Initial:   Pain Intensity 1: 7  Pain Location 1: Leg  Pain Orientation 1: Right  Pain Intervention(s) 1: Nurse notified  Post Session:  7     Self Care: (8 min): Procedure(s) (per grid) utilized to improve and/or restore self-care/home management as related to dressing and grooming. Required moderate   cueing to facilitate activities of daily living skills and compensatory activities. Braces/Orthotics/Lines/Etc:   O2 Device: Nasal cannula  Treatment/Session Assessment:    Response to Treatment:  no adverse reaction   Interdisciplinary Collaboration:   Occupational Therapist  Registered Nurse  After treatment position/precautions:   Supine in bed  Bed/Chair-wheels locked  Bed in low position  Call light within reach  RN notified   Compliance with Program/Exercises: Will assess as treatment progresses. Recommendations/Intent for next treatment session: \"Next visit will focus on advancements to more challenging activities and reduction in assistance provided\".   Total Treatment Duration:  OT Patient Time In/Time Out  Time In: 0920  Time Out: Colten Levy OT

## 2020-09-27 LAB
ANION GAP SERPL CALC-SCNC: 5 MMOL/L (ref 7–16)
BACTERIA SPEC CULT: ABNORMAL
BASOPHILS # BLD: 0.1 K/UL (ref 0–0.2)
BASOPHILS NFR BLD: 1 % (ref 0–2)
BUN SERPL-MCNC: 30 MG/DL (ref 6–23)
CALCIUM SERPL-MCNC: 8.7 MG/DL (ref 8.3–10.4)
CHLORIDE SERPL-SCNC: 112 MMOL/L (ref 98–107)
CO2 SERPL-SCNC: 27 MMOL/L (ref 21–32)
CREAT SERPL-MCNC: 3.38 MG/DL (ref 0.6–1)
DIFFERENTIAL METHOD BLD: ABNORMAL
EOSINOPHIL # BLD: 0.3 K/UL (ref 0–0.8)
EOSINOPHIL NFR BLD: 2 % (ref 0.5–7.8)
ERYTHROCYTE [DISTWIDTH] IN BLOOD BY AUTOMATED COUNT: 12.6 % (ref 11.9–14.6)
GLUCOSE SERPL-MCNC: 95 MG/DL (ref 65–100)
GRAM STN SPEC: ABNORMAL
HCT VFR BLD AUTO: 23.3 % (ref 35.8–46.3)
HGB BLD-MCNC: 7.5 G/DL (ref 11.7–15.4)
IMM GRANULOCYTES # BLD AUTO: 0.1 K/UL (ref 0–0.5)
IMM GRANULOCYTES NFR BLD AUTO: 1 % (ref 0–5)
LYMPHOCYTES # BLD: 1.6 K/UL (ref 0.5–4.6)
LYMPHOCYTES NFR BLD: 15 % (ref 13–44)
MCH RBC QN AUTO: 28.8 PG (ref 26.1–32.9)
MCHC RBC AUTO-ENTMCNC: 32.2 G/DL (ref 31.4–35)
MCV RBC AUTO: 89.6 FL (ref 79.6–97.8)
MM INDURATION POC: 0 MM (ref 0–5)
MONOCYTES # BLD: 0.9 K/UL (ref 0.1–1.3)
MONOCYTES NFR BLD: 8 % (ref 4–12)
NEUTS SEG # BLD: 7.7 K/UL (ref 1.7–8.2)
NEUTS SEG NFR BLD: 73 % (ref 43–78)
NRBC # BLD: 0 K/UL (ref 0–0.2)
PLATELET # BLD AUTO: 169 K/UL (ref 150–450)
PMV BLD AUTO: 10.4 FL (ref 9.4–12.3)
POTASSIUM SERPL-SCNC: 3.7 MMOL/L (ref 3.5–5.1)
PPD POC: NEGATIVE NEGATIVE
RBC # BLD AUTO: 2.6 M/UL (ref 4.05–5.2)
SERVICE CMNT-IMP: ABNORMAL
SODIUM SERPL-SCNC: 144 MMOL/L (ref 136–145)
VANCOMYCIN SERPL-MCNC: 16.7 UG/ML
WBC # BLD AUTO: 10.6 K/UL (ref 4.3–11.1)

## 2020-09-27 PROCEDURE — 2709999900 HC NON-CHARGEABLE SUPPLY

## 2020-09-27 PROCEDURE — 77010033678 HC OXYGEN DAILY

## 2020-09-27 PROCEDURE — 36415 COLL VENOUS BLD VENIPUNCTURE: CPT

## 2020-09-27 PROCEDURE — 97530 THERAPEUTIC ACTIVITIES: CPT

## 2020-09-27 PROCEDURE — 85025 COMPLETE CBC W/AUTO DIFF WBC: CPT

## 2020-09-27 PROCEDURE — 74011250636 HC RX REV CODE- 250/636: Performed by: FAMILY MEDICINE

## 2020-09-27 PROCEDURE — 77030038269 HC DRN EXT URIN PURWCK BARD -A

## 2020-09-27 PROCEDURE — 80048 BASIC METABOLIC PNL TOTAL CA: CPT

## 2020-09-27 PROCEDURE — 74011250637 HC RX REV CODE- 250/637: Performed by: ORTHOPAEDIC SURGERY

## 2020-09-27 PROCEDURE — 65270000029 HC RM PRIVATE

## 2020-09-27 PROCEDURE — 77030040393 HC DRSG OPTIFOAM GENT MDII -B

## 2020-09-27 PROCEDURE — 80202 ASSAY OF VANCOMYCIN: CPT

## 2020-09-27 RX ORDER — VANCOMYCIN/0.9 % SOD CHLORIDE 750 MG/250
750 PLASTIC BAG, INJECTION (ML) INTRAVENOUS ONCE
Status: COMPLETED | OUTPATIENT
Start: 2020-09-27 | End: 2020-09-27

## 2020-09-27 RX ORDER — SODIUM CHLORIDE, SODIUM LACTATE, POTASSIUM CHLORIDE, CALCIUM CHLORIDE 600; 310; 30; 20 MG/100ML; MG/100ML; MG/100ML; MG/100ML
100 INJECTION, SOLUTION INTRAVENOUS CONTINUOUS
Status: DISCONTINUED | OUTPATIENT
Start: 2020-09-27 | End: 2020-09-28

## 2020-09-27 RX ADMIN — VANCOMYCIN HYDROCHLORIDE 750 MG: 10 INJECTION, POWDER, LYOPHILIZED, FOR SOLUTION INTRAVENOUS at 18:17

## 2020-09-27 RX ADMIN — Medication 1 TABLET: at 12:09

## 2020-09-27 RX ADMIN — Medication 10 ML: at 05:24

## 2020-09-27 RX ADMIN — OXYCODONE 10 MG: 5 TABLET ORAL at 06:23

## 2020-09-27 RX ADMIN — ACETAMINOPHEN 650 MG: 325 TABLET, FILM COATED ORAL at 08:43

## 2020-09-27 RX ADMIN — Medication 5 ML: at 21:14

## 2020-09-27 RX ADMIN — Medication 10 ML: at 16:31

## 2020-09-27 RX ADMIN — OXYCODONE 10 MG: 5 TABLET ORAL at 20:22

## 2020-09-27 RX ADMIN — OXYCODONE 10 MG: 5 TABLET ORAL at 12:09

## 2020-09-27 RX ADMIN — Medication 10 ML: at 05:25

## 2020-09-27 RX ADMIN — ACETAMINOPHEN 650 MG: 325 TABLET, FILM COATED ORAL at 16:30

## 2020-09-27 RX ADMIN — ASPIRIN 325 MG ORAL TABLET 325 MG: 325 PILL ORAL at 08:43

## 2020-09-27 RX ADMIN — OXYCODONE 10 MG: 5 TABLET ORAL at 23:14

## 2020-09-27 RX ADMIN — ACETAMINOPHEN 650 MG: 325 TABLET, FILM COATED ORAL at 23:12

## 2020-09-27 RX ADMIN — SODIUM CHLORIDE, SODIUM LACTATE, POTASSIUM CHLORIDE, AND CALCIUM CHLORIDE 100 ML/HR: 600; 310; 30; 20 INJECTION, SOLUTION INTRAVENOUS at 16:31

## 2020-09-27 RX ADMIN — Medication 1 TABLET: at 16:30

## 2020-09-27 RX ADMIN — PANTOPRAZOLE SODIUM 40 MG: 40 TABLET, DELAYED RELEASE ORAL at 07:30

## 2020-09-27 RX ADMIN — ESCITALOPRAM OXALATE 10 MG: 10 TABLET ORAL at 08:43

## 2020-09-27 RX ADMIN — Medication 1 TABLET: at 08:43

## 2020-09-27 NOTE — PROGRESS NOTES
Problem: Self Care Deficits Care Plan (Adult)  Goal: *Acute Goals and Plan of Care (Insert Text)  Outcome: Progressing Towards Goal  Note: 1. Pt will toilet with mod A   2. Pt will complete functional transfers for ADLs with mod A  3. Pt will maintain standing balance for ADLs with min A  5. Pt will demonstrate independence with HEP to promote increased BUE strength and functional use for ADLs  6. Pt will tolerate 23 minutes functional activity with ,om or fewer rest breaks to promote increased endurance for ADLs  7. Pt will complete bed mobility with CGA in prep for ADLs    Timeframe: 7 days       OCCUPATIONAL THERAPY: Daily Note and AM 9/27/2020  INPATIENT: OT Visit Days: 2  Payor: Selvin Lee / Plan: [address] / Product Type: [employer] Care Medicare /      NAME/AGE/GENDER: Rubi Philippe is a 61 y.o. female   PRIMARY DIAGNOSIS:  Other fracture of right femur, initial encounter for closed fracture ([facility] [S72.8X1A] Other fracture of right femur, initial encounter for closed fracture ([address] Other fracture of right femur, initial encounter for closed fracture ([address]  Procedure(s) (LRB):  Right FEMUR OPEN REDUCTION INTERNAL FIXATION (Right)  Right TIBIAL PLATEAU OPEN REDUCTION INTERNAL FIXATION (Right)  2 Days Post-Op  ICD-10: Treatment Diagnosis:    · History of falling (Z91.81)   Precautions/Allergies:    RLE NWB Codeine and Lortab [hydrocodone-acetaminophen]      ASSESSMENT:     Ms. Mitchell Espino presents with R distal femur and lateral tibia fractures d/t falling at home, s/p ORIF. RLE NWB with KI. Pt lives with her  and reports that she requires assistance for bathing, dressing, and tub transfers and is otherwise independent with mobility and with toileting.    9/27/2020 Pt was supine in bed upon arrival. Pt completed bed mobility with max A x2. Pt completed functional transfer with max A X2. Pt was very anxious with all movement.  Pt would get ready to stand and then convince herself that she could not do it. Pt required lots of cues and encouragement to complete task. Minimal progress made. Continue POC. At this time, patient is appropriate for Co-treatment with occupational therapy when available due to patient's decreased overall endurance/tolerance levels, as well as need for high level skilled assistance to complete functional transfers/mobility and functional tasks. Yudelka Dey is appropriate for a multidisciplinary co-treatment of PT and OT to address goals of both disciplines. This section established at most recent assessment   PROBLEM LIST (Impairments causing functional limitations):  1. Decreased Strength  2. Decreased ADL/Functional Activities  3. Decreased Transfer Abilities  4. Decreased Balance  5. Increased Pain  6. Decreased Activity Tolerance  7. Decreased Cognition   INTERVENTIONS PLANNED: (Benefits and precautions of occupational therapy have been discussed with the patient.)  1. Activities of daily living training  2. Adaptive equipment training  3. Balance training  4. Therapeutic activity  5. Therapeutic exercise     TREATMENT PLAN: Frequency/Duration: Follow patient 6 times/ week to address above goals. Rehabilitation Potential For Stated Goals: [facility] (at time of discharge pending progress):    Placement: It is my opinion, based on this patient's performance to date, that Ms. Lori Dey may benefit from d/c to SNF. Equipment:    None at this time              OCCUPATIONAL PROFILE AND HISTORY:   History of Present Injury/Illness (Reason for Referral):  See H&P  Past Medical History/Comorbidities:   Ms. Lori Dey  has a past medical history of Aneurysm of common carotid artery ([facility] (2004), CKD (chronic kidney disease) (9/18/2014), Dementia ([address] FSGS (focal segmental glomerulosclerosis), Gout, Hypertension, Osteoarthritis (9/18/2014), and Stroke ([address] (2004, 2013).   Ms. Lori Dey  has a past surgical history that includes hx intracranial aneurysm repair (2004); hx refractive surgery; hx orthopaedic (Right, 10/2014); hx ankle fracture tx (Left, 2013); hx ercp (02/27/2018); and hx cholecystectomy (02/28/2018). Social History/Living Environment:   Home Environment: Private residence  One/Two Story Residence: One story  Living Alone: No  Support Systems: Spouse/Significant Other/Partner  Patient Expects to be Discharged to[de-identified] Rehabilitation facility  Current DME Used/Available at Home: Commode, bedside, Grab bars, Shower chair  Tub or Shower Type: Tub/Shower combination  Prior Level of Function/Work/Activity:  See assessment     Number of Personal Factors/Comorbidities that affect the Plan of Care: Expanded review of therapy/medical records (1-2):  MODERATE COMPLEXITY   ASSESSMENT OF OCCUPATIONAL PERFORMANCE[de-identified]   Activities of Daily Living:   Basic ADLs (From Assessment) Complex ADLs (From Assessment)   Feeding: Setup  Oral Facial Hygiene/Grooming: Contact guard assistance  Bathing: Maximum assistance  Upper Body Dressing: Moderate assistance  Lower Body Dressing: Total assistance  Toileting: Total assistance Instrumental ADL  Meal Preparation: Total assistance  Homemaking:  Total assistance   Grooming/Bathing/Dressing Activities of Daily Living     Cognitive Retraining  Safety/Judgement: Fall prevention                       Bed/Mat Mobility  Supine to Sit: Maximum assistance;Assist x2  Sit to Stand: Assist x2;Maximum assistance  Bed to Chair: Maximum assistance;Assist x2     Most Recent Physical Functioning:   Gross Assessment:                  Posture:  Posture (WDL): Exceptions to WDL  Posture Assessment: Cervical, Forward head  Balance:  Sitting: With support  Standing: With support;Pull to stand Bed Mobility:  Supine to Sit: Maximum assistance;Assist x2  Wheelchair Mobility:     Transfers:  Sit to Stand: Assist x2;Maximum assistance  Bed to Chair: Maximum assistance;Assist x2            Patient Vitals for the past 6 hrs:   BP BP Patient Position SpO2 Pulse   20 0813 (!) 164/89 At rest 94 % (!) 111   20 1149 (!) 145/90 At rest 97 % (!) 106       Mental Status  Neurologic State: Alert  Orientation Level: Oriented to person, Oriented to place, Oriented to situation  Cognition: Decreased command following  Perception: Verbal, Tactile  Perseveration: Tactile cues provided, Verbal cues provided  Safety/Judgement: Fall prevention                          Physical Skills Involved:  1. Balance  2. Strength  3. Activity Tolerance  4. Pain (acute) Cognitive Skills Affected (resulting in the inability to perform in a timely and safe manner):  1. Executive Function Psychosocial Skills Affected:  1. Environmental Adaptation   Number of elements that affect the Plan of Care: 5+:  HIGH COMPLEXITY   CLINICAL DECISION MAKIN[address] [place] [address] AM-PAC 6 Clicks   Daily Activity Inpatient Short Form  How much help from another person does the patient currently need. .. Total A Lot A Little None   1. Putting on and taking off regular lower body clothing? [x] 1   [] 2   [] 3   [] 4   2. Bathing (including washing, rinsing, drying)? [] 1   [x] 2   [] 3   [] 4   3. Toileting, which includes using toilet, bedpan or urinal?   [x] 1   [] 2   [] 3   [] 4   4. Putting on and taking off regular upper body clothing? [] 1   [x] 2   [] 3   [] 4   5. Taking care of personal grooming such as brushing teeth? [] 1   [] 2   [x] 3   [] 4   6. Eating meals? [] 1   [] 2   [x] 3   [] 4   © , Trustees of [address] [place], [place] [id number], under license to [employer]. All rights reserved      Score:  Initial: 12 Most Recent: X (Date: -- )    Interpretation of Tool:  Represents activities that are increasingly more difficult (i.e. Bed mobility, Transfers, Gait).     Medical Necessity:     · Patient is expected to demonstrate progress in   · strength, balance, and functional technique  ·  to   · decrease assistance required with ADLs  · .  Reason for Services/Other Comments:  · Patient   · continues to require present interventions due to patient's inability to complete ADLs  · . Use of outcome tool(s) and clinical judgement create a POC that gives a: MODERATE COMPLEXITY         TREATMENT:   (In addition to Assessment/Re-Assessment sessions the following treatments were rendered)     Pre-treatment Symptoms/Complaints:    Pain: Initial:   Pain Intensity 1: 5  Pain Location 1: Hip  Pain Intervention(s) 1: Nurse notified, Repositioned  Post Session:  7     Today's treatment session addressed Decreased Strength, Decreased Transfer Abilities, Decreased Balance and Increased Pain to progress towards achieving goals. During this session,  Physical Therapy addressed  Functional Transfers to progress towards their discipline specific goal(s). Co-treatment was necessary to improve patient's ability to follow higher level commands, ability to increase activity demands and ability to return to normal functional activity. Therapeutic Activity: (    38): Therapeutic activities including bed mobility and functional transfer to improve mobility and strength. Required max X2   to promote motor control of bilateral, upper extremity(s), lower extremity(s). Braces/Orthotics/Lines/Etc:   · O2 Device: Nasal cannula  Treatment/Session Assessment:    · Response to Treatment:  no adverse reaction   · Interdisciplinary Collaboration:   o Physical Therapy Assistant  o Certified Occupational Therapy Assistant  o Registered Nurse  · After treatment position/precautions:   o Up in chair  o Bed/Chair-wheels locked  o Bed in low position  o Call light within reach  o RN notified   · Compliance with Program/Exercises: Will assess as treatment progresses. · Recommendations/Intent for next treatment session: \"Next visit will focus on advancements to more challenging activities and reduction in assistance provided\".   Total Treatment Duration:  OT Patient Time In/Time Out  Time In: 0832  Time Out: Mick Tinoco Pontiff

## 2020-09-27 NOTE — PROGRESS NOTES
ORTHO PROGRESS NOTE    2020    Admit Date: 2020  Admit Diagnosis: Other fracture of right femur, initial encounter for closed fracture ([facility] [S72.8X1A]  Post Op day: 2 Days Post-Op      Subjective:     Nori Magana is a patient who is now 2 Days Post-Op  and has no complaints. Objective:     PT/OT:    Progressing    Vital Signs:    Patient Vitals for the past 8 hrs:   BP Temp Pulse Resp SpO2   20 0813 (!) 164/89 99 °F (37.2 °C) (!) 111 18 94 %   20 0440 (!) 153/91 98.8 °F (37.1 °C) (!) 112 18 90 %     Temp (24hrs), Av.8 °F (37.1 °C), Min:98.4 °F (36.9 °C), Max:99.6 °F (37.6 °C)      LAB:    Recent Labs     20  0534   HGB 7.5*   WBC 10.6          I/O:  No intake/output data recorded.  1901 -  0700  In: -   Out: 2365 [Urine:2250; Drains:115]    Physical Exam:    Awake and in no acute distress. Mood and affect appropriate. Respirations unlabored and no evidence cyanosis. Calves nontender. Abdomen soft and nontender. Dressing clean/dry  No new neurologic deficit. Assessment:      Patient Active Problem List   Diagnosis Code    Essential hypertension I10    Cerebral infarction ([facility] I63.9    FSGS (focal segmental glomerulosclerosis) N05.1    Gout M10.9    Weakness of right leg R29.898    CKD (chronic kidney disease) N18.9    Osteoarthritis M19.90    S/P total hip arthroplasty Z96.649    Bipolar disorder ([facility] F31.9    Gait instability R26.81    Hypokalemia E87.6    OSMIN (acute kidney injury) ([facility] N17.9    Acute metabolic encephalopathy V83.68    Alcohol abuse F10.10    Falls W19. Suzan Captain    Vascular dementia ([facility] F01.50    Hypomagnesemia E83.42    Hypophosphatemia E83.39    Depression F32.9    Cholecystitis K81.9    Cholelithiasis K80.20    Bacteremia due to Gram-negative bacteria R78.81    Alcohol withdrawal (HCC) F10.239    Alcoholism (HCC) F10.20    Hypertension I10    C. difficile colitis A04.72    Femur fracture, right ([facility] S72.91XA    D[facility] F03.90    Leukocytosis D72.829    Other fracture of right femur, initial encounter for closed fracture [facility] S72.8X1A    SIRS (systemic inflammatory response syndrome) (HCC) R65.10    Acute kidney injury superimposed on chronic kidney disease ([facility] N17.9, N18.9       2 Days Post-Op STATUS POST Procedure(s):  Right FEMUR OPEN REDUCTION INTERNAL FIXATION  Right TIBIAL PLATEAU OPEN REDUCTION INTERNAL FIXATION      Plan:     Continue PT/OT/Rehab  Discontinue: drain            Signed By: Tj Leblanc MD

## 2020-09-27 NOTE — PROGRESS NOTES
Hospitalist Progress Note    2020  Admit Date: 2020  6:33 PM   NAME: Ayaan King   :  1960   MRN:  [id number]   Attending: Brennen Thomas DO  PCP:  Diandra Vitale MD    SUBJECTIVE:   Patient is a 61year old CF with a PMHx of HTN, CKD s/s FSGS, dementia, CVA and EtOH presented to the ED after a fall when her  fell onto her. In ED, XR of R hip shows fracture of distal femur and possible fracture of proximal tibia. CT RLE confirmed distal femur fracture and a depressed fracture of the lateral tibial plateau. CT pelvis showed an intact right total hip arthroplasty w/o evidence of fracture in either hip. Head CT negative. Ortho service was consulted. Pt participated with PT yesterday but needed a lot of encouragement. No events overnight. Denies fever, chills, SOB, chest pain, abdominal pain, N/V, dysuria, hematuria. Review of Systems negative with exception of pertinent positives noted above  PHYSICAL EXAM     Visit Vitals  BP (!) 153/91 (BP 1 Location: Right arm, BP Patient Position: At rest)   Pulse (!) 112   Temp 98.8 °F (37.1 °C)   Resp 18   Ht 5' 5\" (1.651 m)   Wt 88.5 kg (195 lb)   SpO2 90%   BMI 32.45 kg/m²      Temp (24hrs), Av.8 °F (37.1 °C), Min:98.4 °F (36.9 °C), Max:99.6 °F (37.6 °C)    Oxygen Therapy  O2 Sat (%): 90 % (20)  Pulse via Oximetry: 90 beats per minute (20)  O2 Device: Nasal cannula (20)  O2 Flow Rate (L/min): 2 l/min (20)    Intake/Output Summary (Last 24 hours) at 2020 0735  Last data filed at 2020 0441  Gross per 24 hour   Intake    Output 1840 ml   Net -1840 ml      General: No acute distress. Lungs:  CTA Bilaterally. Heart:  Regular rate and rhythm,  No murmur, rub, or gallop  Abdomen: Soft, Non distended, Non tender, Positive bowel sounds  Extremities: No cyanosis, clubbing or edema. RLE TTP. No peripheral edema. Pulses intact. IV line in L neck.  R LE wrapped with drain in place, clean dry and intact. Neurologic:  No focal deficits    XR KNEE RT MAX 2 VWS   Final Result   Impression:      Postoperative changes with extensive hardware placement. XR TIB/FIB RT   Final Result   Impression: Intraoperative spot films         NC XR TECHNOLOGIST SERVICE    (Results Pending)         [address] Problems    Diagnosis Date Noted    SIRS (systemic inflammatory response syndrome) ([facility] 09/23/2020    Acute kidney injury superimposed on chronic kidney disease ([facility] 09/23/2020    Other fracture of right femur, initial encounter for closed fracture [facility] 09/23/2020    Femur fracture, right ([facility] 09/23/2020    Leukocytosis     Hypertension 05/18/2018    Alcohol abuse 02/13/2018    Bipolar disorder ([facility] 10/22/2014    CKD (chronic kidney disease) 09/18/2014    FSGS (focal segmental glomerulosclerosis) 09/11/2012     Plan:    Right femur fracture  XR of R hip shows fracture of distal femur and possible fracture of proximal tibia. CT RLE confirmed distal femur fracture and a depressed fracture of the lateral tibial plateau. Ortho consulted: s/p ORIF of R hip and ORIF of R tibial on 9/25  Pain control: Dilaudid and Gris prn  -F/u ortho recs--Cont PT/OT. D/c drain today. Acute blood loss anemia post-op  -Hgb 11.2>8.8>7.8>7.5  -Continue to monitor. Transfuse if hgb <7 or symptomatic  -AM CBC    AoCKD  -CKD 2/2 Focal segmental glomerulosclerosis (FSGS)  -Bl Cr <3. Was 3.57 on admission. Improved on mIVF. -Cr elevated today. Will restart fluid for hydration. Leukocytosis, resolved  -WBC 21.6 with LA 2.3 on admission. Meet SIRS criteria but no sepsis, negative qSOFA. No obvious signs of infection and pt well appearing. -CXR and UA unremarkable. -Blood Cx Positive x2: MRSA  -Started on Vanc (9/24-. ..). Received Ancef for surgery ppx. D/c ceftriaxone started in ED.  -WBC 21.6>11.8>10.3.  WBC and LA normalized after IVF  -? BCx Contaminant, however with pt undergoing orthopedic surgery with hardware placement, ID consulted for further guidance. OK to continue Vanc and Ancef alex-operatively. Repeated BCx on 9/27 and d/c abx if negative. Chronic medical problems:  Alcohol Use: EtOH negative on admission. Monitor for signs of withdrawals. Started on thiamine  Anxiety/depression: Cont home Lexapro.  Hold home Seroquel and Xanax  GERD: Cont home PPI    DVT Prophylaxis: SCD's  Dispo: most likely rehab    Signed By: Clari Goldsmith DO     September 27, 2020

## 2020-09-27 NOTE — PROGRESS NOTES
Pharmacokinetic Consult to Pharmacist    Bhupinder Roman is a 61 y.o. female being treated with Vancomycin. Height: 5' 5\" (165.1 cm)  Weight: 88.5 kg (195 lb)  Lab Results   Component Value Date/Time    BUN 30 (H) 09/27/2020 05:34 AM    Creatinine 3.38 (H) 09/27/2020 05:34 AM    WBC 10.6 09/27/2020 05:34 AM    Procalcitonin 4.6 02/25/2018 05:17 AM    Lactic acid 0.7 09/23/2020 11:13 AM    Lactic Acid (POC) 2.7 (H) 02/25/2018 07:43 AM      Estimated Creatinine Clearance: 19.7 mL/min (A) (based on SCr of 3.38 mg/dL (H)). Lab Results   Component Value Date/Time    Vancomycin, random 16.7 09/27/2020 03:49 PM       Day 5 of vancomycin. Goal trough is 15-20. Patient with therapeutic random level. Will order another dose of 750mg IV x1 and continue dosing intermittently for now. Will continue to follow patient and order levels when clinically indicated.     Thanks,  Von Pagan, PharmD  PGY1 Pharmacy Resident  [phone]

## 2020-09-27 NOTE — PROGRESS NOTES
RIGHT LE NWB   STG:  (1.)Ms. Nemesio Lomas will move from supine to sit and sit to supine , scoot up and down, and roll side to side with MINIMAL ASSIST within 4 treatment day(s). (2.)Ms. Nemesio Lomas will transfer from bed to chair and chair to bed with MINIMAL ASSIST using the least restrictive device within 4 treatment day(s). (3.)Ms. Nemesio Lomas will ambulate with MINIMAL ASSIST for 10 feet with the least restrictive device within 4 treatment day(s). LTG:  (1.)Ms. Nemesio Lomas will move from supine to sit and sit to supine , scoot up and down, and roll side to side in bed with CONTACT GUARD ASSIST within 7 treatment day(s). (2.)Ms. Nemesio Lomas will transfer from bed to chair and chair to bed with CONTACT GUARD ASSIST using the least restrictive device within 7 treatment day(s). (3.)Ms. Nemesio Lomas will ambulate with CONTACT GUARD ASSIST for 20 feet with the least restrictive device within 7 treatment day(s).   ________________________________________________________________________________________________      PHYSICAL THERAPY: Daily Note and PM 9/27/2020  INPATIENT: PT Visit Days : 2  Payor: [address] [place] [address] / Plan: [address] / Product Type: [employer] /       NAME/AGE/GENDER: González Self is a 61 y.o. female   PRIMARY DIAGNOSIS: Other fracture of right femur, initial encounter for closed fracture ([address] [S72.8X1A] Other fracture of right femur, initial encounter for closed fracture ([address] Other fracture of right femur, initial encounter for closed fracture ([address]  Procedure(s) (LRB):  Right FEMUR OPEN REDUCTION INTERNAL FIXATION (Right)  Right TIBIAL PLATEAU OPEN REDUCTION INTERNAL FIXATION (Right)  2 Days Post-Op  ICD-10: Treatment Diagnosis:    · Generalized Muscle Weakness (M62.81)  · Other lack of cordination (R27.8)  · Difficulty in walking, Not elsewhere classified (R26.2)  · Other abnormalities of gait and mobility (R26.89)   Precaution/Allergies:  Codeine and Lortab [hydrocodone-acetaminophen]      ASSESSMENT:     Ms. Arash Cervantes  is a pleasant, but reluctant disabled female presenting with above diagnosis who demonstrates with decreased transfers, ambulation and mobility below her prior functional baseline. Pt is in chair from AM treatment on contact. She is anxious about getting up and moving around. Sit to stand on second attempt with max a x 2 + 1 for safety. Attempting to stand multiple times NWB R with education and instruction on wt bearing. Visual demonstrations of standing and transferring. Total a to stand her and transfer herfrom the chair to the EOB. EOB to supine with max a x 2.. Poor progress towards physical therapy goals. Goals listed above are appropriate. Will continue efforts as patient is still below functional baseline. At this time, patient is appropriate for Co-treatment with occupational therapy when available due to patient's decreased overall endurance/tolerance levels, as well as need for high level skilled assistance to complete functional transfers/mobility and functional tasks. Katie Burroughs is appropriate for a multidisciplinary co-treatment of PT and OT to address goals of both disciplines. This section established at most recent assessment   PROBLEM LIST (Impairments causing functional limitations):  1. Decreased Strength  2. Decreased ADL/Functional Activities  3. Decreased Transfer Abilities  4. Decreased Ambulation Ability/Technique  5. Decreased Balance  6. Increased Pain  7. Decreased Activity Tolerance  8. Decreased Pacing Skills   INTERVENTIONS PLANNED: (Benefits and precautions of physical therapy have been discussed with the patient.)  1. Balance Exercise  2. Bed Mobility  3. Home Exercise Program (HEP)  4. Range of Motion (ROM)  5. Therapeutic Activites  6. Therapeutic Exercise/Strengthening  7.  Transfer Training     TREATMENT PLAN: Frequency/Duration: up to twice daily for duration of hospital stay  Rehabilitation Potential For Stated Goals: [facility] (at time of discharge pending progress):    Placement: It is my opinion, based on this patient's performance to date, that Ms. Deisy Rasmussen may benefit from intensive therapy at a [address] [place], [place] [address]e after discharge due to the functional deficits listed above that are likely to improve with skilled rehabilitation and concerns that he/she may be unsafe to be unsupervised at home due to balance and stability issues. .  Equipment:    None at this time              HISTORY:   History of Present Injury/Illness (Reason for Referral):  PER MD H&P   Lisette Nelly is a 61 y.o. female who apparently fell there is some question on whether or not her  also maybe fell on her and injured her right knee. She has a history she says of having bad knees with arthritis. She does not know that she has had a previous fracture. Her pain x-rays and CT scan have revealed distal femur and proximal tibial plateau fractures of the right knee. She does have quite a bit of pain in her right knee. She denies other problems besides her right knee     Past Medical History/Comorbidities:   Ms. Deisy Rasmussen  has a past medical history of Aneurysm of common carotid artery ([facility] (2004), CKD (chronic kidney disease) (9/18/2014), Dementia ([facility] FSGS (focal segmental glomerulosclerosis), Gout, Hypertension, Osteoarthritis (9/18/2014), and Stroke ([facility] (2004, 2013). Ms. Deisy Rasmussen  has a past surgical history that includes hx intracranial aneurysm repair (2004); hx refractive surgery; hx orthopaedic (Right, 10/2014); hx ankle fracture tx (Left, 2013); hx ercp (02/27/2018); and hx cholecystectomy (02/28/2018).   Social History/Living Environment:   Home Environment: Private residence  One/Two Story Residence: One story  Living Alone: No  Support Systems: Spouse/Significant Other/Partner  Patient Expects to be Discharged to[de-identified] Rehabilitation facility  Current DME Used/Available at Home: Commode, bedside, Grab bars, Shower chair  Tub or Shower Type: Tub/Shower combination  Prior Level of Function/Work/Activity:  Limited by prior comorbidities. Dominant Side:         RIGHT    Personal Factors:          Sex:  female        Age:  61 y.o. Number of Personal Factors/Comorbidities that affect the Plan of Care: 1-2: MODERATE COMPLEXITY   EXAMINATION:   Most Recent Physical Functioning:   Gross Assessment:                  Posture:     Balance:  Sitting - Static: Fair (occasional)  Sitting - Dynamic: Fair (occasional) Bed Mobility:  Supine to Sit: Maximum assistance;Assist x2  Sit to Supine: Maximum assistance;Assist x2  Wheelchair Mobility:     Transfers:  Sit to Stand: Maximum assistance  Stand to Sit: Maximum assistance  Gait:            Body Structures Involved:  1. Bones  2. Joints  3. Muscles  4. Ligaments Body Functions Affected:  1. Neuromusculoskeletal  2. Movement Related  3. Skin Related  4. Metobolic/Endocrine Activities and Participation Affected:  1. General Tasks and Demands  2. Communication  3. Mobility  4. Self Care  5. Domestic Life  6. Community, Social and [place]   Number of elements that affect the Plan of Care: 3: MODERATE COMPLEXITY   CLINICAL PRESENTATION:   Presentation: Evolving clinical presentation with changing clinical characteristics: MODERATE COMPLEXITY   CLINICAL DECISION MAKIN [facility] Mobility Inpatient Short Form  How much difficulty does the patient currently have. .. Unable A Lot A Little None   1. Turning over in bed (including adjusting bedclothes, sheets and blankets)? [] 1   [x] 2   [] 3   [] 4   2. Sitting down on and standing up from a chair with arms ( e.g., wheelchair, bedside commode, etc.)   [x] 1   [] 2   [] 3   [] 4   3. Moving from lying on back to sitting on the side of the bed? [] 1   [x] 2   [] 3   [] 4   How much help from another person does the patient currently need. .. Total A Lot A Little None   4. Moving to and from a bed to a chair (including a wheelchair)? [x] 1   [] 2   [] 3   [] 4   5. Need to walk in hospital room? [x] 1   [] 2   [] 3   [] 4   6. Climbing 3-5 steps with a railing? [x] 1   [] 2   [] 3   [] 4   © 2007, Trustees of [address] [place] [address], under license to [employer]. All rights reserved      Score:  Initial: 8 Most Recent: X (Date: -- )    Interpretation of Tool:  Represents activities that are increasingly more difficult (i.e. Bed mobility, Transfers, Gait). Medical Necessity:     · Patient demonstrates   · fair  ·  rehab potential due to higher previous functional level. Reason for Services/Other Comments:  · Patient continues to require skilled intervention due to   · medical complications, patient unable to attend/participate in therapy as expected, and right LE NWB and prior comorbidities and debility. · .   Use of outcome tool(s) and clinical judgement create a POC that gives a: Questionable prediction of patient's progress: MODERATE COMPLEXITY            TREATMENT:   (In addition to Assessment/Re-Assessment sessions the following treatments were rendered)   Pre-treatment Symptoms/Complaints:    \"Wait a minute\" and \"I can't\"  Pain: Initial:      Post Session:          Therapeutic Activity: (    08  mins): Therapeutic activities including Bed transfers,  standing and transferring to improve mobility, strength, balance, and coordination. Required minimal   to promote coordination of right, lower extremity(s). Date:  9/26  Date:   Date:     Activity/Exercise Parameters Parameters Parameters   AP's  10 x's      HIP ABD  10 x's      Quad sets  10 x's      Glute sets  10 x's      Left LE LAQ's.    10 x's                    Braces/Orthotics/Lines/Etc:   · O2 Device: Nasal cannula  Treatment/Session Assessment:    · Response to Treatment:  limited mobility per patient toleration.     · Interdisciplinary Collaboration:   o Physical Therapist  o Physical Therapy Assistant  o Certified Occupational Therapy Assistant  o Registered Nurse  · After treatment position/precautions:   o Supine in bed  o Bed alarm/tab alert on  o Bed/Chair-wheels locked  o Bed in low position  o Call light within reach  o RN notified  o Nurse at bedside  o Side rails x 3   · Compliance with Program/Exercises: Will assess as treatment progresses  · Recommendations/Intent for next treatment session: \"Next visit will focus on advancements to more challenging activities, reduction in assistance provided, and OOB pivot or slide per patient toleration. \".   Total Treatment Duration:  PT Patient Time In/Time Out  Time In: 1307  Time Out: [address] [facility]

## 2020-09-27 NOTE — PROGRESS NOTES
RIGHT LE NWB   STG:  (1.)Ms. Terence Malone will move from supine to sit and sit to supine , scoot up and down, and roll side to side with MINIMAL ASSIST within 4 treatment day(s). (2.)Ms. Terence Malone will transfer from bed to chair and chair to bed with MINIMAL ASSIST using the least restrictive device within 4 treatment day(s). (3.)Ms. Terence Malone will ambulate with MINIMAL ASSIST for 10 feet with the least restrictive device within 4 treatment day(s). LTG:  (1.)Ms. Terence Malone will move from supine to sit and sit to supine , scoot up and down, and roll side to side in bed with CONTACT GUARD ASSIST within 7 treatment day(s). (2.)Ms. Terence Malone will transfer from bed to chair and chair to bed with CONTACT GUARD ASSIST using the least restrictive device within 7 treatment day(s). (3.)Ms. Terence Malone will ambulate with CONTACT GUARD ASSIST for 20 feet with the least restrictive device within 7 treatment day(s).   ________________________________________________________________________________________________      PHYSICAL THERAPY: Daily Note and AM 9/27/2020  INPATIENT: PT Visit Days : 2  Payor: Mariah Phillips / Plan: [address] / Product Type: [employer] Medicare /       NAME/AGE/GENDER: Tomi Pérez is a 61 y.o. female   PRIMARY DIAGNOSIS: Other fracture of right femur, initial encounter for closed fracture ([address] [S72.8X1A] Other fracture of right femur, initial encounter for closed fracture ([address] Other fracture of right femur, initial encounter for closed fracture ([address]  Procedure(s) (LRB):  Right FEMUR OPEN REDUCTION INTERNAL FIXATION (Right)  Right TIBIAL PLATEAU OPEN REDUCTION INTERNAL FIXATION (Right)  2 Days Post-Op  ICD-10: Treatment Diagnosis:    · Generalized Muscle Weakness (M62.81)  · Other lack of cordination (R27.8)  · Difficulty in walking, Not elsewhere classified (R26.2)  · Other abnormalities of gait and mobility (R26.89)   Precaution/Allergies:  Codeine and Lortab [hydrocodone-acetaminophen]      ASSESSMENT:     Ms. Terence Malone  is a pleasant, but reluctant disabled female presenting with above diagnosis who demonstrates with decreased transfers, ambulation and mobility below her prior functional baseline. Pt is supine on contact. She is anxious about getting up and moving around. To EOB with max a x 2. Attempting to stand multiple times NWB R with education and instruction on wt bearing. Visual demonstrations of standing and transferring. Total a to stand her and transfer her the chair. Poor progress towards physical therapy goals. Goals listed above are appropriate. Will continue efforts as patient is still below functional baseline. At this time, patient is appropriate for Co-treatment with occupational therapy when available due to patient's decreased overall endurance/tolerance levels, as well as need for high level skilled assistance to complete functional transfers/mobility and functional tasks. Tomi Pérez is appropriate for a multidisciplinary co-treatment of PT and OT to address goals of both disciplines. This section established at most recent assessment   PROBLEM LIST (Impairments causing functional limitations):  1. Decreased Strength  2. Decreased ADL/Functional Activities  3. Decreased Transfer Abilities  4. Decreased Ambulation Ability/Technique  5. Decreased Balance  6. Increased Pain  7. Decreased Activity Tolerance  8. Decreased Pacing Skills   INTERVENTIONS PLANNED: (Benefits and precautions of physical therapy have been discussed with the patient.)  1. Balance Exercise  2. Bed Mobility  3. Home Exercise Program (HEP)  4. Range of Motion (ROM)  5. Therapeutic Activites  6. Therapeutic Exercise/Strengthening  7. Transfer Training     TREATMENT PLAN: Frequency/Duration: up to twice daily for duration of hospital stay  Rehabilitation Potential For Stated Goals: [facility] (at time of discharge pending progress):    Placement:   It is my opinion, based on this patient's performance to date, that Ms. Kj Azevedo may benefit from intensive therapy at a [address] [place] [address] after discharge due to the functional deficits listed above that are likely to improve with skilled rehabilitation and concerns that he/she may be unsafe to be unsupervised at home due to balance and stability issues. .  Equipment:    None at this time              HISTORY:   History of Present Injury/Illness (Reason for Referral):  PER MD H&P   Tawanda Mcdonnell is a 61 y.o. female who apparently fell there is some question on whether or not her  also maybe fell on her and injured her right knee. She has a history she says of having bad knees with arthritis. She does not know that she has had a previous fracture. Her pain x-rays and CT scan have revealed distal femur and proximal tibial plateau fractures of the right knee. She does have quite a bit of pain in her right knee. She denies other problems besides her right knee     Past Medical History/Comorbidities:   Ms. Kj Azevedo  has a past medical history of Aneurysm of common carotid artery ([address] (2004), CKD (chronic kidney disease) (9/18/2014), Dementia ([address] FSGS (focal segmental glomerulosclerosis), Gout, Hypertension, Osteoarthritis (9/18/2014), and Stroke ([address] (2004, 2013). Ms. Kj Azevedo  has a past surgical history that includes hx intracranial aneurysm repair (2004); hx refractive surgery; hx orthopaedic (Right, 10/2014); hx ankle fracture tx (Left, 2013); hx ercp (02/27/2018); and hx cholecystectomy (02/28/2018).   Social History/Living Environment:   Home Environment: Private residence  One/Two Story Residence: One story  Living Alone: No  Support Systems: Spouse/Significant Other/Partner  Patient Expects to be Discharged to[de-identified] Rehabilitation facility  Current DME Used/Available at Home: Commode, bedside, Grab bars, Shower chair  Tub or Shower Type: Tub/Shower combination  Prior Level of Function/Work/Activity:  Limited by prior comorbidities. Dominant Side:         RIGHT    Personal Factors:          Sex:  female        Age:  61 y.o. Number of Personal Factors/Comorbidities that affect the Plan of Care: 1-2: MODERATE COMPLEXITY   EXAMINATION:   Most Recent Physical Functioning:   Gross Assessment:                  Posture:     Balance:  Sitting - Static: Fair (occasional)  Sitting - Dynamic: Fair (occasional) Bed Mobility:  Supine to Sit: Maximum assistance;Assist x2  Wheelchair Mobility:     Transfers:  Sit to Stand: Maximum assistance  Stand to Sit: Maximum assistance  Gait:            Body Structures Involved:  1. Bones  2. Joints  3. Muscles  4. Ligaments Body Functions Affected:  1. Neuromusculoskeletal  2. Movement Related  3. Skin Related  4. Metobolic/Endocrine Activities and Participation Affected:  1. General Tasks and Demands  2. Communication  3. Mobility  4. Self Care  5. Domestic Life  6. Community, Social and [place]   Number of elements that affect the Plan of Care: 3: MODERATE COMPLEXITY   CLINICAL PRESENTATION:   Presentation: Evolving clinical presentation with changing clinical characteristics: MODERATE COMPLEXITY   CLINICAL DECISION MAKIN [facility] Inpatient Short Form  How much difficulty does the patient currently have. .. Unable A Lot A Little None   1. Turning over in bed (including adjusting bedclothes, sheets and blankets)? [] 1   [x] 2   [] 3   [] 4   2. Sitting down on and standing up from a chair with arms ( e.g., wheelchair, bedside commode, etc.)   [x] 1   [] 2   [] 3   [] 4   3. Moving from lying on back to sitting on the side of the bed? [] 1   [x] 2   [] 3   [] 4   How much help from another person does the patient currently need. .. Total A Lot A Little None   4. Moving to and from a bed to a chair (including a wheelchair)? [x] 1   [] 2   [] 3   [] 4   5. Need to walk in hospital room?    [x] 1   [] 2   [] 3   [] 4   6. Climbing 3-5 steps with a railing? [x] 1   [] 2   [] 3   [] 4   © 2007, Trustees of [address] [place] [address] [address] [id number], under license to [employer]. All rights reserved      Score:  Initial: 8 Most Recent: X (Date: -- )    Interpretation of Tool:  Represents activities that are increasingly more difficult (i.e. Bed mobility, Transfers, Gait). Medical Necessity:     · Patient demonstrates   · fair  ·  rehab potential due to higher previous functional level. Reason for Services/Other Comments:  · Patient continues to require skilled intervention due to   · medical complications, patient unable to attend/participate in therapy as expected, and right LE NWB and prior comorbidities and debility. · .   Use of outcome tool(s) and clinical judgement create a POC that gives a: Questionable prediction of patient's progress: MODERATE COMPLEXITY            TREATMENT:   (In addition to Assessment/Re-Assessment sessions the following treatments were rendered)   Pre-treatment Symptoms/Complaints:    \"Wait a minute\" and \"I can't\"  Pain: Initial:      Post Session:          Therapeutic Activity: (    38  mins): Therapeutic activities including Bed transfers, seated dangling, standing and transferring to improve mobility, strength, balance, and coordination. Required minimal   to promote coordination of right, lower extremity(s). Date:  9/26  Date:   Date:     Activity/Exercise Parameters Parameters Parameters   AP's  10 x's      HIP ABD  10 x's      Quad sets  10 x's      Glute sets  10 x's      Left LE LAQ's.    10 x's                    Braces/Orthotics/Lines/Etc:   · O2 Device: Nasal cannula  Treatment/Session Assessment:    · Response to Treatment:  limited mobility per patient toleration and agreement to EOB only today.     · Interdisciplinary Collaboration:   o Physical Therapist  o Physical Therapy Assistant  o Certified Occupational Therapy Assistant  o Registered Nurse  · After treatment position/precautions:   o Up in chair  o Bed alarm/tab alert on  o Bed/Chair-wheels locked  o Bed in low position  o Call light within reach  o RN notified  o Nurse at bedside  o Side rails x 3   · Compliance with Program/Exercises: Will assess as treatment progresses  · Recommendations/Intent for next treatment session: \"Next visit will focus on advancements to more challenging activities, reduction in assistance provided, and OOB pivot or slide per patient toleration. \".   Total Treatment Duration:  PT Patient Time In/Time Out  Time In: 0832  Time Out: [address] Ranjana Aparicio, SVETLANA

## 2020-09-28 LAB
ANION GAP SERPL CALC-SCNC: 7 MMOL/L (ref 7–16)
BACTERIA SPEC CULT: ABNORMAL
BACTERIA SPEC CULT: ABNORMAL
BUN SERPL-MCNC: 25 MG/DL (ref 6–23)
CALCIUM SERPL-MCNC: 9 MG/DL (ref 8.3–10.4)
CHLORIDE SERPL-SCNC: 110 MMOL/L (ref 98–107)
CO2 SERPL-SCNC: 26 MMOL/L (ref 21–32)
CREAT SERPL-MCNC: 3.03 MG/DL (ref 0.6–1)
ERYTHROCYTE [DISTWIDTH] IN BLOOD BY AUTOMATED COUNT: 12.8 % (ref 11.9–14.6)
GLUCOSE SERPL-MCNC: 88 MG/DL (ref 65–100)
GRAM STN SPEC: ABNORMAL
GRAM STN SPEC: ABNORMAL
HCT VFR BLD AUTO: 22.9 % (ref 35.8–46.3)
HGB BLD-MCNC: 7.3 G/DL (ref 11.7–15.4)
MCH RBC QN AUTO: 29 PG (ref 26.1–32.9)
MCHC RBC AUTO-ENTMCNC: 31.9 G/DL (ref 31.4–35)
MCV RBC AUTO: 90.9 FL (ref 79.6–97.8)
MM INDURATION POC: 0 MM (ref 0–5)
MM INDURATION POC: 0 MM (ref 0–5)
NRBC # BLD: 0 K/UL (ref 0–0.2)
PLATELET # BLD AUTO: 181 K/UL (ref 150–450)
PMV BLD AUTO: 9.8 FL (ref 9.4–12.3)
POTASSIUM SERPL-SCNC: 3.5 MMOL/L (ref 3.5–5.1)
PPD POC: NEGATIVE NEGATIVE
PPD POC: NEGATIVE NEGATIVE
RBC # BLD AUTO: 2.52 M/UL (ref 4.05–5.2)
SERVICE CMNT-IMP: ABNORMAL
SODIUM SERPL-SCNC: 143 MMOL/L (ref 136–145)
WBC # BLD AUTO: 9.5 K/UL (ref 4.3–11.1)

## 2020-09-28 PROCEDURE — 2709999900 HC NON-CHARGEABLE SUPPLY

## 2020-09-28 PROCEDURE — 74011250637 HC RX REV CODE- 250/637: Performed by: ORTHOPAEDIC SURGERY

## 2020-09-28 PROCEDURE — 77030038269 HC DRN EXT URIN PURWCK BARD -A

## 2020-09-28 PROCEDURE — 36415 COLL VENOUS BLD VENIPUNCTURE: CPT

## 2020-09-28 PROCEDURE — 85027 COMPLETE CBC AUTOMATED: CPT

## 2020-09-28 PROCEDURE — 97530 THERAPEUTIC ACTIVITIES: CPT

## 2020-09-28 PROCEDURE — 65270000029 HC RM PRIVATE

## 2020-09-28 PROCEDURE — 80048 BASIC METABOLIC PNL TOTAL CA: CPT

## 2020-09-28 PROCEDURE — 97535 SELF CARE MNGMENT TRAINING: CPT

## 2020-09-28 RX ORDER — ALPRAZOLAM 0.5 MG/1
0.25 TABLET ORAL
Status: DISCONTINUED | OUTPATIENT
Start: 2020-09-28 | End: 2020-09-28

## 2020-09-28 RX ORDER — CEFAZOLIN SODIUM/WATER 2 G/20 ML
2 SYRINGE (ML) INTRAVENOUS ONCE
Status: ACTIVE | OUTPATIENT
Start: 2020-09-28 | End: 2020-09-28

## 2020-09-28 RX ADMIN — PANTOPRAZOLE SODIUM 40 MG: 40 TABLET, DELAYED RELEASE ORAL at 04:05

## 2020-09-28 RX ADMIN — Medication 5 ML: at 06:53

## 2020-09-28 RX ADMIN — ACETAMINOPHEN 650 MG: 325 TABLET, FILM COATED ORAL at 16:32

## 2020-09-28 RX ADMIN — Medication 1 TABLET: at 16:32

## 2020-09-28 RX ADMIN — OXYCODONE 10 MG: 5 TABLET ORAL at 21:51

## 2020-09-28 RX ADMIN — Medication 10 ML: at 14:22

## 2020-09-28 RX ADMIN — OXYCODONE 10 MG: 5 TABLET ORAL at 14:23

## 2020-09-28 RX ADMIN — Medication 10 ML: at 21:56

## 2020-09-28 RX ADMIN — Medication 5 ML: at 06:52

## 2020-09-28 RX ADMIN — ACETAMINOPHEN 650 MG: 325 TABLET, FILM COATED ORAL at 08:12

## 2020-09-28 RX ADMIN — Medication 1 TABLET: at 08:12

## 2020-09-28 RX ADMIN — ACETAMINOPHEN 650 MG: 325 TABLET, FILM COATED ORAL at 21:51

## 2020-09-28 RX ADMIN — OXYCODONE 10 MG: 5 TABLET ORAL at 04:05

## 2020-09-28 RX ADMIN — ESCITALOPRAM OXALATE 10 MG: 10 TABLET ORAL at 08:12

## 2020-09-28 RX ADMIN — Medication 1 TABLET: at 11:56

## 2020-09-28 RX ADMIN — ASPIRIN 325 MG ORAL TABLET 325 MG: 325 PILL ORAL at 08:12

## 2020-09-28 RX ADMIN — OXYCODONE 10 MG: 5 TABLET ORAL at 11:56

## 2020-09-28 RX ADMIN — OXYCODONE 10 MG: 5 TABLET ORAL at 08:12

## 2020-09-28 NOTE — PROGRESS NOTES
Infectious Disease Progress Note    Today's Date: 9/28/2020   Admit Date: 9/23/2020    Impression:   · CoNS bacteremia: blood culture (9/23) with CoNS (ox-R)- likely contaminant  · Right Distal femur and lateral tibia fracture: s/p fall  · Hx of R RAMONA  · Hx of ankle fracture with HW    Plan:   · Stop Vancomycin as repeat BCs negative. No retained cardiac HW. Has completed 5 days. · **Will sign off at this time. Please call with questions or concerns. Anti-infectives:   · Vanc (9/23-  · CTX (9/23-9/24)    Subjective: Interval History:  TM 99.3F, doing well. Patient Active Problem List   Diagnosis Code    Essential hypertension I10    Cerebral infarction ([facility] I63.9    FSGS (focal segmental glomerulosclerosis) N05.1    Gout M10.9    Weakness of right leg R29.898    CKD (chronic kidney disease) N18.9    Osteoarthritis M19.90    S/P total hip arthroplasty Z96.649    Bipolar disorder ([facility] F31.9    Gait instability R26.81    Hypokalemia E87.6    OSMIN (acute kidney injury) ([facility] N17.9    Acute metabolic encephalopathy H88.64    Alcohol abuse F10.10    Falls W19. Christa Kathia    Vascular dementia ([facility] F01.50    Hypomagnesemia E83.42    Hypophosphatemia E83.39    Depression F32.9    Cholecystitis K81.9    Cholelithiasis K80.20    Bacteremia due to Gram-negative bacteria R78.81    Alcohol withdrawal ([facility]) F10.239    Alcoholism ([facility]) F10.20    Hypertension I10    C. difficile colitis A04.72    Femur fracture, right ([facility]) S72.91XA    Dementia ([facility]) F03.90    Leukocytosis D72.829    Other fracture of right femur, initial encounter for closed fracture ([facility]) S72.8X1A    SIRS (systemic inflammatory response syndrome) ([facility]) R65.10    Acute kidney injury superimposed on chronic kidney disease ([facility]) N17.9, N18.9     Past Medical History:   Diagnosis Date    Aneurysm of common carotid artery ([facility] 2004    left side s/p coil     CKD (chronic kidney disease) 9/18/2014    Dementia ([facility]     FSGS (focal segmental glomerulosclerosis)     in remission at present    Gout     Hypertension     managed with medication     Osteoarthritis 2014    Stroke ([facility] ,     slight weakness on right side, slight effect to speech      Family History   Problem Relation Age of Onset    Cancer Father 80        unknown    Stroke Sister 48      Social History     Tobacco Use    Smoking status: Former Smoker     Packs/day: 0.25     Last attempt to quit: 1979     Years since quittin.7    Smokeless tobacco: Never Used   Substance Use Topics    Alcohol use: Yes     Alcohol/week: 2.5 standard drinks     Types: 3 Shots of liquor per week     Comment: pint of fireball     Past Surgical History:   Procedure Laterality Date    HX ANKLE FRACTURE TX Left 2013    has hardware in   [address]  2018    HX ERCP  2018    cbd stone    HX INTRACRANIAL ANEURYSM REPAIR      left carotid     HX ORTHOPAEDIC Right 10/2014    hip replacement    HX REFRACTIVE SURGERY      bilateral - Lasik      Prior to Admission medications    Medication Sig Start Date End Date Taking? Authorizing Provider   escitalopram oxalate (LEXAPRO) 10 mg tablet Take 10 mg by mouth daily. Yes Provider, Historical   acetaminophen (TYLENOL) 325 mg tablet Take 2 Tabs by mouth every six (6) hours as needed. 18  Yes Albert Bolden MD   oxyCODONE-acetaminophen (PERCOCET) 5-325 mg per tablet Take 1 Tab by mouth every eight (8) hours as needed. Max Daily Amount: 3 Tabs. 18   Marina Hanson MD   magnesium oxide (MAG-OX) 400 mg tablet Take 1 Tab by mouth daily. 18   Marina Hanson MD   ondansetron (ZOFRAN ODT) 8 mg disintegrating tablet Take 1 Tab by mouth every six (6) hours as needed. PRN for nausea 18   Marina Hanson MD   ALPRAZolam Glennie Born) 0.5 mg tablet Take 0.5 Tabs by mouth two (2) times daily as needed for Anxiety or Sleep.  Max Daily Amount: 0.5 mg. 18   Robbie Rolandoof Nga Meyer MD   QUEtiapine (SEROQUEL) 50 mg tablet Take 50 mg by mouth nightly. Provider, Historical   folic acid (FOLVITE) 1 mg tablet Take 1 Tab by mouth daily. 18   Doni Clifton MD   pantoprazole (PROTONIX) 40 mg tablet Take 1 Tab by mouth Daily (before breakfast). 18   Doni Clifton MD   thiamine (B-1) 100 mg tablet Take 1 Tab by mouth daily. 18   Doni Clifton MD       Allergies   Allergen Reactions    Codeine Nausea and Vomiting    Lortab [Hydrocodone-Acetaminophen] Nausea and Vomiting        Review of Systems:  A comprehensive review of systems was negative except for that written in the History of Present Illness. Objective:     Visit Vitals  /87 (BP 1 Location: Right arm, BP Patient Position: At rest)   Pulse 100   Temp 99.3 °F (37.4 °C)   Resp 16   Ht 5' 5\" (1.651 m)   Wt 88.5 kg (195 lb)   SpO2 97%   BMI 32.45 kg/m²     Temp (24hrs), Av.5 °F (36.9 °C), Min:98 °F (36.7 °C), Max:99.3 °F (37.4 °C)     Patient was seen and examined on 20 and physical exam remains unchanged since yesterday, unless noted below:      Lines:  Peripheral IV:   L EJ      Physical Exam:    General:  Alert, cooperative, well noursished, well developed, appears stated age; obese   Eyes:  Sclera anicteric. Pupils equally round and reactive to light. Mouth/Throat: Mucous membranes normal, oral pharynx clear   Neck: Supple   Lungs:   Clear to auscultation bilaterally, good effort   CV:  Regular rate and rhythm,no murmur, click, rub or gallop   Abdomen:   Soft, non-tender.  bowel sounds normal. non-distended   Extremities: RLE with post operative dressing   Skin: Skin color, texture, turgor normal. no acute rash or lesions   Lymph nodes: Cervical and supraclavicular normal   Musculoskeletal: No swelling or deformity   Lines/Devices:  Intact, no erythema, drainage or tenderness   Psych: Alert and oriented, normal mood affect given the setting       Data Review:     CBC:  Recent Labs 2034 20  0523   WBC 9.5 10.6 10.6   GRANS  --  73 78   MONOS  --  8 11   EOS  --  2 0*   ANEU  --  7.7 8.3*   ABL  --  1.6 1.0   HGB 7.3* 7.5* 7.8*   HCT 22.9* 23.3* 23.9*    169 151       BMP:  Recent Labs     2034 20  05   CREA 3.03* 3.38* 3.16*   BUN 25* 30* 29*    144 143   K 3.5 3.7 4.2   * 112* 114*   CO2 26 27 24   AGAP 7 5* 5*   GLU 88 95 126*       LFTS:  No results for input(s): TBILI, ALT, AP, TP, ALB in the last 72 hours. No lab exists for component: SGOT    Microbiology:     All Micro Results     Procedure Component Value Units Date/Time    CULTURE, BLOOD [[id number]] Collected:  20    Order Status:  Completed Specimen:  Blood Updated:  20     Special Requests: --        LEFT  Antecubital       Culture result: NO GROWTH AFTER 20 HOURS       CULTURE, BLOOD [[id number]] Collected:  20    Order Status:  Completed Specimen:  Blood Updated:  20     Special Requests: --        RIGHT  Antecubital       Culture result: NO GROWTH AFTER 20 HOURS       MSSA/MRSA SC BY PCR, NASAL SWAB [[id number]] Collected:  20 190    Order Status:  Canceled Specimen:  Nasal swab           Imagin/23 CT R Knee  IMPRESSION:     1. Distal femur medial condyle fracture, extending into the intercondylar notch.     2. Depressed fracture of the lateral tibial plateau.     3. Osteopenia, diminishing overall sensitivity.  CT pelv  IMPRESSION  IMPRESSION:     1. Right total hip arthroplasty. No evidence of periprosthetic fracture or hip  dislocation.     2. No acute left hip fracture. If there is suspicion for occult left hip  fracture, MRI is a more sensitive study.     Signed By: Francesca Haley NP     2020

## 2020-09-28 NOTE — PROGRESS NOTES
Problem: Falls - Risk of  Goal: *Absence of Falls  Description: Document Jumana Arceoann Fall Risk and appropriate interventions in the flowsheet. Outcome: Progressing Towards Goal  Note: Fall Risk Interventions:  Mobility Interventions: Communicate number of staff needed for ambulation/transfer, OT consult for ADLs, Patient to call before getting OOB, PT Consult for mobility concerns         Medication Interventions: Patient to call before getting OOB, Teach patient to arise slowly    Elimination Interventions: Call light in reach, Patient to call for help with toileting needs, Stay With Me (per policy), Toilet paper/wipes in reach, Toileting schedule/hourly rounds    History of Falls Interventions: Door open when patient unattended, Investigate reason for fall         Problem: Patient Education: Go to Patient Education Activity  Goal: Patient/Family Education  Outcome: Progressing Towards Goal     Problem: Pressure Injury - Risk of  Goal: *Prevention of pressure injury  Description: Document Luis Scale and appropriate interventions in the flowsheet.   Outcome: Progressing Towards Goal  Note: Pressure Injury Interventions:  Sensory Interventions: Assess changes in LOC    Moisture Interventions: Absorbent underpads, Internal/External urinary devices, Limit adult briefs, Minimize layers    Activity Interventions: Increase time out of bed, Pressure redistribution bed/mattress(bed type), PT/OT evaluation    Mobility Interventions: HOB 30 degrees or less, Pressure redistribution bed/mattress (bed type), PT/OT evaluation    Nutrition Interventions: Document food/fluid/supplement intake    Friction and Shear Interventions: HOB 30 degrees or less                Problem: Patient Education: Go to Patient Education Activity  Goal: Patient/Family Education  Outcome: Progressing Towards Goal     Problem: Patient Education: Go to Patient Education Activity  Goal: Patient/Family Education  Outcome: Progressing Towards Goal Problem: Patient Education: Go to Patient Education Activity  Goal: Patient/Family Education  Outcome: Progressing Towards Goal     Problem: Lower Extremity Fracture:Post-op Day 2  Goal: Off Pathway (Use only if patient is Off Pathway)  Outcome: Progressing Towards Goal  Goal: Activity/Safety  Outcome: Progressing Towards Goal  Goal: Diagnostic Test/Procedures  Outcome: Progressing Towards Goal  Goal: Nutrition/Diet  Outcome: Progressing Towards Goal  Goal: Discharge Planning  Outcome: Progressing Towards Goal  Goal: Medications  Outcome: Progressing Towards Goal  Goal: Respiratory  Outcome: Progressing Towards Goal  Goal: Treatments/Interventions/Procedures  Outcome: Progressing Towards Goal  Goal: Psychosocial  Outcome: Progressing Towards Goal  Goal: *Optimal pain control at patient's stated goal  Outcome: Progressing Towards Goal  Goal: *Hemodynamically stable  Outcome: Progressing Towards Goal  Goal: *Adequate oxygenation  Outcome: Progressing Towards Goal  Goal: *PT/INR within defined limits  Outcome: Progressing Towards Goal  Goal: *Demonstrates progressive activity  Outcome: Progressing Towards Goal  Goal: *Voiding  Outcome: Progressing Towards Goal  Goal: *Bowel movement  Outcome: Progressing Towards Goal  Goal: *Tolerating diet  Outcome: Progressing Towards Goal

## 2020-09-28 NOTE — PROGRESS NOTES
Problem: Self Care Deficits Care Plan (Adult)  Goal: *Acute Goals and Plan of Care (Insert Text)  Outcome: Progressing Towards Goal  Note: 1. Pt will toilet with mod A   2. Pt will complete functional transfers for ADLs with mod A  3. Pt will maintain standing balance for ADLs with min A  5. Pt will demonstrate independence with HEP to promote increased BUE strength and functional use for ADLs  6. Pt will tolerate 23 minutes functional activity with ,om or fewer rest breaks to promote increased endurance for ADLs  7. Pt will complete bed mobility with CGA in prep for ADLs    Timeframe: 7 days       OCCUPATIONAL THERAPY: Daily Note and AM    9/28/2020  INPATIENT: OT Visit Days: 3  Payor: Jos Martin / Plan: Aristides Cueva MEDICARE COMPLETE / Product Type: [employer] /      NAME/AGE/GENDER: Lavell Crooks is a 61 y.o. female   PRIMARY DIAGNOSIS:  Other fracture of right femur, initial encounter for closed fracture ([facility] [address] [S72.8X1A] Other fracture of right femur, initial encounter for closed fracture ([address] Other fracture of right femur, initial encounter for closed fracture ([address]  Procedure(s) (LRB):  Right FEMUR OPEN REDUCTION INTERNAL FIXATION (Right)  Right TIBIAL PLATEAU OPEN REDUCTION INTERNAL FIXATION (Right)  3 Days Post-Op  ICD-10: Treatment Diagnosis:    · History of falling (Z91.81)   Precautions/Allergies:    RLE NWB Codeine and Lortab [hydrocodone-acetaminophen]      ASSESSMENT:     Ms. Alverto Xavier presents with R distal femur and lateral tibia fractures d/t falling at home, s/p ORIF. RLTHUAN NWB with KI. Pt lives with her  and reports that she requires assistance for bathing, dressing, and tub transfers and is otherwise independent with mobility and with toileting.    9/28/2020 Pt presents in supine upon arrival. Pt agreeable to treatment with encouragement. Pt transferred to sitting with max a x's 2.  Pt sat edge of bed approximately 25 minutes and participated in grooming, bathing, and dressing with assist provided in the following grid. Pt completed sit to stand with max a x's 2 and a rolling walker and completed SPT to the chair with max a x's 2 + 1 assist to hold pt's foot off of the floor. Pt was left up in the chair with belongings in reach and posey in place. Pt somewhat self limiting. Continue POC. At this time, patient is appropriate for Co-treatment with occupational therapy when available due to patient's decreased overall endurance/tolerance levels, as well as need for high level skilled assistance to complete functional transfers/mobility and functional tasks. Yudelka Kohler is appropriate for a multidisciplinary co-treatment of PT and OT to address goals of both disciplines. This section established at most recent assessment   PROBLEM LIST (Impairments causing functional limitations):  1. Decreased Strength  2. Decreased ADL/Functional Activities  3. Decreased Transfer Abilities  4. Decreased Balance  5. Increased Pain  6. Decreased Activity Tolerance  7. Decreased Cognition   INTERVENTIONS PLANNED: (Benefits and precautions of occupational therapy have been discussed with the patient.)  1. Activities of daily living training  2. Adaptive equipment training  3. Balance training  4. Therapeutic activity  5. Therapeutic exercise     TREATMENT PLAN: Frequency/Duration: Follow patient 6 times/ week to address above goals. Rehabilitation Potential For Stated Goals: [address] (at time of discharge pending progress):    Placement: It is my opinion, based on this patient's performance to date, that Ms. Katlyn Kohler may benefit from d/c to SNF.   Equipment:    None at this time              OCCUPATIONAL PROFILE AND HISTORY:   History of Present Injury/Illness (Reason for Referral):  See H&P  Past Medical History/Comorbidities:   Ms. Katlyn Kohler  has a past medical history of Aneurysm of common carotid artery ([facility] [address] (2004), CKD (chronic kidney disease) (9/18/2014), Dementia [facility]), FSGS (focal segmental glomerulosclerosis), Gout, Hypertension, Osteoarthritis (9/18/2014), and Stroke [facility]) (2004, 2013). Ms. Leandro Vallejo  has a past surgical history that includes hx intracranial aneurysm repair (2004); hx refractive surgery; hx orthopaedic (Right, 10/2014); hx ankle fracture tx (Left, 2013); hx ercp (02/27/2018); and hx cholecystectomy (02/28/2018). Social History/Living Environment:   Home Environment: Private residence  One/Two Story Residence: One story  Living Alone: No  Support Systems: Spouse/Significant Other/Partner  Patient Expects to be Discharged to[de-identified] Rehabilitation facility  Current DME Used/Available at Home: Commode, bedside, Grab bars, Shower chair  Tub or Shower Type: Tub/Shower combination  Prior Level of Function/Work/Activity:  See assessment     Number of Personal Factors/Comorbidities that affect the Plan of Care: Expanded review of therapy/medical records (1-2):  MODERATE COMPLEXITY   ASSESSMENT OF OCCUPATIONAL PERFORMANCE[de-identified]   Activities of Daily Living:   Basic ADLs (From Assessment) Complex ADLs (From Assessment)   Feeding: Setup  Oral Facial Hygiene/Grooming: Contact guard assistance  Bathing: Maximum assistance  Upper Body Dressing: Moderate assistance  Lower Body Dressing: Total assistance  Toileting: Total assistance Instrumental ADL  Meal Preparation: Total assistance  Homemaking: Total assistance   Grooming/Bathing/Dressing Activities of Daily Living   Grooming  Grooming Assistance: Set-up  Position Performed: Seated edge of bed  Washing Face: Stand-by assistance  Washing Hands: Stand-by assistance  Brushing Teeth: Stand-by assistance  Brushing/Combing Hair: Total assistance (dependent)(self limiting)     Upper Body Bathing  Bathing Assistance:  Moderate assistance  Position Performed: Seated edge of bed           Upper Body [address]: Maximum assistance       Bed/Mat Mobility  Supine to Sit: Maximum assistance;Assist x2  Sit to Supine: Maximum assistance;Assist x2  Sit to Stand: Maximum assistance;Assist x2  Stand to Sit: Maximum assistance;Assist x2  Bed to Chair: Maximum assistance(assist x's 3)     Most Recent Physical Functioning:   Gross Assessment:                  Posture:  Posture (WDL): Exceptions to WDL  Posture Assessment: Cervical, Forward head  Balance:  Sitting: Impaired  Sitting - Static: Good (unsupported)  Sitting - Dynamic: Fair (occasional) Bed Mobility:  Supine to Sit: Maximum assistance;Assist x2  Sit to Supine: Maximum assistance;Assist x2  Wheelchair Mobility:     Transfers:  Sit to Stand: Maximum assistance;Assist x2  Stand to Sit: Maximum assistance;Assist x2  Bed to Chair: Maximum assistance(assist x's 3)            Patient Vitals for the past 6 hrs:   BP BP Patient Position SpO2 Pulse   20 0719 135/87 At rest 97 % 100   20 1104 122/71 Sitting 96 % 86       Mental Status  Neurologic State: Alert  Orientation Level: Oriented X4  Cognition: Decreased command following  Perception: Verbal, Tactile  Perseveration: Tactile cues provided, Verbal cues provided  Safety/Judgement: Fall prevention                          Physical Skills Involved:  1. Balance  2. Strength  3. Activity Tolerance  4. Pain (acute) Cognitive Skills Affected (resulting in the inability to perform in a timely and safe manner):  1. Executive Function Psychosocial Skills Affected:  1. Environmental Adaptation   Number of elements that affect the Plan of Care: 5+:  HIGH COMPLEXITY   CLINICAL DECISION MAKIN [facility] [address] [address] AM-PAC 6 Clicks   Daily Activity Inpatient Short Form  How much help from another person does the patient currently need. .. Total A Lot A Little None   1. Putting on and taking off regular lower body clothing? [x] 1   [] 2   [] 3   [] 4   2. Bathing (including washing, rinsing, drying)? [] 1   [x] 2   [] 3   [] 4   3. Toileting, which includes using toilet, bedpan or urinal?   [x] 1   [] 2   [] 3   [] 4   4. Putting on and taking off regular upper body clothing? [] 1   [x] 2   [] 3   [] 4   5. Taking care of personal grooming such as brushing teeth? [] 1   [] 2   [x] 3   [] 4   6. Eating meals? [] 1   [] 2   [x] 3   [] 4   © 2007, Trustees of [address] [place] [address] [address] [address], under license to [employer]. All rights reserved      Score:  Initial: 12 Most Recent: X (Date: -- )    Interpretation of Tool:  Represents activities that are increasingly more difficult (i.e. Bed mobility, Transfers, Gait). Medical Necessity:     · Patient is expected to demonstrate progress in   · strength, balance, and functional technique  ·  to   · decrease assistance required with ADLs  · .  Reason for Services/Other Comments:  · Patient   · continues to require present interventions due to patient's inability to complete ADLs  · . Use of outcome tool(s) and clinical judgement create a POC that gives a: MODERATE COMPLEXITY         TREATMENT:   (In addition to Assessment/Re-Assessment sessions the following treatments were rendered)     Pre-treatment Symptoms/Complaints:    Pain: Initial:   Pain Intensity 1: 7  Pain Location 1: Leg  Pain Orientation 1: Right  Pain Intervention(s) 1: Repositioned  Post Session:  7     Today's treatment session addressed Decreased Strength, Decreased Transfer Abilities, Decreased Balance and Increased Pain to progress towards achieving goals. During this session,  Physical Therapy addressed  Functional Transfers to progress towards their discipline specific goal(s). Co-treatment was necessary to improve patient's ability to follow higher level commands, ability to increase activity demands and ability to return to normal functional activity. Self Care: (31 minutes): Procedure(s) (per grid) utilized to improve and/or restore self-care/home management as related to dressing, bathing, grooming and transfer training.  Required maximal verbal, manual and   cueing to facilitate activities of daily living skills. Braces/Orthotics/Lines/Etc:   · O2 Device: Nasal cannula  Treatment/Session Assessment:    · Response to Treatment:  no adverse reaction   · Interdisciplinary Collaboration:   o Physical Therapy Assistant  o Certified Occupational Therapy Assistant  o Registered Nurse  · After treatment position/precautions:   o Up in chair  o Bed/Chair-wheels locked  o Bed in low position  o Call light within reach  o RN notified   · Compliance with Program/Exercises: Will assess as treatment progresses. · Recommendations/Intent for next treatment session: \"Next visit will focus on advancements to more challenging activities and reduction in assistance provided\".   Total Treatment Duration:  OT Patient Time In/Time Out  Time In: [facility]  Time Out: 79 Amanda Guerrero

## 2020-09-28 NOTE — PROGRESS NOTES
RIGHT LE NWB   STG:  (1.)Ms. Renetta Montes De Oca will move from supine to sit and sit to supine , scoot up and down, and roll side to side with MINIMAL ASSIST within 4 treatment day(s). (2.)Ms. Renetta Montes De Oca will transfer from bed to chair and chair to bed with MINIMAL ASSIST using the least restrictive device within 4 treatment day(s). (3.)Ms. Renetta Montes De Oca will ambulate with MINIMAL ASSIST for 10 feet with the least restrictive device within 4 treatment day(s). LTG:  (1.)Ms. Renetta Montes De Oca will move from supine to sit and sit to supine , scoot up and down, and roll side to side in bed with CONTACT GUARD ASSIST within 7 treatment day(s). (2.)Ms. Renetta Montes De Oca will transfer from bed to chair and chair to bed with CONTACT GUARD ASSIST using the least restrictive device within 7 treatment day(s). (3.)Ms. Renetta Montes De Oca will ambulate with CONTACT GUARD ASSIST for 20 feet with the least restrictive device within 7 treatment day(s).   ________________________________________________________________________________________________      PHYSICAL THERAPY: Daily Note and PM 9/28/2020  INPATIENT: PT Visit Days : 3  Payor: Ariel Kaye / Plan: [address] / Product Type: [email or web address] Medicare /       NAME/AGE/GENDER: Kristina Noriega is a 61 y.o. female   PRIMARY DIAGNOSIS: Other fracture of right femur, initial encounter for closed fracture ([address] [S72.8X1A] Other fracture of right femur, initial encounter for closed fracture ([address] Other fracture of right femur, initial encounter for closed fracture ([address]  Procedure(s) (LRB):  Right FEMUR OPEN REDUCTION INTERNAL FIXATION (Right)  Right TIBIAL PLATEAU OPEN REDUCTION INTERNAL FIXATION (Right)  3 Days Post-Op  ICD-10: Treatment Diagnosis:    · Generalized Muscle Weakness (M62.81)  · Other lack of cordination (R27.8)  · Difficulty in walking, Not elsewhere classified (R26.2)  · Other abnormalities of gait and mobility (R26.89)   Precaution/Allergies:  Codeine and Lortab [hydrocodone-acetaminophen]      ASSESSMENT:     Ms. Lori Dey  is a pleasant, but reluctant disabled female presenting with above diagnosis who demonstrates with decreased transfers, ambulation and mobility below her prior functional baseline. Patient was sitting up in recliner chair upon contact and agreeable to PT> Patient is somewhat anxious about mobility and pain but does well with redirection and encouragement. Instructed patient in proper sit to stand technique but felt it unsafe to attempt with assist x 2 as she had difficulty managing/maneuvering RLE to maintain NWB status properly. Patient transfers to standing and performs SPT back to bed with max assist x 2 +1 maintaining NWB status on RLE. Patient returns to supine with max assist x 2. Patient then participates in therapeutic strengthening exercises to improve functional strength for transfers, gait and overall mobility. Patient requires cues and assistance to perform exercises correctly. Overall slow progress towards physical therapy goals. Patient's goals listed above are still appropriate. Will continue skilled PT to address remaining deficits. At this time, patient is appropriate for Co-treatment with occupational therapy when available due to patient's decreased overall endurance/tolerance levels, as well as need for high level skilled assistance to complete functional transfers/mobility and functional tasks. Elizabeth Loya is appropriate for a multidisciplinary co-treatment of PT and OT to address goals of both disciplines. This section established at most recent assessment   PROBLEM LIST (Impairments causing functional limitations):  1. Decreased Strength  2. Decreased ADL/Functional Activities  3. Decreased Transfer Abilities  4. Decreased Ambulation Ability/Technique  5. Decreased Balance  6. Increased Pain  7. Decreased Activity Tolerance  8.  Decreased Pacing Skills   INTERVENTIONS PLANNED: (Benefits and precautions of physical therapy have been discussed with the patient.)  1. Balance Exercise  2. Bed Mobility  3. Home Exercise Program (HEP)  4. Range of Motion (ROM)  5. Therapeutic Activites  6. Therapeutic Exercise/Strengthening  7. Transfer Training     TREATMENT PLAN: Frequency/Duration: up to twice daily for duration of hospital stay  Rehabilitation Potential For Stated Goals: [facility] (at time of discharge pending progress):    Placement: It is my opinion, based on this patient's performance to date, that Ms. Esthela Mora may benefit from intensive therapy at a [address] [place] [address] after discharge due to the functional deficits listed above that are likely to improve with skilled rehabilitation and concerns that he/she may be unsafe to be unsupervised at home due to balance and stability issues. .  Equipment:    None at this time              HISTORY:   History of Present Injury/Illness (Reason for Referral):  PER MD H&P   Samira Roberts is a 61 y.o. female who apparently fell there is some question on whether or not her  also maybe fell on her and injured her right knee. She has a history she says of having bad knees with arthritis. She does not know that she has had a previous fracture. Her pain x-rays and CT scan have revealed distal femur and proximal tibial plateau fractures of the right knee. She does have quite a bit of pain in her right knee. She denies other problems besides her right knee     Past Medical History/Comorbidities:   Ms. Esthela Mora  has a past medical history of Aneurysm of common carotid artery ([facility] [address] (2004), CKD (chronic kidney disease) (9/18/2014), Dementia ([address] FSGS (focal segmental glomerulosclerosis), Gout, Hypertension, Osteoarthritis (9/18/2014), and Stroke ([address] (2004, 2013).   Ms. Esthela Mora  has a past surgical history that includes hx intracranial aneurysm repair (2004); hx refractive surgery; hx orthopaedic (Right, 10/2014); hx ankle fracture tx (Left, 2013); hx ercp (02/27/2018); and hx cholecystectomy (02/28/2018). Social History/Living Environment:   Home Environment: Private residence  One/Two Story Residence: One story  Living Alone: No  Support Systems: Spouse/Significant Other/Partner  Patient Expects to be Discharged to[de-identified] Rehabilitation facility  Current DME Used/Available at Home: Commode, bedside, Grab bars, Shower chair  Tub or Shower Type: Tub/Shower combination  Prior Level of Function/Work/Activity:  Limited by prior comorbidities. Dominant Side:         RIGHT    Personal Factors:          Sex:  female        Age:  61 y.o. Number of Personal Factors/Comorbidities that affect the Plan of Care: 1-2: MODERATE COMPLEXITY   EXAMINATION:   Most Recent Physical Functioning:   Gross Assessment:                  Posture:     Balance:  Sitting: Impaired  Sitting - Static: Good (unsupported)  Sitting - Dynamic: Fair (occasional)  Standing: Impaired  Standing - Static: Poor  Standing - Dynamic : Poor Bed Mobility:  Supine to Sit: Maximum assistance;Assist x2  Sit to Supine: Maximum assistance;Assist x2  Wheelchair Mobility:     Transfers:  Sit to Stand: Maximum assistance;Assist x2(+1 to maintain NWB status)  Stand to Sit: Maximum assistance;Assist x2(+1 to maintain NWB status on RLE)  Bed to Chair: Maximum assistance;Assist x2(+ 1 to maintain NWB status on RLE)  Gait:            Body Structures Involved:  1. Bones  2. Joints  3. Muscles  4. Ligaments Body Functions Affected:  1. Neuromusculoskeletal  2. Movement Related  3. Skin Related  4. Metobolic/Endocrine Activities and Participation Affected:  1. General Tasks and Demands  2. Communication  3. Mobility  4. Self Care  5. Domestic Life  6.  Community, Social and [address] [place] [address]   Number of elements that affect the Plan of Care: 3: MODERATE COMPLEXITY   CLINICAL PRESENTATION:   Presentation: Evolving clinical presentation with changing clinical characteristics: MODERATE COMPLEXITY   CLINICAL DECISION MAKING:   HUSSEIN [employer] AM-PAC 6 Clicks   Basic Mobility Inpatient Short Form  How much difficulty does the patient currently have. .. Unable A Lot A Little None   1. Turning over in bed (including adjusting bedclothes, sheets and blankets)? [] 1   [x] 2   [] 3   [] 4   2. Sitting down on and standing up from a chair with arms ( e.g., wheelchair, bedside commode, etc.)   [x] 1   [] 2   [] 3   [] 4   3. Moving from lying on back to sitting on the side of the bed? [] 1   [x] 2   [] 3   [] 4   How much help from another person does the patient currently need. .. Total A Lot A Little None   4. Moving to and from a bed to a chair (including a wheelchair)? [x] 1   [] 2   [] 3   [] 4   5. Need to walk in hospital room? [x] 1   [] 2   [] 3   [] 4   6. Climbing 3-5 steps with a railing? [x] 1   [] 2   [] 3   [] 4   © 2007, Trustees of [address] [place] [address] [address] [address], under license to [employer]. All rights reserved      Score:  Initial: 8 Most Recent: X (Date: -- )    Interpretation of Tool:  Represents activities that are increasingly more difficult (i.e. Bed mobility, Transfers, Gait). Medical Necessity:     · Patient demonstrates   · fair  ·  rehab potential due to higher previous functional level. Reason for Services/Other Comments:  · Patient continues to require skilled intervention due to   · medical complications, patient unable to attend/participate in therapy as expected, and right LE NWB and prior comorbidities and debility. · .   Use of outcome tool(s) and clinical judgement create a POC that gives a: Questionable prediction of patient's progress: MODERATE COMPLEXITY            TREATMENT:   (In addition to Assessment/Re-Assessment sessions the following treatments were rendered)   Pre-treatment Symptoms/Complaints:   None  Pain: Initial:   Pain Intensity 1: 0  Post Session:          Therapeutic Activity: (    23  mins):   Therapeutic activities including bed mobility training, transfer training, static/dynamic sitting/standing balance training, instruction in sequencing with rolling walker/walker manipulation, scooting, posture training, education on NWB status on RLE, supine LE exercises and patient education to improve mobility, strength, balance, and coordination. Required moderate verbal, tactile and manual cues   to promote static and dynamic balance in standing and promote coordination of bilateral, lower extremity(s). Date:  9/26  Date:   Date:     Activity/Exercise Parameters Parameters Parameters   AP's  10 x's      HIP ABD  10 x's      Quad sets  10 x's      Glute sets  10 x's      Left LE LAQ's.    10 x's                    Braces/Orthotics/Lines/Etc:   · O2 Device: Nasal cannula  Treatment/Session Assessment:    · Response to Treatment:  See above  · Interdisciplinary Collaboration:   o Physical Therapy Assistant  o Registered Nurse  o Rehabilitation Attendant  · After treatment position/precautions:   o Supine in bed  o Bed alarm/tab alert on  o Bed/Chair-wheels locked  o Bed in low position  o Call light within reach  o RN notified  o Family at bedside   · Compliance with Program/Exercises: Will assess as treatment progresses  · Recommendations/Intent for next treatment session: \"Next visit will focus on advancements to more challenging activities, reduction in assistance provided, and OOB pivot or slide per patient toleration. \".   Total Treatment Duration:  PT Patient Time In/Time Out  Time In: 1875  Time Out: [address]

## 2020-09-28 NOTE — PROGRESS NOTES
Hospitalist Progress Note    2020  Admit Date: 2020  6:33 PM   NAME: [place] Son   :  1960   MRN:  [id number]   Attending: Kenny Neely DO  PCP:  Susan Ngo MD    SUBJECTIVE:   Patient is a 61year old CF with a PMHx of HTN, CKD s/s FSGS, dementia, CVA and EtOH presented to the ED after a fall when her  fell onto her. In ED, XR of R hip shows fracture of distal femur and possible fracture of proximal tibia. CT RLE confirmed distal femur fracture and a depressed fracture of the lateral tibial plateau. CT pelvis showed an intact right total hip arthroplasty w/o evidence of fracture in either hip. Head CT negative. Ortho service was consulted. Pt has no complaints this morning. No events overnight. Working with PT. Stated that she has been urinating well so will d/c IVF. Waiting for SNF. Denies fever, chills, SOB, chest pain, abdominal pain, N/V, dysuria, hematuria. Review of Systems negative with exception of pertinent positives noted above  PHYSICAL EXAM     Visit Vitals  /64 (BP 1 Location: Right arm, BP Patient Position: At rest)   Pulse 99   Temp 98.3 °F (36.8 °C)   Resp 16   Ht 5' 5\" (1.651 m)   Wt 88.5 kg (195 lb)   SpO2 94%   BMI 32.45 kg/m²      Temp (24hrs), Av.4 °F (36.9 °C), Min:98 °F (36.7 °C), Max:99 °F (37.2 °C)    Oxygen Therapy  O2 Sat (%): 94 % (20)  Pulse via Oximetry: 90 beats per minute (20)  O2 Device: Nasal cannula (20)  O2 Flow Rate (L/min): 2 l/min (20)    Intake/Output Summary (Last 24 hours) at 2020  Last data filed at 2020  Gross per 24 hour   Intake    Output 1450 ml   Net -1450 ml      General: No acute distress. Lungs:  CTA Bilaterally. Heart:  Regular rate and rhythm,  No murmur, rub, or gallop  Abdomen: Soft, Non distended, Non tender, Positive bowel sounds  Extremities: No cyanosis, clubbing or edema. RLE TTP. No peripheral edema. Pulses intact. IV line in L neck. R LE wrapped with drain in place, clean dry and intact. Neurologic:  No focal deficits    XR KNEE RT MAX 2 VWS   Final Result   Impression:      Postoperative changes with extensive hardware placement. XR TIB/FIB RT   Final Result   Impression: Intraoperative spot films         NC XR TECHNOLOGIST SERVICE    (Results Pending)         [address] Problems    Diagnosis Date Noted    SIRS (systemic inflammatory response syndrome) ([facility] 09/23/2020    Acute kidney injury superimposed on chronic kidney disease ([facility] 09/23/2020    Other fracture of right femur, initial encounter for closed fracture ([facility] 09/23/2020    Femur fracture, right ([facility] 09/23/2020    Leukocytosis     Hypertension 05/18/2018    Alcohol abuse 02/13/2018    Bipolar disorder ([facility] 10/22/2014    CKD (chronic kidney disease) 09/18/2014    FSGS (focal segmental glomerulosclerosis) 09/11/2012     Plan:    Right femur fracture  XR of R hip shows fracture of distal femur and possible fracture of proximal tibia. CT RLE confirmed distal femur fracture and a depressed fracture of the lateral tibial plateau. Ortho consulted: s/p ORIF of R hip and ORIF of R tibial on 9/25  Pain control: Dilaudid and Gris prn  -F/u ortho recs--drain d/c. Keep in knee immobilizer. ASA 325mg every day. Cont PT. Pending SNF for discharge. Acute blood loss anemia post-op  -Hgb 11.2>8.8>7.8>7.5>7.3  -Continue to monitor. Transfuse if hgb <7 or symptomatic  -AM CBC    AoCKD  -CKD 2/2 Focal segmental glomerulosclerosis (FSGS)  -Bl Cr <3. Was 3.57 on admission. Improved on mIVF. -D/c mIVF. F/u AM BMP. Leukocytosis, resolved  -WBC 21.6 with LA 2.3 on admission. Meet SIRS criteria but no sepsis, negative qSOFA. No obvious signs of infection and pt well appearing. -CXR and UA unremarkable. -Blood Cx Positive x2: MRSA  -Started on Vanc (9/24-9/28). Received Ancef for surgery ppx. D/c ceftriaxone started in ED.  -WBC 21.6>11.8>10.3. WBC and LA normalized after IVF  -? BCx Contaminant, however with pt undergoing orthopedic surgery with hardware placement, ID consulted for further guidance. -D/c Abx today. BCx appears to be contaminant. Chronic medical problems:  Alcohol Use: EtOH negative on admission. Monitor for signs of withdrawals. Anxiety/depression: Cont home Lexapro.  stated this is the only med she is taking for her anxiety.   GERD: Cont home PPI    DVT Prophylaxis: SCD's  Dispo: most likely rehab    Signed By: Tamar Feldman DO     September 28, 2020

## 2020-09-28 NOTE — PROGRESS NOTES
September 28, 2020         Post Op day: 3 Days Post-Op Procedure(s) (LRB):  Right FEMUR OPEN REDUCTION INTERNAL FIXATION (Right)  Right TIBIAL PLATEAU OPEN REDUCTION INTERNAL FIXATION (Right)      Admit Date: 9/23/2020  Admit Diagnosis: Other fracture of right femur, initial encounter for closed fracture ([facility] [S72.8X1A]       Principle Problem: Other fracture of right femur, initial encounter for closed fracture ([facility] Subjective: Doing well, No complaints, No SOB, No Chest Pain, No Nausea or Vomiting     Objective:   Vital Signs are Stable, No Acute Distress, Alert,  Dressing is saturated,  Neurovascular exam is normal.     Assessment / Plan :  Patient Active Problem List   Diagnosis Code    Essential hypertension I10    Cerebral infarction ([facility] I63.9    FSGS (focal segmental glomerulosclerosis) N05.1    Gout M10.9    Weakness of right leg R29.898    CKD (chronic kidney disease) N18.9    Osteoarthritis M19.90    S/P total hip arthroplasty Z96.649    Bipolar disorder ([facility] F31.9    Gait instability R26.81    Hypokalemia E87.6    OSMIN (acute kidney injury) ([facility] N17.9    Acute metabolic encephalopathy D53.97    Alcohol abuse F10.10    Falls W19. Federico Claude    Vascular dementia ([facility] F01.50    Hypomagnesemia E83.42    Hypophosphatemia E83.39    Depression F32.9    Cholecystitis K81.9    Cholelithiasis K80.20    Bacteremia due to Gram-negative bacteria R78.81    Alcohol withdrawal ([facility]) F10.239    Alcoholism ([facility]) F10.20    Hypertension I10    C. difficile colitis A04.72    Femur fracture, right ([facility]) S72.91XA    Dementia ([facility]) F03.90    Leukocytosis D72.829    Other fracture of right femur, initial encounter for closed fracture ([facility]) S72.8X1A    SIRS (systemic inflammatory response syndrome) ([facility]) R65.10    Acute kidney injury superimposed on chronic kidney disease ([facility]) N17.9, N18.9      Patient Vitals for the past 8 hrs:   BP Temp Pulse Resp SpO2   09/28/20 0719 135/87 99.3 °F (37.4 °C) 100 16 97 %   20 0525 138/64 98.3 °F (36.8 °C) 99 16 94 %    Temp (24hrs), Av.4 °F (36.9 °C), Min:98 °F (36.7 °C), Max:99.3 °F (37.4 °C)    Body mass index is 32.45 kg/m². Lab Results   Component Value Date/Time    HGB 7.3 (L) 2020 04:59 AM        S/P ORIF right medial femoral condyle and tibial plateau fracture: dressing change today.  Keep in knee immobilizer  Medical Mgmt per hospitalist  Anticoagulation plan: ASA 325mg daily   Continue PT  Fall Precautions  DC disp: rehab placement        Signed By: YUNIOR Brower  2020,  8:20 AM

## 2020-09-28 NOTE — PROGRESS NOTES
The documentation for this period is being entered following the guidelines as defined in the [facility] policy by Zeb Primrose, RN.    [id number]

## 2020-09-28 NOTE — PROGRESS NOTES
RIGHT LE NWB   STG:  (1.)Ms. Terence Malone will move from supine to sit and sit to supine , scoot up and down, and roll side to side with MINIMAL ASSIST within 4 treatment day(s). (2.)Ms. Terence Malone will transfer from bed to chair and chair to bed with MINIMAL ASSIST using the least restrictive device within 4 treatment day(s). (3.)Ms. Terence Malone will ambulate with MINIMAL ASSIST for 10 feet with the least restrictive device within 4 treatment day(s). LTG:  (1.)Ms. Terence Malone will move from supine to sit and sit to supine , scoot up and down, and roll side to side in bed with CONTACT GUARD ASSIST within 7 treatment day(s). (2.)Ms. Terence Malone will transfer from bed to chair and chair to bed with CONTACT GUARD ASSIST using the least restrictive device within 7 treatment day(s). (3.)Ms. Terence Malone will ambulate with CONTACT GUARD ASSIST for 20 feet with the least restrictive device within 7 treatment day(s).   ________________________________________________________________________________________________      PHYSICAL THERAPY: Daily Note and AM 9/28/2020  INPATIENT: PT Visit Days : 3  Payor: Mariah Phillips / Plan: [address] / Product Type: [employer] /       NAME/AGE/GENDER: Tomi Pérez is a 61 y.o. female   PRIMARY DIAGNOSIS: Other fracture of right femur, initial encounter for closed fracture ([address] [S72.8X1A] Other fracture of right femur, initial encounter for closed fracture ([address] Other fracture of right femur, initial encounter for closed fracture ([address]  Procedure(s) (LRB):  Right FEMUR OPEN REDUCTION INTERNAL FIXATION (Right)  Right TIBIAL PLATEAU OPEN REDUCTION INTERNAL FIXATION (Right)  3 Days Post-Op  ICD-10: Treatment Diagnosis:    · Generalized Muscle Weakness (M62.81)  · Other lack of cordination (R27.8)  · Difficulty in walking, Not elsewhere classified (R26.2)  · Other abnormalities of gait and mobility (R26.89)   Precaution/Allergies:  Codeine and Lortab [hydrocodone-acetaminophen]      ASSESSMENT:     Ms. Rosario Hathaway  is a pleasant, but reluctant disabled female presenting with above diagnosis who demonstrates with decreased transfers, ambulation and mobility below her prior functional baseline. Patient was supine upon contact and agreeable to PT. Patient seen in conjunction with occupational therapist for co-treatment due to complex nature of patient's physical and medical deficits requiring assistance from multiple skilled disciplines. Patient performs supine to sit with max assist x 2, additional time, and cues for improved/proper technique. Patient sits EOB working on posture, sitting balance, and activity tolerance. Instructed patient in proper sit to stand technique but felt it unsafe to attempt with assist x 2 as she had difficulty managing/maneuvering RLE. Patient transfers to standing and performs SPT to recliner chair with max assist x 2 +1 maintaining NWB status on RLE. Patient was positioned for comfort in recliner chair. Overall slow progress towards physical therapy goals. Patient's goals listed above are still appropriate. Will continue skilled PT to address remaining deficits. At this time, patient is appropriate for Co-treatment with occupational therapy when available due to patient's decreased overall endurance/tolerance levels, as well as need for high level skilled assistance to complete functional transfers/mobility and functional tasks. Elizabeth Ibanez is appropriate for a multidisciplinary co-treatment of PT and OT to address goals of both disciplines. This section established at most recent assessment   PROBLEM LIST (Impairments causing functional limitations):  1. Decreased Strength  2. Decreased ADL/Functional Activities  3. Decreased Transfer Abilities  4. Decreased Ambulation Ability/Technique  5. Decreased Balance  6. Increased Pain  7. Decreased Activity Tolerance  8.  Decreased Pacing Skills   INTERVENTIONS PLANNED: (Benefits and precautions of physical therapy have been discussed with the patient.)  1. Balance Exercise  2. Bed Mobility  3. Home Exercise Program (HEP)  4. Range of Motion (ROM)  5. Therapeutic Activites  6. Therapeutic Exercise/Strengthening  7. Transfer Training     TREATMENT PLAN: Frequency/Duration: up to twice daily for duration of hospital stay  Rehabilitation Potential For Stated Goals: [facility] (at time of discharge pending progress):    Placement: It is my opinion, based on this patient's performance to date, that Ms. Rosaura Cartagena may benefit from intensive therapy at a [address] [place] [address] after discharge due to the functional deficits listed above that are likely to improve with skilled rehabilitation and concerns that he/she may be unsafe to be unsupervised at home due to balance and stability issues. .  Equipment:    None at this time              HISTORY:   History of Present Injury/Illness (Reason for Referral):  PER MD H&P   Mario Reyes is a 61 y.o. female who apparently fell there is some question on whether or not her  also maybe fell on her and injured her right knee. She has a history she says of having bad knees with arthritis. She does not know that she has had a previous fracture. Her pain x-rays and CT scan have revealed distal femur and proximal tibial plateau fractures of the right knee. She does have quite a bit of pain in her right knee. She denies other problems besides her right knee     Past Medical History/Comorbidities:   Ms. Rosaura Cartagena  has a past medical history of Aneurysm of common carotid artery ([address] (2004), CKD (chronic kidney disease) (9/18/2014), Dementia ([address] FSGS (focal segmental glomerulosclerosis), Gout, Hypertension, Osteoarthritis (9/18/2014), and Stroke ([address] (2004, 2013).   Ms. Rosaura Cartagena  has a past surgical history that includes hx intracranial aneurysm repair (2004); hx refractive surgery; hx orthopaedic (Right, 10/2014); hx ankle fracture tx (Left, 2013); hx ercp (2018); and hx cholecystectomy (2018). Social History/Living Environment:   Home Environment: Private residence  One/Two Story Residence: One story  Living Alone: No  Support Systems: Spouse/Significant Other/Partner  Patient Expects to be Discharged to[de-identified] Rehabilitation facility  Current DME Used/Available at Home: Commode, bedside, Grab bars, Shower chair  Tub or Shower Type: Tub/Shower combination  Prior Level of Function/Work/Activity:  Limited by prior comorbidities. Dominant Side:         RIGHT    Personal Factors:          Sex:  female        Age:  61 y.o. Number of Personal Factors/Comorbidities that affect the Plan of Care: 1-2: MODERATE COMPLEXITY   EXAMINATION:   Most Recent Physical Functioning:   Gross Assessment:                  Posture:     Balance:  Sitting: Impaired  Sitting - Static: Good (unsupported)  Sitting - Dynamic: Fair (occasional)  Standing: Impaired  Standing - Static: Poor  Standing - Dynamic : Poor Bed Mobility:  Supine to Sit: Maximum assistance;Assist x2  Wheelchair Mobility:     Transfers:  Sit to Stand: Maximum assistance;Assist x2(+1 to maintain NWB status)  Stand to Sit: Maximum assistance;Assist x2(+1 to maintain NWB status on RLE)  Bed to Chair: Maximum assistance;Assist x2(+ 1 to maintain NWB status on RLE)  Gait:            Body Structures Involved:  1. Bones  2. Joints  3. Muscles  4. Ligaments Body Functions Affected:  1. Neuromusculoskeletal  2. Movement Related  3. Skin Related  4. Metobolic/Endocrine Activities and Participation Affected:  1. General Tasks and Demands  2. Communication  3. Mobility  4. Self Care  5. Domestic Life  6.  Community, Social and [place]   Number of elements that affect the Plan of Care: 3: MODERATE COMPLEXITY   CLINICAL PRESENTATION:   Presentation: Evolving clinical presentation with changing clinical characteristics: MODERATE COMPLEXITY   CLINICAL DECISION MAKIN [facility] [address] [id number] AM-PAC 6 Clicks   Basic Mobility Inpatient Short Form  How much difficulty does the patient currently have. .. Unable A Lot A Little None   1. Turning over in bed (including adjusting bedclothes, sheets and blankets)? [] 1   [x] 2   [] 3   [] 4   2. Sitting down on and standing up from a chair with arms ( e.g., wheelchair, bedside commode, etc.)   [x] 1   [] 2   [] 3   [] 4   3. Moving from lying on back to sitting on the side of the bed? [] 1   [x] 2   [] 3   [] 4   How much help from another person does the patient currently need. .. Total A Lot A Little None   4. Moving to and from a bed to a chair (including a wheelchair)? [x] 1   [] 2   [] 3   [] 4   5. Need to walk in hospital room? [x] 1   [] 2   [] 3   [] 4   6. Climbing 3-5 steps with a railing? [x] 1   [] 2   [] 3   [] 4   © 2007, Trustees of [address] [place] [address] [address] [id number], under license to [employer]. All rights reserved      Score:  Initial: 8 Most Recent: X (Date: -- )    Interpretation of Tool:  Represents activities that are increasingly more difficult (i.e. Bed mobility, Transfers, Gait). Medical Necessity:     · Patient demonstrates   · fair  ·  rehab potential due to higher previous functional level. Reason for Services/Other Comments:  · Patient continues to require skilled intervention due to   · medical complications, patient unable to attend/participate in therapy as expected, and right LE NWB and prior comorbidities and debility. · .   Use of outcome tool(s) and clinical judgement create a POC that gives a: Questionable prediction of patient's progress: MODERATE COMPLEXITY            TREATMENT:   (In addition to Assessment/Re-Assessment sessions the following treatments were rendered)   Pre-treatment Symptoms/Complaints:    \"Wait a minute\" and \"I can't\"  Pain: Initial:      Post Session:          Therapeutic Activity: (    31  mins):   Therapeutic activities including bed mobility training, transfer training, static/dynamic sitting/standing balance training, instruction in sequencing with rolling walker/walker manipulation, scooting, posture training, education on NWB status on RLE, and patient education to improve mobility, strength, balance, and coordination. Required moderate verbal, tactile and manual cues   to promote static and dynamic balance in standing and promote coordination of bilateral, lower extremity(s). Date:  9/26  Date:   Date:     Activity/Exercise Parameters Parameters Parameters   AP's  10 x's      HIP ABD  10 x's      Quad sets  10 x's      Glute sets  10 x's      Left LE LAQ's.    10 x's                    Braces/Orthotics/Lines/Etc:   · O2 Device: Nasal cannula  Treatment/Session Assessment:    · Response to Treatment:  See above  · Interdisciplinary Collaboration:   o Physical Therapy Assistant  o Certified Occupational Therapy Assistant  o Registered Nurse  · After treatment position/precautions:   o Up in chair  o Bed alarm/tab alert on  o Bed/Chair-wheels locked  o Bed in low position  o Call light within reach  o RN notified   · Compliance with Program/Exercises: Will assess as treatment progresses  · Recommendations/Intent for next treatment session: \"Next visit will focus on advancements to more challenging activities, reduction in assistance provided, and OOB pivot or slide per patient toleration. \".   Total Treatment Duration:  PT Patient Time In/Time Out  Time In: 0944  Time Out: Ailyn Abbott PTA

## 2020-09-29 VITALS
OXYGEN SATURATION: 97 % | WEIGHT: 195 LBS | RESPIRATION RATE: 20 BRPM | HEART RATE: 98 BPM | SYSTOLIC BLOOD PRESSURE: 146 MMHG | BODY MASS INDEX: 32.49 KG/M2 | TEMPERATURE: 98.5 F | HEIGHT: 65 IN | DIASTOLIC BLOOD PRESSURE: 82 MMHG

## 2020-09-29 LAB
ANION GAP SERPL CALC-SCNC: 10 MMOL/L (ref 7–16)
BASOPHILS # BLD: 0.1 K/UL (ref 0–0.2)
BASOPHILS NFR BLD: 1 % (ref 0–2)
BUN SERPL-MCNC: 27 MG/DL (ref 6–23)
CALCIUM SERPL-MCNC: 8.7 MG/DL (ref 8.3–10.4)
CHLORIDE SERPL-SCNC: 108 MMOL/L (ref 98–107)
CO2 SERPL-SCNC: 23 MMOL/L (ref 21–32)
CREAT SERPL-MCNC: 3.21 MG/DL (ref 0.6–1)
DIFFERENTIAL METHOD BLD: ABNORMAL
EOSINOPHIL # BLD: 0.5 K/UL (ref 0–0.8)
EOSINOPHIL NFR BLD: 5 % (ref 0.5–7.8)
ERYTHROCYTE [DISTWIDTH] IN BLOOD BY AUTOMATED COUNT: 12.6 % (ref 11.9–14.6)
GLUCOSE BLD STRIP.AUTO-MCNC: 104 MG/DL (ref 65–100)
GLUCOSE BLD STRIP.AUTO-MCNC: 114 MG/DL (ref 65–100)
GLUCOSE SERPL-MCNC: 90 MG/DL (ref 65–100)
HCT VFR BLD AUTO: 24.2 % (ref 35.8–46.3)
HGB BLD-MCNC: 7.5 G/DL (ref 11.7–15.4)
IMM GRANULOCYTES # BLD AUTO: 0.1 K/UL (ref 0–0.5)
IMM GRANULOCYTES NFR BLD AUTO: 1 % (ref 0–5)
LYMPHOCYTES # BLD: 1.2 K/UL (ref 0.5–4.6)
LYMPHOCYTES NFR BLD: 13 % (ref 13–44)
MCH RBC QN AUTO: 28.5 PG (ref 26.1–32.9)
MCHC RBC AUTO-ENTMCNC: 31 G/DL (ref 31.4–35)
MCV RBC AUTO: 92 FL (ref 79.6–97.8)
MONOCYTES # BLD: 0.8 K/UL (ref 0.1–1.3)
MONOCYTES NFR BLD: 8 % (ref 4–12)
NEUTS SEG # BLD: 6.9 K/UL (ref 1.7–8.2)
NEUTS SEG NFR BLD: 72 % (ref 43–78)
NRBC # BLD: 0 K/UL (ref 0–0.2)
PLATELET # BLD AUTO: 245 K/UL (ref 150–450)
PLATELET COMMENTS,PCOM: ADEQUATE
PMV BLD AUTO: 10.5 FL (ref 9.4–12.3)
POTASSIUM SERPL-SCNC: 3.5 MMOL/L (ref 3.5–5.1)
RBC # BLD AUTO: 2.63 M/UL (ref 4.05–5.2)
RBC MORPH BLD: ABNORMAL
SODIUM SERPL-SCNC: 141 MMOL/L (ref 136–145)
WBC # BLD AUTO: 9.6 K/UL (ref 4.3–11.1)
WBC MORPH BLD: ABNORMAL

## 2020-09-29 PROCEDURE — 74011250637 HC RX REV CODE- 250/637: Performed by: ORTHOPAEDIC SURGERY

## 2020-09-29 PROCEDURE — 97530 THERAPEUTIC ACTIVITIES: CPT

## 2020-09-29 PROCEDURE — 87635 SARS-COV-2 COVID-19 AMP PRB: CPT

## 2020-09-29 PROCEDURE — 85025 COMPLETE CBC W/AUTO DIFF WBC: CPT

## 2020-09-29 PROCEDURE — 80048 BASIC METABOLIC PNL TOTAL CA: CPT

## 2020-09-29 PROCEDURE — 74011250636 HC RX REV CODE- 250/636: Performed by: ORTHOPAEDIC SURGERY

## 2020-09-29 PROCEDURE — 82962 GLUCOSE BLOOD TEST: CPT

## 2020-09-29 PROCEDURE — 74011250637 HC RX REV CODE- 250/637: Performed by: INTERNAL MEDICINE

## 2020-09-29 PROCEDURE — 97110 THERAPEUTIC EXERCISES: CPT

## 2020-09-29 PROCEDURE — 97535 SELF CARE MNGMENT TRAINING: CPT

## 2020-09-29 PROCEDURE — 36415 COLL VENOUS BLD VENIPUNCTURE: CPT

## 2020-09-29 RX ORDER — POLYETHYLENE GLYCOL 3350 17 G/17G
17 POWDER, FOR SOLUTION ORAL ONCE
Status: COMPLETED | OUTPATIENT
Start: 2020-09-29 | End: 2020-09-29

## 2020-09-29 RX ORDER — OXYCODONE HYDROCHLORIDE 5 MG/1
5 TABLET ORAL
Qty: 12 TAB | Refills: 0 | Status: SHIPPED | OUTPATIENT
Start: 2020-09-29 | End: 2020-10-02

## 2020-09-29 RX ORDER — ASPIRIN 325 MG
325 TABLET ORAL DAILY
Qty: 30 TAB | Refills: 0 | Status: SHIPPED
Start: 2020-09-30

## 2020-09-29 RX ADMIN — ACETAMINOPHEN 650 MG: 325 TABLET, FILM COATED ORAL at 08:31

## 2020-09-29 RX ADMIN — ESCITALOPRAM OXALATE 10 MG: 10 TABLET ORAL at 08:31

## 2020-09-29 RX ADMIN — OXYCODONE 10 MG: 5 TABLET ORAL at 08:31

## 2020-09-29 RX ADMIN — HYDROMORPHONE HYDROCHLORIDE 0.5 MG: 1 INJECTION, SOLUTION INTRAMUSCULAR; INTRAVENOUS; SUBCUTANEOUS at 10:13

## 2020-09-29 RX ADMIN — Medication 5 ML: at 05:58

## 2020-09-29 RX ADMIN — POLYETHYLENE GLYCOL 3350 17 G: 17 POWDER, FOR SOLUTION ORAL at 10:14

## 2020-09-29 RX ADMIN — PANTOPRAZOLE SODIUM 40 MG: 40 TABLET, DELAYED RELEASE ORAL at 05:57

## 2020-09-29 RX ADMIN — ASPIRIN 325 MG ORAL TABLET 325 MG: 325 PILL ORAL at 08:31

## 2020-09-29 RX ADMIN — Medication 1 TABLET: at 11:38

## 2020-09-29 RX ADMIN — OXYCODONE 10 MG: 5 TABLET ORAL at 05:56

## 2020-09-29 RX ADMIN — Medication 1 TABLET: at 08:30

## 2020-09-29 NOTE — PROGRESS NOTES
September 29, 2020         Post Op day: 4 Days Post-Op Procedure(s) (LRB):  Right FEMUR OPEN REDUCTION INTERNAL FIXATION (Right)  Right TIBIAL PLATEAU OPEN REDUCTION INTERNAL FIXATION (Right)      Admit Date: 9/23/2020  Admit Diagnosis: Other fracture of right femur, initial encounter for closed fracture ([facility] [S72.8X1A]       Principle Problem: Other fracture of right femur, initial encounter for closed fracture ([facility] Subjective: Doing well, No complaints, No SOB, No Chest Pain, No Nausea or Vomiting     Objective:   Vital Signs are Stable, No Acute Distress, Alert,  Dressing is Dry,  Neurovascular exam is normal.     Assessment / Plan :  Patient Active Problem List   Diagnosis Code    Essential hypertension I10    Cerebral infarction ([facility] I63.9    FSGS (focal segmental glomerulosclerosis) N05.1    Gout M10.9    Weakness of right leg R29.898    CKD (chronic kidney disease) N18.9    Osteoarthritis M19.90    S/P total hip arthroplasty Z96.649    Bipolar disorder ([facility] F31.9    Gait instability R26.81    Hypokalemia E87.6    OSMIN (acute kidney injury) ([facility] N17.9    Acute metabolic encephalopathy Q01.48    Alcohol abuse F10.10    Falls W19. Mirlande Juniper    Vascular dementia ([facility] F01.50    Hypomagnesemia E83.42    Hypophosphatemia E83.39    Depression F32.9    Cholecystitis K81.9    Cholelithiasis K80.20    Bacteremia due to Gram-negative bacteria R78.81    Alcohol withdrawal ([facility]) F10.239    Alcoholism ([facility]) F10.20    Hypertension I10    C. difficile colitis A04.72    Femur fracture, right ([facility]) S72.91XA    Dementia ([facility]) F03.90    Leukocytosis D72.829    Other fracture of right femur, initial encounter for closed fracture ([facility]) S72.8X1A    SIRS (systemic inflammatory response syndrome) ([facility]) R65.10    Acute kidney injury superimposed on chronic kidney disease ([facility]) N17.9, N18.9      Patient Vitals for the past 8 hrs:   BP Temp Pulse Resp SpO2   09/29/20 0731 (!) 159/90 99 °F (37.2 °C) (!) 108 17 96 %   20 0406 134/87 98.1 °F (36.7 °C) 93 18 98 %    Temp (24hrs), Av.3 °F (36.8 °C), Min:98.1 °F (36.7 °C), Max:99 °F (37.2 °C)    Body mass index is 32.45 kg/m². Lab Results   Component Value Date/Time    HGB 7.3 (L) 2020 04:59 AM          S/P ORIF right medial femoral condyle and tibial plateau fracture: dressing change today.  Keep in knee immobilizer  Medical Mgmt per hospitalist  Anticoagulation plan: ASA 325mg daily   Continue PT  Fall Precautions  DC disp: rehab placement      Signed By: YUNIOR Nino  2020,  8:06 AM

## 2020-09-29 NOTE — PROGRESS NOTES
Problem: Falls - Risk of  Goal: *Absence of Falls  Description: Document Cookie Rosenthal Fall Risk and appropriate interventions in the flowsheet. Outcome: Resolved/Met  Note: Fall Risk Interventions:  Mobility Interventions: Bed/chair exit alarm, Patient to call before getting OOB         Medication Interventions: Patient to call before getting OOB    Elimination Interventions: Bed/chair exit alarm, Call light in reach, Patient to call for help with toileting needs, Toileting schedule/hourly rounds    History of Falls Interventions: Bed/chair exit alarm, Consult care management for discharge planning, Door open when patient unattended, Room close to nurse's station         Problem: Patient Education: Go to Patient Education Activity  Goal: Patient/Family Education  Outcome: Resolved/Met     Problem: Pressure Injury - Risk of  Goal: *Prevention of pressure injury  Description: Document Luis Scale and appropriate interventions in the flowsheet.   Outcome: Resolved/Met  Note: Pressure Injury Interventions:  Sensory Interventions: Assess changes in LOC    Moisture Interventions: Absorbent underpads    Activity Interventions: Increase time out of bed, Pressure redistribution bed/mattress(bed type), PT/OT evaluation    Mobility Interventions: Float heels, Pressure redistribution bed/mattress (bed type), PT/OT evaluation    Nutrition Interventions: Document food/fluid/supplement intake    Friction and Shear Interventions: HOB 30 degrees or less                Problem: Lower Extremity Fracture:Discharge Outcomes  Goal: *Hemodynamically stable  Outcome: Resolved/Met  Goal: *Modified independence with transfers, ambulation on levels with assistance devices, stair climbing, ADL's  Outcome: Resolved/Met  Goal: *Independent with active exercises  Outcome: Resolved/Met  Goal: *Describes follow-up/return visits to physicians  Outcome: Resolved/Met  Goal: *Tolerating diet  Outcome: Resolved/Met  Goal: *Optimal pain control at patient's stated goal  Outcome: Resolved/Met  Goal: *Adequate air exchange  Outcome: Resolved/Met  Goal: *Lungs clear or at baseline  Outcome: Resolved/Met  Goal: *Afebrile  Outcome: Resolved/Met  Goal: *Incision intact without signs of infection, redness, warmth  Outcome: Resolved/Met  Goal: *Absence of deep venous thrombosis signs and symptoms(Stroke Metric)  Outcome: Resolved/Met  Goal: *Active bowel function  Outcome: Resolved/Met  Goal: *Adequate urinary output  Outcome: Resolved/Met  Goal: *Discharge anxiety minimal  Outcome: Resolved/Met  Goal: *Describes available resources and support systems  Outcome: Resolved/Met     Problem: Pain  Goal: *Control of Pain  Outcome: Resolved/Met

## 2020-09-29 NOTE — PROGRESS NOTES
TRANSFER - OUT REPORT:    Verbal report given to Esteban Webster on Nori Magana  being transferred to [facility] for routine progression of care       Report consisted of patients Situation, Background, Assessment and   Recommendations(SBAR). Information from the following report(s) SBAR, Kardex and MAR was reviewed with the receiving nurse. Opportunity for questions and clarification was provided.

## 2020-09-29 NOTE — PROGRESS NOTES
RIGHT LE NWB   STG:  (1.)Ms. Nemesio Lomas will move from supine to sit and sit to supine , scoot up and down, and roll side to side with MINIMAL ASSIST within 4 treatment day(s). (2.)Ms. Nemesio Lomas will transfer from bed to chair and chair to bed with MINIMAL ASSIST using the least restrictive device within 4 treatment day(s). (3.)Ms. Nemesio Lomas will ambulate with MINIMAL ASSIST for 10 feet with the least restrictive device within 4 treatment day(s). LTG:  (1.)Ms. Nemesio Lomas will move from supine to sit and sit to supine , scoot up and down, and roll side to side in bed with CONTACT GUARD ASSIST within 7 treatment day(s). (2.)Ms. Nemesio Lomas will transfer from bed to chair and chair to bed with CONTACT GUARD ASSIST using the least restrictive device within 7 treatment day(s). (3.)Ms. Nemesio Lomas will ambulate with CONTACT GUARD ASSIST for 20 feet with the least restrictive device within 7 treatment day(s).   ________________________________________________________________________________________________      PHYSICAL THERAPY: Daily Note and AM 9/29/2020  INPATIENT: PT Visit Days : 4  Payor: [address] [place] [address] / Plan: [address] / Product Type: [employer] Care Medicare /       NAME/AGE/GENDER: González Self is a 61 y.o. female   PRIMARY DIAGNOSIS: Other fracture of right femur, initial encounter for closed fracture ([address] [S72.8X1A] Other fracture of right femur, initial encounter for closed fracture ([address] Other fracture of right femur, initial encounter for closed fracture ([address]  Procedure(s) (LRB):  Right FEMUR OPEN REDUCTION INTERNAL FIXATION (Right)  Right TIBIAL PLATEAU OPEN REDUCTION INTERNAL FIXATION (Right)  4 Days Post-Op  ICD-10: Treatment Diagnosis:    · Generalized Muscle Weakness (M62.81)  · Other lack of cordination (R27.8)  · Difficulty in walking, Not elsewhere classified (R26.2)  · Other abnormalities of gait and mobility (R26.89)   Precaution/Allergies:  Codeine and Lortab [hydrocodone-acetaminophen]      ASSESSMENT:     Ms. Carlota Andersen  is a pleasant, but reluctant disabled female presenting with above diagnosis who demonstrates with decreased transfers, ambulation and mobility below her prior functional baseline. Patient was supine upon contact and agreeable to PT. Patient is somewhat anxious about mobility and pain but does well with redirection and encouragement. Patient seen in conjunction with occupational therapist for co-treatment due to complex nature of patient's physical and medical deficits requiring assistance from multiple skilled disciplines. Patient performs supine to sit with mod assist, additional time, and cues for improved/proper technique. Patient sits on EOB with good static sitting balance and fair dynamic sitting balance. Instructed patient in proper sit to stand technique but upon attempt patient did not put forth effort to try to stand. Patient was transferred to recliner chair with total assist x 3. Overall slow progress towards physical therapy goals. Patient's goals listed above are still appropriate. Will continue skilled PT to address remaining deficits. At this time, patient is appropriate for Co-treatment with occupational therapy when available due to patient's decreased overall endurance/tolerance levels, as well as need for high level skilled assistance to complete functional transfers/mobility and functional tasks. Ama Marinelli is appropriate for a multidisciplinary co-treatment of PT and OT to address goals of both disciplines. This section established at most recent assessment   PROBLEM LIST (Impairments causing functional limitations):  1. Decreased Strength  2. Decreased ADL/Functional Activities  3. Decreased Transfer Abilities  4. Decreased Ambulation Ability/Technique  5. Decreased Balance  6. Increased Pain  7. Decreased Activity Tolerance  8.  Decreased Pacing Skills   INTERVENTIONS PLANNED: (Benefits and precautions of physical therapy have been discussed with the patient.)  1. Balance Exercise  2. Bed Mobility  3. Home Exercise Program (HEP)  4. Range of Motion (ROM)  5. Therapeutic Activites  6. Therapeutic Exercise/Strengthening  7. Transfer Training     TREATMENT PLAN: Frequency/Duration: up to twice daily for duration of hospital stay  Rehabilitation Potential For Stated Goals: [facility] (at time of discharge pending progress):    Placement: It is my opinion, based on this patient's performance to date, that Ms. Nemesio Lomas may benefit from intensive therapy at a [address] [place] [address] after discharge due to the functional deficits listed above that are likely to improve with skilled rehabilitation and concerns that he/she may be unsafe to be unsupervised at home due to balance and stability issues. .  Equipment:    None at this time              HISTORY:   History of Present Injury/Illness (Reason for Referral):  PER MD H&P   González Self is a 61 y.o. female who apparently fell there is some question on whether or not her  also maybe fell on her and injured her right knee. She has a history she says of having bad knees with arthritis. She does not know that she has had a previous fracture. Her pain x-rays and CT scan have revealed distal femur and proximal tibial plateau fractures of the right knee. She does have quite a bit of pain in her right knee. She denies other problems besides her right knee     Past Medical History/Comorbidities:   Ms. Nemesio Lomas  has a past medical history of Aneurysm of common carotid artery ([facility] [address] (2004), CKD (chronic kidney disease) (9/18/2014), Dementia ([address] FSGS (focal segmental glomerulosclerosis), Gout, Hypertension, Osteoarthritis (9/18/2014), and Stroke ([address] (2004, 2013).   Ms. Nemesio Lomas  has a past surgical history that includes hx intracranial aneurysm repair (2004); hx refractive surgery; hx orthopaedic (Right, 10/2014); hx ankle fracture tx (Left, 2013); hx ercp (2018); and hx cholecystectomy (2018). Social History/Living Environment:   Home Environment: Private residence  One/Two Story Residence: One story  Living Alone: No  Support Systems: Spouse/Significant Other/Partner  Patient Expects to be Discharged to[de-identified] Rehabilitation facility  Current DME Used/Available at Home: Commode, bedside, Grab bars, Shower chair  Tub or Shower Type: Tub/Shower combination  Prior Level of Function/Work/Activity:  Limited by prior comorbidities. Dominant Side:         RIGHT    Personal Factors:          Sex:  female        Age:  61 y.o. Number of Personal Factors/Comorbidities that affect the Plan of Care: 1-2: MODERATE COMPLEXITY   EXAMINATION:   Most Recent Physical Functioning:   Gross Assessment:                  Posture:     Balance:  Sitting: Impaired  Sitting - Static: Good (unsupported)  Sitting - Dynamic: Fair (occasional)  Standing: Impaired  Standing - Static: Poor  Standing - Dynamic : Poor Bed Mobility:  Supine to Sit: Moderate assistance  Wheelchair Mobility:     Transfers:  Sit to Stand: Total assistance(x3)  Stand to Sit: Total assistance(x3)  Bed to Chair: Total assistance(x3)  Gait:            Body Structures Involved:  1. Bones  2. Joints  3. Muscles  4. Ligaments Body Functions Affected:  1. Neuromusculoskeletal  2. Movement Related  3. Skin Related  4. Metobolic/Endocrine Activities and Participation Affected:  1. General Tasks and Demands  2. Communication  3. Mobility  4. Self Care  5. Domestic Life  6. Community, Social and [place]   Number of elements that affect the Plan of Care: 3: MODERATE COMPLEXITY   CLINICAL PRESENTATION:   Presentation: Evolving clinical presentation with changing clinical characteristics: MODERATE COMPLEXITY   CLINICAL DECISION MAKIN [facility] Inpatient Short Form  How much difficulty does the patient currently have. .. Unable A Lot A Little None   1.   Turning over in bed (including adjusting bedclothes, sheets and blankets)? [] 1   [x] 2   [] 3   [] 4   2. Sitting down on and standing up from a chair with arms ( e.g., wheelchair, bedside commode, etc.)   [x] 1   [] 2   [] 3   [] 4   3. Moving from lying on back to sitting on the side of the bed? [] 1   [x] 2   [] 3   [] 4   How much help from another person does the patient currently need. .. Total A Lot A Little None   4. Moving to and from a bed to a chair (including a wheelchair)? [x] 1   [] 2   [] 3   [] 4   5. Need to walk in hospital room? [x] 1   [] 2   [] 3   [] 4   6. Climbing 3-5 steps with a railing? [x] 1   [] 2   [] 3   [] 4   © 2007, Trustees of [address] [place] [address], under license to [employer]. All rights reserved      Score:  Initial: 8 Most Recent: X (Date: -- )    Interpretation of Tool:  Represents activities that are increasingly more difficult (i.e. Bed mobility, Transfers, Gait). Medical Necessity:     · Patient demonstrates   · fair  ·  rehab potential due to higher previous functional level. Reason for Services/Other Comments:  · Patient continues to require skilled intervention due to   · medical complications, patient unable to attend/participate in therapy as expected, and right LE NWB and prior comorbidities and debility. · .   Use of outcome tool(s) and clinical judgement create a POC that gives a: Questionable prediction of patient's progress: MODERATE COMPLEXITY            TREATMENT:   (In addition to Assessment/Re-Assessment sessions the following treatments were rendered)   Pre-treatment Symptoms/Complaints:   None  Pain: Initial:   Pain Intensity 1: 0  Post Session:          Therapeutic Activity: (    23  minutes):   Therapeutic activities including bed mobility training, transfer training, static/dynamic sitting/standing balance training, instruction in sequencing with rolling walker/walker manipulation, scooting, posture training, education on NWB status on RLE, and patient education to improve mobility, strength, balance, and coordination. Required moderate verbal, tactile and manual cues   to promote static and dynamic balance in standing and promote coordination of bilateral, lower extremity(s). Date:  9/26  Date:   Date:     Activity/Exercise Parameters Parameters Parameters   AP's  10 x's      HIP ABD  10 x's      Quad sets  10 x's      Glute sets  10 x's      Left LE LAQ's.    10 x's                    Braces/Orthotics/Lines/Etc:   · O2 Device: Nasal cannula  Treatment/Session Assessment:    · Response to Treatment:  See above  · Interdisciplinary Collaboration:   o Physical Therapy Assistant  o Registered Nurse  o Rehabilitation Attendant  · After treatment position/precautions:   o Up in chair  o Bed alarm/tab alert on  o Bed/Chair-wheels locked  o Call light within reach  o RN notified   · Compliance with Program/Exercises: Will assess as treatment progresses  · Recommendations/Intent for next treatment session: \"Next visit will focus on advancements to more challenging activities, reduction in assistance provided, and OOB pivot or slide per patient toleration. \".   Total Treatment Duration:  PT Patient Time In/Time Out  Time In: 0930  Time Out: [address], [place]

## 2020-09-29 NOTE — PROGRESS NOTES
Problem: Self Care Deficits Care Plan (Adult)  Goal: *Acute Goals and Plan of Care (Insert Text)  Outcome: Progressing Towards Goal  Note: 1. Pt will toilet with mod A   2. Pt will complete functional transfers for ADLs with mod A  3. Pt will maintain standing balance for ADLs with min A  5. Pt will demonstrate independence with HEP to promote increased BUE strength and functional use for ADLs  6. Pt will tolerate 23 minutes functional activity with ,om or fewer rest breaks to promote increased endurance for ADLs  7. Pt will complete bed mobility with CGA in prep for ADLs    Timeframe: 7 days       OCCUPATIONAL THERAPY: Daily Note and AM    9/29/2020  INPATIENT: OT Visit Days: 4  Payor: Lorna Hernández / Plan: [place] [employer] MEDICARE COMPLETE / Product Type: [employer] Care Medicare /      NAME/AGE/GENDER: Vikas Emery is a 61 y.o. female   PRIMARY DIAGNOSIS:  Other fracture of right femur, initial encounter for closed fracture ([facility] [address] [S72.8X1A] Other fracture of right femur, initial encounter for closed fracture ([address] Other fracture of right femur, initial encounter for closed fracture ([address]  Procedure(s) (LRB):  Right FEMUR OPEN REDUCTION INTERNAL FIXATION (Right)  Right TIBIAL PLATEAU OPEN REDUCTION INTERNAL FIXATION (Right)  4 Days Post-Op  ICD-10: Treatment Diagnosis:    · History of falling (Z91.81)   Precautions/Allergies:    RLE NWB Codeine and Lortab [hydrocodone-acetaminophen]      ASSESSMENT:     Ms. Jose Mckenna presents with R distal femur and lateral tibia fractures d/t falling at home, s/p ORIF. RLE NWB with KI. Pt lives with her  and reports that she requires assistance for bathing, dressing, and tub transfers and is otherwise independent with mobility and with toileting.    9/29/2020 Pt presents in supine upon arrival. Pt agreeable to treatment with encouragement. Pt transferred to sitting with mod a. Pt sat edge of bed  and participated in grooming and hygiene activities.  Pt did not want to bathe or brush her teeth. Pt completed UE exercises with encouragement. Pt completed sit to stand with max/total assist x's 3 and transferred from the bed to the chair with same level of assist aforementioned. Pt left up in the chair with belongings in reach and posey on. Fair effort. Continue POC. At this time, patient is appropriate for Co-treatment with occupational therapy when available due to patient's decreased overall endurance/tolerance levels, as well as need for high level skilled assistance to complete functional transfers/mobility and functional tasks. Yudelka Kohler is appropriate for a multidisciplinary co-treatment of PT and OT to address goals of both disciplines. This section established at most recent assessment   PROBLEM LIST (Impairments causing functional limitations):  1. Decreased Strength  2. Decreased ADL/Functional Activities  3. Decreased Transfer Abilities  4. Decreased Balance  5. Increased Pain  6. Decreased Activity Tolerance  7. Decreased Cognition   INTERVENTIONS PLANNED: (Benefits and precautions of occupational therapy have been discussed with the patient.)  1. Activities of daily living training  2. Adaptive equipment training  3. Balance training  4. Therapeutic activity  5. Therapeutic exercise     TREATMENT PLAN: Frequency/Duration: Follow patient 6 times/ week to address above goals. Rehabilitation Potential For Stated Goals: [facility] (at time of discharge pending progress):    Placement: It is my opinion, based on this patient's performance to date, that Ms. Katlyn Kohler may benefit from d/c to SNF.   Equipment:    None at this time              OCCUPATIONAL PROFILE AND HISTORY:   History of Present Injury/Illness (Reason for Referral):  See H&P  Past Medical History/Comorbidities:   Ms. Katlyn Kohler  has a past medical history of Aneurysm of common carotid artery ([facility] [address] (2004), CKD (chronic kidney disease) (9/18/2014), Dementia ([facility] [address] FSGS (focal segmental glomerulosclerosis), Gout, Hypertension, Osteoarthritis (9/18/2014), and Stroke [facility]) (2004, 2013). Ms. Ana Mahajan  has a past surgical history that includes hx intracranial aneurysm repair (2004); hx refractive surgery; hx orthopaedic (Right, 10/2014); hx ankle fracture tx (Left, 2013); hx ercp (02/27/2018); and hx cholecystectomy (02/28/2018). Social History/Living Environment:   Home Environment: Private residence  One/Two Story Residence: One story  Living Alone: No  Support Systems: Spouse/Significant Other/Partner  Patient Expects to be Discharged to[de-identified] Rehabilitation facility  Current DME Used/Available at Home: Commode, bedside, Grab bars, Shower chair  Tub or Shower Type: Tub/Shower combination  Prior Level of Function/Work/Activity:  See assessment     Number of Personal Factors/Comorbidities that affect the Plan of Care: Expanded review of therapy/medical records (1-2):  MODERATE COMPLEXITY   ASSESSMENT OF OCCUPATIONAL PERFORMANCE[de-identified]   Activities of Daily Living:   Basic ADLs (From Assessment) Complex ADLs (From Assessment)   Feeding: Setup  Oral Facial Hygiene/Grooming: Contact guard assistance  Bathing: Maximum assistance  Upper Body Dressing: Moderate assistance  Lower Body Dressing: Total assistance  Toileting: Total assistance Instrumental ADL  Meal Preparation: Total assistance  Homemaking: Total assistance   Grooming/Bathing/Dressing Activities of Daily Living   Grooming  Washing Face: Stand-by assistance  Brushing/Combing Hair: Stand-by assistance                         Bed/Mat Mobility  Supine to Sit: Moderate assistance  Sit to Stand: Maximum assistance; Total assistance  Stand to Sit: Maximum assistance; Total assistance  Bed to Chair: Maximum assistance; Total assistance;Assist x2  Scooting:  Moderate assistance     Most Recent Physical Functioning:   Gross Assessment:                  Posture:  Posture (WDL): Exceptions to WDL  Posture Assessment: Cervical, Forward head  Balance:  Sitting: Impaired  Sitting - Static: Good (unsupported)  Sitting - Dynamic: Fair (occasional)  Standing: Impaired  Standing - Static: Poor  Standing - Dynamic : Poor Bed Mobility:  Supine to Sit: Moderate assistance  Scooting: Moderate assistance  Wheelchair Mobility:     Transfers:  Sit to Stand: Maximum assistance; Total assistance  Stand to Sit: Maximum assistance; Total assistance  Bed to Chair: Maximum assistance; Total assistance;Assist x2            Patient Vitals for the past 6 hrs:   BP BP Patient Position SpO2 Pulse   20 0731 (!) 159/90 At rest 96 % (!) 108   20 1055 (!) 146/82 At rest 97 % 98       Mental Status  Neurologic State: Alert, Eyes open spontaneously  Orientation Level: Oriented X4  Cognition: Follows commands, Decreased attention/concentration  Perception: Verbal, Tactile  Perseveration: Tactile cues provided, Verbal cues provided  Safety/Judgement: Fall prevention                          Physical Skills Involved:  1. Balance  2. Strength  3. Activity Tolerance  4. Pain (acute) Cognitive Skills Affected (resulting in the inability to perform in a timely and safe manner):  1. Executive Function Psychosocial Skills Affected:  1. Environmental Adaptation   Number of elements that affect the Plan of Care: 5+:  HIGH COMPLEXITY   CLINICAL DECISION MAKIN [facility] [address] [address] AM-PAC 6 Clicks   Daily Activity Inpatient Short Form  How much help from another person does the patient currently need. .. Total A Lot A Little None   1. Putting on and taking off regular lower body clothing? [x] 1   [] 2   [] 3   [] 4   2. Bathing (including washing, rinsing, drying)? [] 1   [x] 2   [] 3   [] 4   3. Toileting, which includes using toilet, bedpan or urinal?   [x] 1   [] 2   [] 3   [] 4   4. Putting on and taking off regular upper body clothing? [] 1   [x] 2   [] 3   [] 4   5. Taking care of personal grooming such as brushing teeth? [] 1   [] 2   [x] 3   [] 4   6. Eating meals? [] 1   [] 2   [x] 3   [] 4   © 2007, Trustees of [facility], under license to [employer]. All rights reserved      Score:  Initial: 12 Most Recent: X (Date: -- )    Interpretation of Tool:  Represents activities that are increasingly more difficult (i.e. Bed mobility, Transfers, Gait). Medical Necessity:     · Patient is expected to demonstrate progress in   · strength, balance, and functional technique  ·  to   · decrease assistance required with ADLs  · .  Reason for Services/Other Comments:  · Patient   · continues to require present interventions due to patient's inability to complete ADLs  · . Use of outcome tool(s) and clinical judgement create a POC that gives a: MODERATE COMPLEXITY         TREATMENT:   (In addition to Assessment/Re-Assessment sessions the following treatments were rendered)     Pre-treatment Symptoms/Complaints:    Pain: Initial:   Pain Intensity 1: 0  Pain Location 1: Leg  Pain Orientation 1: Right  Pain Intervention(s) 1: Repositioned  Post Session:  7     Today's treatment session addressed Decreased Strength, Decreased Transfer Abilities, Decreased Balance and Increased Pain to progress towards achieving goals. During this session,  Physical Therapy addressed  Functional Transfers to progress towards their discipline specific goal(s). Co-treatment was necessary to improve patient's ability to follow higher level commands, ability to increase activity demands and ability to return to normal functional activity. Self Care: (10 minutes): Procedure(s) (per grid) utilized to improve and/or restore self-care/home management as related to grooming. Required maximal verbal, manual and   cueing to facilitate activities of daily living skills. Therapeutic Exercise: (13 minutes):  Exercises per grid below to improve mobility, strength and balance. Required minimal visual and verbal cues to promote proper body posture and promote proper body mechanics.   Progressed range and repetitions as indicated. Date:  9/29/2020 Date:   Date:     Activity/Exercise Parameters Parameters Parameters   Shoulder flexion 10 reps     Shoulder shrugs 10 reps     Elbow flexion/extension 10 reps     Mobility/transfers 8  minutes                               Braces/Orthotics/Lines/Etc:   · O2 Device: Nasal cannula  Treatment/Session Assessment:    · Response to Treatment:  no adverse reaction   · Interdisciplinary Collaboration:   o Physical Therapy Assistant  o Certified Occupational Therapy Assistant  o Registered Nurse  · After treatment position/precautions:   o Up in chair  o Bed/Chair-wheels locked  o Bed in low position  o Call light within reach  o RN notified   · Compliance with Program/Exercises: Will assess as treatment progresses. · Recommendations/Intent for next treatment session: \"Next visit will focus on advancements to more challenging activities and reduction in assistance provided\".   Total Treatment Duration:  OT Patient Time In/Time Out  Time In: 0930  Time Out: 120 [facility] [place]

## 2020-09-29 NOTE — PROGRESS NOTES
Insurance approval received. Pt is medically cleared for dc today and will transfer to room 421 at [facility] for STR services. Transport scheduled for 1500 through [employer]. RN to call report to # [phone]. SW notified pt/spouse of pt's dc and transport time. SW remains available to assist as needed. Care Management Interventions  PCP Verified by CM: Yes  Last Visit to PCP: 08/21/20  Mode of Transport at Discharge: BLS([facility])  Hospital Transport Time of Discharge: Giovanni (CM Consult): SNF  Partner SNF: Yes  Discharge Durable Medical Equipment: No  Physical Therapy Consult: Yes  Occupational Therapy Consult: Yes  Speech Therapy Consult: No  Current Support Network: Own Home, Lives with Spouse  Confirm Follow Up Transport: Family  The Plan for Transition of Care is Related to the Following Treatment Goals : Subacute rehab to improve pt's functional abilities for a safe transition to home.   The Patient and/or Patient Representative was Provided with a Choice of Provider and Agrees with the Discharge Plan?: Yes  Freedom of Choice List was Provided with Basic Dialogue that Supports the Patient's Individualized Plan of Care/Goals, Treatment Preferences and Shares the Quality Data Associated with the Providers?: No(pt requested referral to specific facilities she has been to before.)  Discharge Location  Discharge Placement: Rehab Unit [address] [place] [address])

## 2020-09-29 NOTE — PROGRESS NOTES
RIGHT LE NWB   STG:  (1.)Ms. Tam Mcarthur will move from supine to sit and sit to supine , scoot up and down, and roll side to side with MINIMAL ASSIST within 4 treatment day(s). (2.)Ms. Tam Mcarthur will transfer from bed to chair and chair to bed with MINIMAL ASSIST using the least restrictive device within 4 treatment day(s). (3.)Ms. Tam Mcarthur will ambulate with MINIMAL ASSIST for 10 feet with the least restrictive device within 4 treatment day(s). LTG:  (1.)Ms. Tam Mcarthur will move from supine to sit and sit to supine , scoot up and down, and roll side to side in bed with CONTACT GUARD ASSIST within 7 treatment day(s). (2.)Ms. Tam Mcarthur will transfer from bed to chair and chair to bed with CONTACT GUARD ASSIST using the least restrictive device within 7 treatment day(s). (3.)Ms. Tam Mcarthur will ambulate with CONTACT GUARD ASSIST for 20 feet with the least restrictive device within 7 treatment day(s).   ________________________________________________________________________________________________      PHYSICAL THERAPY: Daily Note and PM 9/29/2020  INPATIENT: PT Visit Days : 4  Payor: [address] [place] [address] / Plan: [address] / Product Type: [employer] Medicare /       NAME/AGE/GENDER: South Son is a 61 y.o. female   PRIMARY DIAGNOSIS: Other fracture of right femur, initial encounter for closed fracture ([address] [S72.8X1A] Other fracture of right femur, initial encounter for closed fracture ([address] Other fracture of right femur, initial encounter for closed fracture ([address]  Procedure(s) (LRB):  Right FEMUR OPEN REDUCTION INTERNAL FIXATION (Right)  Right TIBIAL PLATEAU OPEN REDUCTION INTERNAL FIXATION (Right)  4 Days Post-Op  ICD-10: Treatment Diagnosis:    · Generalized Muscle Weakness (M62.81)  · Other lack of cordination (R27.8)  · Difficulty in walking, Not elsewhere classified (R26.2)  · Other abnormalities of gait and mobility (R26.89)   Precaution/Allergies:  Codeine and Lortab [hydrocodone-acetaminophen]      ASSESSMENT:     Ms. Cody Mcdonnell  is a pleasant, but reluctant disabled female presenting with above diagnosis who demonstrates with decreased transfers, ambulation and mobility below her prior functional baseline. Patient was sitting up in recliner chair upon contact and agreeable to PT. Patient is somewhat anxious about mobility and pain but does well with redirection and encouragement. Instructed patient in proper sit to stand technique but upon attempt patient did not put forth effort to try to stand. Patient was transferred to back to bed with total assist x 3. Patient performs sit to supine with mod assist, additional time, and cues for improved/proper technique. Patient then participates in therapeutic strengthening exercises to improve functional strength for transfers, gait and overall mobility. Patient requires cues and assistance to perform exercises correctly. Overall slow progress towards physical therapy goals. Patient's goals listed above are still appropriate. Will continue skilled PT to address remaining deficits. At this time, patient is appropriate for Co-treatment with occupational therapy when available due to patient's decreased overall endurance/tolerance levels, as well as need for high level skilled assistance to complete functional transfers/mobility and functional tasks. Gerardo Palafox is appropriate for a multidisciplinary co-treatment of PT and OT to address goals of both disciplines. This section established at most recent assessment   PROBLEM LIST (Impairments causing functional limitations):  1. Decreased Strength  2. Decreased ADL/Functional Activities  3. Decreased Transfer Abilities  4. Decreased Ambulation Ability/Technique  5. Decreased Balance  6. Increased Pain  7. Decreased Activity Tolerance  8.  Decreased Pacing Skills   INTERVENTIONS PLANNED: (Benefits and precautions of physical therapy have been discussed with the patient.)  1. Balance Exercise  2. Bed Mobility  3. Home Exercise Program (HEP)  4. Range of Motion (ROM)  5. Therapeutic Activites  6. Therapeutic Exercise/Strengthening  7. Transfer Training     TREATMENT PLAN: Frequency/Duration: up to twice daily for duration of hospital stay  Rehabilitation Potential For Stated Goals: 52 [facility] (at time of discharge pending progress):    Placement: It is my opinion, based on this patient's performance to date, that Ms. Leandro Vallejo may benefit from intensive therapy at a [address] [place] [address] after discharge due to the functional deficits listed above that are likely to improve with skilled rehabilitation and concerns that he/she may be unsafe to be unsupervised at home due to balance and stability issues. .  Equipment:    None at this time              HISTORY:   History of Present Injury/Illness (Reason for Referral):  PER MD H&P   Toynaagustinglenn Best is a 61 y.o. female who apparently fell there is some question on whether or not her  also maybe fell on her and injured her right knee. She has a history she says of having bad knees with arthritis. She does not know that she has had a previous fracture. Her pain x-rays and CT scan have revealed distal femur and proximal tibial plateau fractures of the right knee. She does have quite a bit of pain in her right knee. She denies other problems besides her right knee     Past Medical History/Comorbidities:   Ms. Leandro Vallejo  has a past medical history of Aneurysm of common carotid artery ([address] (2004), CKD (chronic kidney disease) (9/18/2014), Dementia ([address] FSGS (focal segmental glomerulosclerosis), Gout, Hypertension, Osteoarthritis (9/18/2014), and Stroke ([address] (2004, 2013).   Ms. Leandro Vallejo  has a past surgical history that includes hx intracranial aneurysm repair (2004); hx refractive surgery; hx orthopaedic (Right, 10/2014); hx ankle fracture tx (Left, 2013); hx ercp (02/27/2018); and hx cholecystectomy (2018). Social History/Living Environment:   Home Environment: Private residence  One/Two Story Residence: One story  Living Alone: No  Support Systems: Spouse/Significant Other/Partner  Patient Expects to be Discharged to[de-identified] Rehabilitation facility  Current DME Used/Available at Home: Commode, bedside, Grab bars, Shower chair  Tub or Shower Type: Tub/Shower combination  Prior Level of Function/Work/Activity:  Limited by prior comorbidities. Dominant Side:         RIGHT    Personal Factors:          Sex:  female        Age:  61 y.o. Number of Personal Factors/Comorbidities that affect the Plan of Care: 1-2: MODERATE COMPLEXITY   EXAMINATION:   Most Recent Physical Functioning:   Gross Assessment:                  Posture:     Balance:  Sitting: Impaired  Sitting - Static: Good (unsupported)  Sitting - Dynamic: Fair (occasional)  Standing: Impaired  Standing - Static: Poor  Standing - Dynamic : Poor Bed Mobility:  Supine to Sit: Moderate assistance  Sit to Supine: Moderate assistance  Wheelchair Mobility:     Transfers:  Sit to Stand: Total assistance(x3)  Stand to Sit: Total assistance(x3)  Bed to Chair: Total assistance(x3)  Gait:            Body Structures Involved:  1. Bones  2. Joints  3. Muscles  4. Ligaments Body Functions Affected:  1. Neuromusculoskeletal  2. Movement Related  3. Skin Related  4. Metobolic/Endocrine Activities and Participation Affected:  1. General Tasks and Demands  2. Communication  3. Mobility  4. Self Care  5. Domestic Life  6. Community, Social and [place]   Number of elements that affect the Plan of Care: 3: MODERATE COMPLEXITY   CLINICAL PRESENTATION:   Presentation: Evolving clinical presentation with changing clinical characteristics: MODERATE COMPLEXITY   CLINICAL DECISION MAKIN [facility] Inpatient Short Form  How much difficulty does the patient currently have. .. Unable A Lot A Little None   1.   Turning over in bed (including adjusting bedclothes, sheets and blankets)? [] 1   [x] 2   [] 3   [] 4   2. Sitting down on and standing up from a chair with arms ( e.g., wheelchair, bedside commode, etc.)   [x] 1   [] 2   [] 3   [] 4   3. Moving from lying on back to sitting on the side of the bed? [] 1   [x] 2   [] 3   [] 4   How much help from another person does the patient currently need. .. Total A Lot A Little None   4. Moving to and from a bed to a chair (including a wheelchair)? [x] 1   [] 2   [] 3   [] 4   5. Need to walk in hospital room? [x] 1   [] 2   [] 3   [] 4   6. Climbing 3-5 steps with a railing? [x] 1   [] 2   [] 3   [] 4   © 2007, Trustees of [address] [place] [address], under license to [employer]. All rights reserved      Score:  Initial: 8 Most Recent: X (Date: -- )    Interpretation of Tool:  Represents activities that are increasingly more difficult (i.e. Bed mobility, Transfers, Gait). Medical Necessity:     · Patient demonstrates   · fair  ·  rehab potential due to higher previous functional level. Reason for Services/Other Comments:  · Patient continues to require skilled intervention due to   · medical complications, patient unable to attend/participate in therapy as expected, and right LE NWB and prior comorbidities and debility. · .   Use of outcome tool(s) and clinical judgement create a POC that gives a: Questionable prediction of patient's progress: MODERATE COMPLEXITY            TREATMENT:   (In addition to Assessment/Re-Assessment sessions the following treatments were rendered)   Pre-treatment Symptoms/Complaints:   None  Pain: Initial:   Pain Intensity 1: 0  Post Session:          Therapeutic Activity: (    23  minutes):   Therapeutic activities including bed mobility training, transfer training, static/dynamic sitting/standing balance training, instruction in sequencing with rolling walker/walker manipulation, scooting, posture training, education on NWB status on RLE, LE exercises supine and patient education to improve mobility, strength, balance, and coordination. Required moderate verbal, tactile and manual cues   to promote static and dynamic balance in standing and promote coordination of bilateral, lower extremity(s). Date:  9/26  Date:   Date:     Activity/Exercise Parameters Parameters Parameters   AP's  10 x's      HIP ABD  10 x's      Quad sets  10 x's      Glute sets  10 x's      Left LE LAQ's.    10 x's                    Braces/Orthotics/Lines/Etc:   · O2 Device: Nasal cannula  Treatment/Session Assessment:    · Response to Treatment:  See above  · Interdisciplinary Collaboration:   o Physical Therapy Assistant  o Registered Nurse  o Rehabilitation Attendant  · After treatment position/precautions:   o Supine in bed  o Bed alarm/tab alert on  o Bed/Chair-wheels locked  o Bed in low position  o Call light within reach  o RN notified   · Compliance with Program/Exercises: Will assess as treatment progresses  · Recommendations/Intent for next treatment session: \"Next visit will focus on advancements to more challenging activities, reduction in assistance provided, and OOB pivot or slide per patient toleration. \".   Total Treatment Duration:  PT Patient Time In/Time Out  Time In: 1255  Time Out: [address] [place]

## 2020-09-29 NOTE — DISCHARGE SUMMARY
Date of Admission: 9/23/2020  Date of Discharge: 9/29/2020    Discharge Diagnoses:  1. Right femoral fracture    Discharge Medications:  Current Discharge Medication List      START taking these medications    Details   aspirin (ASPIRIN) 325 mg tablet Take 1 Tab by mouth daily. Qty: 30 Tab, Refills: 0      oxyCODONE IR (ROXICODONE) 5 mg immediate release tablet Take 1 Tab by mouth every six (6) hours as needed for Pain for up to 3 days. Max Daily Amount: 20 mg.  Qty: 12 Tab, Refills: 0    Associated Diagnoses: Other fracture of right femur, initial encounter for closed fracture ([facility] [address]         CONTINUE these medications which have NOT CHANGED    Details   escitalopram oxalate (LEXAPRO) 10 mg tablet Take 10 mg by mouth daily. acetaminophen (TYLENOL) 325 mg tablet Take 2 Tabs by mouth every six (6) hours as needed. Qty: 30 Tab, Refills: 0      magnesium oxide (MAG-OX) 400 mg tablet Take 1 Tab by mouth daily. Qty: 30 Tab, Refills: 0      ondansetron (ZOFRAN ODT) 8 mg disintegrating tablet Take 1 Tab by mouth every six (6) hours as needed. PRN for nausea  Qty: 30 Tab, Refills: 0      QUEtiapine (SEROQUEL) 50 mg tablet Take 50 mg by mouth nightly. folic acid (FOLVITE) 1 mg tablet Take 1 Tab by mouth daily. Qty: 30 Tab, Refills: 0      pantoprazole (PROTONIX) 40 mg tablet Take 1 Tab by mouth Daily (before breakfast). Qty: 30 Tab, Refills: 0      thiamine (B-1) 100 mg tablet Take 1 Tab by mouth daily. Qty: 30 Tab, Refills: 0         STOP taking these medications       oxyCODONE-acetaminophen (PERCOCET) 5-325 mg per tablet Comments:   Reason for Stopping:         ALPRAZolam (XANAX) 0.5 mg tablet Comments:   Reason for Stopping:                Pending Labs:  None    Follow-up (including scheduled tests):   Follow-up Information     Follow up With Specialties Details Why Mary Beth Sanon [address] [place] [address] 104 [address] [place] [address]  Bry Toro MD Orthopedic Surgery On 10/8/2020 @ 8:30 a.m. [place] [id number]  [phone]             History of Present Illness:  61year old CF with a PMHx of HTN, CKD s/s FSGS, dementia, CVA and EtOH presented to the ED after a fall when her  fell onto her. In ED, XR of R hip shows fracture of distal femur and possible fracture of proximal tibia. CT RLE confirmed distal femur fracture and a depressed fracture of the lateral tibial plateau. CT pelvis showed an intact right total hip arthroplasty w/o evidence of fracture in either hip. Head CT negative. Ortho service was consulted. Past Medical History:  Past Medical History:   Diagnosis Date    Aneurysm of common carotid artery ([address] 2004    left side s/p coil     CKD (chronic kidney disease) 9/18/2014    Dementia ([address]     FSGS (focal segmental glomerulosclerosis)     in remission at present    Gout     Hypertension     managed with medication     Osteoarthritis 9/18/2014    Stroke ([facility] [address] 2004, 2013    slight weakness on right side, slight effect to speech       Allergies: Allergies   Allergen Reactions    Codeine Nausea and Vomiting    Lortab [Hydrocodone-Acetaminophen] Nausea and Vomiting       Hospital Course:  61year old CF with a PMHx of HTN, CKD s/s FSGS, dementia, CVA and EtOH presented to the ED after a fall when her  fell onto her. In ED, XR of R hip shows fracture of distal femur and possible fracture of proximal tibia. CT RLE confirmed distal femur fracture and a depressed fracture of the lateral tibial plateau. CT pelvis showed an intact right total hip arthroplasty w/o evidence of fracture in either hip. Head CT negative. Ortho service was consulted. Right femur fracture  XR of R hip showed fracture of distal femur and possible fracture of proximal tibia.  CT RLE confirmed distal femur fracture and a depressed fracture of the lateral tibial madeleine. Ortho consulted, s/p ORIF of R hip and ORIF of R tibial on 9/25  Placed knee immobilizer  ASA 325mg every day for DVT ppx  PT recommended SNF     Acute blood loss anemia post-op  Hgb 11.2>8.8>7.8>7.5>7.3>7.5  H&H stable last 48h  No signs of acute bleed     Leukocytosis likely reactive  WBC 21.6 with LA 2.3 on admission  CXR and UA unremarkable  Blood Cx Positive x2: MRSA  Started on Vanc (9/24-9/28). Received Ancef for surgery ppx.    WBC 21.6>11.8>10.3>9.6  WBC and LA normalized after IVF  BCx likely contaminant  ID consulted and recommended stop antibiotics since repeated BCx negative  Asymptomatic and hemodynamically stable on discharge    Procedures:  ORIF    Discharge Day Information:  Follow with PMD    Diet: Cardiac  Activity: As tolerated    Discharge Physical Exam:  General: Alert, oriented, NAD  HEENT: NC/AT, EOM are intact  Neck: supple, no JVD  Cardiovascular: RRR, S1, S2, no murmurs  Respiratory: Lungs are clear, no wheezes or rales  Abdomen: Soft, NT, ND  Back: No CVA tenderness, no paraspinal tenderness  Extremities: LE without pedal edema, no erythema  Neuro: A&O, CN are intact, no focal deficits  Skin: no rash or ulcers  Psych: good mood and affect    Condition: Improved    Disposition: SNF    Consultants During This Hospitalization: Ortho, ID       Spent 35 minutes on discharge services

## 2020-09-30 ENCOUNTER — PATIENT OUTREACH (OUTPATIENT)
Dept: CASE MANAGEMENT | Age: 60
End: 2020-09-30

## 2020-09-30 LAB
COVID-19 RAPID TEST, COVR: NOT DETECTED
SARS COV-2, XPGCVT: NEGATIVE
SOURCE, COVRS: NORMAL

## 2020-09-30 NOTE — PROGRESS NOTES
Patient was on [facility] list for hospital discharge  No PEDRO call indicated at this time due to patient is being managed by [facility]   Episode closed.

## 2020-10-01 NOTE — ADT AUTH CERT NOTES
PREVIOUSLY DENIED FOR INPATIENT DOWNGRADED TO OBSERVATION REF #  [id number] PLEASE FAX OR CALL BACK TO NOTIFY IF  AUTHORIZATION FOR OBSERVATION IS OR IS NOT REQUIRED  PHONE # [phone]  FAX # [phone]

## 2020-10-11 ENCOUNTER — PATIENT OUTREACH (OUTPATIENT)
Dept: CASE MANAGEMENT | Age: 60
End: 2020-10-11

## 2020-10-11 NOTE — PROGRESS NOTES
Community Care Team documentation for patient in [facility]    The information below provided by:Vero  PT Update: MOD for bed mobility. MAX transfers. Non ambulatory.   ADLs MIN and MAX        Nursing Update:wound care to RLE; Med management    Discharge Date:TBD      Assign to [facility] Manager:Edel

## 2020-10-29 ENCOUNTER — PATIENT OUTREACH (OUTPATIENT)
Dept: CASE MANAGEMENT | Age: 60
End: 2020-10-29

## 2020-10-29 NOTE — PROGRESS NOTES
Community Care Team documentation for patient in [facility]    The information below provided by:Vero    PT Update: MIN bed mobility and MOD for transfers. Non ambulatory. ADLS MOD and MAX         Nursing Update:stable      Discharge Date: to help at home.  May jodi Castillo d/c 11/2 [facility]    Assign to [facility] Manager:KETURAH

## 2020-10-30 ENCOUNTER — HOME HEALTH ADMISSION (OUTPATIENT)
Dept: HOME HEALTH SERVICES | Facility: HOME HEALTH | Age: 60
End: 2020-10-30
Payer: MEDICARE

## 2020-11-03 ENCOUNTER — HOME CARE VISIT (OUTPATIENT)
Dept: HOME HEALTH SERVICES | Facility: HOME HEALTH | Age: 60
End: 2020-11-03

## 2020-11-04 ENCOUNTER — PATIENT OUTREACH (OUTPATIENT)
Dept: CASE MANAGEMENT | Age: 60
End: 2020-11-04

## 2020-11-04 NOTE — PROGRESS NOTES
Patient was admitted to [facility] on 2020 and discharged on 2020 for Fracture of Right Femur. Outreach made within 2 business days of discharge: No, due to patient d/c from [facility] 2020    Top Discharge Challenges to be reviewed by the provider   Additional needs identified to be addressed with provider no  none  Discussed COVID-19 related testing which was available at this time. Test results were negative. Patient informed of results, if available? yes   Method of communication with provider : none       Advance Care Planning:   Does patient have an Advance Directive:  yes; reviewed and current     Inpatient Readmission Risk score: 4  Was this a readmission? no   Patient stated reason for the admission: none  Patients top risk factors for readmission: Fracture of Right Femur  Interventions to address risk factors:    Care Coordinator (CC) contacted the family by telephone to perform post hospital discharge assessment. Verified name and  with family as identifiers. Provided introduction to self, and explanation of the CC role. CC reviewed discharge instructions, medical action plan and red flags with family who verbalized understanding. Family given an opportunity to ask questions and does not have any further questions or concerns at this time. The family agrees to contact the PCP office for questions related to their healthcare. Medication reconciliation was performed with family, who verbalizes understanding of administration of home medications. Advised obtaining a 90-day supply of all daily and as-needed medications.    Referral to Pharm D needed: yes     Home Health/Outpatient orders at discharge: [address]: [facility]  Date of initial visit: Patient is waiting to hear from JEREMIAH/ [address] ordered at discharge: none  [address]:   [address] [facility] received:     Covid Risk Education    Patient has following risk factors of: Fracture of Right Femur. Education provided regarding infection prevention, and signs and symptoms of COVID-19 and when to seek medical attention with family who verbalized understanding. Discussed exposure protocols and quarantine From CDC: Are you at higher risk for severe illness?  and given an opportunity for questions and concerns. The family agrees to contact the COVID-19 hotline [phone] or PCP office for questions related to COVID-19. For more information on steps you can take to protect yourself, see CDC's How to Protect Yourself     Patient/family/caregiver given information for [employer] [facility] and agrees to enroll no  Patient's preferred e-mail: declines  Patient's preferred phone number: declines    Discussed follow-up appointments. If no appointment was previously scheduled, appointment scheduling offered: [facility][1] follow up appointment(s): No future appointments. Non-[facility] follow up appointment(s): [facility] 10/9/2020. This Care Coordinator is providing information on modes of transportation  Plan for follow-up call in 10-14 days based on severity of symptoms and risk factors. CC provided contact information for future needs.     Goals Addressed                 This Visit's Progress     Identification of barriers to adherence to a plan of care such as inability to afford medications, lack of insurance, lack of transportation, etc.

## 2020-11-05 ENCOUNTER — HOME CARE VISIT (OUTPATIENT)
Dept: SCHEDULING | Facility: HOME HEALTH | Age: 60
End: 2020-11-05
Payer: MEDICARE

## 2020-11-05 PROCEDURE — G0299 HHS/HOSPICE OF RN EA 15 MIN: HCPCS

## 2020-11-05 PROCEDURE — 400013 HH SOC

## 2020-11-06 ENCOUNTER — HOME CARE VISIT (OUTPATIENT)
Dept: SCHEDULING | Facility: HOME HEALTH | Age: 60
End: 2020-11-06
Payer: MEDICARE

## 2020-11-06 PROCEDURE — G0152 HHCP-SERV OF OT,EA 15 MIN: HCPCS

## 2020-11-08 VITALS
BODY MASS INDEX: 37.73 KG/M2 | HEART RATE: 88 BPM | TEMPERATURE: 97.8 F | HEIGHT: 62 IN | RESPIRATION RATE: 18 BRPM | DIASTOLIC BLOOD PRESSURE: 60 MMHG | WEIGHT: 205 LBS | OXYGEN SATURATION: 97 % | SYSTOLIC BLOOD PRESSURE: 100 MMHG

## 2020-11-08 VITALS
OXYGEN SATURATION: 94 % | HEART RATE: 54 BPM | DIASTOLIC BLOOD PRESSURE: 65 MMHG | TEMPERATURE: 97.6 F | RESPIRATION RATE: 18 BRPM | SYSTOLIC BLOOD PRESSURE: 118 MMHG

## 2020-11-09 ENCOUNTER — HOME CARE VISIT (OUTPATIENT)
Dept: SCHEDULING | Facility: HOME HEALTH | Age: 60
End: 2020-11-09
Payer: MEDICARE

## 2020-11-09 VITALS
HEART RATE: 80 BPM | DIASTOLIC BLOOD PRESSURE: 72 MMHG | RESPIRATION RATE: 16 BRPM | TEMPERATURE: 98.1 F | SYSTOLIC BLOOD PRESSURE: 118 MMHG

## 2020-11-09 PROCEDURE — G0151 HHCP-SERV OF PT,EA 15 MIN: HCPCS

## 2020-11-09 PROCEDURE — G0299 HHS/HOSPICE OF RN EA 15 MIN: HCPCS

## 2020-11-10 ENCOUNTER — HOME CARE VISIT (OUTPATIENT)
Dept: SCHEDULING | Facility: HOME HEALTH | Age: 60
End: 2020-11-10
Payer: MEDICARE

## 2020-11-10 ENCOUNTER — HOME CARE VISIT (OUTPATIENT)
Dept: HOME HEALTH SERVICES | Facility: HOME HEALTH | Age: 60
End: 2020-11-10
Payer: MEDICARE

## 2020-11-10 VITALS
DIASTOLIC BLOOD PRESSURE: 68 MMHG | OXYGEN SATURATION: 95 % | HEART RATE: 68 BPM | RESPIRATION RATE: 16 BRPM | SYSTOLIC BLOOD PRESSURE: 116 MMHG | TEMPERATURE: 97.9 F

## 2020-11-10 PROCEDURE — G0157 HHC PT ASSISTANT EA 15: HCPCS

## 2020-11-11 ENCOUNTER — HOME CARE VISIT (OUTPATIENT)
Dept: HOME HEALTH SERVICES | Facility: HOME HEALTH | Age: 60
End: 2020-11-11
Payer: MEDICARE

## 2020-11-11 VITALS
OXYGEN SATURATION: 98 % | HEART RATE: 70 BPM | SYSTOLIC BLOOD PRESSURE: 122 MMHG | DIASTOLIC BLOOD PRESSURE: 64 MMHG | RESPIRATION RATE: 18 BRPM | TEMPERATURE: 98 F

## 2020-11-12 ENCOUNTER — HOME CARE VISIT (OUTPATIENT)
Dept: SCHEDULING | Facility: HOME HEALTH | Age: 60
End: 2020-11-12
Payer: MEDICARE

## 2020-11-12 VITALS
SYSTOLIC BLOOD PRESSURE: 110 MMHG | DIASTOLIC BLOOD PRESSURE: 64 MMHG | RESPIRATION RATE: 16 BRPM | OXYGEN SATURATION: 97 % | TEMPERATURE: 97 F | HEART RATE: 63 BPM

## 2020-11-12 VITALS
SYSTOLIC BLOOD PRESSURE: 102 MMHG | OXYGEN SATURATION: 99 % | DIASTOLIC BLOOD PRESSURE: 72 MMHG | HEART RATE: 80 BPM | TEMPERATURE: 98.3 F

## 2020-11-12 VITALS
HEART RATE: 88 BPM | DIASTOLIC BLOOD PRESSURE: 60 MMHG | SYSTOLIC BLOOD PRESSURE: 104 MMHG | OXYGEN SATURATION: 98 % | RESPIRATION RATE: 17 BRPM | TEMPERATURE: 97.3 F

## 2020-11-12 PROCEDURE — G0157 HHC PT ASSISTANT EA 15: HCPCS

## 2020-11-12 PROCEDURE — G0158 HHC OT ASSISTANT EA 15: HCPCS

## 2020-11-12 PROCEDURE — G0299 HHS/HOSPICE OF RN EA 15 MIN: HCPCS

## 2020-11-13 ENCOUNTER — HOME CARE VISIT (OUTPATIENT)
Dept: SCHEDULING | Facility: HOME HEALTH | Age: 60
End: 2020-11-13
Payer: MEDICARE

## 2020-11-13 VITALS
HEART RATE: 80 BPM | OXYGEN SATURATION: 99 % | SYSTOLIC BLOOD PRESSURE: 112 MMHG | DIASTOLIC BLOOD PRESSURE: 72 MMHG | TEMPERATURE: 98.5 F

## 2020-11-13 PROCEDURE — G0158 HHC OT ASSISTANT EA 15: HCPCS

## 2020-11-13 PROCEDURE — G0155 HHCP-SVS OF CSW,EA 15 MIN: HCPCS

## 2020-11-16 ENCOUNTER — HOME CARE VISIT (OUTPATIENT)
Dept: SCHEDULING | Facility: HOME HEALTH | Age: 60
End: 2020-11-16
Payer: MEDICARE

## 2020-11-16 VITALS
RESPIRATION RATE: 16 BRPM | HEART RATE: 78 BPM | DIASTOLIC BLOOD PRESSURE: 80 MMHG | SYSTOLIC BLOOD PRESSURE: 128 MMHG | OXYGEN SATURATION: 98 % | TEMPERATURE: 97.7 F

## 2020-11-16 PROCEDURE — G0299 HHS/HOSPICE OF RN EA 15 MIN: HCPCS

## 2020-11-17 ENCOUNTER — HOME CARE VISIT (OUTPATIENT)
Dept: HOME HEALTH SERVICES | Facility: HOME HEALTH | Age: 60
End: 2020-11-17
Payer: MEDICARE

## 2020-11-17 ENCOUNTER — HOME CARE VISIT (OUTPATIENT)
Dept: SCHEDULING | Facility: HOME HEALTH | Age: 60
End: 2020-11-17
Payer: MEDICARE

## 2020-11-17 VITALS
TEMPERATURE: 97.9 F | SYSTOLIC BLOOD PRESSURE: 120 MMHG | RESPIRATION RATE: 16 BRPM | HEART RATE: 68 BPM | OXYGEN SATURATION: 94 % | DIASTOLIC BLOOD PRESSURE: 72 MMHG

## 2020-11-17 VITALS
DIASTOLIC BLOOD PRESSURE: 82 MMHG | SYSTOLIC BLOOD PRESSURE: 122 MMHG | OXYGEN SATURATION: 95 % | TEMPERATURE: 97.3 F | HEART RATE: 64 BPM

## 2020-11-17 PROCEDURE — G0157 HHC PT ASSISTANT EA 15: HCPCS

## 2020-11-17 PROCEDURE — G0158 HHC OT ASSISTANT EA 15: HCPCS

## 2020-11-18 ENCOUNTER — PATIENT OUTREACH (OUTPATIENT)
Dept: CASE MANAGEMENT | Age: 60
End: 2020-11-18

## 2020-11-18 NOTE — PROGRESS NOTES
Attempted to call patient to f/u on PEDRO call. Unable to reach left a voice message. This Care Coordinator will graduate this patient from this program, patient will be added back if phone call is returned.

## 2020-11-19 ENCOUNTER — HOME CARE VISIT (OUTPATIENT)
Dept: SCHEDULING | Facility: HOME HEALTH | Age: 60
End: 2020-11-19
Payer: MEDICARE

## 2020-11-19 ENCOUNTER — HOME CARE VISIT (OUTPATIENT)
Dept: HOME HEALTH SERVICES | Facility: HOME HEALTH | Age: 60
End: 2020-11-19
Payer: MEDICARE

## 2020-11-19 VITALS
OXYGEN SATURATION: 98 % | DIASTOLIC BLOOD PRESSURE: 70 MMHG | SYSTOLIC BLOOD PRESSURE: 120 MMHG | RESPIRATION RATE: 16 BRPM | HEART RATE: 80 BPM | TEMPERATURE: 98 F

## 2020-11-19 VITALS
OXYGEN SATURATION: 98 % | SYSTOLIC BLOOD PRESSURE: 126 MMHG | HEART RATE: 84 BPM | RESPIRATION RATE: 16 BRPM | TEMPERATURE: 98 F | DIASTOLIC BLOOD PRESSURE: 82 MMHG

## 2020-11-19 PROCEDURE — G0157 HHC PT ASSISTANT EA 15: HCPCS

## 2020-11-19 PROCEDURE — G0299 HHS/HOSPICE OF RN EA 15 MIN: HCPCS

## 2020-11-23 ENCOUNTER — HOME CARE VISIT (OUTPATIENT)
Dept: SCHEDULING | Facility: HOME HEALTH | Age: 60
End: 2020-11-23
Payer: MEDICARE

## 2020-11-23 ENCOUNTER — HOSPITAL ENCOUNTER (INPATIENT)
Age: 60
LOS: 11 days | Discharge: NURSING, REHAB OR LONG-TERM CARE | DRG: 682 | End: 2020-12-08
Attending: EMERGENCY MEDICINE | Admitting: FAMILY MEDICINE
Payer: MEDICARE

## 2020-11-23 VITALS — RESPIRATION RATE: 16 BRPM | HEART RATE: 84 BPM | TEMPERATURE: 98 F

## 2020-11-23 DIAGNOSIS — S72.001A CLOSED FRACTURE OF NECK OF RIGHT FEMUR, INITIAL ENCOUNTER (HCC): Primary | ICD-10-CM

## 2020-11-23 DIAGNOSIS — T74.01XA: ICD-10-CM

## 2020-11-23 DIAGNOSIS — R53.81 DEBILITY: ICD-10-CM

## 2020-11-23 PROBLEM — R78.81 BACTEREMIA DUE TO GRAM-NEGATIVE BACTERIA: Status: RESOLVED | Noted: 2018-02-27 | Resolved: 2020-11-23

## 2020-11-23 PROBLEM — N18.9 ACUTE KIDNEY INJURY SUPERIMPOSED ON CHRONIC KIDNEY DISEASE (HCC): Status: RESOLVED | Noted: 2020-09-23 | Resolved: 2020-11-23

## 2020-11-23 PROBLEM — K81.9 CHOLECYSTITIS: Status: RESOLVED | Noted: 2018-02-25 | Resolved: 2020-11-23

## 2020-11-23 PROBLEM — F10.10 ALCOHOL ABUSE: Status: RESOLVED | Noted: 2018-02-13 | Resolved: 2020-11-23

## 2020-11-23 PROBLEM — A04.72 C. DIFFICILE COLITIS: Status: RESOLVED | Noted: 2018-05-20 | Resolved: 2020-11-23

## 2020-11-23 PROBLEM — E83.39 HYPOPHOSPHATEMIA: Status: RESOLVED | Noted: 2018-02-13 | Resolved: 2020-11-23

## 2020-11-23 PROBLEM — K80.20 CHOLELITHIASIS: Status: RESOLVED | Noted: 2018-02-25 | Resolved: 2020-11-23

## 2020-11-23 PROBLEM — E83.42 HYPOMAGNESEMIA: Status: RESOLVED | Noted: 2018-02-13 | Resolved: 2020-11-23

## 2020-11-23 PROBLEM — N17.9 AKI (ACUTE KIDNEY INJURY) (HCC): Status: RESOLVED | Noted: 2018-02-13 | Resolved: 2020-11-23

## 2020-11-23 PROBLEM — R65.10 SIRS (SYSTEMIC INFLAMMATORY RESPONSE SYNDROME) (HCC): Status: RESOLVED | Noted: 2020-09-23 | Resolved: 2020-11-23

## 2020-11-23 PROBLEM — N17.9 ACUTE KIDNEY INJURY SUPERIMPOSED ON CHRONIC KIDNEY DISEASE (HCC): Status: RESOLVED | Noted: 2020-09-23 | Resolved: 2020-11-23

## 2020-11-23 PROBLEM — G93.41 ACUTE METABOLIC ENCEPHALOPATHY: Status: RESOLVED | Noted: 2018-02-13 | Resolved: 2020-11-23

## 2020-11-23 PROBLEM — F10.939 ALCOHOL WITHDRAWAL (HCC): Status: RESOLVED | Noted: 2018-05-18 | Resolved: 2020-11-23

## 2020-11-23 PROBLEM — Z74.2: Status: ACTIVE | Noted: 2020-11-23

## 2020-11-23 PROBLEM — E87.6 HYPOKALEMIA: Status: RESOLVED | Noted: 2018-02-13 | Resolved: 2020-11-23
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
## 2020-11-23 PROCEDURE — G0157 HHC PT ASSISTANT EA 15: HCPCS

## 2020-11-23 PROCEDURE — 99283 EMERGENCY DEPT VISIT LOW MDM: CPT

## 2020-11-23 PROCEDURE — 99218 HC RM OBSERVATION: CPT

## 2020-11-23 NOTE — DISCHARGE INSTRUCTIONS
Follow-up as needed with your primary care physician    Return to the ER for symptoms that concern you or if you change your mind about wanting work-up.

## 2020-11-23 NOTE — ED NOTES
Spoke to pt prior to being discharged. Pt stated she cannot go home because her caregiver is her  who is currently hospitalized for AMS. Pt states she does no have any relatives or friends that can help her. Charge RN and MD aware. Social work contacted.

## 2020-11-23 NOTE — ED TRIAGE NOTES
Patient arrived via EMS from home. EMS was called by the physical therapist (PT) due to patient was laying in bed at home and  fell on top of patient in bed. Patient was hitting  to get  off patient when PT arrived. Denies injury or pain or discomfort at this time. A+O x4. , temp 98.1F oral, /78, , o2 sat 97% on RA. Patient is nonambulatory and EMS states patient unable to be at home without assistance.

## 2020-11-23 NOTE — ED PROVIDER NOTES
[facility]       HPI:    60-year-old female presents to the ED via EMS with complaint of \"I'm here because my  is drunk\"  Per EMS report they were called out to residents by physical therapist.  Patient's  apparently came home and laid on top of her in bed. When PT got there patient was eating her  to get him off of her. Patient specifically denies any injury or pain. Denies headache chest or abdominal pain. Prior CVA but denies new focal weakness or paresthesias. She literally has no complaints at this point and just wants to go home. Patient gets home health at this point for a recent right femur fracture. Her  typically helps take care of her at home.       REVIEW OF SYSTEMS     ROS Negative Except as Listed in HPI    PAST MEDICAL HISTORY     Past Medical History:   Diagnosis Date    Aneurysm of common carotid artery ([address]     left side s/p coil     CKD (chronic kidney disease) 2014    Dementia ([address]     FSGS (focal segmental glomerulosclerosis)     in remission at present    Gout     Hypertension     managed with medication     Osteoarthritis 2014    Stroke ([address] ,     slight weakness on right side, slight effect to speech     Past Surgical History:   Procedure Laterality Date    HX ANKLE FRACTURE TX Left 2013    has hardware in    HX CHOLECYSTECTOMY  2018    HX ERCP  2018    cbd stone    HX INTRACRANIAL ANEURYSM REPAIR  2004    left carotid     HX ORTHOPAEDIC Right 10/2014    hip replacement    HX REFRACTIVE SURGERY      bilateral - Lasik     Social History     Socioeconomic History    Marital status:      Spouse name: Not on file    Number of children: Not on file    Years of education: Not on file    Highest education level: Not on file   Tobacco Use    Smoking status: Former Smoker     Packs/day: 0.25     Last attempt to quit: 1979     Years since quittin.9    Smokeless tobacco: Never Used   Substance and Sexual Activity    Alcohol use: Yes     Alcohol/week: 2.5 standard drinks     Types: 3 Shots of liquor per week     Comment: pint of fireball    Drug use: No    Sexual activity: Yes     Partners: Male     Birth control/protection: None     Prior to Admission Medications   Prescriptions Last Dose Informant Patient Reported? Taking? QUEtiapine (SEROQUEL) 50 mg tablet   Yes No   Sig: Take 50 mg by mouth nightly. acetaminophen (TYLENOL) 325 mg tablet   No No   Sig: Take 2 Tabs by mouth every six (6) hours as needed. Patient taking differently: Take 500 mg by mouth three (3) times daily. ascorbic acid, vitamin C, (VITAMIN C) 500 mg tablet   Yes No   Sig: Take 500 mg by mouth two (2) times a day. aspirin (ASPIRIN) 325 mg tablet   No No   Sig: Take 1 Tab by mouth daily. escitalopram oxalate (LEXAPRO) 10 mg tablet   Yes No   Sig: Take 10 mg by mouth daily. ferrous sulfate 325 mg (65 mg iron) tablet   Yes No   Sig: Take 325 mg by mouth three (3) times daily (with meals). Indications: anemia from inadequate iron   folic acid (FOLVITE) 1 mg tablet   No No   Sig: Take 1 Tab by mouth daily. Patient not taking: Reported on 11/5/2020   lisinopriL (PRINIVIL, ZESTRIL) 10 mg tablet   Yes No   Sig: Take 10 mg by mouth daily. magnesium oxide (MAG-OX) 400 mg tablet   No No   Sig: Take 1 Tab by mouth daily. ondansetron (ZOFRAN ODT) 8 mg disintegrating tablet   No No   Sig: Take 1 Tab by mouth every six (6) hours as needed. PRN for nausea   Patient not taking: Reported on 11/5/2020   pantoprazole (PROTONIX) 40 mg tablet   No No   Sig: Take 1 Tab by mouth Daily (before breakfast). thiamine (B-1) 100 mg tablet   No No   Sig: Take 1 Tab by mouth daily. traMADoL (ULTRAM) 50 mg tablet   Yes No   Sig: Take 50 mg by mouth every six (6) hours as needed for Pain.  Indications: pain      Facility-Administered Medications: None     Allergies   Allergen Reactions    Codeine Nausea and Vomiting    Lortab [Hydrocodone-Acetaminophen] Itching     Not Allergic to this medication patient states MR RN 11/5/2020        PHYSICAL EXAM       Vitals:    11/23/20 1621   BP: 98/68   Pulse: 97   Resp: 16   SpO2: 98%    Vital signs were reviewed. Physical Exam  Vitals signs and nursing note reviewed. Constitutional:       General: She is not in acute distress. Appearance: She is not ill-appearing or toxic-appearing. HENT:      Head: Atraumatic. Comments: Negative raccoon eyes. Negative rebolledo sign. No otorrhea or rhinorrhea     Mouth/Throat:      Mouth: Mucous membranes are moist.   Eyes:      General: No scleral icterus. Neck:      Musculoskeletal: No muscular tenderness. Cardiovascular:      Rate and Rhythm: Normal rate. Pulmonary:      Breath sounds: Normal breath sounds. Abdominal:      Palpations: Abdomen is soft. Tenderness: There is no abdominal tenderness. Musculoskeletal:      Comments: No neck or back tenderness. No tenderness, swelling, deformities of the upper or lower extremities noted   Skin:     General: Skin is warm. Neurological:      Comments: Awake, alert. GCS 15. CN II-XII grossly intact. Speech clear. Mild right-sided paresis from prior CVA. Otherwise no gross lateralizing neuro deficits   Psychiatric:         Behavior: Behavior normal.      Comments: No clinical intoxication or encephalopathy noted          MEDICAL DECISION MAKING       Initial Impression and Treatment Plan  Nontoxic patient in no acute distress who wants to go home after initial evaluation. She has no somatic complaints at this point. She has no clinical intoxication or encephalopathy at this point. She does not desire work-up in the emergency department. Will accede to her wishes and discharge home        No results found for this or any previous visit (from the past 8 hour(s)). No results found.       Medications - No data to display        Procedures    Update notes  5:03 PM-disposition issue home as patient usually is at home to reevaluate refracturing gets home health/PT.  apparently usually helps take care of her at home but unfortunately he is currently in emergency department and ill as well. She apparently does not have any family or friends who can assist her. Working to consult social work to help aid in disposition. Disposition:        Diagnosis:     ICD-10-CM ICD-9-CM   1. No problem, feared complaint unfounded  Z71.1 V65.5         __________________________________________________________      Please note, this chart was dictated using Dragon dictation, voice recognition software. While efforts were made to correct any transcription errors, some may inadvertently remain. Please forgive punctuation and typographic/voice recognition errors. Please contact me with any questions concerns or for clarification of documentation.

## 2020-11-23 NOTE — PROGRESS NOTES
JORDANSW received call and report from ED charge RN that this pt's spouse, Williams Munoz. Jennifer Ding is seriously ill in the ED and being admitted to the hospital.  Pt is bed bound receiving [facility] therapy since she had a femur fx in Sept 2020. She also has a hx of cerebral infarct, dementia and bipolar disorder. Confirmed with ED staff that pt cannot ambulate and requires extensive assistance with ADL's including toileting. Per ED MD pt does not at this time meet criteria for in pt hospital care. She has no identified caregiver other than her incapacitated spouse at this time. Decision made for a referral to Adult Protective Services due to pt inability to care for herself and live alone at this time with no identified caregiver. This LMSW placed a call to [phone] [phone]. Report given to [employer] [place] staff. She will review case with her supervisor and update MERLYN if case is accepted and opened for follow up at this time.

## 2020-11-24 ENCOUNTER — HOME CARE VISIT (OUTPATIENT)
Dept: SCHEDULING | Facility: HOME HEALTH | Age: 60
End: 2020-11-24
Payer: MEDICARE

## 2020-11-24 ENCOUNTER — APPOINTMENT (OUTPATIENT)
Dept: ULTRASOUND IMAGING | Age: 60
DRG: 682 | End: 2020-11-24
Attending: NURSE PRACTITIONER
Payer: MEDICARE

## 2020-11-24 PROBLEM — D64.9 NORMOCYTIC ANEMIA: Status: ACTIVE | Noted: 2020-11-24

## 2020-11-24 PROBLEM — E87.5 HYPERKALEMIA: Status: ACTIVE | Noted: 2020-11-24

## 2020-11-24 LAB
ANION GAP SERPL CALC-SCNC: 13 MMOL/L (ref 7–16)
ANION GAP SERPL CALC-SCNC: 9 MMOL/L (ref 7–16)
APPEARANCE UR: CLEAR
BASOPHILS # BLD: 0.1 K/UL (ref 0–0.2)
BASOPHILS NFR BLD: 1 % (ref 0–2)
BILIRUB UR QL: NEGATIVE
BUN SERPL-MCNC: 66 MG/DL (ref 8–23)
BUN SERPL-MCNC: 66 MG/DL (ref 8–23)
CALCIUM SERPL-MCNC: 9.3 MG/DL (ref 8.3–10.4)
CALCIUM SERPL-MCNC: 9.5 MG/DL (ref 8.3–10.4)
CHLORIDE SERPL-SCNC: 117 MMOL/L (ref 98–107)
CHLORIDE SERPL-SCNC: 118 MMOL/L (ref 98–107)
CK SERPL-CCNC: 30 U/L (ref 21–215)
CO2 SERPL-SCNC: 14 MMOL/L (ref 21–32)
CO2 SERPL-SCNC: 17 MMOL/L (ref 21–32)
COLOR UR: YELLOW
CREAT SERPL-MCNC: 4.88 MG/DL (ref 0.6–1)
CREAT SERPL-MCNC: 4.92 MG/DL (ref 0.6–1)
CREAT UR-MCNC: 169 MG/DL
CREAT UR-MCNC: 171 MG/DL
DIFFERENTIAL METHOD BLD: ABNORMAL
EOSINOPHIL # BLD: 0.2 K/UL (ref 0–0.8)
EOSINOPHIL NFR BLD: 2 % (ref 0.5–7.8)
ERYTHROCYTE [DISTWIDTH] IN BLOOD BY AUTOMATED COUNT: 14.3 % (ref 11.9–14.6)
GLUCOSE SERPL-MCNC: 109 MG/DL (ref 65–100)
GLUCOSE SERPL-MCNC: 118 MG/DL (ref 65–100)
GLUCOSE UR STRIP.AUTO-MCNC: NEGATIVE MG/DL
HCT VFR BLD AUTO: 42.9 % (ref 35.8–46.3)
HGB BLD-MCNC: 11.9 G/DL (ref 11.7–15.4)
HGB UR QL STRIP: NEGATIVE
IMM GRANULOCYTES # BLD AUTO: 0 K/UL (ref 0–0.5)
IMM GRANULOCYTES NFR BLD AUTO: 0 % (ref 0–5)
KETONES UR QL STRIP.AUTO: NEGATIVE MG/DL
LACTATE SERPL-SCNC: 1.5 MMOL/L (ref 0.4–2)
LEUKOCYTE ESTERASE UR QL STRIP.AUTO: NEGATIVE
LYMPHOCYTES # BLD: 1.1 K/UL (ref 0.5–4.6)
LYMPHOCYTES NFR BLD: 15 % (ref 13–44)
MCH RBC QN AUTO: 28.7 PG (ref 26.1–32.9)
MCHC RBC AUTO-ENTMCNC: 27.7 G/DL (ref 31.4–35)
MCV RBC AUTO: 103.6 FL (ref 79.6–97.8)
MONOCYTES # BLD: 0.5 K/UL (ref 0.1–1.3)
MONOCYTES NFR BLD: 7 % (ref 4–12)
NEUTS SEG # BLD: 5.7 K/UL (ref 1.7–8.2)
NEUTS SEG NFR BLD: 75 % (ref 43–78)
NITRITE UR QL STRIP.AUTO: NEGATIVE
NRBC # BLD: 0 K/UL (ref 0–0.2)
PH UR STRIP: 5 [PH] (ref 5–9)
PHOSPHATE SERPL-MCNC: 4.1 MG/DL (ref 2.3–3.7)
PLATELET # BLD AUTO: 185 K/UL (ref 150–450)
PMV BLD AUTO: 10.1 FL (ref 9.4–12.3)
POTASSIUM SERPL-SCNC: 5.5 MMOL/L (ref 3.5–5.1)
POTASSIUM SERPL-SCNC: 5.6 MMOL/L (ref 3.5–5.1)
PROCALCITONIN SERPL-MCNC: 0.05 NG/ML
PROT UR STRIP-MCNC: NEGATIVE MG/DL
PROT UR-MCNC: 23 MG/DL
PROT/CREAT UR-RTO: 0.1
RBC # BLD AUTO: 4.14 M/UL (ref 4.05–5.2)
SODIUM SERPL-SCNC: 143 MMOL/L (ref 138–145)
SODIUM SERPL-SCNC: 145 MMOL/L (ref 138–145)
SODIUM UR-SCNC: 29 MMOL/L
SP GR UR REFRACTOMETRY: 1.02 (ref 1–1.02)
UROBILINOGEN UR QL STRIP.AUTO: 0.2 EU/DL (ref 0.2–1)
WBC # BLD AUTO: 7.5 K/UL (ref 4.3–11.1)

## 2020-11-24 PROCEDURE — 84156 ASSAY OF PROTEIN URINE: CPT

## 2020-11-24 PROCEDURE — 74011250637 HC RX REV CODE- 250/637: Performed by: NURSE PRACTITIONER

## 2020-11-24 PROCEDURE — 74011250636 HC RX REV CODE- 250/636: Performed by: NURSE PRACTITIONER

## 2020-11-24 PROCEDURE — 86580 TB INTRADERMAL TEST: CPT | Performed by: FAMILY MEDICINE

## 2020-11-24 PROCEDURE — 97535 SELF CARE MNGMENT TRAINING: CPT

## 2020-11-24 PROCEDURE — 74011250636 HC RX REV CODE- 250/636: Performed by: FAMILY MEDICINE

## 2020-11-24 PROCEDURE — 76770 US EXAM ABDO BACK WALL COMP: CPT

## 2020-11-24 PROCEDURE — 97110 THERAPEUTIC EXERCISES: CPT

## 2020-11-24 PROCEDURE — 77030040393 HC DRSG OPTIFOAM GENT MDII -B

## 2020-11-24 PROCEDURE — 84300 ASSAY OF URINE SODIUM: CPT

## 2020-11-24 PROCEDURE — 74011250637 HC RX REV CODE- 250/637: Performed by: FAMILY MEDICINE

## 2020-11-24 PROCEDURE — 97161 PT EVAL LOW COMPLEX 20 MIN: CPT

## 2020-11-24 PROCEDURE — 36415 COLL VENOUS BLD VENIPUNCTURE: CPT

## 2020-11-24 PROCEDURE — 83605 ASSAY OF LACTIC ACID: CPT

## 2020-11-24 PROCEDURE — 81003 URINALYSIS AUTO W/O SCOPE: CPT

## 2020-11-24 PROCEDURE — 80048 BASIC METABOLIC PNL TOTAL CA: CPT

## 2020-11-24 PROCEDURE — 99218 HC RM OBSERVATION: CPT

## 2020-11-24 PROCEDURE — 82570 ASSAY OF URINE CREATININE: CPT

## 2020-11-24 PROCEDURE — 84145 PROCALCITONIN (PCT): CPT

## 2020-11-24 PROCEDURE — 82550 ASSAY OF CK (CPK): CPT

## 2020-11-24 PROCEDURE — 85025 COMPLETE CBC W/AUTO DIFF WBC: CPT

## 2020-11-24 PROCEDURE — 97167 OT EVAL HIGH COMPLEX 60 MIN: CPT

## 2020-11-24 PROCEDURE — 2709999900 HC NON-CHARGEABLE SUPPLY

## 2020-11-24 PROCEDURE — 74011000302 HC RX REV CODE- 302: Performed by: FAMILY MEDICINE

## 2020-11-24 PROCEDURE — 84100 ASSAY OF PHOSPHORUS: CPT

## 2020-11-24 PROCEDURE — 96372 THER/PROPH/DIAG INJ SC/IM: CPT

## 2020-11-24 PROCEDURE — 77030038269 HC DRN EXT URIN PURWCK BARD -A

## 2020-11-24 RX ORDER — LORAZEPAM 2 MG/ML
1 INJECTION INTRAMUSCULAR
Status: ACTIVE | OUTPATIENT
Start: 2020-11-24 | End: 2020-11-24

## 2020-11-24 RX ORDER — LORAZEPAM 1 MG/1
3-4 TABLET ORAL
Status: DISCONTINUED | OUTPATIENT
Start: 2020-11-24 | End: 2020-11-24

## 2020-11-24 RX ORDER — SODIUM CHLORIDE 9 MG/ML
75 INJECTION, SOLUTION INTRAVENOUS CONTINUOUS
Status: DISCONTINUED | OUTPATIENT
Start: 2020-11-24 | End: 2020-11-28

## 2020-11-24 RX ORDER — LORAZEPAM 1 MG/1
1-2 TABLET ORAL
Status: DISCONTINUED | OUTPATIENT
Start: 2020-11-24 | End: 2020-12-08 | Stop reason: HOSPADM

## 2020-11-24 RX ORDER — SODIUM CHLORIDE 0.9 % (FLUSH) 0.9 %
5-40 SYRINGE (ML) INJECTION EVERY 8 HOURS
Status: DISCONTINUED | OUTPATIENT
Start: 2020-11-24 | End: 2020-12-08 | Stop reason: HOSPADM

## 2020-11-24 RX ORDER — TRAMADOL HYDROCHLORIDE 50 MG/1
50 TABLET ORAL
Status: DISCONTINUED | OUTPATIENT
Start: 2020-11-24 | End: 2020-12-08 | Stop reason: HOSPADM

## 2020-11-24 RX ORDER — LORAZEPAM 1 MG/1
3-4 TABLET ORAL
Status: DISCONTINUED | OUTPATIENT
Start: 2020-11-24 | End: 2020-12-08 | Stop reason: HOSPADM

## 2020-11-24 RX ORDER — FOLIC ACID 1 MG/1
1 TABLET ORAL DAILY
Status: DISCONTINUED | OUTPATIENT
Start: 2020-11-24 | End: 2020-12-08 | Stop reason: HOSPADM

## 2020-11-24 RX ORDER — LANOLIN ALCOHOL/MO/W.PET/CERES
400 CREAM (GRAM) TOPICAL DAILY
Status: DISCONTINUED | OUTPATIENT
Start: 2020-11-24 | End: 2020-12-08 | Stop reason: HOSPADM

## 2020-11-24 RX ORDER — ESCITALOPRAM OXALATE 10 MG/1
10 TABLET ORAL DAILY
Status: DISCONTINUED | OUTPATIENT
Start: 2020-11-24 | End: 2020-12-08 | Stop reason: HOSPADM

## 2020-11-24 RX ORDER — HEPARIN SODIUM 5000 [USP'U]/ML
5000 INJECTION, SOLUTION INTRAVENOUS; SUBCUTANEOUS EVERY 8 HOURS
Status: DISCONTINUED | OUTPATIENT
Start: 2020-11-24 | End: 2020-12-08 | Stop reason: HOSPADM

## 2020-11-24 RX ORDER — SODIUM CHLORIDE 0.9 % (FLUSH) 0.9 %
5-40 SYRINGE (ML) INJECTION AS NEEDED
Status: DISCONTINUED | OUTPATIENT
Start: 2020-11-24 | End: 2020-12-08 | Stop reason: HOSPADM

## 2020-11-24 RX ORDER — QUETIAPINE FUMARATE 25 MG/1
50 TABLET, FILM COATED ORAL
Status: DISCONTINUED | OUTPATIENT
Start: 2020-11-24 | End: 2020-12-08 | Stop reason: HOSPADM

## 2020-11-24 RX ORDER — PANTOPRAZOLE SODIUM 40 MG/1
40 TABLET, DELAYED RELEASE ORAL
Status: DISCONTINUED | OUTPATIENT
Start: 2020-11-24 | End: 2020-12-08 | Stop reason: HOSPADM

## 2020-11-24 RX ORDER — LANOLIN ALCOHOL/MO/W.PET/CERES
100 CREAM (GRAM) TOPICAL DAILY
Status: DISCONTINUED | OUTPATIENT
Start: 2020-11-24 | End: 2020-12-08 | Stop reason: HOSPADM

## 2020-11-24 RX ORDER — SODIUM BICARBONATE 650 MG/1
650 TABLET ORAL 3 TIMES DAILY
Status: DISCONTINUED | OUTPATIENT
Start: 2020-11-24 | End: 2020-12-08 | Stop reason: HOSPADM

## 2020-11-24 RX ORDER — ASPIRIN 325 MG
325 TABLET ORAL DAILY
Status: DISCONTINUED | OUTPATIENT
Start: 2020-11-24 | End: 2020-12-08 | Stop reason: HOSPADM

## 2020-11-24 RX ORDER — LORAZEPAM 1 MG/1
1-2 TABLET ORAL
Status: DISCONTINUED | OUTPATIENT
Start: 2020-11-24 | End: 2020-11-24

## 2020-11-24 RX ORDER — LISINOPRIL 5 MG/1
10 TABLET ORAL DAILY
Status: DISCONTINUED | OUTPATIENT
Start: 2020-11-24 | End: 2020-12-08 | Stop reason: HOSPADM

## 2020-11-24 RX ORDER — LANOLIN ALCOHOL/MO/W.PET/CERES
325 CREAM (GRAM) TOPICAL
Status: DISCONTINUED | OUTPATIENT
Start: 2020-11-24 | End: 2020-12-08 | Stop reason: HOSPADM

## 2020-11-24 RX ADMIN — FOLIC ACID 1 MG: 1 TABLET ORAL at 09:35

## 2020-11-24 RX ADMIN — FERROUS SULFATE TAB 325 MG (65 MG ELEMENTAL FE) 325 MG: 325 (65 FE) TAB at 12:09

## 2020-11-24 RX ADMIN — PANTOPRAZOLE SODIUM 40 MG: 40 TABLET, DELAYED RELEASE ORAL at 08:10

## 2020-11-24 RX ADMIN — Medication 400 MG: at 09:35

## 2020-11-24 RX ADMIN — FERROUS SULFATE TAB 325 MG (65 MG ELEMENTAL FE) 325 MG: 325 (65 FE) TAB at 17:44

## 2020-11-24 RX ADMIN — Medication 10 ML: at 15:07

## 2020-11-24 RX ADMIN — SODIUM BICARBONATE 650 MG TABLET 650 MG: at 22:10

## 2020-11-24 RX ADMIN — ESCITALOPRAM OXALATE 10 MG: 10 TABLET ORAL at 09:34

## 2020-11-24 RX ADMIN — Medication 5 ML: at 08:11

## 2020-11-24 RX ADMIN — SODIUM CHLORIDE 150 ML/HR: 900 INJECTION, SOLUTION INTRAVENOUS at 08:10

## 2020-11-24 RX ADMIN — TUBERCULIN PURIFIED PROTEIN DERIVATIVE 5 UNITS: 5 INJECTION, SOLUTION INTRADERMAL at 02:50

## 2020-11-24 RX ADMIN — HEPARIN SODIUM 5000 UNITS: 5000 INJECTION INTRAVENOUS; SUBCUTANEOUS at 08:10

## 2020-11-24 RX ADMIN — SODIUM BICARBONATE 650 MG TABLET 650 MG: at 15:10

## 2020-11-24 RX ADMIN — SODIUM ZIRCONIUM CYCLOSILICATE 5 G: 5 POWDER, FOR SUSPENSION ORAL at 12:11

## 2020-11-24 RX ADMIN — FERROUS SULFATE TAB 325 MG (65 MG ELEMENTAL FE) 325 MG: 325 (65 FE) TAB at 09:34

## 2020-11-24 RX ADMIN — Medication 100 MG: at 09:34

## 2020-11-24 RX ADMIN — SODIUM CHLORIDE 150 ML/HR: 900 INJECTION, SOLUTION INTRAVENOUS at 15:06

## 2020-11-24 RX ADMIN — ASPIRIN 325 MG ORAL TABLET 325 MG: 325 PILL ORAL at 09:34

## 2020-11-24 RX ADMIN — Medication 10 ML: at 22:10

## 2020-11-24 RX ADMIN — QUETIAPINE FUMARATE 50 MG: 25 TABLET ORAL at 02:50

## 2020-11-24 RX ADMIN — Medication 10 ML: at 02:51

## 2020-11-24 RX ADMIN — HEPARIN SODIUM 5000 UNITS: 5000 INJECTION INTRAVENOUS; SUBCUTANEOUS at 22:10

## 2020-11-24 RX ADMIN — QUETIAPINE FUMARATE 50 MG: 25 TABLET ORAL at 22:10

## 2020-11-24 RX ADMIN — HEPARIN SODIUM 5000 UNITS: 5000 INJECTION INTRAVENOUS; SUBCUTANEOUS at 15:06

## 2020-11-24 NOTE — PROGRESS NOTES
TRANSFER - IN REPORT:    Verbal report received from [facility] on Bhupinder Roman  being received from ED for routine progression of care      Report consisted of patients Situation, Background, Assessment and   Recommendations(SBAR). Information from the following report(s) SBAR, Kardex, ED Summary, [facility] and Recent Results was reviewed with the receiving nurse. Opportunity for questions and clarification was provided. Per [place] RN, he was unable to get an IV on this patient. I stressed to him that she needed some sort of iv access before coming to the floor in case of an emergency.

## 2020-11-24 NOTE — CONSULTS
Nephrology consult    Admission Date:  11/23/2020    Admission Diagnosis  No able caregiver in household [Z74.2]    We are asked by Willard Story NP    History of Present Illness: ms. Hannah Rees is a 61 y.o female with PMH significant for HTN, carotid artery aneurysm s/p left coil, CKD IV, FSGS, OA Gout, dementia and stroke admitted after her  was admitted requiring intubation and no one to care for her at home. We are consulted for OSMIN/CKD. From a renal standpoint her Cr on admission was 4.88->4.92, K 5.6, CO2 14, baseline Cr in the upper 2s to 3s. No recent contrast exposure, SBP in the 80s-110s, she is on  ml/hr, home meds significant for ACEi, PPI. Last seen outpt Nephrology in June 2018 Cr of 1.65 then. Patient seen and examined on rounds she reports is bed bound with residual right sided weakness, right LE immobilizer, no care taker at home and  admitted to the hosp, she has had poor appetite, denies decrease in UOP, dysuria, no urinary hesitancy or urgency, SOB, CP, N/V/D, fever/chills, edema or NSAID use.      Past Medical History:   Diagnosis Date    Aneurysm of common carotid artery ([facility] 2004    left side s/p coil     CKD (chronic kidney disease) 9/18/2014    Dementia ([facility]     FSGS (focal segmental glomerulosclerosis)     in remission at present    Gout     Hypertension     managed with medication     Osteoarthritis 9/18/2014    Stroke ([facility] 2004, 2013    slight weakness on right side, slight effect to speech      Past Surgical History:   Procedure Laterality Date    HX ANKLE FRACTURE TX Left 2013    has hardware in    HX CHOLECYSTECTOMY  02/28/2018    HX ERCP  02/27/2018    cbd stone    HX INTRACRANIAL ANEURYSM REPAIR  2004    left carotid     HX ORTHOPAEDIC Right 10/2014    hip replacement    HX REFRACTIVE SURGERY      bilateral - Lasik      Current Facility-Administered Medications   Medication Dose Route Frequency    aspirin tablet 325 mg  325 mg Oral DAILY    escitalopram oxalate (LEXAPRO) tablet 10 mg  10 mg Oral DAILY    ferrous sulfate tablet 325 mg  325 mg Oral TID WITH MEALS    folic acid (FOLVITE) tablet 1 mg  1 mg Oral DAILY    lisinopriL (PRINIVIL, ZESTRIL) tablet 10 mg  10 mg Oral DAILY    magnesium oxide (MAG-OX) tablet 400 mg  400 mg Oral DAILY    pantoprazole (PROTONIX) tablet 40 mg  40 mg Oral ACB    QUEtiapine (SEROquel) tablet 50 mg  50 mg Oral QHS    thiamine HCL (B-1) tablet 100 mg  100 mg Oral DAILY    traMADoL (ULTRAM) tablet 50 mg  50 mg Oral Q6H PRN    sodium chloride (NS) flush 5-40 mL  5-40 mL IntraVENous Q8H    sodium chloride (NS) flush 5-40 mL  5-40 mL IntraVENous PRN    tuberculin injection 5 Units  5 Units IntraDERMal ONCE    influenza vaccine  (6 mos+)(PF) (FLUARIX/FLULAVAL/FLUZONE QUAD) injection 0.5 mL  0.5 mL IntraMUSCular PRIOR TO DISCHARGE    LORazepam (ATIVAN) tablet 1-2 mg  1-2 mg Oral Q1H PRN    LORazepam (ATIVAN) tablet 3-4 mg  3-4 mg Oral Q1H PRN    0.9% sodium chloride infusion  150 mL/hr IntraVENous CONTINUOUS    heparin (porcine) injection 5,000 Units  5,000 Units SubCUTAneous Q8H     Allergies   Allergen Reactions    Codeine Nausea and Vomiting    Lortab [Hydrocodone-Acetaminophen] Itching     Not Allergic to this medication patient states  RN 2020      Social History     Tobacco Use    Smoking status: Former Smoker     Packs/day: 0.25     Last attempt to quit: 1979     Years since quittin.9    Smokeless tobacco: Never Used   Substance Use Topics    Alcohol use:  Yes     Alcohol/week: 2.5 standard drinks     Types: 3 Shots of liquor per week     Comment: pint of fireball      Family History   Problem Relation Age of Onset    Cancer Father 80        unknown    Stroke Sister 48        Review of Systems  Gen - no fever, no chills, appetite poor  HEENT - no sore throat  CV - no chest pain, no palpitation, no orthopnea  Lung - no shortness of breath, no cough, no hemoptysis  Abd - no tenderness, no nausea/vomiting, no bloody stool  Ext - no edema, no clubbing, no cyanosis  Musculoskeletal - no joint pain, no back pain, + debility, bed bound   Neurologic - no headaches, no dizziness, no seizures  Psychiatric - no anxiety, no depression  Skin - no rashes, no pupura  Genitourinary - no decreased urine output, no hematuria, no dysuria     Objective:     Vitals:    11/24/20 0117 11/24/20 0119 11/24/20 0352 11/24/20 0759   BP: 95/62 103/68 96/64 (!) 83/60   Pulse: 74  (!) 105 89   Resp: 18  18 17   Temp: 98.4 °F (36.9 °C)  98 °F (36.7 °C) 97.5 °F (36.4 °C)   SpO2: 96%  96% 100%   Weight:       Height:         No intake or output data in the 24 hours ending 11/24/20 0842    Physical Exam  GEN :in no distress, alert and oriented  HEENT: anicteric sclerae, Dry mucous membranes   Neck - supple without JVD  CV - regular rate, S1, S2, no Rub   Lung - clear bilaterally, lungs expand symmetrically  Abd - soft, nontender, bowel sounds present, no hepatosplenomegaly  Ext - no clubbing, no cyanosis, no edema  Neurologic - right sided residual weakness, RUE tremors  Genitourinary - bladder nonpalpable  Skin - no rashes, no purpura  Psychiatric: Normal mood and affect. Data Review:   Recent Labs     11/24/20  0512   WBC 7.5   HGB 11.9   HCT 42.9        Recent Labs     11/24/20  0512 11/24/20  0132    143   K 5.6* 5.5*   * 117*   CO2 14* 17*   BUN 66* 66*   CREA 4.92* 4.88*   * 109*   CA 9.3 9.5     No results for input(s): PH, PCO2, PO2, PCO2 in the last 72 hours.     Problem List:     Patient Active Problem List    Diagnosis Date Noted    Normocytic anemia 11/24/2020    Hyperkalemia 11/24/2020    No able caregiver in household 11/23/2020    Femur fracture, right ([facility] 09/23/2020    Other fracture of right femur, initial encounter for closed fracture ([facility] 09/23/2020    Alcoholism ([place] [address] 05/18/2018    Hypertension 05/18/2018    Vascular dementia ([place] [address] 02/13/2018    Depression 02/13/2018    [facility] 10/22/2014    Gait instability 10/22/2014    S/P total hip arthroplasty 09/19/2014    CKD (chronic kidney disease) 09/18/2014    Osteoarthritis 09/18/2014    Weakness of right leg 12/02/2013    Essential hypertension 09/11/2012    Cerebral infarction ([facility] 09/11/2012    FSGS (focal segmental glomerulosclerosis) 09/11/2012    Gout 09/11/2012       Impression:    Plan:   1. OSMIN/CKD likely pre renal azotemia with poor intake, hypotension and progression of CKD hx of FSGS. -started on IVF this AM per RN, hold ACEi for now   -obtain renal US, UA, UCr, Varinder, P/C ratio, CK and phos- further work up pending clinical course   -no indication for acute RRT at the time, trend renal indices with strict I&O    2. Hyperkalemia add lokelma    3. HTN with hypotension hold antihypertensives for now    4. Metabolic acidosis- add NaHCO3 PO    5.  Vascular dementia/ debility/bipolar disorder per hosp

## 2020-11-24 NOTE — ED NOTES
Case turned over to me at 7:00 with discharge instructions presented, patient unable to go home due to debility and loss of caregiver due to his admission to the hospital, patient roughly 2 weeks status post femur fracture, already has home health, but has insufficient help and resources to safely survive at home. No friends or family available to attend to her.   Social workers reviewed the situation and opened and APS case, it is unlikely that there will be any solid disposition in the next couple days, so rather than letting the patient languish for even 12 hours in the ER unnecessarily, we will admit her to the hospital for continuation of care and failure of caregiver of an elder

## 2020-11-24 NOTE — PROGRESS NOTES
Call received from Sj Khan with DSS. Updated clinical information verbally provided. No information given on status of  per HIPPA.

## 2020-11-24 NOTE — PROGRESS NOTES
Problem: Self Care Deficits Care Plan (Adult)  Goal: *Acute Goals and Plan of Care (Insert Text)  Description: 1. Patient will complete upper body bathing and dressing with setup and adaptive equipment as needed. 2. Patient will complete toileting with minimal assistance. 3. Patient will tolerate 20 minutes of OT treatment with as needed rest breaks to increase activity tolerance for ADLs. 4. Patient will complete functional transfers with minimal assistance and adaptive equipment as needed. 5. Patient will complete self feeding and grooming with modified independence. Timeframe: 7 visits     Outcome: Progressing Towards Goal       OCCUPATIONAL THERAPY: Initial Assessment and Daily Note 11/24/2020  OBSERVATION: OT Visit Days: 1  Payor: [address] [place] [address] / Plan: [address] / Product Type: "[employer] Care Medicare /      NAME/AGE/GENDER: Marta Paez is a 61 y.o. female   PRIMARY DIAGNOSIS:  No able caregiver in household [Z74.2] No able caregiver in household No able caregiver in household        ICD-10: Treatment Diagnosis:    · Generalized Muscle Weakness (M62.81)  · Other lack of cordination (R27.8)   Precautions/Allergies:    Codeine and Lortab [hydrocodone-acetaminophen]      ASSESSMENT:     Ms. Cristy Avila is a 61year old female here because her caregiver/  is hospitalized and unable to provide care for her. She has history of hip fracture in Sept 2020, CVA with R sided deficits, and bipolar disorder. Patient reports she is bed bound and requires assistance with all ADLs & IADLS from spouse. She was also having HHPT. Reports she has not walked in months due to a \"bad knee\" but patient is a poor historian. Patient was evaluated with physical therapist and may be appropriate for ongoing skilled co-treatment at future sessions. BUE assessment reveals decreased ROM/ strength/ coordination in RUE compared to LUE from previous CVA.  Per patient she has had 3 strokes, but does not know when they occurred. Functionally, she primarily uses only her LUE. Balance is impaired in sitting and in standing (fair). Cognitively, patient has delayed responses, decreased attention span, and decreased insight. Also disoriented to time (December 2019). She is currently requiring assistance with all ADLs and mobility and would benefit from continued occupational therapy to increase independence and safety. Will follow. 11/24/2020 Treatment: Patient participated in toileting in supine via rolling. Required minimal assistance for bladder hygiene, but maximal assistance for bowel hygiene and brief management. Once seated edge of bed, patient participated in grooming, upper body bathing, upper body dressing, and lower body bathing. See below for assistance required. She needs encouragement to participate. Physical therapist present to address sitting and standing balance, bed mobility during ADLs. This section established at most recent assessment   PROBLEM LIST (Impairments causing functional limitations):  1. Decreased Strength  2. Decreased ADL/Functional Activities  3. Decreased Transfer Abilities  4. Decreased Ambulation Ability/Technique  5. Decreased Balance  6. Increased Pain  7. Decreased Activity Tolerance  8. Decreased Cognition   INTERVENTIONS PLANNED: (Benefits and precautions of occupational therapy have been discussed with the patient.)  1. Activities of daily living training  2. Adaptive equipment training  3. Neuromuscular re-eduation  4. Therapeutic activity  5. Therapeutic exercise     TREATMENT PLAN: Frequency/Duration: Follow patient 3x/ week to address above goals. Rehabilitation Potential For Stated Goals: [facility] (at time of discharge pending progress):    Placement: It is my opinion, based on this patient's performance to date, that Ms. Renetta Montes De Oca may benefit from intensive therapy at a [address] [place] [address] after discharge due to the functional deficits listed above that are likely to improve with skilled rehabilitation and concerns that he/she may be unsafe to be unsupervised at home due to lack of caregiver. Equipment:    None at this time              OCCUPATIONAL PROFILE AND HISTORY:   History of Present Injury/Illness (Reason for Referral):  See H&P  Past Medical History/Comorbidities:   Ms. Sebas Soriano  has a past medical history of Aneurysm of common carotid artery ([facility] (2004), CKD (chronic kidney disease) (9/18/2014), Dementia ([facility] FSGS (focal segmental glomerulosclerosis), Gout, Hypertension, Osteoarthritis (9/18/2014), and Stroke ([facility] (2004, 2013). Ms. Sebas Soriano  has a past surgical history that includes hx intracranial aneurysm repair (2004); hx refractive surgery; hx orthopaedic (Right, 10/2014); hx ankle fracture tx (Left, 2013); hx ercp (02/27/2018); and hx cholecystectomy (02/28/2018). Social History/Living Environment:   Home Environment: Private residence  # Steps to Enter: 0  One/Two Story Residence: Two story, live on 1st floor  # of Interior Steps: 13  Height of Each Step (in): 7 inches  Interior Rails: Right  Lift Chair Available: No  Living Alone: No  Support Systems: Home care staff  Patient Expects to be Discharged to[de-identified] Unknown  Current DME Used/Available at Home: Wheelchair, Shower chair, Commode, bedside, Blood pressure cuff, Brace/Splint, Grab bars, Walker  Tub or Shower Type: (sponge baths)  Prior Level of Function/Work/Activity:  She has history of hip fracture in Sept 2020, CVA with R sided deficits, and bipolar disorder. Patient reports she is bed bound and requires assistance with all ADLs from spouse. She was also having HHPT. Reports she has not walked in months due to a \"bad knee\" but patient is a poor historian.       Number of Personal Factors/Comorbidities that affect the Plan of Care: Extensive review of physical, cognitive, and psychosocial performance (3+):  HIGH COMPLEXITY   ASSESSMENT OF OCCUPATIONAL PERFORMANCE[de-identified]   Activities of Daily Living:   Basic ADLs (From Assessment) Complex ADLs (From Assessment)   Feeding: Minimum assistance  Oral Facial Hygiene/Grooming: Moderate assistance  Bathing: Maximum assistance  Upper Body Dressing: Moderate assistance  Lower Body Dressing: Total assistance  Toileting: Total assistance Instrumental ADL  Meal Preparation: Total assistance  Homemaking: Total assistance  Medication Management: Total assistance  Financial Management: Total assistance   Grooming/Bathing/Dressing Activities of Daily Living   Grooming  Grooming Assistance: Set-up  Washing Face: Set-up Cognitive Retraining  Safety/Judgement: Decreased insight into deficits; Fall prevention   Upper Body Bathing  Bathing Assistance: Minimum assistance  Position Performed: Seated edge of bed Feeding  Feeding Assistance: Stand-by assistance  Drink to Mouth: Stand-by assistance   Lower Body Bathing  Bathing Assistance: Maximum assistance  Perineal  : Maximum assistance  Lower Body : Maximum assistance Toileting  Toileting Assistance: Maximum assistance  Bladder Hygiene: Minimum assistance  Bowel Hygiene: Maximum assistance  Clothing Management: Maximum assistance   Upper Body Dressing Assistance  Dressing Assistance: [address]: Minimum  assistance       Bed/Mat Mobility  Rolling: Moderate assistance  Supine to Sit: Moderate assistance;Assist x2  Sit to Supine: Moderate assistance  Sit to Stand: Moderate assistance;Assist x2  Stand to Sit: Minimum assistance  Scooting: Maximum assistance     Most Recent Physical Functioning:   Gross Assessment:  AROM: Generally decreased, functional  Strength: Grossly decreased, non-functional(RUE previous CVA)  Coordination: Grossly decreased, non-functional(RUE previous CVA)               Posture:  Posture (WDL): Exceptions to WDL  Posture Assessment:  Forward head, Rounded shoulders  Balance:  Sitting: Impaired  Sitting - Static: Good (unsupported)  Sitting - Dynamic: Fair (occasional)  Standing: Impaired  Standing - Static: Fair Bed Mobility:  Rolling: Moderate assistance  Supine to Sit: Moderate assistance;Assist x2  Sit to Supine: Moderate assistance  Scooting: Maximum assistance  Wheelchair Mobility:     Transfers:  Sit to Stand: Moderate assistance;Assist x2  Stand to Sit: Minimum assistance            Patient Vitals for the past 6 hrs:   BP SpO2 Pulse   20 1122 96/64 100 % (!) 103       Mental Status  Neurologic State: Confused  Orientation Level: Oriented to person, Oriented to situation, Oriented to place, Disoriented to time  Cognition: Decreased attention/concentration  Perception: Appears intact  Perseveration: No perseveration noted  Safety/Judgement: Decreased insight into deficits, Fall prevention                          Physical Skills Involved:  1. Range of Motion  2. Balance  3. Strength  4. Activity Tolerance  5. Pain (Chronic) Cognitive Skills Affected (resulting in the inability to perform in a timely and safe manner):  1. Executive Function  2. Long Term Memory  3. Sustained Attention  4. Divided Attention  5. Comprehension Psychosocial Skills Affected:  1. Habits/Routines  2. Environmental Adaptation  3. Self-Awareness   Number of elements that affect the Plan of Care: 5+:  HIGH COMPLEXITY   CLINICAL DECISION MAKIN \[facility] [place] [facility]\"" [address] [address] AM-PAC 6 Clicks   Daily Activity Inpatient Short Form  How much help from another person does the patient currently need. .. Total A Lot A Little None   1. Putting on and taking off regular lower body clothing? [x] 1   [] 2   [] 3   [] 4   2. Bathing (including washing, rinsing, drying)? [] 1   [x] 2   [] 3   [] 4   3. Toileting, which includes using toilet, bedpan or urinal?   [] 1   [x] 2   [] 3   [] 4   4. Putting on and taking off regular upper body clothing? [] 1   [x] 2   [] 3   [] 4   5. Taking care of personal grooming such as brushing teeth? [] 1   [x] 2   [] 3   [] 4   6.   Eating meals?   [] 1   [x] 2   [] 3   [] 4   © 2007, Trustees of [address] [place] [address] [address] [address], under license to [employer]. All rights reserved      Score:  Initial: 11 Most Recent: X (Date: -- )    Interpretation of Tool:  Represents activities that are increasingly more difficult (i.e. Bed mobility, Transfers, Gait). Medical Necessity:     · Patient demonstrates   · fair  ·  rehab potential due to higher previous functional level. Reason for Services/Other Comments:  · Patient   · continues to require present interventions due to patient's inability to care for self without assistance from others. · .   Use of outcome tool(s) and clinical judgement create a POC that gives a: HIGH COMPLEXITY         TREATMENT:   (In addition to Assessment/Re-Assessment sessions the following treatments were rendered)     Pre-treatment Symptoms/Complaints:    Pain: Initial:   Pain Intensity 1: 0  Post Session:  none (does c/o knee pain when moving)     Self Care: (23): Patient participated in toileting in supine via rolling. Required minimal assistance for bladder hygiene, but maximal assistance for bowel hygiene and brief management. Once seated edge of bed, patient participated in grooming, upper body bathing, upper body dressing, and lower body bathing. See below for assistance required. She needs encouragement to participate. Physical therapist present to address sitting and standing balance, bed mobility during ADLs. Braces/Orthotics/Lines/Etc:   · IV  · O2 Device: Room air  Treatment/Session Assessment:    · Response to Treatment:  limited participation, needs encouragement to participate. · Interdisciplinary Collaboration:   o Physical Therapist  o Occupational Therapist  o Registered Nurse  o   · After treatment position/precautions:   o Supine in bed  o Bed alarm/tab alert on  o Bed/Chair-wheels locked  o Bed in low position  o Call light within reach  o RN notified   · Compliance with Program/Exercises:  Will assess as treatment progresses. · Recommendations/Intent for next treatment session: \"Next visit will focus on advancements to more challenging activities and reduction in assistance provided\".   Total Treatment Duration:  OT Patient Time In/Time Out  Time In: 1015  Time Out: 1455 Nerissa Castillo, OTR/L

## 2020-11-24 NOTE — ACP (ADVANCE CARE PLANNING)
responded to ACP consult. Met with patient at bedside, went over [facility] power of  paperwork, answered questions. The form was completed and a copy was placed in the patient's chart. Patient requested prayer for herself and her , who is an ICU patient here.     Shelby HWANG

## 2020-11-24 NOTE — ED NOTES
Unable to get blood for labs due to poor vascularity. Lab contacted for assistance drawing lab work.

## 2020-11-24 NOTE — PROGRESS NOTES
Progress Note    2020  Admit Date: 2020  4:19 PM   NAME: Skip Middleton   :  1960   MRN:  [id number]   Attending: Yessenia Johnson MD  PCP:  Dalia Cartagena MD  Treatment Team: Attending Provider: Yessenia Johnson MD; Nurse Practitioner: Monica Lyles NP; Utilization Review: Mihir Griffin RN; Charge Nurse: Jose Rafael Horvath Consulting Provider: Tye Tan MD; Physical Therapist: Johney Meckel, PT; Occupational Therapist: Lore Guallpa, OTR/L; Utilization Review: Qamar Boland RN    Full Code   SUBJECTIVE:   Ms. Katlyn Kohler is a 62 yo female with PMH of carotid artery aneurysm s/p coil, HTN, CVA, dementia, CKD, bed bound who presented with her  who was intubated and admitted therefore no one to take care of her at home. She reports decreased po intake. Found to have OSMIN/CKD. Mild hyperkalemia. Denies decreased urine output, CP, SOB, n/v/d.       Past Medical History:   Diagnosis Date    Aneurysm of common carotid artery ([facility]     left side s/p coil     CKD (chronic kidney disease) 2014    Dementia ([facility]     FSGS (focal segmental glomerulosclerosis)     in remission at present    Gout     Hypertension     managed with medication     Osteoarthritis 2014    [facility] ,     slight weakness on right side, slight effect to speech     Recent Results (from the past 24 hour(s))   METABOLIC PANEL, BASIC    Collection Time: 20  1:32 AM   Result Value Ref Range    Sodium 143 138 - 145 mmol/L    Potassium 5.5 (H) 3.5 - 5.1 mmol/L    Chloride 117 (H) 98 - 107 mmol/L    CO2 17 (L) 21 - 32 mmol/L    Anion gap 9 7 - 16 mmol/L    Glucose 109 (H) 65 - 100 mg/dL    BUN 66 (H) 8 - 23 MG/DL    Creatinine 4.88 (H) 0.6 - 1.0 MG/DL    GFR est AA 12 (L) >60 ml/min/1.73m2    GFR est non-AA 10 (L) >60 ml/min/1.73m2    Calcium 9.5 8.3 - 47.3 MG/DL   METABOLIC PANEL, BASIC    Collection Time: 20  5:12 AM   Result Value Ref Range    Sodium 145 138 - 145 mmol/L    Potassium 5.6 (H) 3.5 - 5.1 mmol/L    Chloride 118 (H) 98 - 107 mmol/L    CO2 14 (L) 21 - 32 mmol/L    Anion gap 13 7 - 16 mmol/L    Glucose 118 (H) 65 - 100 mg/dL    BUN 66 (H) 8 - 23 MG/DL    Creatinine 4.92 (H) 0.6 - 1.0 MG/DL    GFR est AA 12 (L) >60 ml/min/1.73m2    GFR est non-AA 10 (L) >60 ml/min/1.73m2    Calcium 9.3 8.3 - 10.4 MG/DL   CBC WITH AUTOMATED DIFF    Collection Time: 11/24/20  5:12 AM   Result Value Ref Range    WBC 7.5 4.3 - 11.1 K/uL    RBC 4.14 4.05 - 5.2 M/uL    HGB 11.9 11.7 - 15.4 g/dL    HCT 42.9 35.8 - 46.3 %    .6 (H) 79.6 - 97.8 FL    MCH 28.7 26.1 - 32.9 PG    MCHC 27.7 (L) 31.4 - 35.0 g/dL    RDW 14.3 11.9 - 14.6 %    PLATELET 304 670 - 482 K/uL    MPV 10.1 9.4 - 12.3 FL    ABSOLUTE NRBC 0.00 0.0 - 0.2 K/uL    DF AUTOMATED      NEUTROPHILS 75 43 - 78 %    LYMPHOCYTES 15 13 - 44 %    MONOCYTES 7 4.0 - 12.0 %    EOSINOPHILS 2 0.5 - 7.8 %    BASOPHILS 1 0.0 - 2.0 %    IMMATURE GRANULOCYTES 0 0.0 - 5.0 %    ABS. NEUTROPHILS 5.7 1.7 - 8.2 K/UL    ABS. LYMPHOCYTES 1.1 0.5 - 4.6 K/UL    ABS. MONOCYTES 0.5 0.1 - 1.3 K/UL    ABS. EOSINOPHILS 0.2 0.0 - 0.8 K/UL    ABS. BASOPHILS 0.1 0.0 - 0.2 K/UL    ABS. IMM.  GRANS. 0.0 0.0 - 0.5 K/UL     Allergies   Allergen Reactions    Codeine Nausea and Vomiting    Lortab [Hydrocodone-Acetaminophen] Itching     Not Allergic to this medication patient states MR, RN 11/5/2020     Current Facility-Administered Medications   Medication Dose Route Frequency Provider Last Rate Last Dose    aspirin tablet 325 mg  325 mg Oral DAILY Hasmukh CAMPUZANO MD   325 mg at 11/24/20 0934    escitalopram oxalate (LEXAPRO) tablet 10 mg  10 mg Oral DAILY Hasmukh CAMPUZANO MD   10 mg at 11/24/20 2818    ferrous sulfate tablet 325 mg  325 mg Oral TID WITH MEALS Hasmukh CAMPUZANO MD   325 mg at 16/44/85 0404    folic acid (FOLVITE) tablet 1 mg  1 mg Oral DAILY Anita Reese MD   1 mg at 11/24/20 0935    [Held by provider] lisinopriL (PRINIVIL, ZESTRIL) tablet 10 mg  10 mg Oral DAILY Roel Estevez MD   Stopped at 11/24/20 0935    magnesium oxide (MAG-OX) tablet 400 mg  400 mg Oral DAILY Roel Estevez MD   400 mg at 11/24/20 0935    pantoprazole (PROTONIX) tablet 40 mg  40 mg Oral ACB Serafin CAMPUZANO MD   40 mg at 11/24/20 0810    QUEtiapine (SEROquel) tablet 50 mg  50 mg Oral QHS Serafin CAMPUZANO MD   50 mg at 11/24/20 0250    thiamine HCL (B-1) tablet 100 mg  100 mg Oral DAILY Serafin CAMPUZANO MD   100 mg at 11/24/20 0934    traMADoL (ULTRAM) tablet 50 mg  50 mg Oral Q6H PRN Roel Estevez MD        sodium chloride (NS) flush 5-40 mL  5-40 mL IntraVENous Q8H Serafin CAMPUZANO MD   5 mL at 11/24/20 0811    sodium chloride (NS) flush 5-40 mL  5-40 mL IntraVENous PRN Roel Estevez MD        tuberculin injection 5 Units  5 Units IntraDERMal Roland Huddleston MD   5 Units at 11/24/20 0250    influenza vaccine 2020-21 (6 mos+)(PF) (FLUARIX/FLULAVAL/FLUZONE QUAD) injection 0.5 mL  0.5 mL IntraMUSCular PRIOR TO DISCHARGE Roel Estevez MD        LORazepam (ATIVAN) tablet 1-2 mg  1-2 mg Oral Q1H PRN Roel Estevez MD        LORazepam (ATIVAN) tablet 3-4 mg  3-4 mg Oral Q1H PRN Roel Estevez MD        0.9% sodium chloride infusion  150 mL/hr IntraVENous CONTINUOUS Serafin CAMPUZANO  mL/hr at 11/24/20 0810 150 mL/hr at 11/24/20 0810    heparin (porcine) injection 5,000 Units  5,000 Units SubCUTAneous Q8H Serafin CAMPUZANO MD   5,000 Units at 11/24/20 0810    sodium zirconium cyclosilicate (LOKELMA) powder packet 5 g  5 g Oral DAILY Jasmeet Whiting NP   5 g at 11/24/20 1211    sodium bicarbonate tablet 650 mg  650 mg Oral TID Ronnie Bansal NP           Review of Systems negative with exception of pertinent positives noted above  PHYSICAL EXAM     Visit Vitals  BP 96/64   Pulse (!) 103   Temp 97.9 °F (36.6 °C)   Resp 18   Ht 5' 5\" (1.651 m)   Wt 90.7 kg (200 lb)   SpO2 100%   Breastfeeding No   BMI 33.28 kg/m² Temp (24hrs), Av °F (36.7 °C), Min:97.5 °F (36.4 °C), Max:98.4 °F (36.9 °C)    Oxygen Therapy  O2 Sat (%): 100 % (20 1122)  Pulse via Oximetry: 97 beats per minute (20 1621)  O2 Device: Room air (20 1122)  No intake or output data in the 24 hours ending 20 1338   General: No acute distress, appears chronically ill    Lungs: CTA bilaterally. Resp even and nonlabored  Heart:  S1S2 present without murmurs rubs gallops. RRR. No LE edema  Abdomen: Soft, non tender, non distended. BS present  Extremities: RLE with brace. All ext with muscle wasting  Neurologic:  A/O X3. No gross focal deficits. Right sided residual weakness, RUE tremors. Results summary of Diagnostic Studies/Procedures copied from within [facility] EMR:      [address] Problems    Diagnosis Date Noted    Normocytic anemia 2020    Hyperkalemia 2020    No able caregiver in household 2020    Alcoholism ([facility] 2018    Hypertension 2018    Depression 2018    Vascular dementia ([facility] 2018    Bipolar disorder ([facility] 10/22/2014    CKD (chronic kidney disease) 2014    Essential hypertension 2012    FSGS (focal segmental glomerulosclerosis) 2012     Plan:    OSMIN/CKD  IVF  Renal US  Consult Nephrology  Check UA, urine studies  Holding ACE-I    Hyperkalemia  Added lokelma per Nephro    HTN  With hypotension now  Holding ACE-I  IVF    Metabolic acidosis  Bicarb po    Vascular dementia/debility  PT/OT  STR    Bipolar disorder  Denies SI/HI  Home meds      Notes, labs, VS, diagnostic testing reviewed      DVT Prophylaxis: heparin sq  Plan of Care Discussed with: Supervising MD  Dr. Jayla Escamilla, care team, pt        Colette Berry, NP       Addendum  Nursing called pt hypotensive, asymptomatic. Will check lactic acid, PCT and give NS bolus.   Case discussed with Dr. Jayla Escamilla

## 2020-11-24 NOTE — PROGRESS NOTES
CM reviewed chart and presented to patient's room for CM consult. Patient is sleeping soundly and does not wake to CM presence. Per documentation in chart, patient lives at home with her spouse who is her primary caregiver. Patient's spouse is currently in the hospital himself. Patient states that she does not feel comfortable or safe returning home. APS was contacted by previous CM. Call received from Karrie Emery with DSS. Her contact number is [phone]. Call returned and message left. CM will continue to follow for discharge planning.     Care Management Interventions  Physical Therapy Consult: Yes  Occupational Therapy Consult: Yes  Current Support Network: Own Home, Lives with Spouse  Discharge Location  Discharge Placement: Unable to determine at this time

## 2020-11-24 NOTE — PROGRESS NOTES
11/24/20 0057   Assessment  Type   Assessment Type Admission assessment   Activity and Safety   Activity Level Bed Rest   Weight Bearing Status NWB (Non Weight Bearing)   NWB (Non Weight Bearing extremities RLE (Right Lower Extremity)   Dual Skin Pressure Injury Assessment   Dual Skin Pressure Injury Assessment WDL   Second Care Provider (Based on Facility Policy) Joselyn Treviño RN   Skin Integumentary   Skin Integumentary (WDL) X    Pressure  Injury Documentation No Pressure Injury Noted-Pressure Ulcer Prevention Initiated   Skin Color Appropriate for ethnicity; Red  (redness to buttocks, around abdomen and groin)   Skin Condition/Temp Dry;Flaky   Skin Integrity Scars (comment); Excoriation   Hair Growth Present   Wound Prevention and Protection Methods   Orientation of Wound Prevention Posterior   Location of Wound Prevention Sacrum/Coccyx   Dressing Present  Yes  (New)   Dressing Status Other (comment)  (New)   Wound Offloading (Prevention Methods) Bed, pressure reduction mattress;Pillows;Repositioning;Turning     Dual skin assessment with this nurse and Derick Homans, RN. Patient has scars noted to her right hip, and a healing incision to her right knee, with a knee immobilizer in place. Patient has blanchable redness noted to her buttocks, EPC cream and an Allevyn applied. Patient has redness and excoriation to her groin and abdomen areas that looks like it was caused by a brief, EPC applied. Patient's bilateral feet are dry and flaky and slightly boggy. Patient has extremely flaky skin. No other issues noted.

## 2020-11-24 NOTE — PROGRESS NOTES
Problem: Mobility Impaired (Adult and Pediatric)  Goal: *Acute Goals and Plan of Care (Insert Text)  Outcome: Progressing Towards Goal  Note: LTG:  (1.)Ms. Alverto Xavier will move from supine to sit and sit to supine , scoot up and down, and roll side to side with SUPERVISION within 7 treatment day(s). (2.)Ms. Alverto Xavier will transfer from bed to chair and chair to bed with CONTACT GUARD ASSIST using the least restrictive device within 7 treatment day(s). (3.)Ms. Alverto Xavier will ambulate with MINIMAL ASSIST for 10 feet with the least restrictive device within 7 treatment day(s). (4.)Ms. Alverto Xavier will perform there ex B LEs x 10 min in supine or sitting with CGA within 7 treatment days for increased strength and AROM. ______________________________________________________________________________________      PHYSICAL THERAPY: Initial Assessment and AM 11/24/2020  OBSERVATION: PT Visit Days : 1  Payor: Jos Martin / Plan: Efrain Zuñiga / Product Type: Managed Care Medicare /       NAME/AGE/GENDER: Lavell Crooks is a 61 y.o. female   PRIMARY DIAGNOSIS: No able caregiver in household [Z74.2] No able caregiver in household No able caregiver in household        ICD-10: Treatment Diagnosis:    · Generalized Muscle Weakness (M62.81)  · Difficulty in walking, Not elsewhere classified (R26.2)  · History of falling (Z91.81)   Precaution/Allergies:  Codeine and Lortab [hydrocodone-acetaminophen]      ASSESSMENT:     Ms. Alverto Xavier presents with decreased bed mobility, transfers, ambulation, balance, activity tolerance, strength and overall general functional mobility s/p hospital admission on 11/23/20 by HHPT, pt appeared not cared for at home. Pt history significant for CVA with R deficits, dementia, recent R hip fx in Sept 2020. Per chart, pt's spouse is caregiver who is intubated in ICU presently.   Pt A & O x 2 this date, on room air, states no pain at rest.  Pt reports lives with spouse in 2 story home, lives on main level. Pt was receiving HHPT, pt states bed bound since fall; was ambulating short distance to bathroom prior to September. Pt with KI to R LE, states due to \"bad knee\", complaints of knee pain with any mobility. Pt reports wears KI at all times, unable to recall name of physician following for orthopedic needs. Pt unable to state why she does not ambulate or get OOB at home, just states knee pain, delayed responses and motor planning. Pt uses brief at baseline, is total assist all ADL needs from spouse. Pt states will sit briefly EOB at times. Pt today required min/mod A for mobility, demonstrates fair to good static sitting balance, sit to stand attempt with mod A x 2, bed elevated. Pt unwilling to attempt side steps or weight shifting in standing. Pt stood x 10-20 seconds. PT to cont to follow for acute care needs to address deficits noted above. Unsure of discharge plan due to appears un cared for well at home, APS called per chart. If pt would participate, would benefit from STR for increased strength, transfers, ambulation. This section established at most recent assessment   PROBLEM LIST (Impairments causing functional limitations):  1. Decreased Strength  2. Decreased ADL/Functional Activities  3. Decreased Transfer Abilities  4. Decreased Ambulation Ability/Technique  5. Decreased Balance  6. Decreased Activity Tolerance  7. Decreased [place] with Home Exercise Program  8. Decreased Cognition   INTERVENTIONS PLANNED: (Benefits and precautions of physical therapy have been discussed with the patient.)  1. Balance Exercise  2. Bed Mobility  3. Family Education  4. Gait Training  5. Home Exercise Program (HEP)  6. Neuromuscular Re-education/Strengthening  7. Range of Motion (ROM)  8. Therapeutic Activites  9. Therapeutic Exercise/Strengthening  10.  Transfer Training     TREATMENT PLAN: Frequency/Duration: 3 times a week for duration of hospital stay  Rehabilitation Potential For Stated Goals: [facility] (at time of discharge pending progress):    Placement: It is my opinion, based on this patient's performance to date, that Ms. Kj Azevedo may benefit from intensive therapy at a [address] [place] [address] after discharge due to the functional deficits listed above that are likely to improve with skilled rehabilitation and concerns that he/she may be unsafe to be unsupervised at home due to safety concerns at home, fall risk . Equipment:    To be determined              HISTORY:   History of Present Injury/Illness (Reason for Referral):  Tawanda Mcdonnell is a 61 y.o. female with a past medical history of mild vascular dementia, HTN, CKD, functional paraplegia who presents to the ER with her . Her  was unfortunately intubated and admitted, and now she has no one to take care of her at home. She has no complaints currently. She denies any pain, nausea, vomiting. .  Past Medical History/Comorbidities:   Ms. Kj Azevedo  has a past medical history of Aneurysm of common carotid artery ([facility] (2004), CKD (chronic kidney disease) (9/18/2014), Dementia ([address] FSGS (focal segmental glomerulosclerosis), Gout, Hypertension, Osteoarthritis (9/18/2014), and Stroke ([address] (2004, 2013). Ms. Kj Azevedo  has a past surgical history that includes hx intracranial aneurysm repair (2004); hx refractive surgery; hx orthopaedic (Right, 10/2014); hx ankle fracture tx (Left, 2013); hx ercp (02/27/2018); and hx cholecystectomy (02/28/2018).   Social History/Living Environment:   Home Environment: Private residence  # Steps to Enter: 0  One/Two Story Residence: Two story, live on 1st floor  # of Interior Steps: 13  Height of Each Step (in): 7 inches  Interior Rails: Right  Lift Chair Available: No  Living Alone: No  Support Systems: Home care staff  Patient Expects to be Discharged to[de-identified] Unknown  Current DME Used/Available at Home: Wheelchair, Shower chair, Commode, bedside, Blood pressure cuff, Brace/Splint, Grab bars, Walker  Tub or Shower Type: (sponge baths)  Prior Level of Function/Work/Activity:  Lives spouse, bed bound at baseline, total care ADLs  Personal Factors:          Sex:  female        Age:  61 y.o. Overall Behavior:  agreeable, delayed responses   Number of Personal Factors/Comorbidities that affect the Plan of Care:  CVA, dementia, falls 3+: HIGH COMPLEXITY   EXAMINATION:   Most Recent Physical Functioning:   Gross Assessment:  AROM: Generally decreased, functional(R LE limited, KI donned, pain with ROM)  Strength: Generally decreased, functional(B LE, limited R LE)  Coordination: Generally decreased, functional               Posture:  Posture (WDL): Exceptions to WDL  Posture Assessment: Forward head, Rounded shoulders  Balance:  Sitting: Impaired  Sitting - Static: Good (unsupported)  Sitting - Dynamic: Fair (occasional)  Standing: Impaired  Standing - Static: Constant support; Fair Bed Mobility:  Rolling: Minimum assistance  Supine to Sit: Minimum assistance; Moderate assistance  Sit to Supine: Minimum assistance; Moderate assistance  Scooting: Moderate assistance;Maximum assistance(pt lacks effort)  Wheelchair Mobility:     Transfers:  Sit to Stand: Moderate assistance;Assist x2(bed elevated)  Stand to Sit: Minimum assistance;Assist x2  Gait:  Right Side Weight Bearing: As tolerated         Body Structures Involved:  1. Muscles Body Functions Affected:  1. Movement Related Activities and Participation Affected:  1. General Tasks and Demands  2. Mobility  3. Self Care   Number of elements that affect the Plan of Care: 4+: HIGH COMPLEXITY   CLINICAL PRESENTATION:   Presentation: Evolving clinical presentation with changing clinical characteristics: MODERATE COMPLEXITY   CLINICAL DECISION MAKIN [facility] Inpatient Short Form  How much difficulty does the patient currently have. .. Unable A Lot A Little None   1.   Turning over in bed (including adjusting bedclothes, sheets and blankets)? [] 1   [] 2   [x] 3   [] 4   2. Sitting down on and standing up from a chair with arms ( e.g., wheelchair, bedside commode, etc.)   [] 1   [] 2   [x] 3   [] 4   3. Moving from lying on back to sitting on the side of the bed? [] 1   [] 2   [x] 3   [] 4   How much help from another person does the patient currently need. .. Total A Lot A Little None   4. Moving to and from a bed to a chair (including a wheelchair)? [] 1   [] 2   [x] 3   [] 4   5. Need to walk in hospital room? [] 1   [x] 2   [] 3   [] 4   6. Climbing 3-5 steps with a railing? [x] 1   [] 2   [] 3   [] 4   © 2007, Trustees of [address] [place] [address], under license to [employer]. All rights reserved      Score:  Initial: 15 Most Recent: X (Date: -- )    Interpretation of Tool:  Represents activities that are increasingly more difficult (i.e. Bed mobility, Transfers, Gait). Medical Necessity:     · Patient is expected to demonstrate progress in   · strength, range of motion, balance, and coordination  ·  to   · decrease assistance required with overall functional mobility, transfers, ambulation  · .  · Patient demonstrates   · good  ·  rehab potential due to higher previous functional level. Reason for Services/Other Comments:  · Patient   · continues to require present interventions due to patient's inability to perform bed mobility, transfers, ambulation safely and effectively  · . Use of outcome tool(s) and clinical judgement create a POC that gives a: Clear prediction of patient's progress: LOW COMPLEXITY            TREATMENT:   (In addition to Assessment/Re-Assessment sessions the following treatments were rendered)   Pre-treatment Symptoms/Complaints:  \"I don't walk\"  Pain: Initial:   Pain Intensity 1: 0(does c/o R knee pain with mobility)  Post Session:  0/10 post session     Therapeutic Exercise: ( 23 min):   Activities to include bed mobility, weight shifting, static and dynamic sitting and standing balance, sit to stand transfers  below to improve mobility, strength, balance, and coordination. Required moderate verbal and tactile cues to promote proper body alignment, promote proper body posture, and promote proper body mechanics. Progressed complexity of movement as indicated. Today's treatment session addressed Decreased Strength, Decreased Transfer Abilities, and Decreased Balance to progress towards achieving goal(s) 1, 2, and 3. During this session, Occupational Therapy addressed ADLs to progress towards their discipline specific goal(s). Co-treatment was necessary to improve patient's ability to follow higher level commands, ability to increase activity demands, and ability to return to normal functional activity. Braces/Orthotics/Lines/Etc:   · IV  · KI R knee   · O2 Device: Room air  Treatment/Session Assessment:    · Response to Treatment:  delayed responses, R weakness, KI to R knee; unsure why not ambulating  · Interdisciplinary Collaboration:   o Physical Therapist  o Occupational Therapist  o Registered Nurse  · After treatment position/precautions:   o Supine in bed  o Bed/Chair-wheels locked  o Bed in low position  o Call light within reach  o RN notified  o Side rails x 2   · Compliance with Program/Exercises: Will assess as treatment progresses  · Recommendations/Intent for next treatment session: \"Next visit will focus on advancements to more challenging activities\".   Total Treatment Duration:  PT Patient Time In/Time Out  Time In: 1015  Time Out: [address], PT

## 2020-11-24 NOTE — H&P
HOSPITALIST INITIAL HISTORY AND PHYSICAL    NAME:  Zofia Oneill   Age:  61 y.o.  :   1960   MRN:   [id number]  PCP: Everardo Ball MD  Consulting MD:  Treatment Team: Attending Provider: Dyan Plasencia MD; Primary Nurse: Keith Eden RN    CHIEF COMPLAINT: unable to care for self    HISTORY OF PRESENT ILLNESS:   Zofia Oneill is a 61 y.o. female with a past medical history of mild vascular dementia, HTN, CKD, functional paraplegia who presents to the ER with her . Her  was unfortunately intubated and admitted, and now she has no one to take care of her at home. She has no complaints currently. She denies any pain, nausea, vomiting. Brandt Borja REVIEW OF SYSTEMS: Comprehensive ROS performed. Denies fevers, chills, nausea, vomiting, otherwise negative. Past Medical History:   Diagnosis Date    Aneurysm of common carotid artery ([facility] [address]     left side s/p coil     CKD (chronic kidney disease) 2014    Dementia ([facility] [address]     FSGS (focal segmental glomerulosclerosis)     in remission at present    Gout     Hypertension     managed with medication     Osteoarthritis 2014    Stroke ([facility] [address] ,     slight weakness on right side, slight effect to speech        Past Surgical History:   Procedure Laterality Date    HX ANKLE FRACTURE TX Left 2013    has hardware in    HX CHOLECYSTECTOMY  2018    HX ERCP  2018    cbd stone    HX INTRACRANIAL ANEURYSM REPAIR  2004    left carotid     HX ORTHOPAEDIC Right 10/2014    hip replacement    HX REFRACTIVE SURGERY      bilateral - Lasik       Prior to Admission Medications   Prescriptions Last Dose Informant Patient Reported? Taking? QUEtiapine (SEROQUEL) 50 mg tablet   Yes No   Sig: Take 50 mg by mouth nightly. acetaminophen (TYLENOL) 325 mg tablet   No No   Sig: Take 2 Tabs by mouth every six (6) hours as needed. Patient taking differently: Take 500 mg by mouth three (3) times daily.    ascorbic acid, vitamin C, (VITAMIN C) 500 mg tablet   Yes No   Sig: Take 500 mg by mouth two (2) times a day. aspirin (ASPIRIN) 325 mg tablet   No No   Sig: Take 1 Tab by mouth daily. escitalopram oxalate (LEXAPRO) 10 mg tablet   Yes No   Sig: Take 10 mg by mouth daily. ferrous sulfate 325 mg (65 mg iron) tablet   Yes No   Sig: Take 325 mg by mouth three (3) times daily (with meals). Indications: anemia from inadequate iron   folic acid (FOLVITE) 1 mg tablet   No No   Sig: Take 1 Tab by mouth daily. Patient not taking: Reported on 2020   lisinopriL (PRINIVIL, ZESTRIL) 10 mg tablet   Yes No   Sig: Take 10 mg by mouth daily. magnesium oxide (MAG-OX) 400 mg tablet   No No   Sig: Take 1 Tab by mouth daily. ondansetron (ZOFRAN ODT) 8 mg disintegrating tablet   No No   Sig: Take 1 Tab by mouth every six (6) hours as needed. PRN for nausea   Patient not taking: Reported on 2020   pantoprazole (PROTONIX) 40 mg tablet   No No   Sig: Take 1 Tab by mouth Daily (before breakfast). thiamine (B-1) 100 mg tablet   No No   Sig: Take 1 Tab by mouth daily. traMADoL (ULTRAM) 50 mg tablet   Yes No   Sig: Take 50 mg by mouth every six (6) hours as needed for Pain. Indications: pain      Facility-Administered Medications: None       Allergies   Allergen Reactions    Codeine Nausea and Vomiting    Lortab [Hydrocodone-Acetaminophen] Itching     Not Allergic to this medication patient states MR, RN 2020       FAMILY HISTORY: Reviewed. Negative except   Family History   Problem Relation Age of Onset    Cancer Father 80        unknown    Stroke Sister 48       Social History     Tobacco Use    Smoking status: Former Smoker     Packs/day: 0.25     Last attempt to quit: 1979     Years since quittin.9    Smokeless tobacco: Never Used   Substance Use Topics    Alcohol use:  Yes     Alcohol/week: 2.5 standard drinks     Types: 3 Shots of liquor per week     Comment: pint of fireball         Objective:     Visit Vitals  BP 98/68 (BP 1 Location: Left arm, BP Patient Position: At rest)   Pulse 97   Resp 16   Ht 5' 5\" (1.651 m)   Wt 90.7 kg (200 lb)   SpO2 98%   BMI 33.28 kg/m²      Temp (24hrs), Av °F (36.7 °C), Min:98 °F (36.7 °C), Max:98 °F (36.7 °C)    Oxygen Therapy  O2 Sat (%): 98 % (20 162)  Pulse via Oximetry: 97 beats per minute (20 162)  O2 Device: Room air (20 162)  Physical Exam:  General:    The patient is a pleasant middle aged female in no acute distress. Head:   Normocephalic/atraumatic. Eyes:  No palpebral pallor or scleral icterus. ENT:  External auricular and nasal exam within normal limits. Mucous membranes are moist.  Neck:  Supple, non-tender, no JVD. Lungs:   Clear to auscultation bilaterally without wheezes or crackles. No respiratory distress or accessory muscle use. Heart:   Regular rate and rhythm, without murmurs, rubs, or gallops. Abdomen:   Soft, non-tender, non-distended with normoactive bowel sounds. Genitourinary: No tenderness over the bladder or bilateral CVAs. Extremities: Without clubbing, cyanosis, or edema. Skin:     Normal color, texture, and turgor. No rashes, lesions, or jaundice. Pulses: Radial and dorsalis pedis pulses present 2+ bilaterally. Capillary refill <2s. Neurologic: CN II-XII grossly intact and symmetrical.     Moving all four extremities well with no focal deficits. Psychiatric: Pleasant demeanor, appropriate affect. Alert and oriented x 3    Data Review:   No results found for this or any previous visit (from the past 24 hour(s)). Imaging /Procedures /Studies:  No results found. Assessment and Plan:     Principal Problem:    No able caregiver in household (2020)    Observe on med/surg, consult CM, PT/OT. Active Problems:    OSMIN on CKD. Cr > 4 up from baseline of around 3-3.5. IVF, follow BMP. Consider consult nephrology      Hyperkalemia    IVF, follow BMP. Normocytic anemia.     Appears stable, follow CBC. Alcoholism ([facility] [address] (5/18/2018)    No evidence of withdrawal at this time. WA protocol. FSGS (focal segmental glomerulosclerosis) (9/11/2012)    Stable, follow       CKD (chronic kidney disease) (9/18/2014)    Per above. Essential hypertension (9/11/2012)    Stable. Continue home meds. Hypertension (5/18/2018)    Stable. Continue home meds. Bipolar disorder ([facility] [address] (10/22/2014)    Stable. Continue home meds. Vascular dementia ([facility] [address] (2/13/2018)    Stable. Depression (2/13/2018)    Stable. Continue home meds. DVT Prophylaxis: Heparin      Code Status: FULL CODE      Disposition: Admit to med/surg for evaluation and treatment as per above.       Anticipated discharge: 2-3 days     Signed By: Lance Farris MD     November 23, 2020

## 2020-11-24 NOTE — ED NOTES
TRANSFER - OUT REPORT:    Verbal report given to RN Gabriel Holm  on Ayaan King  being transferred to [facility] [address] [facility] for routine progression of care       Report consisted of patients Situation, Background, Assessment and   Recommendations(SBAR). Information from the following report(s) SBAR, ED Summary and MAR was reviewed with the receiving nurse. Lines:       Opportunity for questions and clarification was provided.       Patient transported with:   [employer]

## 2020-11-25 ENCOUNTER — APPOINTMENT (OUTPATIENT)
Dept: GENERAL RADIOLOGY | Age: 60
DRG: 682 | End: 2020-11-25
Attending: NURSE PRACTITIONER
Payer: MEDICARE

## 2020-11-25 ENCOUNTER — HOME CARE VISIT (OUTPATIENT)
Dept: HOME HEALTH SERVICES | Facility: HOME HEALTH | Age: 60
End: 2020-11-25
Payer: MEDICARE

## 2020-11-25 LAB
ANION GAP SERPL CALC-SCNC: 6 MMOL/L (ref 7–16)
BASOPHILS # BLD: 0.1 K/UL (ref 0–0.2)
BASOPHILS NFR BLD: 1 % (ref 0–2)
BUN SERPL-MCNC: 65 MG/DL (ref 8–23)
CALCIUM SERPL-MCNC: 8 MG/DL (ref 8.3–10.4)
CHLORIDE SERPL-SCNC: 116 MMOL/L (ref 98–107)
CO2 SERPL-SCNC: 21 MMOL/L (ref 21–32)
CREAT SERPL-MCNC: 4.1 MG/DL (ref 0.6–1)
DIFFERENTIAL METHOD BLD: ABNORMAL
EOSINOPHIL # BLD: 0.3 K/UL (ref 0–0.8)
EOSINOPHIL NFR BLD: 4 % (ref 0.5–7.8)
ERYTHROCYTE [DISTWIDTH] IN BLOOD BY AUTOMATED COUNT: 14.3 % (ref 11.9–14.6)
GLUCOSE SERPL-MCNC: 96 MG/DL (ref 65–100)
HCT VFR BLD AUTO: 28.7 % (ref 35.8–46.3)
HGB BLD-MCNC: 8.5 G/DL (ref 11.7–15.4)
IMM GRANULOCYTES # BLD AUTO: 0 K/UL (ref 0–0.5)
IMM GRANULOCYTES NFR BLD AUTO: 1 % (ref 0–5)
LYMPHOCYTES # BLD: 1.7 K/UL (ref 0.5–4.6)
LYMPHOCYTES NFR BLD: 23 % (ref 13–44)
MCH RBC QN AUTO: 28.6 PG (ref 26.1–32.9)
MCHC RBC AUTO-ENTMCNC: 29.6 G/DL (ref 31.4–35)
MCV RBC AUTO: 96.6 FL (ref 79.6–97.8)
MM INDURATION POC: 0 MM (ref 0–5)
MONOCYTES # BLD: 0.6 K/UL (ref 0.1–1.3)
MONOCYTES NFR BLD: 8 % (ref 4–12)
NEUTS SEG # BLD: 4.9 K/UL (ref 1.7–8.2)
NEUTS SEG NFR BLD: 64 % (ref 43–78)
NRBC # BLD: 0 K/UL (ref 0–0.2)
PLATELET # BLD AUTO: 206 K/UL (ref 150–450)
PMV BLD AUTO: 9.9 FL (ref 9.4–12.3)
POTASSIUM SERPL-SCNC: 5.8 MMOL/L (ref 3.5–5.1)
PPD POC: NEGATIVE NEGATIVE
RBC # BLD AUTO: 2.97 M/UL (ref 4.05–5.2)
SODIUM SERPL-SCNC: 143 MMOL/L (ref 136–145)
WBC # BLD AUTO: 7.7 K/UL (ref 4.3–11.1)

## 2020-11-25 PROCEDURE — 74011250637 HC RX REV CODE- 250/637: Performed by: NURSE PRACTITIONER

## 2020-11-25 PROCEDURE — 71045 X-RAY EXAM CHEST 1 VIEW: CPT

## 2020-11-25 PROCEDURE — 2709999900 HC NON-CHARGEABLE SUPPLY

## 2020-11-25 PROCEDURE — 96372 THER/PROPH/DIAG INJ SC/IM: CPT

## 2020-11-25 PROCEDURE — 74011250637 HC RX REV CODE- 250/637: Performed by: FAMILY MEDICINE

## 2020-11-25 PROCEDURE — 85025 COMPLETE CBC W/AUTO DIFF WBC: CPT

## 2020-11-25 PROCEDURE — 80048 BASIC METABOLIC PNL TOTAL CA: CPT

## 2020-11-25 PROCEDURE — 97110 THERAPEUTIC EXERCISES: CPT

## 2020-11-25 PROCEDURE — 36415 COLL VENOUS BLD VENIPUNCTURE: CPT

## 2020-11-25 PROCEDURE — 99218 HC RM OBSERVATION: CPT

## 2020-11-25 PROCEDURE — 74011250636 HC RX REV CODE- 250/636: Performed by: FAMILY MEDICINE

## 2020-11-25 RX ADMIN — Medication 10 ML: at 05:52

## 2020-11-25 RX ADMIN — Medication 10 ML: at 22:35

## 2020-11-25 RX ADMIN — SODIUM BICARBONATE 650 MG TABLET 650 MG: at 22:35

## 2020-11-25 RX ADMIN — ESCITALOPRAM OXALATE 10 MG: 10 TABLET ORAL at 09:11

## 2020-11-25 RX ADMIN — SODIUM BICARBONATE 650 MG TABLET 650 MG: at 09:11

## 2020-11-25 RX ADMIN — SODIUM ZIRCONIUM CYCLOSILICATE 10 G: 10 POWDER, FOR SUSPENSION ORAL at 12:54

## 2020-11-25 RX ADMIN — FERROUS SULFATE TAB 325 MG (65 MG ELEMENTAL FE) 325 MG: 325 (65 FE) TAB at 09:11

## 2020-11-25 RX ADMIN — ASPIRIN 325 MG ORAL TABLET 325 MG: 325 PILL ORAL at 09:11

## 2020-11-25 RX ADMIN — SODIUM BICARBONATE 650 MG TABLET 650 MG: at 16:06

## 2020-11-25 RX ADMIN — SODIUM CHLORIDE 150 ML/HR: 900 INJECTION, SOLUTION INTRAVENOUS at 00:31

## 2020-11-25 RX ADMIN — HEPARIN SODIUM 5000 UNITS: 5000 INJECTION INTRAVENOUS; SUBCUTANEOUS at 16:06

## 2020-11-25 RX ADMIN — PANTOPRAZOLE SODIUM 40 MG: 40 TABLET, DELAYED RELEASE ORAL at 05:52

## 2020-11-25 RX ADMIN — HEPARIN SODIUM 5000 UNITS: 5000 INJECTION INTRAVENOUS; SUBCUTANEOUS at 05:52

## 2020-11-25 RX ADMIN — FOLIC ACID 1 MG: 1 TABLET ORAL at 09:11

## 2020-11-25 RX ADMIN — Medication 400 MG: at 09:11

## 2020-11-25 RX ADMIN — FERROUS SULFATE TAB 325 MG (65 MG ELEMENTAL FE) 325 MG: 325 (65 FE) TAB at 12:54

## 2020-11-25 RX ADMIN — FERROUS SULFATE TAB 325 MG (65 MG ELEMENTAL FE) 325 MG: 325 (65 FE) TAB at 16:09

## 2020-11-25 RX ADMIN — HEPARIN SODIUM 5000 UNITS: 5000 INJECTION INTRAVENOUS; SUBCUTANEOUS at 22:35

## 2020-11-25 RX ADMIN — QUETIAPINE FUMARATE 50 MG: 25 TABLET ORAL at 22:35

## 2020-11-25 RX ADMIN — Medication 100 MG: at 09:11

## 2020-11-25 RX ADMIN — Medication 10 ML: at 16:06

## 2020-11-25 NOTE — PROGRESS NOTES
Comprehensive Nutrition Assessment    Type and Reason for Visit: Initial, Positive nutrition screen  Best Practice Alert for Malnutrition Screening Tool: Recently Lost Weight Without Trying: Unsure,  , Eating Poorly Due to Decreased Appetite: No    Nutrition Recommendations/Plan:    Continue current diet   Continue Ensure Enlive TID     Nutrition Assessment:   Nutrition History: Patient with vague nutrition history. She reports she has not been eating as much since she returned home from [facility]. She states she was not eating much there because she did not like the food. Cultural/Temple/Ethnic Food Preference(s): None   Nutrition Background: Patient with carotid artery aneurysm, CKD, dementia, HTN, CVA, functional paraplegia. She came to the hospital with her  as he is her primary caregiver and no one else is available to take care of her. He was intubated and admitted to ICU. Daily Update:  Patient seen with lunch tray with only vegetable portion eaten. She had drank her ensure. When RD asks about breakfast she states \"not much. \" When asked about appetite she denies any changes, but when queried further she admits that she has been able to tolerate much since she returned home from [facility]. She does not know if she has had any weight changes as she does not weigh at home. Nutrition Related Findings: no wasting noted      Current Nutrition Therapies:  DIET REGULAR  DIET NUTRITIONAL SUPPLEMENTS All Meals; Ensure Enlive    Anthropometric Measures:  Height: 5' 5\" (165.1 cm)  Current Body Wt: 90.7 kg (199 lb 15.3 oz), Weight source: Not specified(11/23)  BMI: 33.3, Obese class 1 (BMI 30.0-34.9)     Ideal Body Wt: 125 lbs (57 kg), 160 %  There is limited weight history available. Office weights per EMR: 7/22/2019 200#, 2/17/20 183#. Other weights from this year 194# and 205#. She appears to be weight stable.           Estimated Daily Nutrient Needs:  Energy (kcal): 6142-0582 (Kcal/kg(23-28), Weight Used: Ideal(56.8 kg))  Protein (g): 45-57(0.8-1 g/kg) Weight Used: (Ideal)  Fluid (ml/day): 9996-6110 (1 ml/kcal)    Nutrition Diagnosis:   · Inadequate oral intake related to (decreased appetite) as evidenced by (patient reported barrier to PO, intake as above)    Nutrition Interventions:   Food and/or Nutrient Delivery: Continue current diet, Continue oral nutrition supplement     Coordination of Nutrition Care: Continue to monitor while inpatient    Goals:    Meet at least 75% nutrition needs within 7 days    Nutrition Monitoring and Evaluation:      Food/Nutrient Intake Outcomes: Food and nutrient intake, Supplement intake       Discharge Planning:     Too soon to determine    [address] [place], LD on 11/25/2020 at 2:29 PM  Contact: [phone]

## 2020-11-25 NOTE — PROGRESS NOTES
Oc Rausch  Admission Date: 11/23/2020             Renal Daily Progress Note: 11/25/2020    The patient's chart is reviewed and the patient is discussed with the staff. Follow up OSMIN/CKD IV  Subjective:   Patient seen and examined on rounds, poor appetite.  Good uop  Labs and chart reviewed    ROS:  General: no fever/chills, + debility  CV; no CP  Lung: no SOB, no cough  GI: no N/V/D  Ext: no edema     Current Facility-Administered Medications   Medication Dose Route Frequency    aspirin tablet 325 mg  325 mg Oral DAILY    escitalopram oxalate (LEXAPRO) tablet 10 mg  10 mg Oral DAILY    ferrous sulfate tablet 325 mg  325 mg Oral TID WITH MEALS    folic acid (FOLVITE) tablet 1 mg  1 mg Oral DAILY    [Held by provider] lisinopriL (PRINIVIL, ZESTRIL) tablet 10 mg  10 mg Oral DAILY    magnesium oxide (MAG-OX) tablet 400 mg  400 mg Oral DAILY    pantoprazole (PROTONIX) tablet 40 mg  40 mg Oral ACB    QUEtiapine (SEROquel) tablet 50 mg  50 mg Oral QHS    thiamine HCL (B-1) tablet 100 mg  100 mg Oral DAILY    traMADoL (ULTRAM) tablet 50 mg  50 mg Oral Q6H PRN    sodium chloride (NS) flush 5-40 mL  5-40 mL IntraVENous Q8H    sodium chloride (NS) flush 5-40 mL  5-40 mL IntraVENous PRN    influenza vaccine 2020-21 (6 mos+)(PF) (FLUARIX/FLULAVAL/FLUZONE QUAD) injection 0.5 mL  0.5 mL IntraMUSCular PRIOR TO DISCHARGE    LORazepam (ATIVAN) tablet 1-2 mg  1-2 mg Oral Q1H PRN    LORazepam (ATIVAN) tablet 3-4 mg  3-4 mg Oral Q1H PRN    0.9% sodium chloride infusion  150 mL/hr IntraVENous CONTINUOUS    heparin (porcine) injection 5,000 Units  5,000 Units SubCUTAneous Q8H    sodium zirconium cyclosilicate (LOKELMA) powder packet 5 g  5 g Oral DAILY    sodium bicarbonate tablet 650 mg  650 mg Oral TID         Objective:     Vitals:    11/24/20 2215 11/24/20 2350 11/25/20 0418 11/25/20 0810   BP: (!) 84/50 (!) 81/48 (!) 86/59 93/61   Pulse: 89 83 79 87   Resp:  18 18 18   Temp:  98 °F (36.7 °C) 97.9 °F (36.6 °C) 98.5 °F (36.9 °C)   SpO2:  98% 98% 94%   Weight:       Height:         Intake and Output:   No intake/output data recorded. No intake/output data recorded. Physical Exam:   Constitutional:  the patient is well developed and in no acute distress, alert and oriented   HEENT:  Sclera clear, pupils equal, oral mucosa moist  Lungs: clear bilaterally   Cardiovascular:  Regular rate, S1, S2, no Rub   Abd/GI: soft and non-tender; with positive bowel sounds. Ext: warm without cyanosis. There is no lower leg edema. Skin:  no jaundice or rashes  Neuro: no gross neuro deficits   Psychiatric: Calm. LAB  Recent Labs     11/25/20  0551 11/24/20  0512   WBC 7.7 7.5   HGB 8.5* 11.9   HCT 28.7* 42.9    185     Recent Labs     11/25/20  0551 11/24/20  2116 11/24/20  0512 11/24/20  0132     --  145 143   K 5.8*  --  5.6* 5.5*   *  --  118* 117*   CO2 21  --  14* 17*   GLU 96  --  118* 109*   BUN 65*  --  66* 66*   CREA 4.10*  --  4.92* 4.88*   PHOS  --  4.1*  --   --      No results for input(s): PH, PCO2, PO2, HCO3 in the last 72 hours.       Assessment:  (Medical Decision Making)     Hospital Problems  Date Reviewed: 2/28/2018          Codes Class Noted POA    Normocytic anemia ICD-10-CM: D64.9  ICD-9-CM: 285.9  11/24/2020 Yes        Hyperkalemia ICD-10-CM: E87.5  ICD-9-CM: 276.7  11/24/2020 Yes        * (Principal) No able caregiver in household ICD-10-CM: Z74.2  ICD-9-CM: V60.4  11/23/2020 Yes        Alcoholism ([facility] [address] ICD-10-CM: F10.20  ICD-9-CM: 303.90  5/18/2018 Yes        Hypertension ICD-10-CM: I10  ICD-9-CM: 401.9  5/18/2018 Yes        Vascular dementia ([address] ICD-10-CM: F01.50  ICD-9-CM: 290.40  2/13/2018 Yes        Depression ICD-10-CM: F32.9  ICD-9-CM: 048  2/13/2018 Yes        Bipolar disorder ([facility] ICD-10-CM: F31.9  ICD-9-CM: 296.80  10/22/2014 Yes        CKD (chronic kidney disease) ICD-10-CM: N18.9  ICD-9-CM: 585.9  9/18/2014 Yes        Essential hypertension ICD-10-CM: I10  ICD-9-CM: 401.9  9/11/2012 Yes        FSGS (focal segmental glomerulosclerosis) ICD-10-CM: N05.1  ICD-9-CM: 582.1  9/11/2012 Yes              Plan:  (Medical Decision Making)   1. OSMIN/CKD IV likely pre renal azotemia with poor intake, hypotension and progression of CKD hx of FSGS. - hold ACEi for now, continue IVF, Cr trending down   - renal US with atropic kidneys, no hydronephrosis, UA bland, 0.6%, P/C ratio 0.1, CK 30 and phos 4.1  -no indication for acute RRT at the time, trend renal indices with strict I&O     2. Hyperkalemia increase lokelma to 10 mg daily, low K diet     3. HTN with hypotension hold antihypertensives      4.  Metabolic acidosis- improving continue  NaHCO3 PO     5. Vascular dementia/ debility/bipolar disorder per josefina Gillespie, NP

## 2020-11-25 NOTE — PROGRESS NOTES
Pt.'s IV infiltrated this nurse and charge nurse attempted to start new IV, but were not successful. Lab was not able to draw labs either. Suni Anand NP notified. Reached to ICU nurse, awaiting for available nurse for assistance.

## 2020-11-25 NOTE — PROGRESS NOTES
Hospitalist Progress Note     Admit Date:  2020  4:19 PM   Name:  Chon Best   Age:  61 y.o.  :  1960   MRN:  [id number]   PCP:  Ava Stapleton MD  Treatment Team: Attending Provider: Kelsea Canales MD; Consulting Provider: Lindsey Rosario MD; Utilization Review: Jamal Alvarenga RN; Care Manager: Arely Romano RN; Charge Nurse: Rosy Brown; Utilization Review: Nghia Mosquera RN; Nurse Practitioner: Rosa M Chavez NP; Occupational Therapy Assistant: Todd Hodgkin, COTA    Subjective:   HPI and or CC:   60 yo female with PMH of carotid artery aneurysm s/p coil, HTN, CVA, dementia, CKD, bed bound admitted  when caregiver brought to this facility and patient had nobody to care for her. She was noted to have OSMIN on CKD with mild hyperkalemia. :  Patient alert and oriented x3. Afebrile, no leukocytosis. Nephrology continues to follow along. Cr. 4.10-improving. Objective:     Patient Vitals for the past 24 hrs:   Temp Pulse Resp BP SpO2   20 1253 98.1 °F (36.7 °C) (!) 111 18 (!) 93/57 94 %   20 0810 98.5 °F (36.9 °C) 87 18 93/61 94 %   20 0418 97.9 °F (36.6 °C) 79 18 (!) 86/59 98 %   20 2350 98 °F (36.7 °C) 83 18 (!) 81/48 98 %   20 2215  89  (!) 84/50    206 98.1 °F (36.7 °C) 89 18 (!) 82/45 100 %   20 1623    (!) 82/50    20 1604 98.1 °F (36.7 °C) 100 17 (!) 73/57 100 %     Oxygen Therapy  O2 Sat (%): 94 % (20 1253)  Pulse via Oximetry: 97 beats per minute (20 1621)  O2 Device: Room air (20 1604)    Intake/Output Summary (Last 24 hours) at 2020 1308  Last data filed at 2020 1256  Gross per 24 hour   Intake    Output 350 ml   Net -350 ml         REVIEW OF SYSTEMS: Comprehensive ROS performed and negative except as stated in HPI. Physical Examination:  General:       No acute distress, appears chronically ill    Lungs:          CTA bilaterally.  Resp even and nonlabored  Heart:            S1S2 present without murmurs rubs gallops. RRR. No LE edema  Abdomen:    Soft, non tender, non distended. BS present  Extremities: RLE with brace. All ext with muscle wasting  Neurologic:  A/O X3. No gross focal deficits. Right sided residual weakness, RUE tremors. Data Review:  I have reviewed all labs, meds, telemetry events, and studies from the last 24 hours. Recent Results (from the past 24 hour(s))   URINALYSIS W/ RFLX MICROSCOPIC    Collection Time: 11/24/20  6:57 PM   Result Value Ref Range    Color YELLOW      Appearance CLEAR      Specific gravity 1.016 1.001 - 1.023      pH (UA) 5.0 5.0 - 9.0      Protein Negative NEG mg/dL    Glucose Negative mg/dL    Ketone Negative NEG mg/dL    Bilirubin Negative NEG      Blood Negative NEG      Urobilinogen 0.2 0.2 - 1.0 EU/dL    Nitrites Negative NEG      Leukocyte Esterase Negative NEG     PROTEIN/CREATININE RATIO, URINE    Collection Time: 11/24/20  6:57 PM   Result Value Ref Range    Protein, urine random 23 (H) <11.9 mg/dL    Creatinine, urine 169.00 mg/dL    Protein/Creat.  urine Ratio 0.1     CREATININE, UR, RANDOM    Collection Time: 11/24/20  6:57 PM   Result Value Ref Range    Creatinine, urine 171.00 mg/dL   SODIUM, UR, RANDOM    Collection Time: 11/24/20  6:57 PM   Result Value Ref Range    Sodium,urine random 29 MMOL/L   PROCALCITONIN    Collection Time: 11/24/20  9:16 PM   Result Value Ref Range    Procalcitonin 0.05 ng/mL   LACTIC ACID    Collection Time: 11/24/20  9:16 PM   Result Value Ref Range    Lactic acid 1.5 0.4 - 2.0 MMOL/L   CK    Collection Time: 11/24/20  9:16 PM   Result Value Ref Range    CK 30 21 - 215 U/L   PHOSPHORUS    Collection Time: 11/24/20  9:16 PM   Result Value Ref Range    Phosphorus 4.1 (H) 2.3 - 3.7 MG/DL   PLEASE READ & DOCUMENT PPD TEST IN 24 HRS    Collection Time: 11/25/20  2:50 AM   Result Value Ref Range    PPD Negative Negative    mm Induration 0 0 - 5 mm   CBC WITH AUTOMATED DIFF    Collection Time: 11/25/20  5:51 AM   Result Value Ref Range    WBC 7.7 4.3 - 11.1 K/uL    RBC 2.97 (L) 4.05 - 5.2 M/uL    HGB 8.5 (L) 11.7 - 15.4 g/dL    HCT 28.7 (L) 35.8 - 46.3 %    MCV 96.6 79.6 - 97.8 FL    MCH 28.6 26.1 - 32.9 PG    MCHC 29.6 (L) 31.4 - 35.0 g/dL    RDW 14.3 11.9 - 14.6 %    PLATELET 672 697 - 345 K/uL    MPV 9.9 9.4 - 12.3 FL    ABSOLUTE NRBC 0.00 0.0 - 0.2 K/uL    DF AUTOMATED      NEUTROPHILS 64 43 - 78 %    LYMPHOCYTES 23 13 - 44 %    MONOCYTES 8 4.0 - 12.0 %    EOSINOPHILS 4 0.5 - 7.8 %    BASOPHILS 1 0.0 - 2.0 %    IMMATURE GRANULOCYTES 1 0.0 - 5.0 %    ABS. NEUTROPHILS 4.9 1.7 - 8.2 K/UL    ABS. LYMPHOCYTES 1.7 0.5 - 4.6 K/UL    ABS. MONOCYTES 0.6 0.1 - 1.3 K/UL    ABS. EOSINOPHILS 0.3 0.0 - 0.8 K/UL    ABS. BASOPHILS 0.1 0.0 - 0.2 K/UL    ABS. IMM.  GRANS. 0.0 0.0 - 0.5 K/UL   METABOLIC PANEL, BASIC    Collection Time: 11/25/20  5:51 AM   Result Value Ref Range    Sodium 143 136 - 145 mmol/L    Potassium 5.8 (H) 3.5 - 5.1 mmol/L    Chloride 116 (H) 98 - 107 mmol/L    CO2 21 21 - 32 mmol/L    Anion gap 6 (L) 7 - 16 mmol/L    Glucose 96 65 - 100 mg/dL    BUN 65 (H) 8 - 23 MG/DL    Creatinine 4.10 (H) 0.6 - 1.0 MG/DL    GFR est AA 14 (L) >60 ml/min/1.73m2    GFR est non-AA 12 (L) >60 ml/min/1.73m2    Calcium 8.0 (L) 8.3 - 10.4 MG/DL        All Micro Results     Procedure Component Value Units Date/Time    QUANTIFERON-TB GOLD PLUS [[id number]]     Order Status:  Sent Specimen:  Blood     QUANTIFERON-TB GOLD PLUS [[id number]]     Order Status:  Canceled Specimen:  Blood           Current Meds:  Current Facility-Administered Medications   Medication Dose Route Frequency    sodium zirconium cyclosilicate (LOKELMA) powder packet 10 g  10 g Oral DAILY    aspirin tablet 325 mg  325 mg Oral DAILY    escitalopram oxalate (LEXAPRO) tablet 10 mg  10 mg Oral DAILY    ferrous sulfate tablet 325 mg  325 mg Oral TID WITH MEALS    folic acid (FOLVITE) tablet 1 mg  1 mg Oral DAILY    [Held by provider] lisinopriL (PRINIVIL, ZESTRIL) tablet 10 mg  10 mg Oral DAILY    magnesium oxide (MAG-OX) tablet 400 mg  400 mg Oral DAILY    pantoprazole (PROTONIX) tablet 40 mg  40 mg Oral ACB    QUEtiapine (SEROquel) tablet 50 mg  50 mg Oral QHS    thiamine HCL (B-1) tablet 100 mg  100 mg Oral DAILY    traMADoL (ULTRAM) tablet 50 mg  50 mg Oral Q6H PRN    sodium chloride (NS) flush 5-40 mL  5-40 mL IntraVENous Q8H    sodium chloride (NS) flush 5-40 mL  5-40 mL IntraVENous PRN    influenza vaccine 2020-21 (6 mos+)(PF) (FLUARIX/FLULAVAL/FLUZONE QUAD) injection 0.5 mL  0.5 mL IntraMUSCular PRIOR TO DISCHARGE    LORazepam (ATIVAN) tablet 1-2 mg  1-2 mg Oral Q1H PRN    LORazepam (ATIVAN) tablet 3-4 mg  3-4 mg Oral Q1H PRN    0.9% sodium chloride infusion  150 mL/hr IntraVENous CONTINUOUS    heparin (porcine) injection 5,000 Units  5,000 Units SubCUTAneous Q8H    sodium bicarbonate tablet 650 mg  650 mg Oral TID       Diet:  DIET REGULAR  DIET NUTRITIONAL SUPPLEMENTS    Other Studies (last 24 hours):  Xr Chest Sngl V    Result Date: 11/25/2020  CHEST X-RAY, one view. HISTORY:  Positive tuberculin skin test. TECHNIQUE:  AP upright portable view COMPARISON: September 2020 FINDINGS: Lungs: are clear. Costophrenic angles: are sharp. Heart size: is normal. Pulmonary vasculature: is unremarkable. Aorta: Unremarkable. Included portion of the upper abdomen: is unremarkable. Bones: No gross bony lesions. Other: None. IMPRESSION:  Negative for evidence of tuberculosis.  The lungs are clear       Assessment and Plan:     Hospital Problems as of 11/25/2020 Date Reviewed: 2/28/2018          Codes Class Noted - Resolved POA    Normocytic anemia ICD-10-CM: D64.9  ICD-9-CM: 285.9  11/24/2020 - Present Yes        Hyperkalemia ICD-10-CM: E87.5  ICD-9-CM: 276.7  11/24/2020 - Present Yes        * (Principal) No able caregiver in household ICD-10-CM: Z74.2  ICD-9-CM: V60.4  11/23/2020 - Present Yes        Alcoholism [facility]) ICD-10-CM: F10.20  ICD-9-CM: 303.90  5/18/2018 - Present Yes        Hypertension ICD-10-CM: I10  ICD-9-CM: 401.9  5/18/2018 - Present Yes        Vascular dementia ([facility] ICD-10-CM: F01.50  ICD-9-CM: 290.40  2/13/2018 - Present Yes        Depression ICD-10-CM: F32.9  ICD-9-CM: 914  2/13/2018 - Present Yes        Bipolar disorder ([facility] ICD-10-CM: F31.9  ICD-9-CM: 296.80  10/22/2014 - Present Yes        CKD (chronic kidney disease) ICD-10-CM: N18.9  ICD-9-CM: 585.9  9/18/2014 - Present Yes        Essential hypertension ICD-10-CM: I10  ICD-9-CM: 401.9  9/11/2012 - Present Yes        FSGS (focal segmental glomerulosclerosis) ICD-10-CM: N05.1  ICD-9-CM: 582.1  9/11/2012 - Present Yes        RESOLVED: Dementia ([facility] ICD-10-CM: F03.90  ICD-9-CM: 294.20  Unknown - 11/23/2020 Yes        RESOLVED: Alcohol abuse ICD-10-CM: F10.10  ICD-9-CM: 305.00  2/13/2018 - 11/23/2020 Yes              A/P:    1. OSMIN/CKD  IVF  Renal US  Consult Nephrology  Check UA, urine studies  Holding ACE-I     2. Hyperkalemia  Added lokelma per Nephro     3. HTN  With hypotension now  Holding ACE-I  IVF     4. Metabolic acidosis  Bicarb po     5. Vascular dementia/debility  PT/OT  STR     6.  Bipolar disorder  Denies SI/HI  Home meds          Signed:  Serafin Austin NP

## 2020-11-25 NOTE — PROGRESS NOTES
Problem: Self Care Deficits Care Plan (Adult)  Goal: *Acute Goals and Plan of Care (Insert Text)  Description: 1. Patient will complete upper body bathing and dressing with setup and adaptive equipment as needed. 2. Patient will complete toileting with minimal assistance. 3. Patient will tolerate 20 minutes of OT treatment with as needed rest breaks to increase activity tolerance for ADLs. 4. Patient will complete functional transfers with minimal assistance and adaptive equipment as needed. 5. Patient will complete self feeding and grooming with modified independence. Timeframe: 7 visits     Outcome: Progressing Towards Goal       OCCUPATIONAL THERAPY: Daily Note and PM 11/25/2020  OBSERVATION: OT Visit Days: 2  Payor: Arash Monge / Plan: [address] / Product Type: [employer] /      NAME/AGE/GENDER: Marni Kayser is a 61 y.o. female   PRIMARY DIAGNOSIS:  No able caregiver in household [Z74.2] No able caregiver in household No able caregiver in household       ICD-10: Treatment Diagnosis:    · Generalized Muscle Weakness (M62.81)  · Other lack of cordination (R27.8)   Precautions/Allergies:    Codeine and Lortab [hydrocodone-acetaminophen]      ASSESSMENT:     Ms. Bobby Sanford is a 61year old female here because her caregiver/  is hospitalized and unable to provide care for her. She has history of hip fracture in Sept 2020, CVA with R sided deficits, and bipolar disorder. Patient reports she is bed bound and requires assistance with all ADLs & IADLS from spouse. She was also having HHPT. Reports she has not walked in months due to a \"bad knee\" but patient is a poor historian. Patient was evaluated with physical therapist and may be appropriate for ongoing skilled co-treatment at future sessions. BUE assessment reveals decreased ROM/ strength/ coordination in RUE compared to LUE from previous CVA.  Per patient she has had 3 strokes, but does not know when they occurred. Functionally, she primarily uses only her LUE. Balance is impaired in sitting and in standing (fair). Cognitively, patient has delayed responses, decreased attention span, and decreased insight. Also disoriented to time (December 2019). She is currently requiring assistance with all ADLs and mobility and would benefit from continued occupational therapy to increase independence and safety. Will follow. 11/25/2020 Treatment: Patient performed UE exercises (In grid below) to increase strength and activity tolerance to perform mobility and ADLs. Pt noticed to be weaker on right side due to previous CVA. Required tactile, verbal, and visual cueing to complete exercises correctly. Pt making minimal progress at this time. Will continue to benefit from skilled OT during stay. This section established at most recent assessment   PROBLEM LIST (Impairments causing functional limitations):  1. Decreased Strength  2. Decreased ADL/Functional Activities  3. Decreased Transfer Abilities  4. Decreased Ambulation Ability/Technique  5. Decreased Balance  6. Increased Pain  7. Decreased Activity Tolerance  8. Decreased Cognition   INTERVENTIONS PLANNED: (Benefits and precautions of occupational therapy have been discussed with the patient.)  1. Activities of daily living training  2. Adaptive equipment training  3. Neuromuscular re-eduation  4. Therapeutic activity  5. Therapeutic exercise     TREATMENT PLAN: Frequency/Duration: Follow patient 3x/ week to address above goals. Rehabilitation Potential For Stated Goals: [facility] (at time of discharge pending progress):    Placement: It is my opinion, based on this patient's performance to date, that Ms. Ana Mahajan may benefit from intensive therapy at a [address] [place] [address] after discharge due to the functional deficits listed above that are likely to improve with skilled rehabilitation and concerns that he/she may be unsafe to be unsupervised at home due to lack of caregiver. Equipment:    None at this time              OCCUPATIONAL PROFILE AND HISTORY:   History of Present Injury/Illness (Reason for Referral):  See H&P  Past Medical History/Comorbidities:   Ms. Andi Ibanez  has a past medical history of Aneurysm of common carotid artery ([facility] (2004), CKD (chronic kidney disease) (9/18/2014), Dementia ([facility] FSGS (focal segmental glomerulosclerosis), Gout, Hypertension, Osteoarthritis (9/18/2014), and Stroke ([facility] (2004, 2013). Ms. Andi Ibanez  has a past surgical history that includes hx intracranial aneurysm repair (2004); hx refractive surgery; hx orthopaedic (Right, 10/2014); hx ankle fracture tx (Left, 2013); hx ercp (02/27/2018); and hx cholecystectomy (02/28/2018). Social History/Living Environment:   Home Environment: Private residence  # Steps to Enter: 0  One/Two Story Residence: Two story, live on 1st floor  # of Interior Steps: 13  Height of Each Step (in): 7 inches  Interior Rails: Right  Lift Chair Available: No  Living Alone: No  Support Systems: Home care staff  Patient Expects to be Discharged to[de-identified] Unknown  Current DME Used/Available at Home: Wheelchair, Shower chair, Commode, bedside, Blood pressure cuff, Brace/Splint, Grab bars, Walker  Tub or Shower Type: (sponge baths)  Prior Level of Function/Work/Activity:  She has history of hip fracture in Sept 2020, CVA with R sided deficits, and bipolar disorder. Patient reports she is bed bound and requires assistance with all ADLs from spouse. She was also having HHPT. Reports she has not walked in months due to a \"bad knee\" but patient is a poor historian.       Number of Personal Factors/Comorbidities that affect the Plan of Care: Extensive review of physical, cognitive, and psychosocial performance (3+):  HIGH COMPLEXITY   ASSESSMENT OF OCCUPATIONAL PERFORMANCE[de-identified]   Activities of Daily Living:   Basic ADLs (From Assessment) Complex ADLs (From Assessment)   Feeding: Minimum assistance  Oral Facial Hygiene/Grooming: Moderate assistance  Bathing: Maximum assistance  Upper Body Dressing: Moderate assistance  Lower Body Dressing: Total assistance  Toileting: Total assistance Instrumental ADL  Meal Preparation: Total assistance  Homemaking: Total assistance  Medication Management: Total assistance  Financial Management: Total assistance   Grooming/Bathing/Dressing Activities of Daily Living     Cognitive Retraining  Safety/Judgement: Awareness of environment                             Most Recent Physical Functioning:   Gross Assessment:                  Posture:  Posture (WDL): Exceptions to WDL  Posture Assessment: Forward head, Rounded shoulders  Balance:  Sitting: Impaired  Sitting - Static: Fair (occasional) Bed Mobility:     Wheelchair Mobility:     Transfers:               Patient Vitals for the past 6 hrs:   BP BP Patient Position SpO2 Pulse   20 1253 (!) 93/57 At rest 94 % (!) 111       Mental Status  Neurologic State: Alert  Orientation Level: Oriented X4  Cognition: Follows commands  Perception: Appears intact  Perseveration: No perseveration noted  Safety/Judgement: Awareness of environment                          Physical Skills Involved:  1. Range of Motion  2. Balance  3. Strength  4. Activity Tolerance  5. Pain (Chronic) Cognitive Skills Affected (resulting in the inability to perform in a timely and safe manner):  1. Executive Function  2. Long Term Memory  3. Sustained Attention  4. Divided Attention  5. Comprehension Psychosocial Skills Affected:  1. Habits/Routines  2. Environmental Adaptation  3. Self-Awareness   Number of elements that affect the Plan of Care: 5+:  HIGH COMPLEXITY   CLINICAL DECISION MAKIN [facility] [address] [address] AM-PAC 6 Clicks   Daily Activity Inpatient Short Form  How much help from another person does the patient currently need. .. Total A Lot A Little None   1. Putting on and taking off regular lower body clothing?    [x] 1   [] 2   [] 3 [] 4   2. Bathing (including washing, rinsing, drying)? [] 1   [x] 2   [] 3   [] 4   3. Toileting, which includes using toilet, bedpan or urinal?   [] 1   [x] 2   [] 3   [] 4   4. Putting on and taking off regular upper body clothing? [] 1   [x] 2   [] 3   [] 4   5. Taking care of personal grooming such as brushing teeth? [] 1   [x] 2   [] 3   [] 4   6. Eating meals? [] 1   [x] 2   [] 3   [] 4   © 2007, Trustees of [address] [place] [address] [address] [address], under license to [employer]. All rights reserved      Score:  Initial: 11 Most Recent: X (Date: -- )    Interpretation of Tool:  Represents activities that are increasingly more difficult (i.e. Bed mobility, Transfers, Gait). Medical Necessity:     · Patient demonstrates   · fair  ·  rehab potential due to higher previous functional level. Reason for Services/Other Comments:  · Patient   · continues to require present interventions due to patient's inability to care for self without assistance from others. · .   Use of outcome tool(s) and clinical judgement create a POC that gives a: HIGH COMPLEXITY         TREATMENT:   (In addition to Assessment/Re-Assessment sessions the following treatments were rendered)     Pre-treatment Symptoms/Complaints:    Pain: Initial:   Pain Intensity 1: 0  Post Session:  none (does c/o knee pain when moving)     Therapeutic Exercise: (15 minutes):  Exercises per grid below to improve mobility, strength, coordination and activity tolerance. Required minimal visual, verbal and tactile cues to promote proper body alignment and promote proper body mechanics. Progressed resistance and repetitions as indicated.     Yellow theraband Date:  11/24 Date:   Date:     Activity/Exercise Parameters Parameters Parameters   Shoulder Abd/Adduction 20 reps     Shoulder Flexion 10 reps with 1# dowel     Elbow Flexion 10 reps with dowel and theraband     Punches (Chest Press) 10 reps                         Braces/Orthotics/Lines/Etc:   · IV  · O2 Device: Room air  Treatment/Session Assessment:    · Response to Treatment:  limited participation, needs encouragement to participate. · Interdisciplinary Collaboration:   o Certified Occupational Therapy Assistant  o Registered Nurse  · After treatment position/precautions:   o Supine in bed  o Bed alarm/tab alert on  o Bed/Chair-wheels locked  o Bed in low position  o Call light within reach   · Compliance with Program/Exercises: Will assess as treatment progresses. · Recommendations/Intent for next treatment session: \"Next visit will focus on advancements to more challenging activities and reduction in assistance provided\".   Total Treatment Duration:  OT Patient Time In/Time Out  Time In: 1395  Time Out: 2187 [facility]

## 2020-11-25 NOTE — PROGRESS NOTES
11/24/20 2350   Vital Signs   Temp 98 °F (36.7 °C)   Temp Source Oral   Pulse (Heart Rate) 83   Heart Rate Source Monitor   Resp Rate 18   O2 Sat (%) 98 %   Level of Consciousness Alert   BP (!) 81/48   MAP (Calculated) (!) 59   BP 1 Method Automatic   BP 1 Location Left arm   BP Patient Position At rest   MEWS Score 2     Patient has been running hypotensive all day 80s/40-50s and had no IV access. Patient is alert and oriented, asymptomatic, and now has IV with maintenance fluids running at 150 of normal saline. Doctor notified, no new orders received. Will continue to monitor.

## 2020-11-26 LAB
ANION GAP SERPL CALC-SCNC: 9 MMOL/L (ref 7–16)
BASOPHILS # BLD: 0 K/UL (ref 0–0.2)
BASOPHILS NFR BLD: 1 % (ref 0–2)
BUN SERPL-MCNC: 61 MG/DL (ref 8–23)
CALCIUM SERPL-MCNC: 8.4 MG/DL (ref 8.3–10.4)
CHLORIDE SERPL-SCNC: 118 MMOL/L (ref 98–107)
CO2 SERPL-SCNC: 18 MMOL/L (ref 21–32)
CREAT SERPL-MCNC: 3.66 MG/DL (ref 0.6–1)
DIFFERENTIAL METHOD BLD: ABNORMAL
EOSINOPHIL # BLD: 0.3 K/UL (ref 0–0.8)
EOSINOPHIL NFR BLD: 4 % (ref 0.5–7.8)
ERYTHROCYTE [DISTWIDTH] IN BLOOD BY AUTOMATED COUNT: 14.2 % (ref 11.9–14.6)
GLUCOSE SERPL-MCNC: 91 MG/DL (ref 65–100)
HCT VFR BLD AUTO: 27.1 % (ref 35.8–46.3)
HGB BLD-MCNC: 8 G/DL (ref 11.7–15.4)
IMM GRANULOCYTES # BLD AUTO: 0 K/UL (ref 0–0.5)
IMM GRANULOCYTES NFR BLD AUTO: 1 % (ref 0–5)
LYMPHOCYTES # BLD: 1.4 K/UL (ref 0.5–4.6)
LYMPHOCYTES NFR BLD: 21 % (ref 13–44)
MCH RBC QN AUTO: 28.5 PG (ref 26.1–32.9)
MCHC RBC AUTO-ENTMCNC: 29.5 G/DL (ref 31.4–35)
MCV RBC AUTO: 96.4 FL (ref 79.6–97.8)
MM INDURATION POC: 5 MM (ref 0–5)
MONOCYTES # BLD: 0.5 K/UL (ref 0.1–1.3)
MONOCYTES NFR BLD: 7 % (ref 4–12)
NEUTS SEG # BLD: 4.4 K/UL (ref 1.7–8.2)
NEUTS SEG NFR BLD: 66 % (ref 43–78)
NRBC # BLD: 0 K/UL (ref 0–0.2)
PLATELET # BLD AUTO: 195 K/UL (ref 150–450)
PMV BLD AUTO: 10.2 FL (ref 9.4–12.3)
POTASSIUM SERPL-SCNC: 5.2 MMOL/L (ref 3.5–5.1)
PPD POC: POSITIVE NEGATIVE
RBC # BLD AUTO: 2.81 M/UL (ref 4.05–5.2)
SODIUM SERPL-SCNC: 145 MMOL/L (ref 138–145)
WBC # BLD AUTO: 6.6 K/UL (ref 4.3–11.1)

## 2020-11-26 PROCEDURE — 36415 COLL VENOUS BLD VENIPUNCTURE: CPT

## 2020-11-26 PROCEDURE — 74011250637 HC RX REV CODE- 250/637: Performed by: NURSE PRACTITIONER

## 2020-11-26 PROCEDURE — 2709999900 HC NON-CHARGEABLE SUPPLY

## 2020-11-26 PROCEDURE — 74011250636 HC RX REV CODE- 250/636: Performed by: FAMILY MEDICINE

## 2020-11-26 PROCEDURE — 74011250637 HC RX REV CODE- 250/637: Performed by: FAMILY MEDICINE

## 2020-11-26 PROCEDURE — 96372 THER/PROPH/DIAG INJ SC/IM: CPT

## 2020-11-26 PROCEDURE — 80048 BASIC METABOLIC PNL TOTAL CA: CPT

## 2020-11-26 PROCEDURE — 99218 HC RM OBSERVATION: CPT

## 2020-11-26 PROCEDURE — 85025 COMPLETE CBC W/AUTO DIFF WBC: CPT

## 2020-11-26 RX ADMIN — ESCITALOPRAM OXALATE 10 MG: 10 TABLET ORAL at 09:38

## 2020-11-26 RX ADMIN — HEPARIN SODIUM 5000 UNITS: 5000 INJECTION INTRAVENOUS; SUBCUTANEOUS at 16:44

## 2020-11-26 RX ADMIN — Medication 400 MG: at 09:39

## 2020-11-26 RX ADMIN — Medication 100 MG: at 09:38

## 2020-11-26 RX ADMIN — SODIUM ZIRCONIUM CYCLOSILICATE 10 G: 10 POWDER, FOR SUSPENSION ORAL at 11:42

## 2020-11-26 RX ADMIN — SODIUM CHLORIDE 150 ML/HR: 900 INJECTION, SOLUTION INTRAVENOUS at 01:15

## 2020-11-26 RX ADMIN — HEPARIN SODIUM 5000 UNITS: 5000 INJECTION INTRAVENOUS; SUBCUTANEOUS at 05:18

## 2020-11-26 RX ADMIN — Medication 10 ML: at 21:20

## 2020-11-26 RX ADMIN — ASPIRIN 325 MG ORAL TABLET 325 MG: 325 PILL ORAL at 09:38

## 2020-11-26 RX ADMIN — FERROUS SULFATE TAB 325 MG (65 MG ELEMENTAL FE) 325 MG: 325 (65 FE) TAB at 11:44

## 2020-11-26 RX ADMIN — SODIUM BICARBONATE 650 MG TABLET 650 MG: at 09:38

## 2020-11-26 RX ADMIN — FOLIC ACID 1 MG: 1 TABLET ORAL at 09:38

## 2020-11-26 RX ADMIN — FERROUS SULFATE TAB 325 MG (65 MG ELEMENTAL FE) 325 MG: 325 (65 FE) TAB at 09:38

## 2020-11-26 RX ADMIN — HEPARIN SODIUM 5000 UNITS: 5000 INJECTION INTRAVENOUS; SUBCUTANEOUS at 21:20

## 2020-11-26 RX ADMIN — QUETIAPINE FUMARATE 50 MG: 25 TABLET ORAL at 21:20

## 2020-11-26 RX ADMIN — SODIUM BICARBONATE 650 MG TABLET 650 MG: at 21:20

## 2020-11-26 RX ADMIN — SODIUM BICARBONATE 650 MG TABLET 650 MG: at 16:44

## 2020-11-26 RX ADMIN — PANTOPRAZOLE SODIUM 40 MG: 40 TABLET, DELAYED RELEASE ORAL at 05:18

## 2020-11-26 RX ADMIN — FERROUS SULFATE TAB 325 MG (65 MG ELEMENTAL FE) 325 MG: 325 (65 FE) TAB at 16:44

## 2020-11-26 RX ADMIN — Medication 10 ML: at 05:18

## 2020-11-26 NOTE — PROGRESS NOTES
Hospitalist Progress Note     Admit Date:  2020  4:19 PM   Name:  South Son   Age:  61 y.o.  :  1960   MRN:  [id number]   PCP:  Susan Ngo MD  Treatment Team: Attending Provider: Yeimi Barnes MD; Consulting Provider: Lashell Brownlee MD; Utilization Review: Moni Moreno RN; Care Manager: Cait Forbes RN; Utilization Review: Vipul Escobar RN; Nurse Practitioner: Yoanna Varner NP; Charge Nurse: Emmanuelle Coto    Subjective:   HPI and or CC:   62 yo female with PMH of carotid artery aneurysm s/p coil, HTN, CVA, dementia, CKD, bed bound admitted  when caregiver brought to this facility and patient had nobody to care for her. She was noted to have OSMIN on CKD with mild hyperkalemia. :  Patient alert and oriented x3. Afebrile, no leukocytosis. Nephrology continues to follow along. Cr. 3.66-improving. Objective:     Patient Vitals for the past 24 hrs:   Temp Pulse Resp BP SpO2   20 0800 98 °F (36.7 °C) 72 18 92/61 99 %   20 0406 98.6 °F (37 °C) 83 18 (!) 93/56 98 %   20 2239 98.6 °F (37 °C) 89 18 99/65 96 %   20 2032 97.6 °F (36.4 °C) 99 18 94/60 96 %   20 1551 98.3 °F (36.8 °C) (!) 104 18 (!) 103/59 95 %   20 1253 98.1 °F (36.7 °C) (!) 111 18 (!) 93/57 94 %     Oxygen Therapy  O2 Sat (%): 99 % (20 0800)  Pulse via Oximetry: 97 beats per minute (20 1621)  O2 Device: Room air (20 1604)    Intake/Output Summary (Last 24 hours) at 2020 1140  Last data filed at 2020 1552  Gross per 24 hour   Intake    Output 700 ml   Net -700 ml         REVIEW OF SYSTEMS: Comprehensive ROS performed and negative except as stated in HPI. Physical Examination:  General:       No acute distress, appears chronically ill    Lungs:          CTA bilaterally. Resp even and nonlabored  Heart:            S1S2 present without murmurs rubs gallops. RRR.  No LE edema  Abdomen:    Soft, non tender, non distended. BS present  Extremities: RLE with brace. All ext with muscle wasting  Neurologic:  A/O X3. No gross focal deficits. Right sided residual weakness, RUE tremors. Data Review:  I have reviewed all labs, meds, telemetry events, and studies from the last 24 hours. Recent Results (from the past 24 hour(s))   CBC WITH AUTOMATED DIFF    Collection Time: 11/26/20  4:24 AM   Result Value Ref Range    WBC 6.6 4.3 - 11.1 K/uL    RBC 2.81 (L) 4.05 - 5.2 M/uL    HGB 8.0 (L) 11.7 - 15.4 g/dL    HCT 27.1 (L) 35.8 - 46.3 %    MCV 96.4 79.6 - 97.8 FL    MCH 28.5 26.1 - 32.9 PG    MCHC 29.5 (L) 31.4 - 35.0 g/dL    RDW 14.2 11.9 - 14.6 %    PLATELET 721 232 - 248 K/uL    MPV 10.2 9.4 - 12.3 FL    ABSOLUTE NRBC 0.00 0.0 - 0.2 K/uL    DF AUTOMATED      NEUTROPHILS 66 43 - 78 %    LYMPHOCYTES 21 13 - 44 %    MONOCYTES 7 4.0 - 12.0 %    EOSINOPHILS 4 0.5 - 7.8 %    BASOPHILS 1 0.0 - 2.0 %    IMMATURE GRANULOCYTES 1 0.0 - 5.0 %    ABS. NEUTROPHILS 4.4 1.7 - 8.2 K/UL    ABS. LYMPHOCYTES 1.4 0.5 - 4.6 K/UL    ABS. MONOCYTES 0.5 0.1 - 1.3 K/UL    ABS. EOSINOPHILS 0.3 0.0 - 0.8 K/UL    ABS. BASOPHILS 0.0 0.0 - 0.2 K/UL    ABS. IMM.  GRANS. 0.0 0.0 - 0.5 K/UL   METABOLIC PANEL, BASIC    Collection Time: 11/26/20  4:24 AM   Result Value Ref Range    Sodium 145 138 - 145 mmol/L    Potassium 5.2 (H) 3.5 - 5.1 mmol/L    Chloride 118 (H) 98 - 107 mmol/L    CO2 18 (L) 21 - 32 mmol/L    Anion gap 9 7 - 16 mmol/L    Glucose 91 65 - 100 mg/dL    BUN 61 (H) 8 - 23 MG/DL    Creatinine 3.66 (H) 0.6 - 1.0 MG/DL    GFR est AA 16 (L) >60 ml/min/1.73m2    GFR est non-AA 13 (L) >60 ml/min/1.73m2    Calcium 8.4 8.3 - 10.4 MG/DL        All Micro Results     Procedure Component Value Units Date/Time    QUANTIFERON-TB GOLD PLUS [[id number]] Collected:  11/25/20 1315    Order Status:  Canceled Specimen:  Blood     QUANTIFERON-TB GOLD PLUS [[id number]]     Order Status:  Canceled Specimen:  Blood           Current Meds:  Current Facility-Administered Medications   Medication Dose Route Frequency    sodium zirconium cyclosilicate (LOKELMA) powder packet 10 g  10 g Oral DAILY    aspirin tablet 325 mg  325 mg Oral DAILY    escitalopram oxalate (LEXAPRO) tablet 10 mg  10 mg Oral DAILY    ferrous sulfate tablet 325 mg  325 mg Oral TID WITH MEALS    folic acid (FOLVITE) tablet 1 mg  1 mg Oral DAILY    [Held by provider] lisinopriL (PRINIVIL, ZESTRIL) tablet 10 mg  10 mg Oral DAILY    magnesium oxide (MAG-OX) tablet 400 mg  400 mg Oral DAILY    pantoprazole (PROTONIX) tablet 40 mg  40 mg Oral ACB    QUEtiapine (SEROquel) tablet 50 mg  50 mg Oral QHS    thiamine HCL (B-1) tablet 100 mg  100 mg Oral DAILY    traMADoL (ULTRAM) tablet 50 mg  50 mg Oral Q6H PRN    sodium chloride (NS) flush 5-40 mL  5-40 mL IntraVENous Q8H    sodium chloride (NS) flush 5-40 mL  5-40 mL IntraVENous PRN    influenza vaccine 2020-21 (6 mos+)(PF) (FLUARIX/FLULAVAL/FLUZONE QUAD) injection 0.5 mL  0.5 mL IntraMUSCular PRIOR TO DISCHARGE    LORazepam (ATIVAN) tablet 1-2 mg  1-2 mg Oral Q1H PRN    LORazepam (ATIVAN) tablet 3-4 mg  3-4 mg Oral Q1H PRN    0.9% sodium chloride infusion  150 mL/hr IntraVENous CONTINUOUS    heparin (porcine) injection 5,000 Units  5,000 Units SubCUTAneous Q8H    sodium bicarbonate tablet 650 mg  650 mg Oral TID       Diet:  DIET REGULAR  DIET NUTRITIONAL SUPPLEMENTS    Other Studies (last 24 hours):  No results found.     Assessment and Plan:     Hospital Problems as of 11/26/2020 Date Reviewed: 2/28/2018          Codes Class Noted - Resolved POA    Normocytic anemia ICD-10-CM: D64.9  ICD-9-CM: 285.9  11/24/2020 - Present Yes        Hyperkalemia ICD-10-CM: E87.5  ICD-9-CM: 276.7  11/24/2020 - Present Yes        * (Principal) No able caregiver in household ICD-10-CM: Z74.2  ICD-9-CM: V60.4  11/23/2020 - Present Yes        Alcoholism ([facility] [address] ICD-10-CM: F10.20  ICD-9-CM: 303.90  5/18/2018 - Present Yes        Hypertension ICD-10-CM: I10  ICD-9-CM: 401.9  5/18/2018 - Present Yes        Vascular dementia ([facility] ICD-10-CM: F01.50  ICD-9-CM: 290.40  2/13/2018 - Present Yes        Depression ICD-10-CM: F32.9  ICD-9-CM: 835  2/13/2018 - Present Yes        Bipolar disorder ([facility] ICD-10-CM: F31.9  ICD-9-CM: 296.80  10/22/2014 - Present Yes        CKD (chronic kidney disease) ICD-10-CM: N18.9  ICD-9-CM: 585.9  9/18/2014 - Present Yes        Essential hypertension ICD-10-CM: I10  ICD-9-CM: 401.9  9/11/2012 - Present Yes        FSGS (focal segmental glomerulosclerosis) ICD-10-CM: N05.1  ICD-9-CM: 582.1  9/11/2012 - Present Yes        RESOLVED: Dementia ([facility] ICD-10-CM: F03.90  ICD-9-CM: 294.20  Unknown - 11/23/2020 Yes        RESOLVED: Alcohol abuse ICD-10-CM: F10.10  ICD-9-CM: 305.00  2/13/2018 - 11/23/2020 Yes              A/P:    1. OSMIN/CKD  IVF  Renal US  Consult Nephrology  Check UA, urine studies  Holding ACE-I     2. Hyperkalemia  Added lokelma per Nephro     3. HTN  With hypotension now  Holding ACE-I  IVF     4. Metabolic acidosis  Bicarb po     5. Vascular dementia/debility  PT/OT  STR     6.  Bipolar disorder  Denies SI/HI  Home meds          Signed:  Ginna Pineda NP

## 2020-11-26 NOTE — PROGRESS NOTES
Morgan Horne  Admission Date: 11/23/2020             Renal Daily Progress Note: 11/26/2020    The patient's chart is reviewed and the patient is discussed with the staff.   Follow up OSMIN/CKD IV  Subjective:       ROS:  General: no fever/chills, + debility  CV; no CP  Lung: no SOB, no cough  GI: no N/V/D  Ext: no edema     Current Facility-Administered Medications   Medication Dose Route Frequency    sodium zirconium cyclosilicate (LOKELMA) powder packet 10 g  10 g Oral DAILY    aspirin tablet 325 mg  325 mg Oral DAILY    escitalopram oxalate (LEXAPRO) tablet 10 mg  10 mg Oral DAILY    ferrous sulfate tablet 325 mg  325 mg Oral TID WITH MEALS    folic acid (FOLVITE) tablet 1 mg  1 mg Oral DAILY    [Held by provider] lisinopriL (PRINIVIL, ZESTRIL) tablet 10 mg  10 mg Oral DAILY    magnesium oxide (MAG-OX) tablet 400 mg  400 mg Oral DAILY    pantoprazole (PROTONIX) tablet 40 mg  40 mg Oral ACB    QUEtiapine (SEROquel) tablet 50 mg  50 mg Oral QHS    thiamine HCL (B-1) tablet 100 mg  100 mg Oral DAILY    traMADoL (ULTRAM) tablet 50 mg  50 mg Oral Q6H PRN    sodium chloride (NS) flush 5-40 mL  5-40 mL IntraVENous Q8H    sodium chloride (NS) flush 5-40 mL  5-40 mL IntraVENous PRN    influenza vaccine 2020-21 (6 mos+)(PF) (FLUARIX/FLULAVAL/FLUZONE QUAD) injection 0.5 mL  0.5 mL IntraMUSCular PRIOR TO DISCHARGE    LORazepam (ATIVAN) tablet 1-2 mg  1-2 mg Oral Q1H PRN    LORazepam (ATIVAN) tablet 3-4 mg  3-4 mg Oral Q1H PRN    0.9% sodium chloride infusion  150 mL/hr IntraVENous CONTINUOUS    heparin (porcine) injection 5,000 Units  5,000 Units SubCUTAneous Q8H    sodium bicarbonate tablet 650 mg  650 mg Oral TID         Objective:     Vitals:    11/25/20 2032 11/25/20 2239 11/26/20 0406 11/26/20 0800   BP: 94/60 99/65 (!) 93/56 92/61   Pulse: 99 89 83 72   Resp: 18 18 18 18   Temp: 97.6 °F (36.4 °C) 98.6 °F (37 °C) 98.6 °F (37 °C) 98 °F (36.7 °C)   SpO2: 96% 96% 98% 99%   Weight: Height:         Intake and Output:   11/24 1901 - 11/26 0700  In: -   Out: 700 [Urine:700]  No intake/output data recorded. Physical Exam:   Constitutional:  the patient is well developed and in no acute distress, alert and oriented   HEENT:  Sclera clear, pupils equal, oral mucosa moist  Lungs: clear bilaterally   Cardiovascular:  Regular rate, S1, S2, no Rub   Abd/GI: soft and non-tender; with positive bowel sounds. Ext: warm without cyanosis. There is no lower leg edema. Skin:  no jaundice or rashes  Neuro: no gross neuro deficits   Psychiatric: Calm. LAB  Recent Labs     11/26/20  0424 11/25/20  0551 11/24/20  0512   WBC 6.6 7.7 7.5   HGB 8.0* 8.5* 11.9   HCT 27.1* 28.7* 42.9    206 185     Recent Labs     11/26/20  0424 11/25/20  0551 11/24/20  2116 11/24/20  0512    143  --  145   K 5.2* 5.8*  --  5.6*   * 116*  --  118*   CO2 18* 21  --  14*   GLU 91 96  --  118*   BUN 61* 65*  --  66*   CREA 3.66* 4.10*  --  4.92*   PHOS  --   --  4.1*  --      No results for input(s): PH, PCO2, PO2, HCO3 in the last 72 hours.       Assessment:  (Medical Decision Making)     Hospital Problems  Date Reviewed: 2/28/2018          Codes Class Noted POA    Normocytic anemia ICD-10-CM: D64.9  ICD-9-CM: 285.9  11/24/2020 Yes        Hyperkalemia ICD-10-CM: E87.5  ICD-9-CM: 276.7  11/24/2020 Yes        * (Principal) No able caregiver in household ICD-10-CM: Z74.2  ICD-9-CM: V60.4  11/23/2020 Yes        Alcoholism ([facility] [address] ICD-10-CM: F10.20  ICD-9-CM: 303.90  5/18/2018 Yes        Hypertension ICD-10-CM: I10  ICD-9-CM: 401.9  5/18/2018 Yes        Vascular dementia ([address] ICD-10-CM: F01.50  ICD-9-CM: 290.40  2/13/2018 Yes        Depression ICD-10-CM: F32.9  ICD-9-CM: 187  2/13/2018 Yes        Bipolar disorder ([facility] ICD-10-CM: F31.9  ICD-9-CM: 296.80  10/22/2014 Yes        CKD (chronic kidney disease) ICD-10-CM: N18.9  ICD-9-CM: 585.9  9/18/2014 Yes        Essential hypertension ICD-10-CM: I10  ICD-9-CM: 401.9  9/11/2012 Yes        FSGS (focal segmental glomerulosclerosis) ICD-10-CM: N05.1  ICD-9-CM: 582.1  9/11/2012 Yes              Plan:  (Medical Decision Making)   1. OSMIN/CKD IV likely pre renal azotemia, CKD hx of FSGS. - hold ACEi for now, continue IVF, Cr trending down   F/u      2.  Hyperkalemia  lokelma, low K diet, Na CHO3            Gregory Castelan MD

## 2020-11-27 PROBLEM — N17.9 AKI (ACUTE KIDNEY INJURY) (HCC): Status: ACTIVE | Noted: 2020-11-27

## 2020-11-27 LAB
ANION GAP SERPL CALC-SCNC: 5 MMOL/L (ref 7–16)
BASOPHILS # BLD: 0.1 K/UL (ref 0–0.2)
BASOPHILS NFR BLD: 1 % (ref 0–2)
BUN SERPL-MCNC: 56 MG/DL (ref 8–23)
CALCIUM SERPL-MCNC: 8.4 MG/DL (ref 8.3–10.4)
CHLORIDE SERPL-SCNC: 120 MMOL/L (ref 98–107)
CO2 SERPL-SCNC: 21 MMOL/L (ref 21–32)
CREAT SERPL-MCNC: 3.4 MG/DL (ref 0.6–1)
DIFFERENTIAL METHOD BLD: ABNORMAL
EOSINOPHIL # BLD: 0.4 K/UL (ref 0–0.8)
EOSINOPHIL NFR BLD: 5 % (ref 0.5–7.8)
ERYTHROCYTE [DISTWIDTH] IN BLOOD BY AUTOMATED COUNT: 14.2 % (ref 11.9–14.6)
GLUCOSE SERPL-MCNC: 93 MG/DL (ref 65–100)
HCT VFR BLD AUTO: 28.5 % (ref 35.8–46.3)
HGB BLD-MCNC: 8.3 G/DL (ref 11.7–15.4)
IMM GRANULOCYTES # BLD AUTO: 0.1 K/UL (ref 0–0.5)
IMM GRANULOCYTES NFR BLD AUTO: 1 % (ref 0–5)
LYMPHOCYTES # BLD: 1.3 K/UL (ref 0.5–4.6)
LYMPHOCYTES NFR BLD: 18 % (ref 13–44)
MCH RBC QN AUTO: 27.9 PG (ref 26.1–32.9)
MCHC RBC AUTO-ENTMCNC: 29.1 G/DL (ref 31.4–35)
MCV RBC AUTO: 96 FL (ref 79.6–97.8)
MONOCYTES # BLD: 0.5 K/UL (ref 0.1–1.3)
MONOCYTES NFR BLD: 7 % (ref 4–12)
NEUTS SEG # BLD: 4.7 K/UL (ref 1.7–8.2)
NEUTS SEG NFR BLD: 68 % (ref 43–78)
NRBC # BLD: 0 K/UL (ref 0–0.2)
PLATELET # BLD AUTO: 175 K/UL (ref 150–450)
PLATELET COMMENTS,PCOM: ADEQUATE
PMV BLD AUTO: 10.3 FL (ref 9.4–12.3)
POTASSIUM SERPL-SCNC: 5.6 MMOL/L (ref 3.5–5.1)
RBC # BLD AUTO: 2.97 M/UL (ref 4.05–5.2)
RBC MORPH BLD: ABNORMAL
SODIUM SERPL-SCNC: 146 MMOL/L (ref 136–145)
WBC # BLD AUTO: 7.1 K/UL (ref 4.3–11.1)
WBC MORPH BLD: ABNORMAL

## 2020-11-27 PROCEDURE — 97110 THERAPEUTIC EXERCISES: CPT

## 2020-11-27 PROCEDURE — 65270000029 HC RM PRIVATE

## 2020-11-27 PROCEDURE — 99218 HC RM OBSERVATION: CPT

## 2020-11-27 PROCEDURE — 80048 BASIC METABOLIC PNL TOTAL CA: CPT

## 2020-11-27 PROCEDURE — 97535 SELF CARE MNGMENT TRAINING: CPT

## 2020-11-27 PROCEDURE — 74011250637 HC RX REV CODE- 250/637: Performed by: FAMILY MEDICINE

## 2020-11-27 PROCEDURE — 36415 COLL VENOUS BLD VENIPUNCTURE: CPT

## 2020-11-27 PROCEDURE — 97530 THERAPEUTIC ACTIVITIES: CPT

## 2020-11-27 PROCEDURE — 97112 NEUROMUSCULAR REEDUCATION: CPT

## 2020-11-27 PROCEDURE — 96372 THER/PROPH/DIAG INJ SC/IM: CPT

## 2020-11-27 PROCEDURE — 2709999900 HC NON-CHARGEABLE SUPPLY

## 2020-11-27 PROCEDURE — 74011250637 HC RX REV CODE- 250/637: Performed by: NURSE PRACTITIONER

## 2020-11-27 PROCEDURE — 74011250636 HC RX REV CODE- 250/636: Performed by: FAMILY MEDICINE

## 2020-11-27 PROCEDURE — 77030038269 HC DRN EXT URIN PURWCK BARD -A

## 2020-11-27 PROCEDURE — 85025 COMPLETE CBC W/AUTO DIFF WBC: CPT

## 2020-11-27 PROCEDURE — 77030040393 HC DRSG OPTIFOAM GENT MDII -B

## 2020-11-27 RX ADMIN — PANTOPRAZOLE SODIUM 40 MG: 40 TABLET, DELAYED RELEASE ORAL at 05:28

## 2020-11-27 RX ADMIN — SODIUM BICARBONATE 650 MG TABLET 650 MG: at 21:10

## 2020-11-27 RX ADMIN — SODIUM ZIRCONIUM CYCLOSILICATE 10 G: 10 POWDER, FOR SUSPENSION ORAL at 08:37

## 2020-11-27 RX ADMIN — FOLIC ACID 1 MG: 1 TABLET ORAL at 08:36

## 2020-11-27 RX ADMIN — HEPARIN SODIUM 5000 UNITS: 5000 INJECTION INTRAVENOUS; SUBCUTANEOUS at 21:11

## 2020-11-27 RX ADMIN — ASPIRIN 325 MG ORAL TABLET 325 MG: 325 PILL ORAL at 08:30

## 2020-11-27 RX ADMIN — FERROUS SULFATE TAB 325 MG (65 MG ELEMENTAL FE) 325 MG: 325 (65 FE) TAB at 18:06

## 2020-11-27 RX ADMIN — QUETIAPINE FUMARATE 50 MG: 25 TABLET ORAL at 21:10

## 2020-11-27 RX ADMIN — Medication 5 ML: at 21:11

## 2020-11-27 RX ADMIN — SODIUM BICARBONATE 650 MG TABLET 650 MG: at 08:30

## 2020-11-27 RX ADMIN — Medication 100 MG: at 08:30

## 2020-11-27 RX ADMIN — FERROUS SULFATE TAB 325 MG (65 MG ELEMENTAL FE) 325 MG: 325 (65 FE) TAB at 08:30

## 2020-11-27 RX ADMIN — ESCITALOPRAM OXALATE 10 MG: 10 TABLET ORAL at 08:30

## 2020-11-27 RX ADMIN — HEPARIN SODIUM 5000 UNITS: 5000 INJECTION INTRAVENOUS; SUBCUTANEOUS at 14:52

## 2020-11-27 RX ADMIN — Medication 5 ML: at 05:28

## 2020-11-27 RX ADMIN — HEPARIN SODIUM 5000 UNITS: 5000 INJECTION INTRAVENOUS; SUBCUTANEOUS at 05:28

## 2020-11-27 RX ADMIN — SODIUM CHLORIDE 150 ML/HR: 900 INJECTION, SOLUTION INTRAVENOUS at 02:43

## 2020-11-27 RX ADMIN — Medication 400 MG: at 08:30

## 2020-11-27 RX ADMIN — SODIUM BICARBONATE 650 MG TABLET 650 MG: at 18:06

## 2020-11-27 NOTE — PROGRESS NOTES
Problem: Mobility Impaired (Adult and Pediatric)  Goal: *Acute Goals and Plan of Care (Insert Text)  Outcome: Progressing Towards Goal  Note: LTG:  (1.)Ms. Ayaka Rodriguez will move from supine to sit and sit to supine , scoot up and down, and roll side to side with SUPERVISION within 7 treatment day(s). (2.)Ms. Ayaka Rodriguez will transfer from bed to chair and chair to bed with CONTACT GUARD ASSIST using the least restrictive device within 7 treatment day(s). (3.)Ms. Ayaka Rodriguez will ambulate with MINIMAL ASSIST for 10 feet with the least restrictive device within 7 treatment day(s). (4.)Ms. Ayaka Rodriguez will perform there ex B LEs x 10 min in supine or sitting with CGA within 7 treatment days for increased strength and AROM. ______________________________________________________________________________________      PHYSICAL THERAPY: Daily Note and AM 11/27/2020  OBSERVATION: PT Visit Days : 2  Payor: Cathie Edmondson / Plan: [address] / Product Type: [employer] /       NAME/AGE/GENDER: Mili Biswas is a 61 y.o. female   PRIMARY DIAGNOSIS: No able caregiver in household [Z74.2] No able caregiver in household No able caregiver in household       ICD-10: Treatment Diagnosis:    · Generalized Muscle Weakness (M62.81)  · Difficulty in walking, Not elsewhere classified (R26.2)  · History of falling (Z91.81)   Precaution/Allergies:  Codeine and Lortab [hydrocodone-acetaminophen]      ASSESSMENT:   Per Initial:  Ms. Ayaka Rodriguez presents with history significant for CVA with R deficits, dementia, recent R hip fx in Sept 2020. Per chart, pt's spouse is caregiver who is intubated in ICU presently. Pt reports lives with spouse in 2 story home, lives on main level. Pt was receiving HHPT, pt states bed bound since fall; was ambulating short distance to bathroom prior to September. Pt with KI to R BRIAN, states due to \"bad knee\", complaints of knee pain with any mobility.  Pt reports wears KI at all times.    The patient is supine upon contact and agreeable to treatment with encouragement. She performed bed mobility with min A and additional time. She demonstrated improved sitting balance EOB with fair/good balance. She performed multiple STS transfers with mod Ax2 and RW, cueing for technique. She was unable to take steps due to reports of knee pain. The patient then performed SPT with max Ax2 and cueing for technique and assist.  She worked on scooting on EOB and in chair with min-mod A and cues for technique. She participated in a few seated exercises and worked on sitting balance and sitting tolerance with cueing for shifting and mobilizing while working with OT on self-care. Overall the patient is making slow progress toward therapy goals as indicated by increased mobility. Will continue skilled PT treatment as patient is still below functional baseline. At this time, patient is appropriate for Co-treatment with occupational therapy due to patient's decreased overall endurance/tolerance levels, as well as need for high level skilled assistance to complete functional transfers/mobility and functional tasks. Bhupinder Roman is appropriate for a multidisciplinary co-treatment of PT and OT to address goals of both disciplines. This section established at most recent assessment   PROBLEM LIST (Impairments causing functional limitations):  1. Decreased Strength  2. Decreased ADL/Functional Activities  3. Decreased Transfer Abilities  4. Decreased Ambulation Ability/Technique  5. Decreased Balance  6. Decreased Activity Tolerance  7. Decreased [place] with Home Exercise Program  8. Decreased Cognition   INTERVENTIONS PLANNED: (Benefits and precautions of physical therapy have been discussed with the patient.)  1. Balance Exercise  2. Bed Mobility  3. Family Education  4. Gait Training  5. Home Exercise Program (HEP)  6. Neuromuscular Re-education/Strengthening  7.  Range of Motion (ROM)  8. Therapeutic Activites  9. Therapeutic Exercise/Strengthening  10. Transfer Training     TREATMENT PLAN: Frequency/Duration: 3 times a week for duration of hospital stay  Rehabilitation Potential For Stated Goals: [facility] (at time of discharge pending progress):    Placement: It is my opinion, based on this patient's performance to date, that Ms. Jennifer Ding may benefit from intensive therapy at a [address] [place] [address] after discharge due to the functional deficits listed above that are likely to improve with skilled rehabilitation and concerns that he/she may be unsafe to be unsupervised at home due to safety concerns at home, fall risk . Equipment:    To be determined              HISTORY:   History of Present Injury/Illness (Reason for Referral):  Oc Rausch is a 61 y.o. female with a past medical history of mild vascular dementia, HTN, CKD, functional paraplegia who presents to the ER with her . Her  was unfortunately intubated and admitted, and now she has no one to take care of her at home. She has no complaints currently. She denies any pain, nausea, vomiting. .  Past Medical History/Comorbidities:   Ms. Jennifer Ding  has a past medical history of Aneurysm of common carotid artery ([facility] [address] (2004), CKD (chronic kidney disease) (9/18/2014), Dementia ([address] FSGS (focal segmental glomerulosclerosis), Gout, Hypertension, Osteoarthritis (9/18/2014), and Stroke ([address] (2004, 2013). Ms. Jennifer Ding  has a past surgical history that includes hx intracranial aneurysm repair (2004); hx refractive surgery; hx orthopaedic (Right, 10/2014); hx ankle fracture tx (Left, 2013); hx ercp (02/27/2018); and hx cholecystectomy (02/28/2018).   Social History/Living Environment:   Home Environment: Private residence  # Steps to Enter: 0  One/Two Story Residence: Two story, live on 1st floor  # of Interior Steps: 13  Height of Each Step (in): 7 inches  Interior Rails: Right  Lift Chair Available: No  Living Alone: No  Support Systems: Home care staff  Patient Expects to be Discharged to[de-identified] Unknown  Current DME Used/Available at Home: Wheelchair, Shower chair, Commode, bedside, Blood pressure cuff, Brace/Splint, Grab bars, Walker  Tub or Shower Type: (sponge baths)  Prior Level of Function/Work/Activity:  Lives spouse, bed bound at baseline, total care ADLs  Personal Factors:          Sex:  female        Age:  61 y.o. Overall Behavior:  agreeable, delayed responses   Number of Personal Factors/Comorbidities that affect the Plan of Care:  CVA, dementia, falls 3+: HIGH COMPLEXITY   EXAMINATION:   Most Recent Physical Functioning:   Gross Assessment:                  Posture:     Balance:  Sitting: Impaired  Sitting - Static: Good (unsupported)  Sitting - Dynamic: Fair (occasional)  Standing: Impaired  Standing - Static: Fair  Standing - Dynamic : Poor Bed Mobility:  Supine to Sit: Minimum assistance;Assist x2  Scooting: Moderate assistance;Assist x2; Additional time  Wheelchair Mobility:     Transfers:  Sit to Stand: Moderate assistance;Assist x2  Stand to Sit: Minimum assistance;Assist x2  Stand Pivot Transfers: Maximum assistance;Assist x2  Gait:            Body Structures Involved:  1. Muscles Body Functions Affected:  1. Movement Related Activities and Participation Affected:  1. General Tasks and Demands  2. Mobility  3. Self Care   Number of elements that affect the Plan of Care: 4+: HIGH COMPLEXITY   CLINICAL PRESENTATION:   Presentation: Evolving clinical presentation with changing clinical characteristics: MODERATE COMPLEXITY   CLINICAL DECISION MAKIN [facility] Mobility Inpatient Short Form  How much difficulty does the patient currently have. .. Unable A Lot A Little None   1. Turning over in bed (including adjusting bedclothes, sheets and blankets)? [] 1   [] 2   [x] 3   [] 4   2.   Sitting down on and standing up from a chair with arms ( e.g., wheelchair, bedside commode, etc.)   [] 1   [] 2   [x] 3   [] 4   3. Moving from lying on back to sitting on the side of the bed? [] 1   [] 2   [x] 3   [] 4   How much help from another person does the patient currently need. .. Total A Lot A Little None   4. Moving to and from a bed to a chair (including a wheelchair)? [] 1   [] 2   [x] 3   [] 4   5. Need to walk in hospital room? [] 1   [x] 2   [] 3   [] 4   6. Climbing 3-5 steps with a railing? [x] 1   [] 2   [] 3   [] 4   © 2007, Trustees of [address] [place] [address] [address] [id number], under license to [employer]. All rights reserved      Score:  Initial: 15 Most Recent: X (Date: -- )    Interpretation of Tool:  Represents activities that are increasingly more difficult (i.e. Bed mobility, Transfers, Gait). Medical Necessity:     · Patient is expected to demonstrate progress in   · strength, range of motion, balance, and coordination  ·  to   · decrease assistance required with overall functional mobility, transfers, ambulation  · .  · Patient demonstrates   · good  ·  rehab potential due to higher previous functional level. Reason for Services/Other Comments:  · Patient   · continues to require present interventions due to patient's inability to perform bed mobility, transfers, ambulation safely and effectively  · . Use of outcome tool(s) and clinical judgement create a POC that gives a: Clear prediction of patient's progress: LOW COMPLEXITY            TREATMENT:   (In addition to Assessment/Re-Assessment sessions the following treatments were rendered)   Pre-treatment Symptoms/Complaints:  \"I just stay in bed\"  Pain: Initial:   Pain Intensity 1: 0  Post Session:  0/10 post session     Therapeutic Exercise: ( 30 min): Activities to include bed mobility, weight shifting, static and dynamic sitting and standing balance, sit to stand transfers and SPT training to improve mobility, strength, balance, and coordination.   Required moderate verbal and tactile cues to promote proper body alignment, promote proper body posture, and promote proper body mechanics. Progressed complexity of movement as indicated. Therapeutic Exercise: (  8 min):  Exercises per grid below to improve mobility, strength and balance. Required minimal verbal cues to promote proper body alignment and promote proper body mechanics. Progressed range, repetitions and complexity of movement as indicated. Date:  11/27/20 Date:   Date:     Activity/Exercise Parameters Parameters Parameters   Ankle pumps x15 B     SLR x5 AAB     LAQ x5                               Today's treatment session addressed Decreased Strength, Decreased Transfer Abilities, and Decreased Balance to progress towards achieving goal(s) 1, 2, and 3. During this session, Occupational Therapy addressed ADLs to progress towards their discipline specific goal(s). Co-treatment was necessary to improve patient's ability to follow higher level commands, ability to increase activity demands, and ability to return to normal functional activity. Braces/Orthotics/Lines/Etc:   · IV  · O2 Device: Room air  Treatment/Session Assessment:    · Response to Treatment:  See above, better participation  · Interdisciplinary Collaboration:   o Physical Therapy Assistant  o Certified Occupational Therapy Assistant  o Registered Nurse  · After treatment position/precautions:   o Up in chair  o Bed/Chair-wheels locked  o Bed in low position  o Call light within reach  o RN notified   · Compliance with Program/Exercises: Will assess as treatment progresses  · Recommendations/Intent for next treatment session: \"Next visit will focus on advancements to more challenging activities\".   Total Treatment Duration:  PT Patient Time In/Time Out  Time In: 0935  Time Out: Jhoana Muir [address] [place] [address]

## 2020-11-27 NOTE — PROGRESS NOTES
Hospitalist Progress Note    Subjective:   Daily Progress Note: 11/27/2020 11:17 AM    Debilitated, bedbound since femur fx 2 weeks ago patient presented to ER 11/23 stating she is here because  is drunk. EMS summoned to home by PT after patient was found with  passed out on top of her, pinning her down. Unclear how long this occurred prior to PT arrival. Patient with recent femur fracture, approx 2 weeks, with  as primary caregiver. Found with OSMIN on CKD, creatinine: 4.88 with baseline in upper 2's.  now in ICU, intubated with alcohol with drawls. Unable to go home alone. SW on board. NS at 150 begun. 11/24:  Nephrology in for consult, patient last saw them 2018. Holding ACE. Renal U/S pending. Hyperkalemia, adding bicarb po. Dehydrated, little po intake at home. APS notified by CM.  11/25:  Hypotensive. Appetite improved. Creatinine: 4.10. OT/PT, nutrition on board. 11/26:  Creatinine 3.66. remains hypotensive. 11/27:  Unable to give consistent meaningful answers. Denies pain. Potassium back up to 5.6, Nephro increasing lokelma, low K+ diet.      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, Dementia, Bed bound, focal segmental glomerulosclerosis in remission, BiPolar disorder    Current Facility-Administered Medications   Medication Dose Route Frequency    sodium zirconium cyclosilicate (LOKELMA) powder packet 10 g  10 g Oral DAILY    aspirin tablet 325 mg  325 mg Oral DAILY    escitalopram oxalate (LEXAPRO) tablet 10 mg  10 mg Oral DAILY    ferrous sulfate tablet 325 mg  325 mg Oral TID WITH MEALS    folic acid (FOLVITE) tablet 1 mg  1 mg Oral DAILY    [Held by provider] lisinopriL (PRINIVIL, ZESTRIL) tablet 10 mg  10 mg Oral DAILY    magnesium oxide (MAG-OX) tablet 400 mg  400 mg Oral DAILY    pantoprazole (PROTONIX) tablet 40 mg  40 mg Oral ACB    QUEtiapine (SEROquel) tablet 50 mg  50 mg Oral QHS    thiamine HCL (B-1) tablet 100 mg  100 mg Oral DAILY    traMADoL (ULTRAM) tablet 50 mg  50 mg Oral Q6H PRN    sodium chloride (NS) flush 5-40 mL  5-40 mL IntraVENous Q8H    sodium chloride (NS) flush 5-40 mL  5-40 mL IntraVENous PRN    influenza vaccine 2020- (6 mos+)(PF) (FLUARIX/FLULAVAL/FLUZONE QUAD) injection 0.5 mL  0.5 mL IntraMUSCular PRIOR TO DISCHARGE    LORazepam (ATIVAN) tablet 1-2 mg  1-2 mg Oral Q1H PRN    LORazepam (ATIVAN) tablet 3-4 mg  3-4 mg Oral Q1H PRN    0.9% sodium chloride infusion  150 mL/hr IntraVENous CONTINUOUS    heparin (porcine) injection 5,000 Units  5,000 Units SubCUTAneous Q8H    sodium bicarbonate tablet 650 mg  650 mg Oral TID        Review of Systems  Patient is unable     Objective:     Visit Vitals  /76 (BP 1 Location: Left arm, BP Patient Position: At rest)   Pulse 86   Temp 97.8 °F (36.6 °C)   Resp 18   Ht 5' 5\" (1.651 m)   Wt 90.7 kg (200 lb)   SpO2 93%   Breastfeeding No   BMI 33.28 kg/m²      O2 Device: Room air    Temp (24hrs), Av.3 °F (36.8 °C), Min:97.8 °F (36.6 °C), Max:99 °F (37.2 °C)     0701 -  1900  In: -   Out: 800 [Urine:800]    General appearance: Oriented, alert, cooperative, dulled mentation. Obese. Unkempt. Head: Normocephalic, without obvious abnormality, atraumatic  Eyes: conjunctivae/corneas clear. PERRL  Throat: Lips, mucosa, and tongue normal. Teeth and gums in need of dental attention and  Hygiene. Neck: supple, symmetrical, trachea midline, no JVD  Lungs: clear to auscultation bilaterally  Heart: regular rate and rhythm, S1, S2 normal, no murmur, click, rub or gallop  Abdomen: soft, non-tender. Bowel sounds normal. No masses,  no organomegaly  Extremities: Knee immobilizer intact left leg. Distal CMS intact. All other extremities normal, atraumatic, no cyanosis or edema  Skin: Skin color, texture, turgor normal. No rashes or lesions  Neurologic: Grossly normal    Additional comments: Notes,orders, test results, vitals reviewed    Data Review  Recent Results (from the past 24 hour(s))   CBC WITH AUTOMATED DIFF    Collection Time: 11/27/20  5:09 AM   Result Value Ref Range    WBC 7.1 4.3 - 11.1 K/uL    RBC 2.97 (L) 4.05 - 5.2 M/uL    HGB 8.3 (L) 11.7 - 15.4 g/dL    HCT 28.5 (L) 35.8 - 46.3 %    MCV 96.0 79.6 - 97.8 FL    MCH 27.9 26.1 - 32.9 PG    MCHC 29.1 (L) 31.4 - 35.0 g/dL    RDW 14.2 11.9 - 14.6 %    PLATELET 000 977 - 308 K/uL    MPV 10.3 9.4 - 12.3 FL    ABSOLUTE NRBC 0.00 0.0 - 0.2 K/uL    NEUTROPHILS 68 43 - 78 %    LYMPHOCYTES 18 13 - 44 %    MONOCYTES 7 4.0 - 12.0 %    EOSINOPHILS 5 0.5 - 7.8 %    BASOPHILS 1 0.0 - 2.0 %    IMMATURE GRANULOCYTES 1 0.0 - 5.0 %    ABS. NEUTROPHILS 4.7 1.7 - 8.2 K/UL    ABS. LYMPHOCYTES 1.3 0.5 - 4.6 K/UL    ABS. MONOCYTES 0.5 0.1 - 1.3 K/UL    ABS. EOSINOPHILS 0.4 0.0 - 0.8 K/UL    ABS. BASOPHILS 0.1 0.0 - 0.2 K/UL    ABS. IMM. GRANS. 0.1 0.0 - 0.5 K/UL    RBC COMMENTS NORMOCYTIC/NORMOCHROMIC      WBC COMMENTS Result Confirmed By Smear      PLATELET COMMENTS ADEQUATE      DF AUTOMATED     METABOLIC PANEL, BASIC    Collection Time: 11/27/20  5:09 AM   Result Value Ref Range    Sodium 146 (H) 136 - 145 mmol/L    Potassium 5.6 (H) 3.5 - 5.1 mmol/L    Chloride 120 (H) 98 - 107 mmol/L    CO2 21 21 - 32 mmol/L    Anion gap 5 (L) 7 - 16 mmol/L    Glucose 93 65 - 100 mg/dL    BUN 56 (H) 8 - 23 MG/DL    Creatinine 3.40 (H) 0.6 - 1.0 MG/DL    GFR est AA 18 (L) >60 ml/min/1.73m2    GFR est non-AA 15 (L) >60 ml/min/1.73m2    Calcium 8.4 8.3 - 10.4 MG/DL     11/24:  CXR:  Negative for evidence of tuberculosis. The lungs are clear      11/24:  ABDOMINAL ULTRASOUND:  Bilateral atrophic kidneys with increased renal cortical echogenicity typically seen with medical renal disease. There is no hydronephrosis.   2.6 cm hypoechoic left renal cyst.    Assessment/Plan:   No able caregiver in household:   primary caregiver in ICU   Patient is unable to care for self, no known family   CM involved APS,     Will need to keep in Hospital until alternative arrangements made    OSMIN on CKD:  Baseline creatinine:  High 2's. 4.48 on admission. Multifactorial: prerenal azotemia with poor po intake, hypotension, progression of CKD/FSGS   Trending down with IVF, continue   Strict I+O   Holding ACE   Nephrology on board:  Appreciate     Hyperkalemia   Bicarb po and lokelma, low K+ diet per Nephrology    Hypotension with baseline hypertension:  Holding antihypertensives    FSGS:  Last seen by Nephrology 2018, will need to follow on discharge     Bipolar disorder:  Home meds     Vascular dementia     Recent femur fracture:  Continue immobilizer     Depression    History of alcoholism ([facility] [address] (5/18/2018)   Unclear if patient currently drinks    Normocytic anemia:  [address] discussed with: Patient, CM, and Nurse.     Signed By: Jacob Marcial NP     November 27, 2020

## 2020-11-27 NOTE — PROGRESS NOTES
Problem: Self Care Deficits Care Plan (Adult)  Goal: *Acute Goals and Plan of Care (Insert Text)  Description: 1. Patient will complete upper body bathing and dressing with setup and adaptive equipment as needed. 2. Patient will complete toileting with minimal assistance. 3. Patient will tolerate 20 minutes of OT treatment with as needed rest breaks to increase activity tolerance for ADLs. 4. Patient will complete functional transfers with minimal assistance and adaptive equipment as needed. 5. Patient will complete self feeding and grooming with modified independence. Timeframe: 7 visits     Outcome: Progressing Towards Goal       OCCUPATIONAL THERAPY: Daily Note and AM 11/27/2020  OBSERVATION: OT Visit Days: 3  Payor: Lorna Hernández / Plan: [address] / Product Type: [employer] Medicare /      NAME/AGE/GENDER: Vikas Emery is a 61 y.o. female   PRIMARY DIAGNOSIS:  No able caregiver in household [Z74.2] No able caregiver in household No able caregiver in household       ICD-10: Treatment Diagnosis:    · Generalized Muscle Weakness (M62.81)  · Other lack of cordination (R27.8)   Precautions/Allergies:    Codeine and Lortab [hydrocodone-acetaminophen]      ASSESSMENT:     Ms. Jose Mckenna is a 61year old female here because her caregiver/  is hospitalized and unable to provide care for her. She has history of hip fracture in Sept 2020, CVA with R sided deficits, and bipolar disorder. Patient reports she is bed bound and requires assistance with all ADLs & IADLS from spouse. She was also having HHPT. Reports she has not walked in months due to a \"bad knee\" but patient is a poor historian. Patient was evaluated with physical therapist and may be appropriate for ongoing skilled co-treatment at future sessions. BUE assessment reveals decreased ROM/ strength/ coordination in RUE compared to LUE from previous CVA.  Per patient she has had 3 strokes, but does not know when they occurred. Functionally, she primarily uses only her LUE. Balance is impaired in sitting and in standing (fair). Cognitively, patient has delayed responses, decreased attention span, and decreased insight. Also disoriented to time (December 2019). She is currently requiring assistance with all ADLs and mobility and would benefit from continued occupational therapy to increase independence and safety. Will follow. 11/27/2020 Treatment: Patient was assisted OOB to chair today with mod assist x2. Pt demonstrates poor standing balance but gave fair effort. Pt assisted with LB bathing and dressing as well. Requires mod/max assist for LB at this time. Pt later assisted back to bed with same amount of assistance. Pt is doing fair with goals at this time. Will continue to benefit from skilled OT during stay. This section established at most recent assessment   PROBLEM LIST (Impairments causing functional limitations):  1. Decreased Strength  2. Decreased ADL/Functional Activities  3. Decreased Transfer Abilities  4. Decreased Ambulation Ability/Technique  5. Decreased Balance  6. Increased Pain  7. Decreased Activity Tolerance  8. Decreased Cognition   INTERVENTIONS PLANNED: (Benefits and precautions of occupational therapy have been discussed with the patient.)  1. Activities of daily living training  2. Adaptive equipment training  3. Neuromuscular re-eduation  4. Therapeutic activity  5. Therapeutic exercise     TREATMENT PLAN: Frequency/Duration: Follow patient 3x/ week to address above goals. Rehabilitation Potential For Stated Goals: [facility] (at time of discharge pending progress):    Placement: It is my opinion, based on this patient's performance to date, that Ms. Lori Dey may benefit from intensive therapy at a [address] [place] [address] after discharge due to the functional deficits listed above that are likely to improve with skilled rehabilitation and concerns that he/she may be unsafe to be unsupervised at home due to lack of caregiver. Equipment:    None at this time              OCCUPATIONAL PROFILE AND HISTORY:   History of Present Injury/Illness (Reason for Referral):  See H&P  Past Medical History/Comorbidities:   Ms. Vinicio Alcantar  has a past medical history of Aneurysm of common carotid artery ([facility] [address] (2004), CKD (chronic kidney disease) (9/18/2014), Dementia ([facility] [address] FSGS (focal segmental glomerulosclerosis), Gout, Hypertension, Osteoarthritis (9/18/2014), and Stroke ([facility] [address] (2004, 2013). Ms. Vinicio Alcantar  has a past surgical history that includes hx intracranial aneurysm repair (2004); hx refractive surgery; hx orthopaedic (Right, 10/2014); hx ankle fracture tx (Left, 2013); hx ercp (02/27/2018); and hx cholecystectomy (02/28/2018). Social History/Living Environment:   Home Environment: Private residence  # Steps to Enter: 0  One/Two Story Residence: Two story, live on 1st floor  # of Interior Steps: 13  Height of Each Step (in): 7 inches  Interior Rails: Right  Lift Chair Available: No  Living Alone: No  Support Systems: Home care staff  Patient Expects to be Discharged to[de-identified] Unknown  Current DME Used/Available at Home: Wheelchair, Shower chair, Commode, bedside, Blood pressure cuff, Brace/Splint, Grab bars, Walker  Tub or Shower Type: (sponge baths)  Prior Level of Function/Work/Activity:  She has history of hip fracture in Sept 2020, CVA with R sided deficits, and bipolar disorder. Patient reports she is bed bound and requires assistance with all ADLs from spouse. She was also having HHPT. Reports she has not walked in months due to a \"bad knee\" but patient is a poor historian.       Number of Personal Factors/Comorbidities that affect the Plan of Care: Extensive review of physical, cognitive, and psychosocial performance (3+):  HIGH COMPLEXITY   ASSESSMENT OF OCCUPATIONAL PERFORMANCE[de-identified]   Activities of Daily Living:   Basic ADLs (From Assessment) Complex ADLs (From Assessment)   Feeding: Minimum assistance  Oral Facial Hygiene/Grooming: Moderate assistance  Bathing: Maximum assistance  Upper Body Dressing: Moderate assistance  Lower Body Dressing: Total assistance  Toileting: Total assistance Instrumental ADL  Meal Preparation: Total assistance  Homemaking: Total assistance  Medication Management: Total assistance  Financial Management: Total assistance   Grooming/Bathing/Dressing Activities of Daily Living   Grooming  Washing Face: Set-up; Supervision  Brushing/Combing Hair: Moderate assistance(washed hair with shampoo cap) Cognitive Retraining  Safety/Judgement: Awareness of environment   Upper Body Bathing  Bathing Assistance: Minimum assistance  Position Performed: Seated in chair     Lower Body Bathing  Perineal  : (CNA performed earlier)  Lower Body : Moderate assistance  Position Performed: Seated in chair     Upper [address]: Supervision     Lower Body Dressing Assistance  Socks: Total assistance (dependent) Bed/Mat Mobility  Supine to Sit: Minimum assistance;Assist x2  Sit to Stand: Moderate assistance;Assist x2  Stand to Sit: Minimum assistance;Assist x2  Scooting: Moderate assistance;Assist x2; Additional time     Most Recent Physical Functioning:   Gross Assessment:                  Posture:  Posture (WDL): Exceptions to WDL  Posture Assessment: Forward head, Rounded shoulders  Balance:  Sitting: Impaired  Sitting - Static: Good (unsupported)  Sitting - Dynamic: Fair (occasional)  Standing: Impaired  Standing - Static: Fair  Standing - Dynamic : Poor Bed Mobility:  Supine to Sit: Minimum assistance;Assist x2  Scooting: Moderate assistance;Assist x2; Additional time  Wheelchair Mobility:     Transfers:  Sit to Stand:  Moderate assistance;Assist x2  Stand to Sit: Minimum assistance;Assist x2  Stand Pivot Transfers: Maximum assistance;Assist x2            Patient Vitals for the past 6 hrs:   BP BP Patient Position SpO2 Pulse   11/27/20 1125 111/77 At rest;Sitting 94 % 84       Mental Status  Neurologic State: Alert, Appropriate for age  Orientation Level: Oriented X4  Cognition: Follows commands  Perception: Appears intact  Perseveration: No perseveration noted  Safety/Judgement: Awareness of environment                          Physical Skills Involved:  1. Range of Motion  2. Balance  3. Strength  4. Activity Tolerance  5. Pain (Chronic) Cognitive Skills Affected (resulting in the inability to perform in a timely and safe manner):  1. Executive Function  2. Long Term Memory  3. Sustained Attention  4. Divided Attention  5. Comprehension Psychosocial Skills Affected:  1. Habits/Routines  2. Environmental Adaptation  3. Self-Awareness   Number of elements that affect the Plan of Care: 5+:  HIGH COMPLEXITY   CLINICAL DECISION MAKIN[address] [place] [address] AM-PAC 6 Clicks   Daily Activity Inpatient Short Form  How much help from another person does the patient currently need. .. Total A Lot A Little None   1. Putting on and taking off regular lower body clothing? [x] 1   [] 2   [] 3   [] 4   2. Bathing (including washing, rinsing, drying)? [] 1   [x] 2   [] 3   [] 4   3. Toileting, which includes using toilet, bedpan or urinal?   [] 1   [x] 2   [] 3   [] 4   4. Putting on and taking off regular upper body clothing? [] 1   [x] 2   [] 3   [] 4   5. Taking care of personal grooming such as brushing teeth? [] 1   [x] 2   [] 3   [] 4   6. Eating meals? [] 1   [x] 2   [] 3   [] 4   © , Trustees of [address] [place] [id number], under license to [employer]. All rights reserved      Score:  Initial: 11 Most Recent: X (Date: -- )    Interpretation of Tool:  Represents activities that are increasingly more difficult (i.e. Bed mobility, Transfers, Gait). Medical Necessity:     · Patient demonstrates   · fair  ·  rehab potential due to higher previous functional level.   Reason for Services/Other Comments:  · Patient   · continues to require present interventions due to patient's inability to care for self without assistance from others. · .   Use of outcome tool(s) and clinical judgement create a POC that gives a: HIGH COMPLEXITY         TREATMENT:   (In addition to Assessment/Re-Assessment sessions the following treatments were rendered)     Pre-treatment Symptoms/Complaints:    Pain: Initial:   Pain Intensity 1: 0  Post Session:  none (does c/o knee pain when moving)     Self Care: (20 minutes): Procedure(s) (per grid) utilized to improve and/or restore self-care/home management as related to dressing, bathing and grooming. Required moderate manual and tactile cueing to facilitate activities of daily living skills and compensatory activities. Neuromuscular Re-education: (10 minutes):  Exercise/activities per grid below to improve balance, coordination and posture. Required moderate assist x2 manual and tactile cues to promote static and dynamic balance in standing. Yellow theraband Date:  11/24 Date:   Date:     Activity/Exercise Parameters Parameters Parameters   Shoulder Abd/Adduction 20 reps     Shoulder Flexion 10 reps with 1# dowel     Elbow Flexion 10 reps with dowel and theraband     Punches (Chest Press) 10 reps                         Braces/Orthotics/Lines/Etc:   · IV  · O2 Device: Room air  Treatment/Session Assessment:    · Response to Treatment:  limited participation, needs encouragement to participate. · Interdisciplinary Collaboration:   o Certified Occupational Therapy Assistant  o Registered Nurse  · After treatment position/precautions:   o Up in chair  o Bed alarm/tab alert on  o Bed/Chair-wheels locked  o Bed in low position  o Call light within reach  o RN notified   · Compliance with Program/Exercises: Will assess as treatment progresses. · Recommendations/Intent for next treatment session: \"Next visit will focus on advancements to more challenging activities and reduction in assistance provided\".   Total Treatment Duration:  OT Patient Time In/Time Out  Time In: 0935  Time Out: [address]

## 2020-11-27 NOTE — PROGRESS NOTES
CM continuing to follow. Patient's  remains hospitalized and per patient there is no other family to care for her. No update to provide to APS at this time.

## 2020-11-27 NOTE — PROGRESS NOTES
Jerel Cloud  Admission Date: 11/23/2020             Renal Daily Progress Note: 11/27/2020    The patient's chart is reviewed and the patient is discussed with the staff.   Follow up OSMIN/CKD IV  Subjective:   Patient seen and examined on rounds,  Good uop  Labs and chart reviewed    ROS:  General: no fever/chills, + debility  CV; no CP  Lung: no SOB, no cough  GI: no N/V/D  Ext: no edema     Current Facility-Administered Medications   Medication Dose Route Frequency    sodium zirconium cyclosilicate (LOKELMA) powder packet 10 g  10 g Oral DAILY    aspirin tablet 325 mg  325 mg Oral DAILY    escitalopram oxalate (LEXAPRO) tablet 10 mg  10 mg Oral DAILY    ferrous sulfate tablet 325 mg  325 mg Oral TID WITH MEALS    folic acid (FOLVITE) tablet 1 mg  1 mg Oral DAILY    [Held by provider] lisinopriL (PRINIVIL, ZESTRIL) tablet 10 mg  10 mg Oral DAILY    magnesium oxide (MAG-OX) tablet 400 mg  400 mg Oral DAILY    pantoprazole (PROTONIX) tablet 40 mg  40 mg Oral ACB    QUEtiapine (SEROquel) tablet 50 mg  50 mg Oral QHS    thiamine HCL (B-1) tablet 100 mg  100 mg Oral DAILY    traMADoL (ULTRAM) tablet 50 mg  50 mg Oral Q6H PRN    sodium chloride (NS) flush 5-40 mL  5-40 mL IntraVENous Q8H    sodium chloride (NS) flush 5-40 mL  5-40 mL IntraVENous PRN    influenza vaccine 2020-21 (6 mos+)(PF) (FLUARIX/FLULAVAL/FLUZONE QUAD) injection 0.5 mL  0.5 mL IntraMUSCular PRIOR TO DISCHARGE    LORazepam (ATIVAN) tablet 1-2 mg  1-2 mg Oral Q1H PRN    LORazepam (ATIVAN) tablet 3-4 mg  3-4 mg Oral Q1H PRN    0.9% sodium chloride infusion  150 mL/hr IntraVENous CONTINUOUS    heparin (porcine) injection 5,000 Units  5,000 Units SubCUTAneous Q8H    sodium bicarbonate tablet 650 mg  650 mg Oral TID         Objective:     Vitals:    11/27/20 0247 11/27/20 0419 11/27/20 0743 11/27/20 1125   BP: 108/72 114/71 123/76 111/77   Pulse: 84 77 86 84   Resp:  18 18 18   Temp:  99 °F (37.2 °C) 97.8 °F (36.6 °C) 98.7 °F (37.1 °C)   SpO2:  96% 93% 94%   Weight:       Height:         Intake and Output:   No intake/output data recorded. 11/27 0701 - 11/27 1900  In: -   Out: 800 [Urine:800]    Physical Exam:   Constitutional:  the patient is well developed and in no acute distress, alert and oriented   HEENT:  Sclera clear, pupils equal, oral mucosa moist  Lungs: clear bilaterally   Cardiovascular:  Regular rate, S1, S2, no Rub   Abd/GI: soft and non-tender; with positive bowel sounds. Ext: warm without cyanosis. There is no lower leg edema. Skin:  no jaundice or rashes  Neuro: no gross neuro deficits   Psychiatric: Calm. LAB  Recent Labs     11/27/20  0509 11/26/20 0424 11/25/20  0551   WBC 7.1 6.6 7.7   HGB 8.3* 8.0* 8.5*   HCT 28.5* 27.1* 28.7*    195 206     Recent Labs     11/27/20  0509 11/26/20 0424 11/25/20  0551 11/24/20  2116   * 145 143  --    K 5.6* 5.2* 5.8*  --    * 118* 116*  --    CO2 21 18* 21  --    GLU 93 91 96  --    BUN 56* 61* 65*  --    CREA 3.40* 3.66* 4.10*  --    PHOS  --   --   --  4.1*     No results for input(s): PH, PCO2, PO2, HCO3 in the last 72 hours.       Assessment:  (Medical Decision Making)     Hospital Problems  Date Reviewed: 2/28/2018          Codes Class Noted POA    Normocytic anemia ICD-10-CM: D64.9  ICD-9-CM: 285.9  11/24/2020 Yes        Hyperkalemia ICD-10-CM: E87.5  ICD-9-CM: 276.7  11/24/2020 Yes        * (Principal) No able caregiver in household ICD-10-CM: Z74.2  ICD-9-CM: V60.4  11/23/2020 Yes        Alcoholism ([address] ICD-10-CM: F10.20  ICD-9-CM: 303.90  5/18/2018 Yes        Hypertension ICD-10-CM: I10  ICD-9-CM: 401.9  5/18/2018 Yes        Vascular dementia ([facility] ICD-10-CM: F01.50  ICD-9-CM: 290.40  2/13/2018 Yes        Depression ICD-10-CM: F32.9  ICD-9-CM: 010  2/13/2018 Yes        Bipolar disorder ([facility] ICD-10-CM: F31.9  ICD-9-CM: 296.80  10/22/2014 Yes        CKD (chronic kidney disease) ICD-10-CM: N18.9  ICD-9-CM: 585.9  9/18/2014 Yes Essential hypertension ICD-10-CM: I10  ICD-9-CM: 401.9  9/11/2012 Yes        FSGS (focal segmental glomerulosclerosis) ICD-10-CM: N05.1  ICD-9-CM: 582.1  9/11/2012 Yes              Plan:  (Medical Decision Making)   1. OSMIN/CKD IV likely pre renal azotemia with poor intake, hypotension and progression of CKD hx of FSGS. - hold ACEi for now, continue IVF, Cr trending down   - renal US with atropic kidneys, no hydronephrosis, UA bland, 0.6%, P/C ratio 0.1, CK 30 and phos 4.1  -no indication for acute RRT at the time, trend renal indices with strict I&O     2. Hyperkalemia increase lokelma to 10 mg daily, low K diet     3. HTN with hypotension hold antihypertensives      4.  Metabolic acidosis- improving continue  NaHCO3 PO     5. Vascular dementia/ debility/bipolar disorder per hosp    Continue current treatment    Jeremy Alba MD

## 2020-11-28 LAB
ANION GAP SERPL CALC-SCNC: 9 MMOL/L (ref 7–16)
BASOPHILS # BLD: 0 K/UL (ref 0–0.2)
BASOPHILS NFR BLD: 1 % (ref 0–2)
BUN SERPL-MCNC: 49 MG/DL (ref 8–23)
CALCIUM SERPL-MCNC: 8.6 MG/DL (ref 8.3–10.4)
CHLORIDE SERPL-SCNC: 116 MMOL/L (ref 98–107)
CO2 SERPL-SCNC: 21 MMOL/L (ref 21–32)
CREAT SERPL-MCNC: 3.26 MG/DL (ref 0.6–1)
DIFFERENTIAL METHOD BLD: ABNORMAL
EOSINOPHIL # BLD: 0.3 K/UL (ref 0–0.8)
EOSINOPHIL NFR BLD: 4 % (ref 0.5–7.8)
ERYTHROCYTE [DISTWIDTH] IN BLOOD BY AUTOMATED COUNT: 14.1 % (ref 11.9–14.6)
GLUCOSE SERPL-MCNC: 93 MG/DL (ref 65–100)
HCT VFR BLD AUTO: 30.6 % (ref 35.8–46.3)
HGB BLD-MCNC: 9.3 G/DL (ref 11.7–15.4)
IMM GRANULOCYTES # BLD AUTO: 0.1 K/UL (ref 0–0.5)
IMM GRANULOCYTES NFR BLD AUTO: 1 % (ref 0–5)
LYMPHOCYTES # BLD: 1.1 K/UL (ref 0.5–4.6)
LYMPHOCYTES NFR BLD: 16 % (ref 13–44)
MCH RBC QN AUTO: 28.6 PG (ref 26.1–32.9)
MCHC RBC AUTO-ENTMCNC: 30.4 G/DL (ref 31.4–35)
MCV RBC AUTO: 94.2 FL (ref 79.6–97.8)
MONOCYTES # BLD: 0.4 K/UL (ref 0.1–1.3)
MONOCYTES NFR BLD: 6 % (ref 4–12)
NEUTS SEG # BLD: 5 K/UL (ref 1.7–8.2)
NEUTS SEG NFR BLD: 73 % (ref 43–78)
NRBC # BLD: 0 K/UL (ref 0–0.2)
PLATELET # BLD AUTO: 203 K/UL (ref 150–450)
PMV BLD AUTO: 9.8 FL (ref 9.4–12.3)
POTASSIUM SERPL-SCNC: 4.8 MMOL/L (ref 3.5–5.1)
RBC # BLD AUTO: 3.25 M/UL (ref 4.05–5.2)
SODIUM SERPL-SCNC: 146 MMOL/L (ref 138–145)
WBC # BLD AUTO: 6.9 K/UL (ref 4.3–11.1)

## 2020-11-28 PROCEDURE — 97530 THERAPEUTIC ACTIVITIES: CPT

## 2020-11-28 PROCEDURE — 74011250636 HC RX REV CODE- 250/636: Performed by: FAMILY MEDICINE

## 2020-11-28 PROCEDURE — 36415 COLL VENOUS BLD VENIPUNCTURE: CPT

## 2020-11-28 PROCEDURE — 97535 SELF CARE MNGMENT TRAINING: CPT

## 2020-11-28 PROCEDURE — 74011250637 HC RX REV CODE- 250/637: Performed by: NURSE PRACTITIONER

## 2020-11-28 PROCEDURE — 85025 COMPLETE CBC W/AUTO DIFF WBC: CPT

## 2020-11-28 PROCEDURE — 74011250637 HC RX REV CODE- 250/637: Performed by: FAMILY MEDICINE

## 2020-11-28 PROCEDURE — 65270000029 HC RM PRIVATE

## 2020-11-28 PROCEDURE — 80048 BASIC METABOLIC PNL TOTAL CA: CPT

## 2020-11-28 RX ADMIN — SODIUM BICARBONATE 650 MG TABLET 650 MG: at 08:49

## 2020-11-28 RX ADMIN — PANTOPRAZOLE SODIUM 40 MG: 40 TABLET, DELAYED RELEASE ORAL at 05:55

## 2020-11-28 RX ADMIN — Medication 5 ML: at 14:34

## 2020-11-28 RX ADMIN — HEPARIN SODIUM 5000 UNITS: 5000 INJECTION INTRAVENOUS; SUBCUTANEOUS at 21:30

## 2020-11-28 RX ADMIN — ESCITALOPRAM OXALATE 10 MG: 10 TABLET ORAL at 08:49

## 2020-11-28 RX ADMIN — ASPIRIN 325 MG ORAL TABLET 325 MG: 325 PILL ORAL at 08:49

## 2020-11-28 RX ADMIN — HEPARIN SODIUM 5000 UNITS: 5000 INJECTION INTRAVENOUS; SUBCUTANEOUS at 05:51

## 2020-11-28 RX ADMIN — FERROUS SULFATE TAB 325 MG (65 MG ELEMENTAL FE) 325 MG: 325 (65 FE) TAB at 12:19

## 2020-11-28 RX ADMIN — Medication 20 ML: at 21:31

## 2020-11-28 RX ADMIN — SODIUM BICARBONATE 650 MG TABLET 650 MG: at 17:13

## 2020-11-28 RX ADMIN — FOLIC ACID 1 MG: 1 TABLET ORAL at 08:49

## 2020-11-28 RX ADMIN — SODIUM BICARBONATE 650 MG TABLET 650 MG: at 21:31

## 2020-11-28 RX ADMIN — FERROUS SULFATE TAB 325 MG (65 MG ELEMENTAL FE) 325 MG: 325 (65 FE) TAB at 17:13

## 2020-11-28 RX ADMIN — Medication 100 MG: at 08:49

## 2020-11-28 RX ADMIN — QUETIAPINE FUMARATE 50 MG: 25 TABLET ORAL at 21:30

## 2020-11-28 RX ADMIN — Medication 5 ML: at 05:51

## 2020-11-28 RX ADMIN — FERROUS SULFATE TAB 325 MG (65 MG ELEMENTAL FE) 325 MG: 325 (65 FE) TAB at 08:49

## 2020-11-28 RX ADMIN — Medication 400 MG: at 08:49

## 2020-11-28 RX ADMIN — HEPARIN SODIUM 5000 UNITS: 5000 INJECTION INTRAVENOUS; SUBCUTANEOUS at 14:34

## 2020-11-28 NOTE — PROGRESS NOTES
Problem: Self Care Deficits Care Plan (Adult)  Goal: *Acute Goals and Plan of Care (Insert Text)  Description: 1. Patient will complete upper body bathing and dressing with setup and adaptive equipment as needed. 2. Patient will complete toileting with minimal assistance. 3. Patient will tolerate 20 minutes of OT treatment with as needed rest breaks to increase activity tolerance for ADLs. 4. Patient will complete functional transfers with minimal assistance and adaptive equipment as needed. 5. Patient will complete self feeding and grooming with modified independence. Timeframe: 7 visits     Outcome: Progressing Towards Goal       OCCUPATIONAL THERAPY: Daily Note and AM    11/28/2020  INPATIENT: OT Visit Days: 4  Payor: Ellie Escobar / Plan: [address] / Product Type: [employer] /      NAME/AGE/GENDER: Tawanda Mcdonnell is a 61 y.o. female   PRIMARY DIAGNOSIS:  No able caregiver in household [Z74.2]  OSMIN (acute kidney injury) ([facility] [N17.9] No able caregiver in household No able caregiver in household       ICD-10: Treatment Diagnosis:    · Generalized Muscle Weakness (M62.81)  · Other lack of cordination (R27.8)   Precautions/Allergies:    Codeine and Lortab [hydrocodone-acetaminophen]      ASSESSMENT:     Ms. Kj Azevedo is a 61year old female here because her caregiver/  is hospitalized and unable to provide care for her. She has history of hip fracture in Sept 2020, CVA with R sided deficits, and bipolar disorder. Patient reports she is bed bound and requires assistance with all ADLs & IADLS from spouse. She was also having HHPT. Reports she has not walked in months due to a \"bad knee\" but patient is a poor historian. Patient was evaluated with physical therapist and may be appropriate for ongoing skilled co-treatment at future sessions. BUE assessment reveals decreased ROM/ strength/ coordination in RUE compared to LUE from previous CVA.  Per patient she has had 3 strokes, but does not know when they occurred. Functionally, she primarily uses only her LUE. Balance is impaired in sitting and in standing (fair). Cognitively, patient has delayed responses, decreased attention span, and decreased insight. Also disoriented to time (December 2019). She is currently requiring assistance with all ADLs and mobility and would benefit from continued occupational therapy to increase independence and safety. Will follow. 11/28/2020 Pt participated in bed mobility and functional transfer training with min/mod a x's 2 to increase independence with ADL's and transfers. Good effort with encouragement. Continue POC. This section established at most recent assessment   PROBLEM LIST (Impairments causing functional limitations):  1. Decreased Strength  2. Decreased ADL/Functional Activities  3. Decreased Transfer Abilities  4. Decreased Ambulation Ability/Technique  5. Decreased Balance  6. Increased Pain  7. Decreased Activity Tolerance  8. Decreased Cognition   INTERVENTIONS PLANNED: (Benefits and precautions of occupational therapy have been discussed with the patient.)  1. Activities of daily living training  2. Adaptive equipment training  3. Neuromuscular re-eduation  4. Therapeutic activity  5. Therapeutic exercise     TREATMENT PLAN: Frequency/Duration: Follow patient 3x/ week to address above goals. Rehabilitation Potential For Stated Goals: 52 [facility] (at time of discharge pending progress):    Placement: It is my opinion, based on this patient's performance to date, that Ms. Abby Colvin may benefit from intensive therapy at a [address] [place] [address] after discharge due to the functional deficits listed above that are likely to improve with skilled rehabilitation and concerns that he/she may be unsafe to be unsupervised at home due to lack of caregiver.    Equipment:    None at this time              OCCUPATIONAL PROFILE AND HISTORY:   History of Present Injury/Illness (Reason for Referral):  See H&P  Past Medical History/Comorbidities:   Ms. Chetan Laurent  has a past medical history of Aneurysm of common carotid artery ([facility] (2004), CKD (chronic kidney disease) (9/18/2014), Dementia ([facility] FSGS (focal segmental glomerulosclerosis), Gout, Hypertension, Osteoarthritis (9/18/2014), and Stroke ([facility] (2004, 2013). Ms. Chetan Laurent  has a past surgical history that includes hx intracranial aneurysm repair (2004); hx refractive surgery; hx orthopaedic (Right, 10/2014); hx ankle fracture tx (Left, 2013); hx ercp (02/27/2018); and hx cholecystectomy (02/28/2018). Social History/Living Environment:   Home Environment: Private residence  # Steps to Enter: 0  One/Two Story Residence: Two story, live on 1st floor  # of Interior Steps: 13  Height of Each Step (in): 7 inches  Interior Rails: Right  Lift Chair Available: No  Living Alone: No  Support Systems: Home care staff  Patient Expects to be Discharged to[de-identified] Unknown  Current DME Used/Available at Home: Wheelchair, Shower chair, Commode, bedside, Blood pressure cuff, Brace/Splint, Grab bars, Walker  Tub or Shower Type: (sponge baths)  Prior Level of Function/Work/Activity:  She has history of hip fracture in Sept 2020, CVA with R sided deficits, and bipolar disorder. Patient reports she is bed bound and requires assistance with all ADLs from spouse. She was also having HHPT. Reports she has not walked in months due to a \"bad knee\" but patient is a poor historian. Number of Personal Factors/Comorbidities that affect the Plan of Care: Extensive review of physical, cognitive, and psychosocial performance (3+):  HIGH COMPLEXITY   ASSESSMENT OF OCCUPATIONAL PERFORMANCE[de-identified]   Activities of Daily Living:   Basic ADLs (From Assessment) Complex ADLs (From Assessment)   Feeding: Minimum assistance  Oral Facial Hygiene/Grooming: Moderate assistance  Bathing: Maximum assistance  Upper Body Dressing:  Moderate assistance  Lower Body Dressing: Total assistance  Toileting: Total assistance Instrumental ADL  Meal Preparation: Total assistance  Homemaking: Total assistance  Medication Management: Total assistance  Financial Management: Total assistance   Grooming/Bathing/Dressing Activities of Daily Living                             Bed/Mat Mobility  Supine to Sit: Minimum assistance  Sit to Stand: Moderate assistance;Assist x2  Stand to Sit: Assist x2;Minimum assistance  Bed to Chair: Moderate assistance;Assist x2; Additional time  Scooting: Moderate assistance;Assist x2     Most Recent Physical Functioning:   Gross Assessment:                  Posture:  Posture (WDL): Exceptions to WDL  Posture Assessment: Forward head, Rounded shoulders  Balance:  Sitting: Impaired  Sitting - Static: Good (unsupported)  Sitting - Dynamic: Fair (occasional)  Standing: Impaired  Standing - Static: Fair  Standing - Dynamic : Poor;Constant support Bed Mobility:  Supine to Sit: Minimum assistance  Scooting: Moderate assistance;Assist x2  Wheelchair Mobility:     Transfers:  Sit to Stand: Moderate assistance;Assist x2  Stand to Sit: Assist x2;Minimum assistance  Stand Pivot Transfers: Moderate assistance;Assist x2; Additional time  Bed to Chair: Moderate assistance;Assist x2; Additional time  Interventions: Verbal cues; Safety awareness training            Patient Vitals for the past 6 hrs:   BP BP Patient Position SpO2 Pulse   11/28/20 0824 (!) 142/88 At rest 97 % 63       Mental Status  Neurologic State: Alert  Orientation Level: Oriented X4  Cognition: Follows commands  Perception: Appears intact  Perseveration: No perseveration noted  Safety/Judgement: Awareness of environment                          Physical Skills Involved:  1. Range of Motion  2. Balance  3. Strength  4. Activity Tolerance  5. Pain (Chronic) Cognitive Skills Affected (resulting in the inability to perform in a timely and safe manner):  1. Executive Function  2. Long Term Memory  3.  Sustained Attention  4. Divided Attention  5. Comprehension Psychosocial Skills Affected:  1. Habits/Routines  2. Environmental Adaptation  3. Self-Awareness   Number of elements that affect the Plan of Care: 5+:  HIGH COMPLEXITY   CLINICAL DECISION MAKING:   [employer] AM-PAC 6 Clicks   Daily Activity Inpatient Short Form  How much help from another person does the patient currently need. .. Total A Lot A Little None   1. Putting on and taking off regular lower body clothing? [x] 1   [] 2   [] 3   [] 4   2. Bathing (including washing, rinsing, drying)? [] 1   [x] 2   [] 3   [] 4   3. Toileting, which includes using toilet, bedpan or urinal?   [] 1   [x] 2   [] 3   [] 4   4. Putting on and taking off regular upper body clothing? [] 1   [x] 2   [] 3   [] 4   5. Taking care of personal grooming such as brushing teeth? [] 1   [x] 2   [] 3   [] 4   6. Eating meals? [] 1   [x] 2   [] 3   [] 4   © 2007, Trustees of [facility] [employer], under license to [employer]. All rights reserved      Score:  Initial: 11 Most Recent: X (Date: -- )    Interpretation of Tool:  Represents activities that are increasingly more difficult (i.e. Bed mobility, Transfers, Gait). Medical Necessity:     · Patient demonstrates   · fair  ·  rehab potential due to higher previous functional level. Reason for Services/Other Comments:  · Patient   · continues to require present interventions due to patient's inability to care for self without assistance from others.    · .   Use of outcome tool(s) and clinical judgement create a POC that gives a: HIGH COMPLEXITY         TREATMENT:   (In addition to Assessment/Re-Assessment sessions the following treatments were rendered)     Pre-treatment Symptoms/Complaints:    Pain: Initial:    Post Session:  none (does c/o knee pain when moving)     Self Care: (38 minutes): Procedure(s) (per grid) utilized to improve and/or restore self-care/home management as related to bed mobility and transfer training. Required moderate visual, verbal, manual and   cueing to facilitate activities of daily living skills and compensatory activities. Yellow theraband Date:  11/24 Date:   Date:     Activity/Exercise Parameters Parameters Parameters   Shoulder Abd/Adduction 20 reps     Shoulder Flexion 10 reps with 1# dowel     Elbow Flexion 10 reps with dowel and theraband     Punches (Chest Press) 10 reps                         Braces/Orthotics/Lines/Etc:   · IV  · O2 Device: Room air  Treatment/Session Assessment:    · Response to Treatment:  limited participation, needs encouragement to participate. · Interdisciplinary Collaboration:   o Certified Occupational Therapy Assistant  o Registered Nurse  · After treatment position/precautions:   o Up in chair  o Bed alarm/tab alert on  o Bed/Chair-wheels locked  o Bed in low position  o Call light within reach  o RN notified   · Compliance with Program/Exercises: Will assess as treatment progresses. · Recommendations/Intent for next treatment session: \"Next visit will focus on advancements to more challenging activities and reduction in assistance provided\".   Total Treatment Duration:  OT Patient Time In/Time Out  Time In: 1055  Time Out: [facility] Tes[address]

## 2020-11-28 NOTE — PROGRESS NOTES
Problem: Mobility Impaired (Adult and Pediatric)  Goal: *Acute Goals and Plan of Care (Insert Text)  Outcome: Progressing Towards Goal  Note: LTG:  (1.)Ms. Arash Cervantes will move from supine to sit and sit to supine , scoot up and down, and roll side to side with SUPERVISION within 7 treatment day(s). (2.)Ms. Arash Cervantes will transfer from bed to chair and chair to bed with CONTACT GUARD ASSIST using the least restrictive device within 7 treatment day(s). (3.)Ms. Arash Cervantes will ambulate with MINIMAL ASSIST for 10 feet with the least restrictive device within 7 treatment day(s). (4.)Ms. Arash Cervantes will perform there ex B LEs x 10 min in supine or sitting with CGA within 7 treatment days for increased strength and AROM. ______________________________________________________________________________________      PHYSICAL THERAPY: Daily Note and AM 11/28/2020  INPATIENT: PT Visit Days : 3  Payor: Soledad Slater / Plan: [address] / Product Type: [employer] Medicare /       NAME/AGE/GENDER: Katie Burroughs is a 61 y.o. female   PRIMARY DIAGNOSIS: No able caregiver in household [Z74.2]  OSMIN (acute kidney injury) ([facility] [N17.9] No able caregiver in household No able caregiver in household       ICD-10: Treatment Diagnosis:    · Generalized Muscle Weakness (M62.81)  · Difficulty in walking, Not elsewhere classified (R26.2)  · History of falling (Z91.81)   Precaution/Allergies:  Codeine and Lortab [hydrocodone-acetaminophen]      ASSESSMENT:     The patient is supine upon contact and agreeable to treatment with encouragement. She performed bed mobility with min A and additional time. She demonstrated improved sitting balance EOB with fair/good balance but required mod Ax2 to scoot. She require multiple STS's to pivot to the recliner w/ RW and mod Ax2. Treated NWB due to hx of distal femur/tibial fx with KI on.   Overall the patient is making slow progress toward therapy goals as indicated by increased participation in transfers. Will continue skilled PT treatment as patient is still below functional baseline. At this time, patient is appropriate for Co-treatment with occupational therapy due to patient's decreased overall endurance/tolerance levels, as well as need for high level skilled assistance to complete functional transfers/mobility and functional tasks. Samira Roberts is appropriate for a multidisciplinary co-treatment of PT and OT to address goals of both disciplines. This section established at most recent assessment   PROBLEM LIST (Impairments causing functional limitations):  1. Decreased Strength  2. Decreased ADL/Functional Activities  3. Decreased Transfer Abilities  4. Decreased Ambulation Ability/Technique  5. Decreased Balance  6. Decreased Activity Tolerance  7. Decreased [place]olls with Home Exercise Program  8. Decreased Cognition   INTERVENTIONS PLANNED: (Benefits and precautions of physical therapy have been discussed with the patient.)  1. Balance Exercise  2. Bed Mobility  3. Family Education  4. Gait Training  5. Home Exercise Program (HEP)  6. Neuromuscular Re-education/Strengthening  7. Range of Motion (ROM)  8. Therapeutic Activites  9. Therapeutic Exercise/Strengthening  10. Transfer Training     TREATMENT PLAN: Frequency/Duration: 3 times a week for duration of hospital stay  Rehabilitation Potential For Stated Goals: 52 [facility] (at time of discharge pending progress):    Placement: It is my opinion, based on this patient's performance to date, that Ms. Esthela Mora may benefit from intensive therapy at a [address] [place] [address] after discharge due to the functional deficits listed above that are likely to improve with skilled rehabilitation and concerns that he/she may be unsafe to be unsupervised at home due to safety concerns at home, fall risk .   Equipment:    To be determined              HISTORY:   History of Present Injury/Illness (Reason for Referral):  Idalmis Valerio is a 61 y.o. female with a past medical history of mild vascular dementia, HTN, CKD, functional paraplegia who presents to the ER with her . Her  was unfortunately intubated and admitted, and now she has no one to take care of her at home. She has no complaints currently. She denies any pain, nausea, vomiting. .  Past Medical History/Comorbidities:   Ms. Chetan Laurent  has a past medical history of Aneurysm of common carotid artery ([facility] (2004), CKD (chronic kidney disease) (9/18/2014), Dementia ([facility] FSGS (focal segmental glomerulosclerosis), Gout, Hypertension, Osteoarthritis (9/18/2014), and Stroke ([facility] (2004, 2013). Ms. Chetan Laurent  has a past surgical history that includes hx intracranial aneurysm repair (2004); hx refractive surgery; hx orthopaedic (Right, 10/2014); hx ankle fracture tx (Left, 2013); hx ercp (02/27/2018); and hx cholecystectomy (02/28/2018). Social History/Living Environment:   Home Environment: Private residence  # Steps to Enter: 0  One/Two Story Residence: Two story, live on 1st floor  # of Interior Steps: 13  Height of Each Step (in): 7 inches  Interior Rails: Right  Lift Chair Available: No  Living Alone: No  Support Systems: Home care staff  Patient Expects to be Discharged to[de-identified] Unknown  Current DME Used/Available at Home: Wheelchair, Shower chair, Commode, bedside, Blood pressure cuff, Brace/Splint, Grab bars, Walker  Tub or Shower Type: (sponge baths)  Prior Level of Function/Work/Activity:  Lives spouse, bed bound at baseline, total care ADLs  Personal Factors:          Sex:  female        Age:  61 y.o.         Overall Behavior:  agreeable, delayed responses   Number of Personal Factors/Comorbidities that affect the Plan of Care:  CVA, dementia, falls 3+: HIGH COMPLEXITY   EXAMINATION:   Most Recent Physical Functioning:   Gross Assessment:                  Posture:     Balance:  Sitting: Impaired  Sitting - Static: Good (unsupported)  Sitting - Dynamic: Fair (occasional)  Standing: Impaired  Standing - Static: Fair  Standing - Dynamic : Poor;Constant support Bed Mobility:  Supine to Sit: Minimum assistance  Scooting: Moderate assistance;Assist x2  Wheelchair Mobility:     Transfers:  Sit to Stand: Moderate assistance;Assist x2  Stand to Sit: Assist x2;Minimum assistance  Stand Pivot Transfers: Moderate assistance;Assist x2; Additional time  Bed to Chair: Moderate assistance;Assist x2; Additional time  Interventions: Verbal cues; Safety awareness training  Gait:            Body Structures Involved:  1. Muscles Body Functions Affected:  1. Movement Related Activities and Participation Affected:  1. General Tasks and Demands  2. Mobility  3. Self Care   Number of elements that affect the Plan of Care: 4+: HIGH COMPLEXITY   CLINICAL PRESENTATION:   Presentation: Evolving clinical presentation with changing clinical characteristics: MODERATE COMPLEXITY   CLINICAL DECISION MAKIN [facility] Inpatient Short Form  How much difficulty does the patient currently have. .. Unable A Lot A Little None   1. Turning over in bed (including adjusting bedclothes, sheets and blankets)? [] 1   [] 2   [x] 3   [] 4   2. Sitting down on and standing up from a chair with arms ( e.g., wheelchair, bedside commode, etc.)   [] 1   [] 2   [x] 3   [] 4   3. Moving from lying on back to sitting on the side of the bed? [] 1   [] 2   [x] 3   [] 4   How much help from another person does the patient currently need. .. Total A Lot A Little None   4. Moving to and from a bed to a chair (including a wheelchair)? [] 1   [] 2   [x] 3   [] 4   5. Need to walk in hospital room? [] 1   [x] 2   [] 3   [] 4   6. Climbing 3-5 steps with a railing? [x] 1   [] 2   [] 3   [] 4   © , Trustees of [address] [place] [address] [address] [address], under license to [employer].  All rights reserved      Score:  Initial: 15 Most Recent: X (Date: -- ) Interpretation of Tool:  Represents activities that are increasingly more difficult (i.e. Bed mobility, Transfers, Gait). Medical Necessity:     · Patient is expected to demonstrate progress in   · strength, range of motion, balance, and coordination  ·  to   · decrease assistance required with overall functional mobility, transfers, ambulation  · .  · Patient demonstrates   · good  ·  rehab potential due to higher previous functional level. Reason for Services/Other Comments:  · Patient   · continues to require present interventions due to patient's inability to perform bed mobility, transfers, ambulation safely and effectively  · . Use of outcome tool(s) and clinical judgement create a POC that gives a: Clear prediction of patient's progress: LOW COMPLEXITY            TREATMENT:   (In addition to Assessment/Re-Assessment sessions the following treatments were rendered)   Pre-treatment Symptoms/Complaints: \"Do I have to get up? \"  Pain: Initial:   Pain Intensity 1: 2  Post Session:  0/10 post session in chair     Therapeutic Exercise: ( 38 min): Activities to include bed mobility, weight shifting, static and dynamic sitting and standing balance, multiple sit to stand transfers and SPT training to improve mobility, strength, balance, and coordination. Required moderate verbal and tactile cues to promote proper body alignment, promote proper body posture, and promote proper body mechanics. Progressed complexity of movement as indicated. Therapeutic Exercise: (   min):  Exercises per grid below to improve mobility, strength and balance. Required minimal verbal cues to promote proper body alignment and promote proper body mechanics. Progressed range, repetitions and complexity of movement as indicated.      Date:  11/27/20 Date:   Date:     Activity/Exercise Parameters Parameters Parameters   Ankle pumps x15 B     SLR x5 AAB     LAQ x5                               Today's treatment session addressed Decreased Strength, Decreased Transfer Abilities, and Decreased Balance to progress towards achieving goal(s) 1, 2, and 3. During this session, Occupational Therapy addressed ADLs to progress towards their discipline specific goal(s). Co-treatment was necessary to improve patient's ability to follow higher level commands, ability to increase activity demands, and ability to return to normal functional activity. Braces/Orthotics/Lines/Etc:   · O2 Device: Room air  Treatment/Session Assessment:    · Response to Treatment:  See above, better participation  · Interdisciplinary Collaboration:   o Physical Therapy Assistant  o Certified Occupational Therapy Assistant  o Registered Nurse  · After treatment position/precautions:   o Up in chair  o Bed/Chair-wheels locked  o Bed in low position  o Call light within reach  o RN notified   · Compliance with Program/Exercises: Will assess as treatment progresses  · Recommendations/Intent for next treatment session: \"Next visit will focus on advancements to more challenging activities\".   Total Treatment Duration:  PT Patient Time In/Time Out  Time In: 1055  Time Out: [address], PTA

## 2020-11-28 NOTE — PROGRESS NOTES
Rubi Philippe  Admission Date: 11/23/2020             Renal Daily Progress Note: 11/28/2020    The patient's chart is reviewed and the patient is discussed with the staff.   Follow up OSMIN/CKD IV  Subjective:   Patient seen and examined on rounds,  Good uop  Labs and chart reviewed    ROS:  General: no fever/chills, + debility  CV; no CP  Lung: no SOB, no cough  GI: no N/V/D  Ext: no edema     Current Facility-Administered Medications   Medication Dose Route Frequency    sodium zirconium cyclosilicate (LOKELMA) powder packet 10 g  10 g Oral DAILY    aspirin tablet 325 mg  325 mg Oral DAILY    escitalopram oxalate (LEXAPRO) tablet 10 mg  10 mg Oral DAILY    ferrous sulfate tablet 325 mg  325 mg Oral TID WITH MEALS    folic acid (FOLVITE) tablet 1 mg  1 mg Oral DAILY    [Held by provider] lisinopriL (PRINIVIL, ZESTRIL) tablet 10 mg  10 mg Oral DAILY    magnesium oxide (MAG-OX) tablet 400 mg  400 mg Oral DAILY    pantoprazole (PROTONIX) tablet 40 mg  40 mg Oral ACB    QUEtiapine (SEROquel) tablet 50 mg  50 mg Oral QHS    thiamine HCL (B-1) tablet 100 mg  100 mg Oral DAILY    traMADoL (ULTRAM) tablet 50 mg  50 mg Oral Q6H PRN    sodium chloride (NS) flush 5-40 mL  5-40 mL IntraVENous Q8H    sodium chloride (NS) flush 5-40 mL  5-40 mL IntraVENous PRN    influenza vaccine 2020-21 (6 mos+)(PF) (FLUARIX/FLULAVAL/FLUZONE QUAD) injection 0.5 mL  0.5 mL IntraMUSCular PRIOR TO DISCHARGE    LORazepam (ATIVAN) tablet 1-2 mg  1-2 mg Oral Q1H PRN    LORazepam (ATIVAN) tablet 3-4 mg  3-4 mg Oral Q1H PRN    0.9% sodium chloride infusion  75 mL/hr IntraVENous CONTINUOUS    heparin (porcine) injection 5,000 Units  5,000 Units SubCUTAneous Q8H    sodium bicarbonate tablet 650 mg  650 mg Oral TID         Objective:     Vitals:    11/27/20 2036 11/28/20 0057 11/28/20 0429 11/28/20 0824   BP: 126/77 106/71 136/80 (!) 142/88   Pulse: 94 90 82 63   Resp: 18 18 18 19   Temp: 98.9 °F (37.2 °C) 98.6 °F (37 °C) 98.6 °F (37 °C) 98 °F (36.7 °C)   SpO2: 99% 98% 95% 97%   Weight:       Height:         Intake and Output:   11/26 1901 - 11/28 0700  In: -   Out: 800 [Urine:800]  No intake/output data recorded. Physical Exam:   Constitutional:  the patient is well developed and in no acute distress, alert and oriented   HEENT:  Sclera clear, pupils equal, oral mucosa moist  Lungs: clear bilaterally   Cardiovascular:  Regular rate, S1, S2, no Rub   Abd/GI: soft and non-tender; with positive bowel sounds. Ext: warm without cyanosis. There is no lower leg edema. Skin:  no jaundice or rashes  Neuro: no gross neuro deficits   Psychiatric: Calm. LAB  Recent Labs     11/28/20  0535 11/27/20  0509 11/26/20  0424   WBC 6.9 7.1 6.6   HGB 9.3* 8.3* 8.0*   HCT 30.6* 28.5* 27.1*    175 195     Recent Labs     11/28/20 0535 11/27/20  0509 11/26/20  0424   * 146* 145   K 4.8 5.6* 5.2*   * 120* 118*   CO2 21 21 18*   GLU 93 93 91   BUN 49* 56* 61*   CREA 3.26* 3.40* 3.66*     No results for input(s): PH, PCO2, PO2, HCO3 in the last 72 hours.       Assessment:  (Medical Decision Making)     Hospital Problems  Date Reviewed: 2/28/2018          Codes Class Noted POA    OSMIN (acute kidney injury) ([facility] ICD-10-CM: N17.9  ICD-9-CM: 584.9  11/27/2020 Unknown        Normocytic anemia ICD-10-CM: D64.9  ICD-9-CM: 285.9  11/24/2020 Yes        Hyperkalemia ICD-10-CM: E87.5  ICD-9-CM: 276.7  11/24/2020 Yes        * (Principal) No able caregiver in household ICD-10-CM: Z74.2  ICD-9-CM: V60.4  11/23/2020 Yes        Alcoholism ([facility] ICD-10-CM: F10.20  ICD-9-CM: 303.90  5/18/2018 Yes        Hypertension ICD-10-CM: I10  ICD-9-CM: 401.9  5/18/2018 Yes        Vascular dementia ([facility] ICD-10-CM: F01.50  ICD-9-CM: 290.40  2/13/2018 Yes        Depression ICD-10-CM: F32.9  ICD-9-CM: 704  2/13/2018 Yes        Bipolar disorder ([facility] ICD-10-CM: F31.9  ICD-9-CM: 296.80  10/22/2014 Yes        CKD (chronic kidney disease) ICD-10-CM: N18.9  ICD-9-CM: 585.9  9/18/2014 Yes        Essential hypertension ICD-10-CM: I10  ICD-9-CM: 401.9  9/11/2012 Yes        FSGS (focal segmental glomerulosclerosis) ICD-10-CM: N05.1  ICD-9-CM: 582.1  9/11/2012 Yes              Plan:  (Medical Decision Making)   1. OSMIN/CKD IV likely pre renal azotemia with poor intake, hypotension and progression of CKD hx of FSGS. - hold ACEi for now, continue IVF, Cr trending down   - renal US with atropic kidneys, no hydronephrosis, UA bland, 0.6%, P/C ratio 0.1, CK 30 and phos 4.1  -no indication for acute RRT at the time, trend renal indices with strict I&O     2. Hyperkalemia improved off ACE-I and Low K diet; will hold lokelma daily,     3. HTN with hypotension hold antihypertensives      4.  Metabolic acidosis- improving continue  NaHCO3 PO     5. Vascular dementia/ debility/bipolar disorder per hosp    Continue current treatment    Ab Strauss MD

## 2020-11-29 LAB
ALBUMIN SERPL-MCNC: 2.4 G/DL (ref 3.2–4.6)
ALBUMIN/GLOB SERPL: 0.8 {RATIO} (ref 1.2–3.5)
ALP SERPL-CCNC: 87 U/L (ref 50–136)
ALT SERPL-CCNC: 6 U/L (ref 12–65)
ANION GAP SERPL CALC-SCNC: 6 MMOL/L (ref 7–16)
AST SERPL-CCNC: 8 U/L (ref 15–37)
BASOPHILS # BLD: 0 K/UL (ref 0–0.2)
BASOPHILS NFR BLD: 1 % (ref 0–2)
BILIRUB SERPL-MCNC: 0.3 MG/DL (ref 0.2–1.1)
BUN SERPL-MCNC: 45 MG/DL (ref 8–23)
CALCIUM SERPL-MCNC: 8.6 MG/DL (ref 8.3–10.4)
CHLORIDE SERPL-SCNC: 115 MMOL/L (ref 98–107)
CO2 SERPL-SCNC: 23 MMOL/L (ref 21–32)
CREAT SERPL-MCNC: 3 MG/DL (ref 0.6–1)
DIFFERENTIAL METHOD BLD: ABNORMAL
EOSINOPHIL # BLD: 0.4 K/UL (ref 0–0.8)
EOSINOPHIL NFR BLD: 5 % (ref 0.5–7.8)
ERYTHROCYTE [DISTWIDTH] IN BLOOD BY AUTOMATED COUNT: 14.1 % (ref 11.9–14.6)
GLOBULIN SER CALC-MCNC: 3 G/DL (ref 2.3–3.5)
GLUCOSE SERPL-MCNC: 89 MG/DL (ref 65–100)
HCT VFR BLD AUTO: 30 % (ref 35.8–46.3)
HGB BLD-MCNC: 8.9 G/DL (ref 11.7–15.4)
IMM GRANULOCYTES # BLD AUTO: 0 K/UL (ref 0–0.5)
IMM GRANULOCYTES NFR BLD AUTO: 0 % (ref 0–5)
LYMPHOCYTES # BLD: 1.3 K/UL (ref 0.5–4.6)
LYMPHOCYTES NFR BLD: 18 % (ref 13–44)
MAGNESIUM SERPL-MCNC: 2.3 MG/DL (ref 1.8–2.4)
MCH RBC QN AUTO: 28.3 PG (ref 26.1–32.9)
MCHC RBC AUTO-ENTMCNC: 29.7 G/DL (ref 31.4–35)
MCV RBC AUTO: 95.2 FL (ref 79.6–97.8)
MONOCYTES # BLD: 0.5 K/UL (ref 0.1–1.3)
MONOCYTES NFR BLD: 7 % (ref 4–12)
NEUTS SEG # BLD: 5 K/UL (ref 1.7–8.2)
NEUTS SEG NFR BLD: 69 % (ref 43–78)
NRBC # BLD: 0 K/UL (ref 0–0.2)
PLATELET # BLD AUTO: 189 K/UL (ref 150–450)
PMV BLD AUTO: 9.7 FL (ref 9.4–12.3)
POTASSIUM SERPL-SCNC: 4.7 MMOL/L (ref 3.5–5.1)
PROT SERPL-MCNC: 5.4 G/DL (ref 6.3–8.2)
RBC # BLD AUTO: 3.15 M/UL (ref 4.05–5.2)
SODIUM SERPL-SCNC: 144 MMOL/L (ref 136–145)
WBC # BLD AUTO: 7.2 K/UL (ref 4.3–11.1)

## 2020-11-29 PROCEDURE — 80053 COMPREHEN METABOLIC PANEL: CPT

## 2020-11-29 PROCEDURE — 85025 COMPLETE CBC W/AUTO DIFF WBC: CPT

## 2020-11-29 PROCEDURE — 65270000029 HC RM PRIVATE

## 2020-11-29 PROCEDURE — 83735 ASSAY OF MAGNESIUM: CPT

## 2020-11-29 PROCEDURE — 36415 COLL VENOUS BLD VENIPUNCTURE: CPT

## 2020-11-29 PROCEDURE — 74011250637 HC RX REV CODE- 250/637: Performed by: FAMILY MEDICINE

## 2020-11-29 PROCEDURE — 74011250637 HC RX REV CODE- 250/637: Performed by: NURSE PRACTITIONER

## 2020-11-29 PROCEDURE — 74011250636 HC RX REV CODE- 250/636: Performed by: FAMILY MEDICINE

## 2020-11-29 RX ADMIN — SODIUM BICARBONATE 650 MG TABLET 650 MG: at 22:00

## 2020-11-29 RX ADMIN — FOLIC ACID 1 MG: 1 TABLET ORAL at 09:10

## 2020-11-29 RX ADMIN — SODIUM BICARBONATE 650 MG TABLET 650 MG: at 09:10

## 2020-11-29 RX ADMIN — HEPARIN SODIUM 5000 UNITS: 5000 INJECTION INTRAVENOUS; SUBCUTANEOUS at 15:45

## 2020-11-29 RX ADMIN — FERROUS SULFATE TAB 325 MG (65 MG ELEMENTAL FE) 325 MG: 325 (65 FE) TAB at 12:26

## 2020-11-29 RX ADMIN — ESCITALOPRAM OXALATE 10 MG: 10 TABLET ORAL at 09:10

## 2020-11-29 RX ADMIN — FERROUS SULFATE TAB 325 MG (65 MG ELEMENTAL FE) 325 MG: 325 (65 FE) TAB at 09:10

## 2020-11-29 RX ADMIN — Medication 10 ML: at 06:24

## 2020-11-29 RX ADMIN — Medication 100 MG: at 09:10

## 2020-11-29 RX ADMIN — QUETIAPINE FUMARATE 50 MG: 25 TABLET ORAL at 22:00

## 2020-11-29 RX ADMIN — Medication 400 MG: at 09:10

## 2020-11-29 RX ADMIN — SODIUM BICARBONATE 650 MG TABLET 650 MG: at 15:45

## 2020-11-29 RX ADMIN — PANTOPRAZOLE SODIUM 40 MG: 40 TABLET, DELAYED RELEASE ORAL at 06:25

## 2020-11-29 RX ADMIN — FERROUS SULFATE TAB 325 MG (65 MG ELEMENTAL FE) 325 MG: 325 (65 FE) TAB at 17:24

## 2020-11-29 RX ADMIN — ASPIRIN 325 MG ORAL TABLET 325 MG: 325 PILL ORAL at 09:10

## 2020-11-29 RX ADMIN — HEPARIN SODIUM 5000 UNITS: 5000 INJECTION INTRAVENOUS; SUBCUTANEOUS at 06:25

## 2020-11-29 RX ADMIN — Medication 10 ML: at 22:00

## 2020-11-29 RX ADMIN — HEPARIN SODIUM 5000 UNITS: 5000 INJECTION INTRAVENOUS; SUBCUTANEOUS at 22:00

## 2020-11-29 RX ADMIN — Medication 5 ML: at 15:46

## 2020-11-29 NOTE — PROGRESS NOTES
Reynaldo Jung  Admission Date: 11/23/2020             Renal Daily Progress Note: 11/29/2020    The patient's chart is reviewed and the patient is discussed with the staff.   Follow up OSMIN/CKD IV  Subjective:   Patient seen and examined on rounds,  Good uop  Labs and chart reviewed    ROS:  General: no fever/chills, + debility  CV; no CP  Lung: no SOB, no cough  GI: no N/V/D  Ext: no edema     Current Facility-Administered Medications   Medication Dose Route Frequency    aspirin tablet 325 mg  325 mg Oral DAILY    escitalopram oxalate (LEXAPRO) tablet 10 mg  10 mg Oral DAILY    ferrous sulfate tablet 325 mg  325 mg Oral TID WITH MEALS    folic acid (FOLVITE) tablet 1 mg  1 mg Oral DAILY    [Held by provider] lisinopriL (PRINIVIL, ZESTRIL) tablet 10 mg  10 mg Oral DAILY    magnesium oxide (MAG-OX) tablet 400 mg  400 mg Oral DAILY    pantoprazole (PROTONIX) tablet 40 mg  40 mg Oral ACB    QUEtiapine (SEROquel) tablet 50 mg  50 mg Oral QHS    thiamine HCL (B-1) tablet 100 mg  100 mg Oral DAILY    traMADoL (ULTRAM) tablet 50 mg  50 mg Oral Q6H PRN    sodium chloride (NS) flush 5-40 mL  5-40 mL IntraVENous Q8H    sodium chloride (NS) flush 5-40 mL  5-40 mL IntraVENous PRN    influenza vaccine 2020-21 (6 mos+)(PF) (FLUARIX/FLULAVAL/FLUZONE QUAD) injection 0.5 mL  0.5 mL IntraMUSCular PRIOR TO DISCHARGE    LORazepam (ATIVAN) tablet 1-2 mg  1-2 mg Oral Q1H PRN    LORazepam (ATIVAN) tablet 3-4 mg  3-4 mg Oral Q1H PRN    heparin (porcine) injection 5,000 Units  5,000 Units SubCUTAneous Q8H    sodium bicarbonate tablet 650 mg  650 mg Oral TID         Objective:     Vitals:    11/28/20 1600 11/28/20 2016 11/29/20 0012 11/29/20 0402   BP: 136/77 124/81 116/76 112/74   Pulse: 84 82 83 80   Resp: 20 19 19 19   Temp: 98.4 °F (36.9 °C) 97.8 °F (36.6 °C) 98.1 °F (36.7 °C) 98 °F (36.7 °C)   SpO2: 100% 96% 98% 99%   Weight:       Height:         Intake and Output:   11/27 1901 - 11/29 0700  In: - Out: 650 [Urine:650]  No intake/output data recorded. Physical Exam:   Constitutional:  the patient is well developed and in no acute distress, alert and oriented   HEENT:  Sclera clear, pupils equal, oral mucosa moist  Lungs: clear bilaterally   Cardiovascular:  Regular rate, S1, S2, no Rub   Abd/GI: soft and non-tender; with positive bowel sounds. Ext: warm without cyanosis. There is no lower leg edema. Skin:  no jaundice or rashes  Neuro: no gross neuro deficits   Psychiatric: Calm. LAB  Recent Labs     11/29/20  0533 11/28/20  0535 11/27/20  0509   WBC 7.2 6.9 7.1   HGB 8.9* 9.3* 8.3*   HCT 30.0* 30.6* 28.5*    203 175     Recent Labs     11/29/20 0533 11/28/20  0535 11/27/20  0509    146* 146*   K 4.7 4.8 5.6*   * 116* 120*   CO2 23 21 21   GLU 89 93 93   BUN 45* 49* 56*   CREA 3.00* 3.26* 3.40*   MG 2.3  --   --    ALB 2.4*  --   --      No results for input(s): PH, PCO2, PO2, HCO3 in the last 72 hours.       Assessment:  (Medical Decision Making)     Hospital Problems  Date Reviewed: 2/28/2018          Codes Class Noted POA    OSMIN (acute kidney injury) ([facility] ICD-10-CM: N17.9  ICD-9-CM: 584.9  11/27/2020 Unknown        Normocytic anemia ICD-10-CM: D64.9  ICD-9-CM: 285.9  11/24/2020 Yes        Hyperkalemia ICD-10-CM: E87.5  ICD-9-CM: 276.7  11/24/2020 Yes        * (Principal) No able caregiver in household ICD-10-CM: Z74.2  ICD-9-CM: V60.4  11/23/2020 Yes        Alcoholism ([facility] ICD-10-CM: F10.20  ICD-9-CM: 303.90  5/18/2018 Yes        Hypertension ICD-10-CM: I10  ICD-9-CM: 401.9  5/18/2018 Yes        Vascular dementia ([facility] ICD-10-CM: F01.50  ICD-9-CM: 290.40  2/13/2018 Yes        Depression ICD-10-CM: F32.9  ICD-9-CM: 849  2/13/2018 Yes        Bipolar disorder ([facility] ICD-10-CM: F31.9  ICD-9-CM: 296.80  10/22/2014 Yes        CKD (chronic kidney disease) ICD-10-CM: N18.9  ICD-9-CM: 585.9  9/18/2014 Yes        Essential hypertension ICD-10-CM: I10  ICD-9-CM: 401.9  9/11/2012 Yes FSGS (focal segmental glomerulosclerosis) ICD-10-CM: N05.1  ICD-9-CM: 582.1  9/11/2012 Yes              Plan:  (Medical Decision Making)   1. OSMIN/CKD IV likely pre renal azotemia with poor intake, hypotension and progression of CKD hx of FSGS. - hold ACEi for now, continue IVF, Cr trending down   - renal US with atropic kidneys, no hydronephrosis, UA bland, 0.6%, P/C ratio 0.1, CK 30 and phos 4.1  -continues to improve     2. Hyperkalemia resolved off ACE-I and Low K diet; will hold lokelma daily,     3. HTN with hypotension hold antihypertensives      4. Metabolic acidosis- improving continue  NaHCO3 PO     5. Vascular dementia/ debility/bipolar disorder per hosp    Continue current treatment, overall stable from renal stand point I will sign off thanks.     José Urena MD
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
## 2020-11-30 LAB
ALBUMIN SERPL-MCNC: 2.4 G/DL (ref 3.2–4.6)
ALBUMIN/GLOB SERPL: 0.7 {RATIO} (ref 1.2–3.5)
ALP SERPL-CCNC: 90 U/L (ref 50–136)
ALT SERPL-CCNC: 7 U/L (ref 12–65)
ANION GAP SERPL CALC-SCNC: 6 MMOL/L (ref 7–16)
AST SERPL-CCNC: 6 U/L (ref 15–37)
BASOPHILS # BLD: 0.1 K/UL (ref 0–0.2)
BASOPHILS NFR BLD: 1 % (ref 0–2)
BILIRUB SERPL-MCNC: 0.3 MG/DL (ref 0.2–1.1)
BUN SERPL-MCNC: 43 MG/DL (ref 8–23)
CALCIUM SERPL-MCNC: 9.2 MG/DL (ref 8.3–10.4)
CHLORIDE SERPL-SCNC: 111 MMOL/L (ref 98–107)
CO2 SERPL-SCNC: 25 MMOL/L (ref 21–32)
CREAT SERPL-MCNC: 3.1 MG/DL (ref 0.6–1)
DIFFERENTIAL METHOD BLD: ABNORMAL
EOSINOPHIL # BLD: 0.4 K/UL (ref 0–0.8)
EOSINOPHIL NFR BLD: 5 % (ref 0.5–7.8)
ERYTHROCYTE [DISTWIDTH] IN BLOOD BY AUTOMATED COUNT: 14.3 % (ref 11.9–14.6)
GLOBULIN SER CALC-MCNC: 3.3 G/DL (ref 2.3–3.5)
GLUCOSE SERPL-MCNC: 88 MG/DL (ref 65–100)
HCT VFR BLD AUTO: 29.2 % (ref 35.8–46.3)
HGB BLD-MCNC: 8.8 G/DL (ref 11.7–15.4)
IMM GRANULOCYTES # BLD AUTO: 0.1 K/UL (ref 0–0.5)
IMM GRANULOCYTES NFR BLD AUTO: 1 % (ref 0–5)
LYMPHOCYTES # BLD: 1.5 K/UL (ref 0.5–4.6)
LYMPHOCYTES NFR BLD: 19 % (ref 13–44)
MCH RBC QN AUTO: 28.1 PG (ref 26.1–32.9)
MCHC RBC AUTO-ENTMCNC: 30.1 G/DL (ref 31.4–35)
MCV RBC AUTO: 93.3 FL (ref 79.6–97.8)
MONOCYTES # BLD: 0.6 K/UL (ref 0.1–1.3)
MONOCYTES NFR BLD: 7 % (ref 4–12)
NEUTS SEG # BLD: 5.5 K/UL (ref 1.7–8.2)
NEUTS SEG NFR BLD: 67 % (ref 43–78)
NRBC # BLD: 0 K/UL (ref 0–0.2)
PLATELET # BLD AUTO: 220 K/UL (ref 150–450)
PMV BLD AUTO: 9.7 FL (ref 9.4–12.3)
POTASSIUM SERPL-SCNC: 4.8 MMOL/L (ref 3.5–5.1)
PROT SERPL-MCNC: 5.7 G/DL (ref 6.3–8.2)
RBC # BLD AUTO: 3.13 M/UL (ref 4.05–5.2)
SODIUM SERPL-SCNC: 142 MMOL/L (ref 138–145)
WBC # BLD AUTO: 8.2 K/UL (ref 4.3–11.1)

## 2020-11-30 PROCEDURE — 36415 COLL VENOUS BLD VENIPUNCTURE: CPT

## 2020-11-30 PROCEDURE — 74011250637 HC RX REV CODE- 250/637: Performed by: NURSE PRACTITIONER

## 2020-11-30 PROCEDURE — 85025 COMPLETE CBC W/AUTO DIFF WBC: CPT

## 2020-11-30 PROCEDURE — 97530 THERAPEUTIC ACTIVITIES: CPT

## 2020-11-30 PROCEDURE — 74011250636 HC RX REV CODE- 250/636: Performed by: FAMILY MEDICINE

## 2020-11-30 PROCEDURE — 65270000029 HC RM PRIVATE

## 2020-11-30 PROCEDURE — 80053 COMPREHEN METABOLIC PANEL: CPT

## 2020-11-30 PROCEDURE — 74011250637 HC RX REV CODE- 250/637: Performed by: FAMILY MEDICINE

## 2020-11-30 RX ADMIN — Medication 400 MG: at 08:06

## 2020-11-30 RX ADMIN — FOLIC ACID 1 MG: 1 TABLET ORAL at 08:06

## 2020-11-30 RX ADMIN — HEPARIN SODIUM 5000 UNITS: 5000 INJECTION INTRAVENOUS; SUBCUTANEOUS at 05:45

## 2020-11-30 RX ADMIN — Medication 100 MG: at 08:06

## 2020-11-30 RX ADMIN — SODIUM BICARBONATE 650 MG TABLET 650 MG: at 08:05

## 2020-11-30 RX ADMIN — FERROUS SULFATE TAB 325 MG (65 MG ELEMENTAL FE) 325 MG: 325 (65 FE) TAB at 08:06

## 2020-11-30 RX ADMIN — SODIUM BICARBONATE 650 MG TABLET 650 MG: at 17:10

## 2020-11-30 RX ADMIN — ASPIRIN 325 MG ORAL TABLET 325 MG: 325 PILL ORAL at 08:06

## 2020-11-30 RX ADMIN — ESCITALOPRAM OXALATE 10 MG: 10 TABLET ORAL at 08:06

## 2020-11-30 RX ADMIN — HEPARIN SODIUM 5000 UNITS: 5000 INJECTION INTRAVENOUS; SUBCUTANEOUS at 21:22

## 2020-11-30 RX ADMIN — SODIUM BICARBONATE 650 MG TABLET 650 MG: at 21:21

## 2020-11-30 RX ADMIN — QUETIAPINE FUMARATE 50 MG: 25 TABLET ORAL at 21:21

## 2020-11-30 RX ADMIN — PANTOPRAZOLE SODIUM 40 MG: 40 TABLET, DELAYED RELEASE ORAL at 05:45

## 2020-11-30 RX ADMIN — HEPARIN SODIUM 5000 UNITS: 5000 INJECTION INTRAVENOUS; SUBCUTANEOUS at 14:14

## 2020-11-30 RX ADMIN — FERROUS SULFATE TAB 325 MG (65 MG ELEMENTAL FE) 325 MG: 325 (65 FE) TAB at 11:06

## 2020-11-30 RX ADMIN — FERROUS SULFATE TAB 325 MG (65 MG ELEMENTAL FE) 325 MG: 325 (65 FE) TAB at 17:10
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
## 2020-11-30 NOTE — PROGRESS NOTES
Problem: Pressure Injury - Risk of  Goal: *Prevention of pressure injury  Description: Document Luis Scale and appropriate interventions in the flowsheet. Outcome: Progressing Towards Goal  Note: Pressure Injury Interventions:  Sensory Interventions: Assess changes in LOC, Discuss PT/OT consult with provider, Float heels, Keep linens dry and wrinkle-free, Pressure redistribution bed/mattress (bed type)    Moisture Interventions: Absorbent underpads, Check for incontinence Q2 hours and as needed, Internal/External urinary devices, Limit adult briefs, Maintain skin hydration (lotion/cream)    Activity Interventions: Increase time out of bed, Pressure redistribution bed/mattress(bed type), PT/OT evaluation    Mobility Interventions: HOB 30 degrees or less, Pressure redistribution bed/mattress (bed type), PT/OT evaluation    Nutrition Interventions: Document food/fluid/supplement intake, Discuss nutritional consult with provider, Offer support with meals,snacks and hydration    Friction and Shear Interventions: HOB 30 degrees or less, Lift sheet                Problem: Falls - Risk of  Goal: *Absence of Falls  Description: Document Damien Fall Risk and appropriate interventions in the flowsheet.   Outcome: Progressing Towards Goal  Note: Fall Risk Interventions:  Mobility Interventions: Bed/chair exit alarm, Communicate number of staff needed for ambulation/transfer, OT consult for ADLs, Patient to call before getting OOB, PT Consult for mobility concerns, Utilize walker, cane, or other assistive device         Medication Interventions: Bed/chair exit alarm, Evaluate medications/consider consulting pharmacy, Patient to call before getting OOB, Teach patient to arise slowly    Elimination Interventions: Bed/chair exit alarm, Call light in reach, Patient to call for help with toileting needs, Toileting schedule/hourly rounds    History of Falls Interventions: Bed/chair exit alarm, Consult care management for discharge planning, Door open when patient unattended, Evaluate medications/consider consulting pharmacy         Problem: General Medical Care Plan  Goal: *Vital signs within specified parameters  Outcome: Progressing Towards Goal     Problem: Pain  Goal: *Control of Pain  Outcome: Progressing Towards Goal

## 2020-11-30 NOTE — PROGRESS NOTES
Problem: Pressure Injury - Risk of  Goal: *Prevention of pressure injury  Description: Document Luis Scale and appropriate interventions in the flowsheet. Outcome: Progressing Towards Goal  Note: Pressure Injury Interventions:  Sensory Interventions: Assess changes in LOC, Avoid rigorous massage over bony prominences    Moisture Interventions: Absorbent underpads, Check for incontinence Q2 hours and as needed    Activity Interventions: Increase time out of bed, Pressure redistribution bed/mattress(bed type), PT/OT evaluation    Mobility Interventions: HOB 30 degrees or less, Pressure redistribution bed/mattress (bed type), PT/OT evaluation    Nutrition Interventions: Document food/fluid/supplement intake, Offer support with meals,snacks and hydration    Friction and Shear Interventions: HOB 30 degrees or less                Problem: Patient Education: Go to Patient Education Activity  Goal: Patient/Family Education  Outcome: Progressing Towards Goal     Problem: Falls - Risk of  Goal: *Absence of Falls  Description: Document Damien Fall Risk and appropriate interventions in the flowsheet.   Outcome: Progressing Towards Goal  Note: Fall Risk Interventions:  Mobility Interventions: Bed/chair exit alarm, Communicate number of staff needed for ambulation/transfer, Patient to call before getting OOB         Medication Interventions: Bed/chair exit alarm, Patient to call before getting OOB, Teach patient to arise slowly    Elimination Interventions: Bed/chair exit alarm, Call light in reach, Stay With Me (per policy), Patient to call for help with toileting needs, Toilet paper/wipes in reach    History of Falls Interventions: Bed/chair exit alarm, Consult care management for discharge planning, Door open when patient unattended, Investigate reason for fall, Room close to nurse's station         Problem: Patient Education: Go to Patient Education Activity  Goal: Patient/Family Education  Outcome: Progressing Towards Goal     Problem: Pain  Goal: *Control of Pain  Outcome: Progressing Towards Goal     Problem: Patient Education: Go to Patient Education Activity  Goal: Patient/Family Education  Outcome: Progressing Towards Goal     Problem: General Medical Care Plan  Goal: *Vital signs within specified parameters  Outcome: Progressing Towards Goal  Goal: *Labs within defined limits  Outcome: Progressing Towards Goal  Goal: *Absence of infection signs and symptoms  Outcome: Progressing Towards Goal  Goal: *Optimal pain control at patient's stated goal  Outcome: Progressing Towards Goal  Goal: *Skin integrity maintained  Outcome: Progressing Towards Goal  Goal: *Fluid volume balance  Outcome: Progressing Towards Goal  Goal: *Optimize nutritional status  Outcome: Progressing Towards Goal  Goal: *Anxiety reduced or absent  Outcome: Progressing Towards Goal  Goal: *Progressive mobility and function (eg: ADL's)  Outcome: Progressing Towards Goal     Problem: Patient Education: Go to Patient Education Activity  Goal: Patient/Family Education  Outcome: Progressing Towards Goal     Problem: Patient Education: Go to Patient Education Activity  Goal: Patient/Family Education  Outcome: Progressing Towards Goal     Problem: Patient Education: Go to Patient Education Activity  Goal: Patient/Family Education  Outcome: Progressing Towards Goal

## 2020-11-30 NOTE — PROGRESS NOTES
Problem: Self Care Deficits Care Plan (Adult)  Goal: *Acute Goals and Plan of Care (Insert Text)  Description: 1. Patient will complete upper body bathing and dressing with setup and adaptive equipment as needed. 2. Patient will complete toileting with minimal assistance. 3. Patient will tolerate 20 minutes of OT treatment with as needed rest breaks to increase activity tolerance for ADLs. 4. Patient will complete functional transfers with minimal assistance and adaptive equipment as needed. 5. Patient will complete self feeding and grooming with modified independence. Timeframe: 7 visits     Outcome: Progressing Towards Goal       OCCUPATIONAL THERAPY: Daily Note and AM    11/30/2020  INPATIENT: OT Visit Days: 5  Payor: Lien Hall / Plan: [employer] MEDICARE COMPLETE / Product Type: Managed Care Medicare /      NAME/AGE/GENDER: Nori Magana is a 61 y.o. female   PRIMARY DIAGNOSIS:  No able caregiver in household [Z74.2]  OSMIN (acute kidney injury) ([facility] [address] [N17.9] No able caregiver in household No able caregiver in household       ICD-10: Treatment Diagnosis:    · Generalized Muscle Weakness (M62.81)  · Other lack of cordination (R27.8)   Precautions/Allergies:    Codeine and Lortab [hydrocodone-acetaminophen]      ASSESSMENT:     Ms. Ana Mahajan is a 61year old female here because her caregiver/  is hospitalized and unable to provide care for her. She has history of hip fracture in Sept 2020, CVA with R sided deficits, and bipolar disorder. Patient reports she is bed bound and requires assistance with all ADLs & IADLS from spouse. She was also having HHPT. Reports she has not walked in months due to a \"bad knee\" but patient is a poor historian. Patient was evaluated with physical therapist and may be appropriate for ongoing skilled co-treatment at future sessions. BUE assessment reveals decreased ROM/ strength/ coordination in RUE compared to LUE from previous CVA.  Per patient she has had 3 strokes, but does not know when they occurred. Functionally, she primarily uses only her LUE. Balance is impaired in sitting and in standing (fair). Cognitively, patient has delayed responses, decreased attention span, and decreased insight. Also disoriented to time (December 2019). She is currently requiring assistance with all ADLs and mobility and would benefit from continued occupational therapy to increase independence and safety. Will follow. 11/30/2020 Pt presents supine upon arrival. Pt continue to require max assist to transfer over to chair. Pt does well with bed mobility but not strong enough to perform sit to stand transfers. Pt required mod assist for rolling for brief change and dependent for clothing management. Pt encouraged to continue to attempt to more for herself to increase strength. Making minimal progress with goals. Will continue to benefit from skilled OT during stay. This section established at most recent assessment   PROBLEM LIST (Impairments causing functional limitations):  1. Decreased Strength  2. Decreased ADL/Functional Activities  3. Decreased Transfer Abilities  4. Decreased Ambulation Ability/Technique  5. Decreased Balance  6. Increased Pain  7. Decreased Activity Tolerance  8. Decreased Cognition   INTERVENTIONS PLANNED: (Benefits and precautions of occupational therapy have been discussed with the patient.)  1. Activities of daily living training  2. Adaptive equipment training  3. Neuromuscular re-eduation  4. Therapeutic activity  5. Therapeutic exercise     TREATMENT PLAN: Frequency/Duration: Follow patient 3x/ week to address above goals. Rehabilitation Potential For Stated Goals: [facility] (at time of discharge pending progress):    Placement: It is my opinion, based on this patient's performance to date, that Ms. Abby Colvin may benefit from intensive therapy at a [address] [place] [address] after discharge due to the functional deficits listed above that are likely to improve with skilled rehabilitation and concerns that he/she may be unsafe to be unsupervised at home due to lack of caregiver. Equipment:    None at this time              OCCUPATIONAL PROFILE AND HISTORY:   History of Present Injury/Illness (Reason for Referral):  See H&P  Past Medical History/Comorbidities:   Ms. Nemesio Lomas  has a past medical history of Aneurysm of common carotid artery ([facility] [facility]) (2004), CKD (chronic kidney disease) (9/18/2014), Dementia ([facility] [facility]), FSGS (focal segmental glomerulosclerosis), Gout, Hypertension, Osteoarthritis (9/18/2014), and Stroke ([facility]) (2004, 2013). Ms. Nemesio Lomas  has a past surgical history that includes hx intracranial aneurysm repair (2004); hx refractive surgery; hx orthopaedic (Right, 10/2014); hx ankle fracture tx (Left, 2013); hx ercp (02/27/2018); and hx cholecystectomy (02/28/2018). Social History/Living Environment:   Home Environment: Private residence  # Steps to Enter: 0  One/Two Story Residence: Two story, live on 1st floor  # of Interior Steps: 13  Height of Each Step (in): 7 inches  Interior Rails: Right  Lift Chair Available: No  Living Alone: No  Support Systems: Home care staff  Patient Expects to be Discharged to[de-identified] Unknown  Current DME Used/Available at Home: Wheelchair, Shower chair, Commode, bedside, Blood pressure cuff, Brace/Splint, Grab bars, Walker  Tub or Shower Type: (sponge baths)  Prior Level of Function/Work/Activity:  She has history of hip fracture in Sept 2020, CVA with R sided deficits, and bipolar disorder. Patient reports she is bed bound and requires assistance with all ADLs from spouse. She was also having HHPT. Reports she has not walked in months due to a \"bad knee\" but patient is a poor historian.       Number of Personal Factors/Comorbidities that affect the Plan of Care: Extensive review of physical, cognitive, and psychosocial performance (3+):  HIGH COMPLEXITY   ASSESSMENT OF OCCUPATIONAL PERFORMANCE[de-identified] Activities of Daily Living:   Basic ADLs (From Assessment) Complex ADLs (From Assessment)   Feeding: Minimum assistance  Oral Facial Hygiene/Grooming: Moderate assistance  Bathing: Maximum assistance  Upper Body Dressing: Moderate assistance  Lower Body Dressing: Total assistance  Toileting: Total assistance Instrumental ADL  Meal Preparation: Total assistance  Homemaking: Total assistance  Medication Management: Total assistance  Financial Management: Total assistance   Grooming/Bathing/Dressing Activities of Daily Living     Cognitive Retraining  Safety/Judgement: Awareness of environment                       Bed/Mat Mobility  Supine to Sit: Minimum assistance  Sit to Stand: Maximum assistance  Bed to Chair: Maximum assistance(stand pivot)     Most Recent Physical Functioning:   Gross Assessment:                  Posture:  Posture (WDL): Exceptions to WDL  Posture Assessment: Forward head, Rounded shoulders  Balance:  Sitting - Static: Good (unsupported)  Sitting - Dynamic: Fair (occasional)  Standing - Static: Poor  Standing - Dynamic : Poor Bed Mobility:  Supine to Sit: Minimum assistance  Wheelchair Mobility:     Transfers:  Sit to Stand: Maximum assistance  Bed to Chair: Maximum assistance(stand pivot)            Patient Vitals for the past 6 hrs:   BP BP Patient Position SpO2 Pulse   11/30/20 0756 109/72 At rest 99 % 95   11/30/20 1214 109/70 At rest 99 % 95       Mental Status  Neurologic State: Alert  Orientation Level: Oriented X4  Cognition: Follows commands  Perception: Appears intact  Perseveration: No perseveration noted  Safety/Judgement: Awareness of environment                          Physical Skills Involved:  1. Range of Motion  2. Balance  3. Strength  4. Activity Tolerance  5. Pain (Chronic) Cognitive Skills Affected (resulting in the inability to perform in a timely and safe manner):  1. Executive Function  2. Long Term Memory  3. Sustained Attention  4. Divided Attention  5.  Comprehension Psychosocial Skills Affected:  1. Habits/Routines  2. Environmental Adaptation  3. Self-Awareness   Number of elements that affect the Plan of Care: 5+:  HIGH COMPLEXITY   CLINICAL DECISION MAKIN[address] [place] [address] AM-PAC 6 Clicks   Daily Activity Inpatient Short Form  How much help from another person does the patient currently need. .. Total A Lot A Little None   1. Putting on and taking off regular lower body clothing? [x] 1   [] 2   [] 3   [] 4   2. Bathing (including washing, rinsing, drying)? [] 1   [x] 2   [] 3   [] 4   3. Toileting, which includes using toilet, bedpan or urinal?   [] 1   [x] 2   [] 3   [] 4   4. Putting on and taking off regular upper body clothing? [] 1   [x] 2   [] 3   [] 4   5. Taking care of personal grooming such as brushing teeth? [] 1   [x] 2   [] 3   [] 4   6. Eating meals? [] 1   [x] 2   [] 3   [] 4   © , Trustees of [address] [place] [address], under license to [employer]. All rights reserved      Score:  Initial: 11 Most Recent: X (Date: -- )    Interpretation of Tool:  Represents activities that are increasingly more difficult (i.e. Bed mobility, Transfers, Gait). Medical Necessity:     · Patient demonstrates   · fair  ·  rehab potential due to higher previous functional level. Reason for Services/Other Comments:  · Patient   · continues to require present interventions due to patient's inability to care for self without assistance from others. · .   Use of outcome tool(s) and clinical judgement create a POC that gives a: HIGH COMPLEXITY         TREATMENT:   (In addition to Assessment/Re-Assessment sessions the following treatments were rendered)     Pre-treatment Symptoms/Complaints:    Pain: Initial:    Post Session:  none (does c/o knee pain when moving)     Therapeutic Activity: (25 minutes): Therapeutic activities including Bed transfers, Chair transfers and sitting edge of bed to improve mobility, strength and balance.   Required maximal assistance to promote static and dynamic balance in standing. Yellow theraband Date:  11/24 Date:   Date:     Activity/Exercise Parameters Parameters Parameters   Shoulder Abd/Adduction 20 reps     Shoulder Flexion 10 reps with 1# dowel     Elbow Flexion 10 reps with dowel and theraband     Punches (Chest Press) 10 reps                         Braces/Orthotics/Lines/Etc:   · IV  · O2 Device: Room air  Treatment/Session Assessment:    · Response to Treatment:  limited participation, needs encouragement to participate. · Interdisciplinary Collaboration:   o Certified Occupational Therapy Assistant  o Registered Nurse  · After treatment position/precautions:   o Up in chair  o Bed alarm/tab alert on  o Bed/Chair-wheels locked  o Bed in low position  o Call light within reach   · Compliance with Program/Exercises: Will assess as treatment progresses. · Recommendations/Intent for next treatment session: \"Next visit will focus on advancements to more challenging activities and reduction in assistance provided\".   Total Treatment Duration:  OT Patient Time In/Time Out  Time In: 1115  Time Out: [address] [place]

## 2020-12-01 LAB
ALBUMIN SERPL-MCNC: 2.3 G/DL (ref 3.2–4.6)
ALBUMIN/GLOB SERPL: 0.7 {RATIO} (ref 1.2–3.5)
ALP SERPL-CCNC: 83 U/L (ref 50–136)
ALT SERPL-CCNC: 7 U/L (ref 12–65)
ANION GAP SERPL CALC-SCNC: 7 MMOL/L (ref 7–16)
AST SERPL-CCNC: 9 U/L (ref 15–37)
BASOPHILS # BLD: 0.1 K/UL (ref 0–0.2)
BASOPHILS NFR BLD: 1 % (ref 0–2)
BILIRUB SERPL-MCNC: 0.3 MG/DL (ref 0.2–1.1)
BUN SERPL-MCNC: 40 MG/DL (ref 8–23)
CALCIUM SERPL-MCNC: 8.7 MG/DL (ref 8.3–10.4)
CHLORIDE SERPL-SCNC: 111 MMOL/L (ref 98–107)
CO2 SERPL-SCNC: 24 MMOL/L (ref 21–32)
CREAT SERPL-MCNC: 3.35 MG/DL (ref 0.6–1)
DIFFERENTIAL METHOD BLD: ABNORMAL
EOSINOPHIL # BLD: 0.3 K/UL (ref 0–0.8)
EOSINOPHIL NFR BLD: 4 % (ref 0.5–7.8)
ERYTHROCYTE [DISTWIDTH] IN BLOOD BY AUTOMATED COUNT: 14.2 % (ref 11.9–14.6)
GLOBULIN SER CALC-MCNC: 3.1 G/DL (ref 2.3–3.5)
GLUCOSE SERPL-MCNC: 88 MG/DL (ref 65–100)
HCT VFR BLD AUTO: 26.8 % (ref 35.8–46.3)
HGB BLD-MCNC: 7.9 G/DL (ref 11.7–15.4)
IMM GRANULOCYTES # BLD AUTO: 0.1 K/UL (ref 0–0.5)
IMM GRANULOCYTES NFR BLD AUTO: 1 % (ref 0–5)
LYMPHOCYTES # BLD: 1.6 K/UL (ref 0.5–4.6)
LYMPHOCYTES NFR BLD: 19 % (ref 13–44)
MCH RBC QN AUTO: 28 PG (ref 26.1–32.9)
MCHC RBC AUTO-ENTMCNC: 29.5 G/DL (ref 31.4–35)
MCV RBC AUTO: 95 FL (ref 79.6–97.8)
MONOCYTES # BLD: 0.7 K/UL (ref 0.1–1.3)
MONOCYTES NFR BLD: 8 % (ref 4–12)
NEUTS SEG # BLD: 5.7 K/UL (ref 1.7–8.2)
NEUTS SEG NFR BLD: 68 % (ref 43–78)
NRBC # BLD: 0 K/UL (ref 0–0.2)
PLATELET # BLD AUTO: 194 K/UL (ref 150–450)
PMV BLD AUTO: 10 FL (ref 9.4–12.3)
POTASSIUM SERPL-SCNC: 4.9 MMOL/L (ref 3.5–5.1)
PROT SERPL-MCNC: 5.4 G/DL (ref 6.3–8.2)
RBC # BLD AUTO: 2.82 M/UL (ref 4.05–5.2)
SODIUM SERPL-SCNC: 142 MMOL/L (ref 138–145)
WBC # BLD AUTO: 8.4 K/UL (ref 4.3–11.1)

## 2020-12-01 PROCEDURE — 74011250637 HC RX REV CODE- 250/637: Performed by: NURSE PRACTITIONER

## 2020-12-01 PROCEDURE — 97530 THERAPEUTIC ACTIVITIES: CPT

## 2020-12-01 PROCEDURE — 36415 COLL VENOUS BLD VENIPUNCTURE: CPT

## 2020-12-01 PROCEDURE — 65270000029 HC RM PRIVATE

## 2020-12-01 PROCEDURE — 77030038269 HC DRN EXT URIN PURWCK BARD -A

## 2020-12-01 PROCEDURE — 87635 SARS-COV-2 COVID-19 AMP PRB: CPT

## 2020-12-01 PROCEDURE — 97535 SELF CARE MNGMENT TRAINING: CPT

## 2020-12-01 PROCEDURE — 2709999900 HC NON-CHARGEABLE SUPPLY

## 2020-12-01 PROCEDURE — 80053 COMPREHEN METABOLIC PANEL: CPT

## 2020-12-01 PROCEDURE — 74011250637 HC RX REV CODE- 250/637: Performed by: FAMILY MEDICINE

## 2020-12-01 PROCEDURE — 85025 COMPLETE CBC W/AUTO DIFF WBC: CPT

## 2020-12-01 PROCEDURE — 74011250636 HC RX REV CODE- 250/636: Performed by: FAMILY MEDICINE

## 2020-12-01 RX ADMIN — HEPARIN SODIUM 5000 UNITS: 5000 INJECTION INTRAVENOUS; SUBCUTANEOUS at 05:12

## 2020-12-01 RX ADMIN — ASPIRIN 325 MG ORAL TABLET 325 MG: 325 PILL ORAL at 08:25

## 2020-12-01 RX ADMIN — HEPARIN SODIUM 5000 UNITS: 5000 INJECTION INTRAVENOUS; SUBCUTANEOUS at 13:56

## 2020-12-01 RX ADMIN — FOLIC ACID 1 MG: 1 TABLET ORAL at 08:25

## 2020-12-01 RX ADMIN — Medication 400 MG: at 08:25

## 2020-12-01 RX ADMIN — FERROUS SULFATE TAB 325 MG (65 MG ELEMENTAL FE) 325 MG: 325 (65 FE) TAB at 17:01

## 2020-12-01 RX ADMIN — SODIUM BICARBONATE 650 MG TABLET 650 MG: at 21:27

## 2020-12-01 RX ADMIN — PANTOPRAZOLE SODIUM 40 MG: 40 TABLET, DELAYED RELEASE ORAL at 05:12

## 2020-12-01 RX ADMIN — Medication 100 MG: at 08:25

## 2020-12-01 RX ADMIN — ESCITALOPRAM OXALATE 10 MG: 10 TABLET ORAL at 08:25

## 2020-12-01 RX ADMIN — SODIUM BICARBONATE 650 MG TABLET 650 MG: at 17:01

## 2020-12-01 RX ADMIN — FERROUS SULFATE TAB 325 MG (65 MG ELEMENTAL FE) 325 MG: 325 (65 FE) TAB at 08:25

## 2020-12-01 RX ADMIN — SODIUM BICARBONATE 650 MG TABLET 650 MG: at 08:25

## 2020-12-01 RX ADMIN — HEPARIN SODIUM 5000 UNITS: 5000 INJECTION INTRAVENOUS; SUBCUTANEOUS at 21:27

## 2020-12-01 RX ADMIN — QUETIAPINE FUMARATE 50 MG: 25 TABLET ORAL at 21:27

## 2020-12-01 RX ADMIN — FERROUS SULFATE TAB 325 MG (65 MG ELEMENTAL FE) 325 MG: 325 (65 FE) TAB at 11:15

## 2020-12-01 NOTE — PROGRESS NOTES
Problem: Self Care Deficits Care Plan (Adult)  Goal: *Acute Goals and Plan of Care (Insert Text)  Description: 1. Patient will complete upper body bathing and dressing with setup and adaptive equipment as needed. 2. Patient will complete toileting with minimal assistance. 3. Patient will tolerate 20 minutes of OT treatment with as needed rest breaks to increase activity tolerance for ADLs. 4. Patient will complete functional transfers with minimal assistance and adaptive equipment as needed. 5. Patient will complete self feeding and grooming with modified independence. Timeframe: 7 visits     Outcome: Progressing Towards Goal       OCCUPATIONAL THERAPY: Daily Note and AM    12/1/2020  INPATIENT: OT Visit Days: 6  Payor: [address] [place] [address] / Plan: [place] MEDICARE COMPLETE / Product Type: Managed Care Medicare /      NAME/AGE/GENDER: Vikas Emery is a 61 y.o. female   PRIMARY DIAGNOSIS:  No able caregiver in household [Z74.2]  OSMIN (acute kidney injury) ([facility] [address] [N17.9] No able caregiver in household No able caregiver in household       ICD-10: Treatment Diagnosis:    · Generalized Muscle Weakness (M62.81)  · Other lack of cordination (R27.8)   Precautions/Allergies:    Codeine and Lortab [hydrocodone-acetaminophen]      ASSESSMENT:     Ms. Jose Mckenna is a 61year old female here because her caregiver/  is hospitalized and unable to provide care for her. She has history of hip fracture in Sept 2020, CVA with R sided deficits, and bipolar disorder. Patient reports she is bed bound and requires assistance with all ADLs & IADLS from spouse. She was also having HHPT. Reports she has not walked in months due to a \"bad knee\" but patient is a poor historian. Patient was evaluated with physical therapist and may be appropriate for ongoing skilled co-treatment at future sessions. BUE assessment reveals decreased ROM/ strength/ coordination in RUE compared to LUE from previous CVA.  Per patient she has had 3 strokes, but does not know when they occurred. Functionally, she primarily uses only her LUE. Balance is impaired in sitting and in standing (fair). Cognitively, patient has delayed responses, decreased attention span, and decreased insight. Also disoriented to time (December 2019). She is currently requiring assistance with all ADLs and mobility and would benefit from continued occupational therapy to increase independence and safety. Will follow. 12/1/2020 Pt presents supine upon arrival. Pt assisted with self care tasks (in grid below) while in supine and edge of bed. Pt continue to require max assist x2 to transfer over to chair. Pt able to stand with mod assist x2 but could not take steps. Pt encouraged to continue to attempt to do more for herself to increase strength. Making minimal progress with goals. Will continue to benefit from skilled OT during stay. This section established at most recent assessment   PROBLEM LIST (Impairments causing functional limitations):  1. Decreased Strength  2. Decreased ADL/Functional Activities  3. Decreased Transfer Abilities  4. Decreased Ambulation Ability/Technique  5. Decreased Balance  6. Increased Pain  7. Decreased Activity Tolerance  8. Decreased Cognition   INTERVENTIONS PLANNED: (Benefits and precautions of occupational therapy have been discussed with the patient.)  1. Activities of daily living training  2. Adaptive equipment training  3. Neuromuscular re-eduation  4. Therapeutic activity  5. Therapeutic exercise     TREATMENT PLAN: Frequency/Duration: Follow patient 3x/ week to address above goals. Rehabilitation Potential For Stated Goals: [facility] (at time of discharge pending progress):    Placement: It is my opinion, based on this patient's performance to date, that Ms. Alverto Xavier may benefit from intensive therapy at a [address] [place] [address] after discharge due to the functional deficits listed above that are likely to improve with skilled rehabilitation and concerns that he/she may be unsafe to be unsupervised at home due to lack of caregiver. Equipment:    None at this time              OCCUPATIONAL PROFILE AND HISTORY:   History of Present Injury/Illness (Reason for Referral):  See H&P  Past Medical History/Comorbidities:   Ms. Lori Dey  has a past medical history of Aneurysm of common carotid artery ([facility] [address] (2004), CKD (chronic kidney disease) (9/18/2014), Dementia ([facility] [address] FSGS (focal segmental glomerulosclerosis), Gout, Hypertension, Osteoarthritis (9/18/2014), and Stroke ([facility] [address] (2004, 2013). Ms. Lori Dye  has a past surgical history that includes hx intracranial aneurysm repair (2004); hx refractive surgery; hx orthopaedic (Right, 10/2014); hx ankle fracture tx (Left, 2013); hx ercp (02/27/2018); and hx cholecystectomy (02/28/2018). Social History/Living Environment:   Home Environment: Private residence  # Steps to Enter: 0  One/Two Story Residence: Two story, live on 1st floor  # of Interior Steps: 13  Height of Each Step (in): 7 inches  Interior Rails: Right  Lift Chair Available: No  Living Alone: No  Support Systems: Home care staff  Patient Expects to be Discharged to[de-identified] Unknown  Current DME Used/Available at Home: Wheelchair, Shower chair, Commode, bedside, Blood pressure cuff, Brace/Splint, Grab bars, Walker  Tub or Shower Type: (sponge baths)  Prior Level of Function/Work/Activity:  She has history of hip fracture in Sept 2020, CVA with R sided deficits, and bipolar disorder. Patient reports she is bed bound and requires assistance with all ADLs from spouse. She was also having HHPT. Reports she has not walked in months due to a \"bad knee\" but patient is a poor historian.       Number of Personal Factors/Comorbidities that affect the Plan of Care: Extensive review of physical, cognitive, and psychosocial performance (3+):  HIGH COMPLEXITY   ASSESSMENT OF OCCUPATIONAL PERFORMANCE[de-identified]   Activities of Daily Living: Basic ADLs (From Assessment) Complex ADLs (From Assessment)   Feeding: Minimum assistance  Oral Facial Hygiene/Grooming: Moderate assistance  Bathing: Maximum assistance  Upper Body Dressing: Moderate assistance  Lower Body Dressing: Total assistance  Toileting: Total assistance Instrumental ADL  Meal Preparation: Total assistance  Homemaking: Total assistance  Medication Management: Total assistance  Financial Management: Total assistance   Grooming/Bathing/Dressing Activities of Daily Living   Grooming  Washing Face: Set-up; Supervision Cognitive Retraining  Safety/Judgement: Awareness of environment   Upper Body Bathing  Bathing Assistance: Minimum assistance  Position Performed: Seated edge of bed     Lower Body Bathing  Perineal  : Maximum assistance  Position Performed: Supine  Lower Body : Maximum assistance  Position Performed: Seated edge of bed;Supine Toileting  Bowel Hygiene: Total assistance (dependent)   Upper Body [address]: Supervision     Lower Body Dressing Assistance  Socks: Total assistance (dependent)  Position Performed: Seated edge of bed Bed/Mat Mobility  Supine to Sit: Minimum assistance  Sit to Stand: Moderate assistance;Assist x2  Bed to Chair: Maximum assistance;Assist x2     Most Recent Physical Functioning:   Gross Assessment:                  Posture:  Posture (WDL): Exceptions to WDL  Posture Assessment: Forward head, Rounded shoulders  Balance:  Sitting - Static: Good (unsupported)  Sitting - Dynamic: Fair (occasional)  Standing - Static: Occasional  Standing - Dynamic : Poor Bed Mobility:  Supine to Sit: Minimum assistance  Wheelchair Mobility:     Transfers:  Sit to Stand:  Moderate assistance;Assist x2  Bed to Chair: Maximum assistance;Assist x2            Patient Vitals for the past 6 hrs:   BP BP Patient Position SpO2 Pulse   12/01/20 0730 122/74 At rest 100 % 82   12/01/20 1156 128/76 At rest 99 % 77       Mental Status  Neurologic State: Alert, Appropriate for age  Orientation Level: Oriented X4  Cognition: Follows commands  Perception: Appears intact  Perseveration: No perseveration noted  Safety/Judgement: Awareness of environment                          Physical Skills Involved:  1. Range of Motion  2. Balance  3. Strength  4. Activity Tolerance  5. Pain (Chronic) Cognitive Skills Affected (resulting in the inability to perform in a timely and safe manner):  1. Executive Function  2. Long Term Memory  3. Sustained Attention  4. Divided Attention  5. Comprehension Psychosocial Skills Affected:  1. Habits/Routines  2. Environmental Adaptation  3. Self-Awareness   Number of elements that affect the Plan of Care: 5+:  HIGH COMPLEXITY   CLINICAL DECISION MAKIN[address] [place] [address] AM-PAC 6 Clicks   Daily Activity Inpatient Short Form  How much help from another person does the patient currently need. .. Total A Lot A Little None   1. Putting on and taking off regular lower body clothing? [x] 1   [] 2   [] 3   [] 4   2. Bathing (including washing, rinsing, drying)? [] 1   [x] 2   [] 3   [] 4   3. Toileting, which includes using toilet, bedpan or urinal?   [] 1   [x] 2   [] 3   [] 4   4. Putting on and taking off regular upper body clothing? [] 1   [x] 2   [] 3   [] 4   5. Taking care of personal grooming such as brushing teeth? [] 1   [x] 2   [] 3   [] 4   6. Eating meals? [] 1   [x] 2   [] 3   [] 4   © , Trustees of [address] [place] [address], under license to [employer]. All rights reserved      Score:  Initial: 11 Most Recent: X (Date: -- )    Interpretation of Tool:  Represents activities that are increasingly more difficult (i.e. Bed mobility, Transfers, Gait). Medical Necessity:     · Patient demonstrates   · fair  ·  rehab potential due to higher previous functional level.   Reason for Services/Other Comments:  · Patient   · continues to require present interventions due to patient's inability to care for self without assistance from others. · .   Use of outcome tool(s) and clinical judgement create a POC that gives a: HIGH COMPLEXITY         TREATMENT:   (In addition to Assessment/Re-Assessment sessions the following treatments were rendered)     Pre-treatment Symptoms/Complaints:    Pain: Initial:    Post Session:  none (does c/o knee pain when moving)     Therapeutic Activity: (10 minutes): Therapeutic activities including Bed transfers, Chair transfers and sitting edge of bed to improve mobility, strength and balance. Required maximal assistance x2 to promote static and dynamic balance in standing. Self Care: (20 minutes): Procedure(s) (per grid) utilized to improve and/or restore self-care/home management as related to dressing, bathing, toileting and grooming. Required moderate verbal, manual and tactile cueing to facilitate activities of daily living skills and compensatory activities. Yellow theraband Date:  11/24 Date:   Date:     Activity/Exercise Parameters Parameters Parameters   Shoulder Abd/Adduction 20 reps     Shoulder Flexion 10 reps with 1# dowel     Elbow Flexion 10 reps with dowel and theraband     Punches (Chest Press) 10 reps                         Braces/Orthotics/Lines/Etc:   · IV  · O2 Device: Room air  Treatment/Session Assessment:    · Response to Treatment:  limited participation, needs encouragement to participate. · Interdisciplinary Collaboration:   o Certified Occupational Therapy Assistant  o Registered Nurse  · After treatment position/precautions:   o Up in chair  o Bed alarm/tab alert on  o Bed/Chair-wheels locked  o Bed in low position  o Call light within reach   · Compliance with Program/Exercises: Will assess as treatment progresses. · Recommendations/Intent for next treatment session: \"Next visit will focus on advancements to more challenging activities and reduction in assistance provided\".   Total Treatment Duration:  OT Patient Time In/Time Out  Time In: 1125  Time Out: 1155 [address]

## 2020-12-01 NOTE — PROGRESS NOTES
CM in to speak with patient. Patient has no concerns with returning home with  once he is medically clear for safe discharge. Patient was taken to visit him today and he is still confused at this time. Therapy recommendations reviewed for SNF placement and patient is agreeable. Patient has previously been to [facility], but would prefer to go to a different facility. List of in network Medicare certified facilities reviewed and patient would like referrals sent to [facility], [address] and [address] [facility]. Patient will need insurance approval.  Referrals sent in [facility] and Covid ordered. Care Management Interventions  PCP Verified by CM: Yes  Mode of Transport at Discharge: [facility]  Transition of Care Consult (CM Consult): SNF  Partner SNF: Yes  Physical Therapy Consult: Yes  Occupational Therapy Consult: Yes  Current Support Network: Own Home, Lives with Spouse  Confirm Follow Up Transport: Family  The Plan for Transition of Care is Related to the Following Treatment Goals : Patient will participate in SNF therapies in order to return home to baseline independence in ADLs.    The Patient and/or Patient Representative was Provided with a Choice of Provider and Agrees with the Discharge Plan?: Yes  Freedom of Choice List was Provided with Basic Dialogue that Supports the Patient's Individualized Plan of Care/Goals, Treatment Preferences and Shares the Quality Data Associated with the Providers?: Yes  Discharge Location  Discharge Placement: Skilled nursing facility

## 2020-12-01 NOTE — PROGRESS NOTES
Problem: Pressure Injury - Risk of  Goal: *Prevention of pressure injury  Description: Document Luis Scale and appropriate interventions in the flowsheet. Outcome: Progressing Towards Goal  Note: Pressure Injury Interventions:  Sensory Interventions: Assess changes in LOC, Avoid rigorous massage over bony prominences    Moisture Interventions: Absorbent underpads, Check for incontinence Q2 hours and as needed    Activity Interventions: Increase time out of bed, Pressure redistribution bed/mattress(bed type), PT/OT evaluation    Mobility Interventions: HOB 30 degrees or less, Pressure redistribution bed/mattress (bed type), PT/OT evaluation    Nutrition Interventions: Document food/fluid/supplement intake, Offer support with meals,snacks and hydration    Friction and Shear Interventions: HOB 30 degrees or less                Problem: Patient Education: Go to Patient Education Activity  Goal: Patient/Family Education  Outcome: Progressing Towards Goal     Problem: Falls - Risk of  Goal: *Absence of Falls  Description: Document Damien Fall Risk and appropriate interventions in the flowsheet.   Outcome: Progressing Towards Goal  Note: Fall Risk Interventions:  Mobility Interventions: Bed/chair exit alarm, Communicate number of staff needed for ambulation/transfer, Patient to call before getting OOB         Medication Interventions: Bed/chair exit alarm, Patient to call before getting OOB, Teach patient to arise slowly    Elimination Interventions: Bed/chair exit alarm, Call light in reach, Patient to call for help with toileting needs, Stay With Me (per policy), Toilet paper/wipes in reach, Toileting schedule/hourly rounds    History of Falls Interventions: Bed/chair exit alarm, Consult care management for discharge planning, Door open when patient unattended, Investigate reason for fall, Room close to nurse's station         Problem: Patient Education: Go to Patient Education Activity  Goal: Patient/Family Education  Outcome: Progressing Towards Goal     Problem: Pain  Goal: *Control of Pain  Outcome: Progressing Towards Goal     Problem: Patient Education: Go to Patient Education Activity  Goal: Patient/Family Education  Outcome: Progressing Towards Goal     Problem: General Medical Care Plan  Goal: *Vital signs within specified parameters  Outcome: Progressing Towards Goal  Goal: *Labs within defined limits  Outcome: Progressing Towards Goal  Goal: *Absence of infection signs and symptoms  Outcome: Progressing Towards Goal  Goal: *Optimal pain control at patient's stated goal  Outcome: Progressing Towards Goal  Goal: *Skin integrity maintained  Outcome: Progressing Towards Goal  Goal: *Fluid volume balance  Outcome: Progressing Towards Goal  Goal: *Optimize nutritional status  Outcome: Progressing Towards Goal  Goal: *Anxiety reduced or absent  Outcome: Progressing Towards Goal  Goal: *Progressive mobility and function (eg: ADL's)  Outcome: Progressing Towards Goal     Problem: Patient Education: Go to Patient Education Activity  Goal: Patient/Family Education  Outcome: Progressing Towards Goal     Problem: Patient Education: Go to Patient Education Activity  Goal: Patient/Family Education  Outcome: Progressing Towards Goal     Problem: Patient Education: Go to Patient Education Activity  Goal: Patient/Family Education  Outcome: Progressing Towards Goal

## 2020-12-01 NOTE — PROGRESS NOTES
Problem: Pressure Injury - Risk of  Goal: *Prevention of pressure injury  Description: Document Luis Scale and appropriate interventions in the flowsheet. Outcome: Progressing Towards Goal  Note: Pressure Injury Interventions:  Sensory Interventions: Assess changes in LOC, Discuss PT/OT consult with provider, Float heels, Keep linens dry and wrinkle-free, Pressure redistribution bed/mattress (bed type)    Moisture Interventions: Absorbent underpads, Apply protective barrier, creams and emollients, Internal/External urinary devices, Limit adult briefs, Maintain skin hydration (lotion/cream)    Activity Interventions: Increase time out of bed, Pressure redistribution bed/mattress(bed type), PT/OT evaluation    Mobility Interventions: HOB 30 degrees or less, Pressure redistribution bed/mattress (bed type), PT/OT evaluation    Nutrition Interventions: Document food/fluid/supplement intake, Offer support with meals,snacks and hydration    Friction and Shear Interventions: HOB 30 degrees or less, Lift sheet    Problem: Falls - Risk of  Goal: *Absence of Falls  Description: Document Damien Fall Risk and appropriate interventions in the flowsheet.   Outcome: Progressing Towards Goal  Note: Fall Risk Interventions:  Mobility Interventions: Bed/chair exit alarm, Communicate number of staff needed for ambulation/transfer, OT consult for ADLs, Patient to call before getting OOB, PT Consult for mobility concerns, Utilize walker, cane, or other assistive device         Medication Interventions: Bed/chair exit alarm, Evaluate medications/consider consulting pharmacy, Patient to call before getting OOB, Teach patient to arise slowly    Elimination Interventions: Bed/chair exit alarm, Call light in reach, Patient to call for help with toileting needs, Stay With Me (per policy), Toileting schedule/hourly rounds    History of Falls Interventions: Bed/chair exit alarm, Consult care management for discharge planning, Door open when patient unattended, Evaluate medications/consider consulting pharmacy         Problem: Pain  Goal: *Control of Pain  Outcome: Progressing Towards Goal     Problem: General Medical Care Plan  Goal: *Vital signs within specified parameters  Outcome: Progressing Towards Goal     Problem: General Medical Care Plan  Goal: *Labs within defined limits  Outcome: Progressing Towards Goal

## 2020-12-01 NOTE — PROGRESS NOTES
Progress Note    2020  Admit Date: 2020  4:19 PM   NAME: Marta Paez   :  1960   MRN:  [id number]   Attending: Ingris Maxwell MD  PCP:  Meghna Mcnair MD  Treatment Team: Attending Provider: Ingris Maxwell MD; Utilization Review: Linda Hernandez RN; Care Manager: Jameel Perez RN; Utilization Review: Marla Goddard RN; Nurse Practitioner: Priscila Lopez NP; Hospitalist: Nestor Lino NP; Nurse Practitioner: Roxy Leblanc NP; Charge Nurse: Crow Ngo; Occupational Therapy Assistant: SANFORD Membreno    Full Code   SUBJECTIVE:   Ms. Cristy Avila is a 60 yo female with PMH of carotid artery aneurysm s/p coil, HTN, CVA, dementia, CKD, bed bound who presented with her  who was intubated and admitted therefore no one to take care of her at home. [facility] found called after PT found pt with  passed out on top of her. She reports decreased po intake.  Found to have OSMIN/CKD, creatinine 4.88 (baseline upper 2 range). Started on IVF. Nephrology consulted. Holding ACE. Renal US showed no acute findings. Hyperkalemia resolved. Started on bicarb po. No complaints today. APS involved. Pt  is still hospitalized and is confused. She is unable to go home with no caregivers.       Past Medical History:   Diagnosis Date    Aneurysm of common carotid artery ([address]     left side s/p coil     CKD (chronic kidney disease) 2014    [facility]     FSGS (focal segmental glomerulosclerosis)     in remission at present    Gout     Hypertension     managed with medication     Osteoarthritis 2014    Stroke ([facility] ,     slight weakness on right side, slight effect to speech     Recent Results (from the past 24 hour(s))   CBC WITH AUTOMATED DIFF    Collection Time: 20  6:39 AM   Result Value Ref Range    WBC 8.4 4.3 - 11.1 K/uL    RBC 2.82 (L) 4.05 - 5.2 M/uL    HGB 7.9 (L) 11.7 - 15.4 g/dL    HCT 26.8 (L) 35.8 - 46.3 %    MCV 95.0 79.6 - 97.8 FL    MCH 28.0 26.1 - 32.9 PG    MCHC 29.5 (L) 31.4 - 35.0 g/dL    RDW 14.2 11.9 - 14.6 %    PLATELET 128 309 - 552 K/uL    MPV 10.0 9.4 - 12.3 FL    ABSOLUTE NRBC 0.00 0.0 - 0.2 K/uL    DF AUTOMATED      NEUTROPHILS 68 43 - 78 %    LYMPHOCYTES 19 13 - 44 %    MONOCYTES 8 4.0 - 12.0 %    EOSINOPHILS 4 0.5 - 7.8 %    BASOPHILS 1 0.0 - 2.0 %    IMMATURE GRANULOCYTES 1 0.0 - 5.0 %    ABS. NEUTROPHILS 5.7 1.7 - 8.2 K/UL    ABS. LYMPHOCYTES 1.6 0.5 - 4.6 K/UL    ABS. MONOCYTES 0.7 0.1 - 1.3 K/UL    ABS. EOSINOPHILS 0.3 0.0 - 0.8 K/UL    ABS. BASOPHILS 0.1 0.0 - 0.2 K/UL    ABS. IMM. GRANS. 0.1 0.0 - 0.5 K/UL   METABOLIC PANEL, COMPREHENSIVE    Collection Time: 12/01/20  6:39 AM   Result Value Ref Range    Sodium 142 138 - 145 mmol/L    Potassium 4.9 3.5 - 5.1 mmol/L    Chloride 111 (H) 98 - 107 mmol/L    CO2 24 21 - 32 mmol/L    Anion gap 7 7 - 16 mmol/L    Glucose 88 65 - 100 mg/dL    BUN 40 (H) 8 - 23 MG/DL    Creatinine 3.35 (H) 0.6 - 1.0 MG/DL    GFR est AA 18 (L) >60 ml/min/1.73m2    GFR est non-AA 15 (L) >60 ml/min/1.73m2    Calcium 8.7 8.3 - 10.4 MG/DL    Bilirubin, total 0.3 0.2 - 1.1 MG/DL    ALT (SGPT) 7 (L) 12 - 65 U/L    AST (SGOT) 9 (L) 15 - 37 U/L    Alk.  phosphatase 83 50 - 136 U/L    Protein, total 5.4 (L) 6.3 - 8.2 g/dL    Albumin 2.3 (L) 3.2 - 4.6 g/dL    Globulin 3.1 2.3 - 3.5 g/dL    A-G Ratio 0.7 (L) 1.2 - 3.5       Allergies   Allergen Reactions    Codeine Nausea and Vomiting    Lortab [Hydrocodone-Acetaminophen] Itching     Not Allergic to this medication patient states MR, RN 11/5/2020     Current Facility-Administered Medications   Medication Dose Route Frequency Provider Last Rate Last Dose    aspirin tablet 325 mg  325 mg Oral DAILY Diomedes CAMPUZANO MD   325 mg at 12/01/20 0825    escitalopram oxalate (LEXAPRO) tablet 10 mg  10 mg Oral DAILY Diomedes CAMPUZANO MD   10 mg at 12/01/20 0825    ferrous sulfate tablet 325 mg  325 mg Oral TID WITH MEALS Daphney Smith MD   325 mg at  0254    folic acid (FOLVITE) tablet 1 mg  1 mg Oral DAILY Merair Jo MD   1 mg at 20 0825    [Held by provider] lisinopriL (PRINIVIL, ZESTRIL) tablet 10 mg  10 mg Oral DAILY Merari Jo MD   Stopped at 20 0935    magnesium oxide (MAG-OX) tablet 400 mg  400 mg Oral DAILY Merari Jo MD   400 mg at 20 0825    pantoprazole (PROTONIX) tablet 40 mg  40 mg Oral ACB Kathleen CAMPUZANO MD   40 mg at 20 0401    QUEtiapine (SEROquel) tablet 50 mg  50 mg Oral QHS Kathleen CAMPUZANO MD   50 mg at 20 212    thiamine HCL (B-1) tablet 100 mg  100 mg Oral DAILY Kathleen CAMPUZANO MD   100 mg at 20 0825    traMADoL (ULTRAM) tablet 50 mg  50 mg Oral Q6H PRN Merari Jo MD        sodium chloride (NS) flush 5-40 mL  5-40 mL IntraVENous Q8H Kathleen CAMPUZANO MD   10 mL at 20 0453    sodium chloride (NS) flush 5-40 mL  5-40 mL IntraVENous PRN Merari Jo MD        influenza vaccine 2020-21 (6 mos+)(PF) (FLUARIX/FLULAVAL/FLUZONE QUAD) injection 0.5 mL  0.5 mL IntraMUSCular PRIOR TO DISCHARGE Merari Jo MD        LORazepam (ATIVAN) tablet 1-2 mg  1-2 mg Oral Q1H PRN Merari Jo MD        LORazepam (ATIVAN) tablet 3-4 mg  3-4 mg Oral Q1H PRN Merari Jo MD        heparin (porcine) injection 5,000 Units  5,000 Units SubCUTAneous Q8H Kathleen CAMPUZANO MD   5,000 Units at 20 6714    sodium bicarbonate tablet 650 mg  650 mg Oral TID Denny Gamez NP   650 mg at 20 0825       Review of Systems negative with exception of pertinent positives noted above  PHYSICAL EXAM     Visit Vitals  /76 (BP 1 Location: Left arm, BP Patient Position: At rest)   Pulse 77   Temp 98.6 °F (37 °C)   Resp 16   Ht 5' 5\" (1.651 m)   Wt 90.7 kg (200 lb)   SpO2 99%   Breastfeeding No   BMI 33.28 kg/m²      Temp (24hrs), Av.5 °F (36.9 °C), Min:97.4 °F (36.3 °C), Max:98.9 °F (37.2 °C)    Oxygen Therapy  O2 Sat (%): 99 % (20 1156)  Pulse via Oximetry: 97 beats per minute (11/23/20 1621)  O2 Device: Room air (11/24/20 1604)    Intake/Output Summary (Last 24 hours) at 12/1/2020 1346  Last data filed at 12/1/2020 0444  Gross per 24 hour   Intake    Output 1000 ml   Net -1000 ml      General:       No acute distress, appears chronically ill    Lungs:          CTA bilaterally. Resp even and nonlabored  Heart:            S1S2 present without murmurs rubs gallops. RRR. No LE edema  Abdomen:    Soft, non tender, non distended. BS present  Extremities: RLE with brace.  All ext with muscle wasting  Neurologic:  A/O X3. No gross focal deficits.  Right sided residual weakness, RUE tremors. Results summary of Diagnostic Studies/Procedures copied from within [facility] EMR:      [address] Problems    Diagnosis Date Noted    OSMIN (acute [facility] injury) ([facility] 11/27/2020    Normocytic anemia 11/24/2020    Hyperkalemia 11/24/2020    No able caregiver in household 11/23/2020    Alcoholism ([facility] 05/18/2018    Hypertension 05/18/2018    Depression 02/13/2018    Vascular dementia ([facility] 02/13/2018    Bipolar disorder ([facility] 10/22/2014    CKD (chronic kidney disease) 09/18/2014    Essential hypertension 09/11/2012    FSGS (focal segmental glomerulosclerosis) 09/11/2012     Plan:       OSMIN/CKD  IVF  Renal US no acute findings, findings of medical renal disease  Nephrology following  Holding ACE-I     Hyperkalemia  Resolved  lokelma per Nephro     HTN  Has had hypotension  Holding ACE-I  IVF     Metabolic acidosis  Bicarb po     Vascular dementia/debility  PT/OT  Difficult social situation, CM assisting in DC plan.   who is pt caregiver is hospitalized with confusion.      Bipolar disorder  Denies SI/HI  Home meds        Notes, labs, VS, diagnostic testing reviewed        DVT Prophylaxis: heparin sq  Plan of Care Discussed with: Supervising MD Dr. Cierra Hutton, care team, pt        Helena Kumar NP

## 2020-12-02 LAB
ALBUMIN SERPL-MCNC: 2.7 G/DL (ref 3.2–4.6)
ALBUMIN/GLOB SERPL: 0.8 {RATIO} (ref 1.2–3.5)
ALP SERPL-CCNC: 95 U/L (ref 50–136)
ALT SERPL-CCNC: 8 U/L (ref 12–65)
ANION GAP SERPL CALC-SCNC: 3 MMOL/L (ref 7–16)
AST SERPL-CCNC: 8 U/L (ref 15–37)
BASOPHILS # BLD: 0.1 K/UL (ref 0–0.2)
BASOPHILS NFR BLD: 1 % (ref 0–2)
BILIRUB SERPL-MCNC: 0.4 MG/DL (ref 0.2–1.1)
BUN SERPL-MCNC: 38 MG/DL (ref 8–23)
CALCIUM SERPL-MCNC: 9 MG/DL (ref 8.3–10.4)
CHLORIDE SERPL-SCNC: 111 MMOL/L (ref 98–107)
CO2 SERPL-SCNC: 29 MMOL/L (ref 21–32)
CREAT SERPL-MCNC: 3.28 MG/DL (ref 0.6–1)
DIFFERENTIAL METHOD BLD: ABNORMAL
EOSINOPHIL # BLD: 0.4 K/UL (ref 0–0.8)
EOSINOPHIL NFR BLD: 5 % (ref 0.5–7.8)
ERYTHROCYTE [DISTWIDTH] IN BLOOD BY AUTOMATED COUNT: 14.3 % (ref 11.9–14.6)
GLOBULIN SER CALC-MCNC: 3.3 G/DL (ref 2.3–3.5)
GLUCOSE SERPL-MCNC: 89 MG/DL (ref 65–100)
HCT VFR BLD AUTO: 29.5 % (ref 35.8–46.3)
HGB BLD-MCNC: 9 G/DL (ref 11.7–15.4)
IMM GRANULOCYTES # BLD AUTO: 0 K/UL (ref 0–0.5)
IMM GRANULOCYTES NFR BLD AUTO: 1 % (ref 0–5)
LYMPHOCYTES # BLD: 1.4 K/UL (ref 0.5–4.6)
LYMPHOCYTES NFR BLD: 17 % (ref 13–44)
MCH RBC QN AUTO: 28.6 PG (ref 26.1–32.9)
MCHC RBC AUTO-ENTMCNC: 30.5 G/DL (ref 31.4–35)
MCV RBC AUTO: 93.7 FL (ref 79.6–97.8)
MONOCYTES # BLD: 0.6 K/UL (ref 0.1–1.3)
MONOCYTES NFR BLD: 8 % (ref 4–12)
NEUTS SEG # BLD: 5.4 K/UL (ref 1.7–8.2)
NEUTS SEG NFR BLD: 68 % (ref 43–78)
NRBC # BLD: 0 K/UL (ref 0–0.2)
PLATELET # BLD AUTO: 208 K/UL (ref 150–450)
PMV BLD AUTO: 9.7 FL (ref 9.4–12.3)
POTASSIUM SERPL-SCNC: 4.8 MMOL/L (ref 3.5–5.1)
PROT SERPL-MCNC: 6 G/DL (ref 6.3–8.2)
RBC # BLD AUTO: 3.15 M/UL (ref 4.05–5.2)
SODIUM SERPL-SCNC: 143 MMOL/L (ref 138–145)
WBC # BLD AUTO: 7.9 K/UL (ref 4.3–11.1)

## 2020-12-02 PROCEDURE — 74011250636 HC RX REV CODE- 250/636: Performed by: FAMILY MEDICINE

## 2020-12-02 PROCEDURE — 97530 THERAPEUTIC ACTIVITIES: CPT

## 2020-12-02 PROCEDURE — 97535 SELF CARE MNGMENT TRAINING: CPT

## 2020-12-02 PROCEDURE — 80053 COMPREHEN METABOLIC PANEL: CPT

## 2020-12-02 PROCEDURE — 2709999900 HC NON-CHARGEABLE SUPPLY

## 2020-12-02 PROCEDURE — 65270000029 HC RM PRIVATE

## 2020-12-02 PROCEDURE — 74011250637 HC RX REV CODE- 250/637: Performed by: NURSE PRACTITIONER

## 2020-12-02 PROCEDURE — 85025 COMPLETE CBC W/AUTO DIFF WBC: CPT

## 2020-12-02 PROCEDURE — 36415 COLL VENOUS BLD VENIPUNCTURE: CPT

## 2020-12-02 PROCEDURE — 74011250637 HC RX REV CODE- 250/637: Performed by: FAMILY MEDICINE

## 2020-12-02 PROCEDURE — 77030038269 HC DRN EXT URIN PURWCK BARD -A

## 2020-12-02 RX ADMIN — FERROUS SULFATE TAB 325 MG (65 MG ELEMENTAL FE) 325 MG: 325 (65 FE) TAB at 11:28

## 2020-12-02 RX ADMIN — FERROUS SULFATE TAB 325 MG (65 MG ELEMENTAL FE) 325 MG: 325 (65 FE) TAB at 09:21

## 2020-12-02 RX ADMIN — SODIUM BICARBONATE 650 MG TABLET 650 MG: at 16:47

## 2020-12-02 RX ADMIN — HEPARIN SODIUM 5000 UNITS: 5000 INJECTION INTRAVENOUS; SUBCUTANEOUS at 05:21

## 2020-12-02 RX ADMIN — ESCITALOPRAM OXALATE 10 MG: 10 TABLET ORAL at 09:21

## 2020-12-02 RX ADMIN — FERROUS SULFATE TAB 325 MG (65 MG ELEMENTAL FE) 325 MG: 325 (65 FE) TAB at 16:47

## 2020-12-02 RX ADMIN — HEPARIN SODIUM 5000 UNITS: 5000 INJECTION INTRAVENOUS; SUBCUTANEOUS at 21:58

## 2020-12-02 RX ADMIN — Medication 400 MG: at 09:21

## 2020-12-02 RX ADMIN — Medication 100 MG: at 09:21

## 2020-12-02 RX ADMIN — Medication 10 ML: at 21:58

## 2020-12-02 RX ADMIN — SODIUM BICARBONATE 650 MG TABLET 650 MG: at 21:58

## 2020-12-02 RX ADMIN — QUETIAPINE FUMARATE 50 MG: 25 TABLET ORAL at 21:58

## 2020-12-02 RX ADMIN — PANTOPRAZOLE SODIUM 40 MG: 40 TABLET, DELAYED RELEASE ORAL at 05:22

## 2020-12-02 RX ADMIN — SODIUM BICARBONATE 650 MG TABLET 650 MG: at 09:21

## 2020-12-02 RX ADMIN — HEPARIN SODIUM 5000 UNITS: 5000 INJECTION INTRAVENOUS; SUBCUTANEOUS at 14:48

## 2020-12-02 RX ADMIN — ASPIRIN 325 MG ORAL TABLET 325 MG: 325 PILL ORAL at 09:21

## 2020-12-02 RX ADMIN — FOLIC ACID 1 MG: 1 TABLET ORAL at 09:21

## 2020-12-02 NOTE — PROGRESS NOTES
Progress Note    2020  Admit Date: 2020  4:19 PM   NAME: Chon Best   :  1960   MRN:  [id number]   Attending: Escobar Franklin MD  PCP:  Ava Stapleton MD  Treatment Team: Attending Provider: Escobar Franklin MD; Utilization Review: Jamal Alvarenga RN; Care Manager: Arely Romano, RN; Nurse Practitioner: Rosa M Chavez NP; Hospitalist: Iris Gutierrez NP; Nurse Practitioner: Alyssa Ivey NP; Utilization Review: Nghia Mosquera RN; Charge Nurse: Rosy Brown; Physical Therapist: Kaveh Hansen DPT; Occupational Therapist: Todd Hodgkin, COTA    Full Code   SUBJECTIVE:   Ms. Leandro Vallejo is a 60 yo female with PMH of carotid artery aneurysm s/p coil, HTN, CVA, dementia, CKD, bed bound who presented with her  who was intubated and admitted therefore no one to take care of her at [facility] found called after PT found pt with  passed out on top of her. She reports decreased po intake.  Found to have OSMIN/CKD, creatinine 4.88 (baseline upper 2 range).  Started on IVF. Nephrology consulted. Holding ACE. Renal US showed no acute findings.  Hyperkalemia resolved. Started on bicarb po.  No complaints today.  APS involved. Pt  is still hospitalized and is confused. She is unable to go home with no caregivers. waiting on approval from STR.       Past Medical History:   Diagnosis Date    Aneurysm of common carotid artery ([facility]     left side s/p coil     CKD (chronic kidney disease) 2014    Dementia ([facility]     FSGS (focal segmental glomerulosclerosis)     in remission at present    Gout     Hypertension     managed with medication     Osteoarthritis 2014    Stroke ([facility] ,     slight weakness on right side, slight effect to speech     Recent Results (from the past 24 hour(s))   SARS-COV-2    Collection Time: 20  4:59 PM   Result Value Ref Range    Specimen source Nasopharyngeal      SARS CoV-2 PENDING    CBC WITH AUTOMATED DIFF    Collection Time: 12/02/20  6:22 AM   Result Value Ref Range    WBC 7.9 4.3 - 11.1 K/uL    RBC 3.15 (L) 4.05 - 5.2 M/uL    HGB 9.0 (L) 11.7 - 15.4 g/dL    HCT 29.5 (L) 35.8 - 46.3 %    MCV 93.7 79.6 - 97.8 FL    MCH 28.6 26.1 - 32.9 PG    MCHC 30.5 (L) 31.4 - 35.0 g/dL    RDW 14.3 11.9 - 14.6 %    PLATELET 278 503 - 727 K/uL    MPV 9.7 9.4 - 12.3 FL    ABSOLUTE NRBC 0.00 0.0 - 0.2 K/uL    DF AUTOMATED      NEUTROPHILS 68 43 - 78 %    LYMPHOCYTES 17 13 - 44 %    MONOCYTES 8 4.0 - 12.0 %    EOSINOPHILS 5 0.5 - 7.8 %    BASOPHILS 1 0.0 - 2.0 %    IMMATURE GRANULOCYTES 1 0.0 - 5.0 %    ABS. NEUTROPHILS 5.4 1.7 - 8.2 K/UL    ABS. LYMPHOCYTES 1.4 0.5 - 4.6 K/UL    ABS. MONOCYTES 0.6 0.1 - 1.3 K/UL    ABS. EOSINOPHILS 0.4 0.0 - 0.8 K/UL    ABS. BASOPHILS 0.1 0.0 - 0.2 K/UL    ABS. IMM. GRANS. 0.0 0.0 - 0.5 K/UL   METABOLIC PANEL, COMPREHENSIVE    Collection Time: 12/02/20  6:22 AM   Result Value Ref Range    Sodium 143 138 - 145 mmol/L    Potassium 4.8 3.5 - 5.1 mmol/L    Chloride 111 (H) 98 - 107 mmol/L    CO2 29 21 - 32 mmol/L    Anion gap 3 (L) 7 - 16 mmol/L    Glucose 89 65 - 100 mg/dL    BUN 38 (H) 8 - 23 MG/DL    Creatinine 3.28 (H) 0.6 - 1.0 MG/DL    GFR est AA 18 (L) >60 ml/min/1.73m2    GFR est non-AA 15 (L) >60 ml/min/1.73m2    Calcium 9.0 8.3 - 10.4 MG/DL    Bilirubin, total 0.4 0.2 - 1.1 MG/DL    ALT (SGPT) 8 (L) 12 - 65 U/L    AST (SGOT) 8 (L) 15 - 37 U/L    Alk.  phosphatase 95 50 - 136 U/L    Protein, total 6.0 (L) 6.3 - 8.2 g/dL    Albumin 2.7 (L) 3.2 - 4.6 g/dL    Globulin 3.3 2.3 - 3.5 g/dL    A-G Ratio 0.8 (L) 1.2 - 3.5       Allergies   Allergen Reactions    Codeine Nausea and Vomiting    Lortab [Hydrocodone-Acetaminophen] Itching     Not Allergic to this medication patient states MR, RN 11/5/2020     Current Facility-Administered Medications   Medication Dose Route Frequency Provider Last Rate Last Dose    aspirin tablet 325 mg  325 mg Oral DAILY Artist MD Zenaida   325 mg at 12/02/20 1105  escitalopram oxalate (LEXAPRO) tablet 10 mg  10 mg Oral DAILY Dain Davdi MD   10 mg at 12/02/20 1348    ferrous sulfate tablet 325 mg  325 mg Oral TID WITH MEALS Dain David MD   325 mg at 93/79/52 2607    folic acid (FOLVITE) tablet 1 mg  1 mg Oral DAILY Dain David MD   1 mg at 12/02/20 4651    [Held by provider] lisinopriL (PRINIVIL, ZESTRIL) tablet 10 mg  10 mg Oral DAILY Dain David MD   Stopped at 11/24/20 0935    magnesium oxide (MAG-OX) tablet 400 mg  400 mg Oral DAILY Angie CAMPUZANO MD   400 mg at 12/02/20 5884    pantoprazole (PROTONIX) tablet 40 mg  40 mg Oral ACB Angie CAMPUZANO MD   40 mg at 12/02/20 0522    QUEtiapine (SEROquel) tablet 50 mg  50 mg Oral QHS Angie CAMPUZANO MD   50 mg at 12/01/20 2127    thiamine HCL (B-1) tablet 100 mg  100 mg Oral DAILY Dain David MD   100 mg at 12/02/20 5901    traMADoL (ULTRAM) tablet 50 mg  50 mg Oral Q6H PRN Dain David MD        sodium chloride (NS) flush 5-40 mL  5-40 mL IntraVENous Q8H Dain David MD   Stopped at 11/30/20 0546    sodium chloride (NS) flush 5-40 mL  5-40 mL IntraVENous PRN Dain David MD        influenza vaccine 2020-21 (6 mos+)(PF) (FLUARIX/FLULAVAL/FLUZONE QUAD) injection 0.5 mL  0.5 mL IntraMUSCular PRIOR TO DISCHARGE Dain David MD        LORazepam (ATIVAN) tablet 1-2 mg  1-2 mg Oral Q1H PRN Dain David MD        LORazepam (ATIVAN) tablet 3-4 mg  3-4 mg Oral Q1H PRN Dain David MD        heparin (porcine) injection 5,000 Units  5,000 Units SubCUTAneous Q8H Angie CAMPUZANO MD   5,000 Units at 12/02/20 8253    sodium bicarbonate tablet 650 mg  650 mg Oral TID Shikha Jeni, NP   650 mg at 12/02/20 2841       Review of Systems negative with exception of pertinent positives noted above  PHYSICAL EXAM     Visit Vitals  /77 (BP 1 Location: Left arm, BP Patient Position: At rest)   Pulse 80   Temp 98.6 °F (37 °C)   Resp 18   Ht 5' 5\" (1.651 m)   Wt 90.7 kg (200 lb)   SpO2 96%   Breastfeeding No   BMI 33.28 kg/m²      Temp (24hrs), Av.2 °F (36.8 °C), Min:97.5 °F (36.4 °C), Max:98.6 °F (37 °C)    Oxygen Therapy  O2 Sat (%): 96 % (20 1224)  Pulse via Oximetry: 97 beats per minute (20 1621)  O2 Device: Room air (20 1604)  No intake or output data in the 24 hours ending 20 1342     General:       No acute distress, appears chronically ill    Lungs:          CTA bilaterally. Resp even and nonlabored  Heart:            S1S2 present without murmurs rubs gallops. RRR. No LE edema  Abdomen:    Soft, non tender, non distended. BS present  Extremities: RLE with brace.  All ext with muscle wasting  Neurologic:  A/O X3. No gross focal deficits.  Right sided residual weakness, RUE tremors. Results summary of Diagnostic Studies/Procedures copied from within [facility] EMR:         [address] Problems    Diagnosis Date Noted    [facility] ([facility]) ([facility] 2020    Normocytic anemia 2020    Hyperkalemia 2020    No able caregiver in household 2020    Alcoholism ([facility] 2018    Hypertension 2018    Depression 2018    Vascular dementia ([facility] 2018    B[facility] 10/22/2014    CKD (chronic kidney disease) 2014    Essential hypertension 2012    FSGS (focal segmental glomerulosclerosis) 2012     Plan:    OSMIN/CKD  IVF  Renal US no acute findings, findings of medical renal disease  Nephrology following  Holding ACE-I     Hyperkalemia  Resolved  lokelma per Nephro     HTN  Has had hypotension but now resolved   Holding ACE-I       Metabolic acidosis  Bicarb po     Vascular dementia/debility  PT/OT  Difficult social situation, CM assisting in DC plan.   who is pt caregiver is hospitalized with confusion.      Bipolar disorder  Denies SI/HI  Home meds        Notes, labs, VS, diagnostic testing reviewed      Awaiting STR bed approval      DVT Prophylaxis: heparin sq  Plan of Care Discussed with: Supervising MD Dr. Minal Sotomayor, care team, pt        Dahiana Paris, NP

## 2020-12-02 NOTE — PROGRESS NOTES
Comprehensive Nutrition Assessment    Type and Reason for Visit: Reassess    Nutrition Recommendations/Plan:   Meals and Snacks:  Continue current diet. Nutrition Supplement Therapy:  Medical food supplement therapy:  Discontinue ensure enlive. Initiate    Nepro twice per day (this provides 420 kcal and 19 grams protein per bottle)       Nutrition Assessment:   Nutrition History: Patient with vague nutrition history. She reports she has not been eating as much since she returned home from [facility]. She states she was not eating much there because she did not like the food. Cultural/Tenriism/Ethnic Food Preference(s): None   Nutrition Background: Patient with carotid artery aneurysm, CKD, dementia, HTN, CVA, functional paraplegia. She came to the hospital with her  as he is her primary caregiver and no one else is available to take care of her. He was intubated and admitted to ICU. Daily Update:  Pt seen reclined in bed. Observed 1 bottle of ensure enlive consumed at bedside. Pt states that she can not recall how much of her breakfast she consumed/what she consumed. Discharge pending placement at [facility]. Pertinent labs: K+ 4.8, BUN 38, Cr 3.28  Pertinent medications: folvite, ferrous sulfate, daily mag-ox, sodium bicarbonate PO TID, protonix, thiamine    Nutrition Related Findings: no wasting noted      Current Nutrition Therapies:  DIET NUTRITIONAL SUPPLEMENTS Breakfast; Ensure Enlive  DIET RENAL Regular    Current Intake: Average Meal Intake: Unable to assess(1 recorded meal intake of 50%. ) Average Supplement Intake: (100% of ensure enlive observed consumed this AM)      Anthropometric Measures:  Height: 5' 5\" (165.1 cm)  Current Body Wt: 90.7 kg (199 lb 15.3 oz), Weight source: Not specified(11/23)  BMI: 33.3, Obese class 1 (BMI 30.0-34.9)     Ideal Body Wt: 125 lbs (57 kg), 160 %  Usual Body Wt: (weight varies from 195-205 pounds throughout this year), Percent weight change: Estimated Daily Nutrient Needs:  Energy (kcal): 3812-3198 (Kcal/kg(23-28), Weight Used: Ideal(56.8 kg))  Protein (g): 45-57(0.8-1 g/kg) Weight Used: (Ideal)  Fluid (ml/day): 9784-2969 (1 ml/kcal)    Nutrition Diagnosis:   · Predicted inadequate energy intake related to (pt report of decreased appetite) as evidenced by (limited recorded intakes.)    Nutrition Interventions:   Food and/or Nutrient Delivery: Continue current diet, Modify oral nutrition supplement     Coordination of Nutrition Care: Continue to monitor while inpatient  Plan of Care discussed with Nela Dumont RN    Goals: Previous Goal Met: Progressing toward goal(s)  Meet at least 75% nutrition needs within 7 days    Nutrition Monitoring and Evaluation:      Food/Nutrient Intake Outcomes: Food and nutrient intake, Supplement intake       Discharge Planning:     Too soon to determine    Ney Spence [address], [address] [place] [address], [address] [place] [address], [address] [place] [address] Av.

## 2020-12-02 NOTE — PROGRESS NOTES
Problem: Pressure Injury - Risk of  Goal: *Prevention of pressure injury  Description: Document Luis Scale and appropriate interventions in the flowsheet. Outcome: Progressing Towards Goal  Note: Pressure Injury Interventions:  Sensory Interventions: Assess changes in LOC, Avoid rigorous massage over bony prominences    Moisture Interventions: Absorbent underpads, Check for incontinence Q2 hours and as needed    Activity Interventions: Increase time out of bed, Pressure redistribution bed/mattress(bed type), PT/OT evaluation    Mobility Interventions: HOB 30 degrees or less, Pressure redistribution bed/mattress (bed type), PT/OT evaluation    Nutrition Interventions: Document food/fluid/supplement intake, Offer support with meals,snacks and hydration    Friction and Shear Interventions: HOB 30 degrees or less                Problem: Patient Education: Go to Patient Education Activity  Goal: Patient/Family Education  Outcome: Progressing Towards Goal     Problem: Falls - Risk of  Goal: *Absence of Falls  Description: Document Damien Fall Risk and appropriate interventions in the flowsheet.   Outcome: Progressing Towards Goal  Note: Fall Risk Interventions:  Mobility Interventions: Bed/chair exit alarm, Communicate number of staff needed for ambulation/transfer, Patient to call before getting OOB         Medication Interventions: Bed/chair exit alarm, Patient to call before getting OOB, Teach patient to arise slowly    Elimination Interventions: Bed/chair exit alarm, Call light in reach, Patient to call for help with toileting needs, Stay With Me (per policy), Toilet paper/wipes in reach, Toileting schedule/hourly rounds    History of Falls Interventions: Bed/chair exit alarm, Consult care management for discharge planning, Door open when patient unattended, Investigate reason for fall         Problem: Patient Education: Go to Patient Education Activity  Goal: Patient/Family Education  Outcome: Progressing Towards Goal     Problem: Pain  Goal: *Control of Pain  Outcome: Progressing Towards Goal     Problem: Patient Education: Go to Patient Education Activity  Goal: Patient/Family Education  Outcome: Progressing Towards Goal     Problem: General Medical Care Plan  Goal: *Vital signs within specified parameters  Outcome: Progressing Towards Goal  Goal: *Labs within defined limits  Outcome: Progressing Towards Goal  Goal: *Absence of infection signs and symptoms  Outcome: Progressing Towards Goal  Goal: *Optimal pain control at patient's stated goal  Outcome: Progressing Towards Goal  Goal: *Skin integrity maintained  Outcome: Progressing Towards Goal  Goal: *Fluid volume balance  Outcome: Progressing Towards Goal  Goal: *Optimize nutritional status  Outcome: Progressing Towards Goal  Goal: *Anxiety reduced or absent  Outcome: Progressing Towards Goal  Goal: *Progressive mobility and function (eg: ADL's)  Outcome: Progressing Towards Goal     Problem: Patient Education: Go to Patient Education Activity  Goal: Patient/Family Education  Outcome: Progressing Towards Goal     Problem: Patient Education: Go to Patient Education Activity  Goal: Patient/Family Education  Outcome: Progressing Towards Goal     Problem: Patient Education: Go to Patient Education Activity  Goal: Patient/Family Education  Outcome: Progressing Towards Goal

## 2020-12-02 NOTE — PROGRESS NOTES
Problem: Self Care Deficits Care Plan (Adult)  Goal: *Acute Goals and Plan of Care (Insert Text)  Description: 1. Patient will complete upper body bathing and dressing with setup and adaptive equipment as needed. 2. Patient will complete toileting with minimal assistance. 3. Patient will tolerate 20 minutes of OT treatment with as needed rest breaks to increase activity tolerance for ADLs. 4. Patient will complete functional transfers with minimal assistance and adaptive equipment as needed. 5. Patient will complete self feeding and grooming with modified independence. Timeframe: 7 visits     Outcome: Progressing Towards Goal       OCCUPATIONAL THERAPY: Daily Note and AM    12/2/2020  INPATIENT: OT Visit Days: 7  Payor: Ayesha Lewis / Plan: Rocael Dickey MEDICARE COMPLETE / Product Type: Managed Care Medicare /      NAME/AGE/GENDER: Chon Best is a 61 y.o. female   PRIMARY DIAGNOSIS:  No able caregiver in household [Z74.2]  OSMIN (acute kidney injury) ([facility] [address] [N17.9] No able caregiver in household No able caregiver in household       ICD-10: Treatment Diagnosis:    · Generalized Muscle Weakness (M62.81)  · Other lack of cordination (R27.8)   Precautions/Allergies:    Codeine and Lortab [hydrocodone-acetaminophen]      ASSESSMENT:     Ms. Leandro Vallejo is a 61year old female here because her caregiver/  is hospitalized and unable to provide care for her. She has history of hip fracture in Sept 2020, CVA with R sided deficits, and bipolar disorder. Patient reports she is bed bound and requires assistance with all ADLs & IADLS from spouse. She was also having HHPT. Reports she has not walked in months due to a \"bad knee\" but patient is a poor historian. Patient was evaluated with physical therapist and may be appropriate for ongoing skilled co-treatment at future sessions. BUE assessment reveals decreased ROM/ strength/ coordination in RUE compared to LUE from previous CVA.  Per patient she has had 3 strokes, but does not know when they occurred. Functionally, she primarily uses only her LUE. Balance is impaired in sitting and in standing (fair). Cognitively, patient has delayed responses, decreased attention span, and decreased insight. Also disoriented to time (December 2019). She is currently requiring assistance with all ADLs and mobility and would benefit from continued occupational therapy to increase independence and safety. Will follow. 12/2/2020 Pt presents supine upon arrival. Pt complete bed to chair transfer using sliding board today due to patient not being able to bear weight on RLE. Pt required max assist to complete as well today. Pt was able to perform simple grooming tasks at sink. Pt is self limiting at times. Encouraged patient to continue to do more for herself including using BSC instead of brief. Making minimal progress with goals. Will continue to benefit from skilled OT during stay. This section established at most recent assessment   PROBLEM LIST (Impairments causing functional limitations):  1. Decreased Strength  2. Decreased ADL/Functional Activities  3. Decreased Transfer Abilities  4. Decreased Ambulation Ability/Technique  5. Decreased Balance  6. Increased Pain  7. Decreased Activity Tolerance  8. Decreased Cognition   INTERVENTIONS PLANNED: (Benefits and precautions of occupational therapy have been discussed with the patient.)  1. Activities of daily living training  2. Adaptive equipment training  3. Neuromuscular re-eduation  4. Therapeutic activity  5. Therapeutic exercise     TREATMENT PLAN: Frequency/Duration: Follow patient 3x/ week to address above goals. Rehabilitation Potential For Stated Goals: [facility] (at time of discharge pending progress):    Placement: It is my opinion, based on this patient's performance to date, that Ms. Tam Mcarthur may benefit from intensive therapy at a [address] [place] [address] after discharge due to the functional deficits listed above that are likely to improve with skilled rehabilitation and concerns that he/she may be unsafe to be unsupervised at home due to lack of caregiver. Equipment:    None at this time              OCCUPATIONAL PROFILE AND HISTORY:   History of Present Injury/Illness (Reason for Referral):  See H&P  Past Medical History/Comorbidities:   Ms. Lori Dey  has a past medical history of Aneurysm of common carotid artery ([facility] [address] (2004), CKD (chronic kidney disease) (9/18/2014), Dementia ([facility] [address] FSGS (focal segmental glomerulosclerosis), Gout, Hypertension, Osteoarthritis (9/18/2014), and Stroke ([facility] [address] (2004, 2013). Ms. Lori Dey  has a past surgical history that includes hx intracranial aneurysm repair (2004); hx refractive surgery; hx orthopaedic (Right, 10/2014); hx ankle fracture tx (Left, 2013); hx ercp (02/27/2018); and hx cholecystectomy (02/28/2018). Social History/Living Environment:   Home Environment: Private residence  # Steps to Enter: 0  One/Two Story Residence: Two story, live on 1st floor  # of Interior Steps: 13  Height of Each Step (in): 7 inches  Interior Rails: Right  Lift Chair Available: No  Living Alone: No  Support Systems: Home care staff  Patient Expects to be Discharged to[de-identified] Unknown  Current DME Used/Available at Home: Wheelchair, Shower chair, Commode, bedside, Blood pressure cuff, Brace/Splint, Grab bars, Walker  Tub or Shower Type: (sponge baths)  Prior Level of Function/Work/Activity:  She has history of hip fracture in Sept 2020, CVA with R sided deficits, and bipolar disorder. Patient reports she is bed bound and requires assistance with all ADLs from spouse. She was also having HHPT. Reports she has not walked in months due to a \"bad knee\" but patient is a poor historian.       Number of Personal Factors/Comorbidities that affect the Plan of Care: Extensive review of physical, cognitive, and psychosocial performance (3+):  HIGH COMPLEXITY   ASSESSMENT OF OCCUPATIONAL PERFORMANCE[de-identified]   Activities of Daily Living:   Basic ADLs (From Assessment) Complex ADLs (From Assessment)   Feeding: Minimum assistance  Oral Facial Hygiene/Grooming: Moderate assistance  Bathing: Maximum assistance  Upper Body Dressing: Moderate assistance  Lower Body Dressing: Total assistance  Toileting: Total assistance Instrumental ADL  Meal Preparation: Total assistance  Homemaking: Total assistance  Medication Management: Total assistance  Financial Management: Total assistance   Grooming/Bathing/Dressing Activities of Daily Living   Grooming  Washing Face: Set-up; Supervision  Brushing Teeth: Minimum assistance  Brushing/Combing Hair: Set-up; Supervision Cognitive Retraining  Safety/Judgement: Awareness of environment                       Bed/Mat Mobility  Supine to Sit: Minimum assistance; Moderate assistance  Bed to Chair: Moderate assistance;Maximum assistance; Additional time(sliding board)  Scooting: Moderate assistance;Maximum assistance     Most Recent Physical Functioning:   Gross Assessment:                  Posture:  Posture (WDL): Exceptions to WDL  Posture Assessment: Forward head, Rounded shoulders  Balance:  Sitting - Static: Fair (occasional)  Sitting - Dynamic: Fair (occasional) Bed Mobility:  Supine to Sit: Minimum assistance; Moderate assistance  Scooting: Moderate assistance;Maximum assistance  Wheelchair Mobility:     Transfers:  Bed to Chair: Moderate assistance;Maximum assistance; Additional time(sliding board)            Patient Vitals for the past 6 hrs:   BP BP Patient Position SpO2 Pulse   12/02/20 0733 129/78 At rest 94 % 83   12/02/20 1224 125/77 At rest 96 % 80       Mental Status  Neurologic State: Alert  Orientation Level: Oriented X4  Cognition: Follows commands  Perception: Appears intact  Perseveration: No perseveration noted  Safety/Judgement: Awareness of environment                          Physical Skills Involved:  1. Range of Motion  2. Balance  3. Strength  4.  Activity Tolerance  5. Pain (Chronic) Cognitive Skills Affected (resulting in the inability to perform in a timely and safe manner):  1. Executive Function  2. Long Term Memory  3. Sustained Attention  4. Divided Attention  5. Comprehension Psychosocial Skills Affected:  1. Habits/Routines  2. Environmental Adaptation  3. Self-Awareness   Number of elements that affect the Plan of Care: 5+:  HIGH COMPLEXITY   CLINICAL DECISION MAKING:   [employer] AM-PAC 6 Clicks   Daily Activity Inpatient Short Form  How much help from another person does the patient currently need. .. Total A Lot A Little None   1. Putting on and taking off regular lower body clothing? [x] 1   [] 2   [] 3   [] 4   2. Bathing (including washing, rinsing, drying)? [] 1   [x] 2   [] 3   [] 4   3. Toileting, which includes using toilet, bedpan or urinal?   [] 1   [x] 2   [] 3   [] 4   4. Putting on and taking off regular upper body clothing? [] 1   [x] 2   [] 3   [] 4   5. Taking care of personal grooming such as brushing teeth? [] 1   [x] 2   [] 3   [] 4   6. Eating meals? [] 1   [x] 2   [] 3   [] 4   © 2007, Trustees of [facility] [employer], under license to [employer]. All rights reserved      Score:  Initial: 11 Most Recent: X (Date: -- )    Interpretation of Tool:  Represents activities that are increasingly more difficult (i.e. Bed mobility, Transfers, Gait). Medical Necessity:     · Patient demonstrates   · fair  ·  rehab potential due to higher previous functional level. Reason for Services/Other Comments:  · Patient   · continues to require present interventions due to patient's inability to care for self without assistance from others.    · .   Use of outcome tool(s) and clinical judgement create a POC that gives a: HIGH COMPLEXITY         TREATMENT:   (In addition to Assessment/Re-Assessment sessions the following treatments were rendered)     Pre-treatment Symptoms/Complaints:    Pain: Initial:    Post Session:  none (does c/o knee pain when moving)     Therapeutic Activity: (10 minutes): Therapeutic activities including Bed transfers, Chair transfers and sitting edge of bed to improve mobility, strength and balance. Required maximal assistance x2 to promote static and dynamic balance in standing. Self Care: (20 minutes): Procedure(s) (per grid) utilized to improve and/or restore self-care/home management as related to dressing, bathing, toileting and grooming. Required moderate verbal, manual and tactile cueing to facilitate activities of daily living skills and compensatory activities. Yellow theraband Date:  11/24 Date:   Date:     Activity/Exercise Parameters Parameters Parameters   Shoulder Abd/Adduction 20 reps     Shoulder Flexion 10 reps with 1# dowel     Elbow Flexion 10 reps with dowel and theraband     Punches (Chest Press) 10 reps                         Braces/Orthotics/Lines/Etc:   · IV  · O2 Device: Room air  Treatment/Session Assessment:    · Response to Treatment:  limited participation, needs encouragement to participate. · Interdisciplinary Collaboration:   o Certified Occupational Therapy Assistant  o Registered Nurse  · After treatment position/precautions:   o Up in chair  o Bed alarm/tab alert on  o Bed/Chair-wheels locked  o Bed in low position  o Call light within reach   · Compliance with Program/Exercises: Will assess as treatment progresses. · Recommendations/Intent for next treatment session: \"Next visit will focus on advancements to more challenging activities and reduction in assistance provided\".   Total Treatment Duration:  OT Patient Time In/Time Out  Time In: 1100  Time Out: 1535 Claudia Decker

## 2020-12-03 LAB
ALBUMIN SERPL-MCNC: 2.7 G/DL (ref 3.2–4.6)
ALBUMIN/GLOB SERPL: 0.8 {RATIO} (ref 1.2–3.5)
ALP SERPL-CCNC: 104 U/L (ref 50–136)
ALT SERPL-CCNC: 10 U/L (ref 12–65)
ANION GAP SERPL CALC-SCNC: 5 MMOL/L (ref 7–16)
AST SERPL-CCNC: 7 U/L (ref 15–37)
BASOPHILS # BLD: 0.1 K/UL (ref 0–0.2)
BASOPHILS NFR BLD: 1 % (ref 0–2)
BILIRUB SERPL-MCNC: 0.4 MG/DL (ref 0.2–1.1)
BUN SERPL-MCNC: 38 MG/DL (ref 8–23)
CALCIUM SERPL-MCNC: 8.8 MG/DL (ref 8.3–10.4)
CHLORIDE SERPL-SCNC: 109 MMOL/L (ref 98–107)
CO2 SERPL-SCNC: 29 MMOL/L (ref 21–32)
CREAT SERPL-MCNC: 3.09 MG/DL (ref 0.6–1)
DIFFERENTIAL METHOD BLD: ABNORMAL
EOSINOPHIL # BLD: 0.4 K/UL (ref 0–0.8)
EOSINOPHIL NFR BLD: 4 % (ref 0.5–7.8)
ERYTHROCYTE [DISTWIDTH] IN BLOOD BY AUTOMATED COUNT: 14.2 % (ref 11.9–14.6)
GLOBULIN SER CALC-MCNC: 3.6 G/DL (ref 2.3–3.5)
GLUCOSE SERPL-MCNC: 85 MG/DL (ref 65–100)
HCT VFR BLD AUTO: 31.2 % (ref 35.8–46.3)
HGB BLD-MCNC: 9.6 G/DL (ref 11.7–15.4)
IMM GRANULOCYTES # BLD AUTO: 0.1 K/UL (ref 0–0.5)
IMM GRANULOCYTES NFR BLD AUTO: 1 % (ref 0–5)
LYMPHOCYTES # BLD: 1.5 K/UL (ref 0.5–4.6)
LYMPHOCYTES NFR BLD: 17 % (ref 13–44)
MCH RBC QN AUTO: 28.5 PG (ref 26.1–32.9)
MCHC RBC AUTO-ENTMCNC: 30.8 G/DL (ref 31.4–35)
MCV RBC AUTO: 92.6 FL (ref 79.6–97.8)
MONOCYTES # BLD: 0.5 K/UL (ref 0.1–1.3)
MONOCYTES NFR BLD: 6 % (ref 4–12)
NEUTS SEG # BLD: 5.9 K/UL (ref 1.7–8.2)
NEUTS SEG NFR BLD: 71 % (ref 43–78)
NRBC # BLD: 0 K/UL (ref 0–0.2)
PLATELET # BLD AUTO: 228 K/UL (ref 150–450)
PMV BLD AUTO: 9.9 FL (ref 9.4–12.3)
POTASSIUM SERPL-SCNC: 5 MMOL/L (ref 3.5–5.1)
PROT SERPL-MCNC: 6.3 G/DL (ref 6.3–8.2)
RBC # BLD AUTO: 3.37 M/UL (ref 4.05–5.2)
SARS COV-2, XPGCVT: NEGATIVE
SODIUM SERPL-SCNC: 143 MMOL/L (ref 138–145)
SOURCE, COVRS: NORMAL
WBC # BLD AUTO: 8.4 K/UL (ref 4.3–11.1)

## 2020-12-03 PROCEDURE — 36415 COLL VENOUS BLD VENIPUNCTURE: CPT

## 2020-12-03 PROCEDURE — 97110 THERAPEUTIC EXERCISES: CPT

## 2020-12-03 PROCEDURE — 97530 THERAPEUTIC ACTIVITIES: CPT

## 2020-12-03 PROCEDURE — 85025 COMPLETE CBC W/AUTO DIFF WBC: CPT

## 2020-12-03 PROCEDURE — 65270000029 HC RM PRIVATE

## 2020-12-03 PROCEDURE — 74011250636 HC RX REV CODE- 250/636: Performed by: FAMILY MEDICINE

## 2020-12-03 PROCEDURE — 74011250637 HC RX REV CODE- 250/637: Performed by: NURSE PRACTITIONER

## 2020-12-03 PROCEDURE — 77030038269 HC DRN EXT URIN PURWCK BARD -A

## 2020-12-03 PROCEDURE — 80053 COMPREHEN METABOLIC PANEL: CPT

## 2020-12-03 PROCEDURE — 74011250637 HC RX REV CODE- 250/637: Performed by: FAMILY MEDICINE

## 2020-12-03 RX ADMIN — HEPARIN SODIUM 5000 UNITS: 5000 INJECTION INTRAVENOUS; SUBCUTANEOUS at 21:11

## 2020-12-03 RX ADMIN — PANTOPRAZOLE SODIUM 40 MG: 40 TABLET, DELAYED RELEASE ORAL at 05:50

## 2020-12-03 RX ADMIN — Medication 10 ML: at 13:34

## 2020-12-03 RX ADMIN — Medication 10 ML: at 05:49

## 2020-12-03 RX ADMIN — Medication 100 MG: at 09:03

## 2020-12-03 RX ADMIN — SODIUM BICARBONATE 650 MG TABLET 650 MG: at 16:36

## 2020-12-03 RX ADMIN — FOLIC ACID 1 MG: 1 TABLET ORAL at 09:04

## 2020-12-03 RX ADMIN — SODIUM BICARBONATE 650 MG TABLET 650 MG: at 09:04

## 2020-12-03 RX ADMIN — ESCITALOPRAM OXALATE 10 MG: 10 TABLET ORAL at 09:04

## 2020-12-03 RX ADMIN — HEPARIN SODIUM 5000 UNITS: 5000 INJECTION INTRAVENOUS; SUBCUTANEOUS at 05:50

## 2020-12-03 RX ADMIN — FERROUS SULFATE TAB 325 MG (65 MG ELEMENTAL FE) 325 MG: 325 (65 FE) TAB at 16:36

## 2020-12-03 RX ADMIN — ASPIRIN 325 MG ORAL TABLET 325 MG: 325 PILL ORAL at 09:03

## 2020-12-03 RX ADMIN — QUETIAPINE FUMARATE 50 MG: 25 TABLET ORAL at 21:11

## 2020-12-03 RX ADMIN — HEPARIN SODIUM 5000 UNITS: 5000 INJECTION INTRAVENOUS; SUBCUTANEOUS at 14:00

## 2020-12-03 RX ADMIN — FERROUS SULFATE TAB 325 MG (65 MG ELEMENTAL FE) 325 MG: 325 (65 FE) TAB at 11:33

## 2020-12-03 RX ADMIN — SODIUM BICARBONATE 650 MG TABLET 650 MG: at 21:11

## 2020-12-03 RX ADMIN — Medication 400 MG: at 09:04

## 2020-12-03 RX ADMIN — FERROUS SULFATE TAB 325 MG (65 MG ELEMENTAL FE) 325 MG: 325 (65 FE) TAB at 09:04

## 2020-12-03 NOTE — PROGRESS NOTES
Hourly rounds completed this shift. Patient had no new complaints this shift. All needs met at this time. Will continue to monitor & give report to oncoming RN.

## 2020-12-03 NOTE — PROGRESS NOTES
Hourly rounds completed with bed in low/locked position and call light within reach. Patient has rested throughout shift, no acute changes over night. Purewick catheter and canister changed. All needs met at this time, will give report to oncoming RN.

## 2020-12-03 NOTE — PROGRESS NOTES
Lengthy conversation had with patient at bedside regarding difficulty in finding SNF placement related to long term plan. Patient continues to say that her goal is to go home with her  after SNF. Patient states that she has no idea about their finances as her  does all of that and is not sure if they have ever tried to get Medicaid. Shared with patient that so far no facilities have offered a bed and that CM will reach out to other facilities. Patient expresses understanding. Referrals sent to multiple SNF.

## 2020-12-03 NOTE — PROGRESS NOTES
Hospitalist Progress Note     Admit Date:  2020  4:19 PM   Name:  Tawanda Mcdonnell   Age:  61 y.o.  :  1960   MRN:  [id number]   PCP:  Christina Keith MD  Treatment Team: Attending Provider: Nava Montanez MD; Utilization Review: Nadia Santacruz RN; Care Manager: Donnie Talavera RN; Nurse Practitioner: Cristopher Wolfe NP; Hospitalist: Gael Daniel NP; Charge Nurse: Mason Johnson RN; Charge Nurse: Tara Cuellar; Utilization Review: Rekha Weiss RN; Physical Therapy Assistant: Erik Paul PTA    Subjective:   HPI and or CC:   62 yo female with PMH of carotid artery aneurysm s/p coil, HTN, CVA, dementia, CKD, bed bound admitted  when caregiver brought to this facility and patient had nobody to care for her. She was noted to have OSMIN on CKD with mild hyperkalemia. APS now involved-spouse (her caregiver) has been hospitalized with confusion. No family or caregivers. 12/3:  Patient alert and oriented x3. Afebrile, no leukocytosis. Nephrology continues to follow along. Cr. 3.09-improving. Discharge planning:  Waiting on placement, CM and APS involved. Objective:     Patient Vitals for the past 24 hrs:   Temp Pulse Resp BP SpO2   20 0831 98.9 °F (37.2 °C) 87 18 123/81 99 %   20 0458 98.3 °F (36.8 °C) 81 16 118/76 100 %   20 0038 98.5 °F (36.9 °C) 82 16 119/74 97 %   20 1953 97.9 °F (36.6 °C) 82 16 122/79 93 %   20 1540 99 °F (37.2 °C) 71 16 126/77 93 %   20 1224 98.6 °F (37 °C) 80 18 125/77 96 %     Oxygen Therapy  O2 Sat (%): 99 % (20 0831)  Pulse via Oximetry: 97 beats per minute (20 1621)  O2 Device: Room air (20 1604)  No intake or output data in the 24 hours ending 20 1143      REVIEW OF SYSTEMS: Comprehensive ROS performed and negative except as stated in HPI. Physical Examination:  General:       No acute distress, appears chronically ill    Lungs:          CTA bilaterally.  Resp even and nonlabored  Heart:            S1S2 present without murmurs rubs gallops. RRR. No LE edema  Abdomen:    Soft, non tender, non distended. BS present  Extremities: RLE with brace.  All ext with muscle wasting  Neurologic:  A/O X3. No gross focal deficits.  Right sided residual weakness, RUE tremors. Data Review:  I have reviewed all labs, meds, telemetry events, and studies from the last 24 hours. Recent Results (from the past 24 hour(s))   CBC WITH AUTOMATED DIFF    Collection Time: 12/03/20  5:51 AM   Result Value Ref Range    WBC 8.4 4.3 - 11.1 K/uL    RBC 3.37 (L) 4.05 - 5.2 M/uL    HGB 9.6 (L) 11.7 - 15.4 g/dL    HCT 31.2 (L) 35.8 - 46.3 %    MCV 92.6 79.6 - 97.8 FL    MCH 28.5 26.1 - 32.9 PG    MCHC 30.8 (L) 31.4 - 35.0 g/dL    RDW 14.2 11.9 - 14.6 %    PLATELET 510 392 - 940 K/uL    MPV 9.9 9.4 - 12.3 FL    ABSOLUTE NRBC 0.00 0.0 - 0.2 K/uL    DF AUTOMATED      NEUTROPHILS 71 43 - 78 %    LYMPHOCYTES 17 13 - 44 %    MONOCYTES 6 4.0 - 12.0 %    EOSINOPHILS 4 0.5 - 7.8 %    BASOPHILS 1 0.0 - 2.0 %    IMMATURE GRANULOCYTES 1 0.0 - 5.0 %    ABS. NEUTROPHILS 5.9 1.7 - 8.2 K/UL    ABS. LYMPHOCYTES 1.5 0.5 - 4.6 K/UL    ABS. MONOCYTES 0.5 0.1 - 1.3 K/UL    ABS. EOSINOPHILS 0.4 0.0 - 0.8 K/UL    ABS. BASOPHILS 0.1 0.0 - 0.2 K/UL    ABS. IMM. GRANS. 0.1 0.0 - 0.5 K/UL   METABOLIC PANEL, COMPREHENSIVE    Collection Time: 12/03/20  5:51 AM   Result Value Ref Range    Sodium 143 138 - 145 mmol/L    Potassium 5.0 3.5 - 5.1 mmol/L    Chloride 109 (H) 98 - 107 mmol/L    CO2 29 21 - 32 mmol/L    Anion gap 5 (L) 7 - 16 mmol/L    Glucose 85 65 - 100 mg/dL    BUN 38 (H) 8 - 23 MG/DL    Creatinine 3.09 (H) 0.6 - 1.0 MG/DL    GFR est AA 20 (L) >60 ml/min/1.73m2    GFR est non-AA 16 (L) >60 ml/min/1.73m2    Calcium 8.8 8.3 - 10.4 MG/DL    Bilirubin, total 0.4 0.2 - 1.1 MG/DL    ALT (SGPT) 10 (L) 12 - 65 U/L    AST (SGOT) 7 (L) 15 - 37 U/L    Alk.  phosphatase 104 50 - 136 U/L    Protein, total 6.3 6.3 - 8.2 g/dL    Albumin 2.7 (L) 3.2 - 4.6 g/dL    Globulin 3.6 (H) 2.3 - 3.5 g/dL    A-G Ratio 0.8 (L) 1.2 - 3.5          All Micro Results     Procedure Component Value Units Date/Time    QUANTIFERON-TB GOLD PLUS [[id number]] Collected:  11/25/20 1315    Order Status:  Canceled Specimen:  Blood     QUANTIFERON-TB GOLD PLUS [[id number]]     Order Status:  Canceled Specimen:  Blood           Current Meds:  Current Facility-Administered Medications   Medication Dose Route Frequency    aspirin tablet 325 mg  325 mg Oral DAILY    escitalopram oxalate (LEXAPRO) tablet 10 mg  10 mg Oral DAILY    ferrous sulfate tablet 325 mg  325 mg Oral TID WITH MEALS    folic acid (FOLVITE) tablet 1 mg  1 mg Oral DAILY    [Held by provider] lisinopriL (PRINIVIL, ZESTRIL) tablet 10 mg  10 mg Oral DAILY    magnesium oxide (MAG-OX) tablet 400 mg  400 mg Oral DAILY    pantoprazole (PROTONIX) tablet 40 mg  40 mg Oral ACB    QUEtiapine (SEROquel) tablet 50 mg  50 mg Oral QHS    thiamine HCL (B-1) tablet 100 mg  100 mg Oral DAILY    traMADoL (ULTRAM) tablet 50 mg  50 mg Oral Q6H PRN    sodium chloride (NS) flush 5-40 mL  5-40 mL IntraVENous Q8H    sodium chloride (NS) flush 5-40 mL  5-40 mL IntraVENous PRN    influenza vaccine 2020-21 (6 mos+)(PF) (FLUARIX/FLULAVAL/FLUZONE QUAD) injection 0.5 mL  0.5 mL IntraMUSCular PRIOR TO DISCHARGE    LORazepam (ATIVAN) tablet 1-2 mg  1-2 mg Oral Q1H PRN    LORazepam (ATIVAN) tablet 3-4 mg  3-4 mg Oral Q1H PRN    heparin (porcine) injection 5,000 Units  5,000 Units SubCUTAneous Q8H    sodium bicarbonate tablet 650 mg  650 mg Oral TID       Diet:  DIET RENAL  DIET NUTRITIONAL SUPPLEMENTS    Other Studies (last 24 hours):  No results found.     Assessment and Plan:     Hospital Problems as of 12/3/2020 Date Reviewed: 2/28/2018          Codes Class Noted - Resolved POA    OSMIN (acute kidney injury) ([facility] ICD-10-CM: N17.9  ICD-9-CM: 584.9  11/27/2020 - Present Unknown        Normocytic anemia ICD-10-CM: D64.9  ICD-9-CM: 285.9  11/24/2020 - Present Yes        Hyperkalemia ICD-10-CM: E87.5  ICD-9-CM: 276.7  11/24/2020 - Present Yes        * (Principal) No able caregiver in household ICD-10-CM: Z74.2  ICD-9-CM: V60.4  11/23/2020 - Present Yes        Alcoholism ([facility] ICD-10-CM: F10.20  ICD-9-CM: 303.90  5/18/2018 - Present Yes        Hypertension ICD-10-CM: I10  ICD-9-CM: 401.9  5/18/2018 - Present Yes        Vascular dementia ([facility] ICD-10-CM: F01.50  ICD-9-CM: 290.40  2/13/2018 - Present Yes        Depression ICD-10-CM: F32.9  ICD-9-CM: 131  2/13/2018 - Present Yes        Bipolar disorder ([facility] ICD-10-CM: F31.9  ICD-9-CM: 296.80  10/22/2014 - Present Yes        CKD (chronic kidney disease) ICD-10-CM: N18.9  ICD-9-CM: 585.9  9/18/2014 - Present Yes        Essential hypertension ICD-10-CM: I10  ICD-9-CM: 401.9  9/11/2012 - Present Yes        FSGS (focal segmental glomerulosclerosis) ICD-10-CM: N05.1  ICD-9-CM: 582.1  9/11/2012 - Present Yes        RESOLVED: Dementia ([facility] ICD-10-CM: F03.90  ICD-9-CM: 294.20  Unknown - 11/23/2020 Yes        RESOLVED: Alcohol abuse ICD-10-CM: F10.10  ICD-9-CM: 305.00  2/13/2018 - 11/23/2020 Yes              A/P:    1. OSMIN/CKD  improving  IVF  Renal US normal   Nephrology signed off 11/29  Check UA, urine studies  Holding ACE-I     2. Hyperkalemia  resolved  Added lokelma per Nephro     3. HTN  With hypotension now  Holding ACE-I  IVF     4. Metabolic acidosis  Bicarb po     5. Vascular dementia/debility  PT/OT  STR     6.  Bipolar disorder  Denies SI/HI  Home meds  :          Signed:  Dane Chavez NP

## 2020-12-03 NOTE — PROGRESS NOTES
Problem: Mobility Impaired (Adult and Pediatric)  Goal: *Acute Goals and Plan of Care (Insert Text)  Outcome: Progressing Towards Goal  Note: LTG:  (1.)Ms. Andi Ibanez will move from supine to sit and sit to supine , scoot up and down, and roll side to side with SUPERVISION within 7 treatment day(s). (2.)Ms. Andi Ibanez will transfer from bed to chair and chair to bed with CONTACT GUARD ASSIST using the least restrictive device within 7 treatment day(s). (3.)Ms. Andi Ibanez will ambulate with MINIMAL ASSIST for 10 feet with the least restrictive device within 7 treatment day(s). (4.)Ms. Andi Ibanez will perform there ex B LEs x 10 min in supine or sitting with CGA within 7 treatment days for increased strength and AROM. ______________________________________________________________________________________      PHYSICAL THERAPY: Daily Note, AM and PM 12/3/2020  INPATIENT: PT Visit Days : 4  Payor: Triny Devine / Plan: [address] / Product Type: Managed Care Medicare /       NAME/AGE/GENDER: Checo Dodson is a 61 y.o. female   PRIMARY DIAGNOSIS: No able caregiver in household [Z74.2]  OSMIN (acute kidney injury) ([facility] [N17.9] No able caregiver in household No able caregiver in household       ICD-10: Treatment Diagnosis:    · Generalized Muscle Weakness (M62.81)  · Difficulty in walking, Not elsewhere classified (R26.2)  · History of falling (Z91.81)   Precaution/Allergies:  Codeine and Lortab [hydrocodone-acetaminophen]      ASSESSMENT:     The patient is supine upon contact and agreeable to treatment with encouragement. She performed supine exercises below with fair ability and a little assist with her R leg. She performed bed mobility with min A and additional time. She needed moderate assist to scoot to the edge. She performed a sliding board transfer to the chair with  Moderate assist and constant encouragement to continue trying and not just quit.   She stayed up for 1.5 hours and I returned to assist her via sliding board back into bed. She again needed encouragement to lean forward and continue to scoot her bottom as well as moderate assist to help. She at one point asked me to just pick her up and I told her that is physically impossible. Slow progress      At this time, patient is appropriate for Co-treatment with occupational therapy due to patient's decreased overall endurance/tolerance levels, as well as need for high level skilled assistance to complete functional transfers/mobility and functional tasks. Jeff Trinidad is appropriate for a multidisciplinary co-treatment of PT and OT to address goals of both disciplines. This section established at most recent assessment   PROBLEM LIST (Impairments causing functional limitations):  1. Decreased Strength  2. Decreased ADL/Functional Activities  3. Decreased Transfer Abilities  4. Decreased Ambulation Ability/Technique  5. Decreased Balance  6. Decreased Activity Tolerance  7. Decreased [place] with Home Exercise Program  8. Decreased Cognition   INTERVENTIONS PLANNED: (Benefits and precautions of physical therapy have been discussed with the patient.)  1. Balance Exercise  2. Bed Mobility  3. Family Education  4. Gait Training  5. Home Exercise Program (HEP)  6. Neuromuscular Re-education/Strengthening  7. Range of Motion (ROM)  8. Therapeutic Activites  9. Therapeutic Exercise/Strengthening  10. Transfer Training     TREATMENT PLAN: Frequency/Duration: 3 times a week for duration of hospital stay  Rehabilitation Potential For Stated Goals: [facility] (at time of discharge pending progress):    Placement: It is my opinion, based on this patient's performance to date, that Ms. Cassie Gonsalves may benefit from intensive therapy at a [address] [place] [address] after discharge due to the functional deficits listed above that are likely to improve with skilled rehabilitation and concerns that he/she may be unsafe to be unsupervised at home due to safety concerns at home, fall risk . Equipment:    To be determined              HISTORY:   History of Present Injury/Illness (Reason for Referral):  Ayaan King is a 61 y.o. female with a past medical history of mild vascular dementia, HTN, CKD, functional paraplegia who presents to the ER with her . Her  was unfortunately intubated and admitted, and now she has no one to take care of her at home. She has no complaints currently. She denies any pain, nausea, vomiting. .  Past Medical History/Comorbidities:   Ms. Missy De Jesus  has a past medical history of Aneurysm of common carotid artery ([facility] (2004), CKD (chronic kidney disease) (9/18/2014), Dementia ([facility] FSGS (focal segmental glomerulosclerosis), Gout, Hypertension, Osteoarthritis (9/18/2014), and Stroke ([facility] (2004, 2013). Ms. Missy De Jesus  has a past surgical history that includes hx intracranial aneurysm repair (2004); hx refractive surgery; hx orthopaedic (Right, 10/2014); hx ankle fracture tx (Left, 2013); hx ercp (02/27/2018); and hx cholecystectomy (02/28/2018). Social History/Living Environment:   Home Environment: Private residence  # Steps to Enter: 0  One/Two Story Residence: Two story, live on 1st floor  # of Interior Steps: 13  Height of Each Step (in): 7 inches  Interior Rails: Right  Lift Chair Available: No  Living Alone: No  Support Systems: Home care staff  Patient Expects to be Discharged to[de-identified] Unknown  Current DME Used/Available at Home: Wheelchair, Shower chair, Commode, bedside, Blood pressure cuff, Brace/Splint, Grab bars, Walker  Tub or Shower Type: (sponge baths)  Prior Level of Function/Work/Activity:  Lives spouse, bed bound at baseline, total care ADLs  Personal Factors:          Sex:  female        Age:  61 y.o.         Overall Behavior:  agreeable, delayed responses   Number of Personal Factors/Comorbidities that affect the Plan of Care:  CVA, dementia, falls 3+: HIGH COMPLEXITY EXAMINATION:   Most Recent Physical Functioning:   Gross Assessment:                  Posture:     Balance:    Bed Mobility:  Supine to Sit: Minimum assistance  Scooting: Moderate assistance(to EOB)  Wheelchair Mobility:     Transfers:  Sliding Board : Moderate assistance  Gait:            Body Structures Involved:  1. Muscles Body Functions Affected:  1. Movement Related Activities and Participation Affected:  1. General Tasks and Demands  2. Mobility  3. Self Care   Number of elements that affect the Plan of Care: 4+: HIGH COMPLEXITY   CLINICAL PRESENTATION:   Presentation: Evolving clinical presentation with changing clinical characteristics: MODERATE COMPLEXITY   CLINICAL DECISION MAKIN [facility] Inpatient Short Form  How much difficulty does the patient currently have. .. Unable A Lot A Little None   1. Turning over in bed (including adjusting bedclothes, sheets and blankets)? [] 1   [] 2   [x] 3   [] 4   2. Sitting down on and standing up from a chair with arms ( e.g., wheelchair, bedside commode, etc.)   [] 1   [] 2   [x] 3   [] 4   3. Moving from lying on back to sitting on the side of the bed? [] 1   [] 2   [x] 3   [] 4   How much help from another person does the patient currently need. .. Total A Lot A Little None   4. Moving to and from a bed to a chair (including a wheelchair)? [] 1   [] 2   [x] 3   [] 4   5. Need to walk in hospital room? [] 1   [x] 2   [] 3   [] 4   6. Climbing 3-5 steps with a railing? [x] 1   [] 2   [] 3   [] 4   © , Trustees of [address] [place] [address], under license to [employer]. All rights reserved      Score:  Initial: 15 Most Recent: X (Date: -- )    Interpretation of Tool:  Represents activities that are increasingly more difficult (i.e. Bed mobility, Transfers, Gait).     Medical Necessity:     · Patient is expected to demonstrate progress in   · strength, range of motion, balance, and coordination  ·  to · decrease assistance required with overall functional mobility, transfers, ambulation  · .  · Patient demonstrates   · good  ·  rehab potential due to higher previous functional level. Reason for Services/Other Comments:  · Patient   · continues to require present interventions due to patient's inability to perform bed mobility, transfers, ambulation safely and effectively  · . Use of outcome tool(s) and clinical judgement create a POC that gives a: Clear prediction of patient's progress: LOW COMPLEXITY            TREATMENT:   (In addition to Assessment/Re-Assessment sessions the following treatments were rendered)   Pre-treatment Symptoms/Complaints:  \"can you just pick me up? \"  Pain: Initial:   Pain Intensity 1: 0  Post Session:  0/10 post session in chair     Therapeutic Activity: (    30 minutes): Therapeutic activities including Bed transfers and sliding board transfers to and from the chair to improve mobility, strength, balance and endurance. Required moderate   to help her scoot across the board. Therapeutic Exercise: (15 Minutes):  Exercises per grid below to improve mobility and strength. Required minimal verbal and manual cues to promote proper body mechanics. Progressed complexity of movement as indicated.        Date:  11/27/20 Date:  12/3/20 Date:     Activity/Exercise Parameters Parameters Parameters   Ankle pumps x15 B 20x B    SLR x5 AAB     LAQ x5     Heel slides  10x B AAR    Supine SAQ  10x B    Supine hip abd  10x B AAR            Braces/Orthotics/Lines/Etc:   · O2 Device: Room air  Treatment/Session Assessment:    · Response to Treatment:  See above, better participation  · Interdisciplinary Collaboration:   o Physical Therapy Assistant  o Registered Nurse  · After treatment position/precautions:   o Supine in bed  o Bed/Chair-wheels locked  o Bed in low position  o Call light within reach  o RN notified   · Compliance with Program/Exercises: Compliant most of the time  · Recommendations/Intent for next treatment session: \"Next visit will focus on advancements to more challenging activities\".   Total Treatment Duration:  PT Patient Time In/Time Out  Time In: 1300  Time Out: 1315    Chelo Rain in AM: 1100  Time out AM: [address], PTA

## 2020-12-04 LAB
ALBUMIN SERPL-MCNC: 2.6 G/DL (ref 3.2–4.6)
ALBUMIN/GLOB SERPL: 0.8 {RATIO} (ref 1.2–3.5)
ALP SERPL-CCNC: 94 U/L (ref 50–136)
ALT SERPL-CCNC: 9 U/L (ref 12–65)
ANION GAP SERPL CALC-SCNC: 5 MMOL/L (ref 7–16)
AST SERPL-CCNC: 7 U/L (ref 15–37)
BASOPHILS # BLD: 0.1 K/UL (ref 0–0.2)
BASOPHILS NFR BLD: 1 % (ref 0–2)
BILIRUB SERPL-MCNC: 0.3 MG/DL (ref 0.2–1.1)
BUN SERPL-MCNC: 44 MG/DL (ref 8–23)
CALCIUM SERPL-MCNC: 8.9 MG/DL (ref 8.3–10.4)
CHLORIDE SERPL-SCNC: 109 MMOL/L (ref 98–107)
CO2 SERPL-SCNC: 30 MMOL/L (ref 21–32)
CREAT SERPL-MCNC: 3.03 MG/DL (ref 0.6–1)
DIFFERENTIAL METHOD BLD: ABNORMAL
EOSINOPHIL # BLD: 0.3 K/UL (ref 0–0.8)
EOSINOPHIL NFR BLD: 4 % (ref 0.5–7.8)
ERYTHROCYTE [DISTWIDTH] IN BLOOD BY AUTOMATED COUNT: 14.3 % (ref 11.9–14.6)
GLOBULIN SER CALC-MCNC: 3.3 G/DL (ref 2.3–3.5)
GLUCOSE SERPL-MCNC: 92 MG/DL (ref 65–100)
HCT VFR BLD AUTO: 29.4 % (ref 35.8–46.3)
HGB BLD-MCNC: 9 G/DL (ref 11.7–15.4)
IMM GRANULOCYTES # BLD AUTO: 0.1 K/UL (ref 0–0.5)
IMM GRANULOCYTES NFR BLD AUTO: 1 % (ref 0–5)
LYMPHOCYTES # BLD: 1.6 K/UL (ref 0.5–4.6)
LYMPHOCYTES NFR BLD: 19 % (ref 13–44)
MCH RBC QN AUTO: 28.2 PG (ref 26.1–32.9)
MCHC RBC AUTO-ENTMCNC: 30.6 G/DL (ref 31.4–35)
MCV RBC AUTO: 92.2 FL (ref 79.6–97.8)
MONOCYTES # BLD: 0.6 K/UL (ref 0.1–1.3)
MONOCYTES NFR BLD: 7 % (ref 4–12)
NEUTS SEG # BLD: 5.8 K/UL (ref 1.7–8.2)
NEUTS SEG NFR BLD: 68 % (ref 43–78)
NRBC # BLD: 0 K/UL (ref 0–0.2)
PLATELET # BLD AUTO: 226 K/UL (ref 150–450)
PMV BLD AUTO: 9.9 FL (ref 9.4–12.3)
POTASSIUM SERPL-SCNC: 4.9 MMOL/L (ref 3.5–5.1)
PROT SERPL-MCNC: 5.9 G/DL (ref 6.3–8.2)
RBC # BLD AUTO: 3.19 M/UL (ref 4.05–5.2)
SODIUM SERPL-SCNC: 144 MMOL/L (ref 136–145)
WBC # BLD AUTO: 8.5 K/UL (ref 4.3–11.1)

## 2020-12-04 PROCEDURE — 74011250637 HC RX REV CODE- 250/637: Performed by: NURSE PRACTITIONER

## 2020-12-04 PROCEDURE — 65270000029 HC RM PRIVATE

## 2020-12-04 PROCEDURE — 80053 COMPREHEN METABOLIC PANEL: CPT

## 2020-12-04 PROCEDURE — 85025 COMPLETE CBC W/AUTO DIFF WBC: CPT

## 2020-12-04 PROCEDURE — 74011250637 HC RX REV CODE- 250/637: Performed by: FAMILY MEDICINE

## 2020-12-04 PROCEDURE — 36415 COLL VENOUS BLD VENIPUNCTURE: CPT

## 2020-12-04 PROCEDURE — 74011250636 HC RX REV CODE- 250/636: Performed by: FAMILY MEDICINE

## 2020-12-04 RX ADMIN — SODIUM BICARBONATE 650 MG TABLET 650 MG: at 08:32

## 2020-12-04 RX ADMIN — PANTOPRAZOLE SODIUM 40 MG: 40 TABLET, DELAYED RELEASE ORAL at 06:47

## 2020-12-04 RX ADMIN — Medication 10 ML: at 13:56

## 2020-12-04 RX ADMIN — FERROUS SULFATE TAB 325 MG (65 MG ELEMENTAL FE) 325 MG: 325 (65 FE) TAB at 13:55

## 2020-12-04 RX ADMIN — FOLIC ACID 1 MG: 1 TABLET ORAL at 08:33

## 2020-12-04 RX ADMIN — ASPIRIN 325 MG ORAL TABLET 325 MG: 325 PILL ORAL at 08:32

## 2020-12-04 RX ADMIN — FERROUS SULFATE TAB 325 MG (65 MG ELEMENTAL FE) 325 MG: 325 (65 FE) TAB at 16:57

## 2020-12-04 RX ADMIN — Medication 5 ML: at 06:47

## 2020-12-04 RX ADMIN — HEPARIN SODIUM 5000 UNITS: 5000 INJECTION INTRAVENOUS; SUBCUTANEOUS at 13:55

## 2020-12-04 RX ADMIN — SODIUM BICARBONATE 650 MG TABLET 650 MG: at 22:01

## 2020-12-04 RX ADMIN — Medication 400 MG: at 08:33

## 2020-12-04 RX ADMIN — Medication 100 MG: at 08:33

## 2020-12-04 RX ADMIN — HEPARIN SODIUM 5000 UNITS: 5000 INJECTION INTRAVENOUS; SUBCUTANEOUS at 22:01

## 2020-12-04 RX ADMIN — SODIUM BICARBONATE 650 MG TABLET 650 MG: at 16:57

## 2020-12-04 RX ADMIN — QUETIAPINE FUMARATE 50 MG: 25 TABLET ORAL at 22:01

## 2020-12-04 RX ADMIN — FERROUS SULFATE TAB 325 MG (65 MG ELEMENTAL FE) 325 MG: 325 (65 FE) TAB at 08:33

## 2020-12-04 RX ADMIN — ESCITALOPRAM OXALATE 10 MG: 10 TABLET ORAL at 08:33

## 2020-12-04 RX ADMIN — HEPARIN SODIUM 5000 UNITS: 5000 INJECTION INTRAVENOUS; SUBCUTANEOUS at 06:47

## 2020-12-04 NOTE — PROGRESS NOTES
Hospitalist Progress Note     Admit Date:  2020  4:19 PM   Name:  Idalmis Valerio   Age:  61 y.o.  :  1960   MRN:  [id number]   PCP:  Eelazar Tillman MD  Treatment Team: Attending Provider: Kemal Pearson MD; Utilization Review: Adalberto Carney RN; Care Manager: Deborah Schlatter, RN; Nurse Practitioner: Karson Jean NP; Hospitalist: Denisse Powell NP; Charge Nurse: Nivia Goodwin; Utilization Review: Juani Gaxiola RN; Charge Nurse: Mariposa Teague RN; Occupational Therapist: Bart Peppers, OTR/L    Subjective:   HPI and or CC:   62 yo female with PMH of carotid artery aneurysm s/p coil, HTN, CVA, dementia, CKD, bed bound admitted  when caregiver brought to this facility and patient had nobody to care for her. She was noted to have OSMIN on CKD with mild hyperkalemia. APS now involved-spouse (her caregiver) has been hospitalized with confusion. No family or caregivers. :  Patient alert and oriented x3. Afebrile, no leukocytosis. Nephrology continues to follow along. Cr. 3.03-improving. Discharge planning:  Waiting on placement, CM and APS involved.     Objective:     Patient Vitals for the past 24 hrs:   Temp Pulse Resp BP SpO2   20 0830 98.2 °F (36.8 °C) 93 18 124/77 99 %   20 0448 98.6 °F (37 °C) 83 18 110/66 97 %   20 2332 97.9 °F (36.6 °C) 78 16 110/72 99 %   20 1913 98.4 °F (36.9 °C) 88 18 128/77 98 %   20 1526 98.4 °F (36.9 °C) 86 18 106/76 98 %   20 1321   18     20 1200 98.1 °F (36.7 °C) 83 18 130/71 100 %     Oxygen Therapy  O2 Sat (%): 99 % (20 0830)  Pulse via Oximetry: 97 beats per minute (20 1621)  O2 Device: Room air (20 1604)    Intake/Output Summary (Last 24 hours) at 2020 1002  Last data filed at 2020 0448  Gross per 24 hour   Intake    Output 1025 ml   Net -1025 ml         REVIEW OF SYSTEMS: Comprehensive ROS performed and negative except as stated in HPI.    Physical Examination:  General:       No acute distress, appears chronically ill    Lungs:          CTA bilaterally. Resp even and nonlabored  Heart:            S1S2 present without murmurs rubs gallops. RRR. No LE edema  Abdomen:    Soft, non tender, non distended. BS present  Extremities: RLE with brace.  All ext with muscle wasting  Neurologic:  A/O X3. No gross focal deficits.  Right sided residual weakness, RUE tremors. Data Review:  I have reviewed all labs, meds, telemetry events, and studies from the last 24 hours. Recent Results (from the past 24 hour(s))   CBC WITH AUTOMATED DIFF    Collection Time: 12/04/20  6:53 AM   Result Value Ref Range    WBC 8.5 4.3 - 11.1 K/uL    RBC 3.19 (L) 4.05 - 5.2 M/uL    HGB 9.0 (L) 11.7 - 15.4 g/dL    HCT 29.4 (L) 35.8 - 46.3 %    MCV 92.2 79.6 - 97.8 FL    MCH 28.2 26.1 - 32.9 PG    MCHC 30.6 (L) 31.4 - 35.0 g/dL    RDW 14.3 11.9 - 14.6 %    PLATELET 779 938 - 813 K/uL    MPV 9.9 9.4 - 12.3 FL    ABSOLUTE NRBC 0.00 0.0 - 0.2 K/uL    DF AUTOMATED      NEUTROPHILS 68 43 - 78 %    LYMPHOCYTES 19 13 - 44 %    MONOCYTES 7 4.0 - 12.0 %    EOSINOPHILS 4 0.5 - 7.8 %    BASOPHILS 1 0.0 - 2.0 %    IMMATURE GRANULOCYTES 1 0.0 - 5.0 %    ABS. NEUTROPHILS 5.8 1.7 - 8.2 K/UL    ABS. LYMPHOCYTES 1.6 0.5 - 4.6 K/UL    ABS. MONOCYTES 0.6 0.1 - 1.3 K/UL    ABS. EOSINOPHILS 0.3 0.0 - 0.8 K/UL    ABS. BASOPHILS 0.1 0.0 - 0.2 K/UL    ABS. IMM.  GRANS. 0.1 0.0 - 0.5 K/UL   METABOLIC PANEL, COMPREHENSIVE    Collection Time: 12/04/20  6:53 AM   Result Value Ref Range    Sodium 144 136 - 145 mmol/L    Potassium 4.9 3.5 - 5.1 mmol/L    Chloride 109 (H) 98 - 107 mmol/L    CO2 30 21 - 32 mmol/L    Anion gap 5 (L) 7 - 16 mmol/L    Glucose 92 65 - 100 mg/dL    BUN 44 (H) 8 - 23 MG/DL    Creatinine 3.03 (H) 0.6 - 1.0 MG/DL    GFR est AA 20 (L) >60 ml/min/1.73m2    GFR est non-AA 17 (L) >60 ml/min/1.73m2    Calcium 8.9 8.3 - 10.4 MG/DL    Bilirubin, total 0.3 0.2 - 1.1 MG/DL    ALT (SGPT) 9 (L) 12 - 65 U/L    AST (SGOT) 7 (L) 15 - 37 U/L    Alk. phosphatase 94 50 - 136 U/L    Protein, total 5.9 (L) 6.3 - 8.2 g/dL    Albumin 2.6 (L) 3.2 - 4.6 g/dL    Globulin 3.3 2.3 - 3.5 g/dL    A-G Ratio 0.8 (L) 1.2 - 3.5          All Micro Results     Procedure Component Value Units Date/Time    QUANTIFERON-TB GOLD PLUS [[id number]] Collected:  11/25/20 1315    Order Status:  Canceled Specimen:  Blood     QUANTIFERON-TB GOLD PLUS [[id number]]     Order Status:  Canceled Specimen:  Blood           Current Meds:  Current Facility-Administered Medications   Medication Dose Route Frequency    aspirin tablet 325 mg  325 mg Oral DAILY    escitalopram oxalate (LEXAPRO) tablet 10 mg  10 mg Oral DAILY    ferrous sulfate tablet 325 mg  325 mg Oral TID WITH MEALS    folic acid (FOLVITE) tablet 1 mg  1 mg Oral DAILY    [Held by provider] lisinopriL (PRINIVIL, ZESTRIL) tablet 10 mg  10 mg Oral DAILY    magnesium oxide (MAG-OX) tablet 400 mg  400 mg Oral DAILY    pantoprazole (PROTONIX) tablet 40 mg  40 mg Oral ACB    QUEtiapine (SEROquel) tablet 50 mg  50 mg Oral QHS    thiamine HCL (B-1) tablet 100 mg  100 mg Oral DAILY    traMADoL (ULTRAM) tablet 50 mg  50 mg Oral Q6H PRN    sodium chloride (NS) flush 5-40 mL  5-40 mL IntraVENous Q8H    sodium chloride (NS) flush 5-40 mL  5-40 mL IntraVENous PRN    influenza vaccine 2020-21 (6 mos+)(PF) (FLUARIX/FLULAVAL/FLUZONE QUAD) injection 0.5 mL  0.5 mL IntraMUSCular PRIOR TO DISCHARGE    LORazepam (ATIVAN) tablet 1-2 mg  1-2 mg Oral Q1H PRN    LORazepam (ATIVAN) tablet 3-4 mg  3-4 mg Oral Q1H PRN    heparin (porcine) injection 5,000 Units  5,000 Units SubCUTAneous Q8H    sodium bicarbonate tablet 650 mg  650 mg Oral TID       Diet:  DIET RENAL  DIET NUTRITIONAL SUPPLEMENTS    Other Studies (last 24 hours):  No results found.     Assessment and Plan:     Hospital Problems as of 12/4/2020 Date Reviewed: 2/28/2018          Codes Class Noted - Resolved POA    OSMIN (acute kidney injury) [facility]) ICD-10-CM: N17.9  ICD-9-CM: 584.9  11/27/2020 - Present Unknown        Normocytic anemia ICD-10-CM: D64.9  ICD-9-CM: 285.9  11/24/2020 - Present Yes        Hyperkalemia ICD-10-CM: E87.5  ICD-9-CM: 276.7  11/24/2020 - Present Yes        * (Principal) No able caregiver in household ICD-10-CM: Z74.2  ICD-9-CM: V60.4  11/23/2020 - Present Yes        Alcoholism ([facility] ICD-10-CM: F10.20  ICD-9-CM: 303.90  5/18/2018 - Present Yes        Hypertension ICD-10-CM: I10  ICD-9-CM: 401.9  5/18/2018 - Present Yes        Vascular dementia ([facility] ICD-10-CM: F01.50  ICD-9-CM: 290.40  2/13/2018 - Present Yes        Depression ICD-10-CM: F32.9  ICD-9-CM: 374  2/13/2018 - Present Yes        Bipolar disorder ([facility] [facility] ICD-10-CM: F31.9  ICD-9-CM: 296.80  10/22/2014 - Present Yes        CKD (chronic kidney disease) ICD-10-CM: N18.9  ICD-9-CM: 585.9  9/18/2014 - Present Yes        Essential hypertension ICD-10-CM: I10  ICD-9-CM: 401.9  9/11/2012 - Present Yes        FSGS (focal segmental glomerulosclerosis) ICD-10-CM: N05.1  ICD-9-CM: 582.1  9/11/2012 - Present Yes        RESOLVED: Dementia ([facility] [facility] ICD-10-CM: F03.90  ICD-9-CM: 294.20  Unknown - 11/23/2020 Yes        RESOLVED: Alcohol abuse ICD-10-CM: F10.10  ICD-9-CM: 305.00  2/13/2018 - 11/23/2020 Yes              A/P:    1. OSMIN/CKD  Improving; likely pre renal azotemia with poor intake, hypotension and progression of CKD hx of FSGS. IVF  Renal US normal   Nephrology signed off 11/29  Holding ACE-I     2. Hyperkalemia  resolved     3. HTN  With hypotension now  Holding ACE-I  IVF     4. Metabolic acidosis  Bicarb po     5. Vascular dementia/debility  PT/OT  STR     6.  Bipolar disorder  Denies SI/HI  Home meds  :          Signed:  Kellie Nunez NP

## 2020-12-04 NOTE — PROGRESS NOTES
Pt is resting in bed at this time. Pt is on RA. Pt has no IV access at this time and doctor is aware. Pt has no s/sx of acute distress at this time. Pt has no complaints at this time. Pt has safety measures in place. Pt has call light within reach and is encouraged to call for assistance if needed. Will continue to monitor.

## 2020-12-04 NOTE — PROGRESS NOTES
Pt is resting in bed at this time. Pt is on RA. Pt has no s/sx of acute distress at this time. Pt has safety measures in place. Pt has call light within reach and is encouraged to call for assistance if needed.  Report given to Han Bell RN

## 2020-12-05 LAB
ALBUMIN SERPL-MCNC: 2.3 G/DL (ref 3.2–4.6)
ALBUMIN/GLOB SERPL: 0.7 {RATIO} (ref 1.2–3.5)
ALP SERPL-CCNC: 89 U/L (ref 50–136)
ALT SERPL-CCNC: 9 U/L (ref 12–65)
ANION GAP SERPL CALC-SCNC: 4 MMOL/L (ref 7–16)
AST SERPL-CCNC: 9 U/L (ref 15–37)
BASOPHILS # BLD: 0.1 K/UL (ref 0–0.2)
BASOPHILS NFR BLD: 1 % (ref 0–2)
BILIRUB SERPL-MCNC: 0.3 MG/DL (ref 0.2–1.1)
BUN SERPL-MCNC: 42 MG/DL (ref 8–23)
CALCIUM SERPL-MCNC: 8.7 MG/DL (ref 8.3–10.4)
CHLORIDE SERPL-SCNC: 110 MMOL/L (ref 98–107)
CO2 SERPL-SCNC: 27 MMOL/L (ref 21–32)
CREAT SERPL-MCNC: 2.97 MG/DL (ref 0.6–1)
DIFFERENTIAL METHOD BLD: ABNORMAL
EOSINOPHIL # BLD: 0.3 K/UL (ref 0–0.8)
EOSINOPHIL NFR BLD: 4 % (ref 0.5–7.8)
ERYTHROCYTE [DISTWIDTH] IN BLOOD BY AUTOMATED COUNT: 14.3 % (ref 11.9–14.6)
GLOBULIN SER CALC-MCNC: 3.2 G/DL (ref 2.3–3.5)
GLUCOSE SERPL-MCNC: 94 MG/DL (ref 65–100)
HCT VFR BLD AUTO: 26.1 % (ref 35.8–46.3)
HGB BLD-MCNC: 8.1 G/DL (ref 11.7–15.4)
IMM GRANULOCYTES # BLD AUTO: 0.1 K/UL (ref 0–0.5)
IMM GRANULOCYTES NFR BLD AUTO: 1 % (ref 0–5)
LYMPHOCYTES # BLD: 1.5 K/UL (ref 0.5–4.6)
LYMPHOCYTES NFR BLD: 20 % (ref 13–44)
MCH RBC QN AUTO: 28.1 PG (ref 26.1–32.9)
MCHC RBC AUTO-ENTMCNC: 31 G/DL (ref 31.4–35)
MCV RBC AUTO: 90.6 FL (ref 79.6–97.8)
MONOCYTES # BLD: 0.6 K/UL (ref 0.1–1.3)
MONOCYTES NFR BLD: 8 % (ref 4–12)
NEUTS SEG # BLD: 4.8 K/UL (ref 1.7–8.2)
NEUTS SEG NFR BLD: 66 % (ref 43–78)
NRBC # BLD: 0 K/UL (ref 0–0.2)
PLATELET # BLD AUTO: 213 K/UL (ref 150–450)
PMV BLD AUTO: 10.5 FL (ref 9.4–12.3)
POTASSIUM SERPL-SCNC: 4.8 MMOL/L (ref 3.5–5.1)
PROT SERPL-MCNC: 5.5 G/DL (ref 6.3–8.2)
RBC # BLD AUTO: 2.88 M/UL (ref 4.05–5.2)
SODIUM SERPL-SCNC: 141 MMOL/L (ref 136–145)
WBC # BLD AUTO: 7.3 K/UL (ref 4.3–11.1)

## 2020-12-05 PROCEDURE — 80053 COMPREHEN METABOLIC PANEL: CPT

## 2020-12-05 PROCEDURE — 77030038269 HC DRN EXT URIN PURWCK BARD -A

## 2020-12-05 PROCEDURE — 65270000029 HC RM PRIVATE

## 2020-12-05 PROCEDURE — 74011250636 HC RX REV CODE- 250/636: Performed by: FAMILY MEDICINE

## 2020-12-05 PROCEDURE — 74011250637 HC RX REV CODE- 250/637: Performed by: NURSE PRACTITIONER

## 2020-12-05 PROCEDURE — 36415 COLL VENOUS BLD VENIPUNCTURE: CPT

## 2020-12-05 PROCEDURE — 2709999900 HC NON-CHARGEABLE SUPPLY

## 2020-12-05 PROCEDURE — 85025 COMPLETE CBC W/AUTO DIFF WBC: CPT

## 2020-12-05 PROCEDURE — 74011250637 HC RX REV CODE- 250/637: Performed by: FAMILY MEDICINE

## 2020-12-05 RX ORDER — SAME BUTANEDISULFONATE/BETAINE 400-600 MG
250 POWDER IN PACKET (EA) ORAL 2 TIMES DAILY
Status: DISCONTINUED | OUTPATIENT
Start: 2020-12-05 | End: 2020-12-08 | Stop reason: HOSPADM

## 2020-12-05 RX ADMIN — HEPARIN SODIUM 5000 UNITS: 5000 INJECTION INTRAVENOUS; SUBCUTANEOUS at 21:34

## 2020-12-05 RX ADMIN — FERROUS SULFATE TAB 325 MG (65 MG ELEMENTAL FE) 325 MG: 325 (65 FE) TAB at 13:13

## 2020-12-05 RX ADMIN — HEPARIN SODIUM 5000 UNITS: 5000 INJECTION INTRAVENOUS; SUBCUTANEOUS at 13:13

## 2020-12-05 RX ADMIN — FOLIC ACID 1 MG: 1 TABLET ORAL at 09:33

## 2020-12-05 RX ADMIN — RDII 250 MG CAPSULE 250 MG: at 18:12

## 2020-12-05 RX ADMIN — ASPIRIN 325 MG ORAL TABLET 325 MG: 325 PILL ORAL at 09:33

## 2020-12-05 RX ADMIN — SODIUM BICARBONATE 650 MG TABLET 650 MG: at 18:12

## 2020-12-05 RX ADMIN — FERROUS SULFATE TAB 325 MG (65 MG ELEMENTAL FE) 325 MG: 325 (65 FE) TAB at 18:12

## 2020-12-05 RX ADMIN — SODIUM BICARBONATE 650 MG TABLET 650 MG: at 09:33

## 2020-12-05 RX ADMIN — HEPARIN SODIUM 5000 UNITS: 5000 INJECTION INTRAVENOUS; SUBCUTANEOUS at 05:43

## 2020-12-05 RX ADMIN — RDII 250 MG CAPSULE 250 MG: at 13:13

## 2020-12-05 RX ADMIN — SODIUM BICARBONATE 650 MG TABLET 650 MG: at 21:34

## 2020-12-05 RX ADMIN — Medication 10 ML: at 13:13

## 2020-12-05 RX ADMIN — FERROUS SULFATE TAB 325 MG (65 MG ELEMENTAL FE) 325 MG: 325 (65 FE) TAB at 09:33

## 2020-12-05 RX ADMIN — Medication 400 MG: at 09:33

## 2020-12-05 RX ADMIN — Medication 100 MG: at 09:33

## 2020-12-05 RX ADMIN — ESCITALOPRAM OXALATE 10 MG: 10 TABLET ORAL at 09:33

## 2020-12-05 RX ADMIN — PANTOPRAZOLE SODIUM 40 MG: 40 TABLET, DELAYED RELEASE ORAL at 05:43

## 2020-12-05 RX ADMIN — QUETIAPINE FUMARATE 50 MG: 25 TABLET ORAL at 21:35

## 2020-12-05 NOTE — PROGRESS NOTES
Hospitalist Progress Note     Admit Date:  2020  4:19 PM   Name:  Britney De La Fuente   Age:  61 y.o.  :  1960   MRN:  [id number]   PCP:  Rachel Barker MD  Treatment Team: Attending Provider: Anastacio Ventura MD; Utilization Review: Nica Anand, RN; Care Manager: Rubi Guzman, RN; Nurse Practitioner: Dara Edwards NP; Hospitalist: Nydia Bright NP    Subjective:   HPI and or CC:   62 yo female with PMH of carotid artery aneurysm s/p coil, HTN, CVA, dementia, CKD, bed bound admitted  when caregiver brought to this facility and patient had nobody to care for her. She was noted to have OSMIN on CKD with mild hyperkalemia. APS now involved-spouse (her caregiver) has been hospitalized with confusion. No family or caregivers. :  Patient alert and oriented x3. Afebrile, no leukocytosis. Nephrology continues to follow along. Cr. 2.97-improving. Discharge planning:  Waiting on placement, CM and APS involved. Objective:     Patient Vitals for the past 24 hrs:   Temp Pulse Resp BP SpO2   20 0702 97.7 °F (36.5 °C) 84 18 137/82 95 %   20 0408 98.1 °F (36.7 °C) 87 18 110/72 99 %   20 2303 98.5 °F (36.9 °C) 71 18 113/71 98 %   20 1916 97.9 °F (36.6 °C) 84 18 117/76 97 %   20 1548 98.5 °F (36.9 °C) 75 18 130/83 98 %   20 1047 98.3 °F (36.8 °C) 90 18 122/73 98 %     Oxygen Therapy  O2 Sat (%): 95 % (20 0702)  Pulse via Oximetry: 97 beats per minute (20 1621)  O2 Device: Room air (20 1604)    Intake/Output Summary (Last 24 hours) at 2020 1027  Last data filed at 2020 1828  Gross per 24 hour   Intake    Output 500 ml   Net -500 ml         REVIEW OF SYSTEMS: Comprehensive ROS performed and negative except as stated in HPI. Physical Examination:  General:       No acute distress, appears chronically ill    Lungs:          CTA bilaterally.  Resp even and nonlabored  Heart:            S1S2 present without murmurs rubs gallops. RRR. No LE edema  Abdomen:    Soft, non tender, non distended. BS present  Extremities: RLE with brace.  All ext with muscle wasting  Neurologic:  A/O X3. No gross focal deficits.  Right sided residual weakness, RUE tremors. Data Review:  I have reviewed all labs, meds, telemetry events, and studies from the last 24 hours. Recent Results (from the past 24 hour(s))   CBC WITH AUTOMATED DIFF    Collection Time: 12/05/20  6:22 AM   Result Value Ref Range    WBC 7.3 4.3 - 11.1 K/uL    RBC 2.88 (L) 4.05 - 5.2 M/uL    HGB 8.1 (L) 11.7 - 15.4 g/dL    HCT 26.1 (L) 35.8 - 46.3 %    MCV 90.6 79.6 - 97.8 FL    MCH 28.1 26.1 - 32.9 PG    MCHC 31.0 (L) 31.4 - 35.0 g/dL    RDW 14.3 11.9 - 14.6 %    PLATELET 751 211 - 280 K/uL    MPV 10.5 9.4 - 12.3 FL    ABSOLUTE NRBC 0.00 0.0 - 0.2 K/uL    DF AUTOMATED      NEUTROPHILS 66 43 - 78 %    LYMPHOCYTES 20 13 - 44 %    MONOCYTES 8 4.0 - 12.0 %    EOSINOPHILS 4 0.5 - 7.8 %    BASOPHILS 1 0.0 - 2.0 %    IMMATURE GRANULOCYTES 1 0.0 - 5.0 %    ABS. NEUTROPHILS 4.8 1.7 - 8.2 K/UL    ABS. LYMPHOCYTES 1.5 0.5 - 4.6 K/UL    ABS. MONOCYTES 0.6 0.1 - 1.3 K/UL    ABS. EOSINOPHILS 0.3 0.0 - 0.8 K/UL    ABS. BASOPHILS 0.1 0.0 - 0.2 K/UL    ABS. IMM. GRANS. 0.1 0.0 - 0.5 K/UL   METABOLIC PANEL, COMPREHENSIVE    Collection Time: 12/05/20  6:22 AM   Result Value Ref Range    Sodium 141 136 - 145 mmol/L    Potassium 4.8 3.5 - 5.1 mmol/L    Chloride 110 (H) 98 - 107 mmol/L    CO2 27 21 - 32 mmol/L    Anion gap 4 (L) 7 - 16 mmol/L    Glucose 94 65 - 100 mg/dL    BUN 42 (H) 8 - 23 MG/DL    Creatinine 2.97 (H) 0.6 - 1.0 MG/DL    GFR est AA 21 (L) >60 ml/min/1.73m2    GFR est non-AA 17 (L) >60 ml/min/1.73m2    Calcium 8.7 8.3 - 10.4 MG/DL    Bilirubin, total 0.3 0.2 - 1.1 MG/DL    ALT (SGPT) 9 (L) 12 - 65 U/L    AST (SGOT) 9 (L) 15 - 37 U/L    Alk.  phosphatase 89 50 - 136 U/L    Protein, total 5.5 (L) 6.3 - 8.2 g/dL    Albumin 2.3 (L) 3.2 - 4.6 g/dL    Globulin 3.2 2.3 - 3.5 g/dL    A-G Ratio 0.7 (L) 1.2 - 3.5          All Micro Results     Procedure Component Value Units Date/Time    QUANTIFERON-TB GOLD PLUS [[id number]] Collected:  11/25/20 1315    Order Status:  Canceled Specimen:  Blood     QUANTIFERON-TB GOLD PLUS [[id number]]     Order Status:  Canceled Specimen:  Blood           Current Meds:  Current Facility-Administered Medications   Medication Dose Route Frequency    aspirin tablet 325 mg  325 mg Oral DAILY    escitalopram oxalate (LEXAPRO) tablet 10 mg  10 mg Oral DAILY    ferrous sulfate tablet 325 mg  325 mg Oral TID WITH MEALS    folic acid (FOLVITE) tablet 1 mg  1 mg Oral DAILY    [Held by provider] lisinopriL (PRINIVIL, ZESTRIL) tablet 10 mg  10 mg Oral DAILY    magnesium oxide (MAG-OX) tablet 400 mg  400 mg Oral DAILY    pantoprazole (PROTONIX) tablet 40 mg  40 mg Oral ACB    QUEtiapine (SEROquel) tablet 50 mg  50 mg Oral QHS    thiamine HCL (B-1) tablet 100 mg  100 mg Oral DAILY    traMADoL (ULTRAM) tablet 50 mg  50 mg Oral Q6H PRN    sodium chloride (NS) flush 5-40 mL  5-40 mL IntraVENous Q8H    sodium chloride (NS) flush 5-40 mL  5-40 mL IntraVENous PRN    influenza vaccine 2020-21 (6 mos+)(PF) (FLUARIX/FLULAVAL/FLUZONE QUAD) injection 0.5 mL  0.5 mL IntraMUSCular PRIOR TO DISCHARGE    LORazepam (ATIVAN) tablet 1-2 mg  1-2 mg Oral Q1H PRN    LORazepam (ATIVAN) tablet 3-4 mg  3-4 mg Oral Q1H PRN    heparin (porcine) injection 5,000 Units  5,000 Units SubCUTAneous Q8H    sodium bicarbonate tablet 650 mg  650 mg Oral TID       Diet:  DIET RENAL  DIET NUTRITIONAL SUPPLEMENTS    Other Studies (last 24 hours):  No results found.     Assessment and Plan:     Hospital Problems as of 12/5/2020 Date Reviewed: 2/28/2018          Codes Class Noted - Resolved POA    OSMIN ([facility]) ([facility] ICD-10-CM: N17.9  ICD-9-CM: 584.9  11/27/2020 - Present Unknown        Normocytic anemia ICD-10-CM: D64.9  ICD-9-CM: 285.9  11/24/2020 - Present Yes        Hyperkalemia ICD-10-CM: E87.5  ICD-9-CM: 276.7  11/24/2020 - Present Yes        * (Principal) No able caregiver in household ICD-10-CM: Z74.2  ICD-9-CM: V60.4  11/23/2020 - Present Yes        Alcoholism ([facility] ICD-10-CM: F10.20  ICD-9-CM: 303.90  5/18/2018 - Present Yes        Hypertension ICD-10-CM: I10  ICD-9-CM: 401.9  5/18/2018 - Present Yes        Vascular dementia ([facility] ICD-10-CM: F01.50  ICD-9-CM: 290.40  2/13/2018 - Present Yes        Depression ICD-10-CM: F32.9  ICD-9-CM: 434  2/13/2018 - Present Yes        Bipolar disorder ([facility] ICD-10-CM: F31.9  ICD-9-CM: 296.80  10/22/2014 - Present Yes        CKD (chronic kidney disease) ICD-10-CM: N18.9  ICD-9-CM: 585.9  9/18/2014 - Present Yes        Essential hypertension ICD-10-CM: I10  ICD-9-CM: 401.9  9/11/2012 - Present Yes        FSGS (focal segmental glomerulosclerosis) ICD-10-CM: N05.1  ICD-9-CM: 582.1  9/11/2012 - Present Yes        RESOLVED: Dementia ([facility] ICD-10-CM: F03.90  ICD-9-CM: 294.20  Unknown - 11/23/2020 Yes        RESOLVED: Alcohol abuse ICD-10-CM: F10.10  ICD-9-CM: 305.00  2/13/2018 - 11/23/2020 Yes              A/P:    1. OSMIN/CKD  Improving; likely pre renal azotemia with poor intake, hypotension and progression of CKD hx of FSGS. IVF  Renal US normal   Nephrology signed off 11/29  Holding ACE-I     2. Hyperkalemia  resolved     3. HTN  With hypotension now  Holding ACE-I  IVF     4. Metabolic acidosis  Bicarb po     5. Vascular dementia/debility  PT/OT  STR     6. Bipolar disorder  Denies SI/HI  Home meds    7.  Normocytic anemia:  Trend  Ferrous sulfate supplements  :          Signed:  Karlie Weeks NP

## 2020-12-06 LAB
ALBUMIN SERPL-MCNC: 2.5 G/DL (ref 3.2–4.6)
ALBUMIN/GLOB SERPL: 0.8 {RATIO} (ref 1.2–3.5)
ALP SERPL-CCNC: 96 U/L (ref 50–136)
ALT SERPL-CCNC: 11 U/L (ref 12–65)
ANION GAP SERPL CALC-SCNC: 6 MMOL/L (ref 7–16)
AST SERPL-CCNC: 8 U/L (ref 15–37)
BASOPHILS # BLD: 0.1 K/UL (ref 0–0.2)
BASOPHILS NFR BLD: 1 % (ref 0–2)
BILIRUB SERPL-MCNC: 0.4 MG/DL (ref 0.2–1.1)
BUN SERPL-MCNC: 42 MG/DL (ref 8–23)
CALCIUM SERPL-MCNC: 8.5 MG/DL (ref 8.3–10.4)
CHLORIDE SERPL-SCNC: 109 MMOL/L (ref 98–107)
CO2 SERPL-SCNC: 28 MMOL/L (ref 21–32)
CREAT SERPL-MCNC: 2.87 MG/DL (ref 0.6–1)
DIFFERENTIAL METHOD BLD: ABNORMAL
EOSINOPHIL # BLD: 0.4 K/UL (ref 0–0.8)
EOSINOPHIL NFR BLD: 5 % (ref 0.5–7.8)
ERYTHROCYTE [DISTWIDTH] IN BLOOD BY AUTOMATED COUNT: 14.3 % (ref 11.9–14.6)
GLOBULIN SER CALC-MCNC: 3.3 G/DL (ref 2.3–3.5)
GLUCOSE SERPL-MCNC: 88 MG/DL (ref 65–100)
HCT VFR BLD AUTO: 29.2 % (ref 35.8–46.3)
HGB BLD-MCNC: 9.2 G/DL (ref 11.7–15.4)
IMM GRANULOCYTES # BLD AUTO: 0.1 K/UL (ref 0–0.5)
IMM GRANULOCYTES NFR BLD AUTO: 1 % (ref 0–5)
LYMPHOCYTES # BLD: 1.7 K/UL (ref 0.5–4.6)
LYMPHOCYTES NFR BLD: 20 % (ref 13–44)
MCH RBC QN AUTO: 28.6 PG (ref 26.1–32.9)
MCHC RBC AUTO-ENTMCNC: 31.5 G/DL (ref 31.4–35)
MCV RBC AUTO: 90.7 FL (ref 79.6–97.8)
MONOCYTES # BLD: 0.6 K/UL (ref 0.1–1.3)
MONOCYTES NFR BLD: 7 % (ref 4–12)
NEUTS SEG # BLD: 5.6 K/UL (ref 1.7–8.2)
NEUTS SEG NFR BLD: 66 % (ref 43–78)
NRBC # BLD: 0 K/UL (ref 0–0.2)
PLATELET # BLD AUTO: 229 K/UL (ref 150–450)
PMV BLD AUTO: 10.7 FL (ref 9.4–12.3)
POTASSIUM SERPL-SCNC: 4.4 MMOL/L (ref 3.5–5.1)
PROT SERPL-MCNC: 5.8 G/DL (ref 6.3–8.2)
RBC # BLD AUTO: 3.22 M/UL (ref 4.05–5.2)
SODIUM SERPL-SCNC: 143 MMOL/L (ref 136–145)
WBC # BLD AUTO: 8.5 K/UL (ref 4.3–11.1)

## 2020-12-06 PROCEDURE — 74011250637 HC RX REV CODE- 250/637: Performed by: NURSE PRACTITIONER

## 2020-12-06 PROCEDURE — 85025 COMPLETE CBC W/AUTO DIFF WBC: CPT

## 2020-12-06 PROCEDURE — 36415 COLL VENOUS BLD VENIPUNCTURE: CPT

## 2020-12-06 PROCEDURE — 80053 COMPREHEN METABOLIC PANEL: CPT

## 2020-12-06 PROCEDURE — 65270000029 HC RM PRIVATE

## 2020-12-06 PROCEDURE — 74011250637 HC RX REV CODE- 250/637: Performed by: FAMILY MEDICINE

## 2020-12-06 PROCEDURE — 74011250636 HC RX REV CODE- 250/636: Performed by: FAMILY MEDICINE

## 2020-12-06 RX ADMIN — ESCITALOPRAM OXALATE 10 MG: 10 TABLET ORAL at 09:02

## 2020-12-06 RX ADMIN — HEPARIN SODIUM 5000 UNITS: 5000 INJECTION INTRAVENOUS; SUBCUTANEOUS at 05:43

## 2020-12-06 RX ADMIN — Medication 10 ML: at 21:25

## 2020-12-06 RX ADMIN — FERROUS SULFATE TAB 325 MG (65 MG ELEMENTAL FE) 325 MG: 325 (65 FE) TAB at 09:02

## 2020-12-06 RX ADMIN — Medication 100 MG: at 09:02

## 2020-12-06 RX ADMIN — HEPARIN SODIUM 5000 UNITS: 5000 INJECTION INTRAVENOUS; SUBCUTANEOUS at 21:24

## 2020-12-06 RX ADMIN — FERROUS SULFATE TAB 325 MG (65 MG ELEMENTAL FE) 325 MG: 325 (65 FE) TAB at 12:53

## 2020-12-06 RX ADMIN — HEPARIN SODIUM 5000 UNITS: 5000 INJECTION INTRAVENOUS; SUBCUTANEOUS at 15:57

## 2020-12-06 RX ADMIN — ASPIRIN 325 MG ORAL TABLET 325 MG: 325 PILL ORAL at 09:01

## 2020-12-06 RX ADMIN — SODIUM BICARBONATE 650 MG TABLET 650 MG: at 21:25

## 2020-12-06 RX ADMIN — RDII 250 MG CAPSULE 250 MG: at 09:01

## 2020-12-06 RX ADMIN — FOLIC ACID 1 MG: 1 TABLET ORAL at 09:01

## 2020-12-06 RX ADMIN — Medication 400 MG: at 09:01

## 2020-12-06 RX ADMIN — PANTOPRAZOLE SODIUM 40 MG: 40 TABLET, DELAYED RELEASE ORAL at 05:44

## 2020-12-06 RX ADMIN — QUETIAPINE FUMARATE 50 MG: 25 TABLET ORAL at 21:24

## 2020-12-06 RX ADMIN — Medication 10 ML: at 15:31

## 2020-12-06 RX ADMIN — FERROUS SULFATE TAB 325 MG (65 MG ELEMENTAL FE) 325 MG: 325 (65 FE) TAB at 17:28

## 2020-12-06 RX ADMIN — SODIUM BICARBONATE 650 MG TABLET 650 MG: at 17:28

## 2020-12-06 RX ADMIN — SODIUM BICARBONATE 650 MG TABLET 650 MG: at 09:01

## 2020-12-06 RX ADMIN — RDII 250 MG CAPSULE 250 MG: at 17:28

## 2020-12-06 NOTE — PROGRESS NOTES
Hospitalist Progress Note     Admit Date:  2020  4:19 PM   Name:  González Self   Age:  61 y.o.  :  1960   MRN:  [id number]   PCP:  Precious Bains MD  Treatment Team: Attending Provider: Avila Brown MD; Utilization Review: Gricelda Bennett RN; Care Manager: Angelica Hernandez RN; Nurse Practitioner: Genny Martinez NP; Hospitalist: Urszula Brooks NP; Charge Nurse: Alvina Pandey    Subjective:   HPI and or CC:   62 yo female with PMH of carotid artery aneurysm s/p coil, HTN, CVA, dementia, CKD, bed bound admitted  when caregiver brought to this facility and patient had nobody to care for her. She was noted to have OSMIN on CKD with mild hyperkalemia. APS now involved-spouse (her caregiver) has been hospitalized with confusion. No family or caregivers. :  Patient alert and oriented x3. Afebrile, no leukocytosis. Nephrology continues to follow along. Cr. 2.87-improving. Discharge planning:  Waiting on placement, CM and APS involved. Objective:     Patient Vitals for the past 24 hrs:   Temp Pulse Resp BP SpO2   20 0722 98.6 °F (37 °C) 81 16 119/86 99 %   20 0433 98.6 °F (37 °C) 79 18 101/68 97 %   20 2325 98.1 °F (36.7 °C) 80 18 111/69 97 %   20 1913 98.5 °F (36.9 °C) 89 18 118/73 97 %   20 1533 98.3 °F (36.8 °C) 85 18 116/69 99 %   20 1050 98.2 °F (36.8 °C) 74 18 122/72 100 %     Oxygen Therapy  O2 Sat (%): 99 % (20 0722)  Pulse via Oximetry: 97 beats per minute (20 1621)  O2 Device: Room air (20 1604)    Intake/Output Summary (Last 24 hours) at 2020 1005  Last data filed at 2020 0433  Gross per 24 hour   Intake    Output 1600 ml   Net -1600 ml         REVIEW OF SYSTEMS: Comprehensive ROS performed and negative except as stated in HPI. Physical Examination:  General:       No acute distress, appears chronically ill    Lungs:          CTA bilaterally.  Resp even and nonlabored  Heart:            S1S2 present without murmurs rubs gallops. RRR. No LE edema  Abdomen:    Soft, non tender, non distended. BS present  Extremities: RLE with brace.  All ext with muscle wasting  Neurologic:  A/O X3. No gross focal deficits.  Right sided residual weakness, RUE tremors. Data Review:  I have reviewed all labs, meds, telemetry events, and studies from the last 24 hours. Recent Results (from the past 24 hour(s))   METABOLIC PANEL, COMPREHENSIVE    Collection Time: 12/06/20  5:41 AM   Result Value Ref Range    Sodium 143 136 - 145 mmol/L    Potassium 4.4 3.5 - 5.1 mmol/L    Chloride 109 (H) 98 - 107 mmol/L    CO2 28 21 - 32 mmol/L    Anion gap 6 (L) 7 - 16 mmol/L    Glucose 88 65 - 100 mg/dL    BUN 42 (H) 8 - 23 MG/DL    Creatinine 2.87 (H) 0.6 - 1.0 MG/DL    GFR est AA 22 (L) >60 ml/min/1.73m2    GFR est non-AA 18 (L) >60 ml/min/1.73m2    Calcium 8.5 8.3 - 10.4 MG/DL    Bilirubin, total 0.4 0.2 - 1.1 MG/DL    ALT (SGPT) 11 (L) 12 - 65 U/L    AST (SGOT) 8 (L) 15 - 37 U/L    Alk. phosphatase 96 50 - 136 U/L    Protein, total 5.8 (L) 6.3 - 8.2 g/dL    Albumin 2.5 (L) 3.2 - 4.6 g/dL    Globulin 3.3 2.3 - 3.5 g/dL    A-G Ratio 0.8 (L) 1.2 - 3.5     CBC WITH AUTOMATED DIFF    Collection Time: 12/06/20  5:41 AM   Result Value Ref Range    WBC 8.5 4.3 - 11.1 K/uL    RBC 3.22 (L) 4.05 - 5.2 M/uL    HGB 9.2 (L) 11.7 - 15.4 g/dL    HCT 29.2 (L) 35.8 - 46.3 %    MCV 90.7 79.6 - 97.8 FL    MCH 28.6 26.1 - 32.9 PG    MCHC 31.5 31.4 - 35.0 g/dL    RDW 14.3 11.9 - 14.6 %    PLATELET 531 273 - 997 K/uL    MPV 10.7 9.4 - 12.3 FL    ABSOLUTE NRBC 0.00 0.0 - 0.2 K/uL    DF AUTOMATED      NEUTROPHILS 66 43 - 78 %    LYMPHOCYTES 20 13 - 44 %    MONOCYTES 7 4.0 - 12.0 %    EOSINOPHILS 5 0.5 - 7.8 %    BASOPHILS 1 0.0 - 2.0 %    IMMATURE GRANULOCYTES 1 0.0 - 5.0 %    ABS. NEUTROPHILS 5.6 1.7 - 8.2 K/UL    ABS. LYMPHOCYTES 1.7 0.5 - 4.6 K/UL    ABS. MONOCYTES 0.6 0.1 - 1.3 K/UL    ABS.  EOSINOPHILS 0.4 0.0 - 0.8 K/UL    ABS. BASOPHILS 0.1 0.0 - 0.2 K/UL    ABS. IMM. GRANS. 0.1 0.0 - 0.5 K/UL        All Micro Results     Procedure Component Value Units Date/Time    QUANTIFERON-TB GOLD PLUS [[id number]] Collected:  11/25/20 1315    Order Status:  Canceled Specimen:  Blood     QUANTIFERON-TB GOLD PLUS [[id number]]     Order Status:  Canceled Specimen:  Blood           Current Meds:  Current Facility-Administered Medications   Medication Dose Route Frequency    Saccharomyces boulardii (FLORASTOR) capsule 250 mg  250 mg Oral BID    aspirin tablet 325 mg  325 mg Oral DAILY    escitalopram oxalate (LEXAPRO) tablet 10 mg  10 mg Oral DAILY    ferrous sulfate tablet 325 mg  325 mg Oral TID WITH MEALS    folic acid (FOLVITE) tablet 1 mg  1 mg Oral DAILY    [Held by provider] lisinopriL (PRINIVIL, ZESTRIL) tablet 10 mg  10 mg Oral DAILY    magnesium oxide (MAG-OX) tablet 400 mg  400 mg Oral DAILY    pantoprazole (PROTONIX) tablet 40 mg  40 mg Oral ACB    QUEtiapine (SEROquel) tablet 50 mg  50 mg Oral QHS    thiamine HCL (B-1) tablet 100 mg  100 mg Oral DAILY    traMADoL (ULTRAM) tablet 50 mg  50 mg Oral Q6H PRN    sodium chloride (NS) flush 5-40 mL  5-40 mL IntraVENous Q8H    sodium chloride (NS) flush 5-40 mL  5-40 mL IntraVENous PRN    influenza vaccine 2020-21 (6 mos+)(PF) (FLUARIX/FLULAVAL/FLUZONE QUAD) injection 0.5 mL  0.5 mL IntraMUSCular PRIOR TO DISCHARGE    LORazepam (ATIVAN) tablet 1-2 mg  1-2 mg Oral Q1H PRN    LORazepam (ATIVAN) tablet 3-4 mg  3-4 mg Oral Q1H PRN    heparin (porcine) injection 5,000 Units  5,000 Units SubCUTAneous Q8H    sodium bicarbonate tablet 650 mg  650 mg Oral TID       Diet:  DIET RENAL  DIET NUTRITIONAL SUPPLEMENTS    Other Studies (last 24 hours):  No results found.     Assessment and Plan:     Hospital Problems as of 12/6/2020 Date Reviewed: 2/28/2018          Codes Class Noted - Resolved POA    OSMIN (acute kidney injury) ([facility] ICD-10-CM: N17.9  ICD-9-CM: 584.9  11/27/2020 - Present Unknown        Normocytic anemia ICD-10-CM: D64.9  ICD-9-CM: 285.9  11/24/2020 - Present Yes        Hyperkalemia ICD-10-CM: E87.5  ICD-9-CM: 276.7  11/24/2020 - Present Yes        * (Principal) No able caregiver in household ICD-10-CM: Z74.2  ICD-9-CM: V60.4  11/23/2020 - Present Yes        Alcoholism ([facility] ICD-10-CM: F10.20  ICD-9-CM: 303.90  5/18/2018 - Present Yes        Hypertension ICD-10-CM: I10  ICD-9-CM: 401.9  5/18/2018 - Present Yes        Vascular dementia ([facility] ICD-10-CM: F01.50  ICD-9-CM: 290.40  2/13/2018 - Present Yes        Depression ICD-10-CM: F32.9  ICD-9-CM: 269  2/13/2018 - Present Yes        Bipolar disorder ([facility] ICD-10-CM: F31.9  ICD-9-CM: 296.80  10/22/2014 - Present Yes        CKD (chronic kidney disease) ICD-10-CM: N18.9  ICD-9-CM: 585.9  9/18/2014 - Present Yes        Essential hypertension ICD-10-CM: I10  ICD-9-CM: 401.9  9/11/2012 - Present Yes        FSGS (focal segmental glomerulosclerosis) ICD-10-CM: N05.1  ICD-9-CM: 582.1  9/11/2012 - Present Yes        RESOLVED: Dementia ([facility] ICD-10-CM: F03.90  ICD-9-CM: 294.20  Unknown - 11/23/2020 Yes        RESOLVED: Alcohol abuse ICD-10-CM: F10.10  ICD-9-CM: 305.00  2/13/2018 - 11/23/2020 Yes              A/P:    1. OSMIN/CKD  Improving; likely pre renal azotemia with poor intake, hypotension and progression of CKD hx of FSGS. IVF  Renal US normal   Nephrology signed off 11/29  Holding ACE-I     2. Hyperkalemia  resolved     3. HTN  With hypotension now  Holding ACE-I  IVF     4. Metabolic acidosis  Bicarb po     5. Vascular dementia/debility  PT/OT  STR     6. Bipolar disorder  Denies SI/HI  Home meds    7.  Normocytic anemia:  Trend  Ferrous sulfate supplements  :          Signed:  Festus Cuello NP

## 2020-12-06 NOTE — PROGRESS NOTES
Problem: Pressure Injury - Risk of  Goal: *Prevention of pressure injury  Description: Document Luis Scale and appropriate interventions in the flowsheet. Outcome: Progressing Towards Goal  Note: Pressure Injury Interventions:  Sensory Interventions: Assess changes in LOC    Moisture Interventions: Absorbent underpads, Internal/External urinary devices, Limit adult briefs, Minimize layers    Activity Interventions: Increase time out of bed, Pressure redistribution bed/mattress(bed type)    Mobility Interventions: HOB 30 degrees or less, Pressure redistribution bed/mattress (bed type)    Nutrition Interventions: Document food/fluid/supplement intake, Offer support with meals,snacks and hydration    Friction and Shear Interventions: HOB 30 degrees or less, Minimize layers                Problem: Patient Education: Go to Patient Education Activity  Goal: Patient/Family Education  Outcome: Progressing Towards Goal     Problem: Falls - Risk of  Goal: *Absence of Falls  Description: Document Damien Fall Risk and appropriate interventions in the flowsheet.   Outcome: Progressing Towards Goal  Note: Fall Risk Interventions:  Mobility Interventions: Communicate number of staff needed for ambulation/transfer         Medication Interventions: Patient to call before getting OOB, Teach patient to arise slowly    Elimination Interventions: Call light in reach, Patient to call for help with toileting needs    History of Falls Interventions: Door open when patient unattended         Problem: Patient Education: Go to Patient Education Activity  Goal: Patient/Family Education  Outcome: Progressing Towards Goal     Problem: Pain  Goal: *Control of Pain  Outcome: Progressing Towards Goal     Problem: Patient Education: Go to Patient Education Activity  Goal: Patient/Family Education  Outcome: Progressing Towards Goal     Problem: General Medical Care Plan  Goal: *Vital signs within specified parameters  Outcome: Progressing Towards Goal  Goal: *Labs within defined limits  Outcome: Progressing Towards Goal  Goal: *Absence of infection signs and symptoms  Outcome: Progressing Towards Goal  Goal: *Optimal pain control at patient's stated goal  Outcome: Progressing Towards Goal  Goal: *Skin integrity maintained  Outcome: Progressing Towards Goal  Goal: *Fluid volume balance  Outcome: Progressing Towards Goal  Goal: *Optimize nutritional status  Outcome: Progressing Towards Goal  Goal: *Anxiety reduced or absent  Outcome: Progressing Towards Goal  Goal: *Progressive mobility and function (eg: ADL's)  Outcome: Progressing Towards Goal     Problem: Patient Education: Go to Patient Education Activity  Goal: Patient/Family Education  Outcome: Progressing Towards Goal     Problem: Patient Education: Go to Patient Education Activity  Goal: Patient/Family Education  Outcome: Progressing Towards Goal     Problem: Patient Education: Go to Patient Education Activity  Goal: Patient/Family Education  Outcome: Progressing Towards Goal

## 2020-12-07 LAB
ALBUMIN SERPL-MCNC: 2.6 G/DL (ref 3.2–4.6)
ALBUMIN/GLOB SERPL: 0.8 {RATIO} (ref 1.2–3.5)
ALP SERPL-CCNC: 105 U/L (ref 50–136)
ALT SERPL-CCNC: 11 U/L (ref 12–65)
ANION GAP SERPL CALC-SCNC: 5 MMOL/L (ref 7–16)
AST SERPL-CCNC: 12 U/L (ref 15–37)
BASOPHILS # BLD: 0.1 K/UL (ref 0–0.2)
BASOPHILS NFR BLD: 1 % (ref 0–2)
BILIRUB SERPL-MCNC: 0.3 MG/DL (ref 0.2–1.1)
BUN SERPL-MCNC: 40 MG/DL (ref 8–23)
CALCIUM SERPL-MCNC: 8.8 MG/DL (ref 8.3–10.4)
CHLORIDE SERPL-SCNC: 109 MMOL/L (ref 98–107)
CO2 SERPL-SCNC: 27 MMOL/L (ref 21–32)
CREAT SERPL-MCNC: 2.93 MG/DL (ref 0.6–1)
DIFFERENTIAL METHOD BLD: ABNORMAL
EOSINOPHIL # BLD: 0.3 K/UL (ref 0–0.8)
EOSINOPHIL NFR BLD: 3 % (ref 0.5–7.8)
ERYTHROCYTE [DISTWIDTH] IN BLOOD BY AUTOMATED COUNT: 14.1 % (ref 11.9–14.6)
GLOBULIN SER CALC-MCNC: 3.4 G/DL (ref 2.3–3.5)
GLUCOSE SERPL-MCNC: 89 MG/DL (ref 65–100)
HCT VFR BLD AUTO: 28.1 % (ref 35.8–46.3)
HGB BLD-MCNC: 8.9 G/DL (ref 11.7–15.4)
IMM GRANULOCYTES # BLD AUTO: 0.1 K/UL (ref 0–0.5)
IMM GRANULOCYTES NFR BLD AUTO: 1 % (ref 0–5)
LYMPHOCYTES # BLD: 1.6 K/UL (ref 0.5–4.6)
LYMPHOCYTES NFR BLD: 18 % (ref 13–44)
MCH RBC QN AUTO: 28.1 PG (ref 26.1–32.9)
MCHC RBC AUTO-ENTMCNC: 31.7 G/DL (ref 31.4–35)
MCV RBC AUTO: 88.6 FL (ref 79.6–97.8)
MONOCYTES # BLD: 0.6 K/UL (ref 0.1–1.3)
MONOCYTES NFR BLD: 6 % (ref 4–12)
NEUTS SEG # BLD: 6.6 K/UL (ref 1.7–8.2)
NEUTS SEG NFR BLD: 71 % (ref 43–78)
NRBC # BLD: 0 K/UL (ref 0–0.2)
PLATELET # BLD AUTO: 249 K/UL (ref 150–450)
PMV BLD AUTO: 10 FL (ref 9.4–12.3)
POTASSIUM SERPL-SCNC: 4.8 MMOL/L (ref 3.5–5.1)
PROT SERPL-MCNC: 6 G/DL (ref 6.3–8.2)
RBC # BLD AUTO: 3.17 M/UL (ref 4.05–5.2)
SODIUM SERPL-SCNC: 141 MMOL/L (ref 136–145)
WBC # BLD AUTO: 9.3 K/UL (ref 4.3–11.1)

## 2020-12-07 PROCEDURE — 80053 COMPREHEN METABOLIC PANEL: CPT

## 2020-12-07 PROCEDURE — 74011250637 HC RX REV CODE- 250/637: Performed by: FAMILY MEDICINE

## 2020-12-07 PROCEDURE — 36415 COLL VENOUS BLD VENIPUNCTURE: CPT

## 2020-12-07 PROCEDURE — 74011250636 HC RX REV CODE- 250/636: Performed by: FAMILY MEDICINE

## 2020-12-07 PROCEDURE — 74011250637 HC RX REV CODE- 250/637: Performed by: NURSE PRACTITIONER

## 2020-12-07 PROCEDURE — 85025 COMPLETE CBC W/AUTO DIFF WBC: CPT

## 2020-12-07 PROCEDURE — 97530 THERAPEUTIC ACTIVITIES: CPT

## 2020-12-07 PROCEDURE — 65270000029 HC RM PRIVATE

## 2020-12-07 PROCEDURE — 77030038269 HC DRN EXT URIN PURWCK BARD -A

## 2020-12-07 RX ADMIN — SODIUM BICARBONATE 650 MG TABLET 650 MG: at 21:10

## 2020-12-07 RX ADMIN — HEPARIN SODIUM 5000 UNITS: 5000 INJECTION INTRAVENOUS; SUBCUTANEOUS at 05:33

## 2020-12-07 RX ADMIN — FERROUS SULFATE TAB 325 MG (65 MG ELEMENTAL FE) 325 MG: 325 (65 FE) TAB at 12:11

## 2020-12-07 RX ADMIN — SODIUM BICARBONATE 650 MG TABLET 650 MG: at 09:16

## 2020-12-07 RX ADMIN — HEPARIN SODIUM 5000 UNITS: 5000 INJECTION INTRAVENOUS; SUBCUTANEOUS at 21:10

## 2020-12-07 RX ADMIN — Medication 10 ML: at 13:36

## 2020-12-07 RX ADMIN — SODIUM BICARBONATE 650 MG TABLET 650 MG: at 18:23

## 2020-12-07 RX ADMIN — RDII 250 MG CAPSULE 250 MG: at 09:15

## 2020-12-07 RX ADMIN — Medication 400 MG: at 09:16

## 2020-12-07 RX ADMIN — ESCITALOPRAM OXALATE 10 MG: 10 TABLET ORAL at 09:16

## 2020-12-07 RX ADMIN — Medication 100 MG: at 09:16

## 2020-12-07 RX ADMIN — QUETIAPINE FUMARATE 50 MG: 25 TABLET ORAL at 21:10

## 2020-12-07 RX ADMIN — PANTOPRAZOLE SODIUM 40 MG: 40 TABLET, DELAYED RELEASE ORAL at 05:34

## 2020-12-07 RX ADMIN — FERROUS SULFATE TAB 325 MG (65 MG ELEMENTAL FE) 325 MG: 325 (65 FE) TAB at 09:16

## 2020-12-07 RX ADMIN — FERROUS SULFATE TAB 325 MG (65 MG ELEMENTAL FE) 325 MG: 325 (65 FE) TAB at 18:22

## 2020-12-07 RX ADMIN — FOLIC ACID 1 MG: 1 TABLET ORAL at 09:16

## 2020-12-07 RX ADMIN — ASPIRIN 325 MG ORAL TABLET 325 MG: 325 PILL ORAL at 09:16

## 2020-12-07 RX ADMIN — RDII 250 MG CAPSULE 250 MG: at 18:22

## 2020-12-07 RX ADMIN — HEPARIN SODIUM 5000 UNITS: 5000 INJECTION INTRAVENOUS; SUBCUTANEOUS at 13:42

## 2020-12-07 NOTE — PROGRESS NOTES
Problem: Mobility Impaired (Adult and Pediatric)  Goal: *Acute Goals and Plan of Care (Insert Text)  Outcome: Progressing Towards Goal  Note: LTG:  (1.)Ms. Padmini Parson will move from supine to sit and sit to supine , scoot up and down, and roll side to side with SUPERVISION within 7 treatment day(s). (2.)Ms. Padmini Parson will transfer from bed to chair and chair to bed with CONTACT GUARD ASSIST using the least restrictive device within 7 treatment day(s). (3.)Ms. Padmini Parson will ambulate with MINIMAL ASSIST for 10 feet with the least restrictive device within 7 treatment day(s). (4.)Ms. Padmini Parson will perform there ex B LEs x 10 min in supine or sitting with CGA within 7 treatment days for increased strength and AROM. ______________________________________________________________________________________      PHYSICAL THERAPY: Daily Note and PM 12/7/2020  INPATIENT: PT Visit Days : 5  Payor: [address] [place] [address] / Plan: [address] / Product Type: Managed Care Medicare /       NAME/AGE/GENDER: Reynaldo Jung is a 61 y.o. female   PRIMARY DIAGNOSIS: No able caregiver in household [Z74.2]  OS[facility]acute [facility]) [facility] [N17.9] No able caregiver in household No able caregiver in household       ICD-10: Treatment Diagnosis:    · Generalized Muscle Weakness (M62.81)  · Difficulty in walking, Not elsewhere classified (R26.2)  · History of falling (Z91.81)   Precaution/Allergies:  Codeine and Lortab [hydrocodone-acetaminophen]      ASSESSMENT:     The patient is supine upon contact and agreeable to treatment with encouragement. She required additional time and encouragement for all mobility. She performed bed mobility with CGA and cues for technique. She demonstrated good sitting balance at the edge of the bed, but required min A to scoot. The patient then performed sit to stand with mod Ax2 and cueing for technique. SPT with mod Ax2 and cueing.   Overall the patient is making slow progress toward therapy goals as indicated by increased mobility. Will continue skilled PT treatment as patient is still below functional baseline. This section established at most recent assessment   PROBLEM LIST (Impairments causing functional limitations):  1. Decreased Strength  2. Decreased ADL/Functional Activities  3. Decreased Transfer Abilities  4. Decreased Ambulation Ability/Technique  5. Decreased Balance  6. Decreased Activity Tolerance  7. Decreased [place] with Home Exercise Program  8. Decreased Cognition   INTERVENTIONS PLANNED: (Benefits and precautions of physical therapy have been discussed with the patient.)  1. Balance Exercise  2. Bed Mobility  3. Family Education  4. Gait Training  5. Home Exercise Program (HEP)  6. Neuromuscular Re-education/Strengthening  7. Range of Motion (ROM)  8. Therapeutic Activites  9. Therapeutic Exercise/Strengthening  10. Transfer Training     TREATMENT PLAN: Frequency/Duration: 3 times a week for duration of hospital stay  Rehabilitation Potential For Stated Goals: [facility] (at time of discharge pending progress):    Placement: It is my opinion, based on this patient's performance to date, that Ms. Vinicio Alcantar may benefit from intensive therapy at a [address] [place] [address] after discharge due to the functional deficits listed above that are likely to improve with skilled rehabilitation and concerns that he/she may be unsafe to be unsupervised at home due to safety concerns at home, fall risk . Equipment:    To be determined              HISTORY:   History of Present Injury/Illness (Reason for Referral):  Taqueria Doe is a 61 y.o. female with a past medical history of mild vascular dementia, HTN, CKD, functional paraplegia who presents to the ER with her . Her  was unfortunately intubated and admitted, and now she has no one to take care of her at home. She has no complaints currently. She denies any pain, nausea, vomiting. .  Past Medical History/Comorbidities:   Ms. Nemesio Lomas  has a past medical history of Aneurysm of common carotid artery ([facility] (2004), CKD (chronic kidney disease) (9/18/2014), Dementia ([facility] FSGS (focal segmental glomerulosclerosis), Gout, Hypertension, Osteoarthritis (9/18/2014), and Stroke ([facility] (2004, 2013). Ms. Nemesio Lomas  has a past surgical history that includes hx intracranial aneurysm repair (2004); hx refractive surgery; hx orthopaedic (Right, 10/2014); hx ankle fracture tx (Left, 2013); hx ercp (02/27/2018); and hx cholecystectomy (02/28/2018). Social History/Living Environment:   Home Environment: Private residence  # Steps to Enter: 0  One/Two Story Residence: Two story, live on 1st floor  # of Interior Steps: 13  Height of Each Step (in): 7 inches  Interior Rails: Right  Lift Chair Available: No  Living Alone: No  Support Systems: Home care staff  Patient Expects to be Discharged to[de-identified] Unknown  Current DME Used/Available at Home: Wheelchair, Shower chair, Commode, bedside, Blood pressure cuff, Brace/Splint, Grab bars, Walker  Tub or Shower Type: (sponge baths)  Prior Level of Function/Work/Activity:  Lives spouse, bed bound at baseline, total care ADLs  Personal Factors:          Sex:  female        Age:  61 y.o. Overall Behavior:  agreeable, delayed responses   Number of Personal Factors/Comorbidities that affect the Plan of Care:  CVA, dementia, falls 3+: HIGH COMPLEXITY   EXAMINATION:   Most Recent Physical Functioning:   Gross Assessment:                  Posture:     Balance:  Sitting: Impaired  Sitting - Static: Good (unsupported)  Sitting - Dynamic: Fair (occasional)  Standing: Impaired  Standing - Static: Poor  Standing - Dynamic : Poor Bed Mobility:  Supine to Sit: Contact guard assistance  Scooting: Minimum assistance; Additional time  Wheelchair Mobility:     Transfers:  Sit to Stand: Minimum assistance; Moderate assistance;Assist x2  Stand to Sit: Minimum assistance;Assist x2  Stand Pivot Transfers: Moderate assistance;Assist x2  Bed to Chair: Moderate assistance;Assist x2; Additional time  Interventions: Verbal cues; Safety awareness training  Gait:            Body Structures Involved:  1. Muscles Body Functions Affected:  1. Movement Related Activities and Participation Affected:  1. General Tasks and Demands  2. Mobility  3. Self Care   Number of elements that affect the Plan of Care: 4+: HIGH COMPLEXITY   CLINICAL PRESENTATION:   Presentation: Evolving clinical presentation with changing clinical characteristics: MODERATE COMPLEXITY   CLINICAL DECISION MAKIN [facility] Inpatient Short Form  How much difficulty does the patient currently have. .. Unable A Lot A Little None   1. Turning over in bed (including adjusting bedclothes, sheets and blankets)? [] 1   [] 2   [x] 3   [] 4   2. Sitting down on and standing up from a chair with arms ( e.g., wheelchair, bedside commode, etc.)   [] 1   [] 2   [x] 3   [] 4   3. Moving from lying on back to sitting on the side of the bed? [] 1   [] 2   [x] 3   [] 4   How much help from another person does the patient currently need. .. Total A Lot A Little None   4. Moving to and from a bed to a chair (including a wheelchair)? [] 1   [] 2   [x] 3   [] 4   5. Need to walk in hospital room? [] 1   [x] 2   [] 3   [] 4   6. Climbing 3-5 steps with a railing? [x] 1   [] 2   [] 3   [] 4   © , Trustees of [facility] [employer], under license to [employer]. All rights reserved      Score:  Initial: 15 Most Recent: X (Date: -- )    Interpretation of Tool:  Represents activities that are increasingly more difficult (i.e. Bed mobility, Transfers, Gait).     Medical Necessity:     · Patient is expected to demonstrate progress in   · strength, range of motion, balance, and coordination  ·  to   · decrease assistance required with overall functional mobility, transfers, ambulation  · .  · Patient demonstrates · good  ·  rehab potential due to higher previous functional level. Reason for Services/Other Comments:  · Patient   · continues to require present interventions due to patient's inability to perform bed mobility, transfers, ambulation safely and effectively  · . Use of outcome tool(s) and clinical judgement create a POC that gives a: Clear prediction of patient's progress: LOW COMPLEXITY            TREATMENT:   (In addition to Assessment/Re-Assessment sessions the following treatments were rendered)   Pre-treatment Symptoms/Complaints:  \"I can't\"  Pain: Initial:   Pain Intensity 1: 0  Post Session:  0/10 post session in chair     Therapeutic Activity: (    26 minutes): Therapeutic activities including Bed transfers, bed mobility, chair transfers, SPT training, and standing balance to improve mobility, strength, balance and endurance. Required moderate   to promote static and dynamic balance in standing. Therapeutic Exercise: ( ):  Exercises per grid below to improve mobility and strength. Required minimal verbal and manual cues to promote proper body mechanics. Progressed complexity of movement as indicated. Date:  11/27/20 Date:  12/3/20 Date:     Activity/Exercise Parameters Parameters Parameters   Ankle pumps x15 B 20x B    SLR x5 AAB     LAQ x5     Heel slides  10x B AAR    Supine SAQ  10x B    Supine hip abd  10x B AAR            Braces/Orthotics/Lines/Etc:   · O2 Device: Room air  Treatment/Session Assessment:    · Response to Treatment:  See above, better participation  · Interdisciplinary Collaboration:   o Physical Therapy Assistant  o Registered Nurse  · After treatment position/precautions:   o Up in chair  o Bed/Chair-wheels locked  o Bed in low position  o Call light within reach  o RN notified   · Compliance with Program/Exercises: Compliant most of the time  · Recommendations/Intent for next treatment session:   \"Next visit will focus on advancements to more challenging activities\".   Total Treatment Duration:  PT Patient Time In/Time Out  Time In: 1450  Time Out: 1516    [place] in AM: 1100  Time out AM: [facility], [facility]

## 2020-12-07 NOTE — PROGRESS NOTES
Patient accepted and selected for [facility] rehab. Patient will require insurance approval.  PPD completed and Covid was negative on the 4th. Message sent to admissions liaison to follow up on need for new Covid test; none needed  Updated therapy notes needed and therapy department notified. Patient and  updated at bedside. They are agreeable. Insurance approval will be started by facility once therapy notes are entered.

## 2020-12-07 NOTE — PROGRESS NOTES
Hospitalist Progress Note    Subjective:   Daily Progress Note: 12/7/2020 3:47 PM    Debilitated, bedbound since femur fx 2 weeks PTA patient presented to ER 11/23 stating she is here because  is drunk. EMS summoned to home by PT after patient was found with  passed out on top of her, pinning her down. Unclear how long this occurred prior to PT arrival. Patient with recent femur fracture, approx 2 weeks, with  as primary caregiver. Found with OSMIN on CKD, creatinine: 4.88 with baseline in upper 2's.  now in ICU, intubated with alcohol with drawls. Unable to go home alone. SW on board. NS at 150 begun. 11/24:  Nephrology in for consult, patient last saw them 2018. Holding ACE. Renal U/S pending. Hyperkalemia, adding bicarb po. Dehydrated, little po intake at home. APS notified by CM.  11/25:  Hypotensive. Appetite improved. Creatinine: 4.10. OT/PT, nutrition on board. 11/26:  Creatinine 3.66. remains hypotensive.   11/27:  Unable to give consistent meaningful answers. Potassium back up to 5.6, Nephro increasing lokelma, low K+ diet. 11/28:  OP remains stable. Creatinine 3.26.   11/29:  Creatinine 3.0, Nephrology signing off.    12/3:  CM with multiple STR referrals out. 12/6:  Creatinine down to 2.87.    12/7: Accepted to [facility] rehab. Pending insurance approval.  at bedside. PT/OT evals in prep for same. No complaints, agreeable.   Creatinine: 2.97 today.      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, Dementia, Bed bound, focal segmental glomerulosclerosis in remission, BiPolar disorder    Current Facility-Administered Medications   Medication Dose Route Frequency    Saccharomyces boulardii (FLORASTOR) capsule 250 mg  250 mg Oral BID    aspirin tablet 325 mg  325 mg Oral DAILY    escitalopram oxalate (LEXAPRO) tablet 10 mg  10 mg Oral DAILY    ferrous sulfate tablet 325 mg 325 mg Oral TID WITH MEALS    folic acid (FOLVITE) tablet 1 mg  1 mg Oral DAILY    [Held by provider] lisinopriL (PRINIVIL, ZESTRIL) tablet 10 mg  10 mg Oral DAILY    magnesium oxide (MAG-OX) tablet 400 mg  400 mg Oral DAILY    pantoprazole (PROTONIX) tablet 40 mg  40 mg Oral ACB    QUEtiapine (SEROquel) tablet 50 mg  50 mg Oral QHS    thiamine HCL (B-1) tablet 100 mg  100 mg Oral DAILY    traMADoL (ULTRAM) tablet 50 mg  50 mg Oral Q6H PRN    sodium chloride (NS) flush 5-40 mL  5-40 mL IntraVENous Q8H    sodium chloride (NS) flush 5-40 mL  5-40 mL IntraVENous PRN    influenza vaccine 2020- (6 mos+)(PF) (FLUARIX/FLULAVAL/FLUZONE QUAD) injection 0.5 mL  0.5 mL IntraMUSCular PRIOR TO DISCHARGE    LORazepam (ATIVAN) tablet 1-2 mg  1-2 mg Oral Q1H PRN    LORazepam (ATIVAN) tablet 3-4 mg  3-4 mg Oral Q1H PRN    heparin (porcine) injection 5,000 Units  5,000 Units SubCUTAneous Q8H    sodium bicarbonate tablet 650 mg  650 mg Oral TID        Review of Systems  A comprehensive review of systems was negative except for that written in the HPI. Objective:     Visit Vitals  /74 (BP 1 Location: Left arm, BP Patient Position: At rest)   Pulse (!) 58   Temp 98.5 °F (36.9 °C)   Resp 18   Ht 5' 5\" (1.651 m)   Wt 90.7 kg (200 lb)   SpO2 93%   Breastfeeding No   BMI 33.28 kg/m²      O2 Device: Room air    Temp (24hrs), Av.3 °F (36.8 °C), Min:97.9 °F (36.6 °C), Max:98.5 °F (36.9 °C)     1901 -  0700  In: -   Out: 1398 [Urine:2325]    General appearance: Oriented, alert, cooperative, dulled mentation.  Obese. Unkempt. Head: Normocephalic, without obvious abnormality, atraumatic  Eyes: conjunctivae/corneas clear.  PERRL  Throat: Lips, mucosa, and tongue normal. Teeth and gums in need of dental attention and  Hygiene.    Neck: supple, symmetrical, trachea midline, no JVD  Lungs: clear to auscultation bilaterally  Heart: regular rate and rhythm, S1, S2 normal, no murmur, click, rub or gallop  Abdomen: soft, non-tender. Bowel sounds normal. No masses,  no organomegaly  Extremities: Knee immobilizer intact left leg.  Distal CMS intact. All other extremities normal, atraumatic, no cyanosis or edema  Skin: Skin color, texture, turgor normal. No rashes or lesions  Neurologic: Grossly normal     Additional comments: Notes,orders, test results, vitals reviewed     Data Review  Recent Results (from the past 24 hour(s))   METABOLIC PANEL, COMPREHENSIVE    Collection Time: 12/07/20  6:02 AM   Result Value Ref Range    Sodium 141 136 - 145 mmol/L    Potassium 4.8 3.5 - 5.1 mmol/L    Chloride 109 (H) 98 - 107 mmol/L    CO2 27 21 - 32 mmol/L    Anion gap 5 (L) 7 - 16 mmol/L    Glucose 89 65 - 100 mg/dL    BUN 40 (H) 8 - 23 MG/DL    Creatinine 2.93 (H) 0.6 - 1.0 MG/DL    GFR est AA 21 (L) >60 ml/min/1.73m2    GFR est non-AA 17 (L) >60 ml/min/1.73m2    Calcium 8.8 8.3 - 10.4 MG/DL    Bilirubin, total 0.3 0.2 - 1.1 MG/DL    ALT (SGPT) 11 (L) 12 - 65 U/L    AST (SGOT) 12 (L) 15 - 37 U/L    Alk. phosphatase 105 50 - 136 U/L    Protein, total 6.0 (L) 6.3 - 8.2 g/dL    Albumin 2.6 (L) 3.2 - 4.6 g/dL    Globulin 3.4 2.3 - 3.5 g/dL    A-G Ratio 0.8 (L) 1.2 - 3.5     CBC WITH AUTOMATED DIFF    Collection Time: 12/07/20 12:37 PM   Result Value Ref Range    WBC 9.3 4.3 - 11.1 K/uL    RBC 3.17 (L) 4.05 - 5.2 M/uL    HGB 8.9 (L) 11.7 - 15.4 g/dL    HCT 28.1 (L) 35.8 - 46.3 %    MCV 88.6 79.6 - 97.8 FL    MCH 28.1 26.1 - 32.9 PG    MCHC 31.7 31.4 - 35.0 g/dL    RDW 14.1 11.9 - 14.6 %    PLATELET 999 161 - 209 K/uL    MPV 10.0 9.4 - 12.3 FL    ABSOLUTE NRBC 0.00 0.0 - 0.2 K/uL    DF AUTOMATED      NEUTROPHILS 71 43 - 78 %    LYMPHOCYTES 18 13 - 44 %    MONOCYTES 6 4.0 - 12.0 %    EOSINOPHILS 3 0.5 - 7.8 %    BASOPHILS 1 0.0 - 2.0 %    IMMATURE GRANULOCYTES 1 0.0 - 5.0 %    ABS. NEUTROPHILS 6.6 1.7 - 8.2 K/UL    ABS. LYMPHOCYTES 1.6 0.5 - 4.6 K/UL    ABS. MONOCYTES 0.6 0.1 - 1.3 K/UL    ABS.  EOSINOPHILS 0.3 0.0 - 0.8 K/UL ABS. BASOPHILS 0.1 0.0 - 0.2 K/UL    ABS. IMM. GRANS. 0.1 0.0 - 0.5 K/UL      11/24:  CXR:  Negative for evidence of tuberculosis. The lungs are clear      11/24:  ABDOMINAL ULTRASOUND:  Bilateral atrophic kidneys with increased renal cortical echogenicity typically seen with medical renal disease. There is no hydronephrosis.  2.6 cm hypoechoic left renal cyst.     Assessment/Plan:   No able caregiver in household: Emily Glasgow primary caregiver home now, reports of alcohol abuse.            Patient is unable to care for self, no known family              CM involved APS,                Will need to keep in Hospital until alternative arrangements made:  Accepted to [facility]  today, pending insurance approval.      OSMIN on CKD:  Baseline creatinine:  High 2's.  4.48 on admission.  Multifactorial: prerenal azotemia with poor po intake, hypotension, progression of CKD/FSGS              Trending down with IVF, continue              Strict I+O              Holding ACE              Nephrology signed off, appreciate assist   Changing labs to every other day 12/7     Hyperkalemia, then hypokalemia               Bicarb po and lokelma, low K+ diet per Nephrology     Hypotension with baseline hypertension:  Holding antihypertensives     FSGS:  Last seen by Nephrology 2018, will need to follow on discharge      Bipolar disorder:  Home meds      Vascular dementia      Recent femur fracture:  Continue immobilizer      Depression     History of alcoholism               Unclear if patient currently drinks:  She denies      Normocytic anemia: Sharon [place] 44 discussed with: Patient, CM and Nurse    Signed By: Laura Chavez NP     December 7, 2020

## 2020-12-08 VITALS
TEMPERATURE: 98.9 F | BODY MASS INDEX: 33.32 KG/M2 | OXYGEN SATURATION: 100 % | RESPIRATION RATE: 18 BRPM | HEART RATE: 79 BPM | WEIGHT: 200 LBS | SYSTOLIC BLOOD PRESSURE: 122 MMHG | DIASTOLIC BLOOD PRESSURE: 73 MMHG | HEIGHT: 65 IN

## 2020-12-08 PROCEDURE — 74011250637 HC RX REV CODE- 250/637: Performed by: NURSE PRACTITIONER

## 2020-12-08 PROCEDURE — 74011250637 HC RX REV CODE- 250/637: Performed by: FAMILY MEDICINE

## 2020-12-08 PROCEDURE — 77030038269 HC DRN EXT URIN PURWCK BARD -A

## 2020-12-08 PROCEDURE — 74011250636 HC RX REV CODE- 250/636: Performed by: FAMILY MEDICINE

## 2020-12-08 RX ORDER — SAME BUTANEDISULFONATE/BETAINE 400-600 MG
250 POWDER IN PACKET (EA) ORAL 2 TIMES DAILY
Qty: 62 CAP | Refills: 0 | Status: SHIPPED | OUTPATIENT
Start: 2020-12-05 | End: 2021-01-05

## 2020-12-08 RX ORDER — TRAMADOL HYDROCHLORIDE 50 MG/1
50 TABLET ORAL
Qty: 12 TAB | Refills: 0 | Status: SHIPPED | OUTPATIENT
Start: 2020-12-08 | End: 2020-12-11

## 2020-12-08 RX ORDER — SODIUM BICARBONATE 650 MG/1
650 TABLET ORAL 3 TIMES DAILY
Qty: 90 TAB | Refills: 0 | Status: SHIPPED
Start: 2020-12-08

## 2020-12-08 RX ADMIN — HEPARIN SODIUM 5000 UNITS: 5000 INJECTION INTRAVENOUS; SUBCUTANEOUS at 13:45

## 2020-12-08 RX ADMIN — FERROUS SULFATE TAB 325 MG (65 MG ELEMENTAL FE) 325 MG: 325 (65 FE) TAB at 11:46

## 2020-12-08 RX ADMIN — Medication 10 ML: at 13:32

## 2020-12-08 RX ADMIN — Medication 100 MG: at 09:00

## 2020-12-08 RX ADMIN — SODIUM BICARBONATE 650 MG TABLET 650 MG: at 09:00

## 2020-12-08 RX ADMIN — ASPIRIN 325 MG ORAL TABLET 325 MG: 325 PILL ORAL at 09:00

## 2020-12-08 RX ADMIN — HEPARIN SODIUM 5000 UNITS: 5000 INJECTION INTRAVENOUS; SUBCUTANEOUS at 05:07

## 2020-12-08 RX ADMIN — Medication 400 MG: at 09:00

## 2020-12-08 RX ADMIN — RDII 250 MG CAPSULE 250 MG: at 09:00

## 2020-12-08 RX ADMIN — PANTOPRAZOLE SODIUM 40 MG: 40 TABLET, DELAYED RELEASE ORAL at 05:07

## 2020-12-08 RX ADMIN — FOLIC ACID 1 MG: 1 TABLET ORAL at 09:00

## 2020-12-08 RX ADMIN — FERROUS SULFATE TAB 325 MG (65 MG ELEMENTAL FE) 325 MG: 325 (65 FE) TAB at 09:00

## 2020-12-08 RX ADMIN — ESCITALOPRAM OXALATE 10 MG: 10 TABLET ORAL at 09:00

## 2020-12-08 NOTE — DISCHARGE SUMMARY
Hospitalist Discharge Summary     Admit Date:  2020  4:19 PM   DC note date: 2020  Name:  Oc Rausch   Age:  61 y.o.  :  1960   MRN:  [id number]   PCP:  Helga Cunha MD  Treatment Team: Attending Provider: Vasiliy Alba DO; Utilization Review: Curt Weaver RN; Care Manager: Jaime Israel RN; Nurse Practitioner: Efren Garnett NP; Hospitalist: Radha Lane NP; Charge Nurse: Sergey Nunez; Occupational Therapist: Mode Rodarte OT    PROBLEMS:    OSMIN on CKD  No able caregiver in household: Unable to care for self  Hypotension with baseline hypertension:  Resolved   Hyperkalemia, then hypokalemia:  Resolved  Dehydration  History of carotid artery aneurysm with S/P coil  Bipolar disorder  History of stroke with residual right side weakness and speech  Deficit  Bed bound   Vascular dementia   Recent femur fracture   Depression  History of alcoholism: In remission   Normocytic anemia  Focal segmental glomerulosclerosis    Admission HPI from 2020:  Clayton Santos is a 61 y.o. female with a past medical history of mild vascular dementia, HTN, CKD, functional paraplegia who presents to the ER with her . Her  was unfortunately intubated and admitted, and now she has no one to take care of her at home. She has no complaints currently. She denies any pain, nausea, vomiting. \"    Hospital Course:  Debilitated, bedbound since femur fx 2 weeks PTA patient presented to ER  stating she is here because  is drunk.  EMS summoned to home by PT after patient was found with  passed out on top of her, pinning her down. [place] how long this occurred prior to PT arrival. Patient with recent femur fracture, approx 2 weeks, with  as primary caregiver. Found with OSMIN on CKD, creatinine: 4.88 with baseline in upper 2's.   now in ICU, intubated with alcohol with drawls.  Unable to go home alone.  SW on board.  NS at 150 begun.  11/24:  Nephrology in for consult, patient last saw them 2018.  Holding ACE. Renal U/S pending.  Hyperkalemia, adding bicarb po. Dehydrated, little po intake at home. APS notified by CM.  11/25:  Hypotensive.  Appetite improved. Creatinine: 4.10. OT/PT, nutrition on board. 11/26:  Creatinine 3.66. remains hypotensive.   11/27:  Unable to give consistent meaningful answers.  Potassium back up to 5.6, Nephro increasing lokelma, low K+ diet. 11/28:  OP remains stable. Creatinine 3.26.   11/29:  Creatinine 3.0, Nephrology signing off.    12/3:  CM with multiple STR referrals out. 12/6:  Creatinine down to 2.87.   12/7: Accepted to [facility] rehab. Pending insurance approval.  at bedside. PT/OT evals in prep for same. No complaints, agreeable. Creatinine: 2.97 today. Disposition: Rehab Facility  Activity: Activity as tolerated and as instructed by staff at Rehab  Diet: DIET RENAL Regular  DIET NUTRITIONAL SUPPLEMENTS Breakfast, Dinner; Nepro ( )  Code Status: Full Code    Discharge meds at bottom of this note. Plan was discussed with patient, RN,DEBBIE. All questions answered. Patient was stable at time of discharge. Given instructions to call a physician or return if any concerns. Diagnostic Imaging/Tests:   11/24: Maribel Tyson for evidence of tuberculosis. The lungs are clear      11/24:  ABDOMINAL ULTRASOUND:  Bilateral atrophic kidneys with increased renal cortical echogenicity typically seen with medical renal disease.  There is no hydronephrosis.  2.6 cm hypoechoic left renal cyst.       Results for orders placed or performed during the hospital encounter of 09/23/20   EKG, 12 LEAD, INITIAL   Result Value Ref Range    Ventricular Rate 118 BPM    Atrial Rate 118 BPM    P-R Interval 128 ms    QRS Duration 74 ms    Q-T Interval 290 ms    QTC Calculation (Bezet) 406 ms    Calculated P Axis 47 degrees    Calculated R Axis 37 degrees    Calculated T Axis 56 degrees    Diagnosis       !! AGE AND GENDER SPECIFIC ECG ANALYSIS !!   Sinus tachycardia  Possible Left atrial enlargement  RSR' or QR pattern in V1 suggests right ventricular conduction delay  Nonspecific T wave abnormality  Abnormal ECG  No previous ECGs available  Confirmed by JASON ALVAREZ (), Diana Reyes ([id number]) on 9/23/2020 10:33:12 AM       COVID: SARS 2:  Negative    Labs: Results:       BMP, Mg, Phos Recent Labs     12/07/20  0602 12/06/20  0541    143   K 4.8 4.4   * 109*   CO2 27 28   AGAP 5* 6*   BUN 40* 42*   CREA 2.93* 2.87*   CA 8.8 8.5   GLU 89 88      CBC Recent Labs     12/07/20  1237 12/06/20  0541   WBC 9.3 8.5   RBC 3.17* 3.22*   HGB 8.9* 9.2*   HCT 28.1* 29.2*    229   GRANS 71 66   LYMPH 18 20   EOS 3 5   MONOS 6 7   BASOS 1 1   IG 1 1   ANEU 6.6 5.6   ABL 1.6 1.7   TAISHA 0.3 0.4   ABM 0.6 0.6   ABB 0.1 0.1   AIG 0.1 0.1      LFT Recent Labs     12/07/20  0602 12/06/20  0541   ALT 11* 11*    96   TP 6.0* 5.8*   ALB 2.6* 2.5*   GLOB 3.4 3.3   AGRAT 0.8* 0.8*      Cardiac Testing Lab Results   Component Value Date/Time    CK 30 11/24/2020 09:16 PM    CK 24 12/02/2013 11:16 AM    CK - MB <0.5 (L) 12/02/2013 11:16 AM    CK-MB Index CANNOT BE CALCULATED 12/02/2013 11:16 AM    Troponin-I, Qt. <0.04 12/02/2013 11:16 AM      Coagulation Tests Lab Results   Component Value Date/Time    Prothrombin time 13.7 05/18/2018 10:04 PM    Prothrombin time 12.8 02/27/2018 07:08 AM    Prothrombin time 10.7 09/18/2014 01:05 PM    INR 1.0 05/18/2018 10:04 PM    INR 0.9 02/27/2018 07:08 AM    INR 1.0 09/18/2014 01:05 PM    aPTT 20.5 (L) 09/18/2014 01:05 PM    aPTT 21.4 (L) 09/18/2014 02:24 AM    aPTT 25.0 (L) 12/02/2013 11:16 AM      Lipid Panel Lab Results   Component Value Date/Time    Cholesterol, total 145 05/19/2018 06:45 AM    HDL Cholesterol 71 (H) 05/19/2018 06:45 AM    LDL, calculated 55 05/19/2018 06:45 AM    VLDL, calculated 19 05/19/2018 06:45 AM    Triglyceride 95 05/19/2018 06:45 AM    CHOL/HDL Ratio 2.0 05/19/2018 06:45 AM      Thyroid Panel Lab Results   Component Value Date/Time    TSH 1.948 05/18/2018 01:50 PM    TSH 0.904 12/02/2013 11:16 AM        Most Recent UA Lab Results   Component Value Date/Time    Color YELLOW 11/24/2020 06:57 PM    Appearance CLEAR 11/24/2020 06:57 PM    Specific gravity 1.016 11/24/2020 06:57 PM    pH (UA) 5.0 11/24/2020 06:57 PM    Protein Negative 11/24/2020 06:57 PM    Glucose Negative 11/24/2020 06:57 PM    Ketone Negative 11/24/2020 06:57 PM    Bilirubin Negative 11/24/2020 06:57 PM    Blood Negative 11/24/2020 06:57 PM    Urobilinogen 0.2 11/24/2020 06:57 PM    Nitrites Negative 11/24/2020 06:57 PM    Leukocyte Esterase Negative 11/24/2020 06:57 PM    WBC 0-3 09/23/2020 02:49 AM    RBC 0-3 09/23/2020 02:49 AM    Epithelial cells 0-3 09/23/2020 02:49 AM    Bacteria 0 09/23/2020 02:49 AM    Casts 0-3 09/23/2020 02:49 AM    Other observations RESULTS VERIFIED MANUALLY 12/31/2019 02:00 PM        Allergies   Allergen Reactions    Codeine Nausea and Vomiting    Lortab [Hydrocodone-Acetaminophen] Itching     Not Allergic to this medication patient states MR, RN 11/5/2020     Immunization History   Administered Date(s) Administered    Influenza Vaccine 10/22/2014    Influenza Vaccine (Quad) PF (>6 Mo Flulaval, Fluarix, and >3 Yrs Afluria, Fluzone 90123) 02/14/2018    Pneumococcal Polysaccharide (PPSV-23) 09/21/2014    TB Skin Test (PPD) Intradermal 09/18/2014, 05/22/2018, 09/23/2020, 09/25/2020, 11/24/2020     Discharge Exam:  Patient Vitals for the past 24 hrs:   Temp Pulse Resp BP SpO2   12/08/20 1155 98.5 °F (36.9 °C) 86 18 120/72 98 %   12/08/20 0745 98.2 °F (36.8 °C) 76 18 (!) 145/80 100 %   12/08/20 0418 98.4 °F (36.9 °C) 96 18 127/78 98 %   12/08/20 0005 98.6 °F (37 °C) 89 18 130/69 97 %   12/07/20 2023 98.6 °F (37 °C) 88 18 120/76 98 %   12/07/20 1645 98.6 °F (37 °C) 62 18 119/80 91 %     Oxygen Therapy  O2 Sat (%): 98 % (12/08/20 1155)  Pulse via Oximetry: 97 beats per minute (11/23/20 1621)  O2 Device: Room air (11/24/20 1604)    Estimated body mass index is 33.28 kg/m² as calculated from the following:    Height as of this encounter: 5' 5\" (1.651 m). Weight as of this encounter: 90.7 kg (200 lb). Intake/Output Summary (Last 24 hours) at 12/8/2020 1512  Last data filed at 12/8/2020 0420  Gross per 24 hour   Intake    Output 600 ml   Net -600 ml       General appearance: Oriented, alert, cooperative, dulled mentation.  Obese. Unkempt. Head: Normocephalic, without obvious abnormality, atraumatic  Eyes: conjunctivae/corneas clear. PERRL  Throat: Lips, mucosa, and tongue normal. Teeth and gums in need of dental attention and  Hygiene.    Neck: supple, symmetrical, trachea midline, no JVD  Lungs: clear to auscultation bilaterally  Heart: regular rate and rhythm, S1, S2 normal, no murmur, click, rub or gallop  Abdomen: soft, non-tender. Bowel sounds normal. No masses,  no organomegaly  Extremities: Knee immobilizer intact left leg.  Distal CMS intact.  All other extremities normal, atraumatic, no cyanosis or edema  Skin: Skin color, texture, turgor normal. No rashes or lesions  Neurologic: Grossly normal    Current Med List in Hospital:   Current Facility-Administered Medications   Medication Dose Route Frequency    Saccharomyces boulardii (FLORASTOR) capsule 250 mg  250 mg Oral BID    aspirin tablet 325 mg  325 mg Oral DAILY    escitalopram oxalate (LEXAPRO) tablet 10 mg  10 mg Oral DAILY    ferrous sulfate tablet 325 mg  325 mg Oral TID WITH MEALS    folic acid (FOLVITE) tablet 1 mg  1 mg Oral DAILY    [Held by provider] lisinopriL (PRINIVIL, ZESTRIL) tablet 10 mg  10 mg Oral DAILY    magnesium oxide (MAG-OX) tablet 400 mg  400 mg Oral DAILY    pantoprazole (PROTONIX) tablet 40 mg  40 mg Oral ACB    QUEtiapine (SEROquel) tablet 50 mg  50 mg Oral QHS    thiamine HCL (B-1) tablet 100 mg  100 mg Oral DAILY    traMADoL (ULTRAM) tablet 50 mg  50 mg Oral Q6H PRN    sodium chloride (NS) flush 5-40 mL  5-40 mL IntraVENous Q8H    sodium chloride (NS) flush 5-40 mL  5-40 mL IntraVENous PRN    influenza vaccine 2020-21 (6 mos+)(PF) (FLUARIX/FLULAVAL/FLUZONE QUAD) injection 0.5 mL  0.5 mL IntraMUSCular PRIOR TO DISCHARGE    LORazepam (ATIVAN) tablet 1-2 mg  1-2 mg Oral Q1H PRN    LORazepam (ATIVAN) tablet 3-4 mg  3-4 mg Oral Q1H PRN    heparin (porcine) injection 5,000 Units  5,000 Units SubCUTAneous Q8H    sodium bicarbonate tablet 650 mg  650 mg Oral TID     Discharge Info:   Current Discharge Medication List      START taking these medications    Details   Saccharomyces boulardii (FLORASTOR) 250 mg capsule Take 1 Cap by mouth two (2) times a day for 31 days. Qty: 62 Cap, Refills: 0      sodium bicarbonate 650 mg tablet Take 1 Tab by mouth three (3) times daily. Qty: 90 Tab, Refills: 0         CONTINUE these medications which have CHANGED    Details   traMADoL (ULTRAM) 50 mg tablet Take 1 Tab by mouth every six (6) hours as needed for Pain for up to 3 days. Max Daily Amount: 200 mg. Qty: 12 Tab, Refills: 0    Associated Diagnoses: Closed fracture of neck of right femur[facility]         CONTINUE these medications which have NOT CHANGED    Details   ferrous sulfate 325 mg (65 mg iron) tablet Take 325 mg by mouth three (3) times daily (with meals). Indications: anemia from inadequate iron      lisinopriL (PRINIVIL, ZESTRIL) 10 mg tablet Take 10 mg by mouth daily. aspirin (ASPIRIN) 325 mg tablet Take 1 Tab by mouth daily. Qty: 30 Tab, Refills: 0      magnesium oxide (MAG-OX) 400 mg tablet Take 1 Tab by mouth daily. Qty: 30 Tab, Refills: 0      QUEtiapine (SEROQUEL) 50 mg tablet Take 50 mg by mouth nightly. escitalopram oxalate (LEXAPRO) 10 mg tablet Take 10 mg by mouth daily. folic acid (FOLVITE) 1 mg tablet Take 1 Tab by mouth daily.   Qty: 30 Tab, Refills: 0      pantoprazole (PROTONIX) 40 mg tablet Take 1 Tab by mouth Daily (before breakfast). Qty: 30 Tab, Refills: 0      thiamine (B-1) 100 mg tablet Take 1 Tab by mouth daily. Qty: 30 Tab, Refills: 0           Follow Up Orders:    FOLLOW UP WITH NEPHROLOGY AS SOON AS YOU GET OUT OF REHAB  FOLLOW UP WITH PRIMARY DOCTOR WITHIN 2 WEEKS OF REHAB DISCHARGE  FOLLOW UP WITH ORTHOPEDICS AS INSTRUCTED BY THEM     Time spent in patient discharge planning and coordination 45 minutes.     Signed:  Jacob Marcial NP

## 2020-12-08 NOTE — PROGRESS NOTES
Discharge completed and patient will discharge to [facility] rehab at [phone] via [facility] ambulance service to room 115. Care Management Interventions  PCP Verified by CM: Yes  Mode of Transport at Discharge: [address]  [address] Time of Discharge: [address] [facility] (CM Consult): SNF  Partner SNF: Yes  Physical Therapy Consult: Yes  Occupational Therapy Consult: Yes  Current Support Network: Own Home, Lives with Spouse  Confirm Follow Up Transport: Family  The Plan for Transition of Care is Related to the Following Treatment Goals : Patient will participate in SNF therapies in order to return home to baseline independence in ADLs.    The Patient and/or Patient Representative was Provided with a Choice of Provider and Agrees with the Discharge Plan?: Yes  Freedom of Choice List was Provided with Basic Dialogue that Supports the Patient's Individualized Plan of Care/Goals, Treatment Preferences and Shares the Quality Data Associated with the Providers?: Yes  Discharge Location  Discharge Placement: Skilled nursing facility

## 2020-12-08 NOTE — PROGRESS NOTES
TRANSFER - OUT REPORT:    Verbal report given to Wania(name) on Nori Magana  being transferred to [address] [place] [address](unit) for routine progression of care       Report consisted of patients Situation, Background, Assessment and   Recommendations(SBAR). Information from the following report(s) SBAR, Kardex and MAR was reviewed with the receiving nurse. Lines:       Opportunity for questions and clarification was provided.       Patient transported with:   [place]

## 2020-12-08 NOTE — PROGRESS NOTES
Problem: Pressure Injury - Risk of  Goal: *Prevention of pressure injury  Description: Document Luis Scale and appropriate interventions in the flowsheet. Outcome: Resolved/Met     Problem: Patient Education: Go to Patient Education Activity  Goal: Patient/Family Education  Outcome: Resolved/Met     Problem: Falls - Risk of  Goal: *Absence of Falls  Description: Document Damien Fall Risk and appropriate interventions in the flowsheet.   Outcome: Resolved/Met     Problem: Patient Education: Go to Patient Education Activity  Goal: Patient/Family Education  Outcome: Resolved/Met     Problem: Pain  Goal: *Control of Pain  Outcome: Resolved/Met     Problem: Patient Education: Go to Patient Education Activity  Goal: Patient/Family Education  Outcome: Resolved/Met     Problem: General Medical Care Plan  Goal: *Vital signs within specified parameters  Outcome: Resolved/Met  Goal: *Labs within defined limits  Outcome: Resolved/Met  Goal: *Absence of infection signs and symptoms  Outcome: Resolved/Met  Goal: *Optimal pain control at patient's stated goal  Outcome: Resolved/Met  Goal: *Skin integrity maintained  Outcome: Resolved/Met  Goal: *Fluid volume balance  Outcome: Resolved/Met  Goal: *Optimize nutritional status  Outcome: Resolved/Met  Goal: *Anxiety reduced or absent  Outcome: Resolved/Met  Goal: *Progressive mobility and function (eg: ADL's)  Outcome: Resolved/Met     Problem: Patient Education: Go to Patient Education Activity  Goal: Patient/Family Education  Outcome: Resolved/Met     Problem: Patient Education: Go to Patient Education Activity  Goal: Patient/Family Education  Outcome: Resolved/Met     Problem: Patient Education: Go to Patient Education Activity  Goal: Patient/Family Education  Outcome: Resolved/Met

## 2020-12-09 ENCOUNTER — PATIENT OUTREACH (OUTPATIENT)
Dept: CASE MANAGEMENT | Age: 60
End: 2020-12-09

## 2020-12-12 ENCOUNTER — HOSPITAL ENCOUNTER (OUTPATIENT)
Dept: LAB | Age: 60
Discharge: HOME | End: 2020-12-12

## 2020-12-12 LAB
ALBUMIN SERPL-MCNC: 3 G/DL (ref 3.2–4.6)
ALBUMIN/GLOB SERPL: 1 {RATIO} (ref 1.2–3.5)
ALP SERPL-CCNC: 127 U/L (ref 50–136)
ALT SERPL-CCNC: 31 U/L (ref 12–65)
ANION GAP SERPL CALC-SCNC: 11 MMOL/L (ref 7–16)
AST SERPL-CCNC: 19 U/L (ref 15–37)
BASOPHILS # BLD: 0.1 K/UL (ref 0–0.2)
BASOPHILS NFR BLD: 1 % (ref 0–2)
BILIRUB SERPL-MCNC: 0.4 MG/DL (ref 0.2–1.1)
BUN SERPL-MCNC: 42 MG/DL (ref 8–23)
CALCIUM SERPL-MCNC: 8.9 MG/DL (ref 8.3–10.4)
CHLORIDE SERPL-SCNC: 110 MMOL/L (ref 98–107)
CO2 SERPL-SCNC: 19 MMOL/L (ref 21–32)
CREAT SERPL-MCNC: 2.95 MG/DL (ref 0.6–1)
DIFFERENTIAL METHOD BLD: ABNORMAL
EOSINOPHIL # BLD: 0.4 K/UL (ref 0–0.8)
EOSINOPHIL NFR BLD: 4 % (ref 0.5–7.8)
ERYTHROCYTE [DISTWIDTH] IN BLOOD BY AUTOMATED COUNT: 14.2 % (ref 11.9–14.6)
GLOBULIN SER CALC-MCNC: 3.1 G/DL (ref 2.3–3.5)
GLUCOSE SERPL-MCNC: 75 MG/DL (ref 65–100)
HCT VFR BLD AUTO: 34.1 % (ref 35.8–46.3)
HGB BLD-MCNC: 10.1 G/DL (ref 11.7–15.4)
IMM GRANULOCYTES # BLD AUTO: 0.1 K/UL (ref 0–0.5)
IMM GRANULOCYTES NFR BLD AUTO: 1 % (ref 0–5)
LYMPHOCYTES # BLD: 1.6 K/UL (ref 0.5–4.6)
LYMPHOCYTES NFR BLD: 18 % (ref 13–44)
MCH RBC QN AUTO: 28.7 PG (ref 26.1–32.9)
MCHC RBC AUTO-ENTMCNC: 29.6 G/DL (ref 31.4–35)
MCV RBC AUTO: 96.9 FL (ref 79.6–97.8)
MONOCYTES # BLD: 0.6 K/UL (ref 0.1–1.3)
MONOCYTES NFR BLD: 7 % (ref 4–12)
NEUTS SEG # BLD: 6 K/UL (ref 1.7–8.2)
NEUTS SEG NFR BLD: 69 % (ref 43–78)
NRBC # BLD: 0 K/UL (ref 0–0.2)
PLATELET # BLD AUTO: 293 K/UL (ref 150–450)
PLATELET COMMENTS,PCOM: ADEQUATE
PMV BLD AUTO: 10.7 FL (ref 9.4–12.3)
POTASSIUM SERPL-SCNC: 5.6 MMOL/L (ref 3.5–5.1)
PROT SERPL-MCNC: 6.1 G/DL (ref 6.3–8.2)
RBC # BLD AUTO: 3.52 M/UL (ref 4.05–5.2)
RBC MORPH BLD: ABNORMAL
SODIUM SERPL-SCNC: 140 MMOL/L (ref 136–145)
WBC # BLD AUTO: 8.8 K/UL (ref 4.3–11.1)

## 2020-12-12 PROCEDURE — 80053 COMPREHEN METABOLIC PANEL: CPT

## 2020-12-12 PROCEDURE — 85025 COMPLETE CBC W/AUTO DIFF WBC: CPT

## 2020-12-18 ENCOUNTER — HOSPITAL ENCOUNTER (OUTPATIENT)
Dept: INTERVENTIONAL RADIOLOGY/VASCULAR | Age: 60
Discharge: HOME | End: 2020-12-18
Attending: GENERAL PRACTICE

## 2020-12-21 ENCOUNTER — PATIENT OUTREACH (OUTPATIENT)
Dept: CASE MANAGEMENT | Age: 60
End: 2020-12-21

## 2020-12-21 NOTE — PROGRESS NOTES
Community Care Team documentation for patient in [facility]    The information below provided by:Dion 12/18/20    PT Update: bed mob mod A, paraplegic, UB adls Min A and LB max a, toielting max a        Nursing Update:ortho f/u 12/17; nephrology appt 1/6 r/t hyperkalemia; order for Kayexalate      Discharge Date:TBD      Assign to [address]

## 2020-12-22 ENCOUNTER — HOSPITAL ENCOUNTER (OUTPATIENT)
Dept: INTERVENTIONAL RADIOLOGY/VASCULAR | Age: 60
Discharge: HOME | End: 2020-12-22
Attending: GENERAL PRACTICE
Payer: MEDICARE

## 2020-12-22 VITALS
OXYGEN SATURATION: 96 % | DIASTOLIC BLOOD PRESSURE: 78 MMHG | TEMPERATURE: 98.6 F | BODY MASS INDEX: 33.32 KG/M2 | SYSTOLIC BLOOD PRESSURE: 128 MMHG | HEART RATE: 87 BPM | RESPIRATION RATE: 16 BRPM | HEIGHT: 65 IN | WEIGHT: 200 LBS

## 2020-12-22 DIAGNOSIS — N18.9 CKD (CHRONIC KIDNEY DISEASE): ICD-10-CM

## 2020-12-22 PROCEDURE — C1769 GUIDE WIRE: HCPCS

## 2020-12-22 PROCEDURE — 77030010507 HC ADH SKN DERMBND J&J -B

## 2020-12-22 PROCEDURE — 77030018719 HC DRSG PTCH ANTIMIC J&J -A

## 2020-12-22 PROCEDURE — 36558 INSERT TUNNELED CV CATH: CPT

## 2020-12-22 PROCEDURE — C1894 INTRO/SHEATH, NON-LASER: HCPCS

## 2020-12-22 PROCEDURE — 77030002916 HC SUT ETHLN J&J -A

## 2020-12-22 PROCEDURE — 74011000250 HC RX REV CODE- 250: Performed by: PHYSICIAN ASSISTANT

## 2020-12-22 PROCEDURE — C1751 CATH, INF, PER/CENT/MIDLINE: HCPCS

## 2020-12-22 RX ORDER — LIDOCAINE HYDROCHLORIDE AND EPINEPHRINE 10; 10 MG/ML; UG/ML
2-100 INJECTION, SOLUTION INFILTRATION; PERINEURAL ONCE
Status: COMPLETED | OUTPATIENT
Start: 2020-12-22 | End: 2020-12-22

## 2020-12-22 RX ADMIN — LIDOCAINE HYDROCHLORIDE AND EPINEPHRINE 200 MG: 10; 10 INJECTION, SOLUTION INFILTRATION; PERINEURAL at 08:03

## 2020-12-22 NOTE — DISCHARGE INSTRUCTIONS
[address]  Department of Interventional Radiology  [facility]  [phone] Office  [phone] Fax    Tunneled or Non Tunneled Catheter Discharge Instructions    General Information:   A catheter, either tunneled (permanent) or non-tunneled (temporary) catheter was inserted into your neck today for the purpose of Cancer treatment (apheresis) or dialysis. Your catheter will be used for the length of your apheresis, or until you get a fistula placed in surgery for dialysis. After this time, your catheter may be removed. You may return to our department for the catheter removal.  A non-tunneled catheter will exit at the neck. There will be three ports, two of which will be used for dialysis or apheresis. The one smaller port in the middle can be used like a regular IV. It ideally is used only for about two weeks. A tunneled catheter will exit lower down on the chest wall, and can be used for a longer period of time. There is no IV port on these. The tunneled catheter is used only for dialysis. In case of emergencies only can drugs be given through these catheters and only with written permission from your doctor. Home Care Instructions: You can resume your regular diet. Do not drink alcohol, drive, or make any important legal decisions in the next 24 hours. Watch the site carefully for signs of infection, like fever, drainage, redness, and/or swelling. Your catheter should be \"packed\" with heparin after each use or at least once a week if it is not being used. This \"packing\" should only be done by nurses experienced with caring for this type of catheter. You may shower in 24 hours, but you need to cover the catheter with plastic wrap and tape to keep it dry while you are showering. Keep the site clean, dry, and protected. Do not immerse yourself in water like in the case of tub baths or swimming as long as you have the catheter. Call If:   You should call your Physician and/or the Radiology Nurse if you have any signs of infection, shortness of breath, or if the dressing should come off or become moist.  You will be instructed on where to go for a new dressing. You should not have to change the dressings yourself, as that will be done by the nurses who access the catheter. Follow-Up Instructions:  Please see your ordering doctor as he/she has requested. To Reach Us: If you have any questions about your procedure, please call the Interventional Radiology department at [phone]. After business hours (5pm) and weekends, call the answering service at [phone] and ask for the Radiologist on call to be paged. Si tiene Preguntas acerca del procedimiento, por favor llame al departamento de Radiología Intervencional al [phone]. Después de horas de oficina (5 pm) y los fines de [place], llamar al Cyndy Olvera al [phone] y pregunte por el Radiologo de [facility]. Interventional Radiology General Nurse Discharge    After general anesthesia or intravenous sedation, for 24 hours or while taking prescription Narcotics:  · Limit your activities  · Do not drive and operate hazardous machinery  · Do not make important personal or business decisions  · Do  not drink alcoholic beverages  · If you have not urinated within 8 hours after discharge, please contact your surgeon on call. * Please give a list of your current medications to your Primary Care Provider. * Please update this list whenever your medications are discontinued, doses are     changed, or new medications (including over-the-counter products) are added. * Please carry medication information at all times in case of emergency situations.     These are general instructions for a healthy lifestyle:    No smoking/ No tobacco products/ Avoid exposure to second hand smoke  Surgeon General's Warning:  Quitting smoking now greatly reduces serious risk to your health. Obesity, smoking, and sedentary lifestyle greatly increases your risk for illness  A healthy diet, regular physical exercise & weight monitoring are important for maintaining a healthy lifestyle    You may be retaining fluid if you have a history of heart failure or if you experience any of the following symptoms:  Weight gain of 3 pounds or more overnight or 5 pounds in a week, increased swelling in our hands or feet or shortness of breath while lying flat in bed. Please call your doctor as soon as you notice any of these symptoms; do not wait until your next office visit. Recognize signs and symptoms of STROKE:  F-face looks uneven    A-arms unable to move or move unevenly    S-speech slurred or non-existent    T-time-call 911 as soon as signs and symptoms begin-DO NOT go       Back to bed or wait to see if you get better-TIME IS BRAIN.     Patient Signature:  Date: 12/22/2020  Discharging Nurse: Dayn Rosado RN

## 2020-12-22 NOTE — PROGRESS NOTES
The patient was counseled at length about the risks of jorge luis Covid-19 during their perioperative period and any recovery window from their procedure. The patient was made aware that jorge luis Covid-19  may worsen their prognosis for recovering from their procedure and lend to a higher morbidity and/or mortality risk. All material risks, benefits, and reasonable alternatives including postponing the procedure were discussed. The patient does  wish to proceed with the procedure at this time.

## 2021-01-07 ENCOUNTER — PATIENT OUTREACH (OUTPATIENT)
Dept: CASE MANAGEMENT | Age: 61
End: 2021-01-07

## 2021-01-07 NOTE — PROGRESS NOTES
Community Care Team documentation for patient in [facility]    The information below provided by:Dion    PT Update: Pt at max potential-will not participate in therapy-Mod a for transfers, CGA for UB And Max for LB, Max A for toileting        Nursing Update:central line to be dc'd 1/13, sending home flushes and HH will monitor until 1/13; ortho f/u 1/28      Discharge Date:1/7/21 Interim HH      Assign to [address] [facility]

## 2021-01-13 ENCOUNTER — HOSPITAL ENCOUNTER (OUTPATIENT)
Dept: INTERVENTIONAL RADIOLOGY/VASCULAR | Age: 61
Discharge: HOME | End: 2021-01-13
Attending: GENERAL PRACTICE

## 2021-01-15 ENCOUNTER — HOSPITAL ENCOUNTER (OUTPATIENT)
Dept: INTERVENTIONAL RADIOLOGY/VASCULAR | Age: 61
Discharge: HOME | End: 2021-01-15
Attending: GENERAL PRACTICE
Payer: MEDICARE

## 2021-01-15 VITALS
HEART RATE: 82 BPM | RESPIRATION RATE: 20 BRPM | HEIGHT: 65 IN | TEMPERATURE: 97.8 F | WEIGHT: 200 LBS | OXYGEN SATURATION: 98 % | SYSTOLIC BLOOD PRESSURE: 127 MMHG | BODY MASS INDEX: 33.32 KG/M2 | DIASTOLIC BLOOD PRESSURE: 58 MMHG

## 2021-01-15 DIAGNOSIS — Z53.9 PROCEDURE DISCONTINUED: ICD-10-CM

## 2021-01-15 PROCEDURE — 36589 REMOVAL TUNNELED CV CATH: CPT

## 2021-01-15 NOTE — DISCHARGE INSTRUCTIONS
DISCHARGE SUMMARY from Nurse    PATIENT INSTRUCTIONS:    After general anesthesia or intravenous sedation, for 24 hours or while taking prescription Narcotics:  · Limit your activities  · Do not drive and operate hazardous machinery  · Do not make important personal or business decisions  · Do  not drink alcoholic beverages  · If you have not urinated within 8 hours after discharge, please contact your surgeon on call. Report the following to your surgeon:  · Excessive pain, swelling, redness or odor of or around the surgical area  · Temperature over 100.5  · Nausea and vomiting lasting longer than 4 hours or if unable to take medications  · Any signs of decreased circulation or nerve impairment to extremity: change in color, persistent  numbness, tingling, coldness or increase pain  · Any questions    What to do at Home:  Recommended activity: Activity as tolerated. *  Please give a list of your current medications to your Primary Care Provider. *  Please update this list whenever your medications are discontinued, doses are      changed, or new medications (including over-the-counter products) are added. *  Please carry medication information at all times in case of emergency situations. These are general instructions for a healthy lifestyle:    No smoking/ No tobacco products/ Avoid exposure to second hand smoke  Surgeon General's Warning:  Quitting smoking now greatly reduces serious risk to your health. Obesity, smoking, and sedentary lifestyle greatly increases your risk for illness    A healthy diet, regular physical exercise & weight monitoring are important for maintaining a healthy lifestyle    You may be retaining fluid if you have a history of heart failure or if you experience any of the following symptoms:  Weight gain of 3 pounds or more overnight or 5 pounds in a week, increased swelling in our hands or feet or shortness of breath while lying flat in bed.   Please call your doctor as soon as you notice any of these symptoms; do not wait until your next office visit. Recognize signs and symptoms of STROKE:    F-face looks uneven    A-arms unable to move or move unevenly    S-speech slurred or non-existent    T-time-call 911 as soon as signs and symptoms begin-DO NOT go       Back to bed or wait to see if you get better-TIME IS BRAIN. Warning Signs of HEART ATTACK     Call 911 if you have these symptoms:   Chest discomfort. Most heart attacks involve discomfort in the center of the chest that lasts more than a few minutes, or that goes away and comes back. It can feel like uncomfortable pressure, squeezing, fullness, or pain.  Discomfort in other areas of the upper body. Symptoms can include pain or discomfort in one or both arms, the back, neck, jaw, or stomach.  Shortness of breath with or without chest discomfort.  Other signs may include breaking out in a cold sweat, nausea, or lightheadedness. Don't wait more than five minutes to call 911 - MINUTES MATTER! Fast action can save your life. Calling 911 is almost always the fastest way to get lifesaving treatment. Emergency Medical Services staff can begin treatment when they arrive -- up to an hour sooner than if someone gets to the hospital by car. The discharge information has been reviewed with the patient. The patient verbalized understanding. Discharge medications reviewed with the patient and appropriate educational materials and side effects teaching were provided. __________________________________________________________________________________________________________________________________         Cholecystectomy: What to Expect at Home  Your Recovery  After your surgery, it is normal to feel weak and tired for several days after you return home. Your belly may be swollen. If you had laparoscopic surgery, you may also have pain in your shoulder for about 24 hours.   You may have gas or need to burp a lot at first, and a few people get diarrhea. The diarrhea usually goes away in 2 to 4 weeks, but it may last longer. How quickly you recover depends on whether you had a laparoscopic or open surgery. · For a laparoscopic surgery, most people can go back to work or their normal routine in 1 to 2 weeks, but it may take longer, depending on the type of work you do. · For an open surgery, it will probably take 4 to 6 weeks before you get back to your normal routine. This care sheet gives you a general idea about how long it will take for you to recover. However, each person recovers at a different pace. Follow the steps below to get better as quickly as possible. How can you care for yourself at home? Activity  ? · Rest when you feel tired. Getting enough sleep will help you recover. ? · Try to walk each day. Start out by walking a little more than you did the day before. Gradually increase the amount you walk. Walking boosts blood flow and helps prevent pneumonia and constipation. ? · For about 2 to 4 weeks, avoid lifting anything that would make you strain. This may include a child, heavy grocery bags and milk containers, a heavy briefcase or backpack, cat litter or dog food bags, or a vacuum . ? · Avoid strenuous activities, such as biking, jogging, weightlifting, and aerobic exercise, until your doctor says it is okay. ? · You may shower 24 to 48 hours after surgery, if your doctor okays it. Pat the cut (incision) dry. Do not take a bath for the first 2 weeks, or until your doctor tells you it is okay. ? · You may drive when you are no longer taking pain medicine and can quickly move your foot from the gas pedal to the brake. You must also be able to sit comfortably for a long period of time, even if you do not plan to go far. You might get caught in traffic. ? · For a laparoscopic surgery, most people can go back to work or their normal routine in 1 to 2 weeks, but it may take longer.  For an open surgery, it will probably take 4 to 6 weeks before you get back to your normal routine. ? · Your doctor will tell you when you can have sex again. ? Diet  ? · Eat smaller meals more often instead of fewer larger meals. You can eat a normal diet, but avoid eating fatty foods for about 1 month. Fatty foods include hamburger, whole milk, cheese, and many snack foods. If your stomach is upset, try bland, low-fat foods like plain rice, broiled chicken, toast, and yogurt. ? · Drink plenty of fluids (unless your doctor tells you not to). ? · If you have diarrhea, try avoiding spicy foods, dairy products, fatty foods, and alcohol. You can also watch to see if specific foods cause it, and stop eating them. If the diarrhea continues for more than 2 weeks, talk to your doctor. ? · You may notice that your bowel movements are not regular right after your surgery. This is common. Try to avoid constipation and straining with bowel movements. You may want to take a fiber supplement every day. If you have not had a bowel movement after a couple of days, ask your doctor about taking a mild laxative. Medicines  ? · Your doctor will tell you if and when you can restart your medicines. He or she will also give you instructions about taking any new medicines. ? · If you take blood thinners, such as warfarin (Coumadin), clopidogrel (Plavix), or aspirin, be sure to talk to your doctor. He or she will tell you if and when to start taking those medicines again. Make sure that you understand exactly what your doctor wants you to do. ? · Take pain medicines exactly as directed. ¨ If the doctor gave you a prescription medicine for pain, take it as prescribed. ¨ If you are not taking a prescription pain medicine, take an over-the-counter medicine such as acetaminophen (Tylenol), ibuprofen (Advil, Motrin), or naproxen (Aleve). Read and follow all instructions on the label.   ¨ Do not take two or more pain medicines at the same time unless the doctor told you to. Many pain medicines contain acetaminophen, which is Tylenol. Too much Tylenol can be harmful. ? · If you think your pain medicine is making you sick to your stomach:  ¨ Take your medicine after meals (unless your doctor tells you not to). ¨ Ask your doctor for a different pain medicine. ? · If your doctor prescribed antibiotics, take them as directed. Do not stop taking them just because you feel better. You need to take the full course of antibiotics. Incision care  ? · If you have strips of tape on the incision, or cut, leave the tape on for a week or until it falls off.   ? · After 24 to 48 hours, wash the area daily with warm, soapy water, and pat it dry. ? · You may have staples to hold the cut together. Keep them dry until your doctor takes them out. This is usually in 7 to 10 days. ? · Keep the area clean and dry. You may cover it with a gauze bandage if it weeps or rubs against clothing. Change the bandage every day. ?Ice  ? · To reduce swelling and pain, put ice or a cold pack on your belly for 10 to 20 minutes at a time. Do this every 1 to 2 hours. Put a thin cloth between the ice and your skin. Follow-up care is a key part of your treatment and safety. Be sure to make and go to all appointments, and call your doctor if you are having problems. It's also a good idea to know your test results and keep a list of the medicines you take. When should you call for help? Call 911 anytime you think you may need emergency care. For example, call if:  ? · You passed out (lost consciousness). ? · You are short of breath. Naoma Broccoli ? Call your doctor now or seek immediate medical care if:  ? · You are sick to your stomach and cannot drink fluids. ? · You have pain that does not get better when you take your pain medicine. ? · You cannot pass stools or gas. ? · You have signs of infection, such as:  ¨ Increased pain, swelling, warmth, or redness.   ¨ Red streaks leading from the incision. ¨ Pus draining from the incision. ¨ A fever. ? · Bright red blood has soaked through the bandage over your incision. ? · You have loose stitches, or your incision comes open. ? · You have signs of a blood clot in your leg (called a deep vein thrombosis), such as:  ¨ Pain in your calf, back of knee, thigh, or groin. ¨ Redness and swelling in your leg or groin. ? Watch closely for any changes in your health, and be sure to contact your doctor if you have any problems. Where can you learn more? Go to http://carrington-venancio.info/. Enter 660 12 [id number] in the search box to learn more about \"Cholecystectomy: What to Expect at Home. \"  Current as of: May 12, 2017  Content Version: 11.4  © 9871-7169 [employer]. Care instructions adapted under license by "[employer] (which disclaims liability or warranty for this information). If you have questions about a medical condition or this instruction, always ask your healthcare professional. [place] [address] any warranty or liability for your use of this information. Unknown

## 2021-01-15 NOTE — DISCHARGE INSTRUCTIONS
[address]  Department of Interventional Radiology  [facility]  [phone] Office  [phone] Fax    DRAIN/PORT/CATHETER REMOVAL  DISCHARGE INSTRUCTIONS    General Information:     Your doctor has ordered for us to remove your drain, port, or catheter. This could be that you do not need it anymore, it is not doing its job, your physician has decided on another plan for your treatment and/or it may need replacing. Home Care Instructions: You can resume your regular diet and medication regimen. Do not drink alcohol, drive, or make any important legal decisions in the next 24 hours. Do not lift anything heavier than a gallon of milk, or do anything strenuous for the next 24 hours. You will notice a dressing over the site of the removal. This dressing should stay in place until the site is healed. The dressing should be changed at least every 48 hours. You should change the dressing sooner if it becomes soiled or wet. The nurse who discharges you to home should review with you any wound care instructions. Resume your normal level of activity slowly. You may shower after 24 hours, but do not take a bath, swim or immerse yourself in water until the site has healed, and keep the dressing dry with plastic wrap while showering. The site may ooze for a couple of days. This drainage should lessen with each passing day. Call If:     You should call your Physician and/or the Radiology Nurse if you have any bleeding other than a small spot on your bandage. Call if you have any signs of infection, fever, or increased pain at the site of the tube. Call if the oozing increases, if it changes color, or begins to have an odor. Follow-Up Instructions: Please see your ordering doctor as he/she has requested. To Reach Us: If you have any questions about your procedure, please call the Interventional Radiology department at [phone]. After business hours (5pm) and weekends, call the answering service at [phone] and ask for the Radiologist on call to be paged. Si tiene Preguntas acerca del procedimiento, por favor llame al departamento de Radiología Intervencional al [phone]. Después de horas de oficina (5 pm) y los fines de [place], llamar al [place] al [phone] y pregunte por el Radiologo de [facility]. Interventional Radiology General Nurse Discharge    After general anesthesia or intravenous sedation, for 24 hours or while taking prescription Narcotics:  · Limit your activities  · Do not drive and operate hazardous machinery  · Do not make important personal or business decisions  · Do  not drink alcoholic beverages  · If you have not urinated within 8 hours after discharge, please contact your surgeon on call. * Please give a list of your current medications to your Primary Care Provider. * Please update this list whenever your medications are discontinued, doses are     changed, or new medications (including over-the-counter products) are added. * Please carry medication information at all times in case of emergency situations. These are general instructions for a healthy lifestyle:    No smoking/ No tobacco products/ Avoid exposure to second hand smoke  Surgeon General's Warning:  Quitting smoking now greatly reduces serious risk to your health. Obesity, smoking, and sedentary lifestyle greatly increases your risk for illness  A healthy diet, regular physical exercise & weight monitoring are important for maintaining a healthy lifestyle    You may be retaining fluid if you have a history of heart failure or if you experience any of the following symptoms:  Weight gain of 3 pounds or more overnight or 5 pounds in a week, increased swelling in our hands or feet or shortness of breath while lying flat in bed.   Please call your doctor as soon as you notice any of these symptoms; do not wait until your next office visit. Recognize signs and symptoms of STROKE:  F-face looks uneven    A-arms unable to move or move unevenly    S-speech slurred or non-existent    T-time-call 911 as soon as signs and symptoms begin-DO NOT go       Back to bed or wait to see if you get better-TIME IS BRAIN.       Patient Signature:  Date: 1/15/2021  Discharging Nurse: Eva Guevara RN

## 2021-02-25 ENCOUNTER — APPOINTMENT (OUTPATIENT)
Dept: GENERAL RADIOLOGY | Age: 61
End: 2021-02-25
Attending: EMERGENCY MEDICINE
Payer: MEDICARE

## 2021-02-25 ENCOUNTER — HOSPITAL ENCOUNTER (OUTPATIENT)
Age: 61
Setting detail: OBSERVATION
Discharge: NURSING, REHAB OR LONG-TERM CARE | End: 2021-03-05
Attending: EMERGENCY MEDICINE | Admitting: FAMILY MEDICINE
Payer: MEDICARE

## 2021-02-25 DIAGNOSIS — R53.1 WEAKNESS: Primary | ICD-10-CM

## 2021-02-25 DIAGNOSIS — R00.0 TACHYCARDIA: ICD-10-CM

## 2021-02-25 DIAGNOSIS — R79.89 ELEVATED LACTIC ACID LEVEL: ICD-10-CM

## 2021-02-25 PROBLEM — E78.5 HYPERLIPIDEMIA: Status: ACTIVE | Noted: 2020-04-08

## 2021-02-25 PROBLEM — N18.2 STAGE 2 CHRONIC KIDNEY DISEASE: Status: RESOLVED | Noted: 2021-02-04 | Resolved: 2021-02-25

## 2021-02-25 PROBLEM — E87.20 LACTIC ACIDOSIS: Status: ACTIVE | Noted: 2021-02-25

## 2021-02-25 PROBLEM — N18.2 STAGE 2 CHRONIC KIDNEY DISEASE: Status: ACTIVE | Noted: 2021-02-04

## 2021-02-25 PROBLEM — Z86.73 HISTORY OF CVA (CEREBROVASCULAR ACCIDENT): Status: ACTIVE | Noted: 2018-05-28

## 2021-02-25 PROBLEM — Z65.9 POOR SOCIAL SITUATION: Status: ACTIVE | Noted: 2019-12-31

## 2021-02-25 PROBLEM — F12.10 TETRAHYDROCANNABINOL (THC) USE DISORDER, MILD, ABUSE: Status: ACTIVE | Noted: 2021-02-25

## 2021-02-25 PROBLEM — K21.9 GASTROESOPHAGEAL REFLUX DISEASE: Status: ACTIVE | Noted: 2020-02-29

## 2021-02-25 PROBLEM — F30.9 MANIA (HCC): Status: ACTIVE | Noted: 2020-02-29

## 2021-02-25 LAB
ALBUMIN SERPL-MCNC: 3.7 G/DL (ref 3.2–4.6)
ALBUMIN/GLOB SERPL: 1 {RATIO} (ref 1.2–3.5)
ALP SERPL-CCNC: 139 U/L (ref 50–136)
ALT SERPL-CCNC: 13 U/L (ref 12–65)
AMPHET UR QL SCN: NEGATIVE
ANION GAP SERPL CALC-SCNC: 14 MMOL/L (ref 7–16)
APPEARANCE UR: CLEAR
AST SERPL-CCNC: 13 U/L (ref 15–37)
ATRIAL RATE: 106 BPM
BACTERIA URNS QL MICRO: 0 /HPF
BARBITURATES UR QL SCN: NEGATIVE
BASOPHILS # BLD: 0.1 K/UL (ref 0–0.2)
BASOPHILS NFR BLD: 1 % (ref 0–2)
BENZODIAZ UR QL: NEGATIVE
BILIRUB SERPL-MCNC: 0.6 MG/DL (ref 0.2–1.1)
BILIRUB UR QL: NEGATIVE
BUN SERPL-MCNC: 28 MG/DL (ref 8–23)
CALCIUM SERPL-MCNC: 9.5 MG/DL (ref 8.3–10.4)
CALCULATED P AXIS, ECG09: 54 DEGREES
CALCULATED R AXIS, ECG10: 37 DEGREES
CALCULATED T AXIS, ECG11: 19 DEGREES
CANNABINOIDS UR QL SCN: POSITIVE
CASTS URNS QL MICRO: ABNORMAL /LPF
CHLORIDE SERPL-SCNC: 106 MMOL/L (ref 98–107)
CK SERPL-CCNC: 71 U/L (ref 21–215)
CO2 SERPL-SCNC: 20 MMOL/L (ref 21–32)
COCAINE UR QL SCN: NEGATIVE
COLOR UR: YELLOW
CREAT SERPL-MCNC: 2.67 MG/DL (ref 0.6–1)
DIAGNOSIS, 93000: NORMAL
DIFFERENTIAL METHOD BLD: ABNORMAL
EOSINOPHIL # BLD: 0.2 K/UL (ref 0–0.8)
EOSINOPHIL NFR BLD: 2 % (ref 0.5–7.8)
EPI CELLS #/AREA URNS HPF: ABNORMAL /HPF
ERYTHROCYTE [DISTWIDTH] IN BLOOD BY AUTOMATED COUNT: 13.4 % (ref 11.9–14.6)
GLOBULIN SER CALC-MCNC: 3.7 G/DL (ref 2.3–3.5)
GLUCOSE SERPL-MCNC: 71 MG/DL (ref 65–100)
GLUCOSE UR STRIP.AUTO-MCNC: NEGATIVE MG/DL
HCT VFR BLD AUTO: 38.8 % (ref 35.8–46.3)
HGB BLD-MCNC: 12.1 G/DL (ref 11.7–15.4)
HGB UR QL STRIP: NEGATIVE
IMM GRANULOCYTES # BLD AUTO: 0 K/UL (ref 0–0.5)
IMM GRANULOCYTES NFR BLD AUTO: 0 % (ref 0–5)
KETONES UR QL STRIP.AUTO: NEGATIVE MG/DL
LACTATE SERPL-SCNC: 1.7 MMOL/L (ref 0.4–2)
LACTATE SERPL-SCNC: 4.1 MMOL/L (ref 0.4–2)
LEUKOCYTE ESTERASE UR QL STRIP.AUTO: NEGATIVE
LIPASE SERPL-CCNC: 157 U/L (ref 73–393)
LYMPHOCYTES # BLD: 1.3 K/UL (ref 0.5–4.6)
LYMPHOCYTES NFR BLD: 12 % (ref 13–44)
MCH RBC QN AUTO: 27.8 PG (ref 26.1–32.9)
MCHC RBC AUTO-ENTMCNC: 31.2 G/DL (ref 31.4–35)
MCV RBC AUTO: 89.2 FL (ref 79.6–97.8)
METHADONE UR QL: NEGATIVE
MONOCYTES # BLD: 0.6 K/UL (ref 0.1–1.3)
MONOCYTES NFR BLD: 5 % (ref 4–12)
NEUTS SEG # BLD: 9 K/UL (ref 1.7–8.2)
NEUTS SEG NFR BLD: 81 % (ref 43–78)
NITRITE UR QL STRIP.AUTO: NEGATIVE
NRBC # BLD: 0 K/UL (ref 0–0.2)
OPIATES UR QL: NEGATIVE
P-R INTERVAL, ECG05: 114 MS
PCP UR QL: NEGATIVE
PH UR STRIP: 5 [PH] (ref 5–9)
PLATELET # BLD AUTO: 262 K/UL (ref 150–450)
PMV BLD AUTO: 9.5 FL (ref 9.4–12.3)
POTASSIUM SERPL-SCNC: 4.8 MMOL/L (ref 3.5–5.1)
PROCALCITONIN SERPL-MCNC: <0.05 NG/ML
PROT SERPL-MCNC: 7.4 G/DL (ref 6.3–8.2)
PROT UR STRIP-MCNC: 30 MG/DL
Q-T INTERVAL, ECG07: 324 MS
QRS DURATION, ECG06: 78 MS
QTC CALCULATION (BEZET), ECG08: 430 MS
RBC # BLD AUTO: 4.35 M/UL (ref 4.05–5.2)
RBC #/AREA URNS HPF: 0 /HPF
SODIUM SERPL-SCNC: 140 MMOL/L (ref 136–145)
SP GR UR REFRACTOMETRY: 1.01 (ref 1–1.02)
TROPONIN-HIGH SENSITIVITY: 26.2 PG/ML (ref 0–14)
TSH SERPL DL<=0.005 MIU/L-ACNC: 0.86 UIU/ML
UROBILINOGEN UR QL STRIP.AUTO: 0.2 EU/DL (ref 0.2–1)
VENTRICULAR RATE, ECG03: 106 BPM
WBC # BLD AUTO: 11.2 K/UL (ref 4.3–11.1)
WBC URNS QL MICRO: 0 /HPF

## 2021-02-25 PROCEDURE — 83690 ASSAY OF LIPASE: CPT

## 2021-02-25 PROCEDURE — 97161 PT EVAL LOW COMPLEX 20 MIN: CPT

## 2021-02-25 PROCEDURE — 74011250637 HC RX REV CODE- 250/637: Performed by: FAMILY MEDICINE

## 2021-02-25 PROCEDURE — 96375 TX/PRO/DX INJ NEW DRUG ADDON: CPT

## 2021-02-25 PROCEDURE — 81001 URINALYSIS AUTO W/SCOPE: CPT

## 2021-02-25 PROCEDURE — 93005 ELECTROCARDIOGRAM TRACING: CPT

## 2021-02-25 PROCEDURE — 83605 ASSAY OF LACTIC ACID: CPT

## 2021-02-25 PROCEDURE — 84443 ASSAY THYROID STIM HORMONE: CPT

## 2021-02-25 PROCEDURE — 80053 COMPREHEN METABOLIC PANEL: CPT

## 2021-02-25 PROCEDURE — 99218 HC RM OBSERVATION: CPT

## 2021-02-25 PROCEDURE — 84145 PROCALCITONIN (PCT): CPT

## 2021-02-25 PROCEDURE — 74011000258 HC RX REV CODE- 258: Performed by: EMERGENCY MEDICINE

## 2021-02-25 PROCEDURE — 74011250636 HC RX REV CODE- 250/636: Performed by: FAMILY MEDICINE

## 2021-02-25 PROCEDURE — 74011250636 HC RX REV CODE- 250/636: Performed by: EMERGENCY MEDICINE

## 2021-02-25 PROCEDURE — 97166 OT EVAL MOD COMPLEX 45 MIN: CPT

## 2021-02-25 PROCEDURE — 74011000250 HC RX REV CODE- 250: Performed by: FAMILY MEDICINE

## 2021-02-25 PROCEDURE — 85025 COMPLETE CBC W/AUTO DIFF WBC: CPT

## 2021-02-25 PROCEDURE — 80307 DRUG TEST PRSMV CHEM ANLYZR: CPT

## 2021-02-25 PROCEDURE — 96374 THER/PROPH/DIAG INJ IV PUSH: CPT

## 2021-02-25 PROCEDURE — 94761 N-INVAS EAR/PLS OXIMETRY MLT: CPT

## 2021-02-25 PROCEDURE — 71045 X-RAY EXAM CHEST 1 VIEW: CPT

## 2021-02-25 PROCEDURE — 87040 BLOOD CULTURE FOR BACTERIA: CPT

## 2021-02-25 PROCEDURE — 82550 ASSAY OF CK (CPK): CPT

## 2021-02-25 PROCEDURE — 97535 SELF CARE MNGMENT TRAINING: CPT

## 2021-02-25 PROCEDURE — 96372 THER/PROPH/DIAG INJ SC/IM: CPT

## 2021-02-25 PROCEDURE — 97530 THERAPEUTIC ACTIVITIES: CPT

## 2021-02-25 PROCEDURE — 99285 EMERGENCY DEPT VISIT HI MDM: CPT

## 2021-02-25 PROCEDURE — 84484 ASSAY OF TROPONIN QUANT: CPT

## 2021-02-25 RX ORDER — POLYETHYLENE GLYCOL 3350 17 G/17G
17 POWDER, FOR SOLUTION ORAL DAILY PRN
Status: DISCONTINUED | OUTPATIENT
Start: 2021-02-25 | End: 2021-03-05 | Stop reason: HOSPADM

## 2021-02-25 RX ORDER — SODIUM CHLORIDE 0.9 % (FLUSH) 0.9 %
5-40 SYRINGE (ML) INJECTION AS NEEDED
Status: DISCONTINUED | OUTPATIENT
Start: 2021-02-25 | End: 2021-03-05 | Stop reason: HOSPADM

## 2021-02-25 RX ORDER — SODIUM CHLORIDE, SODIUM LACTATE, POTASSIUM CHLORIDE, CALCIUM CHLORIDE 600; 310; 30; 20 MG/100ML; MG/100ML; MG/100ML; MG/100ML
125 INJECTION, SOLUTION INTRAVENOUS CONTINUOUS
Status: DISCONTINUED | OUTPATIENT
Start: 2021-02-25 | End: 2021-02-27

## 2021-02-25 RX ORDER — LABETALOL HYDROCHLORIDE 5 MG/ML
10 INJECTION, SOLUTION INTRAVENOUS
Status: DISCONTINUED | OUTPATIENT
Start: 2021-02-25 | End: 2021-03-05 | Stop reason: HOSPADM

## 2021-02-25 RX ORDER — HEPARIN SODIUM 5000 [USP'U]/ML
5000 INJECTION, SOLUTION INTRAVENOUS; SUBCUTANEOUS EVERY 8 HOURS
Status: DISCONTINUED | OUTPATIENT
Start: 2021-02-25 | End: 2021-03-05 | Stop reason: HOSPADM

## 2021-02-25 RX ORDER — ONDANSETRON 2 MG/ML
4 INJECTION INTRAMUSCULAR; INTRAVENOUS
Status: DISCONTINUED | OUTPATIENT
Start: 2021-02-25 | End: 2021-03-05 | Stop reason: HOSPADM

## 2021-02-25 RX ORDER — LORAZEPAM 2 MG/ML
1 INJECTION INTRAMUSCULAR
Status: DISCONTINUED | OUTPATIENT
Start: 2021-02-25 | End: 2021-03-05 | Stop reason: HOSPADM

## 2021-02-25 RX ORDER — ACETAMINOPHEN 325 MG/1
650 TABLET ORAL
Status: DISCONTINUED | OUTPATIENT
Start: 2021-02-25 | End: 2021-03-05 | Stop reason: HOSPADM

## 2021-02-25 RX ORDER — PROMETHAZINE HYDROCHLORIDE 25 MG/1
12.5 TABLET ORAL
Status: DISCONTINUED | OUTPATIENT
Start: 2021-02-25 | End: 2021-03-05 | Stop reason: HOSPADM

## 2021-02-25 RX ORDER — LISINOPRIL 5 MG/1
10 TABLET ORAL DAILY
Status: DISCONTINUED | OUTPATIENT
Start: 2021-02-26 | End: 2021-03-05

## 2021-02-25 RX ORDER — LANOLIN ALCOHOL/MO/W.PET/CERES
325 CREAM (GRAM) TOPICAL
Status: DISCONTINUED | OUTPATIENT
Start: 2021-02-25 | End: 2021-03-05 | Stop reason: HOSPADM

## 2021-02-25 RX ORDER — LANOLIN ALCOHOL/MO/W.PET/CERES
400 CREAM (GRAM) TOPICAL DAILY
Status: DISCONTINUED | OUTPATIENT
Start: 2021-02-26 | End: 2021-03-05 | Stop reason: HOSPADM

## 2021-02-25 RX ORDER — ACETAMINOPHEN 650 MG/1
650 SUPPOSITORY RECTAL
Status: DISCONTINUED | OUTPATIENT
Start: 2021-02-25 | End: 2021-03-05 | Stop reason: HOSPADM

## 2021-02-25 RX ORDER — LANOLIN ALCOHOL/MO/W.PET/CERES
100 CREAM (GRAM) TOPICAL DAILY
Status: DISCONTINUED | OUTPATIENT
Start: 2021-02-26 | End: 2021-03-05 | Stop reason: HOSPADM

## 2021-02-25 RX ORDER — ESCITALOPRAM OXALATE 10 MG/1
10 TABLET ORAL DAILY
Status: DISCONTINUED | OUTPATIENT
Start: 2021-02-26 | End: 2021-03-05 | Stop reason: HOSPADM

## 2021-02-25 RX ORDER — SODIUM CHLORIDE 0.9 % (FLUSH) 0.9 %
5-40 SYRINGE (ML) INJECTION EVERY 8 HOURS
Status: DISCONTINUED | OUTPATIENT
Start: 2021-02-25 | End: 2021-03-05 | Stop reason: HOSPADM

## 2021-02-25 RX ORDER — PANTOPRAZOLE SODIUM 40 MG/1
40 TABLET, DELAYED RELEASE ORAL
Status: DISCONTINUED | OUTPATIENT
Start: 2021-02-26 | End: 2021-03-05 | Stop reason: HOSPADM

## 2021-02-25 RX ORDER — ASPIRIN 325 MG
325 TABLET ORAL DAILY
Status: DISCONTINUED | OUTPATIENT
Start: 2021-02-26 | End: 2021-03-05 | Stop reason: HOSPADM

## 2021-02-25 RX ORDER — QUETIAPINE FUMARATE 25 MG/1
50 TABLET, FILM COATED ORAL
Status: DISCONTINUED | OUTPATIENT
Start: 2021-02-25 | End: 2021-03-05 | Stop reason: HOSPADM

## 2021-02-25 RX ORDER — SODIUM BICARBONATE 650 MG/1
650 TABLET ORAL 3 TIMES DAILY
Status: DISCONTINUED | OUTPATIENT
Start: 2021-02-25 | End: 2021-03-05 | Stop reason: HOSPADM

## 2021-02-25 RX ORDER — FOLIC ACID 1 MG/1
1 TABLET ORAL DAILY
Status: DISCONTINUED | OUTPATIENT
Start: 2021-02-26 | End: 2021-03-05 | Stop reason: HOSPADM

## 2021-02-25 RX ADMIN — Medication 10 ML: at 21:48

## 2021-02-25 RX ADMIN — SODIUM CHLORIDE 1000 ML: 900 INJECTION, SOLUTION INTRAVENOUS at 10:12

## 2021-02-25 RX ADMIN — SODIUM BICARBONATE 650 MG TABLET 650 MG: at 18:30

## 2021-02-25 RX ADMIN — HEPARIN SODIUM 5000 UNITS: 5000 INJECTION INTRAVENOUS; SUBCUTANEOUS at 21:48

## 2021-02-25 RX ADMIN — SODIUM CHLORIDE 1000 ML: 900 INJECTION, SOLUTION INTRAVENOUS at 09:09

## 2021-02-25 RX ADMIN — SODIUM CHLORIDE, SODIUM LACTATE, POTASSIUM CHLORIDE, AND CALCIUM CHLORIDE 125 ML/HR: 600; 310; 30; 20 INJECTION, SOLUTION INTRAVENOUS at 16:59

## 2021-02-25 RX ADMIN — HEPARIN SODIUM 5000 UNITS: 5000 INJECTION INTRAVENOUS; SUBCUTANEOUS at 16:59

## 2021-02-25 RX ADMIN — SODIUM CHLORIDE, SODIUM LACTATE, POTASSIUM CHLORIDE, AND CALCIUM CHLORIDE 125 ML/HR: 600; 310; 30; 20 INJECTION, SOLUTION INTRAVENOUS at 23:33

## 2021-02-25 RX ADMIN — LABETALOL HYDROCHLORIDE 10 MG: 5 INJECTION INTRAVENOUS at 16:59

## 2021-02-25 RX ADMIN — SODIUM BICARBONATE 650 MG TABLET 650 MG: at 21:48

## 2021-02-25 RX ADMIN — CEFTRIAXONE 1 G: 1 INJECTION, POWDER, FOR SOLUTION INTRAMUSCULAR; INTRAVENOUS at 10:15

## 2021-02-25 RX ADMIN — FERROUS SULFATE TAB 325 MG (65 MG ELEMENTAL FE) 325 MG: 325 (65 FE) TAB at 16:59

## 2021-02-25 RX ADMIN — QUETIAPINE FUMARATE 50 MG: 100 TABLET ORAL at 21:48
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
## 2021-02-25 NOTE — PROGRESS NOTES
Problem: Self Care Deficits Care Plan (Adult)  Goal: *Acute Goals and Plan of Care (Insert Text)  Description: 1. Patient will perform grooming with supervision seated edge of bed with set-up. 2. Patient will perform Upper body dressing with stand by assistance and extra time seated edge of bed. 3. Patient will perform lower body dressing with moderate assistance seated edge of bed and standing. 4. Patient will perform bathing with moderate assistance seated edge of bed or on shower chair if can be rolled into shower. 5. Patient will perform toilet transfers with moderate assistance to [facility]. 6. Patient will participate in 30 + minutes of ADL/ therapeutic exercise/therapeutic activity with min rest breaks to increase activity tolerance for self care. Goals to be achieved in 7 days. Outcome: Progressing Towards Goal     OCCUPATIONAL THERAPY: Initial Assessment, Daily Note, and PM 2/25/2021  OBSERVATION:    Payor: Beth Edouard / Plan: Wes Cooler / Product Type: [employer] /      NAME/AGE/GENDER: Kalpana Diaz is a 61 y.o. female   PRIMARY DIAGNOSIS:  Lactic acidosis [E87.2] <principal problem not specified> <principal problem not specified>        ICD-10: Treatment Diagnosis:    · Generalized Muscle Weakness (M62.81)  · Other lack of cordination (R27.8)  · Difficulty in walking, Not elsewhere classified (R26.2)   Precautions/Allergies:     Adhesive, Codeine, and Hydrocodone-acetaminophen      ASSESSMENT:     Ms. Lexa Rizzo presents with above diagnosis and was seen in room with PT present. Pt was bought to ED due to being found by EMS in poor care while picking up her  for alcohol withdrawal. Pt states she is bed bound at home but this OT feels she could do more. Pt would benefit from OT to maximize safety and independence with self care and functional mobility possible to exceed current baseline. Pt could also benefit from SNF for short term rehab. This section established at most recent assessment   PROBLEM LIST (Impairments causing functional limitations):  1. Decreased Strength  2. Decreased ADL/Functional Activities  3. Decreased Transfer Abilities  4. Decreased Balance  5. Decreased Activity Tolerance   INTERVENTIONS PLANNED: (Benefits and precautions of occupational therapy have been discussed with the patient.)  1. Activities of daily living training  2. Adaptive equipment training  3. Balance training  4. Therapeutic activity  5. Therapeutic exercise     TREATMENT PLAN: Frequency/Duration: Follow patient 3 times per week to address above goals. Rehabilitation Potential For Stated Goals: Good     REHAB RECOMMENDATIONS (at time of discharge pending progress):    Placement: It is my opinion, based on this patient's performance to date, that Ms. Chelsi Vasquez may benefit from intensive therapy at a [address] [place] [address] after discharge due to the functional deficits listed above that are likely to improve with skilled rehabilitation and concerns that he/she may be unsafe to be unsupervised at home due to not have assistance. .  Equipment:    None at this time              OCCUPATIONAL PROFILE AND HISTORY:   History of Present Injury/Illness (Reason for Referral):  See H&P  Past Medical History/Comorbidities:   Ms. Chelsi Vasquez  has a past medical history of Aneurysm of common carotid artery ([facility] (2004), CKD (chronic kidney disease) (9/18/2014), Dementia ([address] FSGS (focal segmental glomerulosclerosis), Gout, Hypertension, Osteoarthritis (9/18/2014), and Stroke ([facility] (2004, 2013). Ms. Chelsi Vasquez  has a past surgical history that includes hx intracranial aneurysm repair (2004); hx refractive surgery; hx orthopaedic (Right, 10/2014); hx ankle fracture tx (Left, 2013); hx ercp (02/27/2018); hx cholecystectomy (02/28/2018); ir insert tunl cvc w/o port over 5 yr (12/22/2020); and ir remove tunl cvad w/o port / pump (1/15/2021).   Social History/Living Environment: Home Environment: Private residence  Support Systems: Spouse/Significant Other/Partner(currently in hospital)  Patient Expects to be Discharged to[de-identified] Unknown  Prior Level of Function/Work/Activity:  Independent      Number of Personal Factors/Comorbidities that affect the Plan of Care: Expanded review of therapy/medical records (1-2):  MODERATE COMPLEXITY   ASSESSMENT OF OCCUPATIONAL PERFORMANCE[de-identified]   Activities of Daily Living:   Basic ADLs (From Assessment) Complex ADLs (From Assessment)   Feeding: Independent  Oral Facial Hygiene/Grooming: Moderate assistance  Bathing: Total assistance  Upper Body Dressing: Moderate assistance  Lower Body Dressing: Total assistance  Toileting: Total assistance     Grooming/Bathing/Dressing Activities of Daily Living     Cognitive Retraining  Safety/Judgement: Awareness of environment                 Functional Transfers  Bathroom Mobility: (pt reports she stays in her bed and uses a brief)  Toilet Transfer : (brief change in bed)     Bed/Mat Mobility  Supine to Sit: Contact guard assistance  Sit to Supine: Minimum assistance  Sit to Stand: Minimum assistance  Stand to Sit: Minimum assistance  Scooting: Contact guard assistance;Minimum assistance     Most Recent Physical Functioning:   Gross Assessment:                  Posture:  Posture (WDL): Exceptions to WDL  Posture Assessment: Forward head, Rounded shoulders  Balance:  Sitting: Intact  Standing: Pull to stand; With support Bed Mobility:  Supine to Sit: Contact guard assistance  Sit to Supine: Minimum assistance  Scooting: Contact guard assistance;Minimum assistance  Wheelchair Mobility:     Transfers:  Sit to Stand: Minimum assistance  Stand to Sit: Minimum assistance            Patient Vitals for the past 6 hrs:   BP SpO2 Pulse   02/25/21 1015 (!) 165/77 100 % (!) 111   02/25/21 1045 (!) 161/85 100 % (!) 109   02/25/21 1100 (!) 172/88 95 % (!) 110   02/25/21 1130 (!) 173/89 100 % (!) 105   02/25/21 1215 (!) 176/84 98 % (!) 107   21 1230 (!) 159/86 98 % (!) 108   21 1300 (!) 173/89 98 % (!) 110   21 1328  98 % (!) 109       Mental Status  Neurologic State: Alert  Orientation Level: Oriented to person, Oriented to place, Oriented to situation  Cognition: Follows commands, Impaired decision making  Perception: Appears intact  Perseveration: No perseveration noted  Safety/Judgement: Awareness of environment                          Physical Skills Involved:  1. Balance  2. Strength  3. Activity Tolerance  4. Fine Motor Control  5. Gross Motor Control Cognitive Skills Affected (resulting in the inability to perform in a timely and safe manner):  1. Executive Function Psychosocial Skills Affected:  1. Environmental Adaptation   Number of elements that affect the Plan of Care: 5+:  HIGH COMPLEXITY   CLINICAL DECISION MAKIN[address] [place], [place] [id number] AM-PAC 6 Clicks   Daily Activity Inpatient Short Form  How much help from another person does the patient currently need. .. Total A Lot A Little None   1. Putting on and taking off regular lower body clothing? [x] 1   [] 2   [] 3   [] 4   2. Bathing (including washing, rinsing, drying)? [x] 1   [] 2   [] 3   [] 4   3. Toileting, which includes using toilet, bedpan or urinal?   [x] 1   [] 2   [] 3   [] 4   4. Putting on and taking off regular upper body clothing? [] 1   [x] 2   [] 3   [] 4   5. Taking care of personal grooming such as brushing teeth? [] 1   [] 2   [x] 3   [] 4   6. Eating meals? [] 1   [] 2   [] 3   [x] 4   © , Trustees of [address] [place] [id number], under license to [employer] All rights reserved      Score:  Initial:12 Most Recent: X (Date: -- )    Interpretation of Tool:  Represents activities that are increasingly more difficult (i.e. Bed mobility, Transfers, Gait).        Use of outcome tool(s) and clinical judgement create a POC that gives a: MODERATE COMPLEXITY         TREATMENT:   (In addition to Assessment/Re-Assessment sessions the following treatments were rendered)     Pre-treatment Symptoms/Complaints:    Pain: Initial:     0 Post Session:  0     Self Care: (10 min): Procedure(s) (per grid) utilized to improve and/or restore self-care/home management as related to bathing and toileting. Required maximal verbal, manual, tactile and   cueing to facilitate activities of daily living skills and compensatory activities. OT evaluation completed   completed Braces/Orthotics/Lines/Etc:   · O2 Device: Room air  Treatment/Session Assessment:    · Response to Treatment:  Pt up to edge of bed tolerated well  · Interdisciplinary Collaboration:   o Physical Therapist  o Occupational Therapist  o Registered Nurse  o Certified Nursing Assistant/Patient Care Technician  · After treatment position/precautions:   o Supine in bed  o Bed/Chair-wheels locked  o Bed in low position  o Call light within reach  o RN notified   · Compliance with Program/Exercises: Compliant all of the time, Will assess as treatment progresses. · Recommendations/Intent for next treatment session: \"Next visit will focus on advancements to more challenging activities and reduction in assistance provided\".   Total Treatment Duration:  OT Patient Time In/Time Out  Time In: 4800  Time Out: 605 [facility],

## 2021-02-25 NOTE — PROGRESS NOTES
SW familiar with patient from previous admission. Patient states that she still lives with her  Anderson Israel and does not have other forms of local support. She states that she used to be able to stand and walk to the [facility], but has been unable to ambulate for quite some time. Patient states that she requires assistance with her ADLs which her  provides and uses a wheelchair. She is seen by Dr. Tena Lockhart and is current. Patient asked what occurred this morning to bring her and her  to the ED. Patient states that Haleigh Osullivanica a lot of vodka\" last night and is convinced that there's someone coming into their home. Patient has not seen anyone in the home other than her . Patient states that her  has \"back issues\" lately and it's been difficult for him to help her. Patient found with urine and feces on her bottom. Patient denied to this SW that her  in unable to change her soiled briefs. Patient was current with [facility] [place] but states that services were discontinued recently. Per previous CM notes a report was made to APS and accepted for investigation, the assigned worker is Ryan Bains ([phone]). SW called Ryan Bains to determine if the case was still open or if there's an assigned treatment worker. JOHN. APS Info:  Shanthi Rosa  Office: [phone]  Cell: [phone]  Email: [email or web address]     Liset Juarez LMSW    [facility]    * [email or web address]

## 2021-02-25 NOTE — PROGRESS NOTES
SW reviewed patient's chart and conducted a baseline assessment. Discharge plan at this time is as follows:     Care Management Interventions  PCP Verified by CM: Yes  Mode of Transport at Discharge: [address] (CM Consult): Discharge Planning  Current Support Network: Lives with Spouse  Confirm Follow Up Transport: Family  Discharge Location  Discharge Placement: Unable to determine at this time      *Please note that discharge plans can change throughout an inpatient admission.  Ensure that you are referring to the most recent social work/nurse case management note for current discharge plan*     Aiden Mendes, [facility] Work    [place]    * [email or web address]

## 2021-02-25 NOTE — ED TRIAGE NOTES
Pt here via EMS from home with c/o chronic knee problems. EMS was actually called for spouse due to alcohol probs. This pt was found with urine and fecal matter all over bottom. Spouse not able to care for her. EMS brought pt here for APS reasons. Masked.

## 2021-02-25 NOTE — ED PROVIDER NOTES
Patient last admitted to the hospital 11/23/2020. Note follows. Admission HPI from 11/23/2020:  Devendra Murphy a 61 y.o. female with a past medical history of mild vascular dementia, HTN, CKD, functional paraplegia who presents to the ER with her . Her  was unfortunately intubated and admitted, and now she has no one to take care of her at home. She has no complaints currently. She denies any pain, nausea, vomiting. \"     Hospital Course:  Debilitated, bedbound since femur fx 2 weeks PTA patient presented to ER 11/23 stating she is here because  is drunk.  EMS summoned to home by PT after patient was found with  passed out on top of her, pinning her down. Jay Khanor how long this occurred prior to PT arrival. Patient with recent femur fracture, approx 2 weeks, with  as primary caregiver. Found with OSMIN on CKD, creatinine: 4.88 with baseline in upper 2's.   now in ICU, intubated with alcohol with drawls. Unable to go home alone.  SW on board.  NS at 150 begun. 11/24:  Nephrology in for consult, patient last saw them 2018.  Holding ACE. Renal U/S pending.  Hyperkalemia, adding bicarb po. Dehydrated, little po intake at home. APS notified by CM.  11/25:  Hypotensive.  Appetite improved. Creatinine: 4.10. OT/PT, nutrition on board. 11/26:  Creatinine 3.66. remains hypotensive.   11/27:  Unable to give consistent meaningful answers.  Potassium back up to 5.6, Nephro increasing lokelma, low K+ diet.   11/28:  OP remains stable. Creatinine 3.26.   11/29:  Creatinine 3.0, Nephrology signing off.    12/3:  CM with multiple STR referrals out. 12/6:  Creatinine down to 2.87.   12/7:  Accepted to [facility] rehab. Shirley Rausch.  at bedside.  PT/OT evals in prep for same.  No complaints, agreeable.  Creatinine: 2.97 today. Patient returns today with  also a patient in the ER.   He is in possible alcohol withdrawal.  She is unable to care for herself. Has dementia and unable to give me any history here in the ER. The history is provided by the EMS personnel. The history is limited by the condition of the patient. Other  This is a new problem. The current episode started more than 2 days ago. The problem occurs constantly. The problem has not changed since onset. Nothing aggravates the symptoms. Nothing relieves the symptoms. She has tried nothing for the symptoms.         Past Medical History:   Diagnosis Date    Aneurysm of common carotid artery ([address] 2004    left side s/p coil     CKD (chronic kidney disease) 9/18/2014    Dementia ([address]     FSGS (focal segmental glomerulosclerosis)     in remission at present    Gout     Hypertension     managed with medication     Osteoarthritis 9/18/2014    Stroke [address] 2004, 2013    slight weakness on right side, slight effect to speech       Past Surgical History:   Procedure Laterality Date    HX ANKLE FRACTURE TX Left 2013    has hardware in    HX CHOLECYSTECTOMY  02/28/2018    HX ERCP  02/27/2018    cbd stone    HX INTRACRANIAL ANEURYSM REPAIR  2004    left carotid     HX ORTHOPAEDIC Right 10/2014    hip replacement    HX REFRACTIVE SURGERY      bilateral - Lasik    IR INSERT TUNL CVC W/O PORT OVER 5 YR  12/22/2020    IR REMOVE TUNL CVAD W/O PORT / PUMP  1/15/2021         Family History:   Problem Relation Age of Onset    Cancer Father 80        unknown    Stroke Sister 48       Social History     Socioeconomic History    Marital status:      Spouse name: Not on file    Number of children: Not on file    Years of education: Not on file    Highest education level: Not on file   Occupational History    Not on file   Social Needs    Financial resource strain: Not on file    Food insecurity     Worry: Not on file     Inability: Not on file    Transportation needs     Medical: Not on file     Non-medical: Not on file   Tobacco Use    Smoking status: Former Smoker     Packs/day: 0.25     Quit date: 1979     Years since quittin.1    Smokeless tobacco: Never Used   Substance and Sexual Activity    Alcohol use: Yes     Alcohol/week: 2.5 standard drinks     Types: 3 Shots of liquor per week     Comment: pint of fireball    Drug use: No    Sexual activity: Yes     Partners: Male     Birth control/protection: None   Lifestyle    Physical activity     Days per week: Not on file     Minutes per session: Not on file    Stress: Not on file   Relationships    Social connections     Talks on phone: Not on file     Gets together: Not on file     Attends Rastafarian service: Not on file     Active member of club or organization: Not on file     Attends meetings of clubs or organizations: Not on file     Relationship status: Not on file    Intimate partner violence     Fear of current or ex partner: Not on file     Emotionally abused: Not on file     Physically abused: Not on file     Forced sexual activity: Not on file   Other Topics Concern    Not on file   Social History Narrative    Not on file         ALLERGIES: Adhesive, Codeine, and Hydrocodone-acetaminophen    Review of Systems   Unable to perform ROS: Dementia       Vitals:    21 0746   BP: (!) 152/89   Pulse: (!) 116   Resp: 18   Temp: 97.9 °F (36.6 °C)   SpO2: 100%   Weight: 90.7 kg (200 lb)   Height: 5' 5\" (1.651 m)            Physical Exam  Constitutional:       Appearance: Normal appearance. She is well-developed. She is obese. HENT:      Head: Normocephalic and atraumatic. Eyes:      Extraocular Movements: Extraocular movements intact. Pupils: Pupils are equal, round, and reactive to light. Neck:      Musculoskeletal: Normal range of motion and neck supple. Cardiovascular:      Rate and Rhythm: Regular rhythm. Tachycardia present. Pulmonary:      Effort: Pulmonary effort is normal.      Breath sounds: Normal breath sounds.    Abdominal:      General: Bowel sounds are normal.      Palpations: Abdomen is soft.      Tenderness: There is no abdominal tenderness. Musculoskeletal: Normal range of motion. General: No swelling. Skin:     General: Skin is warm and dry. Neurological:      General: No focal deficit present. Mental Status: She is alert. She is disoriented. MDM  Number of Diagnoses or Management Options  Diagnosis management comments: Patient found by EMS sitting in her own stool and urine  unable to care for her. He is currently going through alcohol withdrawal.  She is tachycardic and confused with history of dementia. Lactic acid of 4. Fluids and antibiotics started and will admit to the hospital.       Amount and/or Complexity of Data Reviewed  Clinical lab tests: ordered and reviewed  Tests in the radiology section of CPT®: ordered and reviewed  Tests in the medicine section of CPT®: ordered and reviewed    Patient Progress  Patient progress: stable         Procedures      Procedure Note for Ultrasound Guided IV. IV access was not able to be obtained by nursing staff due to the patient having very difficult vein access. Skin was cleaned and disinfected prior to IV puncture. Ultrasound was used to find the vein which was compressible and does not have any ultrasound features of an artery. Under real-time ultrasound guidance peripheral access was obtained in the right upper extremity. Blood return was present and IV flushed without difficulty with no clinical signs of infiltration. IV was taped into position and there were no immediate complications noted and patient tolerated the procedure well. Procedure was completed by myself the attending physician. EKG: nonspecific ST and T waves changes, sinus tachycardia. Rate 106. XR CHEST PORT (Final result)  Result time 02/25/21 09:35:10  Final result by Stephanie Almaguer MD (02/25/21 09:35:10)                Impression:    No acute cardiopulmonary abnormality.              Narrative:    EXAM: XR CHEST PORT   INDICATION: cough   COMPARISON: Chest radiograph, 11/25/2020     FINDINGS:   The cardiomediastinal silhouette is within normal limits. No focal parenchymal   process. No pleural effusion. No pneumothorax. No acute osseous abnormality. Results Include:    Recent Results (from the past 24 hour(s))   CBC WITH AUTOMATED DIFF    Collection Time: 02/25/21  9:07 AM   Result Value Ref Range    WBC 11.2 (H) 4.3 - 11.1 K/uL    RBC 4.35 4.05 - 5.2 M/uL    HGB 12.1 11.7 - 15.4 g/dL    HCT 38.8 35.8 - 46.3 %    MCV 89.2 79.6 - 97.8 FL    MCH 27.8 26.1 - 32.9 PG    MCHC 31.2 (L) 31.4 - 35.0 g/dL    RDW 13.4 11.9 - 14.6 %    PLATELET 659 844 - 856 K/uL    MPV 9.5 9.4 - 12.3 FL    ABSOLUTE NRBC 0.00 0.0 - 0.2 K/uL    DF AUTOMATED      NEUTROPHILS 81 (H) 43 - 78 %    LYMPHOCYTES 12 (L) 13 - 44 %    MONOCYTES 5 4.0 - 12.0 %    EOSINOPHILS 2 0.5 - 7.8 %    BASOPHILS 1 0.0 - 2.0 %    IMMATURE GRANULOCYTES 0 0.0 - 5.0 %    ABS. NEUTROPHILS 9.0 (H) 1.7 - 8.2 K/UL    ABS. LYMPHOCYTES 1.3 0.5 - 4.6 K/UL    ABS. MONOCYTES 0.6 0.1 - 1.3 K/UL    ABS. EOSINOPHILS 0.2 0.0 - 0.8 K/UL    ABS. BASOPHILS 0.1 0.0 - 0.2 K/UL    ABS. IMM. GRANS. 0.0 0.0 - 0.5 K/UL   METABOLIC PANEL, COMPREHENSIVE    Collection Time: 02/25/21  9:07 AM   Result Value Ref Range    Sodium 140 136 - 145 mmol/L    Potassium 4.8 3.5 - 5.1 mmol/L    Chloride 106 98 - 107 mmol/L    CO2 20 (L) 21 - 32 mmol/L    Anion gap 14 7 - 16 mmol/L    Glucose 71 65 - 100 mg/dL    BUN 28 (H) 8 - 23 MG/DL    Creatinine 2.67 (H) 0.6 - 1.0 MG/DL    GFR est AA 23 (L) >60 ml/min/1.73m2    GFR est non-AA 19 (L) >60 ml/min/1.73m2    Calcium 9.5 8.3 - 10.4 MG/DL    Bilirubin, total 0.6 0.2 - 1.1 MG/DL    ALT (SGPT) 13 12 - 65 U/L    AST (SGOT) 13 (L) 15 - 37 U/L    Alk.  phosphatase 139 (H) 50 - 136 U/L    Protein, total 7.4 6.3 - 8.2 g/dL    Albumin 3.7 3.2 - 4.6 g/dL    Globulin 3.7 (H) 2.3 - 3.5 g/dL    A-G Ratio 1.0 (L) 1.2 - 3.5     URINALYSIS W/ RFLX MICROSCOPIC Collection Time: 02/25/21  9:07 AM   Result Value Ref Range    Color YELLOW      Appearance CLEAR      Specific gravity 1.011 1.001 - 1.023      pH (UA) 5.0 5.0 - 9.0      Protein 30 (A) NEG mg/dL    Glucose Negative mg/dL    Ketone Negative NEG mg/dL    Bilirubin Negative NEG      Blood Negative NEG      Urobilinogen 0.2 0.2 - 1.0 EU/dL    Nitrites Negative NEG      Leukocyte Esterase Negative NEG      WBC 0 0 /hpf    RBC 0 0 /hpf    Epithelial cells 0-3 0 /hpf    Bacteria 0 0 /hpf    Casts 0-3 0 /lpf   LACTIC ACID    Collection Time: 02/25/21  9:07 AM   Result Value Ref Range    Lactic acid 4.1 (HH) 0.4 - 2.0 MMOL/L   LIPASE    Collection Time: 02/25/21  9:07 AM   Result Value Ref Range    Lipase 157 73 - 393 U/L

## 2021-02-25 NOTE — ED NOTES
TRANSFER - OUT REPORT:    Verbal report given to [address] [place] [address] on Neville Rodriguez  being transferred to room 464 for routine progression of care       Report consisted of patients Situation, Background, Assessment and   Recommendations(SBAR). Information from the following report(s) SBAR was reviewed with the receiving nurse. Lines:   Peripheral IV 02/25/21 Right Antecubital (Active)        Opportunity for questions and clarification was provided.       Patient transported with:   [employer]

## 2021-02-25 NOTE — PROGRESS NOTES
Problem: Mobility Impaired (Adult and Pediatric)  Goal: *Acute Goals and Plan of Care (Insert Text)  Description: STG:  (1.)Ms. Ion Cao will move from supine to sit and sit to supine  with STAND BY ASSIST within 4-7 treatment day(s). (2.)Ms. Ion Cao will transfer from bed to chair and chair to bed with MINIMAL ASSIST of 2 using the least restrictive device within 4-7 treatment day(s). (3.)Ms. Ion Cao will ambulate with MODERATE ASSIST of 2 for 15 feet with the least restrictive device within 4-7 treatment day(s). ________________________________________________________________________________________________      Outcome: Progressing Towards Goal     PHYSICAL THERAPY: Initial Assessment and PM 2/25/2021  OBSERVATION: PT Visit Days : 1  Payor: [facility] / Plan: [address] / Product Type: Managed Care Medicare /       NAME/AGE/GENDER: Neville Rodriguez is a 61 y.o. female   PRIMARY DIAGNOSIS: Lactic acidosis [E87.2] <principal problem not specified> <principal problem not specified>        ICD-10: Treatment Diagnosis:    · Generalized Muscle Weakness (M62.81)  · Difficulty in walking, Not elsewhere classified (R26.2)   Precaution/Allergies:  Adhesive, Codeine, and Hydrocodone-acetaminophen      ASSESSMENT:     Ms. Ion Cao presents with generalized weakness and decreased independence with functional mobility. EMS was called to their home for her spouse who is in our ICU currently and EMS found this pt to be in disarray in urine and feces, and was brought in for APS reasons. She has chronic knee problems and was recently at [facility] rehab. Per pt she is bed bound and her spouse changes her depends and brings her meals to her bed. She states that she would stand long enough to help with linen change but did not walk. Worked on bed mobility, sitting balance and sit to stand.  Pt was not able to stand for very long but moved with minimal assist. Feel with skilled PT interventions she has the potential to improve her mobility. Pt was left in supine with needs in reach. Hope to progress at next therapy session. Unsure of her discharge plan as her caregiver is in the ICU. At this point would recommend SNF. This section established at most recent assessment   PROBLEM LIST (Impairments causing functional limitations):  1. Decreased Strength  2. Decreased ADL/Functional Activities  3. Decreased Transfer Abilities  4. Decreased Ambulation Ability/Technique  5. Decreased Balance  6. Decreased Activity Tolerance   INTERVENTIONS PLANNED: (Benefits and precautions of physical therapy have been discussed with the patient.)  1. Balance Exercise  2. Bed Mobility  3. Gait Training  4. Therapeutic Activites  5. Therapeutic Exercise/Strengthening  6. Transfer Training     TREATMENT PLAN: Frequency/Duration: daily for duration of hospital stay  Rehabilitation Potential For Stated Goals: Good     REHAB RECOMMENDATIONS (at time of discharge pending progress):    Placement: It is my opinion, based on this patient's performance to date, that Ms. Tess Plata may benefit from intensive therapy at a [address] [place] [address] after discharge due to the functional deficits listed above that are likely to improve with skilled rehabilitation and concerns that he/she may be unsafe to be unsupervised at home due to . Equipment:    To be determined              HISTORY:   History of Present Injury/Illness (Reason for Referral):  PER MD NOTE: Pt here via EMS from home with c/o chronic knee problems. EMS was actually called for spouse due to alcohol probs. This pt was found with urine and fecal matter all over bottom. Spouse not able to care for her.  EMS brought pt here for APS reasons  Past Medical History/Comorbidities:   Ms. Tess Plata  has a past medical history of Aneurysm of common carotid artery ([facility] (2004), CKD (chronic kidney disease) (9/18/2014), Dementia ([facility] FSGS (focal segmental glomerulosclerosis), Gout, Hypertension, Osteoarthritis (2014), and Stroke [facility]) (, ). Ms. Justina Montenegro  has a past surgical history that includes hx intracranial aneurysm repair (); hx refractive surgery; hx orthopaedic (Right, 10/2014); hx ankle fracture tx (Left, ); hx ercp (2018); hx cholecystectomy (2018); ir insert tunl cvc w/o port over 5 yr (2020); and ir remove tunl cvad w/o port / pump (1/15/2021). Social History/Living Environment:   Home Environment: Private residence  Support Systems: Spouse/Significant Other/Partner(currently in hospital)  Patient Expects to be Discharged to[de-identified] Unknown  Prior Level of Function/Work/Activity:  Pt living at home with spouse, bed bound and uses depends, spouse cares for pt and he is in hospital now     Number of Personal Factors/Comorbidities that affect the Plan of Care: 3+: HIGH COMPLEXITY   EXAMINATION:   Most Recent Physical Functioning:   Gross Assessment:  AROM: Generally decreased, functional  Strength: Generally decreased, functional  Coordination: Generally decreased, functional               Posture:  Posture (WDL): Exceptions to WDL  Posture Assessment: Forward head, Rounded shoulders  Balance:  Sitting: Intact  Standing: Pull to stand; With support Bed Mobility:  Supine to Sit: Contact guard assistance  Sit to Supine: Minimum assistance  Scooting: Contact guard assistance;Minimum assistance  Wheelchair Mobility:     Transfers:  Sit to Stand: Minimum assistance  Stand to Sit: Minimum assistance  Gait:            Body Structures Involved:  1. Muscles Body Functions Affected:  1. Movement Related Activities and Participation Affected:  1. Mobility  2.  Self Care   Number of elements that affect the Plan of Care: 4+: HIGH COMPLEXITY   CLINICAL PRESENTATION:   Presentation: Stable and uncomplicated: LOW COMPLEXITY   CLINICAL DECISION MAKIN [facility] [address] [id number] AM-PAC 6 Clicks   Basic Mobility Inpatient Short Form  How much difficulty does the patient currently have. .. Unable A Lot A Little None   1. Turning over in bed (including adjusting bedclothes, sheets and blankets)? [] 1   [] 2   [x] 3   [] 4   2. Sitting down on and standing up from a chair with arms ( e.g., wheelchair, bedside commode, etc.)   [] 1   [] 2   [x] 3   [] 4   3. Moving from lying on back to sitting on the side of the bed? [] 1   [] 2   [x] 3   [] 4   How much help from another person does the patient currently need. .. Total A Lot A Little None   4. Moving to and from a bed to a chair (including a wheelchair)? [] 1   [x] 2   [] 3   [] 4   5. Need to walk in hospital room? [] 1   [x] 2   [] 3   [] 4   6. Climbing 3-5 steps with a railing? [] 1   [x] 2   [] 3   [] 4   © 2007, Trustees of [address] [place] [address], under license to [employer]. All rights reserved      Score:  Initial: 15 Most Recent: X (Date: -- )    Interpretation of Tool:  Represents activities that are increasingly more difficult (i.e. Bed mobility, Transfers, Gait). Medical Necessity:     · Patient is expected to demonstrate progress in   · strength and functional technique  ·  to   · decrease assistance required with functional mobility  · . Reason for Services/Other Comments:  · Patient continues to require skilled intervention due to   · Inability to complete functional mobility independently  · . Use of outcome tool(s) and clinical judgement create a POC that gives a: Clear prediction of patient's progress: LOW COMPLEXITY            TREATMENT:   (In addition to Assessment/Re-Assessment sessions the following treatments were rendered)   Pre-treatment Symptoms/Complaints:  none  Pain: Initial:   Pain Intensity 1: 0  Post Session:  0/10   Assessment   Therapeutic Activity: (    10): Therapeutic activities including Bed transfers and sit to stand transfers and scooting to improve mobility, strength, and balance.   Required minimal verbal and manual   to promote static balance in standing. Braces/Orthotics/Lines/Etc:   · IV  · O2 Device: Room air  Treatment/Session Assessment:    · Response to Treatment:  pt tolerated fair  · Interdisciplinary Collaboration:   o Occupational Therapist  o Registered Nurse  · After treatment position/precautions:   o Supine in bed  o Bed/Chair-wheels locked  o Call light within reach  o RN notified   · Compliance with Program/Exercises: Will assess as treatment progresses  · Recommendations/Intent for next treatment session: \"Next visit will focus on advancements to more challenging activities and reduction in assistance provided\".   Total Treatment Duration:  PT Patient Time In/Time Out  Time In: 1500  Time Out: [address] 163, PT

## 2021-02-25 NOTE — PROGRESS NOTES
BIJAN spoke with Jamie Hopson with [employer] who confirms that they are no longer involved with the patient or their spouse. BIJAN called Katty KAUFFMAN, made a report regarding concerns to [employer].      Cesilia Woodard LMSW    [facility]    * [email or web address]

## 2021-02-25 NOTE — PROGRESS NOTES
Received to room from ED. Oriented to person, place and situation. Right sided weakness noted. Patient states that this is baseline. Oriented to bed and room. Discussed diet. Tolerating po fluids without problem.

## 2021-02-25 NOTE — PROGRESS NOTES
BIJAN reached out to Yoshi Zaidi, [facility] . Awaiting call back.     Taylor Avila, [facility]    [facility]    * [email or web address]

## 2021-02-26 LAB
ANION GAP SERPL CALC-SCNC: 10 MMOL/L (ref 7–16)
BUN SERPL-MCNC: 27 MG/DL (ref 8–23)
CALCIUM SERPL-MCNC: 8.5 MG/DL (ref 8.3–10.4)
CHLORIDE SERPL-SCNC: 112 MMOL/L (ref 98–107)
CO2 SERPL-SCNC: 19 MMOL/L (ref 21–32)
CREAT SERPL-MCNC: 2.27 MG/DL (ref 0.6–1)
ERYTHROCYTE [DISTWIDTH] IN BLOOD BY AUTOMATED COUNT: 13.9 % (ref 11.9–14.6)
GLUCOSE SERPL-MCNC: 90 MG/DL (ref 65–100)
HCT VFR BLD AUTO: 30.1 % (ref 35.8–46.3)
HGB BLD-MCNC: 9.4 G/DL (ref 11.7–15.4)
MAGNESIUM SERPL-MCNC: 2.4 MG/DL (ref 1.8–2.4)
MCH RBC QN AUTO: 28.2 PG (ref 26.1–32.9)
MCHC RBC AUTO-ENTMCNC: 31.2 G/DL (ref 31.4–35)
MCV RBC AUTO: 90.4 FL (ref 79.6–97.8)
NRBC # BLD: 0 K/UL (ref 0–0.2)
PLATELET # BLD AUTO: 183 K/UL (ref 150–450)
PMV BLD AUTO: 9.9 FL (ref 9.4–12.3)
POTASSIUM SERPL-SCNC: 5.5 MMOL/L (ref 3.5–5.1)
RBC # BLD AUTO: 3.33 M/UL (ref 4.05–5.2)
SODIUM SERPL-SCNC: 141 MMOL/L (ref 136–145)
WBC # BLD AUTO: 6.8 K/UL (ref 4.3–11.1)

## 2021-02-26 PROCEDURE — 96372 THER/PROPH/DIAG INJ SC/IM: CPT

## 2021-02-26 PROCEDURE — 74011250637 HC RX REV CODE- 250/637: Performed by: FAMILY MEDICINE

## 2021-02-26 PROCEDURE — 99218 HC RM OBSERVATION: CPT

## 2021-02-26 PROCEDURE — 83735 ASSAY OF MAGNESIUM: CPT

## 2021-02-26 PROCEDURE — 36415 COLL VENOUS BLD VENIPUNCTURE: CPT

## 2021-02-26 PROCEDURE — 97530 THERAPEUTIC ACTIVITIES: CPT

## 2021-02-26 PROCEDURE — 80048 BASIC METABOLIC PNL TOTAL CA: CPT

## 2021-02-26 PROCEDURE — 74011250636 HC RX REV CODE- 250/636: Performed by: FAMILY MEDICINE

## 2021-02-26 PROCEDURE — 86580 TB INTRADERMAL TEST: CPT | Performed by: FAMILY MEDICINE

## 2021-02-26 PROCEDURE — 85027 COMPLETE CBC AUTOMATED: CPT

## 2021-02-26 PROCEDURE — 74011000302 HC RX REV CODE- 302: Performed by: FAMILY MEDICINE

## 2021-02-26 RX ADMIN — SODIUM CHLORIDE, SODIUM LACTATE, POTASSIUM CHLORIDE, AND CALCIUM CHLORIDE 125 ML/HR: 600; 310; 30; 20 INJECTION, SOLUTION INTRAVENOUS at 10:45

## 2021-02-26 RX ADMIN — FERROUS SULFATE TAB 325 MG (65 MG ELEMENTAL FE) 325 MG: 325 (65 FE) TAB at 11:57

## 2021-02-26 RX ADMIN — FERROUS SULFATE TAB 325 MG (65 MG ELEMENTAL FE) 325 MG: 325 (65 FE) TAB at 16:36

## 2021-02-26 RX ADMIN — HEPARIN SODIUM 5000 UNITS: 5000 INJECTION INTRAVENOUS; SUBCUTANEOUS at 05:25

## 2021-02-26 RX ADMIN — SODIUM BICARBONATE 650 MG TABLET 650 MG: at 21:12

## 2021-02-26 RX ADMIN — PANTOPRAZOLE SODIUM 40 MG: 40 TABLET, DELAYED RELEASE ORAL at 05:26

## 2021-02-26 RX ADMIN — ESCITALOPRAM OXALATE 10 MG: 10 TABLET ORAL at 08:31

## 2021-02-26 RX ADMIN — SODIUM BICARBONATE 650 MG TABLET 650 MG: at 16:36

## 2021-02-26 RX ADMIN — SODIUM BICARBONATE 650 MG TABLET 650 MG: at 08:31

## 2021-02-26 RX ADMIN — Medication 10 ML: at 21:49

## 2021-02-26 RX ADMIN — MAGNESIUM GLUCONATE 500 MG ORAL TABLET 400 MG: 500 TABLET ORAL at 08:31

## 2021-02-26 RX ADMIN — LISINOPRIL 10 MG: 5 TABLET ORAL at 08:31

## 2021-02-26 RX ADMIN — Medication 10 ML: at 05:26

## 2021-02-26 RX ADMIN — TUBERCULIN PURIFIED PROTEIN DERIVATIVE 5 UNITS: 5 INJECTION INTRADERMAL at 11:00

## 2021-02-26 RX ADMIN — FOLIC ACID 1 MG: 1 TABLET ORAL at 08:31

## 2021-02-26 RX ADMIN — FERROUS SULFATE TAB 325 MG (65 MG ELEMENTAL FE) 325 MG: 325 (65 FE) TAB at 08:31

## 2021-02-26 RX ADMIN — HEPARIN SODIUM 5000 UNITS: 5000 INJECTION INTRAVENOUS; SUBCUTANEOUS at 21:12

## 2021-02-26 RX ADMIN — HEPARIN SODIUM 5000 UNITS: 5000 INJECTION INTRAVENOUS; SUBCUTANEOUS at 11:57

## 2021-02-26 RX ADMIN — ASPIRIN 325 MG: 325 TABLET, FILM COATED ORAL at 08:31

## 2021-02-26 RX ADMIN — QUETIAPINE FUMARATE 50 MG: 100 TABLET ORAL at 21:12

## 2021-02-26 RX ADMIN — Medication 100 MG: at 08:31

## 2021-02-26 NOTE — PROGRESS NOTES
Care Management Interventions  PCP Verified by CM: Yes  Mode of Transport at Discharge: (EMS)  Transition of Care Consult (CM Consult): SNF  MyChart Signup: No  Discharge Durable Medical Equipment: No  Physical Therapy Consult: Yes  Occupational Therapy Consult: Yes  Speech Therapy Consult: No  Current Support Network: Own Home, Lives with Spouse  Confirm Follow Up Transport: (EMS)  The Patient and/or Patient Representative was Provided with a Choice of Provider and Agrees with the Discharge Plan?: Yes  Freedom of Choice List was Provided with Basic Dialogue that Supports the Patient's Individualized Plan of Care/Goals, Treatment Preferences and Shares the Quality Data Associated with the Providers?: Yes  Discharge Location  Discharge Placement: Skilled nursing facility      0900:  Phone call received from Alyssa Robertson (C: [phone]) with [facility]. She is familiar with patient/ as she has worked with him in the past.  Patient had a previous case with CPS in 2015, but the case was closed. Lance Sellers also worked with family in November 2020. In November 2020 where patient discharged to rehab and then transitioned back home. Case with APS was then closed. Per Lance Sellers, patient's  Harmeet Yu) is her POA. Patient gets $1600/month in disability, and  receives $1100/month in disability. Patient does not have any life insurance policies. Lance Sellers states that patient has a sister in [place], but [place] don't get along. \"  No other family has been identified. Unfortunately, Ubaldo Ortiz is currently in the ICU at [facility]. PT/OT/PPD ordered. 1245:  SW spoke with patient who confirms that she lives at home with her . She is alert and oriented. Discussed safe discharge plan for her. Patient verbalized preference to go to [facility]. Referral to [facility] made via Frederic Gita. Packet started; SW will continue to follow.     Saniya Chavez. [address] [facility] [address]   [phone]

## 2021-02-26 NOTE — PROGRESS NOTES
Problem: Mobility Impaired (Adult and Pediatric)  Goal: *Acute Goals and Plan of Care (Insert Text)  Description: STG:  (1.)Ms. Pauly Richard will move from supine to sit and sit to supine  with STAND BY ASSIST within 4-7 treatment day(s). (2.)Ms. Pauly Richard will transfer from bed to chair and chair to bed with MINIMAL ASSIST of 2 using the least restrictive device within 4-7 treatment day(s). (3.)Ms. Pauly Richard will ambulate with MODERATE ASSIST of 2 for 15 feet with the least restrictive device within 4-7 treatment day(s). ________________________________________________________________________________________________      Outcome: Progressing Towards Goal     PHYSICAL THERAPY: Daily Note and AM 2/26/2021  OBSERVATION: PT Visit Days : 2  Payor: [address] [place] [address] / Plan: [address] / Product Type: Managed Care Medicare /       NAME/AGE/GENDER: Dyllan Oconnor is a 61 y.o. female   PRIMARY DIAGNOSIS: Lactic acidosis [E87.2] Lactic acidosis Lactic acidosis       ICD-10: Treatment Diagnosis:    · Generalized Muscle Weakness (M62.81)  · Difficulty in walking, Not elsewhere classified (R26.2)   Precaution/Allergies:  Adhesive, Codeine, and Hydrocodone-acetaminophen      ASSESSMENT:     Ms. Pauly Richard presents with generalized weakness and decreased independence with functional mobility. EMS was called to their home for her spouse who is in our ICU currently and EMS found this pt to be in disarray in urine and feces, and was brought in for APS reasons. She has chronic knee problems and was recently at [facility] rehab. Per pt she is bed bound and her spouse changes her depends and brings her meals to her bed. She states that she would stand long enough to help with linen change but did not walk. Worked on bed mobility, sitting balance and sit to stand.  Pt was not able to stand for very long but moved with minimal assist. Feel with skilled PT interventions she has the potential to improve her mobility. Pt was left in supine with needs in reach. Hope to progress at next therapy session. Unsure of her discharge plan as her caregiver is in the ICU. At this point would recommend SNF.   2/26- pt sitting up in chair on contact, on entry pt quickly asked to go back to bed. She stated that she had been up since her breakfast and she wanted in the bed. worked on sit to stand, took a few attempts and min/mod assist of one to stand. She was able to take steps from the chair to the bed but would not have been able to do this without help, increased risk of falls. When back in bed asked if pt was hurting and she said no she just wanted in the bed. Had her work on some ankle pumps and some hip ABD/ADD and then she said her brief needed to be changed. Let RN know, will continue to follow for therapy interventions. This section established at most recent assessment   PROBLEM LIST (Impairments causing functional limitations):  1. Decreased Strength  2. Decreased ADL/Functional Activities  3. Decreased Transfer Abilities  4. Decreased Ambulation Ability/Technique  5. Decreased Balance  6. Decreased Activity Tolerance   INTERVENTIONS PLANNED: (Benefits and precautions of physical therapy have been discussed with the patient.)  1. Balance Exercise  2. Bed Mobility  3. Gait Training  4. Therapeutic Activites  5. Therapeutic Exercise/Strengthening  6. Transfer Training     TREATMENT PLAN: Frequency/Duration: daily for duration of hospital stay  Rehabilitation Potential For Stated Goals: Good     REHAB RECOMMENDATIONS (at time of discharge pending progress):    Placement: It is my opinion, based on this patient's performance to date, that Ms. Nancie Thomas may benefit from intensive therapy at a [address] [place] [address] after discharge due to the functional deficits listed above that are likely to improve with skilled rehabilitation and concerns that he/she may be unsafe to be unsupervised at home due to .   Equipment:    To be determined              HISTORY:   History of Present Injury/Illness (Reason for Referral):  PER MD NOTE: Pt here via EMS from home with c/o chronic knee problems. EMS was actually called for spouse due to alcohol probs. This pt was found with urine and fecal matter all over bottom. Spouse not able to care for her. EMS brought pt here for APS reasons  Past Medical History/Comorbidities:   Ms. Radhika eSrrano  has a past medical history of Aneurysm of common carotid artery ([facility] (2004), CKD (chronic kidney disease) (9/18/2014), Dementia ([facility] FSGS (focal segmental glomerulosclerosis), Gout, Hypertension, Osteoarthritis (9/18/2014), and Stroke ([facility] (2004, 2013). Ms. Radhika Serrano  has a past surgical history that includes hx intracranial aneurysm repair (2004); hx refractive surgery; hx orthopaedic (Right, 10/2014); hx ankle fracture tx (Left, 2013); hx ercp (02/27/2018); hx cholecystectomy (02/28/2018); ir insert tunl cvc w/o port over 5 yr (12/22/2020); and ir remove tunl cvad w/o port / pump (1/15/2021). Social History/Living Environment:   Home Environment: Private residence  Support Systems: Spouse/Significant Other/Partner(currently in hospital)  Patient Expects to be Discharged to[de-identified] Unknown  Prior Level of Function/Work/Activity:  Pt living at home with spouse, bed bound and uses depends, spouse cares for pt and he is in hospital now     Number of Personal Factors/Comorbidities that affect the Plan of Care: 3+: HIGH COMPLEXITY   EXAMINATION:   Most Recent Physical Functioning:   Gross Assessment:  AROM: Generally decreased, functional  Strength: Generally decreased, functional  Coordination: Generally decreased, functional               Posture:  Posture (WDL): Exceptions to WDL  Posture Assessment: Forward head, Rounded shoulders  Balance:  Sitting: Intact  Standing: Impaired;Pull to stand; With support  Standing - Static: Fair  Standing - Dynamic : Fair;Poor Bed Mobility:  Sit to Supine: Minimum assistance  Scooting: Contact guard assistance;Minimum assistance  Interventions: Safety awareness training;Verbal cues  Wheelchair Mobility:     Transfers:  Sit to Stand: Minimum assistance; Moderate assistance  Stand to Sit: Minimum assistance; Moderate assistance  Bed to Chair: Minimum assistance; Moderate assistance  Gait:            Body Structures Involved:  1. Muscles Body Functions Affected:  1. Movement Related Activities and Participation Affected:  1. Mobility  2. Self Care   Number of elements that affect the Plan of Care: 4+: HIGH COMPLEXITY   CLINICAL PRESENTATION:   Presentation: Stable and uncomplicated: LOW COMPLEXITY   CLINICAL DECISION MAKIN[address] [place] [address] AM-PAC 6 Clicks   Basic Mobility Inpatient Short Form  How much difficulty does the patient currently have. .. Unable A Lot A Little None   1. Turning over in bed (including adjusting bedclothes, sheets and blankets)? [] 1   [] 2   [x] 3   [] 4   2. Sitting down on and standing up from a chair with arms ( e.g., wheelchair, bedside commode, etc.)   [] 1   [] 2   [x] 3   [] 4   3. Moving from lying on back to sitting on the side of the bed? [] 1   [] 2   [x] 3   [] 4   How much help from another person does the patient currently need. .. Total A Lot A Little None   4. Moving to and from a bed to a chair (including a wheelchair)? [] 1   [x] 2   [] 3   [] 4   5. Need to walk in hospital room? [] 1   [x] 2   [] 3   [] 4   6. Climbing 3-5 steps with a railing? [] 1   [x] 2   [] 3   [] 4   © , Trustees of [address] [place] [address], under license to [employer]. All rights reserved      Score:  Initial: 15 Most Recent: X (Date: -- )    Interpretation of Tool:  Represents activities that are increasingly more difficult (i.e. Bed mobility, Transfers, Gait). Medical Necessity:     · Patient is expected to demonstrate progress in   · strength and functional technique  ·  to   · decrease assistance required with functional mobility  · .   Reason for Services/Other Comments:  · Patient continues to require skilled intervention due to   · Inability to complete functional mobility independently  · . Use of outcome tool(s) and clinical judgement create a POC that gives a: Clear prediction of patient's progress: LOW COMPLEXITY            TREATMENT:   (In addition to Assessment/Re-Assessment sessions the following treatments were rendered)   Pre-treatment Symptoms/Complaints:  none  Pain: Initial:   Pain Intensity 1: 0  Post Session:  0/10      Therapeutic Activity: (    14): Therapeutic activities including Bed transfers and sit to stand transfers/chair to bed and scooting to improve mobility, strength, and balance. Required minimal verbal and manual   to promote static balance in standing. Braces/Orthotics/Lines/Etc:   · IV  · O2 Device: Room air  Treatment/Session Assessment:    · Response to Treatment:  Pt appeared to tolerate fine    · Interdisciplinary Collaboration:   o Registered Nurse  · After treatment position/precautions:   o Supine in bed  o Bed/Chair-wheels locked  o Call light within reach  o RN notified   · Compliance with Program/Exercises: Will assess as treatment progresses  · Recommendations/Intent for next treatment session: \"Next visit will focus on advancements to more challenging activities and reduction in assistance provided\".   Total Treatment Duration:  PT Patient Time In/Time Out  Time In: 1120  Time Out: 111 [address],
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
## 2021-02-26 NOTE — PROGRESS NOTES
Problem: Falls - Risk of  Goal: *Absence of Falls  Description: Document Radha Joselito Fall Risk and appropriate interventions in the flowsheet. Outcome: Progressing Towards Goal  Note: Fall Risk Interventions:  Mobility Interventions: Communicate number of staff needed for ambulation/transfer, Patient to call before getting OOB, PT Consult for mobility concerns, PT Consult for assist device competence         Medication Interventions: Bed/chair exit alarm, Evaluate medications/consider consulting pharmacy    Elimination Interventions: Call light in reach, Patient to call for help with toileting needs, Stay With Me (per policy)    History of Falls Interventions: Consult care management for discharge planning, Door open when patient unattended, Evaluate medications/consider consulting pharmacy, Investigate reason for fall, Room close to nurse's station         Problem: Patient Education: Go to Patient Education Activity  Goal: Patient/Family Education  Outcome: Progressing Towards Goal     Problem: Pressure Injury - Risk of  Goal: *Prevention of pressure injury  Description: Document Luis Scale and appropriate interventions in the flowsheet.   Outcome: Progressing Towards Goal  Note: Pressure Injury Interventions:       Moisture Interventions: Absorbent underpads, Apply protective barrier, creams and emollients, Check for incontinence Q2 hours and as needed, Maintain skin hydration (lotion/cream)    Activity Interventions: Chair cushion, Increase time out of bed, Pressure redistribution bed/mattress(bed type), PT/OT evaluation    Mobility Interventions: Float heels, HOB 30 degrees or less, PT/OT evaluation                          Problem: Patient Education: Go to Patient Education Activity  Goal: Patient/Family Education  Outcome: Progressing Towards Goal     Problem: Patient Education: Go to Patient Education Activity  Goal: Patient/Family Education  Outcome: Progressing Towards Goal     Problem: Patient Education: Goby to Patient Education Activity  Goal: Patient/Family Education  Outcome: Progressing Towards Goal

## 2021-02-27 LAB
ANION GAP SERPL CALC-SCNC: 7 MMOL/L (ref 7–16)
BUN SERPL-MCNC: 24 MG/DL (ref 8–23)
CALCIUM SERPL-MCNC: 8.2 MG/DL (ref 8.3–10.4)
CHLORIDE SERPL-SCNC: 114 MMOL/L (ref 98–107)
CO2 SERPL-SCNC: 23 MMOL/L (ref 21–32)
CREAT SERPL-MCNC: 2.3 MG/DL (ref 0.6–1)
ERYTHROCYTE [DISTWIDTH] IN BLOOD BY AUTOMATED COUNT: 13.7 % (ref 11.9–14.6)
GLUCOSE SERPL-MCNC: 88 MG/DL (ref 65–100)
HCT VFR BLD AUTO: 26.7 % (ref 35.8–46.3)
HGB BLD-MCNC: 8.5 G/DL (ref 11.7–15.4)
MCH RBC QN AUTO: 28.5 PG (ref 26.1–32.9)
MCHC RBC AUTO-ENTMCNC: 31.8 G/DL (ref 31.4–35)
MCV RBC AUTO: 89.6 FL (ref 79.6–97.8)
MM INDURATION POC: 0 MM (ref 0–5)
NRBC # BLD: 0 K/UL (ref 0–0.2)
PLATELET # BLD AUTO: 157 K/UL (ref 150–450)
PMV BLD AUTO: 9.8 FL (ref 9.4–12.3)
POTASSIUM SERPL-SCNC: 4.9 MMOL/L (ref 3.5–5.1)
PPD POC: NEGATIVE NEGATIVE
RBC # BLD AUTO: 2.98 M/UL (ref 4.05–5.2)
SODIUM SERPL-SCNC: 144 MMOL/L (ref 136–145)
WBC # BLD AUTO: 5.4 K/UL (ref 4.3–11.1)

## 2021-02-27 PROCEDURE — 99218 HC RM OBSERVATION: CPT

## 2021-02-27 PROCEDURE — 85027 COMPLETE CBC AUTOMATED: CPT

## 2021-02-27 PROCEDURE — 97530 THERAPEUTIC ACTIVITIES: CPT

## 2021-02-27 PROCEDURE — 36415 COLL VENOUS BLD VENIPUNCTURE: CPT

## 2021-02-27 PROCEDURE — 80048 BASIC METABOLIC PNL TOTAL CA: CPT

## 2021-02-27 PROCEDURE — 74011250636 HC RX REV CODE- 250/636: Performed by: FAMILY MEDICINE

## 2021-02-27 PROCEDURE — 74011250637 HC RX REV CODE- 250/637: Performed by: FAMILY MEDICINE

## 2021-02-27 PROCEDURE — 97110 THERAPEUTIC EXERCISES: CPT

## 2021-02-27 PROCEDURE — 96372 THER/PROPH/DIAG INJ SC/IM: CPT

## 2021-02-27 PROCEDURE — 97535 SELF CARE MNGMENT TRAINING: CPT

## 2021-02-27 RX ADMIN — HEPARIN SODIUM 5000 UNITS: 5000 INJECTION INTRAVENOUS; SUBCUTANEOUS at 20:09

## 2021-02-27 RX ADMIN — HEPARIN SODIUM 5000 UNITS: 5000 INJECTION INTRAVENOUS; SUBCUTANEOUS at 05:04

## 2021-02-27 RX ADMIN — QUETIAPINE FUMARATE 50 MG: 100 TABLET ORAL at 20:12

## 2021-02-27 RX ADMIN — SODIUM BICARBONATE 650 MG TABLET 650 MG: at 20:12

## 2021-02-27 RX ADMIN — ESCITALOPRAM OXALATE 10 MG: 10 TABLET ORAL at 08:37

## 2021-02-27 RX ADMIN — ASPIRIN 325 MG: 325 TABLET, FILM COATED ORAL at 08:37

## 2021-02-27 RX ADMIN — HEPARIN SODIUM 5000 UNITS: 5000 INJECTION INTRAVENOUS; SUBCUTANEOUS at 12:35

## 2021-02-27 RX ADMIN — SODIUM CHLORIDE, SODIUM LACTATE, POTASSIUM CHLORIDE, AND CALCIUM CHLORIDE 125 ML/HR: 600; 310; 30; 20 INJECTION, SOLUTION INTRAVENOUS at 04:23

## 2021-02-27 RX ADMIN — MAGNESIUM GLUCONATE 500 MG ORAL TABLET 400 MG: 500 TABLET ORAL at 08:37

## 2021-02-27 RX ADMIN — FERROUS SULFATE TAB 325 MG (65 MG ELEMENTAL FE) 325 MG: 325 (65 FE) TAB at 16:57

## 2021-02-27 RX ADMIN — Medication 10 ML: at 05:04

## 2021-02-27 RX ADMIN — FOLIC ACID 1 MG: 1 TABLET ORAL at 08:37

## 2021-02-27 RX ADMIN — FERROUS SULFATE TAB 325 MG (65 MG ELEMENTAL FE) 325 MG: 325 (65 FE) TAB at 08:37

## 2021-02-27 RX ADMIN — LISINOPRIL 10 MG: 5 TABLET ORAL at 08:37

## 2021-02-27 RX ADMIN — Medication 5 ML: at 21:00

## 2021-02-27 RX ADMIN — SODIUM BICARBONATE 650 MG TABLET 650 MG: at 08:37

## 2021-02-27 RX ADMIN — SODIUM BICARBONATE 650 MG TABLET 650 MG: at 16:57

## 2021-02-27 RX ADMIN — FERROUS SULFATE TAB 325 MG (65 MG ELEMENTAL FE) 325 MG: 325 (65 FE) TAB at 12:34

## 2021-02-27 RX ADMIN — Medication 100 MG: at 08:37

## 2021-02-27 RX ADMIN — PANTOPRAZOLE SODIUM 40 MG: 40 TABLET, DELAYED RELEASE ORAL at 07:05

## 2021-02-27 NOTE — PROGRESS NOTES
Problem: Mobility Impaired (Adult and Pediatric)  Goal: *Acute Goals and Plan of Care (Insert Text)  Description: STG:  (1.)Ms. Justina Montenegro will move from supine to sit and sit to supine  with STAND BY ASSIST within 4-7 treatment day(s). (2.)Ms. Justina Montenegro will transfer from bed to chair and chair to bed with MINIMAL ASSIST of 2 using the least restrictive device within 4-7 treatment day(s). (3.)Ms. Justina Montenegro will ambulate with MODERATE ASSIST of 2 for 15 feet with the least restrictive device within 4-7 treatment day(s). ________________________________________________________________________________________________      Outcome: Progressing Towards Goal     PHYSICAL THERAPY: Daily Note and AM 2/27/2021  OBSERVATION: PT Visit Days : 3  Payor: Brenda Kirkpatrick / Plan: [address] / Product Type: Managed Care Medicare /       NAME/AGE/GENDER: Tracie Palumbo is a 61 y.o. female   PRIMARY DIAGNOSIS: Lactic acidosis [E87.2] Lactic acidosis Lactic acidosis       ICD-10: Treatment Diagnosis:    · Generalized Muscle Weakness (M62.81)  · Difficulty in walking, Not elsewhere classified (R26.2)   Precaution/Allergies:  Adhesive, Codeine, and Hydrocodone-acetaminophen      ASSESSMENT:     Ms. Justina Montenegro presents with generalized weakness and decreased independence with functional mobility. EMS was called to their home for her spouse who is in our ICU currently and EMS found this pt to be in disarray in urine and feces, and was brought in for APS reasons. She has chronic knee problems and was recently at [facility] rehab. Per pt she is bed bound and her spouse changes her depends and brings her meals to her bed. She states that she would stand long enough to help with linen change but did not walk. Worked on bed mobility, sitting balance and sit to stand.  Pt was not able to stand for very long but moved with minimal assist. Feel with skilled PT interventions she has the potential to improve her mobility. Pt was left in supine with needs in reach. Hope to progress at next therapy session. Unsure of her discharge plan as her caregiver is in the ICU. At this point would recommend SNF.   2/26- pt sitting up in chair on contact, on entry pt quickly asked to go back to bed. She stated that she had been up since her breakfast and she wanted in the bed. worked on sit to stand, took a few attempts and min/mod assist of one to stand. She was able to take steps from the chair to the bed but would not have been able to do this without help, increased risk of falls. When back in bed asked if pt was hurting and she said no she just wanted in the bed. Had her work on some ankle pumps and some hip ABD/ADD and then she said her brief needed to be changed. Let RN know, will continue to follow for therapy interventions. 2/27- pt up in chair on contact, RN got her up. Pt said she was not good and did not want to be in the chair. Said she does not like any chair, encouraged her that it was a good thing to be out of the bed. Looked for a recliner chair and found one. We worked on sit to stand-she was in a rocker and it made it difficult. Several attempts to stand and then I had to get a second person. With second person still took 2 attempts to stand. Worked on taking steps from her chair to recliner. Helped position her in chair and then we worked on some leg exercises with verbal cues. Left her up in chair with needs in reach. She appeared to be content in the recliner. Will continue to follow for progressive mobility. This section established at most recent assessment   PROBLEM LIST (Impairments causing functional limitations):  1. Decreased Strength  2. Decreased ADL/Functional Activities  3. Decreased Transfer Abilities  4. Decreased Ambulation Ability/Technique  5. Decreased Balance  6.  Decreased Activity Tolerance   INTERVENTIONS PLANNED: (Benefits and precautions of physical therapy have been discussed with the patient.)  1. Balance Exercise  2. Bed Mobility  3. Gait Training  4. Therapeutic Activites  5. Therapeutic Exercise/Strengthening  6. Transfer Training     TREATMENT PLAN: Frequency/Duration: daily for duration of hospital stay  Rehabilitation Potential For Stated Goals: Good     REHAB RECOMMENDATIONS (at time of discharge pending progress):    Placement: It is my opinion, based on this patient's performance to date, that Ms. Dale Roldan may benefit from intensive therapy at a [address] [place] [address] after discharge due to the functional deficits listed above that are likely to improve with skilled rehabilitation and concerns that he/she may be unsafe to be unsupervised at home due to . Equipment:    To be determined              HISTORY:   History of Present Injury/Illness (Reason for Referral):  PER MD NOTE: Pt here via EMS from home with c/o chronic knee problems. EMS was actually called for spouse due to alcohol probs. This pt was found with urine and fecal matter all over bottom. Spouse not able to care for her. EMS brought pt here for APS reasons  Past Medical History/Comorbidities:   Ms. Dale Roldan  has a past medical history of Aneurysm of common carotid artery ([address] (2004), CKD (chronic kidney disease) (9/18/2014), Dementia ([address] FSGS (focal segmental glomerulosclerosis), Gout, Hypertension, Osteoarthritis (9/18/2014), and Stroke ([address] (2004, 2013). Ms. Dale Roldan  has a past surgical history that includes hx intracranial aneurysm repair (2004); hx refractive surgery; hx orthopaedic (Right, 10/2014); hx ankle fracture tx (Left, 2013); hx ercp (02/27/2018); hx cholecystectomy (02/28/2018); ir insert tunl cvc w/o port over 5 yr (12/22/2020); and ir remove tunl cvad w/o port / pump (1/15/2021).   Social History/Living Environment:   Home Environment: Private residence  Support Systems: Spouse/Significant Other/Partner(currently in hospital)  Patient Expects to be Discharged to[de-identified] Unknown  Prior Level of Function/Work/Activity:  Pt living at home with spouse, bed bound and uses depends, spouse cares for pt and he is in hospital now     Number of Personal Factors/Comorbidities that affect the Plan of Care: 3+: HIGH COMPLEXITY   EXAMINATION:   Most Recent Physical Functioning:   Gross Assessment:  AROM: Generally decreased, functional  Strength: Generally decreased, functional  Coordination: Generally decreased, functional               Posture:  Posture (WDL): Exceptions to WDL  Posture Assessment: Forward head, Rounded shoulders  Balance:  Sitting: Intact  Standing: Impaired;Pull to stand;With support  Standing - Static: Fair  Standing - Dynamic : Fair;Poor Bed Mobility:     Wheelchair Mobility:     Transfers:  Sit to Stand: Minimum assistance;Moderate assistance(of 1 to 2)  Stand to Sit: Minimum assistance;Moderate assistance  Bed to Chair: Minimum assistance;Moderate assistance  Interventions: Safety awareness training;Verbal cues  Duration: 10 Minutes  Gait:     Speed/Radha: Shuffled;Slow  Step Length: Left shortened;Right shortened  Gait Abnormalities: Antalgic;Decreased step clearance;Shuffling gait  Distance (ft): (few steps)  Ambulation - Level of Assistance: Minimal assistance;Moderate assistance      Body Structures Involved:  1. Muscles Body Functions Affected:  1. Movement Related Activities and Participation Affected:  1. Mobility  2. Self Care   Number of elements that affect the Plan of Care: 4+: HIGH COMPLEXITY   CLINICAL PRESENTATION:   Presentation: Stable and uncomplicated: LOW COMPLEXITY   CLINICAL DECISION MAKING:   [facility] AM-PAC™ “6 Clicks”   Basic Mobility Inpatient Short Form  How much difficulty does the patient currently have... Unable A Lot A Little None   1.  Turning over in bed (including adjusting bedclothes, sheets and blankets)?   [] 1   [] 2   [x] 3   [] 4   2.  Sitting down on and standing up from a chair with arms ( e.g., wheelchair, bedside commode, etc.)   [] 1   [] 2  [x] 3   [] 4   3. Moving from lying on back to sitting on the side of the bed? [] 1   [] 2   [x] 3   [] 4   How much help from another person does the patient currently need. .. Total A Lot A Little None   4. Moving to and from a bed to a chair (including a wheelchair)? [] 1   [x] 2   [] 3   [] 4   5. Need to walk in hospital room? [] 1   [x] 2   [] 3   [] 4   6. Climbing 3-5 steps with a railing? [] 1   [x] 2   [] 3   [] 4   © 2007, Trustees of [address] [place] [address] [address] [address], under license to [employer]. All rights reserved      Score:  Initial: 15 Most Recent: X (Date: -- )    Interpretation of Tool:  Represents activities that are increasingly more difficult (i.e. Bed mobility, Transfers, Gait). Medical Necessity:     · Patient is expected to demonstrate progress in   · strength and functional technique  ·  to   · decrease assistance required with functional mobility  · . Reason for Services/Other Comments:  · Patient continues to require skilled intervention due to   · Inability to complete functional mobility independently  · . Use of outcome tool(s) and clinical judgement create a POC that gives a: Clear prediction of patient's progress: LOW COMPLEXITY            TREATMENT:   (In addition to Assessment/Re-Assessment sessions the following treatments were rendered)   Pre-treatment Symptoms/Complaints:  none  Pain: Initial:   Pain Intensity 1: 0  Post Session:  0/10      Therapeutic Activity: (  10 Minutes ):  Therapeutic activities including Bed transfers and sit to stand transfers/chair to bed and scooting to improve mobility, strength, and balance. Required minimal verbal and manual   to promote static balance in standing. Therapeutic Exercise: (13 Minutes):  Exercises per grid below to improve mobility and strength. Required minimal verbal and manual cues to promote proper body alignment and promote proper body posture. Progressed repetitions as indicated.      Date:  2/27 Date:   Date: Activity/Exercise Parameters Parameters Parameters   Ankle pumps 10 B     Gluteal sets 10 B     Hip ABD/ADD 10 B     Heel slides 10 B     Straight leg raises 10 B, assist for Right                           Braces/Orthotics/Lines/Etc:   · IV  · purewick  · O2 Device: Room air  Treatment/Session Assessment:    · Response to Treatment:  Pt unhappy about being in the chair, did appear to like the recliner better  · Interdisciplinary Collaboration:   o Registered Nurse  · After treatment position/precautions:   o Up in chair  o Bed/Chair-wheels locked  o Call light within reach  o RN notified   · Compliance with Program/Exercises: Will assess as treatment progresses  · Recommendations/Intent for next treatment session: \"Next visit will focus on advancements to more challenging activities and reduction in assistance provided\".   Total Treatment Duration:  PT Patient Time In/Time Out  Time In: 1014  Time Out: Jia 111, PT

## 2021-02-27 NOTE — PROGRESS NOTES
Problem: Self Care Deficits Care Plan (Adult)  Goal: *Acute Goals and Plan of Care (Insert Text)  Description: 1. Patient will perform grooming with supervision seated edge of bed with set-up. 2. Patient will perform Upper body dressing with stand by assistance and extra time seated edge of bed. 3. Patient will perform lower body dressing with moderate assistance seated edge of bed and standing. 4. Patient will perform bathing with moderate assistance seated edge of bed or on shower chair if can be rolled into shower. 5. Patient will perform toilet transfers with moderate assistance to [facility]. 6. Patient will participate in 30 + minutes of ADL/ therapeutic exercise/therapeutic activity with min rest breaks to increase activity tolerance for self care. Goals to be achieved in 7 days. Outcome: Progressing Towards Goal     OCCUPATIONAL THERAPY: Daily Note and AM 2/27/2021  OBSERVATION: OT Visit Days: 2  Payor: Derek Raygoza / Plan: [address] / Product Type: Managed Care Medicare /      NAME/AGE/GENDER: Cuate Shaver is a 61 y.o. female   PRIMARY DIAGNOSIS:  Lactic acidosis [E87.2] Lactic acidosis Lactic acidosis       ICD-10: Treatment Diagnosis:    · Generalized Muscle Weakness (M62.81)  · Other lack of cordination (R27.8)  · Difficulty in walking, Not elsewhere classified (R26.2)   Precautions/Allergies:     Adhesive, Codeine, and Hydrocodone-acetaminophen      ASSESSMENT:     Ms. Vilma Lucas presents with above diagnosis and was seen in room with PT present. Pt was bought to ED due to being found by EMS in poor care while picking up her  for alcohol withdrawal. Pt states she is bed bound at home but this OT feels she could do more. Pt would benefit from OT to maximize safety and independence with self care and functional mobility possible to exceed current baseline.  Pt could also benefit from SNF for short term rehab.     2/27/2021 Pt was sitting in chair upon arrival. Pt completed washing face and brushing teeth with set up. Continue POC. This section established at most recent assessment   PROBLEM LIST (Impairments causing functional limitations):  1. Decreased Strength  2. Decreased ADL/Functional Activities  3. Decreased Transfer Abilities  4. Decreased Balance  5. Decreased Activity Tolerance   INTERVENTIONS PLANNED: (Benefits and precautions of occupational therapy have been discussed with the patient.)  1. Activities of daily living training  2. Adaptive equipment training  3. Balance training  4. Therapeutic activity  5. Therapeutic exercise     TREATMENT PLAN: Frequency/Duration: Follow patient 3 times per week to address above goals. Rehabilitation Potential For Stated Goals: Good     REHAB RECOMMENDATIONS (at time of discharge pending progress):    Placement: It is my opinion, based on this patient's performance to date, that Ms. Pauly Richard may benefit from intensive therapy at a [address] [place] [address] after discharge due to the functional deficits listed above that are likely to improve with skilled rehabilitation and concerns that he/she may be unsafe to be unsupervised at home due to not have assistance. .  Equipment:    None at this time              OCCUPATIONAL PROFILE AND HISTORY:   History of Present Injury/Illness (Reason for Referral):  See H&P  Past Medical History/Comorbidities:   Ms. Pauly Richard  has a past medical history of Aneurysm of common carotid artery ([facility] (2004), CKD (chronic kidney disease) (9/18/2014), Dementia ([address] FSGS (focal segmental glomerulosclerosis), Gout, Hypertension, Osteoarthritis (9/18/2014), and Stroke ([address] (2004, 2013).   Ms. Pauly Richard  has a past surgical history that includes hx intracranial aneurysm repair (2004); hx refractive surgery; hx orthopaedic (Right, 10/2014); hx ankle fracture tx (Left, 2013); hx ercp (02/27/2018); hx cholecystectomy (02/28/2018); ir insert tunl cvc w/o port over 5 yr (12/22/2020); and ir remove tunl cvad w/o port / pump (1/15/2021). Social History/Living Environment:   Home Environment: Private residence  Support Systems: Spouse/Significant Other/Partner(currently in hospital)  Patient Expects to be Discharged to[de-identified] Unknown  Prior Level of Function/Work/Activity:  Independent      Number of Personal Factors/Comorbidities that affect the Plan of Care: Expanded review of therapy/medical records (1-2):  MODERATE COMPLEXITY   ASSESSMENT OF OCCUPATIONAL PERFORMANCE[de-identified]   Activities of Daily Living:   Basic ADLs (From Assessment) Complex ADLs (From Assessment)   Feeding: Independent  Oral Facial Hygiene/Grooming: Moderate assistance  Bathing: Total assistance  Upper Body Dressing: Moderate assistance  Lower Body Dressing: Total assistance  Toileting: Total assistance     Grooming/Bathing/Dressing Activities of Daily Living   Grooming  Washing Face: Set-up  Brushing Teeth: Set-up Cognitive Retraining  Safety/Judgement: Fall prevention                       Bed/Mat Mobility  Sit to Stand: Minimum assistance; Moderate assistance(of 1 to 2)  Stand to Sit: Minimum assistance; Moderate assistance  Bed to Chair: Minimum assistance; Moderate assistance     Most Recent Physical Functioning:   Gross Assessment:                  Posture:  Posture (WDL): Exceptions to WDL  Posture Assessment: Forward head, Rounded shoulders  Balance:  Sitting: Intact  Standing: Impaired;Pull to stand; With support  Standing - Static: Fair  Standing - Dynamic : Fair;Poor Bed Mobility:     Wheelchair Mobility:     Transfers:  Sit to Stand: Minimum assistance; Moderate assistance(of 1 to 2)  Stand to Sit: Minimum assistance; Moderate assistance  Bed to Chair: Minimum assistance; Moderate assistance  Interventions: Safety awareness training;Verbal cues  Duration: 10 Minutes            Patient Vitals for the past 6 hrs:   BP SpO2 Pulse   02/27/21 0851 (!) 147/77 94 % 80       Mental Status  Neurologic State: Alert  Orientation Level: Oriented X4  Cognition: Follows commands  Perception: Appears intact  Perseveration: No perseveration noted  Safety/Judgement: Fall prevention                          Physical Skills Involved:  1. Balance  2. Strength  3. Activity Tolerance  4. Fine Motor Control  5. Gross Motor Control Cognitive Skills Affected (resulting in the inability to perform in a timely and safe manner):  1. Executive Function Psychosocial Skills Affected:  1. Environmental Adaptation   Number of elements that affect the Plan of Care: 5+:  HIGH COMPLEXITY   CLINICAL DECISION MAKIN[address] [place] [address] AM-PAC 6 Clicks   Daily Activity Inpatient Short Form  How much help from another person does the patient currently need. .. Total A Lot A Little None   1. Putting on and taking off regular lower body clothing? [x] 1   [] 2   [] 3   [] 4   2. Bathing (including washing, rinsing, drying)? [x] 1   [] 2   [] 3   [] 4   3. Toileting, which includes using toilet, bedpan or urinal?   [x] 1   [] 2   [] 3   [] 4   4. Putting on and taking off regular upper body clothing? [] 1   [x] 2   [] 3   [] 4   5. Taking care of personal grooming such as brushing teeth? [] 1   [] 2   [x] 3   [] 4   6. Eating meals? [] 1   [] 2   [] 3   [x] 4   © , Trustees of [address] [place] [address], under license to [employer]. All rights reserved      Score:  Initial:12 Most Recent: X (Date: -- )    Interpretation of Tool:  Represents activities that are increasingly more difficult (i.e. Bed mobility, Transfers, Gait). Use of outcome tool(s) and clinical judgement create a POC that gives a: MODERATE COMPLEXITY         TREATMENT:   (In addition to Assessment/Re-Assessment sessions the following treatments were rendered)     Pre-treatment Symptoms/Complaints:    Pain: Initial:   Pain Intensity 1: 0 0 Post Session:  0     Self Care: (13 min): Procedure(s) (per grid) utilized to improve and/or restore self-care/home management as related to grooming.  Required set up to facilitate activities of daily living skills and compensatory activities. completed Braces/Orthotics/Lines/Etc:   · O2 Device: Room air  Treatment/Session Assessment:    · Response to Treatment:  Pt up to edge of bed tolerated well  · Interdisciplinary Collaboration:   o Certified Occupational Therapy Assistant  o Registered Nurse  · After treatment position/precautions:   o Up in chair  o Bed/Chair-wheels locked  o Bed in low position  o Call light within reach  o RN notified   · Compliance with Program/Exercises: Compliant all of the time, Will assess as treatment progresses. · Recommendations/Intent for next treatment session: \"Next visit will focus on advancements to more challenging activities and reduction in assistance provided\".   Total Treatment Duration:  OT Patient Time In/Time Out  Time In: 1057  Time Out: 1265 [facility]
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
## 2021-02-28 LAB
ANION GAP SERPL CALC-SCNC: 4 MMOL/L (ref 7–16)
BUN SERPL-MCNC: 25 MG/DL (ref 8–23)
CALCIUM SERPL-MCNC: 8.4 MG/DL (ref 8.3–10.4)
CHLORIDE SERPL-SCNC: 110 MMOL/L (ref 98–107)
CO2 SERPL-SCNC: 27 MMOL/L (ref 21–32)
CREAT SERPL-MCNC: 2.54 MG/DL (ref 0.6–1)
ERYTHROCYTE [DISTWIDTH] IN BLOOD BY AUTOMATED COUNT: 13.7 % (ref 11.9–14.6)
GLUCOSE SERPL-MCNC: 95 MG/DL (ref 65–100)
HCT VFR BLD AUTO: 30.6 % (ref 35.8–46.3)
HGB BLD-MCNC: 9.5 G/DL (ref 11.7–15.4)
MCH RBC QN AUTO: 28.3 PG (ref 26.1–32.9)
MCHC RBC AUTO-ENTMCNC: 31 G/DL (ref 31.4–35)
MCV RBC AUTO: 91.1 FL (ref 79.6–97.8)
MM INDURATION POC: 0 MM (ref 0–5)
NRBC # BLD: 0.02 K/UL (ref 0–0.2)
PLATELET # BLD AUTO: 172 K/UL (ref 150–450)
PMV BLD AUTO: 10.6 FL (ref 9.4–12.3)
POTASSIUM SERPL-SCNC: 5.1 MMOL/L (ref 3.5–5.1)
PPD POC: NEGATIVE NEGATIVE
RBC # BLD AUTO: 3.36 M/UL (ref 4.05–5.2)
SODIUM SERPL-SCNC: 141 MMOL/L (ref 136–145)
WBC # BLD AUTO: 6.3 K/UL (ref 4.3–11.1)

## 2021-02-28 PROCEDURE — 36415 COLL VENOUS BLD VENIPUNCTURE: CPT

## 2021-02-28 PROCEDURE — 85027 COMPLETE CBC AUTOMATED: CPT

## 2021-02-28 PROCEDURE — 80048 BASIC METABOLIC PNL TOTAL CA: CPT

## 2021-02-28 PROCEDURE — 96372 THER/PROPH/DIAG INJ SC/IM: CPT

## 2021-02-28 PROCEDURE — 74011250637 HC RX REV CODE- 250/637: Performed by: FAMILY MEDICINE

## 2021-02-28 PROCEDURE — 97530 THERAPEUTIC ACTIVITIES: CPT

## 2021-02-28 PROCEDURE — 2709999900 HC NON-CHARGEABLE SUPPLY

## 2021-02-28 PROCEDURE — 74011250636 HC RX REV CODE- 250/636: Performed by: FAMILY MEDICINE

## 2021-02-28 PROCEDURE — 99218 HC RM OBSERVATION: CPT

## 2021-02-28 RX ADMIN — HEPARIN SODIUM 5000 UNITS: 5000 INJECTION INTRAVENOUS; SUBCUTANEOUS at 12:01

## 2021-02-28 RX ADMIN — SODIUM BICARBONATE 650 MG TABLET 650 MG: at 09:01

## 2021-02-28 RX ADMIN — HEPARIN SODIUM 5000 UNITS: 5000 INJECTION INTRAVENOUS; SUBCUTANEOUS at 20:47

## 2021-02-28 RX ADMIN — SODIUM BICARBONATE 650 MG TABLET 650 MG: at 16:24

## 2021-02-28 RX ADMIN — QUETIAPINE FUMARATE 50 MG: 100 TABLET ORAL at 20:47

## 2021-02-28 RX ADMIN — FERROUS SULFATE TAB 325 MG (65 MG ELEMENTAL FE) 325 MG: 325 (65 FE) TAB at 12:00

## 2021-02-28 RX ADMIN — HEPARIN SODIUM 5000 UNITS: 5000 INJECTION INTRAVENOUS; SUBCUTANEOUS at 04:01

## 2021-02-28 RX ADMIN — MAGNESIUM GLUCONATE 500 MG ORAL TABLET 400 MG: 500 TABLET ORAL at 09:01

## 2021-02-28 RX ADMIN — Medication 100 MG: at 09:01

## 2021-02-28 RX ADMIN — FOLIC ACID 1 MG: 1 TABLET ORAL at 09:01

## 2021-02-28 RX ADMIN — Medication 5 ML: at 04:01

## 2021-02-28 RX ADMIN — FERROUS SULFATE TAB 325 MG (65 MG ELEMENTAL FE) 325 MG: 325 (65 FE) TAB at 16:24

## 2021-02-28 RX ADMIN — PANTOPRAZOLE SODIUM 40 MG: 40 TABLET, DELAYED RELEASE ORAL at 04:05

## 2021-02-28 RX ADMIN — ESCITALOPRAM OXALATE 10 MG: 10 TABLET ORAL at 09:01

## 2021-02-28 RX ADMIN — ASPIRIN 325 MG: 325 TABLET, FILM COATED ORAL at 09:01

## 2021-02-28 RX ADMIN — ACETAMINOPHEN 650 MG: 325 TABLET, FILM COATED ORAL at 20:56

## 2021-02-28 RX ADMIN — SODIUM BICARBONATE 650 MG TABLET 650 MG: at 20:47

## 2021-02-28 RX ADMIN — LISINOPRIL 10 MG: 5 TABLET ORAL at 09:02

## 2021-02-28 RX ADMIN — Medication 10 ML: at 14:55

## 2021-02-28 RX ADMIN — FERROUS SULFATE TAB 325 MG (65 MG ELEMENTAL FE) 325 MG: 325 (65 FE) TAB at 09:01
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
## 2021-02-28 NOTE — PROGRESS NOTES
Problem: Mobility Impaired (Adult and Pediatric)  Goal: *Acute Goals and Plan of Care (Insert Text)  Description: STG:  (1.)Ms. Chelsi Vasquez will transfer from bed to chair and chair to bed with MINIMAL ASSIST of 2 using the least restrictive device within 4-7 treatment day(s). Met 2/28  (2.)Ms. Chelsi Vasquez will ambulate with MODERATE ASSIST of 2 for 15 feet with the least restrictive device within 4-7 treatment day(s). Met 2/28    GOALS MODIFIED DUE TO PROGRESS 2/28/21 :   (3.)Ms. Chelsi Vasquez will move from supine to sit and sit to supine with CONTACT GUARD ASSIST (consistently). 4) pt transfers with walker & CONTACT GUARD ASSIST (consistently). 5) pt ambulating with rolling walker 25-30 ft with CONTACT GUARD ASSIST (consistently). ________________________________________________________________________________________________      Outcome: Progressing Towards Goal     PHYSICAL THERAPY: Re-evaluation and AM 2/28/2021  OBSERVATION: PT Visit Days : 1  Payor: Vonzella Boeck / Plan: [address] / Product Type: Managed Care Medicare /       NAME/AGE/GENDER: Mirlande Moffett is a 61 y.o. female   PRIMARY DIAGNOSIS: Lactic acidosis [E87.2] Lactic acidosis Lactic acidosis       ICD-10: Treatment Diagnosis:    · Generalized Muscle Weakness (M62.81)  · Difficulty in walking, Not elsewhere classified (R26.2)   Precaution/Allergies:  Adhesive, Codeine, and Hydrocodone-acetaminophen      ASSESSMENT:     Ms. Chelsi Vasquez showed improved level of assist for bed mobility, transfers & gait. This pt is poorly motivated & needs strong encouragement to participate. This pt could return home with 24/7 capable assist & HHPT or placed in a SNF for additional rehab if no help available. This section established at most recent assessment   PROBLEM LIST (Impairments causing functional limitations):  1. Decreased Strength  2. Decreased ADL/Functional Activities  3. Decreased Transfer Abilities  4.  Decreased Ambulation Ability/Technique  5. Decreased Balance  6. Decreased Activity Tolerance   INTERVENTIONS PLANNED: (Benefits and precautions of physical therapy have been discussed with the patient.)  1. Balance Exercise  2. Bed Mobility  3. Gait Training  4. Therapeutic Activites  5. Therapeutic Exercise/Strengthening  6. Transfer Training     TREATMENT PLAN: Frequency/Duration: daily for duration of hospital stay  Rehabilitation Potential For Stated Goals: Good     REHAB RECOMMENDATIONS (at time of discharge pending progress):    Placement: It is my opinion, based on this patient's performance to date, that Ms. Ion Cao may benefit from intensive therapy at a [address] [place] [address] after discharge due to the functional deficits listed above that are likely to improve with skilled rehabilitation and concerns that he/she may be unsafe to be unsupervised at home due to . Equipment:    To be determined              HISTORY:   History of Present Injury/Illness (Reason for Referral):  PER MD NOTE: Pt here via EMS from home with c/o chronic knee problems. EMS was actually called for spouse due to alcohol probs. This pt was found with urine and fecal matter all over bottom. Spouse not able to care for her. EMS brought pt here for APS reasons  Past Medical History/Comorbidities:   Ms. Ion Cao  has a past medical history of Aneurysm of common carotid artery ([facility] [address] (2004), CKD (chronic kidney disease) (9/18/2014), Dementia ([address] FSGS (focal segmental glomerulosclerosis), Gout, Hypertension, Osteoarthritis (9/18/2014), and Stroke ([address] (2004, 2013). Ms. Ion Cao  has a past surgical history that includes hx intracranial aneurysm repair (2004); hx refractive surgery; hx orthopaedic (Right, 10/2014); hx ankle fracture tx (Left, 2013); hx ercp (02/27/2018); hx cholecystectomy (02/28/2018); ir insert tunl cvc w/o port over 5 yr (12/22/2020); and ir remove tunl cvad w/o port / pump (1/15/2021).   Social History/Living Environment: Home Environment: Private residence  Support Systems: Spouse/Significant Other/Partner(currently in hospital)  Patient Expects to be Discharged to[de-identified] Unknown  Prior Level of Function/Work/Activity:  Pt living at home with spouse, bed bound and uses depends, spouse cares for pt and he is in hospital now     Number of Personal Factors/Comorbidities that affect the Plan of Care: 3+: HIGH COMPLEXITY   EXAMINATION:   Most Recent Physical Functioning:   Gross Assessment: 3-/5 throughout                       Balance:  Sitting: Intact; Without support  Standing: Impaired; With support(walker)  Standing - Static: (fair- with walker)  Standing - Dynamic : (fair- with walker) Bed Mobility:  Supine to Sit: Minimum assistance  Sit to Supine: (NT)  Scooting: Minimum assistance  Interventions: Safety awareness training;Verbal cues  Wheelchair Mobility:     Transfers:  Sit to Stand: Minimum assistance  Stand to Sit: Minimum assistance  Bed to Chair: Minimum assistance(with walker)  Duration: 23 Minutes(extra time to work through activity noted)  Gait:     Speed/Radha: Delayed  Step Length: Left shortened;Right shortened  Gait Abnormalities: Antalgic;Decreased step clearance  Distance (ft): 10 Feet (ft)  Ambulation - Level of Assistance: Minimal assistance  Interventions: Safety awareness training;Verbal cues      Body Structures Involved:  1. Muscles Body Functions Affected:  1. Movement Related Activities and Participation Affected:  1. Mobility  2. Self Care   Number of elements that affect the Plan of Care: 4+: HIGH COMPLEXITY   CLINICAL PRESENTATION:   Presentation: Stable and uncomplicated: LOW COMPLEXITY   CLINICAL DECISION MAKIN [facility] [place] [address] [address] AM-PAC 6 Clicks   Basic Mobility Inpatient Short Form  How much difficulty does the patient currently have. .. Unable A Lot A Little None   1. Turning over in bed (including adjusting bedclothes, sheets and blankets)? [] 1   [] 2   [x] 3   [] 4   2.   Sitting down on and standing up from a chair with arms ( e.g., wheelchair, bedside commode, etc.)   [] 1   [] 2   [x] 3   [] 4   3. Moving from lying on back to sitting on the side of the bed? [] 1   [] 2   [x] 3   [] 4   How much help from another person does the patient currently need. .. Total A Lot A Little None   4. Moving to and from a bed to a chair (including a wheelchair)? [] 1   [x] 2   [] 3   [] 4   5. Need to walk in hospital room? [] 1   [x] 2   [] 3   [] 4   6. Climbing 3-5 steps with a railing? [] 1   [x] 2   [] 3   [] 4   © 2007, Trustees of [facility] [employer], under license to [employer]. All rights reserved      Score:  Initial: 15 Most Recent: X (Date: -- )    Interpretation of Tool:  Represents activities that are increasingly more difficult (i.e. Bed mobility, Transfers, Gait). Medical Necessity:     · Patient is expected to demonstrate progress in   · strength and functional technique  ·  to   · decrease assistance required with functional mobility  · . Reason for Services/Other Comments:  · Patient continues to require skilled intervention due to   · Inability to complete functional mobility independently  · . Use of outcome tool(s) and clinical judgement create a POC that gives a: Clear prediction of patient's progress: LOW COMPLEXITY            TREATMENT:   (In addition to Assessment/Re-Assessment sessions the following treatments were rendered)   Pre-treatment Symptoms/Complaints: weakness & fatigue  Pain: Initial:   Pain Intensity 1: 0  Post Session:  0/10   Therapeutic Activity: (  23 Minutes(extra time to work through activity noted) ):  Therapeutic activities including LE AROM as warm up, bed mobility, sitting balance, wt shifting while on EOB, trasnfers & progressive gait with rolling walker to improve mobility, strength, balance, coordination and dynamic movement of arm - bilateral, leg - bilateral and core to improve functional endurance & stability. Braces/Orthotics/Lines/Etc:   · IV  · purewick  · O2 Device: Room air  Treatment/Session Assessment:    · Response to Treatment:  Pt does well with encouragement only. · Interdisciplinary Collaboration:   o Registered Nurse  · After treatment position/precautions:   o Up in chair  o Bed/Chair-wheels locked  o Call light within reach  o RN notified   · Compliance with Program/Exercises: Will assess as treatment progresses  · Recommendations/Intent for next treatment session: \"Next visit will focus on reduction in assistance provided\".   Total Treatment Duration:  PT Patient Time In/Time Out  Time In: 0936  Time Out: [address],, PT

## 2021-02-28 NOTE — PROGRESS NOTES
Problem: Falls - Risk of  Goal: *Absence of Falls  Description: Document Jack Wright Fall Risk and appropriate interventions in the flowsheet. Outcome: Progressing Towards Goal  Note: Fall Risk Interventions:  Mobility Interventions: Patient to call before getting OOB         Medication Interventions: Patient to call before getting OOB    Elimination Interventions: Call light in reach, Patient to call for help with toileting needs, Stay With Me (per policy)    History of Falls Interventions: Consult care management for discharge planning, Door open when patient unattended, Evaluate medications/consider consulting pharmacy, Investigate reason for fall, Room close to nurse's station         Problem: Patient Education: Go to Patient Education Activity  Goal: Patient/Family Education  Outcome: Progressing Towards Goal     Problem: Pressure Injury - Risk of  Goal: *Prevention of pressure injury  Description: Document Luis Scale and appropriate interventions in the flowsheet.   Outcome: Progressing Towards Goal  Note: Pressure Injury Interventions:       Moisture Interventions: Absorbent underpads    Activity Interventions: Pressure redistribution bed/mattress(bed type)    Mobility Interventions: HOB 30 degrees or less    Nutrition Interventions: Document food/fluid/supplement intake                     Problem: Patient Education: Go to Patient Education Activity  Goal: Patient/Family Education  Outcome: Progressing Towards Goal     Problem: Patient Education: Go to Patient Education Activity  Goal: Patient/Family Education  Outcome: Progressing Towards Goal     Problem: Patient Education: Go to Patient Education Activity  Goal: Patient/Family Education  Outcome: Progressing Towards Goal

## 2021-02-28 NOTE — PROGRESS NOTES
Received patient awake, alert and oriented in bed watching tv. Patient has left leg weakness from previous CVA. Denies needs at this time, adjusted up in bed for comfort. Dilip Qualia is in place. Nursing assessment completed. Bed in low and locked position with call bell in place.

## 2021-03-01 LAB
ANION GAP SERPL CALC-SCNC: 5 MMOL/L (ref 7–16)
ATRIAL RATE: 88 BPM
BUN SERPL-MCNC: 26 MG/DL (ref 8–23)
CALCIUM SERPL-MCNC: 8.9 MG/DL (ref 8.3–10.4)
CALCULATED P AXIS, ECG09: 21 DEGREES
CALCULATED R AXIS, ECG10: 13 DEGREES
CALCULATED T AXIS, ECG11: 15 DEGREES
CHLORIDE SERPL-SCNC: 110 MMOL/L (ref 98–107)
CO2 SERPL-SCNC: 26 MMOL/L (ref 21–32)
COVID-19 RAPID TEST, COVR: NOT DETECTED
CREAT SERPL-MCNC: 2.87 MG/DL (ref 0.6–1)
DIAGNOSIS, 93000: NORMAL
GLUCOSE SERPL-MCNC: 90 MG/DL (ref 65–100)
P-R INTERVAL, ECG05: 132 MS
POTASSIUM SERPL-SCNC: 5.3 MMOL/L (ref 3.5–5.1)
POTASSIUM SERPL-SCNC: 6.4 MMOL/L (ref 3.5–5.1)
Q-T INTERVAL, ECG07: 372 MS
QRS DURATION, ECG06: 76 MS
QTC CALCULATION (BEZET), ECG08: 450 MS
SARS-COV-2, COV2: NORMAL
SODIUM SERPL-SCNC: 141 MMOL/L (ref 136–145)
SOURCE, COVRS: NORMAL
VENTRICULAR RATE, ECG03: 88 BPM

## 2021-03-01 PROCEDURE — 87635 SARS-COV-2 COVID-19 AMP PRB: CPT

## 2021-03-01 PROCEDURE — U0004 COV-19 TEST NON-CDC HGH THRU: HCPCS

## 2021-03-01 PROCEDURE — 96372 THER/PROPH/DIAG INJ SC/IM: CPT

## 2021-03-01 PROCEDURE — 80048 BASIC METABOLIC PNL TOTAL CA: CPT

## 2021-03-01 PROCEDURE — 97530 THERAPEUTIC ACTIVITIES: CPT

## 2021-03-01 PROCEDURE — 84132 ASSAY OF SERUM POTASSIUM: CPT

## 2021-03-01 PROCEDURE — 99218 HC RM OBSERVATION: CPT

## 2021-03-01 PROCEDURE — 74011250636 HC RX REV CODE- 250/636: Performed by: FAMILY MEDICINE

## 2021-03-01 PROCEDURE — 74011250637 HC RX REV CODE- 250/637: Performed by: FAMILY MEDICINE

## 2021-03-01 PROCEDURE — 36415 COLL VENOUS BLD VENIPUNCTURE: CPT

## 2021-03-01 PROCEDURE — 2709999900 HC NON-CHARGEABLE SUPPLY

## 2021-03-01 PROCEDURE — 93005 ELECTROCARDIOGRAM TRACING: CPT

## 2021-03-01 RX ORDER — DEXTROSE 50 % IN WATER (D50W) INTRAVENOUS SYRINGE
25 ONCE
Status: DISCONTINUED | OUTPATIENT
Start: 2021-03-01 | End: 2021-03-01

## 2021-03-01 RX ORDER — INSULIN LISPRO 100 [IU]/ML
5 INJECTION, SOLUTION INTRAVENOUS; SUBCUTANEOUS ONCE
Status: DISCONTINUED | OUTPATIENT
Start: 2021-03-01 | End: 2021-03-01

## 2021-03-01 RX ADMIN — Medication 10 ML: at 21:04

## 2021-03-01 RX ADMIN — LISINOPRIL 10 MG: 5 TABLET ORAL at 08:07

## 2021-03-01 RX ADMIN — Medication 100 MG: at 08:07

## 2021-03-01 RX ADMIN — ESCITALOPRAM OXALATE 10 MG: 10 TABLET ORAL at 08:07

## 2021-03-01 RX ADMIN — FERROUS SULFATE TAB 325 MG (65 MG ELEMENTAL FE) 325 MG: 325 (65 FE) TAB at 17:19

## 2021-03-01 RX ADMIN — HEPARIN SODIUM 5000 UNITS: 5000 INJECTION INTRAVENOUS; SUBCUTANEOUS at 21:04

## 2021-03-01 RX ADMIN — PANTOPRAZOLE SODIUM 40 MG: 40 TABLET, DELAYED RELEASE ORAL at 05:42

## 2021-03-01 RX ADMIN — SODIUM BICARBONATE 650 MG TABLET 650 MG: at 21:04

## 2021-03-01 RX ADMIN — FERROUS SULFATE TAB 325 MG (65 MG ELEMENTAL FE) 325 MG: 325 (65 FE) TAB at 08:07

## 2021-03-01 RX ADMIN — HEPARIN SODIUM 5000 UNITS: 5000 INJECTION INTRAVENOUS; SUBCUTANEOUS at 05:42

## 2021-03-01 RX ADMIN — FERROUS SULFATE TAB 325 MG (65 MG ELEMENTAL FE) 325 MG: 325 (65 FE) TAB at 11:59

## 2021-03-01 RX ADMIN — ACETAMINOPHEN 650 MG: 325 TABLET, FILM COATED ORAL at 08:12

## 2021-03-01 RX ADMIN — HEPARIN SODIUM 5000 UNITS: 5000 INJECTION INTRAVENOUS; SUBCUTANEOUS at 11:59

## 2021-03-01 RX ADMIN — SODIUM ZIRCONIUM CYCLOSILICATE 5 G: 5 POWDER, FOR SUSPENSION ORAL at 17:19

## 2021-03-01 RX ADMIN — MAGNESIUM GLUCONATE 500 MG ORAL TABLET 400 MG: 500 TABLET ORAL at 08:07

## 2021-03-01 RX ADMIN — QUETIAPINE FUMARATE 50 MG: 100 TABLET ORAL at 21:04

## 2021-03-01 RX ADMIN — SODIUM BICARBONATE 650 MG TABLET 650 MG: at 17:19

## 2021-03-01 RX ADMIN — FOLIC ACID 1 MG: 1 TABLET ORAL at 08:07

## 2021-03-01 RX ADMIN — ASPIRIN 325 MG: 325 TABLET, FILM COATED ORAL at 08:07

## 2021-03-01 RX ADMIN — SODIUM BICARBONATE 650 MG TABLET 650 MG: at 08:07

## 2021-03-01 NOTE — PROGRESS NOTES
CM reviewed chart. Referral pending to [facility]. Will require insurance precert. PPD has been placed; order placed for COVID screen. CM will follow to assist with discharge planning needs.

## 2021-03-01 NOTE — PROGRESS NOTES
CM spoke with [facility] liaison, Yrn Jefferson; patient has been accepted at [facility]. Facility will start [employer] today. CM will follow to assist with discharge needs.

## 2021-03-01 NOTE — PROGRESS NOTES
Pt is alert, resting in bed. Was advised that pt asked to see this writer but upon entering room, pt stated she didn't remember asking to speak to a nurse. Assessment completed. Tylenol given for report of knee pain. IV is clean, dry and intact. Pt's brief, sheet, gown and bedpad changed as pt was incontinent of urine and stool. New purewick placed as previous purewick was soiled. Pt apologized, stated she was unaware she had a BM. Call light at bedside within reach. Pt encouraged to call for any needs/concerns/assistance. She voiced understanding.

## 2021-03-01 NOTE — PROGRESS NOTES
Shift assessment complete. Pt resting in bed. A&Ox4. IV capped and patent. Incontinent brief in place. Tylenol given for knee pain. Bed in lowest position, call light within reach, side rails x3. Encouraged to call for help when needed.

## 2021-03-01 NOTE — PROGRESS NOTES
Noted K 6.4 on BMP. Repeat order for K stat. Her BG was 90 this AM. Will only give insulin 5U along w/ D50 25g to lower K. Will recheck K after treatment. Obtain EKG, if T wave changes will give calcium gluconate. Pt has been asymptomatic.      Sabrina Litten, DO

## 2021-03-01 NOTE — PROGRESS NOTES
Problem: Mobility Impaired (Adult and Pediatric)  Goal: *Acute Goals and Plan of Care (Insert Text)  Description: STG:  (1.)Ms. Nicole Buchanan will transfer from bed to chair and chair to bed with MINIMAL ASSIST of 2 using the least restrictive device within 4-7 treatment day(s). Met 2/28  (2.)Ms. Nicole Buchanan will ambulate with MODERATE ASSIST of 2 for 15 feet with the least restrictive device within 4-7 treatment day(s). Met 2/28    GOALS MODIFIED DUE TO PROGRESS 2/28/21 :   (3.)Ms. Nicole Buchanan will move from supine to sit and sit to supine with CONTACT GUARD ASSIST (consistently). 4) pt transfers with walker & CONTACT GUARD ASSIST (consistently). 5) pt ambulating with rolling walker 25-30 ft with CONTACT GUARD ASSIST (consistently). ________________________________________________________________________________________________      Outcome: Progressing Towards Goal     PHYSICAL THERAPY: PM 3/1/2021  OBSERVATION: PT Visit Days : 2  Payor: [place] / Plan: [address] / Product Type: Managed Care Medicare /       NAME/AGE/GENDER: Mireya Lazo is a 61 y.o. female   PRIMARY DIAGNOSIS: Lactic acidosis [E87.2] Lactic acidosis Lactic acidosis       ICD-10: Treatment Diagnosis:    · Generalized Muscle Weakness (M62.81)  · Difficulty in walking, Not elsewhere classified (R26.2)   Precaution/Allergies:  Adhesive, Codeine, and Hydrocodone-acetaminophen      ASSESSMENT:     Ms. Nicole Buchanan needed to be encouraged to participate, pt does well when she thinks she completing he final task like walking to a set marker, she performs with speed & energy. This pt will need additional rehab at SNF , poor home situation. This section established at most recent assessment   PROBLEM LIST (Impairments causing functional limitations):  1. Decreased Strength  2. Decreased ADL/Functional Activities  3. Decreased Transfer Abilities  4. Decreased Ambulation Ability/Technique  5.  Decreased Balance  6. Decreased Activity Tolerance   INTERVENTIONS PLANNED: (Benefits and precautions of physical therapy have been discussed with the patient.)  1. Balance Exercise  2. Bed Mobility  3. Gait Training  4. Therapeutic Activites  5. Therapeutic Exercise/Strengthening  6. Transfer Training     TREATMENT PLAN: Frequency/Duration: daily for duration of hospital stay  Rehabilitation Potential For Stated Goals: Good     REHAB RECOMMENDATIONS (at time of discharge pending progress):    Placement: It is my opinion, based on this patient's performance to date, that Ms. Jac Resendiz may benefit from intensive therapy at a [address] [place] [address] after discharge due to the functional deficits listed above that are likely to improve with skilled rehabilitation and concerns that he/she may be unsafe to be unsupervised at home due to . Equipment:    To be determined              HISTORY:   History of Present Injury/Illness (Reason for Referral):  PER MD NOTE: Pt here via EMS from home with c/o chronic knee problems. EMS was actually called for spouse due to alcohol probs. This pt was found with urine and fecal matter all over bottom. Spouse not able to care for her. EMS brought pt here for APS reasons  Past Medical History/Comorbidities:   Ms. Jac Resendiz  has a past medical history of Aneurysm of common carotid artery ([facility] (2004), CKD (chronic kidney disease) (9/18/2014), Dementia ([address] FSGS (focal segmental glomerulosclerosis), Gout, Hypertension, Osteoarthritis (9/18/2014), and Stroke ([facility] [address] (2004, 2013). Ms. Jac Resendiz  has a past surgical history that includes hx intracranial aneurysm repair (2004); hx refractive surgery; hx orthopaedic (Right, 10/2014); hx ankle fracture tx (Left, 2013); hx ercp (02/27/2018); hx cholecystectomy (02/28/2018); ir insert tunl cvc w/o port over 5 yr (12/22/2020); and ir remove tunl cvad w/o port / pump (1/15/2021).   Social History/Living Environment:   Home Environment: Private residence  One/Two Story Residence: One story  Living Alone: No  Support Systems: Spouse/Significant Other/Partner  Patient Expects to be Discharged to[de-identified] Rehabilitation facility  Current DME Used/Available at Home: None  Prior Level of Function/Work/Activity:  Pt living at home with spouse, bed bound and uses depends, spouse cares for pt and he is in hospital now     Number of Personal Factors/Comorbidities that affect the Plan of Care: 3+: HIGH COMPLEXITY   EXAMINATION:   Most Recent Physical Functioning:   Gross Assessment: 3-/5 throughout                       Balance:  Sitting: Intact; Without support  Standing: Impaired; With support(walker)  Standing - Static: (fair- with walker)  Standing - Dynamic : (fair- with walker) Bed Mobility:  Supine to Sit: Minimum assistance  Sit to Supine: (NT)  Wheelchair Mobility:     Transfers:  Sit to Stand: Minimum assistance  Stand to Sit: Minimum assistance  Bed to Chair: Minimum assistance(with walker)  Duration: 23 Minutes(extra time to work through activity noted)  Gait:     Speed/Radha: Delayed  Step Length: Left shortened;Right shortened  Gait Abnormalities: Antalgic;Decreased step clearance  Distance (ft): 15 Feet (ft)  Ambulation - Level of Assistance: Minimal assistance  Interventions: Safety awareness training;Verbal cues      Body Structures Involved:  1. Muscles Body Functions Affected:  1. Movement Related Activities and Participation Affected:  1. Mobility  2. Self Care   Number of elements that affect the Plan of Care: 4+: HIGH COMPLEXITY   CLINICAL PRESENTATION:   Presentation: Stable and uncomplicated: LOW COMPLEXITY   CLINICAL DECISION MAKIN [facility] [address] [id number] AM-PAC 6 Clicks   Basic Mobility Inpatient Short Form  How much difficulty does the patient currently have. .. Unable A Lot A Little None   1. Turning over in bed (including adjusting bedclothes, sheets and blankets)? [] 1   [] 2   [x] 3   [] 4   2.   Sitting down on and standing up from a chair with arms ( e.g., wheelchair, bedside commode, etc.)   [] 1   [] 2   [x] 3   [] 4   3. Moving from lying on back to sitting on the side of the bed? [] 1   [] 2   [x] 3   [] 4   How much help from another person does the patient currently need. .. Total A Lot A Little None   4. Moving to and from a bed to a chair (including a wheelchair)? [] 1   [x] 2   [] 3   [] 4   5. Need to walk in hospital room? [] 1   [x] 2   [] 3   [] 4   6. Climbing 3-5 steps with a railing? [] 1   [x] 2   [] 3   [] 4   © 2007, Trustees of [address] [place] [address] [address] [id number], under license to [employer]. All rights reserved      Score:  Initial: 15 Most Recent: X (Date: -- )    Interpretation of Tool:  Represents activities that are increasingly more difficult (i.e. Bed mobility, Transfers, Gait). Medical Necessity:     · Patient is expected to demonstrate progress in   · strength and functional technique  ·  to   · decrease assistance required with functional mobility  · . Reason for Services/Other Comments:  · Patient continues to require skilled intervention due to   · Inability to complete functional mobility independently  · . Use of outcome tool(s) and clinical judgement create a POC that gives a: Clear prediction of patient's progress: LOW COMPLEXITY            TREATMENT:   (In addition to Assessment/Re-Assessment sessions the following treatments were rendered)   Pre-treatment Symptoms/Complaints: fatigue  Pain: Initial: numeric scale  Pain Intensity 1: 0  Post Session:  0/10   Therapeutic Activity: (  23 Minutes(extra time to work through activity noted) ):  Therapeutic activities including LE AROM as warm up, bed mobility, sitting balance, wt shifting while on EOB, transfers, also transfer on & off BSC along with progressive gait with rolling walker to improve mobility, strength, balance, coordination and dynamic movement of arm - bilateral, leg - bilateral and core to improve functional endurance & stability. Braces/Orthotics/Lines/Etc:   · IV  · purewick  · O2 Device: Room air  Treatment/Session Assessment:    · Response to Treatment:  Pt poorly motivated , needs to be given an incentive to work  · Interdisciplinary Collaboration:   o Registered Nurse  · After treatment position/precautions:   o Up in chair  o Bed/Chair-wheels locked  o Call light within reach  o RN notified   · Compliance with Program/Exercises: Will assess as treatment progresses  · Recommendations/Intent for next treatment session: \"Next visit will focus on reduction in assistance provided\".   Total Treatment Duration:  PT Patient Time In/Time Out  Time In: 1300  Time Out: [address], PT
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
## 2021-03-01 NOTE — PROGRESS NOTES
Problem: Falls - Risk of  Goal: *Absence of Falls  Description: Document Danne Lobe Fall Risk and appropriate interventions in the flowsheet. Outcome: Progressing Towards Goal  Note: Fall Risk Interventions:  Mobility Interventions: Bed/chair exit alarm    Mentation Interventions: Bed/chair exit alarm    Medication Interventions: Bed/chair exit alarm    Elimination Interventions: Call light in reach    History of Falls Interventions: Consult care management for discharge planning, Door open when patient unattended, Evaluate medications/consider consulting pharmacy, Investigate reason for fall, Room close to nurse's station         Problem: Pressure Injury - Risk of  Goal: *Prevention of pressure injury  Description: Document Luis Scale and appropriate interventions in the flowsheet.   Outcome: Progressing Towards Goal  Note: Pressure Injury Interventions:       Moisture Interventions: Absorbent underpads, Apply protective barrier, creams and emollients    Activity Interventions: Increase time out of bed    Mobility Interventions: Pressure redistribution bed/mattress (bed type)    Nutrition Interventions: Offer support with meals,snacks and hydration

## 2021-03-02 LAB
ANION GAP SERPL CALC-SCNC: 3 MMOL/L (ref 7–16)
BACTERIA SPEC CULT: NORMAL
BACTERIA SPEC CULT: NORMAL
BUN SERPL-MCNC: 29 MG/DL (ref 8–23)
CALCIUM SERPL-MCNC: 8.9 MG/DL (ref 8.3–10.4)
CHLORIDE SERPL-SCNC: 108 MMOL/L (ref 98–107)
CO2 SERPL-SCNC: 29 MMOL/L (ref 21–32)
CREAT SERPL-MCNC: 2.8 MG/DL (ref 0.6–1)
GLUCOSE SERPL-MCNC: 87 MG/DL (ref 65–100)
POTASSIUM SERPL-SCNC: 4.9 MMOL/L (ref 3.5–5.1)
SARS COV-2, XPGCVT: NEGATIVE
SERVICE CMNT-IMP: NORMAL
SERVICE CMNT-IMP: NORMAL
SODIUM SERPL-SCNC: 140 MMOL/L (ref 136–145)

## 2021-03-02 PROCEDURE — 36415 COLL VENOUS BLD VENIPUNCTURE: CPT

## 2021-03-02 PROCEDURE — 74011250636 HC RX REV CODE- 250/636: Performed by: FAMILY MEDICINE

## 2021-03-02 PROCEDURE — 99218 HC RM OBSERVATION: CPT

## 2021-03-02 PROCEDURE — 96372 THER/PROPH/DIAG INJ SC/IM: CPT

## 2021-03-02 PROCEDURE — 74011250637 HC RX REV CODE- 250/637: Performed by: FAMILY MEDICINE

## 2021-03-02 PROCEDURE — 97530 THERAPEUTIC ACTIVITIES: CPT

## 2021-03-02 PROCEDURE — 2709999900 HC NON-CHARGEABLE SUPPLY

## 2021-03-02 PROCEDURE — 80048 BASIC METABOLIC PNL TOTAL CA: CPT

## 2021-03-02 RX ADMIN — LORAZEPAM 1 MG: 2 INJECTION INTRAMUSCULAR; INTRAVENOUS at 21:19

## 2021-03-02 RX ADMIN — ACETAMINOPHEN 650 MG: 325 TABLET, FILM COATED ORAL at 21:18

## 2021-03-02 RX ADMIN — LISINOPRIL 10 MG: 5 TABLET ORAL at 08:42

## 2021-03-02 RX ADMIN — ASPIRIN 325 MG: 325 TABLET, FILM COATED ORAL at 08:43

## 2021-03-02 RX ADMIN — HEPARIN SODIUM 5000 UNITS: 5000 INJECTION INTRAVENOUS; SUBCUTANEOUS at 21:19

## 2021-03-02 RX ADMIN — MAGNESIUM GLUCONATE 500 MG ORAL TABLET 400 MG: 500 TABLET ORAL at 08:42

## 2021-03-02 RX ADMIN — Medication 100 MG: at 08:42

## 2021-03-02 RX ADMIN — HEPARIN SODIUM 5000 UNITS: 5000 INJECTION INTRAVENOUS; SUBCUTANEOUS at 12:02

## 2021-03-02 RX ADMIN — FERROUS SULFATE TAB 325 MG (65 MG ELEMENTAL FE) 325 MG: 325 (65 FE) TAB at 08:43

## 2021-03-02 RX ADMIN — Medication 10 ML: at 05:31

## 2021-03-02 RX ADMIN — QUETIAPINE FUMARATE 50 MG: 100 TABLET ORAL at 21:19

## 2021-03-02 RX ADMIN — SODIUM BICARBONATE 650 MG TABLET 650 MG: at 17:11

## 2021-03-02 RX ADMIN — SODIUM BICARBONATE 650 MG TABLET 650 MG: at 08:43

## 2021-03-02 RX ADMIN — Medication 10 ML: at 21:20

## 2021-03-02 RX ADMIN — FERROUS SULFATE TAB 325 MG (65 MG ELEMENTAL FE) 325 MG: 325 (65 FE) TAB at 17:11

## 2021-03-02 RX ADMIN — FOLIC ACID 1 MG: 1 TABLET ORAL at 08:43

## 2021-03-02 RX ADMIN — PANTOPRAZOLE SODIUM 40 MG: 40 TABLET, DELAYED RELEASE ORAL at 05:46

## 2021-03-02 RX ADMIN — ESCITALOPRAM OXALATE 10 MG: 10 TABLET ORAL at 08:42

## 2021-03-02 RX ADMIN — FERROUS SULFATE TAB 325 MG (65 MG ELEMENTAL FE) 325 MG: 325 (65 FE) TAB at 12:02

## 2021-03-02 RX ADMIN — SODIUM BICARBONATE 650 MG TABLET 650 MG: at 21:18

## 2021-03-02 RX ADMIN — HEPARIN SODIUM 5000 UNITS: 5000 INJECTION INTRAVENOUS; SUBCUTANEOUS at 05:31

## 2021-03-02 NOTE — PROGRESS NOTES
Shift assessment complete. Pt resting in bed. A&Ox4. Pt denies pain at this time. Bed alarm in place, bed in lowest position, gripper socks in place. Encouraged to call for help when needed.

## 2021-03-02 NOTE — PROGRESS NOTES
Problem: Falls - Risk of  Goal: *Absence of Falls  Description: Document Nidhi Ann Fall Risk and appropriate interventions in the flowsheet. Outcome: Progressing Towards Goal  Note: Fall Risk Interventions:  Mobility Interventions: Bed/chair exit alarm    Mentation Interventions: Bed/chair exit alarm    Medication Interventions: Bed/chair exit alarm    Elimination Interventions: Call light in reach    History of Falls Interventions: Bed/chair exit alarm         Problem: Pressure Injury - Risk of  Goal: *Prevention of pressure injury  Description: Document Luis Scale and appropriate interventions in the flowsheet.   Outcome: Progressing Towards Goal  Note: Pressure Injury Interventions:       Moisture Interventions: Absorbent underpads, Apply protective barrier, creams and emollients    Activity Interventions: Increase time out of bed    Mobility Interventions: Pressure redistribution bed/mattress (bed type)    Nutrition Interventions: Offer support with meals,snacks and hydration

## 2021-03-02 NOTE — PROGRESS NOTES
Spoke with Marvin Laura with [facility]. She is familiar with pt and her  due to Mrs. Rizzo's prior stays at their [facility]. Marvin Laura aware that we have sent referrals for both MrApolonia And Mrs. Roya Argueta for placement who are both Inpt here. Marvin Laura is trying to work on a solution where they can be placed together in the same facility for rehab. [address] [place] [address] may give us this option as they have several beds. Ins precert will be required. COVID 3-1 (neg) and PPD 2-26.
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
## 2021-03-02 NOTE — PROGRESS NOTES
Comprehensive Nutrition Assessment    Type and Reason for Visit: Initial, RD nutrition re-screen/LOS      Nutrition Recommendations/Plan:    Continue Regular Diet     Nutrition Assessment:   Nutrition History: Pt lives with her spouse; minimal food preparation done at home. Pt is bedbound and relies on spouse for assistance. Pt states she may have cereal at breakfast, take out of hot meal at lunch and cookies or snack for in the evening. Reports stable weight. Nutrition Background: Pt came to hospital with her  as he is her primary caregiver and no one else available to take care of her; was found tachycardic and hypertensive in ER. Past medical history notable for carotid artery aneurysm, CKD, dementia, CVA with right sided residual, HTN, alcohol use. Pt reports good po intake; ate 100% breakfast and lunch today. Weight has been stable; noted weight from EMR at 195# in Sept. 2020. Nutrition Related Findings:   no wasting noted     Current Nutrition Therapies:  Regular Diet    Current Intake:   100% meals    Anthropometric Measures:  Height: 5' 5\" (165.1 cm)  Current Body Wt: 90.7 kg, Weight source: Stated  BMI: 33.3, Obese class 1 (BMI 30.0-34.9)  Usual Body Wt: 90.7 kg (200 lb), Percent weight change: none  Edema: LLE: 1+ (3/1/2021  7:40 PM)  RLE: 1+ (3/1/2021  7:40 PM)     Estimated Daily Nutrient Needs:  Energy (kcal/day): 4434-0638 kcal/day (15-20 kcal/kg/BW) ( , Weight Used: Current)  Protein (g/day): 54-73 gm pro/day (0.6-0.8 gm pro/kg/BW) Weight Used: (Current)  Fluid (ml/day): 0573-0344 ml/day (1 ml/kcal)    Nutrition Diagnosis:   No nutrition diagnosis at this time. Nutrition Interventions:   Food and/or Nutrient Delivery: Continue current diet  Coordination of Nutrition Care: Continue to monitor while inpatient    Goals: Active Goal: Continue to meet at least 75% nutrition needs within 7 days.     Nutrition Monitoring and Evaluation:   Food/Nutrient Intake Outcomes: Food and nutrient intake    Discharge Planning:     Too soon to determine    Electronically signed by Mary Beth Douglas, MPH, RD, LD on 3/2/2021 at 2:48 PM    Contact: [phone]

## 2021-03-02 NOTE — PROGRESS NOTES
Problem: Mobility Impaired (Adult and Pediatric)  Goal: *Acute Goals and Plan of Care (Insert Text)  Description: STG:  (1.)Ms. Brenda Doyle will transfer from bed to chair and chair to bed with MINIMAL ASSIST of 2 using the least restrictive device within 4-7 treatment day(s). Met 2/28  (2.)Ms. Brenda Doyle will ambulate with MODERATE ASSIST of 2 for 15 feet with the least restrictive device within 4-7 treatment day(s). Met 2/28    GOALS MODIFIED DUE TO PROGRESS 2/28/21 :   (3.)Ms. Brenda Doyle will move from supine to sit and sit to supine with CONTACT GUARD ASSIST (consistently). 4) pt transfers with walker & CONTACT GUARD ASSIST (consistently). 5) pt ambulating with rolling walker 25-30 ft with CONTACT GUARD ASSIST (consistently). ________________________________________________________________________________________________      Outcome: Progressing Towards Goal     PHYSICAL THERAPY: Daily Note and AM 3/2/2021  OBSERVATION: PT Visit Days : 3  Payor: [address] / Plan: [address] / Product Type: Managed Care Medicare /       NAME/AGE/GENDER: Rex Mackenzie is a 61 y.o. female   PRIMARY DIAGNOSIS: Lactic acidosis [E87.2] Lactic acidosis Lactic acidosis       ICD-10: Treatment Diagnosis:    · Generalized Muscle Weakness (M62.81)  · Difficulty in walking, Not elsewhere classified (R26.2)   Precaution/Allergies:  Adhesive, Codeine, and Hydrocodone-acetaminophen      ASSESSMENT:     Ms. Brenda Doyle agreed to get up with encouragement. She needed assistance with all mobility. She did ambulate to the door with RW and assistance. All mobility takes extra time and assistance needed. She is not safe on her feet without help. Plans are for rehab. This section established at most recent assessment   PROBLEM LIST (Impairments causing functional limitations):  1. Decreased Strength  2. Decreased ADL/Functional Activities  3. Decreased Transfer Abilities  4.  Decreased Ambulation Ability/Technique  5. Decreased Balance  6. Decreased Activity Tolerance   INTERVENTIONS PLANNED: (Benefits and precautions of physical therapy have been discussed with the patient.)  1. Balance Exercise  2. Bed Mobility  3. Gait Training  4. Therapeutic Activites  5. Therapeutic Exercise/Strengthening  6. Transfer Training     TREATMENT PLAN: Frequency/Duration: daily for duration of hospital stay  Rehabilitation Potential For Stated Goals: Good     REHAB RECOMMENDATIONS (at time of discharge pending progress):    Placement: It is my opinion, based on this patient's performance to date, that Ms. Pauly Richard may benefit from intensive therapy at a [address] [place] [address] after discharge due to the functional deficits listed above that are likely to improve with skilled rehabilitation and concerns that he/she may be unsafe to be unsupervised at home due to . Equipment:    To be determined              HISTORY:   History of Present Injury/Illness (Reason for Referral):  PER MD NOTE: Pt here via EMS from home with c/o chronic knee problems. EMS was actually called for spouse due to alcohol probs. This pt was found with urine and fecal matter all over bottom. Spouse not able to care for her. EMS brought pt here for APS reasons  Past Medical History/Comorbidities:   Ms. Pauly Richard  has a past medical history of Aneurysm of common carotid artery ([facility] [address] (2004), CKD (chronic kidney disease) (9/18/2014), Dementia ([address] FSGS (focal segmental glomerulosclerosis), Gout, Hypertension, Osteoarthritis (9/18/2014), and Stroke ([address] (2004, 2013). Ms. Pauly Richard  has a past surgical history that includes hx intracranial aneurysm repair (2004); hx refractive surgery; hx orthopaedic (Right, 10/2014); hx ankle fracture tx (Left, 2013); hx ercp (02/27/2018); hx cholecystectomy (02/28/2018); ir insert tunl cvc w/o port over 5 yr (12/22/2020); and ir remove tunl cvad w/o port / pump (1/15/2021).   Social History/Living Environment:   Home Environment: Private residence  One/Two Story Residence: One story  Living Alone: No  Support Systems: Spouse/Significant Other/Partner  Patient Expects to be Discharged to[de-identified] Rehabilitation facility  Current DME Used/Available at Home: None  Prior Level of Function/Work/Activity:  Pt living at home with spouse, bed bound and uses depends, spouse cares for pt and he is in hospital now     Number of Personal Factors/Comorbidities that affect the Plan of Care: 3+: HIGH COMPLEXITY   EXAMINATION:   Most Recent Physical Functioning:   Gross Assessment: 3-/5 throughout                       Balance:  Sitting: Intact  Standing: Pull to stand; With support; Impaired  Standing - Static: Constant support  Standing - Dynamic : Constant support Bed Mobility:  Supine to Sit: Minimum assistance  Scooting: Minimum assistance  Wheelchair Mobility:     Transfers:  Sit to Stand: Minimum assistance  Stand to Sit: Minimum assistance  Bed to Chair: Minimum assistance  Duration: 25 Minutes  Gait:     Speed/Radha: Delayed  Step Length: Left shortened;Right shortened  Stance: Right decreased  Gait Abnormalities: Antalgic;Decreased step clearance  Distance (ft): 15 Feet (ft)  Assistive Device: Walker, rolling  Ambulation - Level of Assistance: Minimal assistance  Interventions: Safety awareness training;Verbal cues      Body Structures Involved:  1. Muscles Body Functions Affected:  1. Movement Related Activities and Participation Affected:  1. Mobility  2. Self Care   Number of elements that affect the Plan of Care: 4+: HIGH COMPLEXITY   CLINICAL PRESENTATION:   Presentation: Stable and uncomplicated: LOW COMPLEXITY   CLINICAL DECISION MAKING:   [facility] AM-PAC 6 Clicks   Basic Mobility Inpatient Short Form  How much difficulty does the patient currently have. .. Unable A Lot A Little None   1. Turning over in bed (including adjusting bedclothes, sheets and blankets)? [] 1   [] 2   [x] 3   [] 4   2.   Sitting down on and standing up from a chair with arms ( e.g., wheelchair, bedside commode, etc.)   [] 1   [] 2   [x] 3   [] 4   3. Moving from lying on back to sitting on the side of the bed? [] 1   [] 2   [x] 3   [] 4   How much help from another person does the patient currently need. .. Total A Lot A Little None   4. Moving to and from a bed to a chair (including a wheelchair)? [] 1   [x] 2   [] 3   [] 4   5. Need to walk in hospital room? [] 1   [x] 2   [] 3   [] 4   6. Climbing 3-5 steps with a railing? [] 1   [x] 2   [] 3   [] 4   © 2007, Trustees of [address] [place] [address] [address] [id number], under license to [employer]. All rights reserved      Score:  Initial: 15 Most Recent: X (Date: -- )    Interpretation of Tool:  Represents activities that are increasingly more difficult (i.e. Bed mobility, Transfers, Gait). Medical Necessity:     · Patient is expected to demonstrate progress in   · strength and functional technique  ·  to   · decrease assistance required with functional mobility  · . Reason for Services/Other Comments:  · Patient continues to require skilled intervention due to   · Inability to complete functional mobility independently  · . Use of outcome tool(s) and clinical judgement create a POC that gives a: Clear prediction of patient's progress: LOW COMPLEXITY            TREATMENT:   (In addition to Assessment/Re-Assessment sessions the following treatments were rendered)   Pre-treatment Symptoms/Complaints: fatigue  Pain: Initial: numeric scale     Post Session:  0/10   Therapeutic Activity: (  25 Minutes ):  Therapeutic activities including LE AROM as warm up, bed mobility, sitting balance, wt shifting while on EOB, transfers, along with progressive gait with rolling walker to improve mobility, strength, balance, coordination and dynamic movement of arm - bilateral, leg - bilateral and core to improve functional endurance & stability.              Braces/Orthotics/Lines/Etc:   · O2 Device: Room air  Treatment/Session Assessment:    · Response to Treatment:  Pt. Did ok with encouragement  · Interdisciplinary Collaboration:   o Registered Nurse  o Certified Nursing Assistant/Patient Care Technician  · After treatment position/precautions:   o Up in chair  o Bed/Chair-wheels locked  o Bed in low position  o Call light within reach  o RN notified   · Compliance with Program/Exercises: Will assess as treatment progresses  · Recommendations/Intent for next treatment session: \"Next visit will focus on reduction in assistance provided\".   Total Treatment Duration:  PT Patient Time In/Time Out  Time In: 0840  Time Out: ALYX Bazzi

## 2021-03-03 PROCEDURE — 2709999900 HC NON-CHARGEABLE SUPPLY

## 2021-03-03 PROCEDURE — 74011250637 HC RX REV CODE- 250/637: Performed by: FAMILY MEDICINE

## 2021-03-03 PROCEDURE — 74011250636 HC RX REV CODE- 250/636: Performed by: FAMILY MEDICINE

## 2021-03-03 PROCEDURE — 96372 THER/PROPH/DIAG INJ SC/IM: CPT

## 2021-03-03 PROCEDURE — 99218 HC RM OBSERVATION: CPT

## 2021-03-03 PROCEDURE — 97535 SELF CARE MNGMENT TRAINING: CPT

## 2021-03-03 PROCEDURE — 97530 THERAPEUTIC ACTIVITIES: CPT

## 2021-03-03 RX ADMIN — SODIUM BICARBONATE 650 MG TABLET 650 MG: at 17:20

## 2021-03-03 RX ADMIN — FOLIC ACID 1 MG: 1 TABLET ORAL at 09:21

## 2021-03-03 RX ADMIN — Medication 10 ML: at 05:58

## 2021-03-03 RX ADMIN — Medication 10 ML: at 22:04

## 2021-03-03 RX ADMIN — ESCITALOPRAM OXALATE 10 MG: 10 TABLET ORAL at 09:21

## 2021-03-03 RX ADMIN — ACETAMINOPHEN 650 MG: 325 TABLET, FILM COATED ORAL at 09:27

## 2021-03-03 RX ADMIN — HEPARIN SODIUM 5000 UNITS: 5000 INJECTION INTRAVENOUS; SUBCUTANEOUS at 14:41

## 2021-03-03 RX ADMIN — LISINOPRIL 10 MG: 5 TABLET ORAL at 09:22

## 2021-03-03 RX ADMIN — FERROUS SULFATE TAB 325 MG (65 MG ELEMENTAL FE) 325 MG: 325 (65 FE) TAB at 14:41

## 2021-03-03 RX ADMIN — QUETIAPINE FUMARATE 50 MG: 100 TABLET ORAL at 22:04

## 2021-03-03 RX ADMIN — FERROUS SULFATE TAB 325 MG (65 MG ELEMENTAL FE) 325 MG: 325 (65 FE) TAB at 17:19

## 2021-03-03 RX ADMIN — MAGNESIUM GLUCONATE 500 MG ORAL TABLET 400 MG: 500 TABLET ORAL at 09:22

## 2021-03-03 RX ADMIN — ACETAMINOPHEN 650 MG: 325 TABLET, FILM COATED ORAL at 23:39

## 2021-03-03 RX ADMIN — SODIUM BICARBONATE 650 MG TABLET 650 MG: at 09:21

## 2021-03-03 RX ADMIN — ACETAMINOPHEN 650 MG: 325 TABLET, FILM COATED ORAL at 17:59

## 2021-03-03 RX ADMIN — FERROUS SULFATE TAB 325 MG (65 MG ELEMENTAL FE) 325 MG: 325 (65 FE) TAB at 09:22

## 2021-03-03 RX ADMIN — SODIUM BICARBONATE 650 MG TABLET 650 MG: at 22:04

## 2021-03-03 RX ADMIN — HEPARIN SODIUM 5000 UNITS: 5000 INJECTION INTRAVENOUS; SUBCUTANEOUS at 04:13

## 2021-03-03 RX ADMIN — HEPARIN SODIUM 5000 UNITS: 5000 INJECTION INTRAVENOUS; SUBCUTANEOUS at 22:03

## 2021-03-03 RX ADMIN — Medication 10 ML: at 14:42

## 2021-03-03 RX ADMIN — ASPIRIN 325 MG: 325 TABLET, FILM COATED ORAL at 09:21

## 2021-03-03 RX ADMIN — Medication 100 MG: at 09:21

## 2021-03-03 NOTE — PROGRESS NOTES
Beds available for both patient and her  at [facility]. Insurance precert has been initiated. PPD and COVID complete. CM will continue to follow to assist with discharge needs.
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
## 2021-03-03 NOTE — PROGRESS NOTES
Problem: Self Care Deficits Care Plan (Adult)  Goal: *Acute Goals and Plan of Care (Insert Text)  Description: 1. Patient will perform grooming with supervision seated edge of bed with set-up. PROGRESSING  2. Patient will perform Upper body dressing with stand by assistance and extra time seated edge of bed. 3. Patient will perform lower body dressing with moderate assistance seated edge of bed and standing. 4. Patient will perform bathing with moderate assistance seated edge of bed or on shower chair if can be rolled into shower. PROGRESSING  5. Patient will perform toilet transfers with moderate assistance to [facility]. GOAL MET 3/3/2021    6. Patient will participate in 30 + minutes of ADL/ therapeutic exercise/therapeutic activity with min rest breaks to increase activity tolerance for self care. PROGRESSING      Goals to be achieved in 7 days. Outcome: Progressing Towards Goal     OCCUPATIONAL THERAPY: Daily Note and AM 3/3/2021  OBSERVATION: OT Visit Days: 3  Payor: Medford Habermann / Plan: [address] / Product Type: [employer] /      NAME/AGE/GENDER: Danette Brumfield is a 61 y.o. female   PRIMARY DIAGNOSIS:  Lactic acidosis [E87.2] Lactic acidosis Lactic acidosis       ICD-10: Treatment Diagnosis:    · Generalized Muscle Weakness (M62.81)  · Other lack of cordination (R27.8)  · Difficulty in walking, Not elsewhere classified (R26.2)   Precautions/Allergies:     Adhesive, Codeine, and Hydrocodone-acetaminophen      ASSESSMENT:     Ms. Maye Mercer presents with above diagnosis and was seen in room with PT present. Pt was bought to ED due to being found by EMS in poor care while picking up her  for alcohol withdrawal. Pt states she is bed bound at home but this OT feels she could do more. Pt would benefit from OT to maximize safety and independence with self care and functional mobility possible to exceed current baseline. Pt could also benefit from SNF for short term rehab. 2/27/2021 Pt was sitting in chair upon arrival. Pt completed washing face and brushing teeth with set up. Continue POC.     3/3/21 815 Patient supine in bed incontinent bm. She was CGA for rolling right and left. Total assist clean up for  BM. She completed sponge bath supine she washed upper body and assisted with lower body. She transferred supine to sit with CGA. She combed her hair seated on EOB. She was min assist sit to stand with rw to take steps to bedside chiar. She brushed her teeeth and was set up with her breakfast tray. She is making progress and would continue with skilled OT services. This section established at most recent assessment   PROBLEM LIST (Impairments causing functional limitations):  1. Decreased Strength  2. Decreased ADL/Functional Activities  3. Decreased Transfer Abilities  4. Decreased Balance  5. Decreased Activity Tolerance   INTERVENTIONS PLANNED: (Benefits and precautions of occupational therapy have been discussed with the patient.)  1. Activities of daily living training  2. Adaptive equipment training  3. Balance training  4. Therapeutic activity  5. Therapeutic exercise     TREATMENT PLAN: Frequency/Duration: Follow patient 3 times per week to address above goals. Rehabilitation Potential For Stated Goals: Good     REHAB RECOMMENDATIONS (at time of discharge pending progress):    Placement: It is my opinion, based on this patient's performance to date, that Ms. Kristina Kwok may benefit from intensive therapy at a [address] [place] [address] after discharge due to the functional deficits listed above that are likely to improve with skilled rehabilitation and concerns that he/she may be unsafe to be unsupervised at home due to not have assistance.  .  Equipment:    None at this time              OCCUPATIONAL PROFILE AND HISTORY:   History of Present Injury/Illness (Reason for Referral):  See H&P  Past Medical History/Comorbidities:   Ms. Kristina Kwok  has a past medical history of Aneurysm of common carotid artery ([facility] [address] (2004), CKD (chronic kidney disease) (9/18/2014), Dementia ([facility] [address] FSGS (focal segmental glomerulosclerosis), Gout, Hypertension, Osteoarthritis (9/18/2014), and Stroke [facility]) (2004, 2013). Ms. Chelsi Vasquez  has a past surgical history that includes hx intracranial aneurysm repair (2004); hx refractive surgery; hx orthopaedic (Right, 10/2014); hx ankle fracture tx (Left, 2013); hx ercp (02/27/2018); hx cholecystectomy (02/28/2018); ir insert tunl cvc w/o port over 5 yr (12/22/2020); and ir remove tunl cvad w/o port / pump (1/15/2021). Social History/Living Environment:   Home Environment: Private residence  One/Two Story Residence: One story  Living Alone: No  Support Systems: Spouse/Significant Other/Partner  Patient Expects to be Discharged to[de-identified] Rehabilitation facility  Current DME Used/Available at Home: None  Prior Level of Function/Work/Activity:  Independent      Number of Personal Factors/Comorbidities that affect the Plan of Care: Expanded review of therapy/medical records (1-2):  MODERATE COMPLEXITY   ASSESSMENT OF OCCUPATIONAL PERFORMANCE[de-identified]   Activities of Daily Living:   Basic ADLs (From Assessment) Complex ADLs (From Assessment)   Feeding: Independent  Oral Facial Hygiene/Grooming: Moderate assistance  Bathing: Total assistance  Upper Body Dressing: Moderate assistance  Lower Body Dressing: Total assistance  Toileting:  Total assistance     Grooming/Bathing/Dressing Activities of Daily Living   Grooming  Grooming Assistance: Supervision  Position Performed: Seated edge of bed;Seated in chair  Washing Face: Supervision  Washing Hands: Supervision  Brushing Teeth: Supervision  Brushing/Combing Hair: Supervision Cognitive Retraining  Safety/Judgement: Fall prevention   Upper Body Bathing  Bathing Assistance: Minimum assistance  Position Performed: Long sitting on bed     Lower Body Bathing  Bathing Assistance: Maximum assistance  Perineal  : Total assistance (dependent)  Position Performed: Supine  Lower Body : Maximum assistance  Position Performed: Long sitting on bed Toileting  Toileting Assistance: Total assistance(dependent)  Bladder Hygiene: Total assistance (dependent)  Bowel Hygiene: Total assistance (dependent)  Clothing Management: Total assistance (dependent)   Upper Body Dressing Assistance  Dressing Assistance: Stand-by assistance  Hospital Gown: Stand-by assistance     Lower Body Dressing Assistance  Socks: Total assistance (dependent) Bed/Mat Mobility  Rolling: Contact guard assistance  Supine to Sit: Contact guard assistance  Sit to Stand: Minimum assistance  Stand to Sit: Minimum assistance  Bed to Chair: Minimum assistance     Most Recent Physical Functioning:   Gross Assessment:                  Posture:  Posture (WDL): Exceptions to WDL  Posture Assessment: Forward head, Rounded shoulders  Balance:  Sitting: Intact  Standing: Pull to stand; With support  Standing - Static: Constant support  Standing - Dynamic : Constant support Bed Mobility:  Rolling: Contact guard assistance  Supine to Sit: Contact guard assistance  Wheelchair Mobility:     Transfers:  Sit to Stand: Minimum assistance  Stand to Sit: Minimum assistance  Bed to Chair: Minimum assistance            Patient Vitals for the past 6 hrs:   BP BP Patient Position SpO2 Pulse   03/03/21 0746 135/78 At rest 100 % 83       Mental Status  Neurologic State: Alert, Appropriate for age  Orientation Level: Appropriate for age  Cognition: Follows commands  Perception: Appears intact  Perseveration: No perseveration noted  Safety/Judgement: Fall prevention                          Physical Skills Involved:  1. Balance  2. Strength  3. Activity Tolerance  4. Fine Motor Control  5. Gross Motor Control Cognitive Skills Affected (resulting in the inability to perform in a timely and safe manner):  1. Executive Function Psychosocial Skills Affected:  1.  Environmental Adaptation   Number of elements that affect the Plan of Care: 5+:  HIGH COMPLEXITY   CLINICAL DECISION MAKIN[address] [place] [address] AM-PAC 6 Clicks   Daily Activity Inpatient Short Form  How much help from another person does the patient currently need. .. Total A Lot A Little None   1. Putting on and taking off regular lower body clothing? [x] 1   [] 2   [] 3   [] 4   2. Bathing (including washing, rinsing, drying)? [x] 1   [] 2   [] 3   [] 4   3. Toileting, which includes using toilet, bedpan or urinal?   [x] 1   [] 2   [] 3   [] 4   4. Putting on and taking off regular upper body clothing? [] 1   [x] 2   [] 3   [] 4   5. Taking care of personal grooming such as brushing teeth? [] 1   [] 2   [x] 3   [] 4   6. Eating meals? [] 1   [] 2   [] 3   [x] 4   © , Trustees of [address] [place] [address], under license to [employer]. All rights reserved      Score:  Initial:12 Most Recent: X (Date: -- )    Interpretation of Tool:  Represents activities that are increasingly more difficult (i.e. Bed mobility, Transfers, Gait). Use of outcome tool(s) and clinical judgement create a POC that gives a: MODERATE COMPLEXITY         TREATMENT:   (In addition to Assessment/Re-Assessment sessions the following treatments were rendered)     Pre-treatment Symptoms/Complaints:    Pain: Initial:   Pain Intensity 1: 0 0 Post Session:  0     Self Care: (40 min): Procedure(s) (per grid) utilized to improve and/or restore self-care/home management as related to dressing, bathing, toileting, grooming and functional mobility. Required max to set up to facilitate activities of daily living skills and compensatory activities.     completed Braces/Orthotics/Lines/Etc:   · O2 Device: Room air  Treatment/Session Assessment:    · Response to Treatment:  Pt in recliner needs encouragement but making progress  · Interdisciplinary Collaboration:   o Occupational Therapist  o Registered Nurse  o Certified Nursing Assistant/Patient Care Technician  · After treatment position/precautions:   o Up in chair  o Bed/Chair-wheels locked  o Bed in low position  o Call light within reach  o RN notified   · Compliance with Program/Exercises: Compliant all of the time. · Recommendations/Intent for next treatment session: \"Next visit will focus on advancements to more challenging activities and reduction in assistance provided\".   Total Treatment Duration:40  OT Patient Time In/Time Out  Time In: 0815  Time Out: 935 Nathen Scruggs., OT

## 2021-03-03 NOTE — PROGRESS NOTES
Problem: Falls - Risk of  Goal: *Absence of Falls  Description: Document [place] Fall Risk and appropriate interventions in the flowsheet.   Outcome: Progressing Towards Goal  Note: Fall Risk Interventions:  Mobility Interventions: Bed/chair exit alarm, Communicate number of staff needed for ambulation/transfer, OT consult for ADLs, Utilize walker, cane, or other assistive device, PT Consult for mobility concerns, PT Consult for assist device competence    Mentation Interventions: Adequate sleep, hydration, pain control, Bed/chair exit alarm, Door open when patient unattended, Evaluate medications/consider consulting pharmacy, Eyeglasses and hearing aids, Toileting rounds, Increase mobility, More frequent rounding    Medication Interventions: Bed/chair exit alarm, Evaluate medications/consider consulting pharmacy, Patient to call before getting OOB, Teach patient to arise slowly    Elimination Interventions: Elevated toilet seat, Call light in reach, Bed/chair exit alarm, Patient to call for help with toileting needs, Stay With Me (per policy), Toileting schedule/hourly rounds, Toilet paper/wipes in reach    History of Falls Interventions: Bed/chair exit alarm

## 2021-03-03 NOTE — PROGRESS NOTES
Problem: Mobility Impaired (Adult and Pediatric)  Goal: *Acute Goals and Plan of Care (Insert Text)  Description: STG:  (1.)Ms. Maye Mercer will transfer from bed to chair and chair to bed with MINIMAL ASSIST of 2 using the least restrictive device within 4-7 treatment day(s). Met 2/28  (2.)Ms. Maye Mercer will ambulate with MODERATE ASSIST of 2 for 15 feet with the least restrictive device within 4-7 treatment day(s). Met 2/28    GOALS MODIFIED DUE TO PROGRESS 2/28/21 :   (3.)Ms. Maye Mercer will move from supine to sit and sit to supine with CONTACT GUARD ASSIST (consistently). 4) pt transfers with walker & CONTACT GUARD ASSIST (consistently). 5) pt ambulating with rolling walker 25-30 ft with CONTACT GUARD ASSIST (consistently). ________________________________________________________________________________________________      Outcome: Progressing Towards Goal     PHYSICAL THERAPY: Daily Note and AM 3/3/2021  OBSERVATION: PT Visit Days : 4  Payor: Medford Habermann / Plan: [address] / Product Type: [facility] Care Medicare /       NAME/AGE/GENDER: Danette Parent is a 61 y.o. female   PRIMARY DIAGNOSIS: Lactic acidosis [E87.2] Lactic acidosis Lactic acidosis       ICD-10: Treatment Diagnosis:    · Generalized Muscle Weakness (M62.81)  · Difficulty in walking, Not elsewhere classified (R26.2)   Precaution/Allergies:  Adhesive, Codeine, and Hydrocodone-acetaminophen      ASSESSMENT:     Ms. Maye Mercer already in the chair. Pt. With very flat affect and took some convincing to get up. She tried several times to go sit to stand out of chair but was unable. I got the cna to assist and she stood up but she needed more assistance today. She stood for several minutes with RW and then took one small step and refused to go any further and sat back down. She said she was too tired. She agreed to a few exercisies. Right LE weaker with decreased use of ankle, knee and hip.     Plans are for rehab.    This section established at most recent assessment   PROBLEM LIST (Impairments causing functional limitations):  1. Decreased Strength  2. Decreased ADL/Functional Activities  3. Decreased Transfer Abilities  4. Decreased Ambulation Ability/Technique  5. Decreased Balance  6. Decreased Activity Tolerance   INTERVENTIONS PLANNED: (Benefits and precautions of physical therapy have been discussed with the patient.)  1. Balance Exercise  2. Bed Mobility  3. Gait Training  4. Therapeutic Activites  5. Therapeutic Exercise/Strengthening  6. Transfer Training     TREATMENT PLAN: Frequency/Duration: daily for duration of hospital stay  Rehabilitation Potential For Stated Goals: Good     REHAB RECOMMENDATIONS (at time of discharge pending progress):    Placement: It is my opinion, based on this patient's performance to date, that Ms. Alexandria Noonan may benefit from intensive therapy at a [facility] after discharge due to the functional deficits listed above that are likely to improve with skilled rehabilitation and concerns that he/she may be unsafe to be unsupervised at home due to . Equipment:    To be determined              HISTORY:   History of Present Injury/Illness (Reason for Referral):  PER MD NOTE: Pt here via EMS from home with c/o chronic knee problems. EMS was actually called for spouse due to alcohol probs. This pt was found with urine and fecal matter all over bottom. Spouse not able to care for her. EMS brought pt here for APS reasons  Past Medical History/Comorbidities:   Ms. Alexandria Noonan  has a past medical history of Aneurysm of common carotid artery ([facility] [address] (2004), CKD (chronic kidney disease) (9/18/2014), Dementia ([address] FSGS (focal segmental glomerulosclerosis), Gout, Hypertension, Osteoarthritis (9/18/2014), and Stroke ([address] (2004, 2013).   Ms. Alexandria Noonan  has a past surgical history that includes hx intracranial aneurysm repair (2004); hx refractive surgery; hx orthopaedic (Right, 10/2014); hx ankle fracture tx (Left, ); hx ercp (2018); hx cholecystectomy (2018); ir insert tunl cvc w/o port over 5 yr (2020); and ir remove tunl cvad w/o port / pump (1/15/2021). Social History/Living Environment:   Home Environment: Private residence  One/Two Story Residence: One story  Living Alone: No  Support Systems: Spouse/Significant Other/Partner  Patient Expects to be Discharged to[de-identified] Rehabilitation facility  Current DME Used/Available at Home: None  Prior Level of Function/Work/Activity:  Pt living at home with spouse, bed bound and uses depends, spouse cares for pt and he is in hospital now     Number of Personal Factors/Comorbidities that affect the Plan of Care: 3+: HIGH COMPLEXITY   EXAMINATION:   Most Recent Physical Functioning:   Gross Assessment: 3-/5 throughout                       Balance:  Sitting: Intact  Standing: Pull to stand; With support; Impaired  Standing - Static: Constant support  Standing - Dynamic : Constant support Bed Mobility:  Rolling: (in chair)  Wheelchair Mobility:     Transfers:  Sit to Stand: Moderate assistance;Maximum assistance  Stand to Sit: Minimum assistance  Duration: 25 Minutes  Gait:     Speed/Radha: Delayed  Step Length: Left shortened;Right shortened  Stance: Right decreased  Gait Abnormalities: Antalgic;Decreased step clearance  Distance (ft): (one small step )  Assistive Device: Walker, rolling  Ambulation - Level of Assistance: Minimal assistance  Interventions: Safety awareness training;Verbal cues      Body Structures Involved:  1. Muscles Body Functions Affected:  1. Movement Related Activities and Participation Affected:  1. Mobility  2.  Self Care   Number of elements that affect the Plan of Care: 4+: HIGH COMPLEXITY   CLINICAL PRESENTATION:   Presentation: Stable and uncomplicated: LOW COMPLEXITY   CLINICAL DECISION MAKIN [facility] [address] [id number] AM-PAC 6 Clicks   Basic Mobility Inpatient Short Form  How much difficulty does the patient currently have. .. Unable A Lot A Little None   1. Turning over in bed (including adjusting bedclothes, sheets and blankets)? [] 1   [] 2   [x] 3   [] 4   2. Sitting down on and standing up from a chair with arms ( e.g., wheelchair, bedside commode, etc.)   [] 1   [] 2   [x] 3   [] 4   3. Moving from lying on back to sitting on the side of the bed? [] 1   [] 2   [x] 3   [] 4   How much help from another person does the patient currently need. .. Total A Lot A Little None   4. Moving to and from a bed to a chair (including a wheelchair)? [] 1   [x] 2   [] 3   [] 4   5. Need to walk in hospital room? [] 1   [x] 2   [] 3   [] 4   6. Climbing 3-5 steps with a railing? [] 1   [x] 2   [] 3   [] 4   © 2007, Trustees of [address] [place] [address] [address] [address], under license to [employer]. All rights reserved      Score:  Initial: 15 Most Recent: X (Date: -- )    Interpretation of Tool:  Represents activities that are increasingly more difficult (i.e. Bed mobility, Transfers, Gait). Medical Necessity:     · Patient is expected to demonstrate progress in   · strength and functional technique  ·  to   · decrease assistance required with functional mobility  · . Reason for Services/Other Comments:  · Patient continues to require skilled intervention due to   · Inability to complete functional mobility independently  · .    Use of outcome tool(s) and clinical judgement create a POC that gives a: Clear prediction of patient's progress: LOW COMPLEXITY            TREATMENT:   (In addition to Assessment/Re-Assessment sessions the following treatments were rendered)   Pre-treatment Symptoms/Complaints: fatigue  Pain: Initial: numeric scale     Post Session:  0/10   Therapeutic Activity: (  25 Minutes ):  Therapeutic activities including  sitting balance, transfers, sit to stand, attempted gait with  rolling walker along with ankle pumps, quad set, long arc quads, seated march, hip abduction, heels slides(assist needed on left side) to improve mobility, strength, balance, coordination and dynamic movement of arm - bilateral, leg - bilateral and core to improve functional endurance & stability. Braces/Orthotics/Lines/Etc:   · O2 Device: Room air  Treatment/Session Assessment:    · Response to Treatment:  Less mobile today  · Interdisciplinary Collaboration:   o Registered Nurse  o Certified Nursing Assistant/Patient Care Technician  · After treatment position/precautions:   o Up in chair  o Bed/Chair-wheels locked  o Bed in low position  o Call light within reach  o RN notified   · Compliance with Program/Exercises: Will assess as treatment progresses  · Recommendations/Intent for next treatment session: \"Next visit will focus on reduction in assistance provided\".   Total Treatment Duration:  PT Patient Time In/Time Out  Time In: 0930  Time Out: 88 Ferderic Rascon PT

## 2021-03-04 PROCEDURE — 74011250637 HC RX REV CODE- 250/637: Performed by: FAMILY MEDICINE

## 2021-03-04 PROCEDURE — 96372 THER/PROPH/DIAG INJ SC/IM: CPT

## 2021-03-04 PROCEDURE — 99218 HC RM OBSERVATION: CPT

## 2021-03-04 PROCEDURE — 97530 THERAPEUTIC ACTIVITIES: CPT

## 2021-03-04 PROCEDURE — 2709999900 HC NON-CHARGEABLE SUPPLY

## 2021-03-04 PROCEDURE — 74011250636 HC RX REV CODE- 250/636: Performed by: FAMILY MEDICINE

## 2021-03-04 PROCEDURE — 97110 THERAPEUTIC EXERCISES: CPT

## 2021-03-04 RX ADMIN — FERROUS SULFATE TAB 325 MG (65 MG ELEMENTAL FE) 325 MG: 325 (65 FE) TAB at 17:35

## 2021-03-04 RX ADMIN — MAGNESIUM GLUCONATE 500 MG ORAL TABLET 400 MG: 500 TABLET ORAL at 08:47

## 2021-03-04 RX ADMIN — ACETAMINOPHEN 650 MG: 325 TABLET, FILM COATED ORAL at 21:43

## 2021-03-04 RX ADMIN — ACETAMINOPHEN 650 MG: 325 TABLET, FILM COATED ORAL at 05:24

## 2021-03-04 RX ADMIN — Medication 10 ML: at 22:51

## 2021-03-04 RX ADMIN — SODIUM BICARBONATE 650 MG TABLET 650 MG: at 08:47

## 2021-03-04 RX ADMIN — Medication 10 ML: at 15:22

## 2021-03-04 RX ADMIN — FOLIC ACID 1 MG: 1 TABLET ORAL at 08:47

## 2021-03-04 RX ADMIN — FERROUS SULFATE TAB 325 MG (65 MG ELEMENTAL FE) 325 MG: 325 (65 FE) TAB at 08:47

## 2021-03-04 RX ADMIN — ESCITALOPRAM OXALATE 10 MG: 10 TABLET ORAL at 08:47

## 2021-03-04 RX ADMIN — LISINOPRIL 10 MG: 5 TABLET ORAL at 08:47

## 2021-03-04 RX ADMIN — Medication 10 ML: at 05:24

## 2021-03-04 RX ADMIN — Medication 100 MG: at 08:47

## 2021-03-04 RX ADMIN — PANTOPRAZOLE SODIUM 40 MG: 40 TABLET, DELAYED RELEASE ORAL at 05:24

## 2021-03-04 RX ADMIN — HEPARIN SODIUM 5000 UNITS: 5000 INJECTION INTRAVENOUS; SUBCUTANEOUS at 05:23

## 2021-03-04 RX ADMIN — FERROUS SULFATE TAB 325 MG (65 MG ELEMENTAL FE) 325 MG: 325 (65 FE) TAB at 12:01

## 2021-03-04 RX ADMIN — SODIUM BICARBONATE 650 MG TABLET 650 MG: at 21:51

## 2021-03-04 RX ADMIN — HEPARIN SODIUM 5000 UNITS: 5000 INJECTION INTRAVENOUS; SUBCUTANEOUS at 21:43

## 2021-03-04 RX ADMIN — SODIUM BICARBONATE 650 MG TABLET 650 MG: at 17:35

## 2021-03-04 RX ADMIN — QUETIAPINE FUMARATE 50 MG: 100 TABLET ORAL at 21:43

## 2021-03-04 RX ADMIN — ASPIRIN 325 MG: 325 TABLET, FILM COATED ORAL at 08:47

## 2021-03-04 RX ADMIN — HEPARIN SODIUM 5000 UNITS: 5000 INJECTION INTRAVENOUS; SUBCUTANEOUS at 12:00
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
## 2021-03-04 NOTE — PROGRESS NOTES
Problem: Falls - Risk of  Goal: *Absence of Falls  Description: Document Joaquim Marin Fall Risk and appropriate interventions in the flowsheet. Outcome: Progressing Towards Goal  Note: Fall Risk Interventions:  Mobility Interventions: Bed/chair exit alarm, Communicate number of staff needed for ambulation/transfer, OT consult for ADLs, Patient to call before getting OOB, PT Consult for mobility concerns, PT Consult for assist device competence, Strengthening exercises (ROM-active/passive), Utilize walker, cane, or other assistive device    Mentation Interventions: Adequate sleep, hydration, pain control, Bed/chair exit alarm, Door open when patient unattended    Medication Interventions: Patient to call before getting OOB, Teach patient to arise slowly, Evaluate medications/consider consulting pharmacy, Bed/chair exit alarm    Elimination Interventions: Call light in reach, Patient to call for help with toileting needs    History of Falls Interventions: Bed/chair exit alarm, Consult care management for discharge planning, Door open when patient unattended         Problem: Patient Education: Go to Patient Education Activity  Goal: Patient/Family Education  Outcome: Progressing Towards Goal     Problem: Pressure Injury - Risk of  Goal: *Prevention of pressure injury  Description: Document Luis Scale and appropriate interventions in the flowsheet.   Outcome: Progressing Towards Goal  Note: Pressure Injury Interventions:  Sensory Interventions: Assess changes in LOC, Pressure redistribution bed/mattress (bed type)    Moisture Interventions: Absorbent underpads, Apply protective barrier, creams and emollients, Check for incontinence Q2 hours and as needed, Maintain skin hydration (lotion/cream), Minimize layers, Moisture barrier    Activity Interventions: Chair cushion, Increase time out of bed, Pressure redistribution bed/mattress(bed type), PT/OT evaluation    Mobility Interventions: Pressure redistribution bed/mattress (bed type), PT/OT evaluation    Nutrition Interventions: Document food/fluid/supplement intake, Offer support with meals,snacks and hydration                     Problem: Patient Education: Go to Patient Education Activity  Goal: Patient/Family Education  Outcome: Progressing Towards Goal     Problem: Patient Education: Go to Patient Education Activity  Goal: Patient/Family Education  Outcome: Progressing Towards Goal     Problem: Patient Education: Go to Patient Education Activity  Goal: Patient/Family Education  Outcome: Progressing Towards Goal     Problem: Nutrition Deficit  Goal: *Optimize nutritional status  Outcome: Progressing Towards Goal

## 2021-03-04 NOTE — PROGRESS NOTES
Care Management Interventions  PCP Verified by CM: Yes  Mode of Transport at Discharge: (EMS)  Transition of Care Consult (CM Consult): SNF  MyChart Signup: No  Discharge Durable Medical Equipment: No  Physical Therapy Consult: Yes  Occupational Therapy Consult: Yes  Speech Therapy Consult: No  Current Support Network: Own Home, Lives with Spouse  Confirm Follow Up Transport: (EMS)  The Patient and/or Patient Representative was Provided with a Choice of Provider and Agrees with the Discharge Plan?: Yes  Freedom of Choice List was Provided with Basic Dialogue that Supports the Patient's Individualized Plan of Care/Goals, Treatment Preferences and Shares the Quality Data Associated with the Providers?: Yes  Discharge Location  Discharge Placement: Skilled nursing facility    Spoke with Ree dengab.   We are still waiting on ins approval but hope to have for admission this afternoon 3-4

## 2021-03-05 VITALS
WEIGHT: 200 LBS | SYSTOLIC BLOOD PRESSURE: 136 MMHG | HEIGHT: 65 IN | OXYGEN SATURATION: 94 % | HEART RATE: 86 BPM | TEMPERATURE: 97.8 F | RESPIRATION RATE: 20 BRPM | BODY MASS INDEX: 33.32 KG/M2 | DIASTOLIC BLOOD PRESSURE: 74 MMHG

## 2021-03-05 LAB
ANION GAP SERPL CALC-SCNC: 1 MMOL/L (ref 7–16)
BASOPHILS # BLD: 0.1 K/UL (ref 0–0.2)
BASOPHILS NFR BLD: 1 % (ref 0–2)
BUN SERPL-MCNC: 37 MG/DL (ref 8–23)
CALCIUM SERPL-MCNC: 8.4 MG/DL (ref 8.3–10.4)
CHLORIDE SERPL-SCNC: 107 MMOL/L (ref 98–107)
CO2 SERPL-SCNC: 32 MMOL/L (ref 21–32)
CREAT SERPL-MCNC: 2.94 MG/DL (ref 0.6–1)
DIFFERENTIAL METHOD BLD: ABNORMAL
EOSINOPHIL # BLD: 0.5 K/UL (ref 0–0.8)
EOSINOPHIL NFR BLD: 7 % (ref 0.5–7.8)
ERYTHROCYTE [DISTWIDTH] IN BLOOD BY AUTOMATED COUNT: 13.9 % (ref 11.9–14.6)
GLUCOSE SERPL-MCNC: 89 MG/DL (ref 65–100)
HCT VFR BLD AUTO: 29.8 % (ref 35.8–46.3)
HGB BLD-MCNC: 9.1 G/DL (ref 11.7–15.4)
IMM GRANULOCYTES # BLD AUTO: 0 K/UL (ref 0–0.5)
IMM GRANULOCYTES NFR BLD AUTO: 1 % (ref 0–5)
LYMPHOCYTES # BLD: 1.8 K/UL (ref 0.5–4.6)
LYMPHOCYTES NFR BLD: 25 % (ref 13–44)
MCH RBC QN AUTO: 28.2 PG (ref 26.1–32.9)
MCHC RBC AUTO-ENTMCNC: 30.5 G/DL (ref 31.4–35)
MCV RBC AUTO: 92.3 FL (ref 79.6–97.8)
MONOCYTES # BLD: 0.7 K/UL (ref 0.1–1.3)
MONOCYTES NFR BLD: 9 % (ref 4–12)
NEUTS SEG # BLD: 4.1 K/UL (ref 1.7–8.2)
NEUTS SEG NFR BLD: 57 % (ref 43–78)
NRBC # BLD: 0 K/UL (ref 0–0.2)
PLATELET # BLD AUTO: 200 K/UL (ref 150–450)
PMV BLD AUTO: 10.1 FL (ref 9.4–12.3)
POTASSIUM SERPL-SCNC: 5.3 MMOL/L (ref 3.5–5.1)
RBC # BLD AUTO: 3.23 M/UL (ref 4.05–5.2)
SODIUM SERPL-SCNC: 140 MMOL/L (ref 136–145)
WBC # BLD AUTO: 7.2 K/UL (ref 4.3–11.1)

## 2021-03-05 PROCEDURE — 96372 THER/PROPH/DIAG INJ SC/IM: CPT

## 2021-03-05 PROCEDURE — 97530 THERAPEUTIC ACTIVITIES: CPT

## 2021-03-05 PROCEDURE — 97110 THERAPEUTIC EXERCISES: CPT

## 2021-03-05 PROCEDURE — 74011250636 HC RX REV CODE- 250/636: Performed by: FAMILY MEDICINE

## 2021-03-05 PROCEDURE — 36415 COLL VENOUS BLD VENIPUNCTURE: CPT

## 2021-03-05 PROCEDURE — 74011250637 HC RX REV CODE- 250/637: Performed by: FAMILY MEDICINE

## 2021-03-05 PROCEDURE — 99218 HC RM OBSERVATION: CPT

## 2021-03-05 PROCEDURE — 74011250637 HC RX REV CODE- 250/637: Performed by: INTERNAL MEDICINE

## 2021-03-05 PROCEDURE — 80048 BASIC METABOLIC PNL TOTAL CA: CPT

## 2021-03-05 PROCEDURE — 85025 COMPLETE CBC W/AUTO DIFF WBC: CPT

## 2021-03-05 PROCEDURE — 97116 GAIT TRAINING THERAPY: CPT

## 2021-03-05 RX ADMIN — Medication 100 MG: at 09:19

## 2021-03-05 RX ADMIN — Medication 10 ML: at 06:14

## 2021-03-05 RX ADMIN — MAGNESIUM GLUCONATE 500 MG ORAL TABLET 400 MG: 500 TABLET ORAL at 09:19

## 2021-03-05 RX ADMIN — ACETAMINOPHEN 650 MG: 325 TABLET, FILM COATED ORAL at 06:07

## 2021-03-05 RX ADMIN — SODIUM BICARBONATE 650 MG TABLET 650 MG: at 09:19

## 2021-03-05 RX ADMIN — SODIUM ZIRCONIUM CYCLOSILICATE 5 G: 5 POWDER, FOR SUSPENSION ORAL at 07:30

## 2021-03-05 RX ADMIN — HEPARIN SODIUM 5000 UNITS: 5000 INJECTION INTRAVENOUS; SUBCUTANEOUS at 06:07

## 2021-03-05 RX ADMIN — FERROUS SULFATE TAB 325 MG (65 MG ELEMENTAL FE) 325 MG: 325 (65 FE) TAB at 12:41

## 2021-03-05 RX ADMIN — PANTOPRAZOLE SODIUM 40 MG: 40 TABLET, DELAYED RELEASE ORAL at 06:07

## 2021-03-05 RX ADMIN — FERROUS SULFATE TAB 325 MG (65 MG ELEMENTAL FE) 325 MG: 325 (65 FE) TAB at 09:19

## 2021-03-05 RX ADMIN — ESCITALOPRAM OXALATE 10 MG: 10 TABLET ORAL at 09:19

## 2021-03-05 RX ADMIN — HEPARIN SODIUM 5000 UNITS: 5000 INJECTION INTRAVENOUS; SUBCUTANEOUS at 12:41

## 2021-03-05 RX ADMIN — FOLIC ACID 1 MG: 1 TABLET ORAL at 09:19

## 2021-03-05 RX ADMIN — ASPIRIN 325 MG: 325 TABLET, FILM COATED ORAL at 09:19

## 2021-03-05 NOTE — PROGRESS NOTES
TRANSFER - OUT REPORT:    Verbal report given to Ty on Mandie Fontana  being transferred to [facility] for routine progression of care       Report consisted of patients Situation, Background, Assessment and   Recommendations(SBAR). Information from the following report(s) SBAR, Kardex, Procedure Summary, Intake/Output, MAR and Recent Results was reviewed with the receiving nurse. Lines:   Peripheral IV 02/25/21 Right Antecubital (Active)   Site Assessment Clean, dry, & intact 03/05/21 0715   Phlebitis Assessment 0 03/05/21 0715   Infiltration Assessment 0 03/05/21 0715   Dressing Status Clean, dry, & intact 03/05/21 0715   Dressing Type Tape;Transparent 03/05/21 0715   Hub Color/Line Status Capped;Flushed;Patent 03/05/21 0715   Alcohol Cap Used No 03/04/21 2020        Opportunity for questions and clarification was provided.       Patient transported with:  Patient belongings

## 2021-03-05 NOTE — PROGRESS NOTES
Problem: Falls - Risk of  Goal: *Absence of Falls  Description: Document Nidhi Ann Fall Risk and appropriate interventions in the flowsheet. Outcome: Progressing Towards Goal  Note: Fall Risk Interventions:  Mobility Interventions: Communicate number of staff needed for ambulation/transfer, Patient to call before getting OOB, OT consult for ADLs, PT Consult for mobility concerns, PT Consult for assist device competence, Strengthening exercises (ROM-active/passive), Utilize walker, cane, or other assistive device    Mentation Interventions: Adequate sleep, hydration, pain control, Bed/chair exit alarm, Door open when patient unattended, Reorient patient, Room close to nurse's station    Medication Interventions: Bed/chair exit alarm, Evaluate medications/consider consulting pharmacy, Patient to call before getting OOB, Teach patient to arise slowly    Elimination Interventions: Call light in reach, Elevated toilet seat, Patient to call for help with toileting needs    History of Falls Interventions: Consult care management for discharge planning, Bed/chair exit alarm, Door open when patient unattended, Evaluate medications/consider consulting pharmacy, Room close to nurse's station, Investigate reason for fall         Problem: Patient Education: Go to Patient Education Activity  Goal: Patient/Family Education  Outcome: Progressing Towards Goal     Problem: Pressure Injury - Risk of  Goal: *Prevention of pressure injury  Description: Document Luis Scale and appropriate interventions in the flowsheet.   Outcome: Progressing Towards Goal  Note: Pressure Injury Interventions:  Sensory Interventions: Assess changes in LOC, Pressure redistribution bed/mattress (bed type)    Moisture Interventions: Absorbent underpads, Apply protective barrier, creams and emollients, Check for incontinence Q2 hours and as needed, Maintain skin hydration (lotion/cream), Minimize layers, Moisture barrier    Activity Interventions: Increase time out of bed, Pressure redistribution bed/mattress(bed type), PT/OT evaluation    Mobility Interventions: HOB 30 degrees or less, Pressure redistribution bed/mattress (bed type), PT/OT evaluation, Trapeze to reposition    Nutrition Interventions: Document food/fluid/supplement intake, Offer support with meals,snacks and hydration                     Problem: Patient Education: Go to Patient Education Activity  Goal: Patient/Family Education  Outcome: Progressing Towards Goal     Problem: Patient Education: Go to Patient Education Activity  Goal: Patient/Family Education  Outcome: Progressing Towards Goal     Problem: Patient Education: Go to Patient Education Activity  Goal: Patient/Family Education  Outcome: Progressing Towards Goal     Problem: Nutrition Deficit  Goal: *Optimize nutritional status  Outcome: Progressing Towards Goal
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
## 2021-03-30 ENCOUNTER — HOSPITAL ENCOUNTER (EMERGENCY)
Age: 61
Discharge: HOME | End: 2021-03-30
Attending: EMERGENCY MEDICINE
Payer: MEDICARE

## 2021-03-30 ENCOUNTER — APPOINTMENT (OUTPATIENT)
Dept: GENERAL RADIOLOGY | Age: 61
End: 2021-03-30
Attending: EMERGENCY MEDICINE
Payer: MEDICARE

## 2021-03-30 VITALS
WEIGHT: 200 LBS | TEMPERATURE: 98.4 F | DIASTOLIC BLOOD PRESSURE: 65 MMHG | BODY MASS INDEX: 33.32 KG/M2 | SYSTOLIC BLOOD PRESSURE: 153 MMHG | HEIGHT: 65 IN | RESPIRATION RATE: 16 BRPM | HEART RATE: 73 BPM | OXYGEN SATURATION: 94 %

## 2021-03-30 DIAGNOSIS — S80.12XA CONTUSION OF LEFT KNEE AND LOWER LEG, INITIAL ENCOUNTER: ICD-10-CM

## 2021-03-30 DIAGNOSIS — S83.91XA SPRAIN OF RIGHT KNEE, INITIAL ENCOUNTER: ICD-10-CM

## 2021-03-30 DIAGNOSIS — S80.02XA CONTUSION OF LEFT KNEE AND LOWER LEG, INITIAL ENCOUNTER: ICD-10-CM

## 2021-03-30 DIAGNOSIS — S70.02XA CONTUSION OF LEFT HIP, INITIAL ENCOUNTER: ICD-10-CM
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
## 2021-03-30 DIAGNOSIS — S70.01XA CONTUSION OF RIGHT HIP, INITIAL ENCOUNTER: ICD-10-CM

## 2021-03-30 PROCEDURE — 73502 X-RAY EXAM HIP UNI 2-3 VIEWS: CPT

## 2021-03-30 PROCEDURE — 73562 X-RAY EXAM OF KNEE 3: CPT

## 2021-03-30 PROCEDURE — 99284 EMERGENCY DEPT VISIT MOD MDM: CPT

## 2021-03-30 PROCEDURE — 74011250637 HC RX REV CODE- 250/637: Performed by: EMERGENCY MEDICINE

## 2021-03-30 RX ORDER — OXYCODONE HYDROCHLORIDE 5 MG/1
10 TABLET ORAL
Status: COMPLETED | OUTPATIENT
Start: 2021-03-30 | End: 2021-03-30

## 2021-03-30 RX ORDER — ONDANSETRON 4 MG/1
4 TABLET, ORALLY DISINTEGRATING ORAL
Status: COMPLETED | OUTPATIENT
Start: 2021-03-30 | End: 2021-03-30

## 2021-03-30 RX ORDER — OXYCODONE HYDROCHLORIDE 5 MG/1
5 TABLET ORAL
Qty: 10 TAB | Refills: 0 | Status: SHIPPED | OUTPATIENT
Start: 2021-03-30 | End: 2021-04-02

## 2021-03-30 RX ADMIN — OXYCODONE 10 MG: 5 TABLET ORAL at 11:33

## 2021-03-30 RX ADMIN — ONDANSETRON 4 MG: 4 TABLET, ORALLY DISINTEGRATING ORAL at 11:33

## 2021-03-30 NOTE — ED NOTES
I have reviewed discharge instructions with the patient and  . The patient and  verbalized understanding. Patient left ED via stretcher to home with López Garcia and . Opportunities for questions and clarification provided. Patient given 1 script.

## 2021-03-30 NOTE — ED PROVIDER NOTES
44-year-old female presents after a fall. Patient has a history of stroke is weak on her right side. She tried to get up to the bedside commode but slipped. There was no syncope. She fell in somewhat of a sitting position up against the wall near the bed. No head impact or loss of consciousness. Brought in by EMS complaint of bilateral hip pain and bilateral knee pain, worse on the right. No numbness or weakness. No chest pain shortness of breath, no abdominal pain. No head or neck pain. Past history significant for only stroke but some dementia type symptoms, EtOH use, renal insufficiency. She has had a right hip fracture and is also had right knee surgery due to distal femur fracture as well. Lives with her  who is with her. The history is provided by the patient. Fall  The accident occurred less than 1 hour ago. The fall occurred while standing. She fell from a height of ground level. She landed on hard floor. The point of impact was the left hip, right hip, left knee and right knee. The pain is moderate. Associated symptoms include extremity weakness (Chronic). Pertinent negatives include no fever, no abdominal pain, no vomiting, no hematuria, no headaches, no tingling and no laceration. The risk factors include dementia.          Past Medical History:   Diagnosis Date    Aneurysm of common carotid artery ([address] 2004    left side s/p coil     CKD (chronic kidney disease) 9/18/2014    Dementia ([address]     FSGS (focal segmental glomerulosclerosis)     in remission at present    Gout     Hypertension     managed with medication     Osteoarthritis 9/18/2014    Stroke ([address] 2004, 2013    slight weakness on right side, slight effect to speech       Past Surgical History:   Procedure Laterality Date    HX ANKLE FRACTURE TX Left 2013    has hardware in    HX CHOLECYSTECTOMY  02/28/2018    HX ERCP  02/27/2018    cbd stone    HX INTRACRANIAL ANEURYSM REPAIR  2004    left carotid     HX ORTHOPAEDIC Right 10/2014    hip replacement    HX REFRACTIVE SURGERY      bilateral - Lasik    IR INSERT TUNL CVC W/O PORT OVER 5 YR  2020    IR REMOVE TUNL CVAD W/O PORT / PUMP  1/15/2021         Family History:   Problem Relation Age of Onset    Cancer Father 80        unknown    Stroke Sister 48       Social History     Socioeconomic History    Marital status:      Spouse name: Not on file    Number of children: Not on file    Years of education: Not on file    Highest education level: Not on file   Occupational History    Not on file   Social Needs    Financial resource strain: Not on file    Food insecurity     Worry: Not on file     Inability: Not on file    Transportation needs     Medical: Not on file     Non-medical: Not on file   Tobacco Use    Smoking status: Former Smoker     Packs/day: 0.25     Quit date: 1979     Years since quittin.2    Smokeless tobacco: Never Used   Substance and Sexual Activity    Alcohol use:  Yes     Alcohol/week: 2.5 standard drinks     Types: 3 Shots of liquor per week     Comment: pint of fireball    Drug use: No    Sexual activity: Yes     Partners: Male     Birth control/protection: None   Lifestyle    Physical activity     Days per week: Not on file     Minutes per session: Not on file    Stress: Not on file   Relationships    Social connections     Talks on phone: Not on file     Gets together: Not on file     Attends Roman [employer] service: Not on file     Active member of club or organization: Not on file     Attends meetings of clubs or organizations: Not on file     Relationship status: Not on file    Intimate partner violence     Fear of current or ex partner: Not on file     Emotionally abused: Not on file     Physically abused: Not on file     Forced sexual activity: Not on file   Other Topics Concern    Not on file   Social History Narrative    Not on file         ALLERGIES: Adhesive, Codeine, and Hydrocodone-acetaminophen    Review of Systems   Constitutional: Negative for chills and fever. Respiratory: Negative for cough and shortness of breath. Cardiovascular: Negative for chest pain and palpitations. Gastrointestinal: Negative for abdominal pain, diarrhea and vomiting. Genitourinary: Negative for flank pain and hematuria. Musculoskeletal: Positive for extremity weakness (Chronic). Negative for back pain and neck pain. Skin: Negative for color change and rash. Neurological: Negative for tingling, syncope and headaches. All other systems reviewed and are negative. Vitals:    03/30/21 1059   BP: 129/76   Pulse: 73   Resp: 16   Temp: 98.4 °F (36.9 °C)   SpO2: 98%   Weight: 90.7 kg (200 lb)   Height: 5' 5\" (1.651 m)            Physical Exam  Vitals signs and nursing note reviewed. Constitutional:       General: She is not in acute distress. Appearance: She is well-developed. HENT:      Head: Normocephalic and atraumatic. Eyes:      Pupils: Pupils are equal, round, and reactive to light. Neck:      Musculoskeletal: Normal range of motion and neck supple. No muscular tenderness. Cardiovascular:      Rate and Rhythm: Normal rate and regular rhythm. Heart sounds: Normal heart sounds. Pulmonary:      Effort: Pulmonary effort is normal.      Breath sounds: Normal breath sounds. Abdominal:      General: There is no distension. Palpations: Abdomen is soft. Tenderness: There is no abdominal tenderness. Musculoskeletal:      Right shoulder: Normal.      Left shoulder: Normal.      Right elbow: Normal.     Left elbow: Normal.      Right wrist: Normal.      Left wrist: Normal.      Right hip: She exhibits decreased range of motion, tenderness and bony tenderness. Left hip: She exhibits decreased range of motion, tenderness and bony tenderness. Right knee: She exhibits decreased range of motion, swelling and effusion. Tenderness found.       Left knee: She exhibits normal range of motion. Tenderness found. Comments: Both hips with nonspecific tenderness and some mild slight pain with range of motion. Right knee with swelling and effusion and midline scar. Left knee has some tenderness but no definite effusion. Skin:     General: Skin is warm and dry. Findings: No laceration. Neurological:      Mental Status: She is alert and oriented to person, place, and time. Comments: Speech normal          MDM  Number of Diagnoses or Management Options  Diagnosis management comments: Mechanical, nonsyncopal fall. No evidence for stroke or internal injury. Concern for orthopedic injury of the hips and knees. Patient has some slight right-sided weakness which is old. Imaging of affected areas. Amount and/or Complexity of Data Reviewed  Clinical lab tests: ordered and reviewed  Tests in the radiology section of CPT®: ordered and reviewed    Risk of Complications, Morbidity, and/or Mortality  Presenting problems: moderate  Diagnostic procedures: minimal  Management options: low    Patient Progress  Patient progress: stable         Procedures    Xr Hip Lt W Or Wo Pelv 2-3 Vws    Result Date: 3/30/2021  Examination: XR HIP LT W OR WO PELV 2-3 VWS, XR HIP RT W OR WO PELV 2-3 VWS, XR KNEE LT 3 V, XR KNEE RT 3 V INDICATION: Bilateral hip and knee pain after fall COMPARISON: None FINDINGS: Pelvis and left hip: Diffuse osteopenia. No evidence of fracture or malalignment. Mild left hip and bilateral SI joint degenerative changes. Lower lumbar spondylosis. Soft tissues are grossly normal. Right hip: Postsurgical changes of right total hip arthroplasty. No periprosthetic lucency or fracture. Components remain well aligned. Decreased bone mineralization. Right knee: Diffuse osteopenia. There are postsurgical changes of ORIF to the femoral condyles and tibial plateau. Hardware remains intact without periprosthetic lucency or fracture. Small joint effusion. Tricompartmental degenerative changes. Advanced. Left knee: Diffuse osteopenia. Joint spaces are preserved thousand. Degenerative findings. No significant joint effusion. Soft tissues are within normal limits. Pelvis and hips: 1. No acute osseous abnormality, although osteopenia limits evaluation for subtle nondisplaced pelvic fractures. If there is persistent concern, consider MRI. 2. Postsurgical changes of right total hip arthroplasty without complication. Bilateral knees: 1. No acute osseous abnormality. 2. Postsurgical changes of ORIF to the right distal femur and tibial plateau without hardware complication. Xr Hip Rt W Or Wo Pelv 2-3 Vws    Result Date: 3/30/2021  Examination: XR HIP LT W OR WO PELV 2-3 VWS, XR HIP RT W OR WO PELV 2-3 VWS, XR KNEE LT 3 V, XR KNEE RT 3 V INDICATION: Bilateral hip and knee pain after fall COMPARISON: None FINDINGS: Pelvis and left hip: Diffuse osteopenia. No evidence of fracture or malalignment. Mild left hip and bilateral SI joint degenerative changes. Lower lumbar spondylosis. Soft tissues are grossly normal. Right hip: Postsurgical changes of right total hip arthroplasty. No periprosthetic lucency or fracture. Components remain well aligned. Decreased bone mineralization. Right knee: Diffuse osteopenia. There are postsurgical changes of ORIF to the femoral condyles and tibial plateau. Hardware remains intact without periprosthetic lucency or fracture. Small joint effusion. Tricompartmental degenerative changes. Advanced. Left knee: Diffuse osteopenia. Joint spaces are preserved thousand. Degenerative findings. No significant joint effusion. Soft tissues are within normal limits. Pelvis and hips: 1. No acute osseous abnormality, although osteopenia limits evaluation for subtle nondisplaced pelvic fractures. If there is persistent concern, consider MRI. 2. Postsurgical changes of right total hip arthroplasty without complication. Bilateral knees: 1.  No acute osseous abnormality. 2. Postsurgical changes of ORIF to the right distal femur and tibial plateau without hardware complication. Xr Knee Lt 3 V    Result Date: 3/30/2021  Examination: XR HIP LT W OR WO PELV 2-3 VWS, XR HIP RT W OR WO PELV 2-3 VWS, XR KNEE LT 3 V, XR KNEE RT 3 V INDICATION: Bilateral hip and knee pain after fall COMPARISON: None FINDINGS: Pelvis and left hip: Diffuse osteopenia. No evidence of fracture or malalignment. Mild left hip and bilateral SI joint degenerative changes. Lower lumbar spondylosis. Soft tissues are grossly normal. Right hip: Postsurgical changes of right total hip arthroplasty. No periprosthetic lucency or fracture. Components remain well aligned. Decreased bone mineralization. Right knee: Diffuse osteopenia. There are postsurgical changes of ORIF to the femoral condyles and tibial plateau. Hardware remains intact without periprosthetic lucency or fracture. Small joint effusion. Tricompartmental degenerative changes. Advanced. Left knee: Diffuse osteopenia. Joint spaces are preserved thousand. Degenerative findings. No significant joint effusion. Soft tissues are within normal limits. Pelvis and hips: 1. No acute osseous abnormality, although osteopenia limits evaluation for subtle nondisplaced pelvic fractures. If there is persistent concern, consider MRI. 2. Postsurgical changes of right total hip arthroplasty without complication. Bilateral knees: 1. No acute osseous abnormality. 2. Postsurgical changes of ORIF to the right distal femur and tibial plateau without hardware complication. Xr Knee Rt 3 V    Result Date: 3/30/2021  Examination: XR HIP LT W OR WO PELV 2-3 VWS, XR HIP RT W OR WO PELV 2-3 VWS, XR KNEE LT 3 V, XR KNEE RT 3 V INDICATION: Bilateral hip and knee pain after fall COMPARISON: None FINDINGS: Pelvis and left hip: Diffuse osteopenia. No evidence of fracture or malalignment. Mild left hip and bilateral SI joint degenerative changes.  Lower lumbar spondylosis. Soft tissues are grossly normal. Right hip: Postsurgical changes of right total hip arthroplasty. No periprosthetic lucency or fracture. Components remain well aligned. Decreased bone mineralization. Right knee: Diffuse osteopenia. There are postsurgical changes of ORIF to the femoral condyles and tibial plateau. Hardware remains intact without periprosthetic lucency or fracture. Small joint effusion. Tricompartmental degenerative changes. Advanced. Left knee: Diffuse osteopenia. Joint spaces are preserved thousand. Degenerative findings. No significant joint effusion. Soft tissues are within normal limits. Pelvis and hips: 1. No acute osseous abnormality, although osteopenia limits evaluation for subtle nondisplaced pelvic fractures. If there is persistent concern, consider MRI. 2. Postsurgical changes of right total hip arthroplasty without complication. Bilateral knees: 1. No acute osseous abnormality. 2. Postsurgical changes of ORIF to the right distal femur and tibial plateau without hardware complication. Do not believe patient needs MRI of hips. Patient really does not ambulate very well at all. Pain control follow-up with orthopedist.     states that home health, OT and PT are coming out to the house due to her recent hospitalization.

## 2021-03-30 NOTE — DISCHARGE INSTRUCTIONS
Wear wrapping for 2 to 3 days. Call orthopedist for appointment later this week or early next week. Cool compresses to cut down on swelling. Tylenol or prescription medication for pain. Drink plenty of fluids to prevent constipation. Continue physical therapy when possible.

## 2021-04-24 ENCOUNTER — HOSPITAL ENCOUNTER (EMERGENCY)
Age: 61
Discharge: HOME | End: 2021-04-25
Attending: EMERGENCY MEDICINE
Payer: MEDICARE

## 2021-04-24 DIAGNOSIS — E86.0 DEHYDRATION: Primary | ICD-10-CM

## 2021-04-24 LAB
ALBUMIN SERPL-MCNC: 3.9 G/DL (ref 3.2–4.6)
ALBUMIN/GLOB SERPL: 1.1 {RATIO} (ref 1.2–3.5)
ALP SERPL-CCNC: 173 U/L (ref 50–136)
ALT SERPL-CCNC: 11 U/L (ref 12–65)
ANION GAP SERPL CALC-SCNC: 9 MMOL/L (ref 7–16)
AST SERPL-CCNC: 17 U/L (ref 15–37)
BACTERIA URNS QL MICRO: 0 /HPF
BASOPHILS # BLD: 0.1 K/UL (ref 0–0.2)
BASOPHILS NFR BLD: 2 % (ref 0–2)
BILIRUB SERPL-MCNC: 0.9 MG/DL (ref 0.2–1.1)
BUN SERPL-MCNC: 28 MG/DL (ref 8–23)
CALCIUM SERPL-MCNC: 9.6 MG/DL (ref 8.3–10.4)
CASTS URNS QL MICRO: NORMAL /LPF
CHLORIDE SERPL-SCNC: 107 MMOL/L (ref 98–107)
CO2 SERPL-SCNC: 24 MMOL/L (ref 21–32)
CREAT SERPL-MCNC: 3.25 MG/DL (ref 0.6–1)
DIFFERENTIAL METHOD BLD: NORMAL
EOSINOPHIL # BLD: 0.1 K/UL (ref 0–0.8)
EOSINOPHIL NFR BLD: 1 % (ref 0.5–7.8)
EPI CELLS #/AREA URNS HPF: NORMAL /HPF
ERYTHROCYTE [DISTWIDTH] IN BLOOD BY AUTOMATED COUNT: 13.1 % (ref 11.9–14.6)
GLOBULIN SER CALC-MCNC: 3.6 G/DL (ref 2.3–3.5)
GLUCOSE SERPL-MCNC: 104 MG/DL (ref 65–100)
HCT VFR BLD AUTO: 42.1 % (ref 35.8–46.3)
HGB BLD-MCNC: 13.3 G/DL (ref 11.7–15.4)
IMM GRANULOCYTES # BLD AUTO: 0 K/UL (ref 0–0.5)
IMM GRANULOCYTES NFR BLD AUTO: 0 % (ref 0–5)
LIPASE SERPL-CCNC: 187 U/L (ref 73–393)
LYMPHOCYTES # BLD: 1.3 K/UL (ref 0.5–4.6)
LYMPHOCYTES NFR BLD: 19 % (ref 13–44)
MCH RBC QN AUTO: 27.8 PG (ref 26.1–32.9)
MCHC RBC AUTO-ENTMCNC: 31.6 G/DL (ref 31.4–35)
MCV RBC AUTO: 88.1 FL (ref 79.6–97.8)
MONOCYTES # BLD: 0.4 K/UL (ref 0.1–1.3)
MONOCYTES NFR BLD: 6 % (ref 4–12)
NEUTS SEG # BLD: 5.2 K/UL (ref 1.7–8.2)
NEUTS SEG NFR BLD: 72 % (ref 43–78)
NRBC # BLD: 0 K/UL (ref 0–0.2)
PLATELET # BLD AUTO: 233 K/UL (ref 150–450)
PLATELET COMMENTS,PCOM: ADEQUATE
PMV BLD AUTO: 10.3 FL (ref 9.4–12.3)
POTASSIUM SERPL-SCNC: 4.8 MMOL/L (ref 3.5–5.1)
PROT SERPL-MCNC: 7.5 G/DL (ref 6.3–8.2)
RBC # BLD AUTO: 4.78 M/UL (ref 4.05–5.2)
RBC #/AREA URNS HPF: 0 /HPF
RBC MORPH BLD: NORMAL
SODIUM SERPL-SCNC: 140 MMOL/L (ref 136–145)
WBC # BLD AUTO: 7.1 K/UL (ref 4.3–11.1)
WBC MORPH BLD: NORMAL
WBC URNS QL MICRO: NORMAL /HPF

## 2021-04-24 PROCEDURE — 81015 MICROSCOPIC EXAM OF URINE: CPT

## 2021-04-24 PROCEDURE — 80053 COMPREHEN METABOLIC PANEL: CPT

## 2021-04-24 PROCEDURE — 85025 COMPLETE CBC W/AUTO DIFF WBC: CPT

## 2021-04-24 PROCEDURE — 74011250636 HC RX REV CODE- 250/636: Performed by: EMERGENCY MEDICINE

## 2021-04-24 PROCEDURE — 83690 ASSAY OF LIPASE: CPT

## 2021-04-24 PROCEDURE — 96374 THER/PROPH/DIAG INJ IV PUSH: CPT

## 2021-04-24 PROCEDURE — 99285 EMERGENCY DEPT VISIT HI MDM: CPT

## 2021-04-24 PROCEDURE — 96361 HYDRATE IV INFUSION ADD-ON: CPT

## 2021-04-24 RX ORDER — HYDRALAZINE HYDROCHLORIDE 20 MG/ML
10 INJECTION INTRAMUSCULAR; INTRAVENOUS
Status: COMPLETED | OUTPATIENT
Start: 2021-04-24 | End: 2021-04-24

## 2021-04-24 RX ADMIN — HYDRALAZINE HYDROCHLORIDE 10 MG: 20 INJECTION, SOLUTION INTRAMUSCULAR; INTRAVENOUS at 21:51

## 2021-04-24 RX ADMIN — SODIUM CHLORIDE 1000 ML: 900 INJECTION, SOLUTION INTRAVENOUS at 21:21

## 2021-04-25 VITALS
HEIGHT: 65 IN | SYSTOLIC BLOOD PRESSURE: 190 MMHG | RESPIRATION RATE: 16 BRPM | HEART RATE: 84 BPM | WEIGHT: 200 LBS | OXYGEN SATURATION: 100 % | BODY MASS INDEX: 33.32 KG/M2 | DIASTOLIC BLOOD PRESSURE: 95 MMHG | TEMPERATURE: 98.6 F

## 2021-04-25 NOTE — ED NOTES
I have reviewed discharge instructions with the patient. The patient verbalized understanding. Patient left ED via Discharge Method: stretcher to Home with Gretta Gonsalez. Opportunity for questions and clarification provided. Patient given 0 scripts. To continue your aftercare when you leave the hospital, you may receive an automated call from our care team to check in on how you are doing. This is a free service and part of our promise to provide the best care and service to meet your aftercare needs.  If you have questions, or wish to unsubscribe from this service please call [phone]. Thank you for Choosing our [facility] Emergency Department.      Attempted to  Call  at pt's request. Phone went straight to voicemail

## 2021-04-25 NOTE — ED PROVIDER NOTES
Patient called EMS for urinary odor. Present for the past couple days. Has mild lower abdominal pain. No dysuria or hematuria. No weakness. The history is provided by the patient. No  was used. Urinary Odor   This is a new problem. The current episode started more than 2 days ago. The problem occurs every urination. The problem has not changed since onset. The patient is experiencing no pain. There has been no fever. Associated symptoms include abdominal pain. Pertinent negatives include no chills, no sweats, no nausea, no vomiting, no frequency, no hematuria, no urgency, no flank pain and no back pain. She has tried nothing for the symptoms.         Past Medical History:   Diagnosis Date    Aneurysm of common [address] artery ([facility] 2004    left side s/p coil     CKD (chronic kidney disease) 9/18/2014    Dementia [address]     FSGS (focal segmental glomerulosclerosis)     in remission at present    Gout     Hypertension     managed with medication     Osteoarthritis 9/18/2014    Stroke ([facility] [address] 2004, 2013    slight weakness on right side, slight effect to speech       Past Surgical History:   Procedure Laterality Date    HX ANKLE FRACTURE TX Left 2013    has hardware in    HX CHOLECYSTECTOMY  02/28/2018    HX ERCP  02/27/2018    cbd stone    HX INTRACRANIAL ANEURYSM REPAIR  2004    left carotid     HX ORTHOPAEDIC Right 10/2014    hip replacement    HX REFRACTIVE SURGERY      bilateral - Lasik    IR INSERT TUNL CVC W/O PORT OVER 5 YR  12/22/2020    IR REMOVE TUNL CVAD W/O PORT / PUMP  1/15/2021         Family History:   Problem Relation Age of Onset    Cancer Father 80        unknown    Stroke Sister 48       Social History     Socioeconomic History    Marital status:      Spouse name: Not on file    Number of children: Not on file    Years of education: Not on file    Highest education level: Not on file   Occupational History    Not on file   Social Needs    Financial resource strain: Not on file    Food insecurity     Worry: Not on file     Inability: Not on file    Transportation needs     Medical: Not on file     Non-medical: Not on file   Tobacco Use    Smoking status: Former Smoker     Packs/day: 0.25     Quit date: 1979     Years since quittin.3    Smokeless tobacco: Never Used   Substance and Sexual Activity    Alcohol use: Yes     Alcohol/week: 2.5 standard drinks     Types: 3 Shots of liquor per week     Comment: pint of fireball    Drug use: No    Sexual activity: Yes     Partners: Male     Birth control/protection: None   Lifestyle    Physical activity     Days per week: Not on file     Minutes per session: Not on file    Stress: Not on file   Relationships    Social connections     Talks on phone: Not on file     Gets together: Not on file     Attends Che[employer] service: Not on file     Active member of club or organization: Not on file     Attends meetings of clubs or organizations: Not on file     Relationship status: Not on file    Intimate partner violence     Fear of current or ex partner: Not on file     Emotionally abused: Not on file     Physically abused: Not on file     Forced sexual activity: Not on file   Other Topics Concern    Not on file   Social History Narrative    Not on file         ALLERGIES: Adhesive, Codeine, and Hydrocodone-acetaminophen    Review of Systems   Constitutional: Negative for chills and fever. Eyes: Negative for pain and redness. Respiratory: Negative for chest tightness, shortness of breath and wheezing. Cardiovascular: Negative for chest pain and leg swelling. Gastrointestinal: Positive for abdominal pain. Negative for diarrhea, nausea and vomiting. Genitourinary: Negative for dysuria, flank pain, frequency, hematuria and urgency. Musculoskeletal: Negative for back pain, neck pain and neck stiffness. Skin: Negative for color change and rash.    Neurological: Negative for weakness, numbness and headaches. All other systems reviewed and are negative. Vitals:    04/24/21 2013   BP: (!) 196/91   Pulse: 89   Resp: 16   Temp: 98.6 °F (37 °C)   SpO2: 99%   Weight: 90.7 kg (200 lb)   Height: 5' 5\" (1.651 m)            Physical Exam  Constitutional:       Appearance: She is well-developed. She is obese. HENT:      Head: Normocephalic and atraumatic. Cardiovascular:      Rate and Rhythm: Normal rate and regular rhythm. Pulmonary:      Effort: Pulmonary effort is normal.      Breath sounds: Normal breath sounds. Abdominal:      General: Bowel sounds are normal.      Palpations: Abdomen is soft. Tenderness: There is abdominal tenderness (mild lower abd TTP. ). Musculoskeletal:         General: No swelling or tenderness. Skin:     General: Skin is warm and dry. Neurological:      General: No focal deficit present. Mental Status: She is alert and oriented to person, place, and time. MDM  Number of Diagnoses or Management Options  Diagnosis management comments: Patient with some hypertension improved here. No signs of UTI. Slight dehydration given fluids. Will discharge. Amount and/or Complexity of Data Reviewed  Clinical lab tests: ordered and reviewed  Tests in the medicine section of CPT®: ordered and reviewed    Patient Progress  Patient progress: stable         Procedures          Procedure Note for Ultrasound Guided IV. IV access was not able to be obtained by nursing staff due to the patient having very difficult vein access. Skin was cleaned and disinfected prior to IV puncture. Ultrasound was used to find the vein which was compressible and does not have any ultrasound features of an artery. Under real-time ultrasound guidance peripheral access was obtained in the right upper extremity. Blood return was present and IV flushed without difficulty with no clinical signs of infiltration.   IV was taped into position and there were no immediate complications noted and patient tolerated the procedure well. Procedure was completed by myself the attending physician. Results Include:    Recent Results (from the past 24 hour(s))   CBC WITH AUTOMATED DIFF    Collection Time: 04/24/21  8:17 PM   Result Value Ref Range    WBC 7.1 4.3 - 11.1 K/uL    RBC 4.78 4.05 - 5.2 M/uL    HGB 13.3 11.7 - 15.4 g/dL    HCT 42.1 35.8 - 46.3 %    MCV 88.1 79.6 - 97.8 FL    MCH 27.8 26.1 - 32.9 PG    MCHC 31.6 31.4 - 35.0 g/dL    RDW 13.1 11.9 - 14.6 %    PLATELET 942 420 - 184 K/uL    MPV 10.3 9.4 - 12.3 FL    ABSOLUTE NRBC 0.00 0.0 - 0.2 K/uL    NEUTROPHILS 72 43 - 78 %    LYMPHOCYTES 19 13 - 44 %    MONOCYTES 6 4.0 - 12.0 %    EOSINOPHILS 1 0.5 - 7.8 %    BASOPHILS 2 0.0 - 2.0 %    IMMATURE GRANULOCYTES 0 0.0 - 5.0 %    ABS. NEUTROPHILS 5.2 1.7 - 8.2 K/UL    ABS. LYMPHOCYTES 1.3 0.5 - 4.6 K/UL    ABS. MONOCYTES 0.4 0.1 - 1.3 K/UL    ABS. EOSINOPHILS 0.1 0.0 - 0.8 K/UL    ABS. BASOPHILS 0.1 0.0 - 0.2 K/UL    ABS. IMM. GRANS. 0.0 0.0 - 0.5 K/UL    RBC COMMENTS NORMOCYTIC/NORMOCHROMIC      WBC COMMENTS Result Confirmed By Smear      PLATELET COMMENTS ADEQUATE      DF AUTOMATED     METABOLIC PANEL, COMPREHENSIVE    Collection Time: 04/24/21  8:17 PM   Result Value Ref Range    Sodium 140 136 - 145 mmol/L    Potassium 4.8 3.5 - 5.1 mmol/L    Chloride 107 98 - 107 mmol/L    CO2 24 21 - 32 mmol/L    Anion gap 9 7 - 16 mmol/L    Glucose 104 (H) 65 - 100 mg/dL    BUN 28 (H) 8 - 23 MG/DL    Creatinine 3.25 (H) 0.6 - 1.0 MG/DL    GFR est AA 19 (L) >60 ml/min/1.73m2    GFR est non-AA 15 (L) >60 ml/min/1.73m2    Calcium 9.6 8.3 - 10.4 MG/DL    Bilirubin, total 0.9 0.2 - 1.1 MG/DL    ALT (SGPT) 11 (L) 12 - 65 U/L    AST (SGOT) 17 15 - 37 U/L    Alk.  phosphatase 173 (H) 50 - 136 U/L    Protein, total 7.5 6.3 - 8.2 g/dL    Albumin 3.9 3.2 - 4.6 g/dL    Globulin 3.6 (H) 2.3 - 3.5 g/dL    A-G Ratio 1.1 (L) 1.2 - 3.5     LIPASE    Collection Time: 04/24/21  8:17 PM   Result Value Ref Range    Lipase 187 73 - 393 U/L   URINE MICROSCOPIC    Collection Time: 04/24/21  9:33 PM   Result Value Ref Range    WBC 0-3 0 /hpf    RBC 0 0 /hpf    Epithelial cells 0-3 0 /hpf    Bacteria 0 0 /hpf    Casts 0-3 0 /lpf

## 2021-04-25 NOTE — ED NOTES
Attempted to obtain IV access multiple times. Dr. Willie Rosearia aware. MD Attempting IV with ultrasound.

## 2021-04-25 NOTE — ED TRIAGE NOTES
Patient arrives to ED via EMS from home. Patient called out for UTI. Patient complains of foul smelling urine. Denies burning with urination. Denies abdominal pain. Denies n/v/d. Denies flank pain.

## 2021-07-21 ENCOUNTER — HOSPITAL ENCOUNTER (EMERGENCY)
Age: 61
Discharge: HOME | End: 2021-07-22
Attending: EMERGENCY MEDICINE
Payer: MEDICARE

## 2021-07-21 DIAGNOSIS — Z65.9 POOR SOCIAL SITUATION: Primary | ICD-10-CM

## 2021-07-21 DIAGNOSIS — Z86.73 HISTORY OF STROKE: ICD-10-CM

## 2021-07-21 DIAGNOSIS — R26.2 INABILITY TO AMBULATE DUE TO RIGHT KNEE: ICD-10-CM

## 2021-07-21 DIAGNOSIS — N39.0 URINARY TRACT INFECTION WITHOUT HEMATURIA, SITE UNSPECIFIED: ICD-10-CM

## 2021-07-21 LAB
ALBUMIN SERPL-MCNC: 3.9 G/DL (ref 3.2–4.6)
ALBUMIN/GLOB SERPL: 1.1 {RATIO} (ref 1.2–3.5)
ALP SERPL-CCNC: 159 U/L (ref 50–136)
ALT SERPL-CCNC: 21 U/L (ref 12–65)
ANION GAP SERPL CALC-SCNC: 7 MMOL/L (ref 7–16)
AST SERPL-CCNC: 14 U/L (ref 15–37)
BACTERIA URNS QL MICRO: ABNORMAL /HPF
BASOPHILS # BLD: 0.1 K/UL (ref 0–0.2)
BASOPHILS NFR BLD: 1 % (ref 0–2)
BILIRUB SERPL-MCNC: 0.6 MG/DL (ref 0.2–1.1)
BUN SERPL-MCNC: 39 MG/DL (ref 8–23)
CALCIUM SERPL-MCNC: 9.2 MG/DL (ref 8.3–10.4)
CASTS URNS QL MICRO: ABNORMAL /LPF
CHLORIDE SERPL-SCNC: 106 MMOL/L (ref 98–107)
CO2 SERPL-SCNC: 24 MMOL/L (ref 21–32)
CREAT SERPL-MCNC: 3.18 MG/DL (ref 0.6–1)
DIFFERENTIAL METHOD BLD: ABNORMAL
EOSINOPHIL # BLD: 0.2 K/UL (ref 0–0.8)
EOSINOPHIL NFR BLD: 2 % (ref 0.5–7.8)
EPI CELLS #/AREA URNS HPF: 0 /HPF
ERYTHROCYTE [DISTWIDTH] IN BLOOD BY AUTOMATED COUNT: 13.9 % (ref 11.9–14.6)
GLOBULIN SER CALC-MCNC: 3.7 G/DL (ref 2.3–3.5)
GLUCOSE SERPL-MCNC: 105 MG/DL (ref 65–100)
HCT VFR BLD AUTO: 41.1 % (ref 35.8–46.3)
HGB BLD-MCNC: 12.8 G/DL (ref 11.7–15.4)
IMM GRANULOCYTES # BLD AUTO: 0 K/UL (ref 0–0.5)
IMM GRANULOCYTES NFR BLD AUTO: 0 % (ref 0–5)
LYMPHOCYTES # BLD: 1.7 K/UL (ref 0.5–4.6)
LYMPHOCYTES NFR BLD: 23 % (ref 13–44)
MCH RBC QN AUTO: 27.7 PG (ref 26.1–32.9)
MCHC RBC AUTO-ENTMCNC: 31.1 G/DL (ref 31.4–35)
MCV RBC AUTO: 89 FL (ref 79.6–97.8)
MONOCYTES # BLD: 0.5 K/UL (ref 0.1–1.3)
MONOCYTES NFR BLD: 7 % (ref 4–12)
NEUTS SEG # BLD: 5 K/UL (ref 1.7–8.2)
NEUTS SEG NFR BLD: 66 % (ref 43–78)
NRBC # BLD: 0 K/UL (ref 0–0.2)
PLATELET # BLD AUTO: 207 K/UL (ref 150–450)
PMV BLD AUTO: 10 FL (ref 9.4–12.3)
POTASSIUM SERPL-SCNC: 4.7 MMOL/L (ref 3.5–5.1)
PROT SERPL-MCNC: 7.6 G/DL (ref 6.3–8.2)
RBC # BLD AUTO: 4.62 M/UL (ref 4.05–5.2)
RBC #/AREA URNS HPF: 0 /HPF
SODIUM SERPL-SCNC: 137 MMOL/L (ref 136–145)
WBC # BLD AUTO: 7.5 K/UL (ref 4.3–11.1)
WBC URNS QL MICRO: ABNORMAL /HPF

## 2021-07-21 PROCEDURE — 85025 COMPLETE CBC W/AUTO DIFF WBC: CPT

## 2021-07-21 PROCEDURE — 36415 COLL VENOUS BLD VENIPUNCTURE: CPT

## 2021-07-21 PROCEDURE — 81015 MICROSCOPIC EXAM OF URINE: CPT

## 2021-07-21 PROCEDURE — 99284 EMERGENCY DEPT VISIT MOD MDM: CPT

## 2021-07-21 PROCEDURE — 80053 COMPREHEN METABOLIC PANEL: CPT

## 2021-07-21 RX ORDER — CEPHALEXIN 500 MG/1
500 CAPSULE ORAL 3 TIMES DAILY
Qty: 21 CAPSULE | Refills: 0 | Status: SHIPPED | OUTPATIENT
Start: 2021-07-21

## 2021-07-21 NOTE — ED TRIAGE NOTES
Pt brought in by EMS from home. Pt states that she would like to speak with someone about hospice. Pt has no complaints at this time. Pt also does not have any reasoning for being placed on hospice. Pt  was also transported via EMS to the hospital at the same time.

## 2021-07-21 NOTE — ED PROVIDER NOTES
This is a 27-year-old female has a history of hypertension and possibly some dementia and history of stroke with resultant right leg weakness. Patient is nonambulatory. She gets around her wheelchair but mostly is in bed.  takes care of her. Patient's  was brought in today because of suicidal ideations and alcohol intoxication. Patient has no family in town and is unable to care for herself. She was brought in by EMS as well. Patient denies to me any pain. Does have a history of \"kidney disease\". It sounds like she is pronouncing focal glomerulosclerosis. She denies to me any headache any chest pain shortness of breath. No abdominal pain vomiting or diarrhea. No fever cough. In reviewing records, patient's  was admitted to intensive care about 2 months ago. At that time the patient was emergency department patient unable to care for self. She was placed in short-term rehab somewhere at that time. The history is provided by the patient. Other  This is a new problem. The problem occurs constantly. The problem has not changed since onset. Pertinent negatives include no chest pain, no abdominal pain, no headaches and no shortness of breath.         Past Medical History:   Diagnosis Date    Aneurysm of common carotid artery ([address] 2004    left side s/p coil     CKD (chronic kidney disease) 9/18/2014    Dementia ([address]     FSGS (focal segmental glomerulosclerosis)     in remission at present    Gout     Hypertension     managed with medication     Osteoarthritis 9/18/2014    Stroke ([address] 2004, 2013    slight weakness on right side, slight effect to speech       Past Surgical History:   Procedure Laterality Date    HX ANKLE FRACTURE TX Left 2013    has hardware in    HX CHOLECYSTECTOMY  02/28/2018    HX ERCP  02/27/2018    cbd stone    HX INTRACRANIAL ANEURYSM REPAIR  2004    left carotid     HX ORTHOPAEDIC Right 10/2014    hip replacement    HX REFRACTIVE SURGERY bilateral - Lasik    IR INSERT TUNL CVC W/O PORT OVER 5 YR  2020    IR REMOVE TUNL CVAD W/O PORT / PUMP  1/15/2021         Family History:   Problem Relation Age of Onset    Cancer Father 80        unknown    Stroke Sister 48       Social History     Socioeconomic History    Marital status:      Spouse name: Not on file    Number of children: Not on file    Years of education: Not on file    Highest education level: Not on file   Occupational History    Not on file   Tobacco Use    Smoking status: Former Smoker     Packs/day: 0.25     Quit date: 1979     Years since quittin.5    Smokeless tobacco: Never Used   Substance and Sexual Activity    Alcohol use: Yes     Alcohol/week: 2.5 standard drinks     Types: 3 Shots of liquor per week     Comment: pint of fireball    Drug use: No    Sexual activity: Yes     Partners: Male     Birth control/protection: None   Other Topics Concern    Not on file   Social History Narrative    Not on file     Social Determinants of Health     Financial Resource Strain:     Difficulty of Paying Living Expenses:    Food Insecurity:     Worried About Running Out of Food in the Last Year:     [address] in the Last Year:    Transportation Needs:     Lack of Transportation (Medical):  Lack of Transportation (Non-Medical):    Physical Activity:     Days of Exercise per Week:     Minutes of Exercise per Session:    Stress:     Feeling of Stress :    Social Connections:     Frequency of Communication with Friends and Family:     Frequency of Social Gatherings with Friends and Family:     Attends [employer] Services:     Active Member of Clubs or Organizations:     Attends Club or Organization Meetings:     Marital Status:    Intimate Partner Violence:     Fear of Current or Ex-Partner:     Emotionally Abused:     Physically Abused:     Sexually Abused:           ALLERGIES: Adhesive, Codeine, and Hydrocodone-acetaminophen    Review of Systems   Constitutional: Negative for chills and fever. HENT: Negative for ear pain. Respiratory: Negative for cough and shortness of breath. Cardiovascular: Negative for chest pain and palpitations. Gastrointestinal: Negative for abdominal pain, diarrhea and vomiting. Genitourinary: Negative for dysuria and flank pain. Musculoskeletal: Negative for back pain and neck pain. Skin: Negative for color change and rash. Neurological: Positive for weakness (Chronic leg weakness due to stroke). Negative for syncope and headaches. All other systems reviewed and are negative. Vitals:    07/21/21 1719   BP: 122/73   Pulse: 91   Resp: 16   Temp: 98.4 °F (36.9 °C)   SpO2: 99%   Weight: 90.7 kg (200 lb)   Height: 5' 5\" (1.651 m)            Physical Exam  Vitals and nursing note reviewed. Constitutional:       General: She is not in acute distress. Appearance: She is well-developed. HENT:      Head: Normocephalic and atraumatic. Right Ear: External ear normal.      Left Ear: External ear normal.      Mouth/Throat:      Pharynx: No oropharyngeal exudate. Eyes:      Conjunctiva/sclera: Conjunctivae normal.      Pupils: Pupils are equal, round, and reactive to light. Cardiovascular:      Rate and Rhythm: Normal rate and regular rhythm. Heart sounds: No murmur heard. Pulmonary:      Effort: No respiratory distress. Breath sounds: Normal breath sounds. Abdominal:      General: Bowel sounds are normal.      Palpations: Abdomen is soft. There is no mass. Tenderness: There is no abdominal tenderness. There is no guarding or rebound. Hernia: No hernia is present. Musculoskeletal:      Cervical back: Normal range of motion and neck supple. Skin:     General: Skin is warm and dry. Neurological:      Mental Status: She is alert and oriented to person, place, and time. Gait: Gait normal.      Comments: Nl speech  No drift. Normal finger-nose testing bilaterally. Right lower extremity weakness. Psychiatric:         Speech: Speech normal.          MDM  Number of Diagnoses or Management Options  Diagnosis management comments: History of kidney disease, so we will check electrolytes. Check urinalysis as well. I doubt any issues requiring admission will be found. Plan at this point is to await 's mental status improvement once his alcohol wears off. He will require psychiatric interview. At that time, and the rare chance  is able to return home, patient can be discharged home with . Otherwise if  requires admission or commitment, patient will remain here overnight to speak with  in the morning and see what options are available. Patient had no options for care when  was admitted 2 months ago and ended up in short-term rehab. Amount and/or Complexity of Data Reviewed  Clinical lab tests: ordered and reviewed    Risk of Complications, Morbidity, and/or Mortality  Presenting problems: moderate  Diagnostic procedures: minimal  Management options: low    Patient Progress  Patient progress: stable         Procedures    Results Include:    Recent Results (from the past 24 hour(s))   CBC WITH AUTOMATED DIFF    Collection Time: 07/21/21  7:18 PM   Result Value Ref Range    WBC 7.5 4.3 - 11.1 K/uL    RBC 4.62 4.05 - 5.2 M/uL    HGB 12.8 11.7 - 15.4 g/dL    HCT 41.1 35.8 - 46.3 %    MCV 89.0 79.6 - 97.8 FL    MCH 27.7 26.1 - 32.9 PG    MCHC 31.1 (L) 31.4 - 35.0 g/dL    RDW 13.9 11.9 - 14.6 %    PLATELET 049 107 - 647 K/uL    MPV 10.0 9.4 - 12.3 FL    ABSOLUTE NRBC 0.00 0.0 - 0.2 K/uL    DF AUTOMATED      NEUTROPHILS 66 43 - 78 %    LYMPHOCYTES 23 13 - 44 %    MONOCYTES 7 4.0 - 12.0 %    EOSINOPHILS 2 0.5 - 7.8 %    BASOPHILS 1 0.0 - 2.0 %    IMMATURE GRANULOCYTES 0 0.0 - 5.0 %    ABS. NEUTROPHILS 5.0 1.7 - 8.2 K/UL    ABS. LYMPHOCYTES 1.7 0.5 - 4.6 K/UL    ABS. MONOCYTES 0.5 0.1 - 1.3 K/UL    ABS.  EOSINOPHILS 0.2 0.0 - 0.8 K/UL ABS. BASOPHILS 0.1 0.0 - 0.2 K/UL    ABS. IMM. GRANS. 0.0 0.0 - 0.5 K/UL   METABOLIC PANEL, COMPREHENSIVE    Collection Time: 07/21/21  7:18 PM   Result Value Ref Range    Sodium 137 136 - 145 mmol/L    Potassium 4.7 3.5 - 5.1 mmol/L    Chloride 106 98 - 107 mmol/L    CO2 24 21 - 32 mmol/L    Anion gap 7 7 - 16 mmol/L    Glucose 105 (H) 65 - 100 mg/dL    BUN 39 (H) 8 - 23 MG/DL    Creatinine 3.18 (H) 0.6 - 1.0 MG/DL    GFR est AA 19 (L) >60 ml/min/1.73m2    GFR est non-AA 16 (L) >60 ml/min/1.73m2    Calcium 9.2 8.3 - 10.4 MG/DL    Bilirubin, total 0.6 0.2 - 1.1 MG/DL    ALT (SGPT) 21 12 - 65 U/L    AST (SGOT) 14 (L) 15 - 37 U/L    Alk. phosphatase 159 (H) 50 - 136 U/L    Protein, total 7.6 6.3 - 8.2 g/dL    Albumin 3.9 3.2 - 4.6 g/dL    Globulin 3.7 (H) 2.3 - 3.5 g/dL    A-G Ratio 1.1 (L) 1.2 - 3.5       Labs appear baseline. Plan is to wait for her 's disposition. If he is able to go home, she can 2. If he is admitted or committed,  in the morning. 11:40 PM  Labs normal other than patient has UTI. Patient's  is now awake and alert. Is ready to be discharged home. They will be discharged home together.

## 2021-07-22 VITALS
DIASTOLIC BLOOD PRESSURE: 90 MMHG | RESPIRATION RATE: 16 BRPM | HEART RATE: 74 BPM | HEIGHT: 65 IN | WEIGHT: 200 LBS | OXYGEN SATURATION: 98 % | TEMPERATURE: 98.4 F | SYSTOLIC BLOOD PRESSURE: 150 MMHG | BODY MASS INDEX: 33.32 KG/M2

## 2021-07-22 NOTE — ED NOTES
I have reviewed discharge instructions with the patient. The patient verbalized understanding. Patient left ED via Discharge Method: stretcher to Home with [facility] for questions and clarification provided. Patient given 1 scripts. To continue your aftercare when you leave the hospital, you may receive an automated call from our care team to check in on how you are doing. This is a free service and part of our promise to provide the best care and service to meet your aftercare needs.  If you have questions, or wish to unsubscribe from this service please call [phone]. Thank you for Choosing our [employer] [facility] Emergency Department.

## 2021-07-22 NOTE — PROGRESS NOTES
SW familiar with patient and her  from previous admissions. Note to SW that patient's  is in the hospital and she needs longterm care. SW called the patient and spoke with her  Evelio Sanders. Evelio Sanders states that the patient is resting but feeling better. Evelio Sanders states that he came home last night, states that he was in the hospital for suicidal ideations. Evelio Sanders denies suicidal or homicidal ideations, states that he was \"just drinking. \" SW offered [employer] [facility] [employer]. Evelio Sanders declined stating that he does not like AA. SW provided education on [place] and how it's different from [place]. Evelio Sanders states that he's \"just gonna stop drinking. \" Evelio Sanders denies the need for LTC for this patient. This SW referred the patient and her  to APS in the recent past. Will follow up with [facility] APS to determine if they still have an active investigation or treatment plan.      Luis Manriquez LMSW    [facility]    * [email or web address]

## 2021-07-22 NOTE — PROGRESS NOTES
BIJAN spoke with Ruven Boas at [facility] who states that the patient's case is open to  [facility] ([phone]). [facility] and provided information regarding the patient's ER visit last night and concerns.     Rob Danielle, MERLYN    [facility]    * [email or web address]

## 2021-08-08 ENCOUNTER — HOSPITAL ENCOUNTER (EMERGENCY)
Age: 61
Discharge: HOME | End: 2021-08-11
Attending: EMERGENCY MEDICINE
Payer: MEDICARE

## 2021-08-08 DIAGNOSIS — R53.1 WEAKNESS: Primary | ICD-10-CM

## 2021-08-08 DIAGNOSIS — F03.90 DEMENTIA WITHOUT BEHAVIORAL DISTURBANCE, UNSPECIFIED DEMENTIA TYPE: ICD-10-CM

## 2021-08-08 LAB
COVID-19 RAPID TEST, COVR: NOT DETECTED
SARS-COV-2, COV2: NORMAL
SOURCE, COVRS: NORMAL

## 2021-08-08 PROCEDURE — 87635 SARS-COV-2 COVID-19 AMP PRB: CPT

## 2021-08-08 PROCEDURE — 99285 EMERGENCY DEPT VISIT HI MDM: CPT

## 2021-08-08 PROCEDURE — 74011250637 HC RX REV CODE- 250/637: Performed by: EMERGENCY MEDICINE

## 2021-08-08 RX ORDER — QUETIAPINE FUMARATE 25 MG/1
50 TABLET, FILM COATED ORAL
Status: COMPLETED | OUTPATIENT
Start: 2021-08-08 | End: 2021-08-08

## 2021-08-08 RX ORDER — PANTOPRAZOLE SODIUM 40 MG/1
40 TABLET, DELAYED RELEASE ORAL
Status: DISCONTINUED | OUTPATIENT
Start: 2021-08-09 | End: 2021-08-08

## 2021-08-08 RX ORDER — ESCITALOPRAM OXALATE 10 MG/1
10 TABLET ORAL DAILY
Status: DISCONTINUED | OUTPATIENT
Start: 2021-08-08 | End: 2021-08-11 | Stop reason: HOSPADM

## 2021-08-08 RX ORDER — QUETIAPINE FUMARATE 25 MG/1
50 TABLET, FILM COATED ORAL
Status: DISCONTINUED | OUTPATIENT
Start: 2021-08-08 | End: 2021-08-11 | Stop reason: HOSPADM

## 2021-08-08 RX ORDER — PANTOPRAZOLE SODIUM 40 MG/1
40 TABLET, DELAYED RELEASE ORAL
Status: DISCONTINUED | OUTPATIENT
Start: 2021-08-08 | End: 2021-08-11 | Stop reason: HOSPADM

## 2021-08-08 RX ADMIN — QUETIAPINE FUMARATE 50 MG: 25 TABLET ORAL at 21:55

## 2021-08-08 RX ADMIN — QUETIAPINE FUMARATE 50 MG: 25 TABLET ORAL at 12:08

## 2021-08-08 RX ADMIN — PANTOPRAZOLE SODIUM 40 MG: 40 TABLET, DELAYED RELEASE ORAL at 12:08

## 2021-08-08 RX ADMIN — ESCITALOPRAM OXALATE 10 MG: 10 TABLET ORAL at 12:08

## 2021-08-08 NOTE — ED TRIAGE NOTES
Pt came from home from ems , pt states  her  wont take care of her and she wants ED to take care of her and find her somewhere to go long term.   Per ems reports pt  states he was drunk

## 2021-08-08 NOTE — ED NOTES
Report rec'd from MARY, [facility]. Social Work contacted to come discuss plan for discharge.

## 2021-08-08 NOTE — PROGRESS NOTES
MSN, CM:  Spoke with Laila Samuel at [facility] for emergency respite placement. Hospice order and rapid COVID placed. [facility] to come and evaluate patient. MD notified. Case Management will continue to follow.

## 2021-08-08 NOTE — PROGRESS NOTES
MSN, CM:  Sandra Dean from [facility] called and stated Administration would not accept patient r/t social situation. This CM contacted [facility] ([facility]) which could not accept patient without insurance approval which could take up to three days. [facility] spoke with Bulmaro Silvestre (admin: [phone]). Will wait on return phone call if they can provide any help with this situation. Spoke with PT and they will be unable to see patient today r/t back up in consults with patient's with discharge orders already. Case Management will continue to follow.

## 2021-08-08 NOTE — PROGRESS NOTES
CM received a call from the ER RN. Patient has been in the ED for several hours and spouse is also in the ED. MD told CM that spouse will likely be inpatient for several days. DEBBIE is looking for respite for the patient as her caregiver is her spouse. DEBBIE called [facility] in efforts to get patient placed until spouse is able to care for her. CM following.

## 2021-08-08 NOTE — PROGRESS NOTES
MSN, CM:  Patient being referred [facility] for admission. It will be tomorrow before this can start. Case Management will continue to follow.

## 2021-08-08 NOTE — ED PROVIDER NOTES
Patient is here primarily because her  is here. She is completely dependent on him. He is sick with alcohol abuse and possible withdrawal.  She has come to the ER before when he has dealt with his alcohol problems and been admitted. She is here again with them in the ER. Complaints herself. The history is provided by the patient and the EMS personnel. No  was used. Fatigue  This is a chronic problem. The problem has not changed since onset. There was no focality noted. Pertinent negatives include no focal weakness, no slurred speech, no speech difficulty, no mental status change and no disorientation. There has been no fever. Pertinent negatives include no shortness of breath, no chest pain, no vomiting, no altered mental status, no confusion, no headaches, no nausea, no bowel incontinence and no bladder incontinence.         Past Medical History:   Diagnosis Date    Aneurysm of common carotid artery ([address] 2004    left side s/p coil     CKD (chronic kidney disease) 9/18/2014    Dementia ([address]     FSGS (focal segmental glomerulosclerosis)     in remission at present    Gout     Hypertension     managed with medication     Osteoarthritis 9/18/2014    Stroke ([address] 2004, 2013    slight weakness on right side, slight effect to speech       Past Surgical History:   Procedure Laterality Date    HX ANKLE FRACTURE TX Left 2013    has hardware in    HX CHOLECYSTECTOMY  02/28/2018    HX ERCP  02/27/2018    cbd stone    HX INTRACRANIAL ANEURYSM REPAIR  2004    left carotid     HX ORTHOPAEDIC Right 10/2014    hip replacement    HX REFRACTIVE SURGERY      bilateral - Lasik    IR INSERT TUNL CVC W/O PORT OVER 5 YR  12/22/2020    IR REMOVE TUNL CVAD W/O PORT / PUMP  1/15/2021         Family History:   Problem Relation Age of Onset    Cancer Father 80        unknown    Stroke Sister 48       Social History     Socioeconomic History    Marital status:      Spouse name: Not on file    Number of children: Not on file    Years of education: Not on file    Highest education level: Not on file   Occupational History    Not on file   Tobacco Use    Smoking status: Former Smoker     Packs/day: 0.25     Quit date: 1979     Years since quittin.6    Smokeless tobacco: Never Used   Substance and Sexual Activity    Alcohol use: Yes     Alcohol/week: 2.5 standard drinks     Types: 3 Shots of liquor per week     Comment: pint of fireball    Drug use: No    Sexual activity: Yes     Partners: Male     Birth control/protection: None   Other Topics Concern    Not on file   Social History Narrative    Not on file     Social Determinants of Health     Financial Resource Strain:     Difficulty of Paying Living Expenses:    Food Insecurity:     Worried About Running Out of Food in the Last Year:     [address] in the Last Year:    Transportation Needs:     Lack of Transportation (Medical):  Lack of Transportation (Non-Medical):    Physical Activity:     Days of Exercise per Week:     Minutes of Exercise per Session:    Stress:     Feeling of Stress :    Social Connections:     Frequency of Communication with Friends and Family:     Frequency of Social Gatherings with Friends and Family:     Attends [employer] Services:     Active Member of Clubs or Organizations:     Attends Club or Organization Meetings:     Marital Status:    Intimate Partner Violence:     Fear of Current or Ex-Partner:     Emotionally Abused:     Physically Abused:     Sexually Abused: ALLERGIES: Adhesive, Codeine, and Hydrocodone-acetaminophen    Review of Systems   Unable to perform ROS: Dementia   Constitutional: Positive for fatigue. Respiratory: Negative for shortness of breath. Cardiovascular: Negative for chest pain. Gastrointestinal: Negative for bowel incontinence, nausea and vomiting. Genitourinary: Negative for bladder incontinence.    Neurological: Negative for focal weakness, speech difficulty and headaches. Psychiatric/Behavioral: Negative for confusion. Vitals:    08/08/21 0316 08/08/21 0720   BP: 103/76 (!) 156/80   Pulse: 87    Resp: 20    Temp: 98.4 °F (36.9 °C)    SpO2: 95% 98%   Weight: 90.7 kg (200 lb)    Height: 5' 5\" (1.651 m)             Physical Exam  Constitutional:       Appearance: Normal appearance. She is well-developed. HENT:      Head: Normocephalic and atraumatic. Eyes:      Extraocular Movements: Extraocular movements intact. Pupils: Pupils are equal, round, and reactive to light. Cardiovascular:      Rate and Rhythm: Normal rate and regular rhythm. Pulmonary:      Effort: Pulmonary effort is normal.      Breath sounds: Normal breath sounds. Abdominal:      General: Bowel sounds are normal.      Palpations: Abdomen is soft. Tenderness: There is no abdominal tenderness. Musculoskeletal:         General: No swelling. Normal range of motion. Cervical back: Normal range of motion and neck supple. Skin:     General: Skin is warm and dry. Neurological:      General: No focal deficit present. Mental Status: She is alert. Mental status is at baseline. MDM  Number of Diagnoses or Management Options  Diagnosis management comments: Patient's  is going to be admitted for alcohol withdrawal. Case management is seeing patient for possible placement. No acute at this time.     Patient Progress  Patient progress: stable         Procedures

## 2021-08-09 LAB
ALBUMIN SERPL-MCNC: 3.5 G/DL (ref 3.2–4.6)
ALBUMIN/GLOB SERPL: 1 {RATIO} (ref 1.2–3.5)
ALP SERPL-CCNC: 125 U/L (ref 50–130)
ALT SERPL-CCNC: 18 U/L (ref 12–65)
ANION GAP SERPL CALC-SCNC: 3 MMOL/L (ref 7–16)
APAP SERPL-MCNC: <2 UG/ML (ref 10–30)
AST SERPL-CCNC: 21 U/L (ref 15–37)
BASOPHILS # BLD: 0.1 K/UL (ref 0–0.2)
BASOPHILS NFR BLD: 2 % (ref 0–2)
BILIRUB SERPL-MCNC: 0.9 MG/DL (ref 0.2–1.1)
BUN SERPL-MCNC: 49 MG/DL (ref 8–23)
CALCIUM SERPL-MCNC: 8.7 MG/DL (ref 8.3–10.4)
CHLORIDE SERPL-SCNC: 110 MMOL/L (ref 98–107)
CO2 SERPL-SCNC: 25 MMOL/L (ref 21–32)
CREAT SERPL-MCNC: 3.72 MG/DL (ref 0.6–1)
DIFFERENTIAL METHOD BLD: NORMAL
EOSINOPHIL # BLD: 0.2 K/UL (ref 0–0.8)
EOSINOPHIL NFR BLD: 3 % (ref 0.5–7.8)
ERYTHROCYTE [DISTWIDTH] IN BLOOD BY AUTOMATED COUNT: 13.9 % (ref 11.9–14.6)
ETHANOL SERPL-MCNC: <3 MG/DL
GLOBULIN SER CALC-MCNC: 3.6 G/DL (ref 2.3–3.5)
GLUCOSE SERPL-MCNC: 88 MG/DL (ref 65–100)
HCT VFR BLD AUTO: 40.1 % (ref 35.8–46.3)
HGB BLD-MCNC: 12.7 G/DL (ref 11.7–15.4)
IMM GRANULOCYTES # BLD AUTO: 0 K/UL (ref 0–0.5)
IMM GRANULOCYTES NFR BLD AUTO: 0 % (ref 0–5)
LYMPHOCYTES # BLD: 1.5 K/UL (ref 0.5–4.6)
LYMPHOCYTES NFR BLD: 22 % (ref 13–44)
MCH RBC QN AUTO: 28.5 PG (ref 26.1–32.9)
MCHC RBC AUTO-ENTMCNC: 31.7 G/DL (ref 31.4–35)
MCV RBC AUTO: 89.9 FL (ref 79.6–97.8)
MONOCYTES # BLD: 0.5 K/UL (ref 0.1–1.3)
MONOCYTES NFR BLD: 7 % (ref 4–12)
NEUTS SEG # BLD: 4.5 K/UL (ref 1.7–8.2)
NEUTS SEG NFR BLD: 66 % (ref 43–78)
NRBC # BLD: 0 K/UL (ref 0–0.2)
PLATELET # BLD AUTO: 226 K/UL (ref 150–450)
PMV BLD AUTO: 11 FL (ref 9.4–12.3)
POTASSIUM SERPL-SCNC: 5.9 MMOL/L (ref 3.5–5.1)
PROT SERPL-MCNC: 7.1 G/DL (ref 6.3–8.2)
RBC # BLD AUTO: 4.46 M/UL (ref 4.05–5.2)
SODIUM SERPL-SCNC: 138 MMOL/L (ref 136–145)
WBC # BLD AUTO: 6.8 K/UL (ref 4.3–11.1)

## 2021-08-09 PROCEDURE — 74011000302 HC RX REV CODE- 302: Performed by: EMERGENCY MEDICINE

## 2021-08-09 PROCEDURE — 96372 THER/PROPH/DIAG INJ SC/IM: CPT

## 2021-08-09 PROCEDURE — 97166 OT EVAL MOD COMPLEX 45 MIN: CPT

## 2021-08-09 PROCEDURE — 74011250636 HC RX REV CODE- 250/636: Performed by: EMERGENCY MEDICINE

## 2021-08-09 PROCEDURE — 80053 COMPREHEN METABOLIC PANEL: CPT

## 2021-08-09 PROCEDURE — 80143 DRUG ASSAY ACETAMINOPHEN: CPT

## 2021-08-09 PROCEDURE — 85025 COMPLETE CBC W/AUTO DIFF WBC: CPT

## 2021-08-09 PROCEDURE — 82077 ASSAY SPEC XCP UR&BREATH IA: CPT

## 2021-08-09 PROCEDURE — 97163 PT EVAL HIGH COMPLEX 45 MIN: CPT

## 2021-08-09 PROCEDURE — 97530 THERAPEUTIC ACTIVITIES: CPT

## 2021-08-09 PROCEDURE — 74011250637 HC RX REV CODE- 250/637: Performed by: EMERGENCY MEDICINE

## 2021-08-09 PROCEDURE — 97535 SELF CARE MNGMENT TRAINING: CPT

## 2021-08-09 PROCEDURE — 86580 TB INTRADERMAL TEST: CPT | Performed by: EMERGENCY MEDICINE

## 2021-08-09 RX ORDER — LORAZEPAM 2 MG/ML
1 INJECTION INTRAMUSCULAR
Status: COMPLETED | OUTPATIENT
Start: 2021-08-09 | End: 2021-08-09

## 2021-08-09 RX ORDER — LORAZEPAM 2 MG/ML
1 INJECTION INTRAMUSCULAR
Status: ACTIVE | OUTPATIENT
Start: 2021-08-09 | End: 2021-08-10

## 2021-08-09 RX ADMIN — QUETIAPINE FUMARATE 50 MG: 25 TABLET ORAL at 22:05

## 2021-08-09 RX ADMIN — SODIUM CHLORIDE 1000 ML: 900 INJECTION, SOLUTION INTRAVENOUS at 09:22

## 2021-08-09 RX ADMIN — ESCITALOPRAM OXALATE 10 MG: 10 TABLET ORAL at 09:22

## 2021-08-09 RX ADMIN — LORAZEPAM 1 MG: 2 INJECTION INTRAMUSCULAR; INTRAVENOUS at 13:32

## 2021-08-09 RX ADMIN — TUBERCULIN PURIFIED PROTEIN DERIVATIVE 5 UNITS: 5 INJECTION, SOLUTION INTRADERMAL at 22:05

## 2021-08-09 RX ADMIN — PANTOPRAZOLE SODIUM 40 MG: 40 TABLET, DELAYED RELEASE ORAL at 09:22

## 2021-08-09 NOTE — ED NOTES
Report from [facility]. Assumed care of pt at this time. Pt sleeping at this time.
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
## 2021-08-09 NOTE — PROGRESS NOTES
Problem: Self Care Deficits Care Plan (Adult)  Goal: *Acute Goals and Plan of Care (Insert Text)  Outcome: Progressing Towards Goal  Note:   1. Patient will complete bathing with moderate assist to increase self care independence. 2. Patient will complete toileting with moderate assist to increase self care independence. 3. Patient will improve static standing at edge of bed for 3 minutes to improve independence with transfers and self cares. 4. Patient will complete chair transfer with minimal assist using adaptive equipment as needed. 5. Patient will complete UE exercises with stand by assist to increase overall activity tolerance and strength. Timeframe: 7 visits       OCCUPATIONAL THERAPY: Initial Assessment and Daily Note 8/9/2021  EMERGENCY: OT Visit Days: 1  Payor: Elena Moreland / Plan: Missael Spencer / Product Type: Managed Care Medicare /      NAME/AGE/GENDER: Pj Pelayo is a 61 y.o. female   PRIMARY DIAGNOSIS:  No admission diagnoses are documented for this encounter. <principal problem not specified> <principal problem not specified>        ICD-10: Treatment Diagnosis:    · Generalized Muscle Weakness (M62.81)  · Other lack of cordination (R27.8)   Precautions/Allergies:     Adhesive, Codeine, and Hydrocodone-acetaminophen      ASSESSMENT:     Ms. Anna Mondragon presents in ER. Patient could benefit from OT services for here and continued at SNF. Patient will likely progress from ST to LTC. Patient is below baseline. Patient does have goals that she realistically could achieve with continued rehab. Initiate OT. This section established at most recent assessment   PROBLEM LIST (Impairments causing functional limitations):  1. Decreased Strength  2. Decreased ADL/Functional Activities  3. Decreased Activity Tolerance   INTERVENTIONS PLANNED: (Benefits and precautions of occupational therapy have been discussed with the patient.)  1.  Activities of daily living training  2. Neuromuscular re-eduation  3. Therapeutic activity  4. Therapeutic exercise     TREATMENT PLAN: Frequency/Duration: Follow patient 3x a week to address above goals. Rehabilitation Potential For Stated Goals: Good     REHAB RECOMMENDATIONS (at time of discharge pending progress):    Placement: It is my opinion, based on this patient's performance to date, that Ms. Jenny Sneed may benefit from intensive therapy at a [address] [place] [address] after discharge due to the functional deficits listed above that are likely to improve with skilled rehabilitation and concerns that he/she may be unsafe to be unsupervised at home due to deficits listed above . Equipment:    None at this time              OCCUPATIONAL PROFILE AND HISTORY:   History of Present Injury/Illness (Reason for Referral):  See H&P  Past Medical History/Comorbidities:   Ms. Jenny Sneed  has a past medical history of Aneurysm of common carotid artery ([facility] (2004), CKD (chronic kidney disease) (9/18/2014), Dementia ([facility] FSGS (focal segmental glomerulosclerosis), Gout, Hypertension, Osteoarthritis (9/18/2014), and Stroke ([facility] (2004, 2013). Ms. Jenny Sneed  has a past surgical history that includes hx intracranial aneurysm repair (2004); hx refractive surgery; hx orthopaedic (Right, 10/2014); hx ankle fracture tx (Left, 2013); hx ercp (02/27/2018); hx cholecystectomy (02/28/2018); ir insert tunl cvc w/o port over 5 yr (12/22/2020); and ir remove tunl cvad w/o port / pump (1/15/2021). Social History/Living Environment:   Home Environment: Private residence  # Steps to Enter: 0  Wheelchair Ramp: Yes  Living Alone: No  Support Systems: Spouse/Significant Other/Partner  Patient Expects to be Discharged to[de-identified] Skilled nursing facility  Current DME Used/Available at Home: Walker, rolling  Prior Level of Function/Work/Activity:  Bed bound. But has potential to SPT at home. W/c for mobility.       Number of Personal Factors/Comorbidities that affect the Plan of Care: Expanded review of therapy/medical records (1-2):  MODERATE COMPLEXITY   ASSESSMENT OF OCCUPATIONAL PERFORMANCE[de-identified]   Activities of Daily Living:   Basic ADLs (From Assessment) Complex ADLs (From Assessment)   Bathing: Maximum assistance  Upper Body Dressing: Moderate assistance  Lower Body Dressing: Total assistance  Toileting: Total assistance     Grooming/Bathing/Dressing Activities of Daily Living     Cognitive Retraining  Safety/Judgement: Decreased awareness of environment; Fall prevention; Insight into deficits                       Bed/Mat Mobility  Supine to Sit: Minimum assistance  Sit to Supine: Maximum assistance  Sit to Stand: Moderate assistance  Stand to Sit: Moderate assistance  Bed to Chair: Moderate assistance  Scooting: Moderate assistance     Most Recent Physical Functioning:   Gross Assessment:                  Posture:     Balance:  Sitting: With support  Standing: With support  Standing - Static:  (poor with walker) Bed Mobility:  Supine to Sit: Minimum assistance  Sit to Supine: Maximum assistance  Scooting: Moderate assistance  Wheelchair Mobility:     Transfers:  Sit to Stand:  Moderate assistance  Stand to Sit: Moderate assistance  Bed to Chair: Moderate assistance            Patient Vitals for the past 6 hrs:   BP BP Patient Position SpO2 Pulse   08/09/21 0656 (!) 92/53 Supine 93 % 73   08/09/21 0700 (!) 91/52  98 %    08/09/21 0715 (!) 104/57  99 %    08/09/21 0730 (!) 101/56  99 %    08/09/21 0745 (!) 88/50  99 %    08/09/21 0800 (!) 83/52  99 %    08/09/21 0815 (!) 95/59  98 %    08/09/21 0830 (!) 87/62  98 %    08/09/21 0845 (!) 81/58  98 %    08/09/21 0848 (!) 90/55  98 %    08/09/21 0921 112/72  100 %    08/09/21 0922 112/68  99 %    08/09/21 0923 103/60  99 %    08/09/21 0930 102/60  100 %    08/09/21 0937 103/63  99 %    08/09/21 1007 (!) 108/58  97 %    08/09/21 1037 111/64  100 %    08/09/21 1107 120/70          Mental Status  Neurologic State: Alert, Confused  Orientation Level: Oriented to time  Cognition: Appropriate decision making, Decreased attention/concentration, Follows commands  Perception: Appears intact  Safety/Judgement: Decreased awareness of environment, Fall prevention, Insight into deficits            LLE Assessment  LLE Assessment (WDL): Exception to WDL RLE Assessment  RLE Assessment (WDL): Exceptions to [facility]           Physical Skills Involved:  1. Range of Motion  2. Balance  3. Strength  4. Activity Tolerance Cognitive Skills Affected (resulting in the inability to perform in a timely and safe manner):  1. Short Term Recall  2. Long Term Memory  3. Sustained Attention Psychosocial Skills Affected:  1. Social Interaction  2. Emotional Regulation  3. Self-Awareness   Number of elements that affect the Plan of Care: 3-5:  MODERATE COMPLEXITY   CLINICAL DECISION MAKIN[address] [place], [place] [id number] AM-PAC 6 Clicks   Daily Activity Inpatient Short Form  How much help from another person does the patient currently need. .. Total A Lot A Little None   1. Putting on and taking off regular lower body clothing? [x] 1   [] 2   [] 3   [] 4   2. Bathing (including washing, rinsing, drying)? [] 1   [x] 2   [] 3   [] 4   3. Toileting, which includes using toilet, bedpan or urinal?   [] 1   [x] 2   [] 3   [] 4   4. Putting on and taking off regular upper body clothing? [] 1   [] 2   [x] 3   [] 4   5. Taking care of personal grooming such as brushing teeth? [] 1   [] 2   [x] 3   [] 4   6. Eating meals? [] 1   [] 2   [x] 3   [] 4   © , Trustees of [address] [place] [address], under license to [employer]. All rights reserved      Score:  Initial: 14 Most Recent: X (Date: -- )    Interpretation of Tool:  Represents activities that are increasingly more difficult (i.e. Bed mobility, Transfers, Gait). Medical Necessity:     · Skilled intervention continues to be required due to Deficits noted above.   Reason for Services/Other Comments:  · Patient continues to require skilled intervention due to   · Dx above  · . Use of outcome tool(s) and clinical judgement create a POC that gives a: MODERATE COMPLEXITY         TREATMENT:   (In addition to Assessment/Re-Assessment sessions the following treatments were rendered)     Pre-treatment Symptoms/Complaints:    Pain: Initial:   Pain Intensity 1: 5  Pain Location 1: Knee  Pain Orientation 1: Left, Right  Post Session:  5     Self Care: (10): Procedure(s) (per grid) utilized to improve and/or restore self-care/home management as related to grooming and standing, transfers . Required moderate verbal and tactile cueing to facilitate activities of daily living skills. Initial evaluation 8 minutes. Braces/Orthotics/Lines/Etc:   · IV  ·    Treatment/Session Assessment:    · Response to Treatment:  Fair, tearful at end. · Interdisciplinary Collaboration:   o Physical Therapist  o Occupational Therapist  o Registered Nurse  · After treatment position/precautions:   o Up in chair  o Call light within reach  o RN notified   · Compliance with Program/Exercises: Compliant all of the time, Will assess as treatment progresses. · Recommendations/Intent for next treatment session: \"Next visit will focus on advancements to more challenging activities and reduction in assistance provided\".   Total Treatment Duration:  OT Patient Time In/Time Out  Time In: 1055  Time Out: [address]

## 2021-08-09 NOTE — PROGRESS NOTES
1000:  Phone call with Pipe Rowan with [facility]. This facility is familiar with patient as she has been there before. Facility is okay with chest x-ray in place of [facility]. Awaiting inpatient PT/OT evaluation at this time. 1120:  Per Pipe Rowan, patient has an outstanding bill with [facility] of $2400. Pipe Rowan will speak with her  to see if patient is able to come there while having this unpaid balance. 1145:  Phone call to [facility]/[address] ([phone]). No answer; message left requesting callback. 1150:  Patient was denied placement at [facility]. 1155:  Referral sent to Shaan Valdivia with [facility] via [facility]. 1330:  Phone call to [facility] to follow-up on referral made earlier. No answer; message left. 1515:  Phone call received from Mike Brock with [facility]. Maria Fernanda will start the authorization process for patient to go to [address] [place] [address].      1535:  Phone call to the [facility] ([phone]). Report provided to [facility] due to concerns about patient's inability to independently care for herself without limited/no support.     DIEUDONNE Maynard-MARTA  [address] [facility]   [phone]

## 2021-08-09 NOTE — ED NOTES
Report taken from [facility] ([facility]). Patient resting comfortably in bed. No needs at this time.

## 2021-08-10 LAB
ALBUMIN SERPL-MCNC: 3.5 G/DL (ref 3.2–4.6)
ALBUMIN/GLOB SERPL: 1 {RATIO} (ref 1.2–3.5)
ALP SERPL-CCNC: 119 U/L (ref 50–130)
ALT SERPL-CCNC: 17 U/L (ref 12–65)
ANION GAP SERPL CALC-SCNC: 4 MMOL/L (ref 7–16)
AST SERPL-CCNC: 12 U/L (ref 15–37)
BILIRUB SERPL-MCNC: 0.5 MG/DL (ref 0.2–1.1)
BUN SERPL-MCNC: 53 MG/DL (ref 8–23)
CALCIUM SERPL-MCNC: 8.8 MG/DL (ref 8.3–10.4)
CHLORIDE SERPL-SCNC: 113 MMOL/L (ref 98–107)
CO2 SERPL-SCNC: 26 MMOL/L (ref 21–32)
CREAT SERPL-MCNC: 3.47 MG/DL (ref 0.6–1)
GLOBULIN SER CALC-MCNC: 3.5 G/DL (ref 2.3–3.5)
GLUCOSE SERPL-MCNC: 91 MG/DL (ref 65–100)
POTASSIUM SERPL-SCNC: 4.5 MMOL/L (ref 3.5–5.1)
PROT SERPL-MCNC: 7 G/DL (ref 6.3–8.2)
SODIUM SERPL-SCNC: 143 MMOL/L (ref 136–145)

## 2021-08-10 PROCEDURE — 80053 COMPREHEN METABOLIC PANEL: CPT

## 2021-08-10 PROCEDURE — 96372 THER/PROPH/DIAG INJ SC/IM: CPT

## 2021-08-10 PROCEDURE — 74011250637 HC RX REV CODE- 250/637: Performed by: EMERGENCY MEDICINE

## 2021-08-10 PROCEDURE — 74011250636 HC RX REV CODE- 250/636: Performed by: STUDENT IN AN ORGANIZED HEALTH CARE EDUCATION/TRAINING PROGRAM

## 2021-08-10 RX ORDER — LORAZEPAM 2 MG/ML
1 INJECTION INTRAMUSCULAR
Status: COMPLETED | OUTPATIENT
Start: 2021-08-10 | End: 2021-08-10

## 2021-08-10 RX ADMIN — QUETIAPINE FUMARATE 50 MG: 25 TABLET ORAL at 22:14

## 2021-08-10 RX ADMIN — ESCITALOPRAM OXALATE 10 MG: 10 TABLET ORAL at 09:45

## 2021-08-10 RX ADMIN — PANTOPRAZOLE SODIUM 40 MG: 40 TABLET, DELAYED RELEASE ORAL at 09:45

## 2021-08-10 RX ADMIN — LORAZEPAM 1 MG: 2 INJECTION INTRAMUSCULAR; INTRAVENOUS at 14:29
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
## 2021-08-11 VITALS
BODY MASS INDEX: 33.32 KG/M2 | OXYGEN SATURATION: 99 % | DIASTOLIC BLOOD PRESSURE: 74 MMHG | RESPIRATION RATE: 17 BRPM | HEIGHT: 65 IN | WEIGHT: 200 LBS | SYSTOLIC BLOOD PRESSURE: 142 MMHG | TEMPERATURE: 97.9 F | HEART RATE: 84 BPM

## 2021-08-11 LAB
MM INDURATION POC: 0 MM (ref 0–5)
PPD POC: NEGATIVE NEGATIVE

## 2021-08-11 PROCEDURE — 74011250637 HC RX REV CODE- 250/637: Performed by: EMERGENCY MEDICINE

## 2021-08-11 RX ADMIN — PANTOPRAZOLE SODIUM 40 MG: 40 TABLET, DELAYED RELEASE ORAL at 08:59

## 2021-08-11 RX ADMIN — ESCITALOPRAM OXALATE 10 MG: 10 TABLET ORAL at 09:00

## 2021-08-11 NOTE — PROGRESS NOTES
Care Management Interventions  Palliative Care Criteria Met (RRAT>21 & CHF Dx)?: No  Mode of Transport at Discharge: BLS [facility])  Transition of Care Consult (CM Consult): [address]: No  Reason Outside [place]: Patient already serviced by other home care/hospice agency  Discharge Durable Medical Equipment: No  Physical Therapy Consult: Yes  Occupational Therapy Consult: Yes  Speech Therapy Consult: No  Current Support Network: Lives with Spouse Lina Mendieta [phone])  Confirm Follow Up Transport: Family (spouse)  The Plan for Transition of Care is Related to the Following Treatment Goals : return home  The Patient and/or Patient Representative was Provided with a Choice of Provider and Agrees with the Discharge Plan?: Yes  Name of the Patient Representative Who was Provided with a Choice of Provider and Agrees with the Discharge Plan: patient she states \"I do not need a list I want to go back with [facility]   [facility] of Choice List was Provided with Basic Dialogue that Supports the Patient's Individualized Plan of Care/Goals, Treatment Preferences and Shares the Quality Data Associated with the Providers?: No (Patient declined list for home health as she states had [facility] and wants them again. )  Discharge Location  Discharge Placement: Home with home health  CM followed up with patient today. Patient spouse was d/c home today and she wants to return home. CM asked patient if she was able to get up and she stated no but her  helps her. Asked if she felt that he was able to meet her needs and she stated \"yes\". She wears diapers and she changes the wet ones and he cleans her up if has bowel movement. Patient spouse was admitted for alcohol withdrawal on 8/8 and d/c 8/11. Patient declines to go to rehab if insurance would approve.  DEBBIE discussed home health options and she is agreeable to home health and she request [facility] as she has had them in the past and she did not participate with them so the d/c her but she states she will participate this time. CM spoke with spouse on phone he states he is home and has everything set up and her bed is waiting. He verified address for her to return to. CM sent referral to [facility] for RN/PT/OT/SW with confirmation received. Patient to d/c home with [facility] and spouse by [facility] with  at 1230.

## 2021-08-11 NOTE — ED NOTES
Spoke with pt  who states he wishes for pt to be d/c to home now that he is at home.   Gibran Valerio with case management notified

## 2021-08-11 NOTE — PROGRESS NOTES
Adult protective services report called to DSS spoke with Christo for v[facility]erable adult. Patient lives with her  who was admitted 8/8 for alcohol withdrawal and atrial fib. Spouse is primary care giver and initially patient had stated that he could no longer take care of her. Patient requires diapers and does not get out of bed. Patient states she changes her wet diapers and spouse will clean her if she has bowel movement. Patient stayed in ER due to no one able to care for patient at home as spouse was admitted. Physical therapy recommends placement at nursing home. When spouse d/c patient decided she did not want to go to rehab and spouse requested patient to return home with him.

## 2021-08-11 NOTE — ED NOTES
Spouse called and asked if transport had been set up for his wife to come home. Explained to spouse that a voice mail had been left with  and waiting to hear back. Instructed pt spouse would call with update.

## 2021-08-11 NOTE — ED NOTES
I have reviewed discharge instructions with the patient. The patient verbalized understanding. Patient left ED via Discharge Method: stretcher to Home with Richa Palomares. Opportunity for questions and clarification provided. Patient given 0 scripts. To continue your aftercare when you leave the hospital, you may receive an automated call from our care team to check in on how you are doing. This is a free service and part of our promise to provide the best care and service to meet your aftercare needs.  If you have questions, or wish to unsubscribe from this service please call [phone]. Thank you for Choosing our [facility] Emergency Department.

## 2021-08-20 ENCOUNTER — APPOINTMENT (OUTPATIENT)
Dept: GENERAL RADIOLOGY | Age: 61
End: 2021-08-20
Attending: EMERGENCY MEDICINE
Payer: MEDICARE

## 2021-08-20 ENCOUNTER — HOSPITAL ENCOUNTER (EMERGENCY)
Age: 61
Discharge: HOME | End: 2021-08-20
Attending: EMERGENCY MEDICINE
Payer: MEDICARE

## 2021-08-20 VITALS
RESPIRATION RATE: 17 BRPM | BODY MASS INDEX: 33.32 KG/M2 | HEIGHT: 65 IN | DIASTOLIC BLOOD PRESSURE: 80 MMHG | WEIGHT: 200 LBS | OXYGEN SATURATION: 97 % | TEMPERATURE: 98 F | SYSTOLIC BLOOD PRESSURE: 130 MMHG | HEART RATE: 68 BPM

## 2021-08-20 DIAGNOSIS — G89.29 CHRONIC PAIN OF BOTH KNEES: Primary | ICD-10-CM

## 2021-08-20 DIAGNOSIS — N18.9 CHRONIC KIDNEY DISEASE, UNSPECIFIED CKD STAGE: ICD-10-CM

## 2021-08-20 DIAGNOSIS — M25.562 CHRONIC PAIN OF BOTH KNEES: Primary | ICD-10-CM

## 2021-08-20 DIAGNOSIS — M25.561 CHRONIC PAIN OF BOTH KNEES: Primary | ICD-10-CM

## 2021-08-20 LAB
ALBUMIN SERPL-MCNC: 4.2 G/DL (ref 3.2–4.6)
ALBUMIN/GLOB SERPL: 1.2 {RATIO} (ref 1.2–3.5)
ALP SERPL-CCNC: 142 U/L (ref 50–130)
ALT SERPL-CCNC: 27 U/L (ref 12–65)
ANION GAP SERPL CALC-SCNC: 6 MMOL/L (ref 7–16)
ARTERIAL PATENCY WRIST A: POSITIVE
AST SERPL-CCNC: 21 U/L (ref 15–37)
BASE DEFICIT BLD-SCNC: 3.3 MMOL/L
BASOPHILS # BLD: 0.1 K/UL (ref 0–0.2)
BASOPHILS NFR BLD: 1 % (ref 0–2)
BDY SITE: ABNORMAL
BILIRUB SERPL-MCNC: 0.7 MG/DL (ref 0.2–1.1)
BUN SERPL-MCNC: 43 MG/DL (ref 8–23)
CA-I BLD-MCNC: 1.2 MMOL/L (ref 1.12–1.32)
CALCIUM SERPL-MCNC: 9.7 MG/DL (ref 8.3–10.4)
CHLORIDE SERPL-SCNC: 108 MMOL/L (ref 98–107)
CO2 BLD-SCNC: 21 MMOL/L (ref 13–23)
CO2 SERPL-SCNC: 24 MMOL/L (ref 21–32)
COVID-19 RAPID TEST, COVR: NOT DETECTED
CREAT SERPL-MCNC: 3.31 MG/DL (ref 0.6–1)
DIFFERENTIAL METHOD BLD: NORMAL
EOSINOPHIL # BLD: 0.1 K/UL (ref 0–0.8)
EOSINOPHIL NFR BLD: 2 % (ref 0.5–7.8)
ERYTHROCYTE [DISTWIDTH] IN BLOOD BY AUTOMATED COUNT: 13.9 % (ref 11.9–14.6)
FIO2 ON VENT: 21 %
GAS FLOW.O2 O2 DELIVERY SYS: ABNORMAL L/MIN
GLOBULIN SER CALC-MCNC: 3.6 G/DL (ref 2.3–3.5)
GLUCOSE BLD STRIP.AUTO-MCNC: 113 MG/DL (ref 65–100)
GLUCOSE SERPL-MCNC: 96 MG/DL (ref 65–100)
HCO3 BLD-SCNC: 20.8 MMOL/L (ref 22–26)
HCT VFR BLD AUTO: 43.9 % (ref 35.8–46.3)
HGB BLD-MCNC: 13.8 G/DL (ref 11.7–15.4)
IMM GRANULOCYTES # BLD AUTO: 0 K/UL (ref 0–0.5)
IMM GRANULOCYTES NFR BLD AUTO: 0 % (ref 0–5)
LYMPHOCYTES # BLD: 1.3 K/UL (ref 0.5–4.6)
LYMPHOCYTES NFR BLD: 20 % (ref 13–44)
MCH RBC QN AUTO: 28.3 PG (ref 26.1–32.9)
MCHC RBC AUTO-ENTMCNC: 31.4 G/DL (ref 31.4–35)
MCV RBC AUTO: 90 FL (ref 79.6–97.8)
MONOCYTES # BLD: 0.3 K/UL (ref 0.1–1.3)
MONOCYTES NFR BLD: 4 % (ref 4–12)
NEUTS SEG # BLD: 4.9 K/UL (ref 1.7–8.2)
NEUTS SEG NFR BLD: 73 % (ref 43–78)
NRBC # BLD: 0 K/UL (ref 0–0.2)
PCO2 BLD: 33.2 MMHG (ref 35–45)
PH BLD: 7.4 [PH] (ref 7.35–7.45)
PLATELET # BLD AUTO: 183 K/UL (ref 150–450)
PMV BLD AUTO: 9.6 FL (ref 9.4–12.3)
PO2 BLD: 81 MMHG (ref 75–100)
POTASSIUM BLD-SCNC: 6.4 MMOL/L (ref 3.5–5.1)
POTASSIUM SERPL-SCNC: 6.6 MMOL/L (ref 3.5–5.1)
PROT SERPL-MCNC: 7.8 G/DL (ref 6.3–8.2)
RBC # BLD AUTO: 4.88 M/UL (ref 4.05–5.2)
SAO2 % BLD: 96 %
SERVICE CMNT-IMP: ABNORMAL
SODIUM BLD-SCNC: 136 MMOL/L (ref 136–145)
SODIUM SERPL-SCNC: 138 MMOL/L (ref 136–145)
SOURCE, COVRS: NORMAL
SPECIMEN SITE: ABNORMAL
TOTAL RESP. RATE, ITRR: 17
WBC # BLD AUTO: 6.8 K/UL (ref 4.3–11.1)

## 2021-08-20 PROCEDURE — 74011636637 HC RX REV CODE- 636/637: Performed by: EMERGENCY MEDICINE

## 2021-08-20 PROCEDURE — 36415 COLL VENOUS BLD VENIPUNCTURE: CPT

## 2021-08-20 PROCEDURE — 99284 EMERGENCY DEPT VISIT MOD MDM: CPT

## 2021-08-20 PROCEDURE — 73562 X-RAY EXAM OF KNEE 3: CPT

## 2021-08-20 PROCEDURE — 82803 BLOOD GASES ANY COMBINATION: CPT

## 2021-08-20 PROCEDURE — 74011250637 HC RX REV CODE- 250/637: Performed by: EMERGENCY MEDICINE

## 2021-08-20 PROCEDURE — 74011000250 HC RX REV CODE- 250: Performed by: EMERGENCY MEDICINE

## 2021-08-20 PROCEDURE — 71046 X-RAY EXAM CHEST 2 VIEWS: CPT

## 2021-08-20 PROCEDURE — 86580 TB INTRADERMAL TEST: CPT | Performed by: EMERGENCY MEDICINE

## 2021-08-20 PROCEDURE — 93005 ELECTROCARDIOGRAM TRACING: CPT | Performed by: EMERGENCY MEDICINE

## 2021-08-20 PROCEDURE — 96372 THER/PROPH/DIAG INJ SC/IM: CPT

## 2021-08-20 PROCEDURE — 74011250636 HC RX REV CODE- 250/636: Performed by: EMERGENCY MEDICINE

## 2021-08-20 PROCEDURE — 82947 ASSAY GLUCOSE BLOOD QUANT: CPT

## 2021-08-20 PROCEDURE — 85025 COMPLETE CBC W/AUTO DIFF WBC: CPT

## 2021-08-20 PROCEDURE — 80053 COMPREHEN METABOLIC PANEL: CPT

## 2021-08-20 PROCEDURE — 87635 SARS-COV-2 COVID-19 AMP PRB: CPT

## 2021-08-20 RX ORDER — TRAMADOL HYDROCHLORIDE 50 MG/1
50-100 TABLET ORAL
Qty: 20 TABLET | Refills: 0 | Status: SHIPPED | OUTPATIENT
Start: 2021-08-20 | End: 2021-08-25

## 2021-08-20 RX ORDER — TRAMADOL HYDROCHLORIDE 50 MG/1
100 TABLET ORAL
Status: COMPLETED | OUTPATIENT
Start: 2021-08-20 | End: 2021-08-20

## 2021-08-20 RX ORDER — PREDNISONE 20 MG/1
40 TABLET ORAL DAILY
Qty: 8 TABLET | Refills: 0 | Status: SHIPPED | OUTPATIENT
Start: 2021-08-20 | End: 2021-08-24

## 2021-08-20 RX ORDER — THIAMINE HYDROCHLORIDE 100 MG/ML
100 INJECTION, SOLUTION INTRAMUSCULAR; INTRAVENOUS
Status: COMPLETED | OUTPATIENT
Start: 2021-08-20 | End: 2021-08-20

## 2021-08-20 RX ORDER — ACETAMINOPHEN 500 MG
1000 TABLET ORAL
Status: COMPLETED | OUTPATIENT
Start: 2021-08-20 | End: 2021-08-20

## 2021-08-20 RX ADMIN — PREDNISONE 60 MG: 10 TABLET ORAL at 16:12

## 2021-08-20 RX ADMIN — THIAMINE HYDROCHLORIDE 100 MG: 100 INJECTION, SOLUTION INTRAMUSCULAR; INTRAVENOUS at 16:14

## 2021-08-20 RX ADMIN — ACETAMINOPHEN 1000 MG: 500 TABLET, FILM COATED ORAL at 16:12

## 2021-08-20 RX ADMIN — TRAMADOL HYDROCHLORIDE 100 MG: 50 TABLET, FILM COATED ORAL at 16:12

## 2021-08-20 RX ADMIN — SODIUM ZIRCONIUM CYCLOSILICATE 15 G: 10 POWDER, FOR SUSPENSION ORAL at 18:31

## 2021-08-20 RX ADMIN — TUBERCULIN PURIFIED PROTEIN DERIVATIVE 5 UNITS: 5 INJECTION, SOLUTION INTRADERMAL at 15:26

## 2021-08-20 NOTE — ED NOTES
Patient was signed over to me by Dr. Tay Lu. Plan was to follow-up the patient's blood work and then discharge home if no significant abnormalities. Blood work did come back showing an elevated potassium so an EKG was performed. The EKG showed no signs of hyperkalemia. EKG was performed and interpreted by me showed normal sinus rhythm rate 69, GA is 146, QRS is 78, QTc is 439 with no acute ischemic change and no peaked T waves. I reviewed blood work from the patient over the past several months and she has potassiums that seem to arrange quite dramatically from 4.5-6.2. Given that the patient seems to be her physical baseline, has no EKG findings consistent with potassium toxicity and a history of labile potassium levels I did treated the patient with 15 of Bruce Bender and then she has close follow-up in the next few days.     Bushra Bustillo MD

## 2021-08-20 NOTE — ED NOTES
PT/OT not able to see patient in ED today. Pt to be discharge home with rx from tramadol and prednisone. Home Health to see patient tomorrow to evaluate for Rehab placement. Pt is ok with this plan.

## 2021-08-20 NOTE — ED NOTES
I have reviewed discharge instructions with the patient. The patient verbalized understanding. Patient left ED via Discharge Method: stretcher to Home with [facility] transport. Opportunity for questions and clarification provided. Patient given 2 scripts. To continue your aftercare when you leave the hospital, you may receive an automated call from our care team to check in on how you are doing. This is a free service and part of our promise to provide the best care and service to meet your aftercare needs.  If you have questions, or wish to unsubscribe from this service please call [phone]. Thank you for Choosing our [facility] Emergency Department.

## 2021-08-20 NOTE — PROGRESS NOTES
Meet with pt about her visit today and other recent visits. When pt asked why she is here today, her response is \"my  hates me\". She also informed me that he was \"intoxicated again\". Pt was recently here for the same c/o. Pt is also stating that she wants to go to rehab now. Pt claims that she howe snot walk and have not for months, pt has a WC at home, but it does not get through the doorways. Pt was ordered to go to rehab when she was here as a ER hold form 8/8 to 8/11, but when spouse was d/c home he wanted the pt to come home and the pt went with him. I explained the concern for her frequent visits to the ED and the safety concern for her at home. Pt had not heard form anyone at [facility], even though 2 reports have been made since 7 her /21 visit. I have attempted 4 times to reach Gustavo Gay at both her cell #[phone] and her desk phone, with no call back. I have since called her supervisor Buck Yeboah #[phone] and  as well. I informed Dr Charis Olson of this situation. I have spoken to [facility] , she is familiar with the pt. Pt and spouse were at [facility] in the past. She does have a bed at [facility]. I have ordered PT/OT/PPD, with a CXR to r/o signs & symptoms of TB and rapid COVID. Pt may or may not be able to stay in the ED. If she goes home this evening, after evaluations are complete, she may be able to get placed form home tomorrow.

## 2021-08-20 NOTE — DISCHARGE INSTRUCTIONS
Take 2 extra strength Tylenol every 6 hours for knee pain  Take 1-2 tramadol every 6 hours for severe knee pain  Take 2 prednisone daily for 4 days for the knee pain    The  should call you tomorrow about rehab placement    Avoid any anti-inflammatories (Advil, ibuprofen, Aleve), in light of your kidney function

## 2021-08-20 NOTE — ED PROVIDER NOTES
Chief complaint : \"I cannot walk, and my  hates me\"    HISTORY OF PRESENT ILLNESS :  Location : Pain to bilateral knees    Quality : Aching    Quantity : Constant    Timing : Months    Severity : Moderate    Context : History of knee replacement on the right some years ago  Patient actually is able to ambulate, it just hurts  No recent fall or injury    Alleviating / exacerbating factors : Mild relief with Tylenol    Associated Symptoms : marital discord             Past Medical History:   Diagnosis Date    Aneurysm of common carotid artery ([facility] [address] 2004    left side s/p coil     CKD (chronic kidney disease) 2014    Dementia ([facility] [address]     FSGS (focal segmental glomerulosclerosis)     in remission at present    Gout     Hypertension     managed with medication     Osteoarthritis 2014    Stroke ([facility] [address] ,     slight weakness on right side, slight effect to speech       Past Surgical History:   Procedure Laterality Date    HX ANKLE FRACTURE TX Left 2013    has hardware in    HX CHOLECYSTECTOMY  2018    HX ERCP  2018    cbd stone    HX INTRACRANIAL ANEURYSM REPAIR  2004    left carotid     HX ORTHOPAEDIC Right 10/2014    hip replacement    HX REFRACTIVE SURGERY      bilateral - Lasik    IR INSERT TUNL CVC W/O PORT OVER 5 YR  2020    IR REMOVE TUNL CVAD W/O PORT / PUMP  1/15/2021         Family History:   Problem Relation Age of Onset    Cancer Father 80        unknown    Stroke Sister 48       Social History     Socioeconomic History    Marital status:      Spouse name: Not on file    Number of children: Not on file    Years of education: Not on file    Highest education level: Not on file   Occupational History    Not on file   Tobacco Use    Smoking status: Former Smoker     Packs/day: 0.25     Quit date: 1979     Years since quittin.6    Smokeless tobacco: Never Used   Substance and Sexual Activity    Alcohol use:  Yes     Alcohol/week: 2.5 standard drinks     Types: 3 Shots of liquor per week     Comment: pint of fireball    Drug use: No    Sexual activity: Yes     Partners: Male     Birth control/protection: None   Other Topics Concern    Not on file   Social History Narrative    Not on file     Social Determinants of Health     Financial Resource Strain:     Difficulty of Paying Living Expenses:    Food Insecurity:     Worried About Running Out of Food in the Last Year:     [address] in the Last Year:    Transportation Needs:     Lack of Transportation (Medical):  Lack of Transportation (Non-Medical):    Physical Activity:     Days of Exercise per Week:     Minutes of Exercise per Session:    Stress:     Feeling of Stress :    Social Connections:     Frequency of Communication with Friends and Family:     Frequency of Social Gatherings with Friends and Family:     Attends [employer] Services:     Active Member of Clubs or Organizations:     Attends Club or Organization Meetings:     Marital Status:    Intimate Partner Violence:     Fear of Current or Ex-Partner:     Emotionally Abused:     Physically Abused:     Sexually Abused: ALLERGIES: Adhesive, Codeine, and Hydrocodone-acetaminophen    Review of Systems   Musculoskeletal: Positive for arthralgias and gait problem. Psychiatric/Behavioral: Positive for dysphoric mood. Vitals:    08/20/21 1112   BP: 127/76   Pulse: 75   Resp: 18   Temp: 98 °F (36.7 °C)   SpO2: 97%   Weight: 90.7 kg (200 lb)   Height: 5' 5\" (1.651 m)            Physical Exam  Vitals and nursing note reviewed. Constitutional:       General: She is not in acute distress. Appearance: Normal appearance. She is well-developed. She is not ill-appearing, toxic-appearing or diaphoretic. HENT:      Head: Normocephalic and atraumatic. Right Ear: External ear normal.      Left Ear: External ear normal.   Eyes:      General:         Right eye: No discharge.          Left eye: No discharge. Conjunctiva/sclera: Conjunctivae normal.   Pulmonary:      Effort: Pulmonary effort is normal. No respiratory distress. Musculoskeletal:         General: Normal range of motion. Cervical back: Normal range of motion and neck supple. Skin:     General: Skin is warm and dry. Findings: No rash. Neurological:      General: No focal deficit present. Mental Status: She is alert and oriented to person, place, and time. Mental status is at baseline. Motor: No abnormal muscle tone. Comments: cni 2-12 grossly  Nl gait,  Nl speech     Psychiatric:         Attention and Perception: Attention normal.         Mood and Affect: Mood is depressed. Affect is tearful. Speech: Speech is delayed. Behavior: Behavior normal.          MDM  Number of Diagnoses or Management Options  Chronic kidney disease, unspecified CKD stage  Chronic pain of both knees: established and worsening  Diagnosis management comments: Medical decision making note:  Chronic knee pain, patient wants to get admitted to rehab, tearful regarding marital discord  Seen by case management who will call her tomorrow regarding the pending application for rehab placement   X-ray negative for TB, PPD placed, labs pending    This concludes the \"medical decision making note\" part of this emergency department visit note.          Amount and/or Complexity of Data Reviewed  Clinical lab tests: ordered (Results Include:    Recent Results (from the past 24 hour(s))  -COVID-19 RAPID TEST  Collection Time: 08/20/21  3:14 PM       Result                      Value             Ref Range           Specimen source                                               Nasopharyngeal       COVID-19 rapid test         Not detected      NOTD           )  Tests in the radiology section of CPT®: reviewed and ordered (XR CHEST PA LAT   Final Result        No evidence of acute cardiopulmonary disease, including no definite radiographic evidence of active pulmonary tuberculosis. VOICE DICTATED BY: Dr. Tatiana Valles     XR KNEE RT 3 V   Final Result        1. Severe lateral subcutaneous soft tissue swelling in the imaged portions of    the right lower extremity. There is no evidence of underlying fracture. Correlation for contusion, edema, cellulitis is recommended. 2. Unremarkable appearance of right-sided surgical hardware, with healed    fractures in anatomic alignment. 3. No acute fracture or dislocation in the left knee. Very mild nonprogressive    left knee degenerative changes are as detailed above. VOICE DICTATED BY: Dr. Tatiana Valles                      XR KNEE LT 3 V   Final Result        1. Severe lateral subcutaneous soft tissue swelling in the imaged portions of the right lower extremity. There is no evidence of underlying fracture. Correlation for contusion, edema, cellulitis is recommended. 2. Unremarkable appearance of right-sided surgical hardware, with healed fractures in anatomic alignment. 3. No acute fracture or dislocation in the left knee. Very mild nonprogressive left knee degenerative changes are as detailed above. VOICE DICTATED BY: Dr. Tatiana Valles                      )  Decide to obtain previous medical records or to obtain history from someone other than the patient: yes    Risk of Complications, Morbidity, and/or Mortality  Presenting problems: moderate  Diagnostic procedures: low  Management options: low  General comments: Late entry:  7:39 PM, On 8/20  Care turned over to dr Cayetano Camarena at 16:45 to check labs, DRAWN BY lab phlebotmy, with instructions to discharge if labs are att baseline, or admit as needed  -gabi SAMPSON Patient Progress  Patient progress: stable    ED Course as of Aug 20 1939   Fri Aug 20, 2057   5081 Metabolic panel resulted with a potassium of 6.6. EKG was performed and shows no real stigmata of hyperkalemia.     [JS]   6860 Creatinine seems to be at the patient's baseline.     [JS]      ED Course User Index  [JS] Marko Pizarro MD       Procedures

## 2021-08-20 NOTE — ED TRIAGE NOTES
Pt to ED via EMS from home without complaints for placement away from her .  is her care giver and is currently intoxicated. Pt was here recently for same issues while  was in ICU for detox. Masked.

## 2021-08-20 NOTE — PROGRESS NOTES
BIJAN received report from [facility]. Contacted Jessica Cox to advise that BIJAN would be  over the weekend. Selected Dion in CC link.      Vi Rodriguez LMSW    [facility]    * [email or web address]

## 2021-08-21 LAB
ATRIAL RATE: 69 BPM
CALCULATED P AXIS, ECG09: 42 DEGREES
CALCULATED R AXIS, ECG10: 24 DEGREES
CALCULATED T AXIS, ECG11: 30 DEGREES
DIAGNOSIS, 93000: NORMAL
P-R INTERVAL, ECG05: 146 MS
Q-T INTERVAL, ECG07: 410 MS
QRS DURATION, ECG06: 78 MS
QTC CALCULATION (BEZET), ECG08: 439 MS
VENTRICULAR RATE, ECG03: 69 BPM

## 2021-09-26 ENCOUNTER — HOSPITAL ENCOUNTER (EMERGENCY)
Age: 61
Discharge: HOME | End: 2021-09-27
Attending: EMERGENCY MEDICINE
Payer: MEDICARE

## 2021-09-26 DIAGNOSIS — I95.9 HYPOTENSION, UNSPECIFIED HYPOTENSION TYPE: Primary | ICD-10-CM

## 2021-09-26 LAB
ALBUMIN SERPL-MCNC: 3.6 G/DL (ref 3.2–4.6)
ALBUMIN/GLOB SERPL: 0.9 {RATIO} (ref 1.2–3.5)
ALP SERPL-CCNC: 124 U/L (ref 50–130)
ALT SERPL-CCNC: 16 U/L (ref 12–65)
ANION GAP SERPL CALC-SCNC: 7 MMOL/L (ref 7–16)
AST SERPL-CCNC: 24 U/L (ref 15–37)
BASOPHILS # BLD: 0.1 K/UL (ref 0–0.2)
BASOPHILS NFR BLD: 1 % (ref 0–2)
BILIRUB SERPL-MCNC: 0.6 MG/DL (ref 0.2–1.1)
BUN SERPL-MCNC: 56 MG/DL (ref 8–23)
CALCIUM SERPL-MCNC: 9.5 MG/DL (ref 8.3–10.4)
CHLORIDE SERPL-SCNC: 112 MMOL/L (ref 98–107)
CO2 SERPL-SCNC: 21 MMOL/L (ref 21–32)
CREAT SERPL-MCNC: 3.27 MG/DL (ref 0.6–1)
DIFFERENTIAL METHOD BLD: ABNORMAL
EOSINOPHIL # BLD: 0.1 K/UL (ref 0–0.8)
EOSINOPHIL NFR BLD: 1 % (ref 0.5–7.8)
ERYTHROCYTE [DISTWIDTH] IN BLOOD BY AUTOMATED COUNT: 13.2 % (ref 11.9–14.6)
GLOBULIN SER CALC-MCNC: 3.8 G/DL (ref 2.3–3.5)
GLUCOSE SERPL-MCNC: 132 MG/DL (ref 65–100)
HCT VFR BLD AUTO: 41 % (ref 35.8–46.3)
HGB BLD-MCNC: 12.8 G/DL (ref 11.7–15.4)
IMM GRANULOCYTES # BLD AUTO: 0 K/UL (ref 0–0.5)
IMM GRANULOCYTES NFR BLD AUTO: 0 % (ref 0–5)
LIPASE SERPL-CCNC: 410 U/L (ref 73–393)
LYMPHOCYTES # BLD: 1.3 K/UL (ref 0.5–4.6)
LYMPHOCYTES NFR BLD: 18 % (ref 13–44)
MAGNESIUM SERPL-MCNC: 2.6 MG/DL (ref 1.8–2.4)
MCH RBC QN AUTO: 28.4 PG (ref 26.1–32.9)
MCHC RBC AUTO-ENTMCNC: 31.2 G/DL (ref 31.4–35)
MCV RBC AUTO: 91.1 FL (ref 79.6–97.8)
MONOCYTES # BLD: 0.4 K/UL (ref 0.1–1.3)
MONOCYTES NFR BLD: 5 % (ref 4–12)
NEUTS SEG # BLD: 5.6 K/UL (ref 1.7–8.2)
NEUTS SEG NFR BLD: 75 % (ref 43–78)
NRBC # BLD: 0 K/UL (ref 0–0.2)
PLATELET # BLD AUTO: 180 K/UL (ref 150–450)
PMV BLD AUTO: 10.1 FL (ref 9.4–12.3)
POTASSIUM SERPL-SCNC: 4.8 MMOL/L (ref 3.5–5.1)
POTASSIUM SERPL-SCNC: 6.2 MMOL/L (ref 3.5–5.1)
PROT SERPL-MCNC: 7.4 G/DL (ref 6.3–8.2)
RBC # BLD AUTO: 4.5 M/UL (ref 4.05–5.2)
SODIUM SERPL-SCNC: 140 MMOL/L (ref 136–145)
TROPONIN-HIGH SENSITIVITY: 11.8 PG/ML (ref 0–14)
WBC # BLD AUTO: 7.4 K/UL (ref 4.3–11.1)

## 2021-09-26 PROCEDURE — 84132 ASSAY OF SERUM POTASSIUM: CPT

## 2021-09-26 PROCEDURE — 93005 ELECTROCARDIOGRAM TRACING: CPT | Performed by: EMERGENCY MEDICINE

## 2021-09-26 PROCEDURE — 96360 HYDRATION IV INFUSION INIT: CPT

## 2021-09-26 PROCEDURE — 83690 ASSAY OF LIPASE: CPT

## 2021-09-26 PROCEDURE — 84484 ASSAY OF TROPONIN QUANT: CPT

## 2021-09-26 PROCEDURE — 83735 ASSAY OF MAGNESIUM: CPT

## 2021-09-26 PROCEDURE — 74011250636 HC RX REV CODE- 250/636: Performed by: EMERGENCY MEDICINE

## 2021-09-26 PROCEDURE — 80053 COMPREHEN METABOLIC PANEL: CPT

## 2021-09-26 PROCEDURE — 99285 EMERGENCY DEPT VISIT HI MDM: CPT

## 2021-09-26 PROCEDURE — 85025 COMPLETE CBC W/AUTO DIFF WBC: CPT

## 2021-09-26 RX ORDER — SODIUM CHLORIDE 0.9 % (FLUSH) 0.9 %
5-10 SYRINGE (ML) INJECTION AS NEEDED
Status: DISCONTINUED | OUTPATIENT
Start: 2021-09-26 | End: 2021-09-27 | Stop reason: HOSPADM

## 2021-09-26 RX ORDER — SODIUM CHLORIDE 0.9 % (FLUSH) 0.9 %
5-10 SYRINGE (ML) INJECTION EVERY 8 HOURS
Status: DISCONTINUED | OUTPATIENT
Start: 2021-09-26 | End: 2021-09-27 | Stop reason: HOSPADM

## 2021-09-26 RX ADMIN — SODIUM CHLORIDE 1000 ML: 900 INJECTION, SOLUTION INTRAVENOUS at 22:55

## 2021-09-27 ENCOUNTER — APPOINTMENT (OUTPATIENT)
Dept: GENERAL RADIOLOGY | Age: 61
End: 2021-09-27
Attending: EMERGENCY MEDICINE
Payer: MEDICARE

## 2021-09-27 VITALS
HEIGHT: 65 IN | BODY MASS INDEX: 33.32 KG/M2 | HEART RATE: 94 BPM | SYSTOLIC BLOOD PRESSURE: 125 MMHG | RESPIRATION RATE: 15 BRPM | DIASTOLIC BLOOD PRESSURE: 67 MMHG | WEIGHT: 200 LBS | TEMPERATURE: 97.5 F | OXYGEN SATURATION: 99 %

## 2021-09-27 LAB
ATRIAL RATE: 115 BPM
CALCULATED P AXIS, ECG09: 56 DEGREES
CALCULATED R AXIS, ECG10: 36 DEGREES
CALCULATED T AXIS, ECG11: 49 DEGREES
DIAGNOSIS, 93000: NORMAL
LACTATE SERPL-SCNC: 1 MMOL/L (ref 0.4–2)
P-R INTERVAL, ECG05: 130 MS
Q-T INTERVAL, ECG07: 310 MS
QRS DURATION, ECG06: 78 MS
QTC CALCULATION (BEZET), ECG08: 428 MS
SARS-COV-2, COV2: NORMAL
VENTRICULAR RATE, ECG03: 115 BPM

## 2021-09-27 PROCEDURE — 74011250637 HC RX REV CODE- 250/637: Performed by: EMERGENCY MEDICINE

## 2021-09-27 PROCEDURE — 83605 ASSAY OF LACTIC ACID: CPT

## 2021-09-27 PROCEDURE — 96374 THER/PROPH/DIAG INJ IV PUSH: CPT

## 2021-09-27 PROCEDURE — 74011000250 HC RX REV CODE- 250: Performed by: EMERGENCY MEDICINE

## 2021-09-27 PROCEDURE — 86580 TB INTRADERMAL TEST: CPT | Performed by: EMERGENCY MEDICINE

## 2021-09-27 PROCEDURE — 96360 HYDRATION IV INFUSION INIT: CPT

## 2021-09-27 PROCEDURE — U0004 COV-19 TEST NON-CDC HGH THRU: HCPCS

## 2021-09-27 PROCEDURE — 71046 X-RAY EXAM CHEST 2 VIEWS: CPT

## 2021-09-27 RX ORDER — ESCITALOPRAM OXALATE 10 MG/1
10 TABLET ORAL DAILY
Status: DISCONTINUED | OUTPATIENT
Start: 2021-09-27 | End: 2021-09-27 | Stop reason: HOSPADM

## 2021-09-27 RX ORDER — QUETIAPINE FUMARATE 25 MG/1
50 TABLET, FILM COATED ORAL
Status: DISCONTINUED | OUTPATIENT
Start: 2021-09-27 | End: 2021-09-27 | Stop reason: HOSPADM

## 2021-09-27 RX ORDER — ASPIRIN 325 MG
325 TABLET ORAL DAILY
Status: DISCONTINUED | OUTPATIENT
Start: 2021-09-27 | End: 2021-09-27 | Stop reason: HOSPADM

## 2021-09-27 RX ADMIN — ASPIRIN 325 MG: 325 TABLET, FILM COATED ORAL at 09:00

## 2021-09-27 RX ADMIN — TUBERCULIN PURIFIED PROTEIN DERIVATIVE 5 UNITS: 5 INJECTION, SOLUTION INTRADERMAL at 10:23

## 2021-09-27 RX ADMIN — ESCITALOPRAM OXALATE 10 MG: 10 TABLET ORAL at 09:00

## 2021-09-27 NOTE — ED NOTES
Patient updated on 's condition and plan of care with 's per mission. Patient with blankets. Lights turned off and encouraged patient to try to sleep for the night and get some rest until a plan could be made for her in the morning.

## 2021-09-27 NOTE — ED TRIAGE NOTES
Ems reports that called out hypotension, but pt is not hypotension. Pt  just arrived as pt in ED and unable to walk for 2 years and cannot stay by herself.   vss

## 2021-09-27 NOTE — ED PROVIDER NOTES
[employer] Emergency Department Provider Note                     PCP:                Berkley Dodson MD               Age: 61 y.o. Sex: F           ICD-10-CM ICD-9-CM    1. Hypotension, unspecified hypotension type  I95.9 458.9        Discharged     Orders Placed This Encounter    Troponin - High Sensitivity    CBC    CMP    LIPASE    Magnesium    POTASSIUM    LACTIC ACID    Cardiac Monitoring    PULSE OXIMETRY CONTINUOUS    NURSING-MISCELLANEOUS: Please draw blue top tube and send to lab ONE TIME    EKG    SALINE LOCK IV ONE TIME Routine    INSERT PERIPHERAL IV ONE TIME STAT    sodium chloride (NS) flush 5-10 mL    sodium chloride (NS) flush 5-10 mL    sodium chloride 0.9 % bolus infusion 1,000 mL    IP CONSULT TO SOCIAL WORK            Deyanira Aranda is a 61 y.o. female who presents to the Emergency Department with chief complaint of    Chief Complaint   Patient presents with    Hypotension     cant walk       Mask was worn during the entire patient examination. Deyanira Aranda is a 61 y.o. female who presents to the ED with a chief complaint of low blood pressure. Patient states she has been feeling fine all day and not having any symptoms. Specifically no chest pain, shortness of breath, headache, or vomiting. She has been eating well but did have one episode of diarrhea yesterday does have a history of chronic kidney disease. The only reason they checked her blood pressure was because she asked as her  was coming into the hospital and she has difficulty with mobility and typically comes to ER whenever he comes. Review of Systems   Constitutional: Negative for chills and fever. Respiratory: Negative for chest tightness, shortness of breath, wheezing and stridor. Cardiovascular: Negative for chest pain and palpitations. Gastrointestinal: Negative for abdominal distention, abdominal pain, constipation, diarrhea, nausea and vomiting.    Skin: Negative for color change, pallor and wound. Neurological: Negative for weakness and numbness. All other systems reviewed and are negative. All other systems reviewed and are negative. Past Medical History:   Diagnosis Date    Aneurysm of common carotid artery ([facility] [address] 2004    left side s/p coil     CKD (chronic kidney disease) 9/18/2014    Dementia ([facility] [address]     FSGS (focal segmental glomerulosclerosis)     in remission at present    Gout     Hypertension     managed with medication     Osteoarthritis 9/18/2014    Stroke ([facility] [address] 2004, 2013    slight weakness on right side, slight effect to speech        Past Surgical History:   Procedure Laterality Date    HX ANKLE FRACTURE TX Left 2013    has hardware in    HX CHOLECYSTECTOMY  02/28/2018    HX ERCP  02/27/2018    cbd stone    HX INTRACRANIAL ANEURYSM REPAIR  2004    left carotid     HX ORTHOPAEDIC Right 10/2014    hip replacement    HX REFRACTIVE SURGERY      bilateral - Lasik    IR INSERT TUNL CVC W/O PORT OVER 5 YR  12/22/2020    IR REMOVE TUNL CVAD W/O PORT / PUMP  1/15/2021       Family History   Problem Relation Age of Onset    Cancer Father 80        unknown    Stroke Sister 48           Social Connections:     Frequency of Communication with Friends and Family:     Frequency of Social Gatherings with Friends and Family:     Attends Restorationism Services:     Active Member of Clubs or Organizations:     Attends Club or Organization Meetings:     Marital Status:         Allergies   Allergen Reactions    Adhesive Rash     Rash with blisters      Codeine Nausea and Vomiting    Hydrocodone-Acetaminophen Itching and Nausea and Vomiting     Not Allergic to this medication patient states MR, RN 11/5/2020        Vitals signs and nursing note reviewed. No data found. Physical Exam  Vitals and nursing note reviewed. Constitutional:       General: She is not in acute distress. Appearance: She is well-developed.  She is not ill-appearing, toxic-appearing or diaphoretic. HENT:      Head: Normocephalic and atraumatic. Eyes:      General: No scleral icterus. Conjunctiva/sclera: Conjunctivae normal.   Neck:      Thyroid: No thyromegaly. Cardiovascular:      Rate and Rhythm: Regular rhythm. Tachycardia present. Pulmonary:      Effort: Pulmonary effort is normal. No tachypnea, accessory muscle usage or respiratory distress. Breath sounds: No decreased breath sounds, wheezing, rhonchi or rales. Abdominal:      General: There is no distension. Palpations: Abdomen is soft. Tenderness: There is no abdominal tenderness. There is no guarding or rebound. Musculoskeletal:      Cervical back: Normal range of motion. No rigidity. Skin:     Capillary Refill: Capillary refill takes less than 2 seconds. Neurological:      General: No focal deficit present. Mental Status: She is alert. Mental status is at baseline. Psychiatric:         Mood and Affect: Mood normal. Mood is not anxious. Behavior: Behavior normal. Behavior is not agitated. MDM  Number of Diagnoses or Management Options  Hypotension, unspecified hypotension type  Diagnosis management comments: Patients hypotension resolved with fluids. She has normal lactic acid no signs of infection she feels fine has no symptoms at all. There was concern for possible medication mixup with her . Given that she is asymptomatic and feels well wants to go home we will discharge her home with family. She was given return precautions. Jose Martin Ramos MD; 9/27/2021 @2:05 AM Voice dictation software was used during the making of this note. This software is not perfect and grammatical and other typographical errors may be present.   This note has not been proofread for errors.  ===================================================================     Unfortunately after discharging patient her  who is her normal caretaker had to be admitted to the hospital.  Since she does not normally ambulate at baseline and stays in bed all day and is dependent on his care both he and her were uncomfortable with her going home. This occurred with his last admission as well when she ended up having to stay in the ER. Hopefully in the morning once case management is here we can find an alternative plan for her very difficult social situation.   Lalo Dodd MD 6:10 AM 9/27/2021        Amount and/or Complexity of Data Reviewed  Clinical lab tests: ordered and reviewed (Results for orders placed or performed during the hospital encounter of 09/26/21  -TROPONIN-HIGH SENSITIVITY       Result                      Value             Ref Range           Troponin-High Sensitiv*     11.8              0 - 14 pg/mL   -CBC WITH AUTOMATED DIFF       Result                      Value             Ref Range           WBC                         7.4               4.3 - 11.1 K*       RBC                         4.50              4.05 - 5.2 M*       HGB                         12.8              11.7 - 15.4 *       HCT                         41.0              35.8 - 46.3 %       MCV                         91.1              79.6 - 97.8 *       MCH                         28.4              26.1 - 32.9 *       MCHC                        31.2 (L)          31.4 - 35.0 *       RDW                         13.2              11.9 - 14.6 %       PLATELET                    180               150 - 450 K/*       MPV                         10.1              9.4 - 12.3 FL       ABSOLUTE NRBC               0.00              0.0 - 0.2 K/*       DF                          AUTOMATED                             NEUTROPHILS                 75                43 - 78 %           LYMPHOCYTES                 18                13 - 44 %           MONOCYTES                   5                 4.0 - 12.0 %        EOSINOPHILS                 1                 0.5 - 7.8 %         BASOPHILS 1                 0.0 - 2.0 %         IMMATURE GRANULOCYTES       0                 0.0 - 5.0 %         ABS. NEUTROPHILS            5.6               1.7 - 8.2 K/*       ABS. LYMPHOCYTES            1.3               0.5 - 4.6 K/*       ABS. MONOCYTES              0.4               0.1 - 1.3 K/*       ABS. EOSINOPHILS            0.1               0.0 - 0.8 K/*       ABS. BASOPHILS              0.1               0.0 - 0.2 K/*       ABS. IMM. GRANS.            0.0               0.0 - 0.5 K/*  -METABOLIC PANEL, COMPREHENSIVE       Result                      Value             Ref Range           Sodium                      140               136 - 145 mm*       Potassium                   6.2 (HH)          3.5 - 5.1 mm*       Chloride                    112 (H)           98 - 107 mmo*       CO2                         21                21 - 32 mmol*       Anion gap                   7                 7 - 16 mmol/L       Glucose                     132 (H)           65 - 100 mg/*       BUN                         56 (H)            8 - 23 MG/DL        Creatinine                  3.27 (H)          0.6 - 1.0 MG*       GFR est AA                  19 (L)            >60 ml/min/1*       GFR est non-AA              15 (L)            >60 ml/min/1*       Calcium                     9.5               8.3 - 10.4 M*       Bilirubin, total            0.6               0.2 - 1.1 MG*       ALT (SGPT)                  16                12 - 65 U/L         AST (SGOT)                  24                15 - 37 U/L         Alk.  phosphatase            124               50 - 130 U/L        Protein, total              7.4               6.3 - 8.2 g/*       Albumin                     3.6               3.2 - 4.6 g/*       Globulin                    3.8 (H)           2.3 - 3.5 g/*       A-G Ratio                   0.9 (L)           1.2 - 3.5      -LIPASE       Result                      Value             Ref Range           Lipase 410 (H)           73 - 393 U/L   -MAGNESIUM       Result                      Value             Ref Range           Magnesium                   2.6 (H)           1.8 - 2.4 mg*  -POTASSIUM       Result                      Value             Ref Range           Potassium                   4.8               3.5 - 5.1 mm*  -LACTIC ACID       Result                      Value             Ref Range           Lactic acid                 1.0               0.4 - 2.0 MM* )  Tests in the radiology section of CPT®: ordered and reviewed  Independent visualization of images, tracings, or specimens: yes (===============================================  ED EKG Interpretation  EKG was interpreted in the absence of a cardiologist.    EKG rhythm: sinus tachycardia  Rate: 115  ST Segments: Normal ST segments - NO STEMI      Tiffanie Martinez MD; 9/26/2021 @10:15 PM================   )        Procedures    Results for orders placed or performed during the hospital encounter of 09/26/21   TROPONIN-HIGH SENSITIVITY   Result Value Ref Range    Troponin-High Sensitivity 11.8 0 - 14 pg/mL   CBC WITH AUTOMATED DIFF   Result Value Ref Range    WBC 7.4 4.3 - 11.1 K/uL    RBC 4.50 4.05 - 5.2 M/uL    HGB 12.8 11.7 - 15.4 g/dL    HCT 41.0 35.8 - 46.3 %    MCV 91.1 79.6 - 97.8 FL    MCH 28.4 26.1 - 32.9 PG    MCHC 31.2 (L) 31.4 - 35.0 g/dL    RDW 13.2 11.9 - 14.6 %    PLATELET 177 837 - 471 K/uL    MPV 10.1 9.4 - 12.3 FL    ABSOLUTE NRBC 0.00 0.0 - 0.2 K/uL    DF AUTOMATED      NEUTROPHILS 75 43 - 78 %    LYMPHOCYTES 18 13 - 44 %    MONOCYTES 5 4.0 - 12.0 %    EOSINOPHILS 1 0.5 - 7.8 %    BASOPHILS 1 0.0 - 2.0 %    IMMATURE GRANULOCYTES 0 0.0 - 5.0 %    ABS. NEUTROPHILS 5.6 1.7 - 8.2 K/UL    ABS. LYMPHOCYTES 1.3 0.5 - 4.6 K/UL    ABS. MONOCYTES 0.4 0.1 - 1.3 K/UL    ABS. EOSINOPHILS 0.1 0.0 - 0.8 K/UL    ABS. BASOPHILS 0.1 0.0 - 0.2 K/UL    ABS. IMM.  GRANS. 0.0 0.0 - 0.5 K/UL   METABOLIC PANEL, COMPREHENSIVE   Result Value Ref Range Sodium 140 136 - 145 mmol/L    Potassium 6.2 (HH) 3.5 - 5.1 mmol/L    Chloride 112 (H) 98 - 107 mmol/L    CO2 21 21 - 32 mmol/L    Anion gap 7 7 - 16 mmol/L    Glucose 132 (H) 65 - 100 mg/dL    BUN 56 (H) 8 - 23 MG/DL    Creatinine 3.27 (H) 0.6 - 1.0 MG/DL    GFR est AA 19 (L) >60 ml/min/1.73m2    GFR est non-AA 15 (L) >60 ml/min/1.73m2    Calcium 9.5 8.3 - 10.4 MG/DL    Bilirubin, total 0.6 0.2 - 1.1 MG/DL    ALT (SGPT) 16 12 - 65 U/L    AST (SGOT) 24 15 - 37 U/L    Alk. phosphatase 124 50 - 130 U/L    Protein, total 7.4 6.3 - 8.2 g/dL    Albumin 3.6 3.2 - 4.6 g/dL    Globulin 3.8 (H) 2.3 - 3.5 g/dL    A-G Ratio 0.9 (L) 1.2 - 3.5     LIPASE   Result Value Ref Range    Lipase 410 (H) 73 - 393 U/L   MAGNESIUM   Result Value Ref Range    Magnesium 2.6 (H) 1.8 - 2.4 mg/dL   POTASSIUM   Result Value Ref Range    Potassium 4.8 3.5 - 5.1 mmol/L   LACTIC ACID   Result Value Ref Range    Lactic acid 1.0 0.4 - 2.0 MMOL/L   EKG, 12 LEAD, INITIAL   Result Value Ref Range    Ventricular Rate 115 BPM    Atrial Rate 115 BPM    P-R Interval 130 ms    QRS Duration 78 ms    Q-T Interval 310 ms    QTC Calculation (Bezet) 428 ms    Calculated P Axis 56 degrees    Calculated R Axis 36 degrees    Calculated T Axis 49 degrees    Diagnosis       Sinus tachycardia  RSR' or QR pattern in V1 suggests right ventricular conduction delay  Borderline ECG  When compared with ECG of 20-AUG-2021 17:56,  Vent.  rate has increased BY  46 BPM          No orders to display

## 2021-09-27 NOTE — ED TRIAGE NOTES
Pt states she cant walk and that she cant stay at home because her  is here and she does not want to ask her son to help her

## 2021-09-27 NOTE — PROGRESS NOTES
Meet with pt this am to discuss her visit. SHE came in b/c her  was admitted and she is claiming again that she cannot care for herself. Thanh/spouse was transferred  for a heart cath and possible d/c home later today pending results. After a long discussion with pt, she is a very poor historian to her own health. I asked if she remembers her last visit here and if she ended up going to [facility] for Rehab. She could not remember, pt was unable to provide any concrete information about her visits to the hospital, rehab stays or what therapy company is coming to the house. I asked if she could recall the name of the company, color of uniform or even a emblem on a shirt. Pt had no recollection. States that she has a WC and uses a slide board that her  helps with that. We discussed options for a friend/family member to come over and provided assistance when spouse is away. She claims to have no one. Her sister lives in [place], but they do not communicate and she has never offered to help her. When asked what her plan is if something would happen to her spouse, she stated \"I guess I'll go somewhere, like the hospital\". I explained that , that is not a option. She then stated that she would find a \"job\". I questioned her, b/c she claims that she cannot work, she responded with \" well I will learn to walk then\". I advised that now would be a great time to work on that. Pt stated being wiling to get up and \"try to walk\" for staff. We talked about the possibility of getting her into [facility], that it may or may not happen today. Spoke with Sigrid Castillo #[phone] @ [facility] and referral sen tin [facility]. PT/OT/PPD/COVID orders written. @5596 Pt's spouse is being d/c home per RN caring for spouse/Thanh at . I have called the therapy gym 3 times in hopes for PT/OT to come eval pt. Pt will now be d/c home with [facility] PT/OT/SW. PT may still be eligible for admission to [facility].  PT notes will need to be faxed to them form [facility] called for pt transportation.

## 2021-09-27 NOTE — ED NOTES
I have reviewed discharge instructions with the patient. The patient verbalized understanding. Patient left ED via Discharge Method: stretcher to Home with (transport). Opportunity for questions and clarification provided. Patient given 0 scripts. No esign         To continue your aftercare when you leave the hospital, you may receive an automated call from our care team to check in on how you are doing. This is a free service and part of our promise to provide the best care and service to meet your aftercare needs.  If you have questions, or wish to unsubscribe from this service please call [phone]. Thank you for Choosing our [facility] Emergency Department.

## 2021-09-28 LAB — SARS COV-2, XPGCVT: NEGATIVE

## 2021-10-06 ENCOUNTER — HOSPITAL ENCOUNTER (EMERGENCY)
Age: 61
Discharge: HOME | End: 2021-10-06
Attending: EMERGENCY MEDICINE
Payer: MEDICARE

## 2021-10-06 VITALS
RESPIRATION RATE: 19 BRPM | WEIGHT: 200 LBS | SYSTOLIC BLOOD PRESSURE: 159 MMHG | BODY MASS INDEX: 33.32 KG/M2 | DIASTOLIC BLOOD PRESSURE: 101 MMHG | HEIGHT: 65 IN | HEART RATE: 109 BPM | TEMPERATURE: 98.9 F | OXYGEN SATURATION: 98 %

## 2021-10-06 DIAGNOSIS — Z78.9 SELF-CARE DEFICIT: Primary | ICD-10-CM

## 2021-10-06 PROCEDURE — 99283 EMERGENCY DEPT VISIT LOW MDM: CPT

## 2021-10-06 NOTE — ED NOTES
I have reviewed discharge instructions with the patient. The patient verbalized understanding. Patient left ED via Discharge Method: stretcher to Home with ( and [facility]). Opportunity for questions and clarification provided. Patient given 0 scripts. To continue your aftercare when you leave the hospital, you may receive an automated call from our care team to check in on how you are doing. This is a free service and part of our promise to provide the best care and service to meet your aftercare needs.  If you have questions, or wish to unsubscribe from this service please call [phone]. Thank you for Choosing our [employer] [facility] [employer] Emergency Department.

## 2021-10-06 NOTE — ED NOTES
Patient placed dispo discharge by physician. Patient is unable to care for herself at home \"because I can't walk\" states that her  who is a patient is her primary caretaker. Will be discharged when able.

## 2021-10-06 NOTE — ED PROVIDER NOTES
71-year-old female brought in by EMS because \"my  is a drunk and I cannot take care of myself. \"   was transported by EMS to the hospital for being intoxicated and falling out of bed. She has no actual complaints to be seen in the emergency department, just states she cannot be left alone. Other         Past Medical History:   Diagnosis Date    Aneurysm of common carotid artery ([facility] [address] 2004    left side s/p coil     CKD (chronic kidney disease) 2014    Dementia ([facility] [address]     FSGS (focal segmental glomerulosclerosis)     in remission at present    Gout     Hypertension     managed with medication     Osteoarthritis 2014    Stroke ([facility] [address] ,     slight weakness on right side, slight effect to speech       Past Surgical History:   Procedure Laterality Date    HX ANKLE FRACTURE TX Left 2013    has hardware in    HX CHOLECYSTECTOMY  2018    HX ERCP  2018    cbd stone    HX INTRACRANIAL ANEURYSM REPAIR  2004    left carotid     HX ORTHOPAEDIC Right 10/2014    hip replacement    HX REFRACTIVE SURGERY      bilateral - Lasik    IR INSERT TUNL CVC W/O PORT OVER 5 YR  2020    IR REMOVE TUNL CVAD W/O PORT / PUMP  1/15/2021         Family History:   Problem Relation Age of Onset    Cancer Father 80        unknown    Stroke Sister 48       Social History     Socioeconomic History    Marital status:      Spouse name: Not on file    Number of children: Not on file    Years of education: Not on file    Highest education level: Not on file   Occupational History    Not on file   Tobacco Use    Smoking status: Former Smoker     Packs/day: 0.25     Quit date: 1979     Years since quittin.7    Smokeless tobacco: Never Used   Substance and Sexual Activity    Alcohol use:  Yes     Alcohol/week: 2.5 standard drinks     Types: 3 Shots of liquor per week     Comment: pint of fireball    Drug use: No    Sexual activity: Yes     Partners: Male     Birth control/protection: None   Other Topics Concern    Not on file   Social History Narrative    Not on file     Social Determinants of Health     Financial Resource Strain:     Difficulty of Paying Living Expenses:    Food Insecurity:     Worried About Running Out of Food in the Last Year:     [address] in the Last Year:    Transportation Needs:     Lack of Transportation (Medical):  Lack of Transportation (Non-Medical):    Physical Activity:     Days of Exercise per Week:     Minutes of Exercise per Session:    Stress:     Feeling of Stress :    Social Connections:     Frequency of Communication with Friends and Family:     Frequency of Social Gatherings with Friends and Family:     Attends Yarsanism Services:     Active Member of Clubs or Organizations:     Attends Club or Organization Meetings:     Marital Status:    Intimate Partner Violence:     Fear of Current or Ex-Partner:     Emotionally Abused:     Physically Abused:     Sexually Abused: ALLERGIES: Adhesive, Codeine, and Hydrocodone-acetaminophen    Review of Systems   Constitutional: Negative for fever. Gastrointestinal: Negative for vomiting. All other systems reviewed and are negative. Vitals:    10/06/21 0221   BP: (!) 159/101   Pulse: (!) 109   Resp: 19   Temp: 98.9 °F (37.2 °C)   SpO2: 98%   Weight: 90.7 kg (200 lb)   Height: 5' 5\" (1.651 m)            Physical Exam  Vitals and nursing note reviewed. Constitutional:       Appearance: Normal appearance. HENT:      Head: Normocephalic and atraumatic. Nose: Nose normal.      Mouth/Throat:      Mouth: Mucous membranes are moist.   Eyes:      Pupils: Pupils are equal, round, and reactive to light. Cardiovascular:      Rate and Rhythm: Regular rhythm. Tachycardia present. Pulmonary:      Effort: Pulmonary effort is normal.      Breath sounds: No stridor. Abdominal:      General: Abdomen is flat. Musculoskeletal:         General: No deformity. Normal range of motion. Cervical back: Normal range of motion and neck supple. Skin:     General: Skin is warm and dry. Neurological:      Mental Status: She is alert. Mental status is at baseline. Psychiatric:         Mood and Affect: Mood normal.         Behavior: Behavior normal.          MDM  Number of Diagnoses or Management Options  Self-care deficit  Diagnosis management comments: Parts of this document were created using dragon voice recognition software. The chart has been reviewed but errors may still be present. I wore appropriate PPE throughout this patient's ED visit. Jose Foley MD, 2:39 AM     I discussed the results of all labs, procedures, radiographs, and treatments with the patient and available family. Treatment plan is agreed upon and the patient is ready for discharge. Questions about treatment in the ED and differential diagnosis of presenting condition were answered. Patient was given verbal discharge instructions including, but not limited to, importance of returning to the emergency department for any concern of worsening or continued symptoms. Instructions were given to follow up with a primary care provider or specialist within 1-2 days. Adverse effects of medications, if prescribed, were discussed and patient was advised to refrain from significant physical activity until followed up by primary care physician and to not drive or operate heavy machinery after taking any sedating substances.              Procedures

## 2021-10-10 ENCOUNTER — HOSPITAL ENCOUNTER (EMERGENCY)
Age: 61
Discharge: HOME | End: 2021-10-10
Attending: EMERGENCY MEDICINE
Payer: MEDICARE

## 2021-10-10 VITALS
WEIGHT: 200 LBS | OXYGEN SATURATION: 96 % | HEART RATE: 90 BPM | RESPIRATION RATE: 20 BRPM | TEMPERATURE: 97.6 F | SYSTOLIC BLOOD PRESSURE: 120 MMHG | BODY MASS INDEX: 33.32 KG/M2 | HEIGHT: 65 IN | DIASTOLIC BLOOD PRESSURE: 80 MMHG

## 2021-10-10 DIAGNOSIS — R26.2 INABILITY TO AMBULATE DUE TO RIGHT KNEE: ICD-10-CM

## 2021-10-10 DIAGNOSIS — Z65.9 POOR SOCIAL SITUATION: Primary | ICD-10-CM

## 2021-10-10 PROCEDURE — 74011250637 HC RX REV CODE- 250/637: Performed by: EMERGENCY MEDICINE

## 2021-10-10 PROCEDURE — 99284 EMERGENCY DEPT VISIT MOD MDM: CPT

## 2021-10-10 RX ORDER — TRAMADOL HYDROCHLORIDE 50 MG/1
50 TABLET ORAL
Status: COMPLETED | OUTPATIENT
Start: 2021-10-10 | End: 2021-10-10

## 2021-10-10 RX ADMIN — TRAMADOL HYDROCHLORIDE 50 MG: 50 TABLET, FILM COATED ORAL at 17:01

## 2021-10-10 NOTE — ED NOTES
Report received from [id number], [address] [place] [address]. Transfer of care completed at this time.

## 2021-10-10 NOTE — ED TRIAGE NOTES
Pt BIB EMS from home, reports that she is unable to walk for years. States that she came to be with her . EMS states the house was in poor condition and they were calling DSS.

## 2021-10-10 NOTE — ED PROVIDER NOTES
57-year-old female was brought in by EMS. She accompanies her  who is a patient. Patient called EMS because her  was acting out had been drinking. Long history of alcohol abuse. Patient herself had a stroke and has bad arthritis in her knees is really not ambulatory. She is dependent upon him for care. Usual pattern is such that when he presents to the emergency department due to alcohol intoxication or injury or other issues is that she presents as well because she cannot be home by herself. Has no complaints other than pain in her knees. Review of records reveals similar issues. Have seen her for this myself twice in the past.  No chest pain or shortness of breath. No abdominal pain vomiting or fever. Does complain of knee pain. She is wheelchair-bound. Rarely if ever stands or walks. The history is provided by the patient. Leg Problem   This is a chronic problem. The current episode started more than 1 week ago. The problem occurs constantly. The problem has not changed since onset. The pain is present in the right knee. The pain is moderate. Associated symptoms include limited range of motion. The symptoms are aggravated by movement. She has tried OTC pain medications for the symptoms. There has been no history of extremity trauma.         Past Medical History:   Diagnosis Date    Aneurysm of common carotid artery ([address] 2004    left side s/p coil     CKD (chronic kidney disease) 9/18/2014    Dementia ([address]     FSGS (focal segmental glomerulosclerosis)     in remission at present    Gout     Hypertension     managed with medication     Osteoarthritis 9/18/2014    Stroke ([address] 2004, 2013    slight weakness on right side, slight effect to speech       Past Surgical History:   Procedure Laterality Date    HX ANKLE FRACTURE TX Left 2013    has hardware in    HX CHOLECYSTECTOMY  02/28/2018    HX ERCP  02/27/2018    cbd stone    HX INTRACRANIAL ANEURYSM REPAIR  2004    left carotid     HX ORTHOPAEDIC Right 10/2014    hip replacement    HX REFRACTIVE SURGERY      bilateral - Lasik    IR INSERT TUNL CVC W/O PORT OVER 5 YR  2020    IR REMOVE TUNL CVAD W/O PORT / PUMP  1/15/2021         Family History:   Problem Relation Age of Onset    Cancer Father 80        unknown    Stroke Sister 48       Social History     Socioeconomic History    Marital status:      Spouse name: Not on file    Number of children: Not on file    Years of education: Not on file    Highest education level: Not on file   Occupational History    Not on file   Tobacco Use    Smoking status: Former Smoker     Packs/day: 0.25     Quit date: 1979     Years since quittin.8    Smokeless tobacco: Never Used   Substance and Sexual Activity    Alcohol use: Yes     Alcohol/week: 2.5 standard drinks     Types: 3 Shots of liquor per week     Comment: pint of fireball    Drug use: No    Sexual activity: Yes     Partners: Male     Birth control/protection: None   Other Topics Concern    Not on file   Social History Narrative    Not on file     Social Determinants of Health     Financial Resource Strain:     Difficulty of Paying Living Expenses:    Food Insecurity:     Worried About Running Out of Food in the Last Year:     [address] in the Last Year:    Transportation Needs:     Lack of Transportation (Medical):      Lack of Transportation (Non-Medical):    Physical Activity:     Days of Exercise per Week:     Minutes of Exercise per Session:    Stress:     Feeling of Stress :    Social Connections:     Frequency of Communication with Friends and Family:     Frequency of Social Gatherings with Friends and Family:     Attends [employer] Services:     Active Member of Clubs or Organizations:     Attends Club or Organization Meetings:     Marital Status:    Intimate Partner Violence:     Fear of Current or Ex-Partner:     Emotionally Abused:     Physically Abused:     Sexually Abused: ALLERGIES: Adhesive, Codeine, and Hydrocodone-acetaminophen    Review of Systems   Constitutional: Negative for chills and fever. Respiratory: Negative for cough and shortness of breath. Cardiovascular: Negative for chest pain. Gastrointestinal: Negative for abdominal pain and vomiting. Neurological: Positive for weakness (Chronic due to previous stroke). Vitals:    10/10/21 1427   BP: 120/77   Pulse: (!) 101   Resp: 18   Temp: 97.6 °F (36.4 °C)   SpO2: 96%   Weight: 90.7 kg (200 lb)   Height: 5' 5\" (1.651 m)            Physical Exam  Vitals and nursing note reviewed. Constitutional:       Appearance: She is not ill-appearing. Cardiovascular:      Rate and Rhythm: Normal rate and regular rhythm. Pulmonary:      Effort: Pulmonary effort is normal.      Breath sounds: Normal breath sounds. Musculoskeletal:      Comments: Knee without erythema or effusion or warmth. Skin:     General: Skin is warm and dry. Neurological:      Mental Status: She is alert. MDM  Number of Diagnoses or Management Options  Diagnosis management comments: Patient without complaints other than chronic knee pain. No intervention is needed from a medical standpoint.  is being observed. If  is discharged home. She usually accompanies him. If other issues arise, would be hold over for  and case management. All records have revealed that there has been an application for rehab in the past which has not been acted on as of yet.        Amount and/or Complexity of Data Reviewed  Review and summarize past medical records: yes    Risk of Complications, Morbidity, and/or Mortality  Presenting problems: moderate  Diagnostic procedures: minimal  Management options: low    Patient Progress  Patient progress: stable         Procedures

## 2021-10-10 NOTE — ED NOTES
I have reviewed discharge instructions with the patient. The patient verbalized understanding. Patient left ED via Discharge Method: stretcher to Home with [facility]. Opportunity for questions and clarification provided. Patient given 0 scripts. To continue your aftercare when you leave the hospital, you may receive an automated call from our care team to check in on how you are doing.  This is a free service and part of our promise to provide the best care and service to meet your aftercare needs. \" If you have questions, or wish to unsubscribe from this service please call [phone].  Thank you for Choosing our [facility] Emergency Department.

## 2021-10-14 ENCOUNTER — HOSPITAL ENCOUNTER (EMERGENCY)
Age: 61
Discharge: HOME HEALTH | End: 2021-10-18
Attending: STUDENT IN AN ORGANIZED HEALTH CARE EDUCATION/TRAINING PROGRAM | Admitting: STUDENT IN AN ORGANIZED HEALTH CARE EDUCATION/TRAINING PROGRAM
Payer: MEDICARE

## 2021-10-14 DIAGNOSIS — M25.562 CHRONIC PAIN OF BOTH KNEES: Primary | ICD-10-CM

## 2021-10-14 DIAGNOSIS — M25.561 CHRONIC PAIN OF BOTH KNEES: Primary | ICD-10-CM

## 2021-10-14 DIAGNOSIS — G89.29 CHRONIC PAIN OF BOTH KNEES: Primary | ICD-10-CM

## 2021-10-14 LAB
COVID-19 RAPID TEST, COVR: NOT DETECTED
SARS-COV-2, COV2: NORMAL
SOURCE, COVRS: NORMAL

## 2021-10-14 PROCEDURE — 99285 EMERGENCY DEPT VISIT HI MDM: CPT

## 2021-10-14 PROCEDURE — U0003 INFECTIOUS AGENT DETECTION BY NUCLEIC ACID (DNA OR RNA); SEVERE ACUTE RESPIRATORY SYNDROME CORONAVIRUS 2 (SARS-COV-2) (CORONAVIRUS DISEASE [COVID-19]), AMPLIFIED PROBE TECHNIQUE, MAKING USE OF HIGH THROUGHPUT TECHNOLOGIES AS DESCRIBED BY CMS-2020-01-R: HCPCS

## 2021-10-14 PROCEDURE — 74011000250 HC RX REV CODE- 250: Performed by: STUDENT IN AN ORGANIZED HEALTH CARE EDUCATION/TRAINING PROGRAM

## 2021-10-14 PROCEDURE — 90471 IMMUNIZATION ADMIN: CPT

## 2021-10-14 PROCEDURE — 97166 OT EVAL MOD COMPLEX 45 MIN: CPT

## 2021-10-14 PROCEDURE — 86580 TB INTRADERMAL TEST: CPT | Performed by: STUDENT IN AN ORGANIZED HEALTH CARE EDUCATION/TRAINING PROGRAM

## 2021-10-14 PROCEDURE — 87635 SARS-COV-2 COVID-19 AMP PRB: CPT

## 2021-10-14 PROCEDURE — 97161 PT EVAL LOW COMPLEX 20 MIN: CPT

## 2021-10-14 PROCEDURE — 74011250637 HC RX REV CODE- 250/637: Performed by: STUDENT IN AN ORGANIZED HEALTH CARE EDUCATION/TRAINING PROGRAM

## 2021-10-14 RX ORDER — HYDROCODONE BITARTRATE AND ACETAMINOPHEN 5; 325 MG/1; MG/1
1 TABLET ORAL
Status: COMPLETED | OUTPATIENT
Start: 2021-10-14 | End: 2021-10-14

## 2021-10-14 RX ADMIN — HYDROCODONE BITARTRATE AND ACETAMINOPHEN 1 TABLET: 5; 325 TABLET ORAL at 04:48

## 2021-10-14 RX ADMIN — TUBERCULIN PURIFIED PROTEIN DERIVATIVE 5 UNITS: 5 INJECTION, SOLUTION INTRADERMAL at 14:17

## 2021-10-14 NOTE — ED NOTES
Helped pt to restroom. Pt able to stand with much encouragement. Needed much assistance to turn and transfer.

## 2021-10-14 NOTE — PROGRESS NOTES
Problem: Mobility Impaired (Adult and Pediatric)  Goal: *Acute Goals and Plan of Care (Insert Text)  Description: STG:  (1.)Ms. Aggie Amaya will move from supine to sit and sit to supine  with CONTACT GUARD ASSIST within 4-7 treatment day(s). (2.)Ms. Aggie Amaya will transfer from bed to chair and chair to bed with CONTACT GUARD ASSIST using the least restrictive device within 4-7 treatment day(s). (3.)Ms. Aggie Amaya will ambulate with MINIMAL ASSIST for 15 feet with the least restrictive device within 4-7 treatment day(s). ________________________________________________________________________________________________      Outcome: Progressing Towards Goal  Note:   STG:   (1.)Ms. Aggie Amaya will move from supine to sit and sit to supine with CONTACT GUARD ASSIST within 4-7 treatment day(s). (2.)Ms. Aggie Amaya will transfer from bed to chair and chair to bed with CONTACT GUARD ASSIST using the least restrictive device within 4-7 treatment day(s). (3.)Ms. Aggie Amaya will ambulate with MINIMAL ASSIST for 15 feet with the least restrictive device within 4-7 treatment day(s). PHYSICAL THERAPY: Initial Assessment and AM 10/14/2021  EMERGENCY: PT Visit Days : 1  Payor: [address] [place] [address] / Plan: [address] / Product Type: Managed Care Medicare /       NAME/AGE/GENDER: Sánchez Gallegos is a 64 y.o. female   PRIMARY DIAGNOSIS: No admission diagnoses are documented for this encounter. <principal problem not specified> <principal problem not specified>        ICD-10: Treatment Diagnosis:    · Generalized Muscle Weakness (M62.81)  · Difficulty in walking, Not elsewhere classified (R26.2)   Precaution/Allergies:  Adhesive, Codeine, and Hydrocodone-acetaminophen      ASSESSMENT:     Ms. Aggie Amaya presents supine in the ED, she was brought in by EMS with her  and pt has complaints of knee pain.  Pt known to our PT department and was last seen by PT in August. Pt lives at home with her spouse who is her caregiver and also a chronic alcoholic. Pt does have limited mobility but is able to participate in her care. She stays in the bed a lot but can get in a wheelchair and take a few steps. On assessment pt is motivated to try and works on sitting on side of bed with minimal assist. Stood to a RW and took some side steps up the stretcher, she relied on leaning on the stretcher for support and she does not bear a lot of weight on her right lower extremity. After a short rest had her stand again and try a few forward steps. She does not have full extension on her right leg on brief assessment. She needed minimal assist to return supine. She presents with overall generalized weakness and decreased independence with functional mobility. She will benefit from skilled PT interventions to maximize independence with mobility. Would recommend SNF as she would not be safe to be up without assist and is at increased risk of falls. This section established at most recent assessment   PROBLEM LIST (Impairments causing functional limitations):  1. Decreased Strength  2. Decreased ADL/Functional Activities  3. Decreased Transfer Abilities  4. Decreased Ambulation Ability/Technique  5. Decreased Balance  6. Increased Pain  7. Decreased Activity Tolerance   INTERVENTIONS PLANNED: (Benefits and precautions of physical therapy have been discussed with the patient.)  1. Balance Exercise  2. Bed Mobility  3. Gait Training  4. Therapeutic Activites  5. Therapeutic Exercise/Strengthening  6. Transfer Training     TREATMENT PLAN: Frequency/Duration: daily for duration of hospital stay  Rehabilitation Potential For Stated Goals: Good     REHAB RECOMMENDATIONS (at time of discharge pending progress):    Placement: It is my opinion, based on this patient's performance to date, that Ms. Chayo Mancilla may benefit from intensive therapy at a [address] [place] [address] after discharge due to the functional deficits listed above that are likely to improve with skilled rehabilitation and concerns that he/she may be unsafe to be unsupervised at home. Equipment:    To be determined              HISTORY:   History of Present Injury/Illness (Reason for Referral):  Copied from MD note: 60-year-old female patient arrives via EMS with her  complains of knee pain. Patient is well-known to this department as she often comes with her  who is a chronic alcoholic and frequently presents for alcohol-related complaints. Patient is unable to care for herself at home and requires transport to the hospital with her  each time. EMS providers report initially patient reported no complaints of any type however when she was told that she would wait in the lobby for her  to be seen, she reported that she needed to be seen for knee pain. With regards to this knee pain, she states it is chronic and that \"they have always hurt\". Patient has a longstanding history of bilateral osteoarthritis of the knee and previous knee replacement on the right side. Patient denies falls or trauma normally gets around via wheelchair. She freely admits that her pain is consistent with chronic pain and unchanged at this time. She routinely takes ibuprofen with good effect at home for control of pain. Patient denies falls or trauma. She has no other complaints at this time. Past Medical History/Comorbidities:   Ms. Taj Wright  has a past medical history of Aneurysm of common carotid artery ([facility] [address] [facility] (2004), CKD (chronic kidney disease) (9/18/2014), Dementia ([address] FSGS (focal segmental glomerulosclerosis), Gout, Hypertension, Osteoarthritis (9/18/2014), and Stroke ([address] (2004, 2013).   Ms. Taj Wright  has a past surgical history that includes hx intracranial aneurysm repair (2004); hx refractive surgery; hx orthopaedic (Right, 10/2014); hx ankle fracture tx (Left, 2013); hx ercp (02/27/2018); hx cholecystectomy (02/28/2018); ir insert tunl cvc w/o port over 5 yr (12/22/2020); and ir remove tunl cvad w/o port / pump (1/15/2021). Social History/Living Environment:   Home Environment: Private residence  Support Systems: Spouse/Significant Other  Patient Expects to be Discharged to[de-identified] [facility]  Current DME Used/Available at Home: Wheelchair, Other (comment) (slide board transfer)  Prior Level of Function/Work/Activity:  Pt lives with spouse who is a chronic alcoholic, admits to spending a lot of time in bed, low mobility, needs assist at basesline     Number of Personal Factors/Comorbidities that affect the Plan of Care: 3+: HIGH COMPLEXITY   EXAMINATION:   Most Recent Physical Functioning:   Gross Assessment:  AROM: Generally decreased, functional  Strength: Generally decreased, functional               Posture:  Posture (WDL): Exceptions to WDL  Posture Assessment: Asymmetry (comment), Forward head, Rounded shoulders (does not bear a lot of weight on her right leg)  Balance:  Sitting: Intact  Standing: Impaired;Pull to stand; With support  Standing - Static: Constant support; Fair  Standing - Dynamic : Constant support;Fair;Poor Bed Mobility:  Supine to Sit: Minimum assistance  Sit to Supine: Minimum assistance  Scooting: Stand-by assistance;Contact guard assistance; Additional time  Wheelchair Mobility:     Transfers:  Sit to Stand: Minimum assistance  Stand to Sit: Minimum assistance  Gait:     Speed/Radha: Shuffled; Slow  Step Length: Left shortened;Right shortened  Stance: Right decreased  Gait Abnormalities: Antalgic;Decreased step clearance;Shuffling gait; Step to gait  Distance (ft):  (1 to 2 steps)  Assistive Device: Walker, rolling  Ambulation - Level of Assistance: Minimal assistance; Additional time  Interventions: Safety awareness training;Verbal cues      Body Structures Involved:  1. Muscles Body Functions Affected:  1. Movement Related Activities and Participation Affected:  1. Mobility  2.  Self Care   Number of elements that affect the Plan of Care: 4+: HIGH COMPLEXITY CLINICAL PRESENTATION:   Presentation: Evolving clinical presentation with changing clinical characteristics: MODERATE COMPLEXITY   CLINICAL DECISION MAKIN [facility] Short Form  How much difficulty does the patient currently have. .. Unable A Lot A Little None   1. Turning over in bed (including adjusting bedclothes, sheets and blankets)? [] 1   [] 2   [x] 3   [] 4   2. Sitting down on and standing up from a chair with arms ( e.g., wheelchair, bedside commode, etc.)   [] 1   [] 2   [x] 3   [] 4   3. Moving from lying on back to sitting on the side of the bed? [] 1   [] 2   [x] 3   [] 4   How much help from another person does the patient currently need. .. Total A Lot A Little None   4. Moving to and from a bed to a chair (including a wheelchair)? [] 1   [] 2   [x] 3   [] 4   5. Need to walk in hospital room? [] 1   [x] 2   [] 3   [] 4   6. Climbing 3-5 steps with a railing? [] 1   [x] 2   [] 3   [] 4   © , Trustees of [address] [place] [address], under license to [employer]. All rights reserved      Score:  Initial: 16 Most Recent: X (Date: -- )    Interpretation of Tool:  Represents activities that are increasingly more difficult (i.e. Bed mobility, Transfers, Gait). Medical Necessity:     · Patient is expected to demonstrate progress in   · strength and functional technique  ·  to   · decrease assistance required with functional mobility  · . Reason for Services/Other Comments:  · Patient continues to require skilled intervention due to   · Inability to complete functional mobility independently  · .    Use of outcome tool(s) and clinical judgement create a POC that gives a: Clear prediction of patient's progress: LOW COMPLEXITY            TREATMENT:   (In addition to Assessment/Re-Assessment sessions the following treatments were rendered)   Pre-treatment Symptoms/Complaints:  some knee pain  Pain: Initial: some knee pain     Post Session: some knee pain     Assessment/Reassessment only, no treatment provided today    Braces/Orthotics/Lines/Etc:   · O2 Device: None (Room air)  Treatment/Session Assessment:    · Response to Treatment:  pt did try and admits she needs to go to rehab  · Interdisciplinary Collaboration:   o Occupational Therapist  o Registered Nurse  o   · After treatment position/precautions:   o Supine in bed  o Bed/Chair-wheels locked  o Bed in low position  o Call light within reach   · Compliance with Program/Exercises: Will assess as treatment progresses  · Recommendations/Intent for next treatment session: \"Next visit will focus on advancements to more challenging activities and reduction in assistance provided\".   Total Treatment Duration:  PT Patient Time In/Time Out  Time In: 1130  Time Out: [address] [facility],

## 2021-10-14 NOTE — PROGRESS NOTES
Problem: Self Care Deficits Care Plan (Adult)  Goal: *Acute Goals and Plan of Care (Insert Text)  Outcome: Progressing Towards Goal  Note:   Patient will complete toileting with moderate assist to increase self care independence. Patient will complete bathing with minimal assist to increase self care independence. Patient will complete all functional transfers with contact guard assist using adaptive equipment as needed. Patient will complete UE exercises with stand by assist to increase overall activity tolerance and strength. Timeframe: 2x a week until STR. OCCUPATIONAL THERAPY: Initial Assessment and Daily Note 10/14/2021  EMERGENCY: OT Visit Days: 1  Payor: Dave Cure / Plan: [address] / Product Type: Managed Care Medicare /      NAME/AGE/GENDER: Neville Rodriguez is a 64 y.o. female   PRIMARY DIAGNOSIS:  No admission diagnoses are documented for this encounter. <principal problem not specified> <principal problem not specified>        ICD-10: Treatment Diagnosis:    Stiffness of Left Knee, Not elsewhere classified (M25.662)  Stiffness of Right Knee, Not elsewhere classified (M25.661)   Precautions/Allergies:     Adhesive, Codeine, and Hydrocodone-acetaminophen      ASSESSMENT:     Ms. Ion Cao presents in ER as her caretaker/spouse has been picked up from EMS. Patient is familiar to therapy here from previous admissions. Patient has potential to walk and be much more independent but her home situation does not appear to allow this. Patient will need STR to possible LTR in order to walk and be independent with self cares again. Initiate OT.      This section established at most recent assessment   PROBLEM LIST (Impairments causing functional limitations):  Decreased Strength  Decreased ADL/Functional Activities  Decreased Transfer Abilities  Decreased Balance  Increased Pain  Decreased Activity Tolerance   INTERVENTIONS PLANNED: (Benefits and precautions of occupational therapy have been discussed with the patient.)  Activities of daily living training  Balance training  Neuromuscular re-eduation  Therapeutic activity  Therapeutic exercise     TREATMENT PLAN: Frequency/Duration: Follow patient 2x a week to address above goals. Rehabilitation Potential For Stated Goals: Good     REHAB RECOMMENDATIONS (at time of discharge pending progress):    Placement: It is my opinion, based on this patient's performance to date, that Ms. Cuate Romano may benefit from intensive therapy at a [address] [place] [address] after discharge due to the functional deficits listed above that are likely to improve with skilled rehabilitation and concerns that he/she may be unsafe to be unsupervised at home due to Deficits noted above . Equipment:   None at this time              OCCUPATIONAL PROFILE AND HISTORY:   History of Present Injury/Illness (Reason for Referral):  See H&P  Past Medical History/Comorbidities:   Ms. Cuate Romano  has a past medical history of Aneurysm of common carotid artery ([facility] [address] (2004), CKD (chronic kidney disease) (9/18/2014), Dementia ([facility] [address] FSGS (focal segmental glomerulosclerosis), Gout, Hypertension, Osteoarthritis (9/18/2014), and Stroke ([facility] [address] (2004, 2013). Ms. Cuate Romano  has a past surgical history that includes hx intracranial aneurysm repair (2004); hx refractive surgery; hx orthopaedic (Right, 10/2014); hx ankle fracture tx (Left, 2013); hx ercp (02/27/2018); hx cholecystectomy (02/28/2018); ir insert tunl cvc w/o port over 5 yr (12/22/2020); and ir remove tunl cvad w/o port / pump (1/15/2021). Social History/Living Environment:   Home Environment: Private residence  Support Systems: Spouse/Significant Other  Patient Expects to be Discharged to[de-identified] 2 [facility]  Current DME Used/Available at Home: Wheelchair, Other (comment) (slide board transfer)  Prior Level of Function/Work/Activity:  Reports being bed bound but can stand for short amount of time. Number of Personal Factors/Comorbidities that affect the Plan of Care: Expanded review of therapy/medical records (1-2):  MODERATE COMPLEXITY   ASSESSMENT OF OCCUPATIONAL PERFORMANCE[de-identified]   Activities of Daily Living:   Basic ADLs (From Assessment) Complex ADLs (From Assessment)   Feeding: Setup  Oral Facial Hygiene/Grooming: Stand-by assistance  Bathing: Moderate assistance  Upper Body Dressing: Minimum assistance  Lower Body Dressing: Moderate assistance  Toileting: Moderate assistance     Grooming/Bathing/Dressing Activities of Daily Living                             Bed/Mat Mobility  Supine to Sit: Minimum assistance  Sit to Supine: Minimum assistance  Sit to Stand: Minimum assistance  Stand to Sit: Minimum assistance  Scooting: Stand-by assistance;Contact guard assistance; Additional time     Most Recent Physical Functioning:   Gross Assessment:                  Posture:  Posture (WDL): Exceptions to WDL  Posture Assessment: Asymmetry (comment), Forward head, Rounded shoulders (does not bear a lot of weight on her right leg)  Balance:  Sitting: Intact  Standing: Impaired;Pull to stand; With support  Standing - Static: Constant support; Fair  Standing - Dynamic : Constant support;Fair;Poor Bed Mobility:  Supine to Sit: Minimum assistance  Sit to Supine: Minimum assistance  Scooting: Stand-by assistance;Contact guard assistance; Additional time  Wheelchair Mobility:     Transfers:  Sit to Stand: Minimum assistance  Stand to Sit: Minimum assistance            No data found. Mental Status  Neurologic State: Alert                          Physical Skills Involved:  Range of Motion  Balance  Strength  Activity Tolerance  Pain (acute) Cognitive Skills Affected (resulting in the inability to perform in a timely and safe manner):   Short Term Recall  Long Term Memory  Sustained Attention  Divided Attention Psychosocial Skills Affected:  Habits/Routines  Environmental Adaptation  Social Interaction  Emotional Regulation  Self-Awareness  Awareness of Others   Number of elements that affect the Plan of Care: 3-5:  MODERATE COMPLEXITY   CLINICAL DECISION MAKIN[address] [place] [address] AM-PAC 6 Clicks   Daily Activity Inpatient Short Form  How much help from another person does the patient currently need. .. Total A Lot A Little None   1. Putting on and taking off regular lower body clothing? [x] 1   [] 2   [] 3   [] 4   2. Bathing (including washing, rinsing, drying)? [] 1   [x] 2   [] 3   [] 4   3. Toileting, which includes using toilet, bedpan or urinal?   [x] 1   [] 2   [] 3   [] 4   4. Putting on and taking off regular upper body clothing? [] 1   [x] 2   [] 3   [] 4   5. Taking care of personal grooming such as brushing teeth? [] 1   [x] 2   [] 3   [] 4   6. Eating meals? [] 1   [] 2   [x] 3   [] 4   © , Trustees of [address] [place] [address], under license to [employer]. All rights reserved      Score:  Initial: 11 Most Recent: X (Date: -- )    Interpretation of Tool:  Represents activities that are increasingly more difficult (i.e. Bed mobility, Transfers, Gait). Medical Necessity:     Skilled intervention continues to be required due to deficits noted above. Reason for Services/Other Comments:  Patient continues to require skilled intervention due to   Dx above  .    Use of outcome tool(s) and clinical judgement create a POC that gives a: MODERATE COMPLEXITY         TREATMENT:   (In addition to Assessment/Re-Assessment sessions the following treatments were rendered)     Pre-treatment Symptoms/Complaints:    Pain: Initial:   Pain Intensity 1: 2  Post Session:  2     Assessment/Reassessment only, no treatment provided today    Braces/Orthotics/Lines/Etc:   O2 Device: None (Room air)  Treatment/Session Assessment:    Response to Treatment:  See H&P  Interdisciplinary Collaboration:   Physical Therapist  Occupational Therapist  Registered Nurse  After treatment position/precautions:   Supine in bed  RN notified   Compliance with Program/Exercises: Compliant all of the time, Will assess as treatment progresses. Recommendations/Intent for next treatment session: \"Next visit will focus on advancements to more challenging activities and reduction in assistance provided\".   Total Treatment Duration:  OT Patient Time In/Time Out  Time In: 1120  Time Out: 8[facility],

## 2021-10-14 NOTE — ED TRIAGE NOTES
EMS was called to home for  who is a chronic alcoholic and states he wants detox. Pt has refused detox multiple times.   Upon arrival she had no complaints/ when arrived to ER pt stated she had knee pain

## 2021-10-14 NOTE — PROGRESS NOTES
Meet with pt at the request of the staff. Pt has been frequently the ER for what she states \"I cant take care of myself\". She only comes in when her  is here. He is here this am for ETOH and has been admitted. Visit summary  7/22 Arely Acevedo called [facility] and confirmed \"open case\" with Loraine Daymadhu  8/9 Pt seen by Cristal Nayak and a new report was made. 8/11 Ale LEWIS saw and made a new report to [facility]". 8/20 I saw in ED, spouse here and transferred/admitted DT for a heart catha ans if clear will be released in the afternoon. Pt staed at this time that she has family but they do not communicate to her, she uses a WC and a slide board for transfers. She wants to go to rehab, I spoke with [facility], sent a referral, consulted PT/OT to see. At 1530 that day spouse was d/c home, pt was sent home without a therapy eval, I ordered [facility] in hopes they could eval and fax notes to [facility]. 8/20 Spouse brought in ( for intoxication) and pt followed, stating she cant care for self. I called her DSS worker Martinez Moreau 4x and Ronni Daniel leaving messages and never hearing back. The plan was for PT/OT to see here and attempt to get pt placed at [facility] or [facility]. Pt was able to d/c home and be admitted form there. Today, I called [facility] Main reporting line #[phone] and was told that Billie's name was not on pt's case that is was a Trang Rendon #[phone] and I LM for her and again with Ronni Daniel . Supervisor Ronni Daniel did call me back, after 6 phone calls to [facility]. She confirmed that Merari Medel is \"on pt's case now\", but she is out of the office. She wanted to know what I thought we could do for the pt. Long term placement is optimal but pt is not willing at his point in time. Pt did agree to rehab. I was able to assist pt with paper scrub pants.  She does move well with bed mobility independently and was able to sit edge of bed with min assist, displayed good trunk control, rocked back/forth for momentum when attempting to stand. Pt stood with min assist, she did lean on the be, with the back of her legs and put min weight on rt foot. She does have Hx of stroke on Rt side. Pt stood long enough for me to assist in pulling up her pants. Pt returned to bed and scooted self back up into a comfortable position. I have placed a PT/OT eval for assistance in placing pt today. CM following. @2405 PT/OT notes are in and meet with pt again about her current physical condition. She is still in agreement that she needs rehab and willing to go \"anyhwere\". @ [facility] to [facility], [employer], [employer] [facility], [employer] and [employer]. I have personally spoke with each admissions person and they are reviewing. @1500 [facility] will accept. Need PCR since pt is unvaccinated. I called lab and they  has gone already. So will not have result until the a.m. PPD placed and a note entered at the request of the facility. CM will follow up in the am to set up transport for pt.

## 2021-10-14 NOTE — ED PROVIDER NOTES
17-year-old female patient arrives via EMS with her  complains of knee pain. Patient is well-known to this department as she often comes with her  who is a chronic alcoholic and frequently presents for alcohol-related complaints. Patient is unable to care for herself at home and requires transport to the hospital with her  each time. EMS providers report initially patient reported no complaints of any type however when she was told that she would wait in the lobby for her  to be seen, she reported that she needed to be seen for knee pain. With regards to this knee pain, she states it is chronic and that \"they have always hurt\". Patient has a longstanding history of bilateral osteoarthritis of the knee and previous knee replacement on the right side. Patient denies falls or trauma normally gets around via wheelchair. She freely admits that her pain is consistent with chronic pain and unchanged at this time. She routinely takes ibuprofen with good effect at home for control of pain. Patient denies falls or trauma. She has no other complaints at this time.            Past Medical History:   Diagnosis Date    Aneurysm of common carotid artery ([address] 2004    left side s/p coil     CKD (chronic kidney disease) 9/18/2014    Dementia ([address]     FSGS (focal segmental glomerulosclerosis)     in remission at present    Gout     Hypertension     managed with medication     Osteoarthritis 9/18/2014    Stroke ([address] 2004, 2013    slight weakness on right side, slight effect to speech       Past Surgical History:   Procedure Laterality Date    HX ANKLE FRACTURE TX Left 2013    has hardware in    HX CHOLECYSTECTOMY  02/28/2018    HX ERCP  02/27/2018    cbd stone    HX INTRACRANIAL ANEURYSM REPAIR  2004    left carotid     HX ORTHOPAEDIC Right 10/2014    hip replacement    HX REFRACTIVE SURGERY      bilateral - Lasik    IR INSERT TUNL CVC W/O PORT OVER 5 YR  12/22/2020    IR REMOVE TUNL CVAD W/O PORT / PUMP  1/15/2021         Family History:   Problem Relation Age of Onset    Cancer Father 80        unknown    Stroke Sister 48       Social History     Socioeconomic History    Marital status:      Spouse name: Not on file    Number of children: Not on file    Years of education: Not on file    Highest education level: Not on file   Occupational History    Not on file   Tobacco Use    Smoking status: Former Smoker     Packs/day: 0.25     Quit date: 1979     Years since quittin.8    Smokeless tobacco: Never Used   Substance and Sexual Activity    Alcohol use: Yes     Alcohol/week: 2.5 standard drinks     Types: 3 Shots of liquor per week     Comment: pint of fireball    Drug use: No    Sexual activity: Yes     Partners: Male     Birth control/protection: None   Other Topics Concern    Not on file   Social History Narrative    Not on file     Social Determinants of Health     Financial Resource Strain:     Difficulty of Paying Living Expenses:    Food Insecurity:     Worried About Running Out of Food in the Last Year:     [address] in the Last Year:    Transportation Needs:     Lack of Transportation (Medical):  Lack of Transportation (Non-Medical):    Physical Activity:     Days of Exercise per Week:     Minutes of Exercise per Session:    Stress:     Feeling of Stress :    Social Connections:     Frequency of Communication with Friends and Family:     Frequency of Social Gatherings with Friends and Family:     Attends Synagogue Services:     Active Member of Clubs or Organizations:     Attends Club or Organization Meetings:     Marital Status:    Intimate Partner Violence:     Fear of Current or Ex-Partner:     Emotionally Abused:     Physically Abused:     Sexually Abused: ALLERGIES: Adhesive, Codeine, and Hydrocodone-acetaminophen    Review of Systems   Musculoskeletal: Positive for arthralgias.    All other systems reviewed and are negative. Vitals:    10/14/21 0431   BP: (!) 138/94   Pulse: 84   Resp: 16   Temp: 98.4 °F (36.9 °C)   SpO2: 98%   Weight: 90.7 kg (200 lb)            Physical Exam  Vitals and nursing note reviewed. Constitutional:       Appearance: Normal appearance. HENT:      Head: Normocephalic and atraumatic. Nose: Nose normal.      Mouth/Throat:      Mouth: Mucous membranes are dry. Eyes:      Extraocular Movements: Extraocular movements intact. Pupils: Pupils are equal, round, and reactive to light. Cardiovascular:      Pulses: Normal pulses. Pulmonary:      Effort: Pulmonary effort is normal.   Abdominal:      General: Abdomen is flat. Musculoskeletal:         General: Normal range of motion. Cervical back: Normal range of motion. Comments: Some minimal crepitus is noted with flexion extension of the knees bilaterally. Well-healed surgical scar overlies the anterior aspect of the right knee, distal pulses are intact with post capillary refill present. There is no redness or significant swelling on exam.   Skin:     General: Skin is warm and dry. Capillary Refill: Capillary refill takes less than 2 seconds. Neurological:      General: No focal deficit present. Mental Status: She is alert. Psychiatric:         Mood and Affect: Mood normal.          MDM  Number of Diagnoses or Management Options  Chronic pain of both knees  Diagnosis management comments: 54-year-old female patient well-known to this department presents with her , reporting knee pain that she states is chronic. Will give 1 dose of medication here and discharge. No indication for imaging. Symptoms consistent with chronic knee pain. Voice dictation software was used during the making of this note. This software is not perfect and grammatical and other typographical errors may be present. This note has been proofread, but may still contain errors.   [address] Doctor Romero Stapleton [facility]; 10/14/2021 @4:40 AM ===================================================================         Amount and/or Complexity of Data Reviewed  Tests in the medicine section of CPT®: ordered and reviewed    Risk of Complications, Morbidity, and/or Mortality  Presenting problems: low  Diagnostic procedures: low  Management options: low    Patient Progress  Patient progress: stable         Procedures

## 2021-10-14 NOTE — ED NOTES
rand states [facility] will accept pt after results of PCR covid test which will not be resulted until tomorrow.

## 2021-10-14 NOTE — ED NOTES
Patient dispo set for discharge and patient medically cleared for discharge by Dr Aruna Walker; however patient is unable to care for herself and her only caregiver is a patient also in the ED. Unable to safely discharge patient at this time. Dr Aruna Walker aware.

## 2021-10-14 NOTE — ED NOTES
Report has been received from [facility]. Patient sleeping at this time, respirations are even and non-labored.

## 2021-10-14 NOTE — ED NOTES
Patient moved to private area for urine brief to be changed,  Minimal urine present. Patient cleaned and new brief applied, applied blanket for comfort at this time.

## 2021-10-15 LAB
SARS-COV-2, COV2: NOT DETECTED
SPECIMEN SOURCE, FCOV2M: NORMAL

## 2021-10-15 NOTE — PROGRESS NOTES
PRC resulted & is negative, copy placed on packet. Pt will d/c to [address] @ 1300( via [facility]), Rm # [phone]. [facility] requested that time to prepare pt's room. RN given # to call report. Pt informed of the time of transfer. Will speak with SW at [facility] so that they can assist pt in Medicaid process for the plan of a long term stay. @1100 Received call form Dion, apparently pt does not have any Medicare days left. I called [facility] to confirm and Evelia was not able to provide any dates or palaces pt had been. [facility] is DB checking with there office. Charan James called and placed on will call. [facility] has been denied to \"re-submit\". Per Evelia @ [employer] #[phone]. I called and spoke with D[facility], she pulled up pt's info under her Medicare # [id number]. ..which I pulled form a PT eval form. Pt's last admission was at [facility] from 5/28-6/17. There is no reason pt should not have days left. Gerardo Esparza does not have any days. I called [facility] ([facility]) and they will take pt, but will have to run her insurance. May take 1-3 days. They are aware that we are looking at short to long term stay. CM to follow.

## 2021-10-15 NOTE — ED NOTES
Another nurse and I got pt up to wheelchair and took to restroom. Pt very hard to transfer. Pt voided and passed medium brown stool.  Back to bed in nad

## 2021-10-16 PROCEDURE — 74011250637 HC RX REV CODE- 250/637: Performed by: EMERGENCY MEDICINE

## 2021-10-16 RX ORDER — LISINOPRIL 5 MG/1
10 TABLET ORAL DAILY
Status: DISCONTINUED | OUTPATIENT
Start: 2021-10-17 | End: 2021-10-17 | Stop reason: HOSPADM

## 2021-10-16 RX ORDER — MIRTAZAPINE 15 MG/1
7.5 TABLET, FILM COATED ORAL
Status: DISCONTINUED | OUTPATIENT
Start: 2021-10-16 | End: 2021-10-18 | Stop reason: HOSPADM

## 2021-10-16 RX ORDER — SODIUM BICARBONATE 650 MG/1
650 TABLET ORAL 3 TIMES DAILY
Status: DISCONTINUED | OUTPATIENT
Start: 2021-10-16 | End: 2021-10-18 | Stop reason: HOSPADM

## 2021-10-16 RX ORDER — AMLODIPINE BESYLATE 5 MG/1
5 TABLET ORAL
Status: DISCONTINUED | OUTPATIENT
Start: 2021-10-16 | End: 2021-10-18 | Stop reason: HOSPADM

## 2021-10-16 RX ORDER — FLUOXETINE HYDROCHLORIDE 20 MG/1
20 CAPSULE ORAL DAILY
Status: DISCONTINUED | OUTPATIENT
Start: 2021-10-17 | End: 2021-10-17 | Stop reason: HOSPADM

## 2021-10-16 RX ORDER — QUETIAPINE FUMARATE 25 MG/1
50 TABLET, FILM COATED ORAL
Status: DISCONTINUED | OUTPATIENT
Start: 2021-10-16 | End: 2021-10-18 | Stop reason: HOSPADM

## 2021-10-16 RX ORDER — GUAIFENESIN 100 MG/5ML
162 LIQUID (ML) ORAL DAILY
Status: DISCONTINUED | OUTPATIENT
Start: 2021-10-17 | End: 2021-10-18 | Stop reason: HOSPADM

## 2021-10-16 RX ORDER — ATORVASTATIN CALCIUM 40 MG/1
80 TABLET, FILM COATED ORAL
Status: DISCONTINUED | OUTPATIENT
Start: 2021-10-16 | End: 2021-10-18 | Stop reason: HOSPADM

## 2021-10-16 RX ORDER — TRAZODONE HYDROCHLORIDE 50 MG/1
50 TABLET ORAL
Status: DISCONTINUED | OUTPATIENT
Start: 2021-10-16 | End: 2021-10-18 | Stop reason: HOSPADM

## 2021-10-16 RX ADMIN — ATORVASTATIN CALCIUM 80 MG: 40 TABLET, FILM COATED ORAL at 21:47

## 2021-10-16 RX ADMIN — QUETIAPINE FUMARATE 50 MG: 25 TABLET ORAL at 21:46

## 2021-10-16 RX ADMIN — AMLODIPINE BESYLATE 5 MG: 5 TABLET ORAL at 21:46

## 2021-10-16 RX ADMIN — SODIUM BICARBONATE 650 MG TABLET 650 MG: at 21:47

## 2021-10-16 RX ADMIN — MIRTAZAPINE 7.5 MG: 15 TABLET, FILM COATED ORAL at 21:46

## 2021-10-16 RX ADMIN — TRAZODONE HYDROCHLORIDE 50 MG: 50 TABLET ORAL at 21:46

## 2021-10-16 NOTE — ED NOTES
After several failed attempts to void in the bedside commode, a Purewick was applied instead. Pt has very little overall strength, is unable to utilize her right leg at all and has very little endurance in the left leg.

## 2021-10-16 NOTE — PROGRESS NOTES
Chart reviewed, RN CM notes reviewed by BIJAN. Patient has a bed offer at [facility] but we are currently awaiting pre-cert from the patient's insurance which may take \"one to three days\" per notes.      Alis Ramirez, MERLYN    [facility]    * [email or web address]

## 2021-10-17 PROCEDURE — 74011250637 HC RX REV CODE- 250/637: Performed by: EMERGENCY MEDICINE

## 2021-10-17 RX ADMIN — ASPIRIN 81 MG CHEWABLE TABLET 162 MG: 81 TABLET CHEWABLE at 18:23

## 2021-10-17 RX ADMIN — SODIUM BICARBONATE 650 MG TABLET 650 MG: at 18:24

## 2021-10-18 VITALS
TEMPERATURE: 98.3 F | WEIGHT: 200 LBS | RESPIRATION RATE: 18 BRPM | HEART RATE: 96 BPM | DIASTOLIC BLOOD PRESSURE: 74 MMHG | SYSTOLIC BLOOD PRESSURE: 134 MMHG | OXYGEN SATURATION: 99 % | BODY MASS INDEX: 33.28 KG/M2

## 2021-10-18 NOTE — ED NOTES
Changed out canister for pure wick. Emptied 400 mls of clear yellow urine.  Pt has no needs at this time

## 2021-10-18 NOTE — ED NOTES
Woke pt up to check her vital signs and remind her that her breakfast was waiting for her. Pt said she did not need anything else. Still resting in bed.

## 2021-10-18 NOTE — ED NOTES
I have reviewed discharge instructions with the patient. The patient verbalized understanding. Patient left ED via Discharge Method: wheelchair to Home with . Opportunity for questions and clarification provided. Patient given 0 scripts. To continue your aftercare when you leave the hospital, you may receive an automated call from our care team to check in on how you are doing. This is a free service and part of our promise to provide the best care and service to meet your aftercare needs.  If you have questions, or wish to unsubscribe from this service please call [phone]. Thank you for Choosing our [facility] Emergency Department.

## 2021-11-25 ENCOUNTER — HOSPITAL ENCOUNTER (EMERGENCY)
Age: 61
Discharge: HOME | End: 2021-11-25
Attending: EMERGENCY MEDICINE
Payer: MEDICARE

## 2021-11-25 VITALS
RESPIRATION RATE: 16 BRPM | SYSTOLIC BLOOD PRESSURE: 152 MMHG | DIASTOLIC BLOOD PRESSURE: 87 MMHG | TEMPERATURE: 97.5 F | HEART RATE: 75 BPM

## 2021-11-25 DIAGNOSIS — M25.562 BILATERAL CHRONIC KNEE PAIN: Primary | ICD-10-CM

## 2021-11-25 DIAGNOSIS — M25.561 BILATERAL CHRONIC KNEE PAIN: Primary | ICD-10-CM

## 2021-11-25 DIAGNOSIS — G89.29 BILATERAL CHRONIC KNEE PAIN: Primary | ICD-10-CM

## 2021-11-25 PROCEDURE — 74011250637 HC RX REV CODE- 250/637: Performed by: EMERGENCY MEDICINE

## 2021-11-25 PROCEDURE — 99283 EMERGENCY DEPT VISIT LOW MDM: CPT

## 2021-11-25 RX ORDER — ACETAMINOPHEN 325 MG/1
650 TABLET ORAL
Status: COMPLETED | OUTPATIENT
Start: 2021-11-25 | End: 2021-11-25

## 2021-11-25 RX ADMIN — ACETAMINOPHEN 650 MG: 325 TABLET ORAL at 01:01

## 2021-11-25 NOTE — ED PROVIDER NOTES
55-year-old  female with history of hypertension, gout, CKD, dementia, chronic knee pain and prior stroke presents the emergency department via EMS because her  was transported in. Patient's had multiple visits for the same, as her  is her caretaker and she cannot take care of herself. At present she complains of her chronic bilateral knee pain. The history is provided by the patient and the EMS personnel. Knee Pain   This is a chronic problem. The current episode started more than 1 week ago. The problem occurs daily. The problem has not changed since onset. The pain is present in the right knee and left knee. The quality of the pain is described as aching, dull and constant. The pain is moderate. Associated symptoms include limited range of motion and stiffness. Pertinent negatives include no numbness, no tingling, no itching, no back pain and no neck pain. The symptoms are aggravated by movement, standing and palpation. She has tried rest for the symptoms. The treatment provided mild relief. There has been no history of extremity trauma.         Past Medical History:   Diagnosis Date    Aneurysm of common carotid artery ([address] 2004    left side s/p coil     CKD (chronic kidney disease) 9/18/2014    Dementia ([address]     FSGS (focal segmental glomerulosclerosis)     in remission at present    Gout     Hypertension     managed with medication     Osteoarthritis 9/18/2014    Stroke ([address] 2004, 2013    slight weakness on right side, slight effect to speech       Past Surgical History:   Procedure Laterality Date    HX ANKLE FRACTURE TX Left 2013    has hardware in    HX CHOLECYSTECTOMY  02/28/2018    HX ERCP  02/27/2018    cbd stone    HX INTRACRANIAL ANEURYSM REPAIR  2004    left carotid     HX ORTHOPAEDIC Right 10/2014    hip replacement    HX REFRACTIVE SURGERY      bilateral - Lasik    IR INSERT TUNL CVC W/O PORT OVER 5 YR  12/22/2020    IR REMOVE TUNL CVAD W/O PORT / PUMP 1/15/2021         Family History:   Problem Relation Age of Onset    Cancer Father 80        unknown    Stroke Sister 48       Social History     Socioeconomic History    Marital status:      Spouse name: Not on file    Number of children: Not on file    Years of education: Not on file    Highest education level: Not on file   Occupational History    Not on file   Tobacco Use    Smoking status: Former Smoker     Packs/day: 0.25     Quit date: 1979     Years since quittin.9    Smokeless tobacco: Never Used   Substance and Sexual Activity    Alcohol use: Yes     Alcohol/week: 2.5 standard drinks     Types: 3 Shots of liquor per week     Comment: pint of fireball    Drug use: No    Sexual activity: Yes     Partners: Male     Birth control/protection: None   Other Topics Concern    Not on file   Social History Narrative    Not on file     Social Determinants of Health     Financial Resource Strain:     Difficulty of Paying Living Expenses: Not on file   Food Insecurity:     Worried About Running Out of Food in the Last Year: Not on file    Jarod of Food in the Last Year: Not on file   Transportation Needs:     Lack of Transportation (Medical): Not on file    Lack of Transportation (Non-Medical):  Not on file   Physical Activity:     Days of Exercise per Week: Not on file    Minutes of Exercise per Session: Not on file   Stress:     Feeling of Stress : Not on file   Social Connections:     Frequency of Communication with Friends and Family: Not on file    Frequency of Social Gatherings with Friends and Family: Not on file    Attends Roman Catholic Services: Not on file    Active Member of Clubs or Organizations: Not on file    Attends Club or Organization Meetings: Not on file    Marital Status: Not on file   Intimate Partner Violence:     Fear of Current or Ex-Partner: Not on file    Emotionally Abused: Not on file    Physically Abused: Not on file    Sexually Abused: Not on file   Housing Stability:     Unable to Pay for Housing in the Last Year: Not on file    Number of Places Lived in the Last Year: Not on file    Unstable Housing in the Last Year: Not on file         ALLERGIES: Adhesive, Codeine, and Hydrocodone-acetaminophen    Review of Systems   Constitutional: Negative for chills and fever. Musculoskeletal: Positive for arthralgias and stiffness. Negative for back pain and neck pain. Skin: Negative for itching. Neurological: Negative for tingling and numbness. All other systems reviewed and are negative. There were no vitals filed for this visit. Physical Exam  Vitals and nursing note reviewed. Constitutional:       General: She is not in acute distress. Appearance: She is well-developed. HENT:      Head: Normocephalic and atraumatic. Right Ear: External ear normal.      Left Ear: External ear normal.   Eyes:      Extraocular Movements: Extraocular movements intact. Conjunctiva/sclera: Conjunctivae normal.      Pupils: Pupils are equal, round, and reactive to light. Cardiovascular:      Rate and Rhythm: Normal rate and regular rhythm. Heart sounds: Normal heart sounds. No murmur heard. Pulmonary:      Effort: Pulmonary effort is normal.      Breath sounds: Normal breath sounds. Abdominal:      General: Bowel sounds are normal.      Palpations: Abdomen is soft. Tenderness: There is no abdominal tenderness. Musculoskeletal:         General: Tenderness (Mild to bilateral knees, generalized no evidence of warmth or erythema) present. No swelling. Cervical back: Normal range of motion and neck supple. Skin:     General: Skin is warm and dry. Capillary Refill: Capillary refill takes less than 2 seconds. Neurological:      Mental Status: She is alert.    Psychiatric:         Mood and Affect: Mood normal.          MDM  Number of Diagnoses or Management Options  Bilateral chronic knee pain: new and does not require workup     Amount and/or Complexity of Data Reviewed  Review and summarize past medical records: yes    Risk of Complications, Morbidity, and/or Mortality  Presenting problems: low  Diagnostic procedures: minimal  Management options: low    Patient Progress  Patient progress: stable         Procedures

## 2021-11-25 NOTE — ED TRIAGE NOTES
EMS advised patient stated she could not be left alone at home (  coming to [facility] ED) and had no complaint originally until EMS advised cannot take patient to ED for no complaint. Patient then advised knee pain.

## 2021-11-25 NOTE — ED NOTES
I have reviewed discharge instructions with the patient. The patient verbalized understanding. Patient left ED via Discharge Method: stretcher to Home with ems. Opportunity for questions and clarification provided. Patient given 0 scripts. To continue your aftercare when you leave the hospital, you may receive an automated call from our care team to check in on how you are doing. This is a free service and part of our promise to provide the best care and service to meet your aftercare needs.  If you have questions, or wish to unsubscribe from this service please call [phone]. Thank you for Choosing our [facility] Emergency Department.

## 2022-01-24 ENCOUNTER — HOSPITAL ENCOUNTER (EMERGENCY)
Age: 62
Discharge: HOME | End: 2022-01-24
Attending: STUDENT IN AN ORGANIZED HEALTH CARE EDUCATION/TRAINING PROGRAM
Payer: MEDICARE

## 2022-01-24 VITALS
SYSTOLIC BLOOD PRESSURE: 149 MMHG | HEART RATE: 90 BPM | DIASTOLIC BLOOD PRESSURE: 73 MMHG | OXYGEN SATURATION: 98 % | RESPIRATION RATE: 18 BRPM

## 2022-01-24 DIAGNOSIS — M25.562 CHRONIC PAIN OF BOTH KNEES: Primary | ICD-10-CM

## 2022-01-24 DIAGNOSIS — M25.561 CHRONIC PAIN OF BOTH KNEES: Primary | ICD-10-CM

## 2022-01-24 DIAGNOSIS — G89.29 CHRONIC PAIN OF BOTH KNEES: Primary | ICD-10-CM

## 2022-01-24 PROCEDURE — 74011250637 HC RX REV CODE- 250/637: Performed by: PHYSICIAN ASSISTANT

## 2022-01-24 PROCEDURE — 99284 EMERGENCY DEPT VISIT MOD MDM: CPT

## 2022-01-24 RX ORDER — ACETAMINOPHEN 325 MG/1
650 TABLET ORAL
Status: COMPLETED | OUTPATIENT
Start: 2022-01-24 | End: 2022-01-24

## 2022-01-24 RX ADMIN — ACETAMINOPHEN 650 MG: 325 TABLET, FILM COATED ORAL at 09:20

## 2022-01-24 NOTE — DISCHARGE INSTRUCTIONS
Use your walker at home to help you get around. Follow-up with your primary care physician for pain medication if needed. You can ice your knees. Follow-up with your orthopedic surgeon if needed as well.

## 2022-01-24 NOTE — ED NOTES
I have reviewed discharge instructions with the patient. The patient verbalized understanding. Patient left ED via Discharge Method: stretcher to Home with [address] [facility]. Opportunity for questions and clarification provided. Patient given 0 scripts. To continue your aftercare when you leave the hospital, you may receive an automated call from our care team to check in on how you are doing. This is a free service and part of our promise to provide the best care and service to meet your aftercare needs.  If you have questions, or wish to unsubscribe from this service please call [phone]. Thank you for Choosing our [facility] Emergency Department.

## 2022-01-24 NOTE — PROGRESS NOTES
Meet with pt and confirmed that the only reason she came in was because her  came in to be seen. Pt has had a medical screen and ok to be released back home. Confirmed that pt has heat, running water and food in the home. When I asked about Guido Blanchard states that he comes over daily. I gave pt her house keys and debit card provided by her  and that I have called EMS to take her home. I have called Jose Ramon Reyez to confirm he was coming out and I LM. Pt agreed with this plan. I called [facility] and made a new report on pt's behalf.

## 2022-01-24 NOTE — ED PROVIDER NOTES
Patient was brought in by EMS with her  because he did not want her left alone. She states she has chronic knee pain and her doctor will not give her pain medicine. She cannot remember her doctor's name. Patient is alert and oriented to person place and time. She did have a knee replacement on the right but cannot remember the orthopedic surgeon. Apparently he was here earlier and left and went home and came back by EMS and brought her. Case management is very familiar with her and has been contacted. No other symptoms at this time.  is an alcoholic. The history is provided by the patient. Knee Pain  This is a chronic problem. The current episode started more than 1 week ago. The problem occurs constantly. The problem has not changed since onset. Pertinent negatives include no chest pain, no abdominal pain, no headaches and no shortness of breath. Nothing aggravates the symptoms. Nothing relieves the symptoms. She has tried nothing for the symptoms.         Past Medical History:   Diagnosis Date    Aneurysm of common carotid artery ([address] 2004    left side s/p coil     CKD (chronic kidney disease) 9/18/2014    Dementia ([address]     FSGS (focal segmental glomerulosclerosis)     in remission at present    Gout     Hypertension     managed with medication     Osteoarthritis 9/18/2014    Stroke ([address] 2004, 2013    slight weakness on right side, slight effect to speech       Past Surgical History:   Procedure Laterality Date    HX ANKLE FRACTURE TX Left 2013    has hardware in    HX CHOLECYSTECTOMY  02/28/2018    HX ERCP  02/27/2018    cbd stone    HX INTRACRANIAL ANEURYSM REPAIR  2004    left carotid     HX ORTHOPAEDIC Right 10/2014    hip replacement    HX REFRACTIVE SURGERY      bilateral - Lasik    IR INSERT TUNL CVC W/O PORT OVER 5 YR  12/22/2020    IR REMOVE TUNL CVAD W/O PORT / PUMP  1/15/2021         Family History:   Problem Relation Age of Onset    Cancer Father 80 unknown    Stroke Sister 48       Social History     Socioeconomic History    Marital status:      Spouse name: Not on file    Number of children: Not on file    Years of education: Not on file    Highest education level: Not on file   Occupational History    Not on file   Tobacco Use    Smoking status: Former Smoker     Packs/day: 0.25     Quit date: 1979     Years since quittin.0    Smokeless tobacco: Never Used   Substance and Sexual Activity    Alcohol use: Yes     Alcohol/week: 2.5 standard drinks     Types: 3 Shots of liquor per week     Comment: pint of fireball    Drug use: No    Sexual activity: Yes     Partners: Male     Birth control/protection: None   Other Topics Concern    Not on file   Social History Narrative    Not on file     Social Determinants of Health     Financial Resource Strain:     Difficulty of Paying Living Expenses: Not on file   Food Insecurity:     Worried About Running Out of Food in the Last Year: Not on file    Jarod of Food in the Last Year: Not on file   Transportation Needs:     Lack of Transportation (Medical): Not on file    Lack of Transportation (Non-Medical):  Not on file   Physical Activity:     Days of Exercise per Week: Not on file    Minutes of Exercise per Session: Not on file   Stress:     Feeling of Stress : Not on file   Social Connections:     Frequency of Communication with Friends and Family: Not on file    Frequency of Social Gatherings with Friends and Family: Not on file    Attends Mormonism Services: Not on file    Active Member of Clubs or Organizations: Not on file    Attends Club or Organization Meetings: Not on file    Marital Status: Not on file   Intimate Partner Violence:     Fear of Current or Ex-Partner: Not on file    Emotionally Abused: Not on file    Physically Abused: Not on file    Sexually Abused: Not on file   Housing Stability:     Unable to Pay for Housing in the Last Year: Not on file    Number of Places Lived in the Last Year: Not on file    Unstable Housing in the Last Year: Not on file         ALLERGIES: Adhesive, Codeine, and Hydrocodone-acetaminophen    Review of Systems   Constitutional: Negative. HENT: Negative. Eyes: Negative. Respiratory: Negative. Negative for shortness of breath. Cardiovascular: Negative. Negative for chest pain. Gastrointestinal: Negative. Negative for abdominal pain. Genitourinary: Negative. Musculoskeletal: Negative. Knee pain   Skin: Negative. Neurological: Negative. Negative for headaches. Psychiatric/Behavioral: Negative. All other systems reviewed and are negative. Vitals:    01/24/22 0650   BP: (!) 160/80   Pulse: 90   Resp: 16   SpO2: 99%            Physical Exam  Vitals and nursing note reviewed. Constitutional:       Appearance: She is well-developed. HENT:      Head: Normocephalic and atraumatic. Right Ear: External ear normal.      Left Ear: External ear normal.      Nose: Nose normal.      Mouth/Throat:      Mouth: Mucous membranes are moist.   Eyes:      Conjunctiva/sclera: Conjunctivae normal.      Pupils: Pupils are equal, round, and reactive to light. Cardiovascular:      Rate and Rhythm: Normal rate and regular rhythm. Heart sounds: Normal heart sounds. Pulmonary:      Effort: Pulmonary effort is normal.      Breath sounds: Normal breath sounds. Abdominal:      General: Bowel sounds are normal.      Palpations: Abdomen is soft. Musculoskeletal:         General: Tenderness present. Normal range of motion. Cervical back: Normal range of motion and neck supple. Legs:    Skin:     General: Skin is warm and dry. Capillary Refill: Capillary refill takes less than 2 seconds. Neurological:      General: No focal deficit present. Mental Status: She is alert and oriented to person, place, and time. Deep Tendon Reflexes: Reflexes are normal and symmetric.    Psychiatric: Mood and Affect: Mood normal.         Behavior: Behavior normal.         Thought Content: Thought content normal.         Judgment: Judgment normal.          MDM  Number of Diagnoses or Management Options  Risk of Complications, Morbidity, and/or Mortality  Presenting problems: low  Diagnostic procedures: low  Management options: low    Patient Progress  Patient progress: improved         Procedures  The patient was observed in the ED. Raghavendra Ramirez RN,  has verified there is someone that is going to bring the patient meals and check on her. She has a walker and wheelchair at home that she can utilize to get around. She is stable for discharge. She was instructed to follow-up with her primary care physician regarding her chronic pain and pain medicine if they feel that is indicated or the orthopedic surgeon that did her surgery. I discussed the results of all labs, procedures, radiographs, and treatments with the patient and available family. Treatment plan is agreed upon and the patient is ready for discharge. All voiced understanding of the discharge plan and medication instructions or changes as appropriate. Questions about treatment in the ED were answered. All were encouraged to return should symptoms worsen or new problems develop.

## 2022-02-19 ENCOUNTER — HOSPITAL ENCOUNTER (OUTPATIENT)
Dept: LAB | Age: 62
Discharge: HOME | End: 2022-02-19

## 2022-02-19 PROCEDURE — 80048 BASIC METABOLIC PNL TOTAL CA: CPT

## 2022-02-20 LAB
ANION GAP SERPL CALC-SCNC: 8 MMOL/L (ref 7–16)
BUN SERPL-MCNC: 36 MG/DL (ref 8–23)
CALCIUM SERPL-MCNC: 7.9 MG/DL (ref 8.3–10.4)
CHLORIDE SERPL-SCNC: 113 MMOL/L (ref 98–107)
CO2 SERPL-SCNC: 24 MMOL/L (ref 21–32)
CREAT SERPL-MCNC: 2.7 MG/DL (ref 0.6–1)
GLUCOSE SERPL-MCNC: 116 MG/DL (ref 65–100)
POTASSIUM SERPL-SCNC: 4 MMOL/L (ref 3.5–5.1)
SODIUM SERPL-SCNC: 145 MMOL/L (ref 136–145)

## 2022-05-16 ENCOUNTER — HOSPITAL ENCOUNTER (EMERGENCY)
Age: 62
Discharge: HOME | End: 2022-05-16
Attending: EMERGENCY MEDICINE
